# Patient Record
Sex: FEMALE | Race: WHITE | Employment: OTHER | URBAN - METROPOLITAN AREA
[De-identification: names, ages, dates, MRNs, and addresses within clinical notes are randomized per-mention and may not be internally consistent; named-entity substitution may affect disease eponyms.]

---

## 2017-01-04 ENCOUNTER — APPOINTMENT (OUTPATIENT)
Dept: GENERAL RADIOLOGY | Age: 67
DRG: 617 | End: 2017-01-04
Attending: PODIATRIST
Payer: MEDICARE

## 2017-01-04 PROCEDURE — 73630 X-RAY EXAM OF FOOT: CPT

## 2017-05-10 ENCOUNTER — HOSPITAL ENCOUNTER (OUTPATIENT)
Dept: GENERAL RADIOLOGY | Age: 67
Discharge: HOME | End: 2017-05-10
Attending: PODIATRIST
Payer: MEDICARE

## 2017-05-10 DIAGNOSIS — M25.571 RIGHT ANKLE PAIN: ICD-10-CM

## 2017-05-10 PROCEDURE — 73610 X-RAY EXAM OF ANKLE: CPT

## 2017-06-12 ENCOUNTER — HOSPITAL ENCOUNTER (OUTPATIENT)
Dept: GENERAL RADIOLOGY | Age: 67
Discharge: HOME | End: 2017-06-12
Attending: INTERNAL MEDICINE
Payer: MEDICARE

## 2017-06-12 DIAGNOSIS — R11.2 NAUSEA WITH VOMITING: ICD-10-CM

## 2017-06-12 DIAGNOSIS — K21.9 GERD (GASTROESOPHAGEAL REFLUX DISEASE): ICD-10-CM

## 2017-06-12 PROCEDURE — 74011000255 HC RX REV CODE- 255: Performed by: INTERNAL MEDICINE

## 2017-06-12 PROCEDURE — 74240 X-RAY XM UPR GI TRC 1CNTRST: CPT

## 2017-06-12 RX ADMIN — BARIUM SULFATE 300 ML: 0.6 SUSPENSION ORAL at 08:56

## 2017-07-22 ENCOUNTER — APPOINTMENT (OUTPATIENT)
Dept: CT IMAGING | Age: 67
DRG: 871 | End: 2017-07-22
Attending: EMERGENCY MEDICINE
Payer: MEDICARE

## 2017-07-22 ENCOUNTER — HOSPITAL ENCOUNTER (INPATIENT)
Age: 67
LOS: 2 days | Discharge: HOME HEALTH | DRG: 871 | End: 2017-07-24
Attending: EMERGENCY MEDICINE | Admitting: FAMILY MEDICINE
Payer: MEDICARE

## 2017-07-22 ENCOUNTER — APPOINTMENT (OUTPATIENT)
Dept: GENERAL RADIOLOGY | Age: 67
DRG: 871 | End: 2017-07-22
Attending: EMERGENCY MEDICINE
Payer: MEDICARE

## 2017-07-22 DIAGNOSIS — J18.9 COMMUNITY ACQUIRED PNEUMONIA: Primary | ICD-10-CM

## 2017-07-22 DIAGNOSIS — G89.4 CHRONIC PAIN SYNDROME: ICD-10-CM

## 2017-07-22 PROBLEM — I73.9 PAD (PERIPHERAL ARTERY DISEASE) (HCC): Chronic | Status: ACTIVE | Noted: 2017-07-22

## 2017-07-22 PROBLEM — A41.9 SEPSIS (HCC): Status: ACTIVE | Noted: 2017-07-22

## 2017-07-22 PROBLEM — E87.1 HYPONATREMIA: Status: ACTIVE | Noted: 2017-07-22

## 2017-07-22 PROBLEM — F51.3 SLEEPWALKING DISORDER: Chronic | Status: ACTIVE | Noted: 2017-07-22

## 2017-07-22 LAB
ALBUMIN SERPL BCP-MCNC: 3.4 G/DL (ref 3.4–5)
ALBUMIN/GLOB SERPL: 0.8 {RATIO} (ref 0.8–1.7)
ALP SERPL-CCNC: 127 U/L (ref 45–117)
ALT SERPL-CCNC: 45 U/L (ref 13–56)
AMPHET UR QL SCN: NEGATIVE
ANION GAP BLD CALC-SCNC: 13 MMOL/L (ref 3–18)
APPEARANCE UR: CLEAR
AST SERPL W P-5'-P-CCNC: 28 U/L (ref 15–37)
ATRIAL RATE: 97 BPM
BACTERIA URNS QL MICRO: ABNORMAL /HPF
BARBITURATES UR QL SCN: NEGATIVE
BASOPHILS # BLD AUTO: 0 K/UL (ref 0–0.06)
BASOPHILS # BLD: 0 % (ref 0–2)
BENZODIAZ UR QL: NEGATIVE
BILIRUB SERPL-MCNC: 0.3 MG/DL (ref 0.2–1)
BILIRUB UR QL: NEGATIVE
BUN SERPL-MCNC: 16 MG/DL (ref 7–18)
BUN/CREAT SERPL: 19 (ref 12–20)
CALCIUM SERPL-MCNC: 8.8 MG/DL (ref 8.5–10.1)
CALCULATED P AXIS, ECG09: 43 DEGREES
CALCULATED R AXIS, ECG10: 47 DEGREES
CALCULATED T AXIS, ECG11: 65 DEGREES
CANNABINOIDS UR QL SCN: POSITIVE
CHLORIDE SERPL-SCNC: 98 MMOL/L (ref 100–108)
CK MB CFR SERPL CALC: NORMAL % (ref 0–4)
CK MB SERPL-MCNC: <1 NG/ML (ref 5–25)
CK SERPL-CCNC: 119 U/L (ref 26–192)
CO2 SERPL-SCNC: 23 MMOL/L (ref 21–32)
COCAINE UR QL SCN: NEGATIVE
COLOR UR: YELLOW
CREAT SERPL-MCNC: 0.84 MG/DL (ref 0.6–1.3)
D DIMER PPP FEU-MCNC: 0.75 UG/ML(FEU)
DIAGNOSIS, 93000: NORMAL
DIFFERENTIAL METHOD BLD: ABNORMAL
EOSINOPHIL # BLD: 0 K/UL (ref 0–0.4)
EOSINOPHIL NFR BLD: 0 % (ref 0–5)
EPITH CASTS URNS QL MICRO: ABNORMAL /LPF (ref 0–5)
ERYTHROCYTE [DISTWIDTH] IN BLOOD BY AUTOMATED COUNT: 18.6 % (ref 11.6–14.5)
EST. AVERAGE GLUCOSE BLD GHB EST-MCNC: 186 MG/DL
GLOBULIN SER CALC-MCNC: 4.1 G/DL (ref 2–4)
GLUCOSE SERPL-MCNC: 183 MG/DL (ref 74–99)
GLUCOSE UR STRIP.AUTO-MCNC: 100 MG/DL
HBA1C MFR BLD: 8.1 % (ref 4.5–5.6)
HCT VFR BLD AUTO: 36.1 % (ref 35–45)
HDSCOM,HDSCOM: ABNORMAL
HGB BLD-MCNC: 11.6 G/DL (ref 12–16)
HGB UR QL STRIP: ABNORMAL
INR PPP: 1.4 (ref 0.8–1.2)
KETONES UR QL STRIP.AUTO: NEGATIVE MG/DL
LACTATE SERPL-SCNC: 1.5 MMOL/L (ref 0.4–2)
LEUKOCYTE ESTERASE UR QL STRIP.AUTO: NEGATIVE
LIPASE SERPL-CCNC: 54 U/L (ref 73–393)
LYMPHOCYTES # BLD AUTO: 4 % (ref 21–52)
LYMPHOCYTES # BLD: 0.7 K/UL (ref 0.9–3.6)
MCH RBC QN AUTO: 27.4 PG (ref 24–34)
MCHC RBC AUTO-ENTMCNC: 32.1 G/DL (ref 31–37)
MCV RBC AUTO: 85.1 FL (ref 74–97)
METHADONE UR QL: NEGATIVE
MONOCYTES # BLD: 0.7 K/UL (ref 0.05–1.2)
MONOCYTES NFR BLD AUTO: 4 % (ref 3–10)
NEUTS SEG # BLD: 17.1 K/UL (ref 1.8–8)
NEUTS SEG NFR BLD AUTO: 92 % (ref 40–73)
NITRITE UR QL STRIP.AUTO: NEGATIVE
OPIATES UR QL: POSITIVE
P-R INTERVAL, ECG05: 144 MS
PCP UR QL: NEGATIVE
PH UR STRIP: 5.5 [PH] (ref 5–8)
PLATELET # BLD AUTO: 233 K/UL (ref 135–420)
PMV BLD AUTO: 9.3 FL (ref 9.2–11.8)
POTASSIUM SERPL-SCNC: 3.8 MMOL/L (ref 3.5–5.5)
PROT SERPL-MCNC: 7.5 G/DL (ref 6.4–8.2)
PROT UR STRIP-MCNC: 100 MG/DL
PROTHROMBIN TIME: 16.6 SEC (ref 11.5–15.2)
Q-T INTERVAL, ECG07: 332 MS
QRS DURATION, ECG06: 76 MS
QTC CALCULATION (BEZET), ECG08: 421 MS
RBC # BLD AUTO: 4.24 M/UL (ref 4.2–5.3)
RBC #/AREA URNS HPF: ABNORMAL /HPF (ref 0–5)
SODIUM SERPL-SCNC: 134 MMOL/L (ref 136–145)
SP GR UR REFRACTOMETRY: >1.03 (ref 1–1.03)
TROPONIN I SERPL-MCNC: <0.02 NG/ML (ref 0–0.06)
UROBILINOGEN UR QL STRIP.AUTO: 1 EU/DL (ref 0.2–1)
VENTRICULAR RATE, ECG03: 97 BPM
WBC # BLD AUTO: 18.5 K/UL (ref 4.6–13.2)
WBC URNS QL MICRO: ABNORMAL /HPF (ref 0–5)

## 2017-07-22 PROCEDURE — 93005 ELECTROCARDIOGRAM TRACING: CPT

## 2017-07-22 PROCEDURE — 74011250637 HC RX REV CODE- 250/637: Performed by: EMERGENCY MEDICINE

## 2017-07-22 PROCEDURE — 96365 THER/PROPH/DIAG IV INF INIT: CPT

## 2017-07-22 PROCEDURE — 99285 EMERGENCY DEPT VISIT HI MDM: CPT

## 2017-07-22 PROCEDURE — 74011000258 HC RX REV CODE- 258: Performed by: FAMILY MEDICINE

## 2017-07-22 PROCEDURE — 83036 HEMOGLOBIN GLYCOSYLATED A1C: CPT | Performed by: FAMILY MEDICINE

## 2017-07-22 PROCEDURE — 87086 URINE CULTURE/COLONY COUNT: CPT | Performed by: EMERGENCY MEDICINE

## 2017-07-22 PROCEDURE — 65270000029 HC RM PRIVATE

## 2017-07-22 PROCEDURE — 74011000250 HC RX REV CODE- 250: Performed by: FAMILY MEDICINE

## 2017-07-22 PROCEDURE — 74011000258 HC RX REV CODE- 258: Performed by: EMERGENCY MEDICINE

## 2017-07-22 PROCEDURE — 74011000250 HC RX REV CODE- 250: Performed by: EMERGENCY MEDICINE

## 2017-07-22 PROCEDURE — 74011636320 HC RX REV CODE- 636/320: Performed by: EMERGENCY MEDICINE

## 2017-07-22 PROCEDURE — 82550 ASSAY OF CK (CPK): CPT | Performed by: EMERGENCY MEDICINE

## 2017-07-22 PROCEDURE — 71010 XR CHEST INSP AND EXP: CPT

## 2017-07-22 PROCEDURE — 81001 URINALYSIS AUTO W/SCOPE: CPT | Performed by: EMERGENCY MEDICINE

## 2017-07-22 PROCEDURE — 87077 CULTURE AEROBIC IDENTIFY: CPT | Performed by: EMERGENCY MEDICINE

## 2017-07-22 PROCEDURE — 80053 COMPREHEN METABOLIC PANEL: CPT | Performed by: EMERGENCY MEDICINE

## 2017-07-22 PROCEDURE — 87186 SC STD MICRODIL/AGAR DIL: CPT | Performed by: EMERGENCY MEDICINE

## 2017-07-22 PROCEDURE — 87040 BLOOD CULTURE FOR BACTERIA: CPT | Performed by: EMERGENCY MEDICINE

## 2017-07-22 PROCEDURE — 96361 HYDRATE IV INFUSION ADD-ON: CPT

## 2017-07-22 PROCEDURE — 85025 COMPLETE CBC W/AUTO DIFF WBC: CPT | Performed by: EMERGENCY MEDICINE

## 2017-07-22 PROCEDURE — 96375 TX/PRO/DX INJ NEW DRUG ADDON: CPT

## 2017-07-22 PROCEDURE — 74011250636 HC RX REV CODE- 250/636: Performed by: EMERGENCY MEDICINE

## 2017-07-22 PROCEDURE — 80307 DRUG TEST PRSMV CHEM ANLYZR: CPT | Performed by: FAMILY MEDICINE

## 2017-07-22 PROCEDURE — 83690 ASSAY OF LIPASE: CPT | Performed by: EMERGENCY MEDICINE

## 2017-07-22 PROCEDURE — 83605 ASSAY OF LACTIC ACID: CPT | Performed by: EMERGENCY MEDICINE

## 2017-07-22 PROCEDURE — 85379 FIBRIN DEGRADATION QUANT: CPT | Performed by: EMERGENCY MEDICINE

## 2017-07-22 PROCEDURE — 71275 CT ANGIOGRAPHY CHEST: CPT

## 2017-07-22 PROCEDURE — 85610 PROTHROMBIN TIME: CPT | Performed by: EMERGENCY MEDICINE

## 2017-07-22 RX ORDER — KETOROLAC TROMETHAMINE 15 MG/ML
15 INJECTION, SOLUTION INTRAMUSCULAR; INTRAVENOUS
Status: COMPLETED | OUTPATIENT
Start: 2017-07-22 | End: 2017-07-22

## 2017-07-22 RX ORDER — ALBUTEROL SULFATE 90 UG/1
2 AEROSOL, METERED RESPIRATORY (INHALATION)
Status: DISCONTINUED | OUTPATIENT
Start: 2017-07-22 | End: 2017-07-24 | Stop reason: HOSPADM

## 2017-07-22 RX ORDER — SODIUM CHLORIDE 0.9 % (FLUSH) 0.9 %
5-10 SYRINGE (ML) INJECTION EVERY 8 HOURS
Status: DISCONTINUED | OUTPATIENT
Start: 2017-07-22 | End: 2017-07-24 | Stop reason: HOSPADM

## 2017-07-22 RX ORDER — WARFARIN 6 MG/1
6 TABLET ORAL
Status: DISCONTINUED | OUTPATIENT
Start: 2017-07-23 | End: 2017-07-22

## 2017-07-22 RX ORDER — SODIUM CHLORIDE 0.9 % (FLUSH) 0.9 %
5-10 SYRINGE (ML) INJECTION AS NEEDED
Status: DISCONTINUED | OUTPATIENT
Start: 2017-07-22 | End: 2017-07-22 | Stop reason: SDUPTHER

## 2017-07-22 RX ORDER — MAGNESIUM SULFATE 100 %
4 CRYSTALS MISCELLANEOUS AS NEEDED
Status: DISCONTINUED | OUTPATIENT
Start: 2017-07-22 | End: 2017-07-24 | Stop reason: HOSPADM

## 2017-07-22 RX ORDER — INSULIN LISPRO 100 [IU]/ML
INJECTION, SOLUTION INTRAVENOUS; SUBCUTANEOUS
Status: DISCONTINUED | OUTPATIENT
Start: 2017-07-22 | End: 2017-07-24 | Stop reason: HOSPADM

## 2017-07-22 RX ORDER — KETOROLAC TROMETHAMINE 30 MG/ML
15 INJECTION, SOLUTION INTRAMUSCULAR; INTRAVENOUS
Status: DISCONTINUED | OUTPATIENT
Start: 2017-07-22 | End: 2017-07-22 | Stop reason: CLARIF

## 2017-07-22 RX ORDER — MORPHINE SULFATE 2 MG/ML
4 INJECTION, SOLUTION INTRAMUSCULAR; INTRAVENOUS
Status: DISCONTINUED | OUTPATIENT
Start: 2017-07-22 | End: 2017-07-22 | Stop reason: CLARIF

## 2017-07-22 RX ORDER — ONDANSETRON 2 MG/ML
4 INJECTION INTRAMUSCULAR; INTRAVENOUS
Status: COMPLETED | OUTPATIENT
Start: 2017-07-22 | End: 2017-07-22

## 2017-07-22 RX ORDER — OXYCODONE HYDROCHLORIDE 5 MG/1
5 TABLET ORAL
Status: COMPLETED | OUTPATIENT
Start: 2017-07-22 | End: 2017-07-22

## 2017-07-22 RX ORDER — DEXTROSE 50 % IN WATER (D50W) INTRAVENOUS SYRINGE
25-50 AS NEEDED
Status: DISCONTINUED | OUTPATIENT
Start: 2017-07-22 | End: 2017-07-24 | Stop reason: HOSPADM

## 2017-07-22 RX ORDER — MORPHINE SULFATE 4 MG/ML
4 INJECTION, SOLUTION INTRAMUSCULAR; INTRAVENOUS
Status: COMPLETED | OUTPATIENT
Start: 2017-07-22 | End: 2017-07-22

## 2017-07-22 RX ORDER — SODIUM CHLORIDE 0.9 % (FLUSH) 0.9 %
5-10 SYRINGE (ML) INJECTION AS NEEDED
Status: DISCONTINUED | OUTPATIENT
Start: 2017-07-22 | End: 2017-07-24 | Stop reason: HOSPADM

## 2017-07-22 RX ADMIN — KETOROLAC TROMETHAMINE 15 MG: 15 INJECTION, SOLUTION INTRAMUSCULAR; INTRAVENOUS at 18:20

## 2017-07-22 RX ADMIN — ONDANSETRON 4 MG: 2 INJECTION INTRAMUSCULAR; INTRAVENOUS at 20:28

## 2017-07-22 RX ADMIN — SODIUM CHLORIDE 1000 ML: 900 INJECTION, SOLUTION INTRAVENOUS at 18:18

## 2017-07-22 RX ADMIN — IOPAMIDOL 100 ML: 755 INJECTION, SOLUTION INTRAVENOUS at 19:28

## 2017-07-22 RX ADMIN — OXYCODONE HYDROCHLORIDE 5 MG: 5 TABLET ORAL at 20:52

## 2017-07-22 RX ADMIN — AZTREONAM 2 G: 2 INJECTION, POWDER, LYOPHILIZED, FOR SOLUTION INTRAMUSCULAR; INTRAVENOUS at 23:28

## 2017-07-22 RX ADMIN — Medication 4 MG: at 18:21

## 2017-07-22 RX ADMIN — DOXYCYCLINE 100 MG: 100 INJECTION, POWDER, LYOPHILIZED, FOR SOLUTION INTRAVENOUS at 23:35

## 2017-07-22 RX ADMIN — AZTREONAM 2 G: 2 INJECTION, POWDER, LYOPHILIZED, FOR SOLUTION INTRAMUSCULAR; INTRAVENOUS at 19:53

## 2017-07-22 RX ADMIN — SODIUM CHLORIDE 1000 ML: 900 INJECTION, SOLUTION INTRAVENOUS at 20:28

## 2017-07-22 NOTE — ED TRIAGE NOTES
Pt brought into er via ems from [facility] for right rib/shoulder pain, shortness of breath, and fever. Sepsis Screening completed    (  x)Patient meets SIRS criteria. (  )Patient does not meet SIRS criteria.       SIRS Criteria is achieved when two or more of the following are present   Temperature < 96.8°F (36°C) or > 100.9°F (38.3°C)   Heart Rate > 90 beats per minute   Respiratory Rate > 20 breaths per minute   WBC count > 12,000 or <4,000 or > 10% bands

## 2017-07-22 NOTE — ED NOTES
Bedside and Verbal shift change report given to Ene Hector (oncoming nurse) by Christi Castro RN   (offgoing nurse). Report included the following information SBAR, Kardex, ED Summary, [facility] and Recent Results. Pt currently off the floor to CT.

## 2017-07-22 NOTE — ED PROVIDER NOTES
Date: 7/22/2017   Patient Name: Dl Echevarria   YOB: 1950  Medical Record Number: [id number]    HISTORY OF PRESENTING ILLNESS / COMPLAINT     Chief Complaint   Patient presents with    Shortness of Breath    Fever    Shoulder Pain        History Provided By:  patient    Barriers to Obtaining History:  NONE    Additional History: 5:17 PM   Dl Echevarria is a 73 y.o. female with Hx of collapsed lung, blood clot and DM who presents to the emergency department C/O CP in right rib/shoulder area onset 24 hours ago and gradually worsening. Associated sx's include SOB, vomiting (regular for pt) and fever. PSHx of stomach removal. Pt denies Hx of COPD, smoking cigarettes and difficulty urinating. Primary Care Provider: Sarah Gruber MD   Specialist:    PAST HISTORY     Past Medical History:   Past Medical History:   Diagnosis Date    Arthritis     hands and feet    Asthma     Autoimmune disease ([address]     lupus anticoagulant-causes clotting    Cancer ([address]     carcinoma in situ of cervix and uterus; inside bottom lip    Chronic pain     Left foot    Diabetes ([address] 2013    Dizzy spells     h/o    GERD (gastroesophageal reflux disease)     Liver disease     1/2 of my liver was removed for ulcers.     Lung collapse     2015    Nausea & vomiting 5/23/2012    Neuropathy (HCC)     right foot    Other ill-defined conditions     arterial clot in rt leg    Other ill-defined conditions     restless leg syndrome    Other ill-defined conditions     raynaud's syndrome    Other ill-defined conditions     chronic N/V    Other ill-defined conditions     (2 mutated genes) hyper coag. state     Positive cardiolipin antibodies     Psychiatric disorder     depression    PUD (peptic ulcer disease) 2009    H/O requiring partial gastrectomy and small intestine    Thromboembolus ([address]     artery        Past Surgical History:   Past Surgical History:   Procedure Laterality Date    ABDOMEN SURGERY PROC UNLISTED      duodenal ulcer rupture    BREAST SURGERY PROCEDURE UNLISTED Bilateral 02/2016    hemorrhaged after breast surgery    HX APPENDECTOMY      HX BREAST AUGMENTATION      developed infection and implant removed    HX CHOLECYSTECTOMY      HX GASTRECTOMY      due to ulcers    HX GASTRIC BYPASS  1996    gastric by-pass    HX GI      total gastrectomy, partial colectomy    HX GI      colostomy and reversal    HX HEENT      cancer of lip removed    HX HYSTERECTOMY      for cervical cancer    HX ORTHOPAEDIC      rt foot surgery x10    HX ORTHOPAEDIC Left     great toe gangrenous and amputated    HX OTHER SURGICAL      part of liver removal    HX OTHER SURGICAL      ventral hernia repair x10    HX OTHER SURGICAL      Exc pilonial cystectomy    VASCULAR SURGERY PROCEDURE UNLIST      iliac aorta stents x 6        Family History:   Family History   Problem Relation Age of Onset    Malignant Hyperthermia Neg Hx     Pseudocholinesterase Deficiency Neg Hx     Delayed Awakening Neg Hx     Post-op Nausea/Vomiting Neg Hx     Emergence Delirium Neg Hx     Post-op Cognitive Dysfunction Neg Hx     Other Neg Hx         Social History:   Social History   Substance Use Topics    Smoking status: Former Smoker     Years: 0.50     Quit date: 1/2/1996    Smokeless tobacco: Never Used    Alcohol use Yes      Comment: 1 or 2 glasses wine or mixed drinks monthly         Allergies: Allergies   Allergen Reactions    Rifampin Hives and Swelling     Swelling of mouth and tounge    Levaquin [Levofloxacin] Itching    Amoxicillin Hives    Aspirin Nausea and Vomiting    Azithromycin Angioedema    Bactrim [Sulfamethoprim Ds] Hives    Corn Nausea and Vomiting    Rocephin [Ceftriaxone] Hives    Tylenol [Acetaminophen] Hives    Vancomycin Hives        Review of Systems   Review of Systems   Constitutional: Positive for fever. Respiratory: Positive for shortness of breath.     Cardiovascular: Positive for chest pain. Gastrointestinal: Positive for vomiting. Genitourinary: Negative for difficulty urinating. All other systems reviewed and are negative. Physical Exam  Vitals:    07/22/17 1831 07/22/17 1900 07/22/17 1945 07/22/17 2053   BP:  132/73  142/75   Pulse:  89 (!) 109 91   Resp:  24 20 18   Temp:   (!) 101.1 °F (38.4 °C)    SpO2: 96%  97% 97%   Weight:       Height:           Physical Exam   Nursing note and vitals reviewed. -------------------------PHYSICAL EXAM-------------------------    Vital signs and nursing notes reviewed  Nursing note and VS were reviewed      CONSTITUTIONAL: Mental status: Awake and alert. No immediate acute distress. Non-toxic in appearance. Chronically ill appearing appears. Dyspneic    HEAD: Normocephalic, Atraumatic. EYES: Pupils are equal, round and reactive. Extra-ocular movements intact. Sclera are non icteric. Conjunctiva not injected. ENT: Mucous membranes are moist.   Oral mucosa: There is no erythema or swelling; No oral lesions or thrush. Tonsillar tissue: NO enlargement of the tonsillar tissue or the presence of exudates. Ears: R:  EAC - clear, no swelling with TM that is normal in appearance. L:  EAC - clear, no swelling with TM that is normal in appearance. Nasal mucosa pink with no discharge and the turbinates are of normal size. NECK: Normal ROM. Neck is supple. Anterior aspect: Anterior cervical adenopathy: nothing abnormal.  No obvious enlargement of the thyroid. Trachea is midline. Posterior aspect: Posterior cervical paraspinal muscles are non tender and  bony midline is non tender. Posterior adenopathy: none palpable. CARDIOVASCULAR:  HEART- The rhythm is regular and the rate sounds to be normal. No murmurs, rubs or gallops. VASCULAR - Distal pulses are 2+ and equal.    PULMONARY: Patient is speaking in full sentences. Respiratory effort is normal and without the use of accessory muscles. Air exchange is good. Breath sounds: heard throughout; dry crackles heard on left. No wheezing, rales or rhonchi. CHEST WALL: Normal shape;  non tender to right posterior hemothorax; no crepitus    ABDOMINAL: Visual: non -distended; Ausculation: Bowel sounds are present. Palpation: Soft; No obvious organomegaly; Palpable tenderness: RUQ and epigastrium. NO peritoneal signs - No rebound, guarding or rigidity. BACK: Midline - No bony tenderness; Paraspinal muscles are non tender. Full range of motion. No CVA tenderness. MUSCULOSKELETAL:   Lower Extremities: Peripheral edema- none noted; In general there is No obvious soft tissue tenderness or sites of bony tenderness or deformities; tissue is prink. Multiple toes were surgically removed. Joints: No evidence of acute inflammatory changes and have good range of motion in all extremities. Muscles: Good tone and No obvious muscle tenderness. Upper Extremities: Peripheral edema- none noted; In general there is No obvious soft tissue tenderness or sites of bony tenderness or deformities;   Joints: No evidence of acute inflammatory changes and have good range of motion in all extremities. Muscles: Good tone and No obvious muscle tenderness. SKIN:  Good skin turgor. Cap Refill is Normal. Skin is warm and dry and with NO diaphoresis. Rashes: NONE or nothing that appears infectious; NO petechiae. NO particular lesions. NEUROLOGICAL: Alert, awake and appropriately oriented. Normal speech. CN's are normal;  Motor - no focal weakness; no obvious sensory loss; Cerebellar function- intact; DTR's - 2+ equal    PSYCH: Appropriate affect; normal thought content; no expressed suicidal ideation. INITIAL CLINICAL IMPRESSION and PLANS :  The patient presents with the primary complaint(s) of RECURRENT history of : CP. The presentation, to include historical aspects and clinical findings appear to be consistent with the DX of PNA.  However, other possible DX's to consider as primary, associated with, or exacerbated by include:    1.  pluricy  2.  musculoskeletal pain  3.  subdiaphoretic abscess     My initial focus is to Determine the cause and extent of the problem, Initiate Treatment as Appropriate, Determine the presence of associated injuries and STABILIZE THE PATIENT'S CONDITION . Considering the above, my initial management plan to evaluate and therapeutic interventions include: Obtain Lab Studies,, Obtain Radiologic studies, and Initiate Medications and or IV Fluids,  As well as those noted in the orders:      Deneen Funes MD      [facility] [place]:  I have reviewed past medical records with pertinent portions incorporated below. I reviewed the vital signs, available nursing notes, past medical history, past surgical history, family history and social history. I have spoken to other providers as described below. Old Medical Records: Nursing notes. Pertinent Input from Medical Records:    Diagnostic Study Results:     EKG interpretation: (Preliminary)  Rhythm: NSR.  Rate (approx.): 97 bpm; possible left atrial enlargement   EKG read by Luz Yu MD at 17:06    Labs -      Recent Results (from the past 12 hour(s))   EKG, 12 LEAD, INITIAL    Collection Time: 07/22/17  5:06 PM   Result Value Ref Range    Ventricular Rate 97 BPM    Atrial Rate 97 BPM    P-R Interval 144 ms    QRS Duration 76 ms    Q-T Interval 332 ms    QTC Calculation (Bezet) 421 ms    Calculated P Axis 43 degrees    Calculated R Axis 47 degrees    Calculated T Axis 65 degrees    Diagnosis       Normal sinus rhythm  Possible Left atrial enlargement  Borderline ECG  When compared with ECG of 31-AUG-2016 14:57,  No significant change was found     CBC WITH AUTOMATED DIFF    Collection Time: 07/22/17  5:15 PM   Result Value Ref Range    WBC 18.5 (H) 4.6 - 13.2 K/uL    RBC 4.24 4.20 - 5.30 M/uL    HGB 11.6 (L) 12.0 - 16.0 g/dL    HCT 36.1 35.0 - 45.0 %    MCV 85.1 74.0 - 97.0 FL    MCH 27.4 24.0 - 34.0 PG    MCHC 32.1 31.0 - 37.0 g/dL    RDW 18.6 (H) 11.6 - 14.5 %    PLATELET 055 688 - 101 K/uL    MPV 9.3 9.2 - 11.8 FL    NEUTROPHILS 92 (H) 40 - 73 %    LYMPHOCYTES 4 (L) 21 - 52 %    MONOCYTES 4 3 - 10 %    EOSINOPHILS 0 0 - 5 %    BASOPHILS 0 0 - 2 %    ABS. NEUTROPHILS 17.1 (H) 1.8 - 8.0 K/UL    ABS. LYMPHOCYTES 0.7 (L) 0.9 - 3.6 K/UL    ABS. MONOCYTES 0.7 0.05 - 1.2 K/UL    ABS. EOSINOPHILS 0.0 0.0 - 0.4 K/UL    ABS. BASOPHILS 0.0 0.0 - 0.06 K/UL    DF AUTOMATED     LACTIC ACID    Collection Time: 07/22/17  5:15 PM   Result Value Ref Range    Lactic acid 1.5 0.4 - 2.0 MMOL/L   METABOLIC PANEL, COMPREHENSIVE    Collection Time: 07/22/17  5:15 PM   Result Value Ref Range    Sodium 134 (L) 136 - 145 mmol/L    Potassium 3.8 3.5 - 5.5 mmol/L    Chloride 98 (L) 100 - 108 mmol/L    CO2 23 21 - 32 mmol/L    Anion gap 13 3.0 - 18 mmol/L    Glucose 183 (H) 74 - 99 mg/dL    BUN 16 7.0 - 18 MG/DL    Creatinine 0.84 0.6 - 1.3 MG/DL    BUN/Creatinine ratio 19 12 - 20      GFR est AA >60 >60 ml/min/1.73m2    GFR est non-AA >60 >60 ml/min/1.73m2    Calcium 8.8 8.5 - 10.1 MG/DL    Bilirubin, total 0.3 0.2 - 1.0 MG/DL    ALT (SGPT) 45 13 - 56 U/L    AST (SGOT) 28 15 - 37 U/L    Alk.  phosphatase 127 (H) 45 - 117 U/L    Protein, total 7.5 6.4 - 8.2 g/dL    Albumin 3.4 3.4 - 5.0 g/dL    Globulin 4.1 (H) 2.0 - 4.0 g/dL    A-G Ratio 0.8 0.8 - 1.7     CARDIAC PANEL,(CK, CKMB & TROPONIN)    Collection Time: 07/22/17  5:15 PM   Result Value Ref Range     26 - 192 U/L    CK - MB <1.0 <3.6 ng/ml    CK-MB Index  0.0 - 4.0 %     CALCULATION NOT PERFORMED WHEN RESULT IS BELOW LINEAR LIMIT    Troponin-I, Qt. <0.02 0.00 - 0.06 NG/ML   D DIMER    Collection Time: 07/22/17  5:15 PM   Result Value Ref Range    D DIMER 0.75 (H) <0.46 ug/ml(FEU)   LIPASE    Collection Time: 07/22/17  5:15 PM   Result Value Ref Range    Lipase 54 (L) 73 - 393 U/L   PROTHROMBIN TIME + INR    Collection Time: 07/22/17  5:15 PM Result Value Ref Range    Prothrombin time 16.6 (H) 11.5 - 15.2 sec    INR 1.4 (H) 0.8 - 1.2     URINALYSIS W/ RFLX MICROSCOPIC    Collection Time: 07/22/17  8:00 PM   Result Value Ref Range    Color YELLOW      Appearance CLEAR      Specific gravity >1.030 (H) 1.005 - 1.030    pH (UA) 5.5 5.0 - 8.0      Protein 100 (A) NEG mg/dL    Glucose 100 (A) NEG mg/dL    Ketone NEGATIVE  NEG mg/dL    Bilirubin NEGATIVE  NEG      Blood SMALL (A) NEG      Urobilinogen 1.0 0.2 - 1.0 EU/dL    Nitrites NEGATIVE  NEG      Leukocyte Esterase NEGATIVE  NEG     URINE MICROSCOPIC ONLY    Collection Time: 07/22/17  8:00 PM   Result Value Ref Range    WBC 0 to 3 0 - 5 /hpf    RBC 2 to 6 0 - 5 /hpf    Epithelial cells FEW 0 - 5 /lpf    Bacteria FEW (A) NEG /hpf       Radiologic Studies -  The following have been ordered and reviewed:  XR CHEST INSP AND EXP   Final Result   Left lower lobe airspace opacity may represent pneumonia,  aspiration, or atelectasis. As read by the radiologist.   CTA CHEST ABD PELV W CONT    (Final Result)    IMPRESSION:     1. No evidence of pulmonary embolism. 2. No evidence of acute aortic pathology. 3. Patchy bilateral airspace opacities are concerning for pneumonia. 4. Large volume of retained stool in the colon, otherwise no evidence of acute  abnormality in the abdomen or pelvis. As read by the radiologist.        ED COURSE:    Vital Signs-Reviewed the patient's vital signs.    Patient Vitals for the past 12 hrs:   Temp Pulse Resp BP SpO2   07/22/17 2053 - 91 18 142/75 97 %   07/22/17 1945 (!) 101.1 °F (38.4 °C) (!) 109 20 - 97 %   07/22/17 1900 - 89 24 132/73 -   07/22/17 1831 - - - - 96 %   07/22/17 1830 - 95 25 135/87 93 %   07/22/17 1815 - 95 24 144/73 -   07/22/17 1800 - (!) 101 23 - -   07/22/17 1745 - 93 20 - -   07/22/17 1730 - 96 22 101/79 96 %   07/22/17 1715 - 100 22 147/78 98 %   07/22/17 1708 (!) 103.1 °F (39.5 °C) (!) 102 22 143/64 96 %       Pulse Oximetry Analysis - Normal 96% on RA Cardiac Monitor:   Rate: 97 bpm  Rhythm: Normal Sinus Rhythm     Provider Notes:    [id number]  Initial assessment performed. I examined the patient and provided information regarding the scope of my initial clinical impression and plans for diagnostic and therapeutic intervention(s). I have encouraged them to ask questions as they arise throughout their visit. Miriam Mendez MD    TRANSITION OF CARE:  Summary: pt presents with subacute onset of right posterior chest pains and then later develops fever to 103. Does not describe infectious like sx's other than vomiting more bile than usual. She notes having a h/o sleep walking and could have fallen. ED Course:        Pertinent Study Results: WBC to 18K; CXR - no pneumo or infiltrate          Interventions:  Analgesics and Toradol for fever - use of this NSAID discussed with patient given her h/o GI ulcers             Response thus far:  Appears to be more comfortable. Preliminary Clinical Impression: confusing presentation. May have a skeletal injury but this does not explain the fever. Concern for intra-abdominal  and/ or vascular process such as a PE or ischemic bowel causing referred pain to the chest wall. There is no obvious evidence of a source of fever. Pending Studies:  CTA chest and Abdominal      Pending Response:  Effect on fever      Pending Consults:  none        Plan: obtain CTA of chest and abdom; IVF's;         Anticipated Disposition:  Probable admission  Ayaz Patel MD    BEDSIDE TRANSFER OF CARE:  7:08 PM  Patient care will be transferred from Pat Juarez MD to [facility] .  Discussed available diagnostic results and care plan at length at beside. Patient and family made aware of provider change. All questions answered. Patient and family voiced understanding.   Written by Wilton Moody ED Scribe, as dictated by Pat Juarez MD.     PROGRESS NOTE:   8:47 PM  Pt was updated on results and plan for admission. Procedures:   Procedures    Medications Given in the ED:  Medications   sodium chloride (NS) flush 5-10 mL (not administered)   sodium chloride 0.9 % bolus infusion 1,000 mL (0 mL IntraVENous IV Completed 7/22/17 2001)   morphine injection 4 mg (4 mg IntraVENous Given 7/22/17 1821)   ketorolac (TORADOL) injection 15 mg (15 mg IntraVENous Given 7/22/17 1820)   iopamidol (ISOVUE-370) 76 % injection 100 mL (100 mL IntraVENous Given 7/22/17 1928)   aztreonam (AZACTAM) 2 g in 0.9% sodium chloride (MBP/ADV) 100 mL MBP (0 g IntraVENous IV Completed 7/22/17 2023)   sodium chloride 0.9 % bolus infusion 1,000 mL (1,000 mL IntraVENous New Bag 7/22/17 2028)   ondansetron (ZOFRAN) injection 4 mg (4 mg IntraVENous Given 7/22/17 2028)   oxyCODONE IR (ROXICODONE) tablet 5 mg (5 mg Oral Given 7/22/17 2052)         CONCLUDING NOTES:   I was the first provider for this patient. During the ED course I had re-evaluated the patient, answered their and /or their family's questions regarding my clinical impression, the patient's condition and plans for therapeutic interventions. The patient's ED course was uneventful and remained stable throughout. Response to Intervention(s):   IMPROVED      Unanticipated Developments: NONE       Critical Care Time:     CLINICAL IMPRESSION AND DISCUSSION:     Based on the clinical presentation, findings and results of diagnostic studies, as well as developments while in the ED,  I suspect the following:         For the presentation noted above    My clinical impression is: Community acquired pneumonia         DISCUSSION REGARDING CLINICAL IMPRESSION: The specifics regarding my clinical impression / diagnosis are as follows:        Specific Conversations:  NONE      DISPOSITION DECISION:   CONCERNS / CONSIDERATIONS / JUSTIFICATION: Alec Osman require parenteral abx's    DISPOSITION DECISION:     ADMIT:  Based the my repeated evaluations to assess the patient's clinical response, the existence of underlying and / or new clinical conditions,and / or specific logistic considerations, I feel that hospitalization is warranted to continue ongoing monitoring and patient care. I have reviewed this information and my rationale to the patient and/or their family. The patient expresses agreement and understanding for the need to be admitted and of the admission diagnosis. Consultation will be made now with the inpatient physician for hospitalization. Patient's condition upon admission from the ED: CONDITION STABILIZED    CONDITIONS ON ADMISSION:  Deep Vein Thrombosis is not present at the time of admission. Thrombosis is not present at the time of admission. Urinary Tract Infection is not present at the time of admission. Pneumonia is present at the time of admission. MRSA is not present at the time of admission. Wound infection is not present at the time of admission. Pressure Ulcer is not present at the time of admission. CLINICAL IMPRESSION  1. Community acquired pneumonia        PLAN:  1. ADMIT TO:  DO Pineda Coleman MD      ATTESTATIONS:  This note is prepared by Chandra Lopes, acting as Scribe for Pineda Hurt MD.    Pineda Hurt MD: The scribe's documentation has been prepared under my direction and personally reviewed by me in its entirety. I confirm that the note above accurately reflects all work, treatment, procedures, and medical decision making performed by me. ATTESTATIONS:  This note is prepared by Anmol Gaston, acting as Scribe for Bridger Arellano MD .    Bridger Arellano MD: The scribe's documentation has been prepared under my direction and personally reviewed by me in its entirety. I confirm that the note above accurately reflects all work, treatment, procedures, and medical decision making performed by me.

## 2017-07-22 NOTE — IP AVS SNAPSHOT
[address] [place] [id number] 
[phone] Patient: Sherran Siemens MRN: [id number] GUB:[id number] You are allergic to the following Allergen Reactions Rifampin Hives Swelling Swelling of mouth and tounge Levaquin (Levofloxacin) Itching Amoxicillin Hives Aspirin Nausea and Vomiting Azithromycin Angioedema Bactrim (Sulfamethoprim Ds) Hives Corn Nausea and Vomiting Rocephin (Ceftriaxone) Hives Tylenol (Acetaminophen) Hives Vancomycin Hives Recent Documentation Height Weight Breastfeeding? BMI OB Status Smoking Status 1.651 m 62.5 kg No 22.93 kg/m2 Hysterectomy Former Smoker Unresulted Labs Order Current Status CULTURE, BLOOD Preliminary result Emergency Contacts Name Discharge Info Relation Home Work Mobile Cory Pineda DISCHARGE CAREGIVER [3] Son [22] [phone] Héctor Adkins  Other Relative [6]   [phone] PinedaKyler malin  Child [2] [phone] About your hospitalization You were admitted on:  July 22, 2017 You last received care in the:  [address] [place] [address] You were discharged on:  July 24, 2017 Unit phone number:  [phone] Why you were hospitalized Your primary diagnosis was:  Sepsis (Hcc) Your diagnoses also included:  Lupus Anticoagulant Syndrome (Hcc), Diabetes (Hcc), Pad (Peripheral Artery Disease) (Hcc), Hyponatremia, Sleepwalking Disorder, Cap (Community Acquired Pneumonia) Providers Seen During Your Hospitalizations Provider Role Specialty Primary office phone Geno Gómez MD Attending Provider Emergency Medicine [phone] Alexx Rene DO Attending Provider Family Practice [phone] Diaz Llamas DO Attending Provider Internal Medicine [phone] Your Primary Care Physician (PCP) Primary Care Physician Office Phone Office Fax Kala Coreas [phone] Follow-up Information Follow up With Details Comments Contact Collin Schneider MD On 7/28/2017 Follow up appointment scheduled for July 28, 2017 at 10:00 a.m. with Nav Jang 16. R 
Jamal 40 
[phone] [facility]  Chosen to continue managing your healthcare needs [address] [place] [address] [phone] Current Discharge Medication List  
  
START taking these medications Dose & Instructions Dispensing Information Comments Morning Noon Evening Bedtime  
 celecoxib 200 mg capsule Commonly known as:  CELEBREX Start taking on:  7/25/2017 Your last dose was: Your next dose is:    
   
   
 Dose:  200 mg Take 1 Cap by mouth daily for 90 days. Quantity:  10 Cap Refills:  0  
     
   
   
   
  
 doxycycline 100 mg capsule Commonly known as:  Maryessenia Anna Your last dose was: Your next dose is:    
   
   
 Dose:  100 mg Take 1 Cap by mouth two (2) times a day for 7 days. Quantity:  14 Cap Refills:  0  
     
   
   
   
  
 lidocaine 5 % Commonly known as:  Osborne Risser Your last dose was: Your next dose is:    
   
   
 Apply patch to the affected area for 12 hours a day and remove for 12 hours a day. Quantity:  30 Each Refills:  0 CONTINUE these medications which have NOT CHANGED Dose & Instructions Dispensing Information Comments Morning Noon Evening Bedtime  
 albuterol 90 mcg/actuation inhaler Commonly known as:  PROVENTIL HFA, VENTOLIN HFA, PROAIR HFA Your last dose was: Your next dose is:    
   
   
 Dose:  2 Puff Take 2 Puffs by inhalation every four (4) hours as needed. Indications: ACUTE ASTHMA ATTACK Refills:  0  
     
   
   
   
  
 ATIVAN 2 mg tablet Generic drug:  LORazepam  
   
 Your last dose was: Your next dose is:    
   
   
 Dose:  2 mg Take 2 mg by mouth nightly. Refills:  0  
     
   
   
   
  
 BENADRYL 25 mg capsule Generic drug:  diphenhydrAMINE Your last dose was: Your next dose is:    
   
   
 Dose:  50 mg Take 50 mg by mouth every six (6) hours as needed. Indications: ALLERGIC RHINITIS Refills:  0  
     
   
   
   
  
 BIOTIN PO Your last dose was: Your next dose is:    
   
   
 Dose:  5000 mcg Take 5,000 mcg by mouth daily. Refills:  0  
     
   
   
   
  
 cloNIDine HCl 0.1 mg tablet Commonly known as:  CATAPRES Your last dose was: Your next dose is:    
   
   
 Dose:  0.2 mg Take 0.2 mg by mouth two (2) times a day. Indications: HYPERTENSION, hot flashes; Refills:  0 ESTROVEN MAXIMUM STRENGTH 400 mcg Tab Generic drug:  Chandrika Streeter,Min-FA-Herbal No.157 Your last dose was: Your next dose is:    
   
   
 Dose:  1 Tab Take 1 Tab by mouth two (2) times a day. Refills:  0  
     
   
   
   
  
 hydrOXYzine HCl 25 mg tablet Commonly known as:  ATARAX Your last dose was: Your next dose is:    
   
   
 Dose:  25 mg Take 25 mg by mouth daily. Indications: ALLERGIC RHINITIS Refills:  0  
     
   
   
   
  
 levocetirizine 5 mg tablet Commonly known as:  Leandro Ambrosia Your last dose was: Your next dose is:    
   
   
 Dose:  5 mg Take 5 mg by mouth daily. Indications: ALLERGIC RHINITIS Refills:  0 MARINOL 2.5 mg capsule Generic drug:  dronabinol Your last dose was: Your next dose is:    
   
   
 Dose:  2.5 mg Take 2.5 mg by mouth three (3) times daily as needed. Indications: nausea Refills:  0  
     
   
   
   
  
 metFORMIN 500 mg tablet Commonly known as:  GLUCOPHAGE Your last dose was:     
   
Your next dose is:    
   
   
 Dose:  500 mg  
 Take 500 mg by mouth two (2) times daily (with meals). Indications: TYPE 2 DIABETES MELLITUS Refills:  0  
     
   
   
   
  
 miconazole 2 % vaginal cream  
Commonly known as:  Sandy Cobian Your last dose was: Your next dose is:    
   
   
 Dose:  1 Applicator Insert 1 Applicator into vagina every evening. Quantity:  45 g Refills:  0  
     
   
   
   
  
 promethazine 25 mg tablet Commonly known as:  PHENERGAN Your last dose was: Your next dose is:    
   
   
 Dose:  25 mg Take 25 mg by mouth every six (6) hours as needed for Nausea. Refills:  0  
     
   
   
   
  
 REQUIP 4 mg Tab TAB Generic drug:  rOPINIRole Your last dose was: Your next dose is:    
   
   
 Dose:  4 mg Take 4 mg by mouth nightly. Indications: RESTLESS LEGS SYNDROME Refills:  0  
     
   
   
   
  
 traMADol 50 mg tablet Commonly known as:  ULTRAM  
   
Your last dose was: Your next dose is:    
   
   
 Dose:  50 mg Take 50 mg by mouth every eight (8) hours as needed. Indications: PAIN Refills:  0  
     
   
   
   
  
 warfarin 6 mg tablet Commonly known as:  COUMADIN Your last dose was: Your next dose is:    
   
   
 Dose:  7 mg Take 7 mg by mouth nightly. Takes for circulation Refills:  0 Where to Get Your Medications Information on where to get these meds will be given to you by the nurse or doctor. ! Ask your nurse or doctor about these medications  
  celecoxib 200 mg capsule  
 doxycycline 100 mg capsule  
 lidocaine 5 % Discharge Instructions Sepsis: Care Instructions Your Care Instructions Sepsis is an infection that has spread throughout your body. It is a life-threatening condition and often causes extremely low blood pressure. This can lead to problems with many different organs.  
The cause of sepsis is not always clear, but it can happen as part of a long-term or sudden illness. Sometimes even a mild illness can lead to sepsis. Follow-up care is a key part of your treatment and safety. Be sure to make and go to all appointments, and call your doctor if you are having problems. Its also a good idea to know your test results and keep a list of the medicines you take. How can you care for yourself at home? · If your doctor prescribed antibiotics, take them as directed. Do not stop taking them just because you feel better. You need to take the full course of antibiotics. · Drink plenty of fluids, enough so that your urine is light yellow or clear like water. Choose water or caffeine-free clear liquids until you feel better. If you have kidney, heart, or liver disease and have to limit fluids, talk with your doctor before you increase your fluid intake. You can try rehydration drinks, such as Gatorade or Powerade. · Do not drink alcohol. · Eat a healthy diet. Include fruits, vegetables, and whole grains in your diet every day. · Walking is an easy way to get exercise. Gradually increase the amount you walk every day. Make sure your doctor knows that you are starting an exercise program. 
· Do not smoke or use other tobacco products. If you need help quitting, talk to your doctor about stop-smoking programs and medicines. These can increase your chances of quitting for good. When should you call for help? Call 911 anytime you think you may need emergency care. For example, call if: 
· You passed out (lost consciousness). Call your doctor now or seek immediate medical care if: 
· You have a fever or chills. · You have cool, pale, or clammy skin. · You are dizzy or lightheaded, or you feel like you may faint. · You have any new symptoms, such as a cough, pain in one part of your body, or urinary problems. Watch closely for changes in your health, and be sure to contact your doctor if: 
· You do not get better as expected. Where can you learn more? Go to http://lisa-kenan.info/. Enter A625 in the search box to learn more about \"Sepsis: Care Instructions. \" Current as of: March 20, 2017 Content Version: 11.3 © 7245-1976 [employer]. Care instructions adapted under license by [employer] (which disclaims liability or warranty for this information). If you have questions about a medical condition or this instruction, always ask your healthcare professional. [place] [address] any warranty or liability for your use of this information. Pneumonia: Care Instructions Your Care Instructions Pneumonia is an infection of the lungs. Most cases are caused by infections from bacteria or viruses. Pneumonia may be mild or very severe. If it is caused by bacteria, you will be treated with antibiotics. It may take a few weeks to a few months to recover fully from pneumonia, depending on how sick you were and whether your overall health is good. Follow-up care is a key part of your treatment and safety. Be sure to make and go to all appointments, and call your doctor if you are having problems. Its also a good idea to know your test results and keep a list of the medicines you take. How can you care for yourself at home? · Take your antibiotics exactly as directed. Do not stop taking the medicine just because you are feeling better. You need to take the full course of antibiotics. · Take your medicines exactly as prescribed. Call your doctor if you think you are having a problem with your medicine. · Get plenty of rest and sleep. You may feel weak and tired for a while, but your energy level will improve with time. · To prevent dehydration, drink plenty of fluids, enough so that your urine is light yellow or clear like water. Choose water and other caffeine-free clear liquids until you feel better.  If you have kidney, heart, or liver disease and have to limit fluids, talk with your doctor before you increase the amount of fluids you drink. · Take care of your cough so you can rest. A cough that brings up mucus from your lungs is common with pneumonia. It is one way your body gets rid of the infection. But if coughing keeps you from resting or causes severe fatigue and chest-wall pain, talk to your doctor. He or she may suggest that you take a medicine to reduce the cough. · Use a vaporizer or humidifier to add moisture to your bedroom. Follow the directions for cleaning the machine. · Do not smoke or allow others to smoke around you. Smoke will make your cough last longer. If you need help quitting, talk to your doctor about stop-smoking programs and medicines. These can increase your chances of quitting for good. · Take an over-the-counter pain medicine, such as acetaminophen (Tylenol), ibuprofen (Advil, Motrin), or naproxen (Aleve). Read and follow all instructions on the label. · Do not take two or more pain medicines at the same time unless the doctor told you to. Many pain medicines have acetaminophen, which is Tylenol. Too much acetaminophen (Tylenol) can be harmful. · If you were given a spirometer to measure how well your lungs are working, use it as instructed. This can help your doctor tell how your recovery is going. · To prevent pneumonia in the future, talk to your doctor about getting a flu vaccine (once a year) and a pneumococcal vaccine (one time only for most people). When should you call for help? Call 911 anytime you think you may need emergency care. For example, call if: 
· You have severe trouble breathing. Call your doctor now or seek immediate medical care if: 
· You cough up dark brown or bloody mucus (sputum). · You have new or worse trouble breathing. · You are dizzy or lightheaded, or you feel like you may faint. Watch closely for changes in your health, and be sure to contact your doctor if: 
· You have a new or higher fever. · You are coughing more deeply or more often. · You are not getting better after 2 days (48 hours). · You do not get better as expected. Where can you learn more? Go to http://lisa-kenan.info/. Enter [id number] in the search box to learn more about \"Pneumonia: Care Instructions. \" Current as of: March 25, 2017 Content Version: 11.3 © 1517-9735 [employer]. Care instructions adapted under license by [employer] (which disclaims liability or warranty for this information). If you have questions about a medical condition or this instruction, always ask your healthcare professional. [place] [address] any warranty or liability for your use of this information. Lab Results Component Value Date/Time Hemoglobin A1c 8.1 07/22/2017 05:15 PM  
 
 
This lab test reflects that your blood sugar has been slightly elevated over the past 3 months and should be evaluated by your primary care provider. An A1C of 5.7-6.4% meets the criteria for pre-diabetes; an A1C of 6.5% or higher meets the criteria for diabetes. This lab test reflects that your blood sugar averaged 186 mg/dL over the past 3 months. It is important to follow up with your provider on a routine basis to continue to evaluate your blood sugar and discuss any necessary changes in treatment. Discharge Orders None [email or web address] Announcement We are excited to announce that we are making your provider's discharge notes available to you in [email or web address]. You will see these notes when they are completed and signed by the physician that discharged you from your recent hospital stay. If you have any questions or concerns about any information you see in [email or web address], please call the Health Information Department where you were seen or reach out to your Primary Care Provider for more information about your plan of care. Introducing \[facility]\"" & HEALTH SERVICES! [facility] introduces [employer] patient portal. Now you can access parts of your medical record, email your doctor's office, and request medication refills online. 1. In your internet browser, go to [email or web address] 2. Click on the First Time User? Click Here link in the Sign In box. You will see the New Member Sign Up page. 3. Enter your [employer] Access Code exactly as it appears below. You will not need to use this code after youve completed the sign-up process. If you do not sign up before the expiration date, you must request a new code. · [employer] Access Code: [email or web address] Expires: 8/8/2017  4:06 PM 
 
4. Enter the last four digits of your Social Security Number (xxxx) and Date of Birth (mm/dd/yyyy) as indicated and click Submit. You will be taken to the next sign-up page. 5. Create a [employer] ID. This will be your [employer] login ID and cannot be changed, so think of one that is secure and easy to remember. 6. Create a [employer] password. You can change your password at any time. 7. Enter your Password Reset Question and Answer. This can be used at a later time if you forget your password. 8. Enter your e-mail address. You will receive e-mail notification when new information is available in [address] 9. Click Sign Up. You can now view and download portions of your medical record. 10. Click the Download Summary menu link to download a portable copy of your medical information. If you have questions, please visit the Frequently Asked Questions section of the [employer] website. Remember, [employer] is NOT to be used for urgent needs. For medical emergencies, dial 911. Now available from your iPhone and Android! General Information Please provide this summary of care documentation to your next provider. Patient Signature:  ____________________________________________________________ Date:  ____________________________________________________________  
  
Caitlin Pillai Provider Signature:  ____________________________________________________________ Date:  ____________________________________________________________

## 2017-07-23 LAB
ALBUMIN SERPL BCP-MCNC: 2.7 G/DL (ref 3.4–5)
ALBUMIN/GLOB SERPL: 0.7 {RATIO} (ref 0.8–1.7)
ALP SERPL-CCNC: 114 U/L (ref 45–117)
ALT SERPL-CCNC: 41 U/L (ref 13–56)
ANION GAP BLD CALC-SCNC: 8 MMOL/L (ref 3–18)
AST SERPL W P-5'-P-CCNC: 36 U/L (ref 15–37)
BASOPHILS # BLD AUTO: 0 K/UL (ref 0–0.06)
BASOPHILS # BLD: 0 % (ref 0–2)
BILIRUB SERPL-MCNC: 0.3 MG/DL (ref 0.2–1)
BUN SERPL-MCNC: 11 MG/DL (ref 7–18)
BUN/CREAT SERPL: 17 (ref 12–20)
CALCIUM SERPL-MCNC: 8.3 MG/DL (ref 8.5–10.1)
CHLORIDE SERPL-SCNC: 102 MMOL/L (ref 100–108)
CO2 SERPL-SCNC: 25 MMOL/L (ref 21–32)
CREAT SERPL-MCNC: 0.65 MG/DL (ref 0.6–1.3)
DIFFERENTIAL METHOD BLD: ABNORMAL
EOSINOPHIL # BLD: 0 K/UL (ref 0–0.4)
EOSINOPHIL NFR BLD: 0 % (ref 0–5)
ERYTHROCYTE [DISTWIDTH] IN BLOOD BY AUTOMATED COUNT: 19.1 % (ref 11.6–14.5)
GLOBULIN SER CALC-MCNC: 3.8 G/DL (ref 2–4)
GLUCOSE BLD STRIP.AUTO-MCNC: 112 MG/DL (ref 70–110)
GLUCOSE BLD STRIP.AUTO-MCNC: 189 MG/DL (ref 70–110)
GLUCOSE BLD STRIP.AUTO-MCNC: 192 MG/DL (ref 70–110)
GLUCOSE BLD STRIP.AUTO-MCNC: 256 MG/DL (ref 70–110)
GLUCOSE SERPL-MCNC: 145 MG/DL (ref 74–99)
HCT VFR BLD AUTO: 33.8 % (ref 35–45)
HGB BLD-MCNC: 10.8 G/DL (ref 12–16)
INR PPP: 1.2 (ref 0.8–1.2)
LYMPHOCYTES # BLD AUTO: 6 % (ref 21–52)
LYMPHOCYTES # BLD: 0.7 K/UL (ref 0.9–3.6)
MCH RBC QN AUTO: 27.8 PG (ref 24–34)
MCHC RBC AUTO-ENTMCNC: 32 G/DL (ref 31–37)
MCV RBC AUTO: 87.1 FL (ref 74–97)
MONOCYTES # BLD: 0.8 K/UL (ref 0.05–1.2)
MONOCYTES NFR BLD AUTO: 6 % (ref 3–10)
NEUTS SEG # BLD: 11.7 K/UL (ref 1.8–8)
NEUTS SEG NFR BLD AUTO: 88 % (ref 40–73)
PLATELET # BLD AUTO: 213 K/UL (ref 135–420)
PMV BLD AUTO: 9.3 FL (ref 9.2–11.8)
POTASSIUM SERPL-SCNC: 4.4 MMOL/L (ref 3.5–5.5)
PROT SERPL-MCNC: 6.5 G/DL (ref 6.4–8.2)
PROTHROMBIN TIME: 14.5 SEC (ref 11.5–15.2)
RBC # BLD AUTO: 3.88 M/UL (ref 4.2–5.3)
SODIUM SERPL-SCNC: 135 MMOL/L (ref 136–145)
WBC # BLD AUTO: 13.2 K/UL (ref 4.6–13.2)

## 2017-07-23 PROCEDURE — 65270000029 HC RM PRIVATE

## 2017-07-23 PROCEDURE — 85025 COMPLETE CBC W/AUTO DIFF WBC: CPT | Performed by: FAMILY MEDICINE

## 2017-07-23 PROCEDURE — 80053 COMPREHEN METABOLIC PANEL: CPT | Performed by: FAMILY MEDICINE

## 2017-07-23 PROCEDURE — 36415 COLL VENOUS BLD VENIPUNCTURE: CPT | Performed by: FAMILY MEDICINE

## 2017-07-23 PROCEDURE — 85610 PROTHROMBIN TIME: CPT | Performed by: FAMILY MEDICINE

## 2017-07-23 PROCEDURE — 74011000258 HC RX REV CODE- 258: Performed by: FAMILY MEDICINE

## 2017-07-23 PROCEDURE — 74011000250 HC RX REV CODE- 250: Performed by: FAMILY MEDICINE

## 2017-07-23 PROCEDURE — 74011250637 HC RX REV CODE- 250/637: Performed by: INTERNAL MEDICINE

## 2017-07-23 PROCEDURE — 82962 GLUCOSE BLOOD TEST: CPT

## 2017-07-23 PROCEDURE — 97161 PT EVAL LOW COMPLEX 20 MIN: CPT

## 2017-07-23 PROCEDURE — 74011250637 HC RX REV CODE- 250/637: Performed by: FAMILY MEDICINE

## 2017-07-23 RX ORDER — POLYETHYLENE GLYCOL 3350 17 G/17G
17 POWDER, FOR SOLUTION ORAL DAILY
Status: DISCONTINUED | OUTPATIENT
Start: 2017-07-23 | End: 2017-07-24 | Stop reason: HOSPADM

## 2017-07-23 RX ORDER — DOCUSATE SODIUM 100 MG/1
200 CAPSULE, LIQUID FILLED ORAL 2 TIMES DAILY
Status: DISCONTINUED | OUTPATIENT
Start: 2017-07-23 | End: 2017-07-24 | Stop reason: HOSPADM

## 2017-07-23 RX ORDER — OXYCODONE HYDROCHLORIDE 5 MG/1
5 TABLET ORAL
Status: DISCONTINUED | OUTPATIENT
Start: 2017-07-23 | End: 2017-07-23

## 2017-07-23 RX ORDER — NYSTATIN 100000 U/G
CREAM TOPICAL DAILY
Status: DISCONTINUED | OUTPATIENT
Start: 2017-07-23 | End: 2017-07-24 | Stop reason: HOSPADM

## 2017-07-23 RX ORDER — WARFARIN 10 MG/1
10 TABLET ORAL ONCE
Status: COMPLETED | OUTPATIENT
Start: 2017-07-23 | End: 2017-07-23

## 2017-07-23 RX ORDER — CLONIDINE 0.2 MG/24H
1 PATCH, EXTENDED RELEASE TRANSDERMAL
Status: DISCONTINUED | OUTPATIENT
Start: 2017-07-24 | End: 2017-07-24

## 2017-07-23 RX ORDER — OXYCODONE HYDROCHLORIDE 5 MG/1
10 TABLET ORAL
Status: DISCONTINUED | OUTPATIENT
Start: 2017-07-23 | End: 2017-07-24 | Stop reason: HOSPADM

## 2017-07-23 RX ORDER — CYCLOBENZAPRINE HCL 10 MG
10 TABLET ORAL
Status: DISCONTINUED | OUTPATIENT
Start: 2017-07-23 | End: 2017-07-24 | Stop reason: HOSPADM

## 2017-07-23 RX ORDER — CELECOXIB 100 MG/1
200 CAPSULE ORAL ONCE
Status: COMPLETED | OUTPATIENT
Start: 2017-07-23 | End: 2017-07-23

## 2017-07-23 RX ORDER — WARFARIN 6 MG/1
6 TABLET ORAL ONCE
Status: DISCONTINUED | OUTPATIENT
Start: 2017-07-23 | End: 2017-07-23 | Stop reason: DRUGHIGH

## 2017-07-23 RX ORDER — DOXYCYCLINE 100 MG/1
100 CAPSULE ORAL EVERY 12 HOURS
Status: DISCONTINUED | OUTPATIENT
Start: 2017-07-23 | End: 2017-07-24 | Stop reason: HOSPADM

## 2017-07-23 RX ORDER — DOXYCYCLINE 100 MG/10ML
100 INJECTION, POWDER, LYOPHILIZED, FOR SOLUTION INTRAVENOUS 2 TIMES DAILY WITH MEALS
Status: DISCONTINUED | OUTPATIENT
Start: 2017-07-23 | End: 2017-07-23

## 2017-07-23 RX ORDER — LIDOCAINE 50 MG/G
1 PATCH TOPICAL EVERY 24 HOURS
Status: DISCONTINUED | OUTPATIENT
Start: 2017-07-23 | End: 2017-07-24 | Stop reason: HOSPADM

## 2017-07-23 RX ADMIN — POLYETHYLENE GLYCOL 3350 17 G: 17 POWDER, FOR SOLUTION ORAL at 12:24

## 2017-07-23 RX ADMIN — CELECOXIB 200 MG: 100 CAPSULE ORAL at 11:08

## 2017-07-23 RX ADMIN — DOCUSATE SODIUM 200 MG: 100 CAPSULE, LIQUID FILLED ORAL at 20:32

## 2017-07-23 RX ADMIN — Medication 10 ML: at 21:06

## 2017-07-23 RX ADMIN — CYCLOBENZAPRINE HYDROCHLORIDE 10 MG: 10 TABLET, FILM COATED ORAL at 22:52

## 2017-07-23 RX ADMIN — OXYCODONE HYDROCHLORIDE 5 MG: 5 TABLET ORAL at 08:48

## 2017-07-23 RX ADMIN — AZTREONAM 2 G: 2 INJECTION, POWDER, LYOPHILIZED, FOR SOLUTION INTRAMUSCULAR; INTRAVENOUS at 14:34

## 2017-07-23 RX ADMIN — DOXYCYCLINE 100 MG: 100 CAPSULE ORAL at 20:32

## 2017-07-23 RX ADMIN — CYCLOBENZAPRINE HYDROCHLORIDE 10 MG: 10 TABLET, FILM COATED ORAL at 15:15

## 2017-07-23 RX ADMIN — OXYCODONE HYDROCHLORIDE 10 MG: 5 TABLET ORAL at 21:04

## 2017-07-23 RX ADMIN — WARFARIN SODIUM 10 MG: 10 TABLET ORAL at 17:42

## 2017-07-23 RX ADMIN — AZTREONAM 2 G: 2 INJECTION, POWDER, LYOPHILIZED, FOR SOLUTION INTRAMUSCULAR; INTRAVENOUS at 04:55

## 2017-07-23 RX ADMIN — OXYCODONE HYDROCHLORIDE 5 MG: 5 TABLET ORAL at 01:22

## 2017-07-23 RX ADMIN — AZTREONAM 2 G: 2 INJECTION, POWDER, LYOPHILIZED, FOR SOLUTION INTRAMUSCULAR; INTRAVENOUS at 23:49

## 2017-07-23 RX ADMIN — OXYCODONE HYDROCHLORIDE 5 MG: 5 TABLET ORAL at 05:09

## 2017-07-23 RX ADMIN — Medication 10 ML: at 22:00

## 2017-07-23 RX ADMIN — NYSTATIN: 100000 CREAM TOPICAL at 22:48

## 2017-07-23 RX ADMIN — Medication 10 ML: at 14:35

## 2017-07-23 RX ADMIN — OXYCODONE HYDROCHLORIDE 10 MG: 5 TABLET ORAL at 15:15

## 2017-07-23 RX ADMIN — DOCUSATE SODIUM 200 MG: 100 CAPSULE, LIQUID FILLED ORAL at 12:23

## 2017-07-23 NOTE — PROGRESS NOTES
1100- Pt could not receive abx due to loss of IV access, nurse attempted x1.    1135- Nurse attempt to start new IV x2 with no success at this time. 1227-  Blood sugar 189. Pt refused insulin at this time, states 'I am very sensitive to insulin'. 1230- PCT attempt to start new IV x2 with no success. Will contact ED.    1517- Pt IV infiltrated. Supervisor states that she will attempt to start IV at this time.

## 2017-07-23 NOTE — PROGRESS NOTES
Problem: Mobility Impaired (Adult and Pediatric)  Goal: *Acute Goals and Plan of Care (Insert Text)  Pt declined any further therapy after evaluation thus goals were not addressed. PHYSICAL THERAPY EVALUATION & DISCHARGE     Patient: Blaise Roman (50 y.o. female)  Date: 7/23/2017  Primary Diagnosis: Sepsis [facility])  Precautions:  Fall      ASSESSMENT AND RECOMMENDATIONS:  Based on the objective data described below, pt presents to acute care PT with CLOF at or slightly below PLOF. Pt very hesitant to participate with PT at this time and required increased encouragement. She was able to complete bed mobility and functional transfers with Mod I with increased time required due to c/o 9/10 upper back pain. During gait training pt ambulated a total of 50 feet with wide TOÑA and decreased step clearance without the use of an AD, only use of a GB for safety. Pt declined any further therapy at this time thus will discharge her from the caseload. Left pt in bed with all needs met and call bell within reach. Informed nursing staff of pt c/o 9/10 pain. Skilled physical therapy is not indicated at this time. Discharge Recommendations: Home Health  Further Equipment Recommendations for Discharge: N/A        SUBJECTIVE:   Patient stated I really think I am fine, I don't need any of this.       OBJECTIVE DATA SUMMARY:       Past Medical History:   Diagnosis Date    Arthritis       hands and feet    Asthma      Autoimmune disease ([address]       lupus anticoagulant-causes clotting    Cancer ([address]       carcinoma in situ of cervix and uterus; inside bottom lip    Chronic pain       Left foot    Diabetes ([address] 2013    Dizzy spells       h/o    GERD (gastroesophageal reflux disease)      Liver disease       1/2 of my liver was removed for ulcers.     Lung collapse       2015    Nausea & vomiting 5/23/2012    Neuropathy (HCC)       right foot    Other ill-defined conditions       arterial clot in rt leg    Other ill-defined conditions       restless leg syndrome    Other ill-defined conditions       raynaud's syndrome    Other ill-defined conditions       chronic N/V    Other ill-defined conditions       (2 mutated genes) hyper coag. state     Positive cardiolipin antibodies      Psychiatric disorder       depression    PUD (peptic ulcer disease) 2009     H/O requiring partial gastrectomy and small intestine    Thromboembolus ([address]       artery     Past Surgical History:   Procedure Laterality Date    ABDOMEN SURGERY PROC UNLISTED         duodenal ulcer rupture    BREAST SURGERY PROCEDURE UNLISTED Bilateral 02/2016     hemorrhaged after breast surgery    HX APPENDECTOMY        HX BREAST AUGMENTATION         developed infection and implant removed    HX CHOLECYSTECTOMY        HX GASTRECTOMY         due to ulcers    HX GASTRIC BYPASS   1996     gastric by-pass    HX GI         total gastrectomy, partial colectomy    HX GI         colostomy and reversal    HX HEENT         cancer of lip removed    HX HYSTERECTOMY         for cervical cancer    HX ORTHOPAEDIC         rt foot surgery x10    HX ORTHOPAEDIC Left       great toe gangrenous and amputated    HX OTHER SURGICAL         part of liver removal    HX OTHER SURGICAL         ventral hernia repair x10    HX OTHER SURGICAL         Exc pilonial cystectomy    VASCULAR SURGERY PROCEDURE UNLIST         iliac aorta stents x 6     Barriers to Learning/Limitations: yes;  physical  Compensate with: visual, verbal, tactile, kinesthetic cues/model  Prior Level of Function/Home Situation: Pt stated she required some assistance with ADLs prior to hospitalization.   Home Situation  Home Environment: Private residence  # Steps to Enter: 0  One/Two Story Residence: Two story, live on 1st floor  # of Interior Steps:  (5)  Height of Each Step (in): 9 inches  Interior Rails: Both  Lift Chair Available: No  Living Alone: No  Support Systems: Child(zack)  Patient Expects to be Discharged to[de-identified] Private residence  Current DME Used/Available at Home: None  Critical Behavior:  Neurologic State: Alert  Orientation Level: Oriented X4  Cognition: Appropriate decision making; Follows commands  Safety/Judgement: Awareness of environment; Insight into deficits  Psychosocial  Patient Behaviors: Calm; Cooperative; Talkative  Purposeful Interaction: Yes  Pt Identified Daily Priority: Clinical issues (comment)  Caritas Process: Nurture loving kindness; Teaching/learning; Attend basic human needs  Caring Interventions: Reassure; Therapeutic modalities  Reassure: Therapeutic listening; Informing;Caring rounds  Therapeutic Modalities: Humor; Intentional therapeutic touch  Skin Condition/Temp: Dry   Skin Integrity: Intact  Skin Integumentary  Skin Color: Appropriate for ethnicity  Skin Condition/Temp: Dry  Skin Integrity: Intact   Strength:    Strength: Generally decreased, functional   Tone & Sensation:   Tone: Normal   Sensation: Intact   Range Of Motion:  AROM: Generally decreased, functional   PROM: Within functional limits   Functional Mobility:  Bed Mobility:  Rolling: Modified independent  Supine to Sit: Modified independent  Sit to Supine: Modified independent  Scooting: Modified independent  Transfers:  Sit to Stand: Modified independent  Stand to Sit: Modified independent  Balance:   Sitting: Intact  Standing: Intact  Ambulation/Gait Training:  Distance (ft): 50 Feet (ft)   Ambulation - Level of Assistance: Supervision   Gait Description (WDL): Exceptions to WDL  Gait Abnormalities: Decreased step clearance   Base of Support: Widened   Speed/Agatha: Pace decreased (<100 feet/min)  Step Length: Right shortened;Left shortened   Interventions: Safety awareness training;Verbal cues   Pain:  Pain Scale 1: Numeric (0 - 10)  Pain Intensity 1: 9  Pain Location 1: Back  Pain Orientation 1: Upper;Right  Pain Description 1: Stabbing  Pain Intervention(s) 1: Medication (see MAR)  Activity Tolerance: Fair+  Please refer to the flowsheet for vital signs taken during this treatment. After treatment:   [ ]         Patient left in no apparent distress sitting up in chair  [X]         Patient left in no apparent distress in bed  [X]         Call bell left within reach  [X]         Nursing notified  [ ]         Caregiver present  [ ]         Bed alarm activated      COMMUNICATION/EDUCATION:   [X]         Fall prevention education was provided and the patient/caregiver indicated understanding. [X]         Patient/family have participated as able in goal setting and plan of care. [X]         Patient/family agree to work toward stated goals and plan of care. [ ]         Patient understands intent and goals of therapy, but is neutral about his/her participation. [ ]         Patient is unable to participate in goal setting and plan of care.      Thank you for this referral.  Darylene Imus, RONNIE      Time Calculation: 19 mins

## 2017-07-23 NOTE — ROUTINE PROCESS
Bedside, Verbal and Written shift change report given to HUMBERTO Castle (oncoming nurse) by Devon Forde. Kita Whitmore (offgoing nurse). Report included the following information SBAR, Kardex, MAR and Recent Results.

## 2017-07-23 NOTE — PROGRESS NOTES
Pharmacy Dosing Services: Warfarin    Consult for Warfarin Dosing by Pharmacy by Dr. Andrew Patel provided for this 78 y.o.  female , for indication of Lupus Anticoagulation Syndrome  Home regimen: Warfarin 7 mg po nightly. Dose to achieve an INR goal of 2-3    INR = 1.2 (7/23/17)  Adjusted today's dose to: Warfarin 10 mg ordered to be given today at 18:00. Significant drug interactions: none  LABS    PT/INR Lab Results   Component Value Date/Time    INR 1.2 07/23/2017 05:05 AM      Platelets Lab Results   Component Value Date/Time    PLATELET 406 36/67/1801 05:05 AM      H/H     Lab Results   Component Value Date/Time    HGB 10.8 07/23/2017 05:05 AM        Warfarin Administrations (last 168 hours)       None          Pharmacy to follow daily and will provide subsequent Warfarin dosing based on clinical status.   ITZ Merino  Contact information [phone]

## 2017-07-23 NOTE — PROGRESS NOTES
Pharmacy Dosing Services: Warfarin    Consult for Warfarin Dosing by Pharmacy by Dr. Gloria Watkins provided for this 78 y.o.  female , for indication of Recurrent Systemic Embolism; Lupus Anticoagulation Syndrome     Dose to achieve an INR goal of 2-3    Order entered for Warfarin 6 mg to be given today at 18:00. Home Regimen:  Warfarin 6 mg PO Daily  LABS    PT/INR Lab Results   Component Value Date/Time    INR 1.4 07/22/2017 05:15 PM      Platelets Lab Results   Component Value Date/Time    PLATELET 982 02/66/7097 05:15 PM      H/H     Lab Results   Component Value Date/Time    HGB 11.6 07/22/2017 05:15 PM        Warfarin Administrations (last 168 hours)       None          Pharmacy to follow daily and will provide subsequent Warfarin dosing based on clinical status.   GRICELDA Okeefe [id number] [phone]

## 2017-07-23 NOTE — H&P
Medicine History and Physical    Patient: Roseann Fofana   Age:  73 y.o.     Chief Complaint: Chest/Back Pain    Assessment/Plan     Hospital Problems  Date Reviewed: 12/30/2016          Codes Class Noted POA    * (Principal)Sepsis ([facility] ICD-10-CM: A41.9  ICD-9-CM: 038.9, 995.91  7/22/2017 Yes        PAD (peripheral artery disease) (HCC) (Chronic) ICD-10-CM: I73.9  ICD-9-CM: 443.9  7/22/2017 Yes        Hyponatremia ICD-10-CM: E87.1  ICD-9-CM: 276.1  7/22/2017 Yes        Sleepwalking disorder (Chronic) ICD-10-CM: F51.3  ICD-9-CM: 307.46  7/22/2017 Yes        CAP (community acquired pneumonia) ICD-10-CM: J18.9  ICD-9-CM: 903  7/22/2017 Yes        Lupus anticoagulant syndrome ([facility] ICD-10-CM: S39.96  ICD-9-CM: 289.81  12/29/2016 Yes        Diabetes ([facility] ICD-10-CM: E11.9  ICD-9-CM: 250.00  1/1/2013 Yes              Sepsis ([facility] (7/22/2017)   - Working Source: CAP   - Culture Data    - BCx x 1 (07/22/17): pending    - UCx x 1 (07/22/17): pending   - Antibiotic Data    - Aztreonam 2g IV q6h (first dose 7/22/17)    - Doxycycline 100mg IV q12 (first dose 7/22/17   - Severity/Perfusion Markers    - Lactate: never elevated    - WBC: 18.5k    - Bandemia: none    - CRP: n/a    - Procalcitonin: n/a    - ESR: n/a    - UOP: pending (target 0.5ml/kg/hr)    - MAP: appropriate (target > 65 mmHg)   - Fluid Resuscitation    - Initial fluid challenge: given in ED    - current fluids: NS @ 100cc/hr   - Pressors    - Not necessary   - Sepsis box    - Checked      CAP (community acquired pneumonia) (7/22/2017)  - see above      Lupus anticoagulant syndrome ([facility] (12/29/2016)  - chronically anticoagulated due to multiple ischemic events and severe PAD  - restart coumadin w/ pharmacy dosing      Diabetes ([facility] (1/1/2013)  - hold PO meds  - cover w/ SSI      PAD (peripheral artery disease) ([facility] (7/22/2017)  - coumadin as above      Hyponatremia (7/22/2017)  - hypochloremic, suggesting dehydration  - s/p 2L NS  - fluids: NS @ 100cc/hr  - follow up w/ AM labs      Sleepwalking disorder (7/22/2017)  - bed alarm  - fall precautions    ЕЛЕНА: [email or web address]; coumadin; diabetic diet; no GI prophylaxis required    PT/OT    CM for discharge planning    Dispo: admit to medical floor under inpatient status; anticipated length of stay greater than 48 hours or 2 midnights; step down abx as able, restart home medications as indicated, ensure sepsis box remains adequately checked; final discharge disposition anticipated: home w/ HH      HPI:   Ayaka Medel is a 73 y.o. female who presents to the ED c/o pain in her back and her right rib/shoulder starting 1 day ago. She initially went to an urgent care where they had suspicion for pulmonary embolism and she was noted to be febrile. She was sent here for further evaluation. She reports that her only real complaint is the back pain and she did not even know that she had a fever before she got here. She has no other complaints. She was given initial fluid challenge in ED and one dose of aztreonam as she has multiple severe drug allergies. Past Medical History:  Past Medical History:   Diagnosis Date    Arthritis     hands and feet    Asthma     Autoimmune disease ([address]     lupus anticoagulant-causes clotting    Cancer ([address]     carcinoma in situ of cervix and uterus; inside bottom lip    Chronic pain     Left foot    Diabetes ([address] 2013    Dizzy spells     h/o    GERD (gastroesophageal reflux disease)     Liver disease     1/2 of my liver was removed for ulcers.     Lung collapse     2015    Nausea & vomiting 5/23/2012    Neuropathy (HCC)     right foot    Other ill-defined conditions     arterial clot in rt leg    Other ill-defined conditions     restless leg syndrome    Other ill-defined conditions     raynaud's syndrome    Other ill-defined conditions     chronic N/V    Other ill-defined conditions     (2 mutated genes) hyper coag. state     Positive cardiolipin antibodies     Psychiatric disorder     depression    PUD (peptic ulcer disease) 2009    H/O requiring partial gastrectomy and small intestine    Thromboembolus ([facility]     artery       Past Surgical History:  Past Surgical History:   Procedure Laterality Date    ABDOMEN SURGERY PROC UNLISTED      duodenal ulcer rupture    BREAST SURGERY PROCEDURE UNLISTED Bilateral 02/2016    hemorrhaged after breast surgery    HX APPENDECTOMY      HX BREAST AUGMENTATION      developed infection and implant removed    HX CHOLECYSTECTOMY      HX GASTRECTOMY      due to ulcers    HX GASTRIC BYPASS  1996    gastric by-pass    HX GI      total gastrectomy, partial colectomy    HX GI      colostomy and reversal    HX HEENT      cancer of lip removed    HX HYSTERECTOMY      for cervical cancer    HX ORTHOPAEDIC      rt foot surgery x10    HX ORTHOPAEDIC Left     great toe gangrenous and amputated    HX OTHER SURGICAL      part of liver removal    HX OTHER SURGICAL      ventral hernia repair x10    HX OTHER SURGICAL      Exc pilonial cystectomy    VASCULAR SURGERY PROCEDURE UNLIST      iliac aorta stents x 6       Family History:  Family History   Problem Relation Age of Onset    Malignant Hyperthermia Neg Hx     Pseudocholinesterase Deficiency Neg Hx     Delayed Awakening Neg Hx     Post-op Nausea/Vomiting Neg Hx     Emergence Delirium Neg Hx     Post-op Cognitive Dysfunction Neg Hx     Other Neg Hx        Social History:  Social History     Social History    Marital status:      Spouse name: N/A    Number of children: N/A    Years of education: N/A     Social History Main Topics    Smoking status: Former Smoker     Years: 0.50     Quit date: 1/2/1996    Smokeless tobacco: Never Used    Alcohol use Yes      Comment: 1 or 2 glasses wine or mixed drinks monthly     Drug use: Yes     Special: Marijuana      Comment: daily    Sexual activity: No     Other Topics Concern    None     Social History [facility]       Home Medications:  Prior to Admission medications    Medication Sig Start Date End Date Taking? Authorizing Provider   promethazine (PHENERGAN) 25 mg tablet Take 25 mg by mouth every six (6) hours as needed for Nausea. Yes Historical Provider   LORazepam (ATIVAN) 2 mg tablet Take 2 mg by mouth nightly. Yes Historical Provider   diphenhydrAMINE (BENADRYL) 25 mg capsule Take 50 mg by mouth every six (6) hours as needed. Indications: ALLERGIC RHINITIS   Yes Jay Lott MD   albuterol (PROVENTIL HFA, VENTOLIN HFA) 90 mcg/actuation inhaler Take 2 Puffs by inhalation every four (4) hours as needed. Indications: ACUTE ASTHMA ATTACK   Yes Historical Provider   cloNIDine (CATAPRES) 0.1 mg tablet Take 0.2 mg by mouth two (2) times a day. Indications: HYPERTENSION, hot flashes; Yes Historical Provider   hydrOXYzine (ATARAX) 25 mg tablet Take 25 mg by mouth daily. Indications: ALLERGIC RHINITIS   Yes Historical Provider   Levocetirizine (XYZAL) 5 mg tablet Take 5 mg by mouth daily. Indications: ALLERGIC RHINITIS   Yes Historical Provider   dronabinol (MARINOL) 2.5 mg capsule Take 2.5 mg by mouth three (3) times daily as needed. Indications: nausea   Yes Historical Provider   metFORMIN (GLUCOPHAGE) 500 mg tablet Take 500 mg by mouth two (2) times daily (with meals). Indications: TYPE 2 DIABETES MELLITUS   Yes Historical Provider   ropinirole (REQUIP) 4 mg Tab TAB Take 4 mg by mouth nightly. Indications: RESTLESS LEGS SYNDROME   Yes Historical Provider   traMADol (ULTRAM) 50 mg tablet Take 50 mg by mouth every eight (8) hours as needed. Indications: PAIN   Yes Historical Provider   warfarin (COUMADIN) 6 mg tablet Take 6 mg by mouth nightly. Takes for circulation   Yes Historical Provider   miconazole (MICOTIN) 2 % vaginal cream Insert 1 Applicator into vagina every evening. 1/9/17   Michelet Nava MD   BIOTIN PO Take 5,000 mcg by mouth daily.     Historical Provider   Chandrika Streeter,Min-FA-Herbal No.[id number] ([facility]) 400 mcg Tab Take 1 Tab by mouth two (2) times a day. Jay Lott MD       Allergies: Allergies   Allergen Reactions    Rifampin Hives and Swelling     Swelling of mouth and tounge    Levaquin [Levofloxacin] Itching    Amoxicillin Hives    Aspirin Nausea and Vomiting    Azithromycin Angioedema    Bactrim [Sulfamethoprim Ds] Hives    Corn Nausea and Vomiting    Rocephin [Ceftriaxone] Hives    Tylenol [Acetaminophen] Hives    Vancomycin Hives       Review of Systems  A comprehensive review of systems was negative except for that written in the History of Present Illness. Physical Exam:     Visit Vitals    BP (!) 149/92    Pulse 97    Temp (!) 102 °F (38.9 °C)    Resp 18    Ht 5' 5\" (1.651 m)    Wt 56.7 kg (125 lb)    SpO2 95%    BMI 20.8 kg/m2       Physical Exam:   General appearance: alert, cooperative, no distress, appears stated age  Head: Normocephalic, without obvious abnormality, atraumatic  Neck: supple, trachea midline  Lungs: coarse bilaterally L>R; decreased air movement throughout; faint wheezes in upper fields; some accessory muscle usage after speaking for some time but still speaking in full sentences  Heart: regular rate and rhythm, S1, S2 normal, no murmur, click, rub or gallop  Abdomen: protuberant, soft, non-tender. Bowel sounds normal. No masses,  no organomegaly  Extremities: right foot s/p trans-metatarsal amputation, otherwise extremities normal, atraumatic, no cyanosis or edema  Skin: Skin color, texture, turgor normal. No rashes or lesions  Neurologic: Grossly normal    Intake and Output:  Current Shift:  07/22 1901 - 07/23 0700  In: -   Out: 50 [Urine:50]  Last three shifts:       Lab/Data Reviewed: All lab results for the last 24 hours reviewed. Medications Reviewed.     Ignacio Camejo DO

## 2017-07-23 NOTE — ROUTINE PROCESS
2230: Received report from Juancarlos Goodman, [address] [facility]. Report included SBAR. KARDEX, MAR, V/S, HX and projected hospital course. 00:30: Received pt as a transfer from ED. Pt in room and talking with family members who were in attendance. Pt immediately requested PRN analgesics. Pt had already received MS04 and Roxycodone in ED. Pt and family informed that physician was still in the process of ordering/releasing medications and that all meds ordered would be provided ASAP. Started ABX therapy immediately, Azactam then Doxycycline IV. Lungs are coarse bilaterally with some use of accessory muscles noted. Abdomen presents with bowel sounds to quadrants X4; non-tender and soft. V/S at this time as follows B/P 141/86 P 100 R 18 T 102. 6. Physician on board at this time and made aware of V/S as well as pt request for analgesics. Physician discussed his concerns for        01:22 Pt requested and received Oxycodone 5 MG PO for C/O pain rated 10/10 at this time. She was noted to squirm and groan audibly while verbalizng her request. Pt was noted to visibly relax as soon as medication was given. 03:00: Pt has been dozing lightly since last entry was appended. V/S improving as follows T 100.4 B/P 155/82 P 92 R 18 SP02 98% on RA.    05:00: Lab called with critical results as follows: Culture gram pos cocci in clusters. Aerobic/anaerobic bottle. Attending physician made aware of results along with progression of ABX therapy and improved V/S. No new orders issued.

## 2017-07-23 NOTE — ROUTINE PROCESS
Bedside and Verbal shift change report given to RONNI Fitzpatrick RN (oncoming nurse) by HUMBERTO Childs RN (offgoing nurse). Report included the following information SBAR, Kardex, MAR and Recent Results.

## 2017-07-23 NOTE — PROGRESS NOTES
Daily Progress Note: 7/23/2017 10:32 AM   Admit Date: 7/22/2017    Patient seen in follow up for multiple medical problems as listed below:  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 Z90.3, Z98.0    Right foot ulcer ([facility] L97.519    Atherosclerotic PVD with ulceration ([facility] I73.9, L98.499    Syncope R55    Anemia associated with acute blood loss D62    Anemia D64.9    Cellulitis of right foot L03.115    Foot osteomyelitis, right (HCC) M86.9    Lupus anticoagulant syndrome ([facility]) D68.62    Sepsis ([facility] A41.9    Diabetes ([facility] E11.9    Chronic pain G89.29    PAD (peripheral artery disease) ([facility]) I73.9    Hyponatremia E87.1    Sleepwalking disorder F51.3    CAP (community acquired pneumonia) J18.9       Assesment     67F presented with pleuritic pain found to have     Sepsis    - Working Source: CAP, - BCx x 1, UCx (07/22/17): pending     - Aztreonam 2g IV q6h (first dose 7/22/17) - Doxycycline 100mg IV q12 (first dose 7/22/17   - WBC: 18.5, goal Fever 102.     - MAP: target > 65 mmHg                        -     CAP   - Abx above    - Back pain   this does not appear to be pleuritic but moreso musculoskeletal, have added 1x celebrex, lidoderm patch, and flexeril as this is limiting full resp function and pulmonary toliler       Lupus anticoagulant syndrome   - chronically anticoagulated due to multiple ischemic events and severe PAD, coumadin w/ pharmacy dosing       Diabetes  cover w/ SSI       PAD - coumadin as above       Hyponatremia -resolved   - hypochloremic, suggesting dehydration, s/p 2L NS      Sleepwalking disorder - bed alarm- fall precautions    DVT Protocol Active: yes  Code Status:  Full Code     Disposition:possible home tomorrow pending fevers and cultures    Subjective:     CC: Shortness of Breath; Fever; and Shoulder Pain    Interval History: Overall patient feels improved. C/o uncontrolled pain in R chest worse with R arm movement or deep breath. She has 2 [employer] drinks at bedside. ROS: 11 point ROS negative except for pain. Objective:     Visit Vitals    /76 (BP 1 Location: Left arm, BP Patient Position: At rest)    Pulse 80    Temp 99.3 °F (37.4 °C)    Resp 18    Ht 5' 5\" (1.651 m)    Wt 58.4 kg (128 lb 11.2 oz)    SpO2 99%    Breastfeeding No    BMI 21.42 kg/m2       Temp (24hrs), Av.4 °F (38.6 °C), Min:99.3 °F (37.4 °C), Max:103.1 °F (39.5 °C)        Intake/Output Summary (Last 24 hours) at 17 1032  Last data filed at 17 2132   Gross per 24 hour   Intake                0 ml   Output               50 ml   Net              -50 ml       Gen: AOx3, NAD  HEENT:  ROLANDA, EOMI. Neck: No Bruits/JVD   Lungs:   Distant air sounds. poor respiratory effort limited by pain  Heart:   RR S1 S2 without M/R/G  Abdomen: ND,NT, BSX4,   Extremities:   No LE edema. No cyanosis. Skin:  no jaundice/lesions  MS: Tender posteriorly at inferior R scapular with arm movement/deep breath.  No rib tenderpoints      Data Review:     Meds/Labs/Tests reviewed    Current Shift:     Last three shifts:   1901 -  0700  In: -   Out: 50 [Urine:50]  Recent Labs      17   0505  17   1715   WBC  13.2  18.5*   RBC  3.88*  4.24   HGB  10.8*  11.6*   HCT  33.8*  36.1   PLT  213  233   GRANS  88*  92*   LYMPH  6*  4*   EOS  0  0       Recent Labs      17   0505  17   1715   BUN  11  16   CREA  0.65  0.84   CA  8.3*  8.8   ALB  2.7*  3.4   K  4.4  3.8   NA  135*  134*   CL  102  98*   CO2  25  23   GLU  145*  183*        Lab Results   Component Value Date/Time    Glucose 145 2017 05:05 AM    Glucose 183 2017 05:15 PM    Glucose 304 2017 03:45 AM    Glucose 120 2017 06:00 AM    Glucose 115 2017 02:52 PM          Care coordination with Nursing/Consultants/staff: 15  Prior history, labs, and charting reviewed: 15    Procedures/Imaging:  CTA chest 7/22 - Patchy bilateral airspace opacities, large fecal load     Total time spent with chart review, patient examination/education, discussion with staff on case,documentation and medication management / adjustment  :  30 Minutes      Dr Christofer Arthur

## 2017-07-24 VITALS
TEMPERATURE: 97.8 F | BODY MASS INDEX: 22.96 KG/M2 | HEIGHT: 65 IN | WEIGHT: 137.8 LBS | OXYGEN SATURATION: 100 % | DIASTOLIC BLOOD PRESSURE: 94 MMHG | SYSTOLIC BLOOD PRESSURE: 163 MMHG | RESPIRATION RATE: 18 BRPM | HEART RATE: 84 BPM

## 2017-07-24 LAB
ALBUMIN SERPL BCP-MCNC: 2.9 G/DL (ref 3.4–5)
ALBUMIN/GLOB SERPL: 0.7 {RATIO} (ref 0.8–1.7)
ALP SERPL-CCNC: 125 U/L (ref 45–117)
ALT SERPL-CCNC: 37 U/L (ref 13–56)
ANION GAP BLD CALC-SCNC: 8 MMOL/L (ref 3–18)
AST SERPL W P-5'-P-CCNC: 27 U/L (ref 15–37)
BACTERIA SPEC CULT: NORMAL
BASOPHILS # BLD AUTO: 0 K/UL (ref 0–0.06)
BASOPHILS # BLD: 0 % (ref 0–2)
BILIRUB SERPL-MCNC: 0.3 MG/DL (ref 0.2–1)
BUN SERPL-MCNC: 12 MG/DL (ref 7–18)
BUN/CREAT SERPL: 17 (ref 12–20)
CALCIUM SERPL-MCNC: 8.5 MG/DL (ref 8.5–10.1)
CHLORIDE SERPL-SCNC: 99 MMOL/L (ref 100–108)
CO2 SERPL-SCNC: 26 MMOL/L (ref 21–32)
CREAT SERPL-MCNC: 0.69 MG/DL (ref 0.6–1.3)
DIFFERENTIAL METHOD BLD: ABNORMAL
EOSINOPHIL # BLD: 0 K/UL (ref 0–0.4)
EOSINOPHIL NFR BLD: 0 % (ref 0–5)
ERYTHROCYTE [DISTWIDTH] IN BLOOD BY AUTOMATED COUNT: 18.7 % (ref 11.6–14.5)
GLOBULIN SER CALC-MCNC: 4.2 G/DL (ref 2–4)
GLUCOSE BLD STRIP.AUTO-MCNC: 169 MG/DL (ref 70–110)
GLUCOSE BLD STRIP.AUTO-MCNC: 190 MG/DL (ref 70–110)
GLUCOSE BLD STRIP.AUTO-MCNC: 207 MG/DL (ref 70–110)
GLUCOSE SERPL-MCNC: 202 MG/DL (ref 74–99)
HCT VFR BLD AUTO: 35.4 % (ref 35–45)
HGB BLD-MCNC: 11.4 G/DL (ref 12–16)
INR PPP: 1.1 (ref 0.8–1.2)
LYMPHOCYTES # BLD AUTO: 10 % (ref 21–52)
LYMPHOCYTES # BLD: 1 K/UL (ref 0.9–3.6)
MCH RBC QN AUTO: 27.3 PG (ref 24–34)
MCHC RBC AUTO-ENTMCNC: 32.2 G/DL (ref 31–37)
MCV RBC AUTO: 84.7 FL (ref 74–97)
MONOCYTES # BLD: 0.9 K/UL (ref 0.05–1.2)
MONOCYTES NFR BLD AUTO: 10 % (ref 3–10)
NEUTS SEG # BLD: 7.2 K/UL (ref 1.8–8)
NEUTS SEG NFR BLD AUTO: 80 % (ref 40–73)
PLATELET # BLD AUTO: 192 K/UL (ref 135–420)
PMV BLD AUTO: 8.9 FL (ref 9.2–11.8)
POTASSIUM SERPL-SCNC: 4.4 MMOL/L (ref 3.5–5.5)
PROT SERPL-MCNC: 7.1 G/DL (ref 6.4–8.2)
PROTHROMBIN TIME: 13.8 SEC (ref 11.5–15.2)
RBC # BLD AUTO: 4.18 M/UL (ref 4.2–5.3)
SERVICE CMNT-IMP: NORMAL
SODIUM SERPL-SCNC: 133 MMOL/L (ref 136–145)
WBC # BLD AUTO: 9.1 K/UL (ref 4.6–13.2)

## 2017-07-24 PROCEDURE — 97165 OT EVAL LOW COMPLEX 30 MIN: CPT

## 2017-07-24 PROCEDURE — 74011250637 HC RX REV CODE- 250/637: Performed by: INTERNAL MEDICINE

## 2017-07-24 PROCEDURE — 80053 COMPREHEN METABOLIC PANEL: CPT | Performed by: FAMILY MEDICINE

## 2017-07-24 PROCEDURE — 74011000258 HC RX REV CODE- 258: Performed by: FAMILY MEDICINE

## 2017-07-24 PROCEDURE — 74011636637 HC RX REV CODE- 636/637: Performed by: INTERNAL MEDICINE

## 2017-07-24 PROCEDURE — 85610 PROTHROMBIN TIME: CPT | Performed by: FAMILY MEDICINE

## 2017-07-24 PROCEDURE — 82962 GLUCOSE BLOOD TEST: CPT

## 2017-07-24 PROCEDURE — 97535 SELF CARE MNGMENT TRAINING: CPT

## 2017-07-24 PROCEDURE — 85025 COMPLETE CBC W/AUTO DIFF WBC: CPT | Performed by: FAMILY MEDICINE

## 2017-07-24 PROCEDURE — 74011000250 HC RX REV CODE- 250: Performed by: FAMILY MEDICINE

## 2017-07-24 PROCEDURE — 36415 COLL VENOUS BLD VENIPUNCTURE: CPT | Performed by: FAMILY MEDICINE

## 2017-07-24 RX ORDER — INSULIN GLARGINE 100 [IU]/ML
8 INJECTION, SOLUTION SUBCUTANEOUS DAILY
Status: DISCONTINUED | OUTPATIENT
Start: 2017-07-24 | End: 2017-07-24 | Stop reason: HOSPADM

## 2017-07-24 RX ORDER — CELECOXIB 200 MG/1
200 CAPSULE ORAL DAILY
Qty: 10 CAP | Refills: 0 | Status: SHIPPED | OUTPATIENT
Start: 2017-07-25 | End: 2017-09-08

## 2017-07-24 RX ORDER — CELECOXIB 100 MG/1
200 CAPSULE ORAL DAILY
Status: DISCONTINUED | OUTPATIENT
Start: 2017-07-25 | End: 2017-07-24 | Stop reason: HOSPADM

## 2017-07-24 RX ORDER — CLONIDINE 0.2 MG/24H
1 PATCH, EXTENDED RELEASE TRANSDERMAL
Status: DISCONTINUED | OUTPATIENT
Start: 2017-07-24 | End: 2017-07-24 | Stop reason: HOSPADM

## 2017-07-24 RX ORDER — DOXYCYCLINE 100 MG/1
100 CAPSULE ORAL 2 TIMES DAILY
Qty: 14 CAP | Refills: 0 | Status: SHIPPED | OUTPATIENT
Start: 2017-07-24 | End: 2017-07-31

## 2017-07-24 RX ORDER — LIDOCAINE 50 MG/G
PATCH TOPICAL
Qty: 30 EACH | Refills: 0 | Status: SHIPPED | OUTPATIENT
Start: 2017-07-24 | End: 2017-09-08

## 2017-07-24 RX ORDER — ONDANSETRON 4 MG/1
4 TABLET, ORALLY DISINTEGRATING ORAL
Status: DISCONTINUED | OUTPATIENT
Start: 2017-07-24 | End: 2017-07-24 | Stop reason: HOSPADM

## 2017-07-24 RX ADMIN — Medication 10 ML: at 05:37

## 2017-07-24 RX ADMIN — OXYCODONE HYDROCHLORIDE 10 MG: 5 TABLET ORAL at 01:15

## 2017-07-24 RX ADMIN — Medication 10 ML: at 14:00

## 2017-07-24 RX ADMIN — POLYETHYLENE GLYCOL 3350 17 G: 17 POWDER, FOR SOLUTION ORAL at 10:43

## 2017-07-24 RX ADMIN — DOCUSATE SODIUM 200 MG: 100 CAPSULE, LIQUID FILLED ORAL at 10:43

## 2017-07-24 RX ADMIN — CYCLOBENZAPRINE HYDROCHLORIDE 10 MG: 10 TABLET, FILM COATED ORAL at 16:28

## 2017-07-24 RX ADMIN — OXYCODONE HYDROCHLORIDE 10 MG: 5 TABLET ORAL at 16:28

## 2017-07-24 RX ADMIN — AZTREONAM 2 G: 2 INJECTION, POWDER, LYOPHILIZED, FOR SOLUTION INTRAMUSCULAR; INTRAVENOUS at 10:38

## 2017-07-24 RX ADMIN — NYSTATIN: 100000 CREAM TOPICAL at 10:45

## 2017-07-24 RX ADMIN — INSULIN GLARGINE 8 UNITS: 100 INJECTION, SOLUTION SUBCUTANEOUS at 10:44

## 2017-07-24 RX ADMIN — AZTREONAM 2 G: 2 INJECTION, POWDER, LYOPHILIZED, FOR SOLUTION INTRAMUSCULAR; INTRAVENOUS at 16:09

## 2017-07-24 RX ADMIN — OXYCODONE HYDROCHLORIDE 10 MG: 5 TABLET ORAL at 05:36

## 2017-07-24 RX ADMIN — DOXYCYCLINE 100 MG: 100 CAPSULE ORAL at 10:42

## 2017-07-24 NOTE — PROGRESS NOTES
[facility] and Kardex. 0810-assessed pt sitting in bed no c/o IV SL.  1750-discharged to home prescriptions given instructions reviewed with pt aand understood.

## 2017-07-24 NOTE — DISCHARGE SUMMARY
[facility]  Hospitalist Division    Discharge Summary      Patient: Ian Alvares MRN: [id number]  CSN: [id number]    YOB: 1950  Age: 78 y.o.   Sex: female    DOA: 7/22/2017 LOS:  LOS: 2 days   Discharge Date: 07/24/17     PCP:  Fidel Foster MD    Chief Complaint:    Chief Complaint   Patient presents with    Shortness of Breath    Fever    Shoulder Pain     Sepsis [facility])    Admission Diagnosis:   Hospital Problems as of 7/24/2017  Date Reviewed: 12/30/2016          Codes Class Noted - Resolved POA    * (Principal)Sepsis ([facility] ICD-10-CM: A41.9  ICD-9-CM: 038.9, 995.91  7/22/2017 - Present Yes        PAD (peripheral artery disease) (HCC) (Chronic) ICD-10-CM: I73.9  ICD-9-CM: 443.9  7/22/2017 - Present Yes        Hyponatremia ICD-10-CM: E87.1  ICD-9-CM: 276.1  7/22/2017 - Present Yes        Sleepwalking disorder (Chronic) ICD-10-CM: F51.3  ICD-9-CM: 307.46  7/22/2017 - Present Yes        CAP (community acquired pneumonia) ICD-10-CM: J18.9  ICD-9-CM: 486  7/22/2017 - Present Yes        Lupus anticoagulant syndrome ([facility] ICD-10-CM: R27.12  ICD-9-CM: 289.81  12/29/2016 - Present Yes        Diabetes ([facility] ICD-10-CM: E11.9  ICD-9-CM: 250.00  1/1/2013 - Present Yes        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 ICD-10-CM: Z90.3, Z98.0  ICD-9-CM: V45.89  5/23/2012 - Present               Discharge Diagnoses:    Sepsis    - Working Source: CAP, - BCx x 1, UCx (07/22/17): pending     - Aztreonam 2g IV q6h (first dose 7/22/17) - Doxycycline 100mg IV q12 (first dose 7/22/17   - WBC: 18.5, goal Fever 102.    - MAP: target > 65 mmHg                        -     CAP   - Abx above     - Back pain   this does not appear to be pleuritic but moreso musculoskeletal, have added 1x celebrex, lidoderm patch, and flexeril as this is limiting full resp function and pulmonary toliler        Lupus anticoagulant syndrome   - chronically anticoagulated due to multiple ischemic events and severe PAD, coumadin w/ pharmacy dosing        Diabetes  cover w/ SSI        PAD - coumadin as above        Hyponatremia -resolved   - hypochloremic, suggesting dehydration, s/p 2L NS       Sleepwalking disorder - bed alarm- fall precautions    Hospital Course:   67F presented with pleuritic and back pain and was found to have CAP with sepsis criteria. CTA chest negative for PE. She was started on aztreonam and doxycycline as she has multiple drug allergies. She has not required oxygen and has felt well. She has continued to have intermittent fevers however these have improved with IV antibiotics. On 7/22 the patient looked and felt well, she required no 02. Her pain was better controlled with a lidoderm patch and prn celebrex. She will be discharged on a doxycycline course and will need a chest x-ray in 30 days to monitor for resolution, this was stressed to her and her son. Discharge Ly=184, Cr=0.69 A1c=8.1, WBC decreased from 18.5 to 9. Significant Diagnostic Studies:  CTA chest 7/22 - Patchy bilateral airspace opacities, large fecal load     Consults:  Treatment Team: Attending Provider: Izzy Rice DO; Consulting Provider: Antionette Hernández DO; Care Manager: Phu Avila RN    Operative Procedures:  N/A    Disposition:  Home    Diet:  Diabetic    Discharge Condition:   Good    Discharge Medications:    Current Discharge Medication List      START taking these medications    Details   celecoxib (CELEBREX) 200 mg capsule Take 1 Cap by mouth daily for 90 days. Qty: 10 Cap, Refills: 0      lidocaine (LIDODERM) 5 % Apply patch to the affected area for 12 hours a day and remove for 12 hours a day. Qty: 30 Each, Refills: 0      doxycycline (MONODOX) 100 mg capsule Take 1 Cap by mouth two (2) times a day for 7 days.   Qty: 14 Cap, Refills: 0         CONTINUE these medications which have NOT CHANGED    Details   promethazine (PHENERGAN) 25 mg tablet Take 25 mg by mouth every six (6) hours as needed for Nausea. LORazepam (ATIVAN) 2 mg tablet Take 2 mg by mouth nightly. diphenhydrAMINE (BENADRYL) 25 mg capsule Take 50 mg by mouth every six (6) hours as needed. Indications: ALLERGIC RHINITIS      albuterol (PROVENTIL HFA, VENTOLIN HFA) 90 mcg/actuation inhaler Take 2 Puffs by inhalation every four (4) hours as needed. Indications: ACUTE ASTHMA ATTACK      hydrOXYzine (ATARAX) 25 mg tablet Take 25 mg by mouth daily. Indications: ALLERGIC RHINITIS      dronabinol (MARINOL) 2.5 mg capsule Take 2.5 mg by mouth three (3) times daily as needed. Indications: nausea      metFORMIN (GLUCOPHAGE) 500 mg tablet Take 500 mg by mouth two (2) times daily (with meals). Indications: TYPE 2 DIABETES MELLITUS      ropinirole (REQUIP) 4 mg Tab TAB Take 4 mg by mouth nightly. Indications: RESTLESS LEGS SYNDROME      traMADol (ULTRAM) 50 mg tablet Take 50 mg by mouth every eight (8) hours as needed. Indications: PAIN      warfarin (COUMADIN) 6 mg tablet Take 7 mg by mouth nightly. Takes for circulation      miconazole (MICOTIN) 2 % vaginal cream Insert 1 Applicator into vagina every evening. Qty: 45 g, Refills: 0      BIOTIN PO Take 5,000 mcg by mouth daily. Mv,Ca,Min-FA-Herbal No.157 (ESTROVEN MAXIMUM STRENGTH) 400 mcg Tab Take 1 Tab by mouth two (2) times a day. cloNIDine (CATAPRES) 0.1 mg tablet Take 0.2 mg by mouth two (2) times a day. Indications: HYPERTENSION, hot flashes;      Levocetirizine (XYZAL) 5 mg tablet Take 5 mg by mouth daily. Indications: ALLERGIC RHINITIS               Follow-Up And Discharge Instructions:    Follow-up Information     Follow up With Details Comments [address] MD   [address] [place] [address]  [phone]              Wound Care:   N/A      [facility]  Hospitalist Division        Time Spent:  35min    Cc: Yamilex Gil MD

## 2017-07-24 NOTE — PROGRESS NOTES
conducted an initial consultation and Spiritual Assessment for Nehemiah Cruz, who is a 78 y.o.,female. Patients Primary Language is: [place]. According to the patients EMR Sabianist Affiliation is: Bridger Melton.     The reason the Patient came to the hospital is:   Patient Active Problem List    Diagnosis Date Noted    Sepsis ([address] 07/22/2017    PAD (peripheral artery disease) ([address] 07/22/2017    Hyponatremia 07/22/2017    Sleepwalking disorder 07/22/2017    CAP (community acquired pneumonia) 07/22/2017    Chronic pain     Lupus anticoagulant syndrome ([address] 12/29/2016    Cellulitis of right foot 12/27/2016    Foot osteomyelitis, right ([address] 12/27/2016    Anemia 03/01/2016    Syncope 02/29/2016    Anemia associated with acute blood loss 02/29/2016    Atherosclerotic PVD with ulceration ([address] 03/13/2015    Right foot ulcer ([address] 03/09/2015    Diabetes ([facility] 01/01/2013    Heme positive stool 05/23/2012    Iron deficiency anemia 05/23/2012    Nausea & vomiting 05/23/2012    Pancreatic cyst 05/23/2012    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 05/23/2012        The  provided the following Interventions:  Initiated a relationship of care and support. Explored issues of lin, belief, spirituality and Gnosticist/ritual needs while hospitalized. Listened empathically. Provided chaplaincy education. Provided information about Spiritual Care Services. Offered prayer and assurance of continued prayers on patients behalf. Chart reviewed. The following outcomes were achieved:  Patient shared limited information about both their medical narrative and spiritual journey/beliefs. Patient processed feeling about current hospitalization. Patient expressed gratitude for pastoral care visit. Assessment:  Patient does not have any Gnosticist/cultural needs that will affect patients preferences in health care.   There are no further spiritual or Gnosticist issues which require intervention at this time. Plan:  Chaplains will continue to follow and will provide pastoral care on an as needed/requested basis.  recommends bedside caregivers page  on duty if patient shows signs of acute spiritual or emotional distress.       Sister Elliot Hsu, [address]  [phone]

## 2017-07-24 NOTE — PROGRESS NOTES
Problem: Self Care Deficits Care Plan (Adult)  Goal: *Acute Goals and Plan of Care (Insert Text)  Initial Occupational Therapy Goals (7/24/2017) Within 3 day(s):    1. Patient will perform toilet transfer with modified independent in preparation for bowel and bladder management. 2. Patient will perform bowel and bladder management with modified independent for increased independence with ADLs. 3. Patient will perform UB dressing with modified independence including retrieval of ADL items for increased independence with ADLs. 4. Patient will perform LB dressing with modified independence including retrieval of ADL items for increased independence with ADLs. 5. Patient will adhere to proper body mechanics to decrease low back pain 100% of the time with 1-2 verbal cues for increased independence with ADLs. 6. Patient will utilize energy conservation techniques with 1 verbal for increased independence with ADLs. Outcome: Progressing Towards Goal  OCCUPATIONAL THERAPY EVALUATION     Patient: Tonia Kim (89 y.o. female)  Date: 7/24/2017  Primary Diagnosis: Sepsis [facility])        Precautions:   Fall      ASSESSMENT :  Based on the objective data described below, the patient presents with decreased functional strength, decreased functional balance, decreased overall activity tolerance limiting independence with ADLs. Pt noted with deficits in standing balance during all self care tasks. Discussed with patient about using AD to decrease risk for falls during self care. Pt admits that she has had hx of falls since RLE TMA in January 2017. Pt would benefit from continued OT services to maximize function. Education:balance recovery; fall prevention; proper body mechanics; energy conservation     Patient will benefit from skilled intervention to address the above impairments.   Patients rehabilitation potential is considered to be Good  Factors which may influence rehabilitation potential include:   [X] None noted  [ ]             Mental ability/status  [ ]             Medical condition  [ ]             Home/family situation and support systems  [ ]             Safety awareness  [ ]             Pain tolerance/management  [X]             Other: pt is home alone during the day       PLAN :  Recommendations and Planned Interventions:  [X]               Self Care Training                  [X]        Therapeutic Activities  [X]               Functional Mobility Training    [ ]        Cognitive Retraining  [X]               Therapeutic Exercises           [X]        Endurance Activities  [X]               Balance Training                   [X]        Neuromuscular Re-Education  [ ]               Visual/Perceptual Training     [X]   Home Safety Training  [X]               Patient Education                 [X]        Family Training/Education  [ ]               Other (comment):     Frequency/Duration: Patient will be followed by occupational therapy 2 times a week to address goals. Discharge Recommendations: Home Health  Further Equipment Recommendations for Discharge: shower chair, rolling walker and reacher. SUBJECTIVE:   Patient stated I know my balance is off. I have had trouble since my foot surgery in [address]:       Past Medical History:   Diagnosis Date    Arthritis       hands and feet    Asthma      Autoimmune disease ([facility]       lupus anticoagulant-causes clotting    Cancer ([facility]       carcinoma in situ of cervix and uterus; inside bottom lip    Chronic pain       Left foot    Diabetes ([facility] 2013    Dizzy spells       h/o    GERD (gastroesophageal reflux disease)      Liver disease       1/2 of my liver was removed for ulcers.     Lung collapse       2015    Nausea & vomiting 5/23/2012    Neuropathy (HCC)       right foot    Other ill-defined conditions       arterial clot in rt leg    Other ill-defined conditions       restless leg syndrome    Other ill-defined conditions       raynaud's syndrome    Other ill-defined conditions       chronic N/V    Other ill-defined conditions       (2 mutated genes) hyper coag. state     Positive cardiolipin antibodies      Psychiatric disorder       depression    PUD (peptic ulcer disease) 2009     H/O requiring partial gastrectomy and small intestine    Thromboembolus ([address]       artery     Past Surgical History:   Procedure Laterality Date    ABDOMEN SURGERY PROC UNLISTED         duodenal ulcer rupture    BREAST SURGERY PROCEDURE UNLISTED Bilateral 02/2016     hemorrhaged after breast surgery    HX APPENDECTOMY        HX BREAST AUGMENTATION         developed infection and implant removed    HX CHOLECYSTECTOMY        HX GASTRECTOMY         due to ulcers    HX GASTRIC BYPASS   1996     gastric by-pass    HX GI         total gastrectomy, partial colectomy    HX GI         colostomy and reversal    HX HEENT         cancer of lip removed    HX HYSTERECTOMY         for cervical cancer    HX ORTHOPAEDIC         rt foot surgery x10    HX ORTHOPAEDIC Left       great toe gangrenous and amputated    HX OTHER SURGICAL         part of liver removal    HX OTHER SURGICAL         ventral hernia repair x10    HX OTHER SURGICAL         Exc pilonial cystectomy    VASCULAR SURGERY PROCEDURE UNLIST         iliac aorta stents x 6     Barriers to Learning/Limitations: None  Compensate with: visual, verbal, tactile, kinesthetic cues/model     G-Codes (GP)  Self Care   Current  CI= 1-19%   Goal  CI= 1-19%  The severity rating is based on the professional judgement & direct observation of Level of Assistance required for Functional Mobility and ADLs. Eval Complexity: History: LOW Complexity : Brief history review ; Examination: LOW Complexity : 1-3 performance deficits relating to physical, cognitive , or psychosocial skils that result in activity limitations and / or participation restrictions ;    Decision Making:LOW Complexity : No comorbidities that affect functional and no verbal or physical assistance needed to complete eval tasks      Prior Level of Function/Home Situation: Pt is home alone during the day and has history of falls; Recommend to patient that she use AD during self care tasks to decrease falls.   Home Situation  Home Environment: Private residence  # Steps to Enter: 0  One/Two Story Residence: Two story (bedroom and master bath on second floor)  # of Interior Steps:  (5)  Height of Each Step (in): 9 inches  Interior Rails: Both  Lift Chair Available: No  Living Alone: No  Support Systems: Child(zack) (patient is home alone during the day)  Patient Expects to be Discharged to[de-identified] Private residence  Current DME Used/Available at Home: Jorge Zhang, darlyn, Ady Leonardo, angela (Pt owns walker, cane, and scooter but reports does not use )  Tub or Shower Type: Shower  [X]  Right hand dominant          [ ]  Left hand dominant  Cognitive/Behavioral Status:  Neurologic State: Alert  Orientation Level: Oriented X4  Cognition: Appropriate for age attention/concentration  Safety/Judgement: Awareness of environment  Skin: intact  Edema: none  Vision/Perceptual: no deficits    Coordination:  Coordination: Within functional limits    Balance: Fair standing balance; balance decreases with fatigue   Strength:  Strength:  (Pt declined MMT due to pain in back)   Tone & Sensation:  Decreased sensation in RLE foot that impedes balance   Range of Motion:  AROM:  (pt declined ROM test to RUE due to pain in back)   Functional Mobility and Transfers for ADLs:  Bed Mobility: WFLs  Transfers:  Sit to Stand: Modified independent   Bed to Chair: Supervision              Toilet Transfer : Supervision              ADL Assessment:         Lower Body Dressing: Supervision   Toileting :  Supervision     ADL Intervention:     Lower Body Dressing Assistance  Underpants: Supervision/set-up (cues for safety when pulling up in standing position)  Socks: Modified independent  Slip on Shoes with Back: Modified independent  Leg Crossed Method Used: Yes  Cues: Verbal cues provided     Toileting  Toileting Assistance: Supervision/set up  Clothing Management: Supervision/set-up     Cognitive Retraining  Safety/Judgement: Awareness of environment      Recommended patient use AD during standing self care tasks to decrease risk for falls; pt with decreased dynamic standing balance     Pain:  Pre-treatment: 3/10  Post-treatment: 3/10  Activity Tolerance:   Fair; requires rest breaks; pt declines a lot of activity due to fear of back pain  Please refer to the flowsheet for vital signs taken during this treatment. After treatment:   [ ] Patient left in no apparent distress sitting up in chair  [X] Patient left in no apparent distress in bed  [X] Call bell left within reach  [ ] Nursing notified  [ ] Caregiver present  [ ] Bed alarm activated      COMMUNICATION/EDUCATION:   [ ] Home safety education was provided and the patient/caregiver indicated understanding. [X] Patient/family have participated as able in goal setting and plan of care. [X] Patient/family agree to work toward stated goals and plan of care. [ ] Patient understands intent and goals of therapy, but is neutral about his/her participation. [ ] Patient is unable to participate in goal setting and plan of care.      Thank you for this referral.  Carli Vickers, OT  Time Calculation: 24 mins

## 2017-07-24 NOTE — PROGRESS NOTES
Pharmacy Dosing Services: Warfarin    Consult for Warfarin Dosing by Pharmacy by Dr. Lavinia Guerra provided for this 78 y.o.  female , for indication of Lupus Anticoagulation Syndrome. Dose to achieve an INR goal of 2-3    Order entered for  Warfarin  9 (mg) ordered to be given today at 18:00. Significant drug interactions:  Celebrex    LABS    PT/INR Lab Results   Component Value Date/Time    INR 1.1 07/24/2017 05:55 AM      Platelets Lab Results   Component Value Date/Time    PLATELET 929 72/21/8534 05:55 AM      H/H     Lab Results   Component Value Date/Time    HGB 11.4 07/24/2017 05:55 AM        Warfarin Administrations (last 168 hours)     Date/Time Action Medication Dose    07/23/17 1742 Given   [Pt takes 7mg]    warfarin (COUMADIN) tablet 10 mg 10 mg          Pharmacy to follow daily and will provide subsequent Warfarin dosing based on clinical status.   Flor Gagnon, [facility]  Contact information    [phone]

## 2017-07-24 NOTE — DIABETES MGMT
Diabetes Patient/Family Education Record    Factors That  May Influence Patients Ability  to Learn or  Comply With  Recommendations:    []   Language barrier    []   Cultural needs   []   Motivation    []   Cognitive limitation    []   Physical   []   Education    []   Physiological factors   []   Hearing/vision/speaking impairment   []   Pentecostalism beliefs    []   Financial factors   []  Other:   [x]  No factors identified at this time.      Person Instructed:   []   Patient   []   Family   []  Other     Preference for Learning:   [x]   Verbal   []   Written   []  Demonstration     Level of Comprehension & Competence:    [x]  Good                                      [] Fair                                     []  Poor                             []  Needs Reinforcement   [x]  Teachback completed    Education Component:   [x]  Medication management, including confirmation of home regimen; per pt glipizide X 1 month; medication education   [x]  Nutritional management; pt eats several meals a day   []  Exercise   []  Signs, symptoms, and treatment of hyperglycemia and hypoglycemia   [] Treatment of hyperglycemia and hypoglycemia   [x]  Importance of blood glucose monitoring; SBGM daily or every other day    []  Instruction on use of blood glucose meter   [x]  Discuss the importance of HbA1C monitoring and provide patient with  results   []  Sick day guidelines   []  Proper use and disposal of lancets, needles, syringes or insulin pens (if appropriate)   []  Potential long-term complications (retinopathy, kidney disease, neuropathy, heart disease, stroke, vascular disease, foot care)   [] Provide emergency contact number and contact number for more information    [x]  Goal:  Patient/family will demonstrate understanding of Diabetes Self Management Skills by: (date) 8/30  Plan for post-discharge education or self-management support:    [x] Outpatient class schedule provided            [] Patient Declined    [] Scheduled for outpatient classes (date) _______         Edita Dejesus RN, MS  Glycemic Control Team

## 2017-07-24 NOTE — PROGRESS NOTES
Night coverage note    Patient lost IV access yesterday PM and Aztreonam was held x 1 dose and doxycycline was changed to PO formulation. Patient became febrile around the time the next Azactam dose was due. IV re-established. Azactam restarted. Patient now afebrile.       Trixie Solis DO

## 2017-07-24 NOTE — PROGRESS NOTES
Daily Progress Note: 7/24/2017 10:32 AM   Admit Date: 7/22/2017    Patient seen in follow up for multiple medical problems as listed below:  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 Z90.3, Z98.0    Right foot ulcer ([facility] [address] L97.519    Atherosclerotic PVD with ulceration ([facility] [address] I73.9, L98.499    Syncope R55    Anemia associated with acute blood loss D62    Anemia D64.9    Cellulitis of right foot L03.115    Foot osteomyelitis, right (HCC) M86.9    Lupus anticoagulant syndrome ([facility]) D68.62    Sepsis ([facility] [address] A41.9    Diabetes ([facility] [address] E11.9    Chronic pain G89.29    PAD (peripheral artery disease) ([facility]) I73.9    Hyponatremia E87.1    Sleepwalking disorder F51.3    CAP (community acquired pneumonia) J18.9       Assesment     67F presented with pleuritic pain and was found to have CAP with sepsis criteria. She was started on aztreonam and doxycycline as she has multiple drug allergies. She has not required oxygen and has felt well. She has continued to have intermittent fevers however these have improved with IV antibiotics. Sepsis    - Working Source: CAP, - BCx x 1, UCx (07/22/17): pending     - Aztreonam 2g IV q6h (first dose 7/22/17) - Doxycycline 100mg IV q12 (first dose 7/22/17   - WBC: 18.5, goal Fever 102.     - MAP: target > 65 mmHg                        -     CAP   - Abx above    - Back pain   this does not appear to be pleuritic but moreso musculoskeletal, have added 1x celebrex, lidoderm patch, and flexeril as this is limiting full resp function and pulmonary toliler       Lupus anticoagulant syndrome   - chronically anticoagulated due to multiple ischemic events and severe PAD, coumadin w/ pharmacy dosing       Diabetes  cover w/ SSI       PAD - coumadin as above       Hyponatremia -resolved   - hypochloremic, suggesting dehydration, s/p 2L NS      Sleepwalking disorder - bed alarm- fall precautions    DVT Protocol Active: yes  Code Status:  Full Code     Disposition: home tonight    Subjective:     CC: Shortness of Breath; Fever; and Shoulder Pain    Interval History: Overall patient feels improved. Pain improved with celebrex. No hypoxia, minimal cough. discussed need to complete antibiotics and have a 30 day follow-up chest x-ray. ROS: 11 point ROS negative except for pain. Objective:     Visit Vitals    BP (!) 172/93    Pulse 93    Temp 97.8 °F (36.6 °C)    Resp 18    Ht 5' 5\" (1.651 m)    Wt 62.5 kg (137 lb 12.8 oz)    SpO2 100%    Breastfeeding No    BMI 22.93 kg/m2       Temp (24hrs), Av.5 °F (37.5 °C), Min:97.8 °F (36.6 °C), Max:102.1 °F (38.9 °C)        Intake/Output Summary (Last 24 hours) at 17 1139  Last data filed at 17 0944   Gross per 24 hour   Intake              643 ml   Output             1875 ml   Net            -1232 ml       Gen: AOx3, NAD  HEENT:  ROLANDA, EOMI. Neck: No Bruits/JVD   Lungs:   Distant air sounds. poor respiratory effort limited by pain  Heart:   RR S1 S2 without M/R/G  Abdomen: ND,NT, BSX4,   Extremities:   No LE edema. No cyanosis. Skin:  no jaundice/lesions  MS: Tender posteriorly at inferior R scapular with arm movement/deep breath. No rib tenderpoints      Data Review:     Meds/Labs/Tests reviewed    Current Shift:  701 - 1900  In: 240 [P.O.:240]  Out: 500 [Urine:500]  Last three shifts:  1901 -  07  In: 403 [P.O.:240;  I.V.:163]  Out: 2225 [Urine:2225]  Recent Labs      17   0555  17   0505  17   1715   WBC  9.1  13.2  18.5*   RBC  4.18*  3.88*  4.24   HGB  11.4*  10.8*  11.6*   HCT  35.4  33.8*  36.1   PLT  192  213  233   GRANS  80*  88*  92*   LYMPH  10*  6*  4*   EOS  0  0  0       Recent Labs      17   0555  17   0505  17   1715   BUN  12  11  16   CREA  0.69  0.65 0. 84   CA  8.5  8.3*  8.8   ALB  2.9*  2.7*  3.4   K  4.4  4.4  3.8   NA  133*  135*  134*   CL  99*  102  98*   CO2  26  25  23   GLU  202*  145*  183*        Lab Results   Component Value Date/Time    Glucose 202 07/24/2017 05:55 AM    Glucose 145 07/23/2017 05:05 AM    Glucose 183 07/22/2017 05:15 PM    Glucose 304 01/09/2017 03:45 AM    Glucose 120 01/08/2017 06:00 AM          Care coordination with Nursing/Consultants/staff: 15  Prior history, labs, and charting reviewed: 15    Procedures/Imaging:  CTA chest 7/22 - Patchy bilateral airspace opacities, large fecal load     Total time spent with chart review, patient examination/education, discussion with staff on case,documentation and medication management / adjustment  :  30 Minutes      Dr Charlotte Kirby

## 2017-07-24 NOTE — DIABETES MGMT
NUTRITION ASSESSMENT / GLYCEMIC CONTROL PLAN OF CARE     Nehemiah Cruz           78 y.o.              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 Z90.3, Z98.0    Right foot ulcer ([address] L97.519    Atherosclerotic PVD with ulceration ([address] I73.9, L98.499    Syncope R55    Anemia associated with acute blood loss D62    Anemia D64.9    Cellulitis of right foot L03.115    Foot osteomyelitis, right (HCC) M86.9    Lupus anticoagulant syndrome ([facility]) D68.62    Sepsis (HCC) A41.9    Diabetes (HCC) E11.9    Chronic pain G89.29    PAD (peripheral artery disease) ([facility]) I73.9    Hyponatremia E87.1    Sleepwalking disorder F51.3    CAP (community acquired pneumonia) J18.9   - DM2       INTERVENTIONS/PLAN:   - BG has been out of target range since admission, noted pt has been refusing corrective coverage insulin  - basal insulin started today, recommend continue if pt dose not d/c today  - DM education provided per HOSP SHREE LAUREN RN (see note)  - encouraged OP DM Self Management Class (schedule given)  - encouraged to follow-up with OP PCM re: DM in next 2 months     ASSESSMENT:   Nutritional Status:  Healthy weight (BMI 22.9%), self-care deficit r/t DM2 AEB A1C >8% for at least last 8 months, BG out of target r/t DM         Lab Results   Component Value Date/Time     (H) 07/24/2017 05:55 AM    GLUCPOC 190 (H) 07/24/2017 10:58 AM    GLUCPOC 207 (H) 07/24/2017 08:20 AM    GLUCPOC 256 (H) 07/23/2017 10:55 PM             Within target range (non-ICU: <140; ICU<180): [] Yes   [x]  No    Current Insulin regimen:   - Lantus 8 units every day  - Humalog Normal Insulin Sensitivity Corrective Coverage    Home medication/insulin regimen:   - Metformin 500 mg BID  - Glipizide of glucotrol 1x/day    HbA1c: equivalent  to ave BGlucose of 186 mg/dl for 2-3 months prior to admission    Lab Results   Component Value Date/Time    Hemoglobin A1c 8.1 07/22/2017 05:15 PM    Hemoglobin A1c 8.4 12/28/2016 02:21 AM    Hemoglobin A1c 6.8 08/31/2016 03:10 PM       Adequate glycemic control PTA:  [] Yes  [x] No       SUBJECTIVE/OBJECTIVE:   Information obtained from: pt, chart, GC team; pt was able to confirm home regimen, states Glipizide or Glucotrol (not sure which one, starts with a \"g\") was just started in last month, her A1C was >9% at last follow-up, reports eats ~ 19A/RVG r/t to her complicated GI history (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), states SMBG every other day, denies <70 mg.dl, states is careful to take \"g\" medicine with food, reports good appetite      Medications: [x]                Reviewed        Labs:   Lab Results   Component Value Date/Time    Sodium 133 07/24/2017 05:55 AM    Potassium 4.4 07/24/2017 05:55 AM    Chloride 99 07/24/2017 05:55 AM    CO2 26 07/24/2017 05:55 AM    Anion gap 8 07/24/2017 05:55 AM    Glucose 202 07/24/2017 05:55 AM    BUN 12 07/24/2017 05:55 AM    Creatinine 0.69 07/24/2017 05:55 AM    Calcium 8.5 07/24/2017 05:55 AM    Magnesium 1.8 03/01/2016 01:05 AM    Phosphorus 2.3 03/01/2016 01:05 AM    Albumin 2.9 07/24/2017 05:55 AM       Anthropometrics: IBW : 59 kg (130 lb), % IBW (Calculated): 106 %, BMI (calculated): 22.9  Wt Readings from Last 1 Encounters:   07/24/17 62.5 kg (137 lb 12.8 oz)    Ht Readings from Last 1 Encounters:   07/24/17 5' 5\" (1.651 m)       Estimated Nutrition Needs: 1563 Kcals/day (25 kcal/kg of Actual wt), Protein (g): 75 g (1.2 g/kg) Fluid (ml): 1563 ml (1 ml/kcal)  Based on:   [x]          Actual BW    []          IBW   []            Adjusted BW        Diet:   Active Orders   Diet    DIET DIABETIC CONSISTENT CARB Regular       Intake:   Patient Vitals for the past 100 hrs:   % Diet Eaten   07/24/17 0944 100 %   07/23/17 1432 100 %         Nutrition Diagnoses:   1. Nutrition Diagnosis 1: altered nutrition-related lab values Related to: Dm2 Nutrition Diagnosis 1 Evidenced By: A1C of 8.1% and known h/o DM2    2. Nutrition Diagnosis 2: Inconsistent carbohydrate intake Nutrition Diagnosis 2 Related to: DM Nutrition Diagnosis 2 Evidenced By: A1C of 8.1%      Nutrition Interventions:   Intervention :Food/Nutrient Delivery: Yes  Meals/Snacks: General/healthful diet (Consistent CHO)  Intervention: Coordination of Nutrition Care: Yes  Recommended Diet/Supplements: Continue current diet    Goal:   - BG will be within target range of  mg/dl by 7/27  - pt will follow-up with OP PCM by 9/30 for DM med regimen eval     Nutrition Monitoring and Evaluation      [x]     Monitor po intake on meal rounds  [x]     Continue inpatient monitoring and intervention  []     Other:      Nutrition Risk:  []   High     []  Moderate    [x]  Minimal/Uncompromised    JUD Cisse, MPH, RD, CDE

## 2017-07-24 NOTE — PROGRESS NOTES
Readmission Risk Assessment:     Moderate Risk and [facility]P/[facility] patients    RRAT Score:  13-20    Initial Assessment:chart reviewed and spoke with patient at bedside,pt admitted for sepsis,when discharged pt plans to return back home where she lives with her son,cm offered home health services pt accepted [facility] completed for [facility]. Emergency Contact:  See chart  Pertinent Medical Hx: see chart      PCP/Specialists:Mona       [facility] Services:       DME:          Moderate Risk Care Transition Plan:  1. Evaluate for [facility] or [facility], SNF, acute rehab, community care coordination of resources. 2. Involve patient/caregiver in assessment, planning, education and implement of intervention. 3. CM daily patient care huddles/interdisciplinary rounds. 4. PCP/Specialist appointment within 5  7 days made prior to discharge. 5. Facilitate transportation and logistics for follow-up appointments. 6. Medication reconciliation [address]  7. Formal handoff between hospital provider and post-acute provider to transition patient  Handoff to [id number] Nurse Navigator or PCP practice.

## 2017-07-24 NOTE — PROGRESS NOTES
2030-Assessment complete at this time. Pt A&O x4. Lung sounds clear bilaterally with diminished sounds on right lower lobe. Pt has +PP bilaterally. Pt reports tingling and numbness in LE's due to neuropathy. Pain reported a  9/10 on pain scale to right upper shoulder. Lidocaine patch present and intact over rt shoulder. No IV access at this time. Skin warm and dry. Bruising noted on left forearm. Abdomen soft and non-tender. Bowel sounds active x4 quadrants WDL's. Patient resting with bed in lowest position. Call light in reach. 2108-20G IV inserted into right forearm. Patent and flushed    2315-Pt BP elevated 190/96 and temp of 102.1.     2340-Spoke with Dr. Conor uLo about pt condition (elevated /96, and temp 102.1). 0.2mg Clonidine patch ordered to start now and restart Azactam antibiotic now. Will continue to monitor pt condition    0310-Shift reassessment complete at this time. No changes noted to previous assessment. See flow sheet for details. Pt request shampoo cap and christiana pads at this time. Pt sitting up with bed in lowest position. Call light in reach. End of shift summary. Pt had uneventful shift. Pain was treated with PRN medications which helped but did not get pt to pain goal of 6/10. Pt ambulates ab ivory to restroom. Pt resting with no needs or signs of distress at this time. Call light in reach.

## 2017-07-24 NOTE — ROUTINE PROCESS
Bedside and verbal shift change report given to PHONG Olson RN (oncoming nurse) by SUN Dill RN (offgoing nurse). Report included the following information SBAR, Kardex and MAR.

## 2017-07-24 NOTE — DISCHARGE INSTRUCTIONS
Sepsis: Care Instructions  Your Care Instructions  Sepsis is an infection that has spread throughout your body. It is a life-threatening condition and often causes extremely low blood pressure. This can lead to problems with many different organs. The cause of sepsis is not always clear, but it can happen as part of a long-term or sudden illness. Sometimes even a mild illness can lead to sepsis. Follow-up care is a key part of your treatment and safety. Be sure to make and go to all appointments, and call your doctor if you are having problems. Its also a good idea to know your test results and keep a list of the medicines you take. How can you care for yourself at home? · If your doctor prescribed antibiotics, take them as directed. Do not stop taking them just because you feel better. You need to take the full course of antibiotics. · Drink plenty of fluids, enough so that your urine is light yellow or clear like water. Choose water or caffeine-free clear liquids until you feel better. If you have kidney, heart, or liver disease and have to limit fluids, talk with your doctor before you increase your fluid intake. You can try rehydration drinks, such as Gatorade or Powerade. · Do not drink alcohol. · Eat a healthy diet. Include fruits, vegetables, and whole grains in your diet every day. · Walking is an easy way to get exercise. Gradually increase the amount you walk every day. Make sure your doctor knows that you are starting an exercise program.  · Do not smoke or use other tobacco products. If you need help quitting, talk to your doctor about stop-smoking programs and medicines. These can increase your chances of quitting for good. When should you call for help? Call 911 anytime you think you may need emergency care. For example, call if:  · You passed out (lost consciousness). Call your doctor now or seek immediate medical care if:  · You have a fever or chills.   · You have cool, pale, or clammy skin. · You are dizzy or lightheaded, or you feel like you may faint. · You have any new symptoms, such as a cough, pain in one part of your body, or urinary problems. Watch closely for changes in your health, and be sure to contact your doctor if:  · You do not get better as expected. Where can you learn more? Go to http://lisa-kenan.info/. Enter U195 in the search box to learn more about \"Sepsis: Care Instructions. \"  Current as of: March 20, 2017  Content Version: 11.3  © 8538-9176 [employer]. Care instructions adapted under license by [employer] (which disclaims liability or warranty for this information). If you have questions about a medical condition or this instruction, always ask your healthcare professional. [address] any warranty or liability for your use of this information. Pneumonia: Care Instructions  Your Care Instructions    Pneumonia is an infection of the lungs. Most cases are caused by infections from bacteria or viruses. Pneumonia may be mild or very severe. If it is caused by bacteria, you will be treated with antibiotics. It may take a few weeks to a few months to recover fully from pneumonia, depending on how sick you were and whether your overall health is good. Follow-up care is a key part of your treatment and safety. Be sure to make and go to all appointments, and call your doctor if you are having problems. Its also a good idea to know your test results and keep a list of the medicines you take. How can you care for yourself at home? · Take your antibiotics exactly as directed. Do not stop taking the medicine just because you are feeling better. You need to take the full course of antibiotics. · Take your medicines exactly as prescribed. Call your doctor if you think you are having a problem with your medicine. · Get plenty of rest and sleep.  You may feel weak and tired for a while, but your energy level will improve with time. · To prevent dehydration, drink plenty of fluids, enough so that your urine is light yellow or clear like water. Choose water and other caffeine-free clear liquids until you feel better. If you have kidney, heart, or liver disease and have to limit fluids, talk with your doctor before you increase the amount of fluids you drink. · Take care of your cough so you can rest. A cough that brings up mucus from your lungs is common with pneumonia. It is one way your body gets rid of the infection. But if coughing keeps you from resting or causes severe fatigue and chest-wall pain, talk to your doctor. He or she may suggest that you take a medicine to reduce the cough. · Use a vaporizer or humidifier to add moisture to your bedroom. Follow the directions for cleaning the machine. · Do not smoke or allow others to smoke around you. Smoke will make your cough last longer. If you need help quitting, talk to your doctor about stop-smoking programs and medicines. These can increase your chances of quitting for good. · Take an over-the-counter pain medicine, such as acetaminophen (Tylenol), ibuprofen (Advil, Motrin), or naproxen (Aleve). Read and follow all instructions on the label. · Do not take two or more pain medicines at the same time unless the doctor told you to. Many pain medicines have acetaminophen, which is Tylenol. Too much acetaminophen (Tylenol) can be harmful. · If you were given a spirometer to measure how well your lungs are working, use it as instructed. This can help your doctor tell how your recovery is going. · To prevent pneumonia in the future, talk to your doctor about getting a flu vaccine (once a year) and a pneumococcal vaccine (one time only for most people). When should you call for help? Call 911 anytime you think you may need emergency care. For example, call if:  · You have severe trouble breathing.   Call your doctor now or seek immediate medical care if:  · You cough up dark brown or bloody mucus (sputum). · You have new or worse trouble breathing. · You are dizzy or lightheaded, or you feel like you may faint. Watch closely for changes in your health, and be sure to contact your doctor if:  · You have a new or higher fever. · You are coughing more deeply or more often. · You are not getting better after 2 days (48 hours). · You do not get better as expected. Where can you learn more? Go to http://lisa-kenan.info/. Enter [id number] in the search box to learn more about \"Pneumonia: Care Instructions. \"  Current as of: March 25, 2017  Content Version: 11.3  © 9703-0028 [employer]. Care instructions adapted under license by [employer] (which disclaims liability or warranty for this information). If you have questions about a medical condition or this instruction, always ask your healthcare professional. [place] [address] any warranty or liability for your use of this information. Lab Results   Component Value Date/Time    Hemoglobin A1c 8.1 07/22/2017 05:15 PM       This lab test reflects that your blood sugar has been slightly elevated over the past 3 months and should be evaluated by your primary care provider. An A1C of 5.7-6.4% meets the criteria for pre-diabetes; an A1C of 6.5% or higher meets the criteria for diabetes. This lab test reflects that your blood sugar averaged 186 mg/dL over the past 3 months. It is important to follow up with your provider on a routine basis to continue to evaluate your blood sugar and discuss any necessary changes in treatment.

## 2017-07-25 LAB
BACTERIA SPEC CULT: ABNORMAL
GRAM STN SPEC: ABNORMAL
GRAM STN SPEC: ABNORMAL
SERVICE CMNT-IMP: ABNORMAL

## 2017-08-18 ENCOUNTER — HOSPITAL ENCOUNTER (EMERGENCY)
Age: 67
Discharge: HOME | End: 2017-08-18
Attending: EMERGENCY MEDICINE
Payer: MEDICARE

## 2017-08-18 ENCOUNTER — APPOINTMENT (OUTPATIENT)
Dept: CT IMAGING | Age: 67
End: 2017-08-18
Attending: EMERGENCY MEDICINE
Payer: MEDICARE

## 2017-08-18 VITALS
SYSTOLIC BLOOD PRESSURE: 142 MMHG | HEART RATE: 75 BPM | TEMPERATURE: 98.7 F | DIASTOLIC BLOOD PRESSURE: 84 MMHG | BODY MASS INDEX: 20.83 KG/M2 | OXYGEN SATURATION: 93 % | WEIGHT: 125 LBS | RESPIRATION RATE: 18 BRPM | HEIGHT: 65 IN

## 2017-08-18 DIAGNOSIS — M54.50 ACUTE MIDLINE LOW BACK PAIN WITHOUT SCIATICA: Primary | ICD-10-CM

## 2017-08-18 DIAGNOSIS — K59.00 CONSTIPATION, UNSPECIFIED CONSTIPATION TYPE: ICD-10-CM

## 2017-08-18 LAB
ALBUMIN SERPL-MCNC: 2.5 G/DL (ref 3.4–5)
ALBUMIN/GLOB SERPL: 0.5 {RATIO} (ref 0.8–1.7)
ALP SERPL-CCNC: 126 U/L (ref 45–117)
ALT SERPL-CCNC: 21 U/L (ref 13–56)
ANION GAP SERPL CALC-SCNC: 7 MMOL/L (ref 3–18)
APTT PPP: 35.4 SEC (ref 23–36.4)
AST SERPL-CCNC: 17 U/L (ref 15–37)
BASOPHILS # BLD: 0 K/UL (ref 0–0.06)
BASOPHILS NFR BLD: 0 % (ref 0–2)
BILIRUB DIRECT SERPL-MCNC: 0.1 MG/DL (ref 0–0.2)
BILIRUB SERPL-MCNC: 0.2 MG/DL (ref 0.2–1)
BUN SERPL-MCNC: 12 MG/DL (ref 7–18)
BUN/CREAT SERPL: 18 (ref 12–20)
CALCIUM SERPL-MCNC: 8.5 MG/DL (ref 8.5–10.1)
CHLORIDE SERPL-SCNC: 101 MMOL/L (ref 100–108)
CK MB CFR SERPL CALC: NORMAL % (ref 0–4)
CK MB SERPL-MCNC: <1 NG/ML (ref 5–25)
CK SERPL-CCNC: 142 U/L (ref 26–192)
CO2 SERPL-SCNC: 29 MMOL/L (ref 21–32)
CREAT SERPL-MCNC: 0.66 MG/DL (ref 0.6–1.3)
DIFFERENTIAL METHOD BLD: ABNORMAL
EOSINOPHIL # BLD: 0.1 K/UL (ref 0–0.4)
EOSINOPHIL NFR BLD: 0 % (ref 0–5)
ERYTHROCYTE [DISTWIDTH] IN BLOOD BY AUTOMATED COUNT: 19.2 % (ref 11.6–14.5)
GLOBULIN SER CALC-MCNC: 4.7 G/DL (ref 2–4)
GLUCOSE SERPL-MCNC: 154 MG/DL (ref 74–99)
HCT VFR BLD AUTO: 35.6 % (ref 35–45)
HGB BLD-MCNC: 11.8 G/DL (ref 12–16)
INR PPP: 1.3 (ref 0.8–1.2)
LIPASE SERPL-CCNC: 65 U/L (ref 73–393)
LYMPHOCYTES # BLD: 1.3 K/UL (ref 0.9–3.6)
LYMPHOCYTES NFR BLD: 12 % (ref 21–52)
MCH RBC QN AUTO: 28.4 PG (ref 24–34)
MCHC RBC AUTO-ENTMCNC: 33.1 G/DL (ref 31–37)
MCV RBC AUTO: 85.6 FL (ref 74–97)
MONOCYTES # BLD: 0.7 K/UL (ref 0.05–1.2)
MONOCYTES NFR BLD: 7 % (ref 3–10)
NEUTS SEG # BLD: 9.1 K/UL (ref 1.8–8)
NEUTS SEG NFR BLD: 81 % (ref 40–73)
PLATELET # BLD AUTO: 395 K/UL (ref 135–420)
PMV BLD AUTO: 8.3 FL (ref 9.2–11.8)
POTASSIUM SERPL-SCNC: 4.6 MMOL/L (ref 3.5–5.5)
PROT SERPL-MCNC: 7.2 G/DL (ref 6.4–8.2)
PROTHROMBIN TIME: 15.5 SEC (ref 11.5–15.2)
RBC # BLD AUTO: 4.16 M/UL (ref 4.2–5.3)
SODIUM SERPL-SCNC: 137 MMOL/L (ref 136–145)
TROPONIN I SERPL-MCNC: <0.02 NG/ML (ref 0–0.06)
WBC # BLD AUTO: 11.2 K/UL (ref 4.6–13.2)

## 2017-08-18 PROCEDURE — 85610 PROTHROMBIN TIME: CPT | Performed by: EMERGENCY MEDICINE

## 2017-08-18 PROCEDURE — 80076 HEPATIC FUNCTION PANEL: CPT | Performed by: EMERGENCY MEDICINE

## 2017-08-18 PROCEDURE — 74011250636 HC RX REV CODE- 250/636: Performed by: EMERGENCY MEDICINE

## 2017-08-18 PROCEDURE — 85025 COMPLETE CBC W/AUTO DIFF WBC: CPT | Performed by: EMERGENCY MEDICINE

## 2017-08-18 PROCEDURE — 85730 THROMBOPLASTIN TIME PARTIAL: CPT | Performed by: EMERGENCY MEDICINE

## 2017-08-18 PROCEDURE — 74011636320 HC RX REV CODE- 636/320: Performed by: EMERGENCY MEDICINE

## 2017-08-18 PROCEDURE — 96374 THER/PROPH/DIAG INJ IV PUSH: CPT

## 2017-08-18 PROCEDURE — 80048 BASIC METABOLIC PNL TOTAL CA: CPT | Performed by: EMERGENCY MEDICINE

## 2017-08-18 PROCEDURE — 83690 ASSAY OF LIPASE: CPT | Performed by: EMERGENCY MEDICINE

## 2017-08-18 PROCEDURE — 74177 CT ABD & PELVIS W/CONTRAST: CPT

## 2017-08-18 PROCEDURE — 82550 ASSAY OF CK (CPK): CPT | Performed by: EMERGENCY MEDICINE

## 2017-08-18 PROCEDURE — 99284 EMERGENCY DEPT VISIT MOD MDM: CPT

## 2017-08-18 RX ORDER — DICYCLOMINE HYDROCHLORIDE 20 MG/1
20 TABLET ORAL EVERY 6 HOURS
Qty: 20 TAB | Refills: 0 | Status: SHIPPED | OUTPATIENT
Start: 2017-08-18 | End: 2017-08-23

## 2017-08-18 RX ORDER — KETOROLAC TROMETHAMINE 15 MG/ML
15 INJECTION, SOLUTION INTRAMUSCULAR; INTRAVENOUS
Status: COMPLETED | OUTPATIENT
Start: 2017-08-18 | End: 2017-08-18

## 2017-08-18 RX ORDER — MAGNESIUM CITRATE
SOLUTION, ORAL ORAL
Qty: 1 BOTTLE | Refills: 0 | Status: SHIPPED | OUTPATIENT
Start: 2017-08-18 | End: 2017-09-08

## 2017-08-18 RX ADMIN — IOPAMIDOL 100 ML: 612 INJECTION, SOLUTION INTRAVENOUS at 07:27

## 2017-08-18 RX ADMIN — KETOROLAC TROMETHAMINE 15 MG: 15 INJECTION, SOLUTION INTRAMUSCULAR; INTRAVENOUS at 06:39

## 2017-08-18 NOTE — ED PROVIDER NOTES
[address]  EMERGENCY DEPARTMENT HISTORY AND PHYSICAL EXAM       Date: 8/18/2017   Patient Name: Blaise Roman   YOB: 1950  Medical Record Number: [id number]    History of Presenting Illness     Chief Complaint   Patient presents with    Back Pain        History Provided By:  patient    Additional History: 6:13 AM  Blaise Roman is a 78 y.o. female with pmhx of chronic N/V, chronic pain, carcinoma, DM, PUD, and lupus, who presents to the emergency department C/O 10/10 lower back pain onset 2 weeks ago. Reports she is currently taking Prednisone (finished yesterday) and Tramadol for pain without relief. Pt was seen by [facility] on 7/22/17, was dx with PNA, admitted, and discharged 11 days ago. Current medications include Warfarin BID and Metformin every day. Son notes some constipation after discharge, now resolved. Son states pt has been walking in her sleep, unsure if she sustained injury/fall while sleep walking. Pt denies urinary sxs, change in BM, vaginal bleeding, fhx DM, fhx HTN, known fall/injury, new N/V, and any other Sx or complaints. Primary Care Provider: Sakina Sullivan MD   Specialist:    Past History     Past Medical History:   Past Medical History:   Diagnosis Date    Arthritis     hands and feet    Asthma     Autoimmune disease ([address]     lupus anticoagulant-causes clotting    Cancer ([address]     carcinoma in situ of cervix and uterus; inside bottom lip    Chronic pain     Left foot    Diabetes ([address] 2013    Dizzy spells     h/o    GERD (gastroesophageal reflux disease)     Liver disease     1/2 of my liver was removed for ulcers.     Lung collapse     2015    Nausea & vomiting 5/23/2012    Neuropathy (HCC)     right foot    Other ill-defined conditions     arterial clot in rt leg    Other ill-defined conditions     restless leg syndrome    Other ill-defined conditions     raynaud's syndrome    Other ill-defined conditions     chronic N/V  Other ill-defined conditions     (2 mutated genes) hyper coag. state     Positive cardiolipin antibodies     Psychiatric disorder     depression    PUD (peptic ulcer disease) 2009    H/O requiring partial gastrectomy and small intestine    Thromboembolus ([address]     artery        Past Surgical History:   Past Surgical History:   Procedure Laterality Date    ABDOMEN SURGERY PROC UNLISTED      duodenal ulcer rupture    BREAST SURGERY PROCEDURE UNLISTED Bilateral 02/2016    hemorrhaged after breast surgery    HX APPENDECTOMY      HX BREAST AUGMENTATION      developed infection and implant removed    HX CHOLECYSTECTOMY      HX GASTRECTOMY      due to ulcers    HX GASTRIC BYPASS  1996    gastric by-pass    HX GI      total gastrectomy, partial colectomy    HX GI      colostomy and reversal    HX HEENT      cancer of lip removed    HX HYSTERECTOMY      for cervical cancer    HX ORTHOPAEDIC      rt foot surgery x10    HX ORTHOPAEDIC Left     great toe gangrenous and amputated    HX OTHER SURGICAL      part of liver removal    HX OTHER SURGICAL      ventral hernia repair x10    HX OTHER SURGICAL      Exc pilonial cystectomy    VASCULAR SURGERY PROCEDURE UNLIST      iliac aorta stents x 6        Family History:   Family History   Problem Relation Age of Onset    Malignant Hyperthermia Neg Hx     Pseudocholinesterase Deficiency Neg Hx     Delayed Awakening Neg Hx     Post-op Nausea/Vomiting Neg Hx     Emergence Delirium Neg Hx     Post-op Cognitive Dysfunction Neg Hx     Other Neg Hx         Social History:   Social History   Substance Use Topics    Smoking status: Former Smoker     Years: 0.50     Quit date: 1/2/1996    Smokeless tobacco: Never Used    Alcohol use Yes      Comment: 1 or 2 glasses wine or mixed drinks monthly         Allergies:    Allergies   Allergen Reactions    Rifampin Hives and Swelling     Swelling of mouth and tounge    Levaquin [Levofloxacin] Itching    Amoxicillin Hives    Aspirin Nausea and Vomiting    Azithromycin Angioedema    Bactrim [Sulfamethoprim Ds] Hives    Corn Nausea and Vomiting    Rocephin [Ceftriaxone] Hives    Tylenol [Acetaminophen] Hives    Vancomycin Hives        Review of Systems   Review of Systems   Constitutional: Negative for chills, diaphoresis, fever and unexpected weight change. HENT: Positive for congestion. Negative for drooling, ear pain, rhinorrhea, sore throat, tinnitus and trouble swallowing. Eyes: Negative for photophobia, pain, redness and visual disturbance. Respiratory: Negative for cough, choking, chest tightness, shortness of breath, wheezing and stridor. Cardiovascular: Negative for chest pain, palpitations and leg swelling. Gastrointestinal: Positive for constipation (resolved). Negative for abdominal distention, abdominal pain, anal bleeding, blood in stool, diarrhea, nausea and vomiting. Endocrine: Negative for cold intolerance, heat intolerance, polydipsia and polyuria. Genitourinary: Negative for difficulty urinating, dysuria, flank pain, frequency, hematuria and urgency. Musculoskeletal: Positive for back pain. Negative for arthralgias and neck pain. Skin: Negative for color change, rash and wound. Allergic/Immunologic: Negative for immunocompromised state. Neurological: Negative for dizziness, seizures, syncope, speech difficulty, light-headedness and headaches. Hematological: Does not bruise/bleed easily. Psychiatric/Behavioral: Negative for agitation, behavioral problems, hallucinations, self-injury and suicidal ideas. The patient is not hyperactive. All other systems reviewed and are negative.       Physical Exam  Vitals:    08/18/17 0607 08/18/17 0817   BP: (!) 138/102 142/84   Pulse: 99 75   Resp: 20 18   Temp: 99.6 °F (37.6 °C) 98.7 °F (37.1 °C)   SpO2: 97% 93%   Weight: 56.7 kg (125 lb)    Height: 5' 5\" (1.651 m)        Physical Exam   Constitutional: She is oriented to person, place, and time. She appears well-developed and well-nourished. Non-toxic appearance. No distress. Uncomfortable appearing. HENT:   Head: Normocephalic and atraumatic. Right Ear: External ear normal.   Left Ear: External ear normal.   Mouth/Throat: Oropharynx is clear and moist. Mucous membranes are dry. No oropharyngeal exudate. Eyes: Conjunctivae and EOM are normal. Pupils are equal, round, and reactive to light. No scleral icterus. No pallor   Neck: Normal range of motion. Neck supple. No JVD present. No tracheal deviation present. No thyromegaly present. Cardiovascular: Normal rate, regular rhythm and normal heart sounds. Pulmonary/Chest: Effort normal and breath sounds normal. No stridor. No respiratory distress. Abdominal: Soft. Bowel sounds are normal. She exhibits no distension. There is no tenderness. There is no rebound and no guarding. Multiple well healed surgical scars. Musculoskeletal: Normal range of motion. She exhibits no edema or tenderness. No soft tissue injuries. No focal area of tenderness to her lower back. The region of complaint is lumbar L4-L5 and right SI joint region. Lymphadenopathy:     She has no cervical adenopathy. Neurological: She is alert and oriented to person, place, and time. She has normal reflexes. No cranial nerve deficit. Coordination normal.   Skin: Skin is warm and dry. No rash noted. She is not diaphoretic. No erythema. Psychiatric: She has a normal mood and affect. Her behavior is normal. Judgment and thought content normal.   Nursing note and vitals reviewed. Diagnostic Study Results     Labs -      No results found for this or any previous visit (from the past 12 hour(s)). Radiologic Studies -  The following have been ordered and reviewed:   CT ABD PELV W CONT   Final Result              Medical Decision Making   I am the first provider for this patient.      I reviewed the vital signs, available nursing notes, past medical history, past surgical history, family history and social history. Vital Signs-Reviewed the patient's vital signs. No data found. Pulse Oximetry Analysis - Normal 97% on room air     Old Medical Records: Nursing notes. Provider Notes:   DDx: worsening back pain over 2 weeks. Probable exacerbation of her chronic pain. Though she states not the case. Would consider internal bleeding or vascular lesion given anticoagulation, lupus, and her extensive medical hx. Kidney problems including bleeding, infection, or simple ASPEN. I anticipate this is musculoskeletal pain without cauda equina. Procedures:   Procedures    ED Course:  6:13 AM  Initial assessment performed. The patients presenting problems have been discussed, and they are in agreement with the care plan formulated and outlined with them. I have encouraged them to ask questions as they arise throughout their visit. Medications Given in the ED:  Medications   ketorolac (TORADOL) injection 15 mg (15 mg IntraVENous Given 8/18/17 0639)       Discharge Note:  .noq  7:58 AM  Pt has been reexamined. Patient has no new complaints, changes, or physical findings. Care plan outlined and precautions discussed. Results were reviewed with the patient. All medications were reviewed with the patient; will d/c home with pain meds, cathartics. All of pt's questions and concerns were addressed. Patient was instructed and agrees to follow up with GYN/PCP, as well as to return to the ED upon further deterioration. Patient is ready to go home. Diagnosis   Clinical Impression:   1. Acute midline low back pain without sciatica    2.  Constipation, unspecified constipation type           Follow-up Information     Follow up With Details Comments Anand Oro MD Schedule an appointment as soon as possible for a visit For Your [facility]      [facility] EMERGENCY DEPT Go to As needed, If symptoms worsen [address] Justina Koch  [phone]    Mickie Brand MD In 1 week  [address]  [phone]            Discharge Medication List as of 8/18/2017  9:25 AM      START taking these medications    Details   dicyclomine (BENTYL) 20 mg tablet Take 1 Tab by mouth every six (6) hours for 20 doses. , Normal, Disp-20 Tab, R-0      magnesium citrate solution Take 1/3 of bottle every 8 hours until finished or until you have a bowel movement., Normal, Disp-1 Bottle, R-0         CONTINUE these medications which have NOT CHANGED    Details   celecoxib (CELEBREX) 200 mg capsule Take 1 Cap by mouth daily for 90 days. , Print, Disp-10 Cap, R-0      lidocaine (LIDODERM) 5 % Apply patch to the affected area for 12 hours a day and remove for 12 hours a day., Print, Disp-30 Each, R-0      miconazole (MICOTIN) 2 % vaginal cream Insert 1 Applicator into vagina every evening., Disp-45 g, R-0, Print      promethazine (PHENERGAN) 25 mg tablet Take 25 mg by mouth every six (6) hours as needed for Nausea., Historical Med      LORazepam (ATIVAN) 2 mg tablet Take 2 mg by mouth nightly., Historical Med      BIOTIN PO Take 5,000 mcg by mouth daily. , Historical Med      diphenhydrAMINE (BENADRYL) 25 mg capsule Take 50 mg by mouth every six (6) hours as needed. Indications: ALLERGIC RHINITIS, Historical Med      Mv,Ca,Min-FA-Herbal No.157 (ESTROVEN MAXIMUM STRENGTH) 400 mcg Tab Take 1 Tab by mouth two (2) times a day.  , Historical Med      albuterol (PROVENTIL HFA, VENTOLIN HFA) 90 mcg/actuation inhaler Take 2 Puffs by inhalation every four (4) hours as needed. Indications: ACUTE ASTHMA ATTACK, Historical Med      cloNIDine (CATAPRES) 0.1 mg tablet Take 0.2 mg by mouth two (2) times a day. Indications: HYPERTENSION, hot flashes;, Historical Med      hydrOXYzine (ATARAX) 25 mg tablet Take 25 mg by mouth daily.  Indications: ALLERGIC RHINITIS, Historical Med      Levocetirizine (XYZAL) 5 mg tablet Take 5 mg by mouth daily. Indications: ALLERGIC RHINITIS, Historical Med      dronabinol (MARINOL) 2.5 mg capsule Take 2.5 mg by mouth three (3) times daily as needed. Indications: nausea, Historical Med      metFORMIN (GLUCOPHAGE) 500 mg tablet Take 500 mg by mouth two (2) times daily (with meals). Indications: TYPE 2 DIABETES MELLITUS, Historical Med      ropinirole (REQUIP) 4 mg Tab TAB Take 4 mg by mouth nightly. Indications: RESTLESS LEGS SYNDROME, Historical Med      traMADol (ULTRAM) 50 mg tablet Take 50 mg by mouth every eight (8) hours as needed. Indications: PAIN, Historical Med      warfarin (COUMADIN) 6 mg tablet Take 7 mg by mouth nightly. Takes for circulation, Historical Med             _______________________________   Attestations: This note is prepared by Sharon Huitron, acting as a Scribe for Eddie Walton MD  on 6:13 AM on 8/18/2017 . Tresa Navarrete. Juanita Claudio MD: The scribe's documentation has been prepared under my direction and personally reviewed by me in its entirety.   _______________________________

## 2017-08-18 NOTE — ED NOTES
Verbal shift change report given to April, RN (oncoming nurse) by Olga Martin RN (offgoing nurse). Report included the following information SBAR, ED Summary and MAR.

## 2017-08-18 NOTE — ED TRIAGE NOTES
Lower back pain x 2 weeks. Pt states that pain has been \"moving around\" but has not contacted PCP. Sepsis Screening completed    (  )Patient meets SIRS criteria. ( Meliza Miles does not meet SIRS criteria.       SIRS Criteria is achieved when two or more of the following are present   Temperature < 96.8°F (36°C) or > 100.9°F (38.3°C)   Heart Rate > 90 beats per minute   Respiratory Rate > 20 breaths per minute   WBC count > 12,000 or <4,000 or > 10% bands

## 2017-08-18 NOTE — DISCHARGE INSTRUCTIONS
Learning About How to Have a Healthy Back  What causes back pain? Back pain is often caused by overuse, strain, or injury. For example, people often hurt their backs playing sports or working in the yard, being jolted in a car accident, or lifting something too heavy. Aging plays a part too. Your bones and muscles tend to lose strength as you age, which makes injury more likely. The spongy discs between the bones of the spine (vertebrae) may suffer from wear and tear and no longer provide enough cushion between the bones. A disc that bulges or breaks open (herniated disc) can press on nerves, causing back pain. In some people, back pain is the result of arthritis, broken vertebrae caused by bone loss (osteoporosis), illness, or a spine problem. Although most people have back pain at one time or another, there are steps you can take to make it less likely. How can you have a healthy back? Reduce stress on your back through good posture  Slumping or slouching alone may not cause low back pain. But after the back has been strained or injured, bad posture can make pain worse. · Sleep in a position that maintains your back's normal curves and on a mattress that feels comfortable. Sleep on your side with a pillow between your knees, or sleep on your back with a pillow under your knees. These positions can reduce strain on your back. · Stand and sit up straight. \"Good posture\" generally means your ears, shoulders, and hips are in a straight line. · If you must stand for a long time, put one foot on a stool, ledge, or box. Switch feet every now and then. · Sit in a chair that is low enough to let you place both feet flat on the floor with both knees nearly level with your hips. If your chair or desk is too high, use a footrest to raise your knees. Place a small pillow, a rolled-up towel, or a lumbar roll in the curve of your back if you need extra support.   · Try a kneeling chair, which helps tilt your hips forward. This takes pressure off your lower back. · Try sitting on an exercise ball. It can rock from side to side, which helps keep your back loose. · When driving, keep your knees nearly level with your hips. Sit straight, and drive with both hands on the steering wheel. Your arms should be in a slightly bent position. Reduce stress on your back through careful lifting  · Squat down, bending at the hips and knees only. If you need to, put one knee to the floor and extend your other knee in front of you, bent at a right angle (half kneeling). · Press your chest straight forward. This helps keep your upper back straight while keeping a slight arch in your low back. · Hold the load as close to your body as possible, at the level of your belly button (navel). · Use your feet to change direction, taking small steps. · Lead with your hips as you change direction. Keep your shoulders in line with your hips as you move. · Set down your load carefully, squatting with your knees and hips only. Exercise and stretch your back  · Do some exercise on most days of the week, if your doctor says it is okay. You can walk, run, swim, or cycle. · Stretch your back muscles. Here are a few exercises to try:  Ann Marie Sermons on your back, and gently pull one bent knee to your chest. Put that foot back on the floor, and then pull the other knee to your chest.  ¨ Do pelvic tilts. Lie on your back with your knees bent. Tighten your stomach muscles. Pull your belly button (navel) in and up toward your ribs. You should feel like your back is pressing to the floor and your hips and pelvis are slightly lifting off the floor. Hold for 6 seconds while breathing smoothly. ¨ Sit with your back flat against a wall. · Keep your core muscles strong. The muscles of your back, belly (abdomen), and buttocks support your spine. ¨ Pull in your belly and imagine pulling your navel toward your spine. Hold this for 6 seconds, then relax.  Remember to keep breathing normally as you tense your muscles. ¨ Do curl-ups. Always do them with your knees bent. Keep your low back on the floor, and curl your shoulders toward your knees using a smooth, slow motion. Keep your arms folded across your chest. If this bothers your neck, try putting your hands behind your neck (not your head), with your elbows spread apart. ¨ Lie on your back with your knees bent and your feet flat on the floor. Tighten your belly muscles, and then push with your feet and raise your buttocks up a few inches. Hold this position 6 seconds as you continue to breathe normally, then lower yourself slowly to the floor. Repeat 8 to 12 times. ¨ If you like group exercise, try Pilates or yoga. These classes have poses that strengthen the core muscles. Lead a healthy lifestyle  · Stay at a healthy weight to avoid strain on your back. · Do not smoke. Smoking increases the risk of osteoporosis, which weakens the spine. If you need help quitting, talk to your doctor about stop-smoking programs and medicines. These can increase your chances of quitting for good. Where can you learn more? Go to http://lisaWiztangokenan.info/. Enter L315 in the search box to learn more about \"Learning About How to Have a Healthy Back. \"  Current as of: March 21, 2017  Content Version: 11.3  © 8899-0083 [employer]. Care instructions adapted under license by [employer] (which disclaims liability or warranty for this information). If you have questions about a medical condition or this instruction, always ask your healthcare professional. [place] [address] any warranty or liability for your use of this information. Constipation: Care Instructions  Your Care Instructions  Constipation means that you have a hard time passing stools (bowel movements). People pass stools from 3 times a day to once every 3 days. What is normal for you may be different.  Constipation may occur with pain in the rectum and cramping. The pain may get worse when you try to pass stools. Sometimes there are small amounts of bright red blood on toilet paper or the surface of stools. This is because of enlarged veins near the rectum (hemorrhoids). A few changes in your diet and lifestyle may help you avoid ongoing constipation. Your doctor may also prescribe medicine to help loosen your stool. Some medicines can cause constipation. These include pain medicines and antidepressants. Tell your doctor about all the medicines you take. Your doctor may want to make a medicine change to ease your symptoms. Follow-up care is a key part of your treatment and safety. Be sure to make and go to all appointments, and call your doctor if you are having problems. It's also a good idea to know your test results and keep a list of the medicines you take. How can you care for yourself at home? · Drink plenty of fluids, enough so that your urine is light yellow or clear like water. If you have kidney, heart, or liver disease and have to limit fluids, talk with your doctor before you increase the amount of fluids you drink. · Include high-fiber foods in your diet each day. These include fruits, vegetables, beans, and whole grains. · Get at least 30 minutes of exercise on most days of the week. Walking is a good choice. You also may want to do other activities, such as running, swimming, cycling, or playing tennis or team sports. · Take a fiber supplement, such as Citrucel or Metamucil, every day. Read and follow all instructions on the label. · Schedule time each day for a bowel movement. A daily routine may help. Take your time having your bowel movement. · Support your feet with a small step stool when you sit on the toilet. This helps flex your hips and places your pelvis in a squatting position. · Your doctor may recommend an over-the-counter laxative to relieve your constipation.  Examples are Milk of Magnesia and MiraLax. Read and follow all instructions on the label. Do not use laxatives on a long-term basis. When should you call for help? Call your doctor now or seek immediate medical care if:  · You have new or worse belly pain. · You have new or worse nausea or vomiting. · You have blood in your stools. Watch closely for changes in your health, and be sure to contact your doctor if:  · Your constipation is getting worse. · You do not get better as expected. Where can you learn more? Go to http://lisa-kenan.info/. Enter 21 491.390.2238 in the search box to learn more about \"Constipation: Care Instructions. \"  Current as of: March 20, 2017  Content Version: 11.3  © 6961-5368 [employer]. Care instructions adapted under license by [employer] (which disclaims liability or warranty for this information). If you have questions about a medical condition or this instruction, always ask your healthcare professional. [place] [address] any warranty or liability for your use of this information.

## 2017-08-28 ENCOUNTER — HOSPITAL ENCOUNTER (OUTPATIENT)
Dept: GENERAL RADIOLOGY | Age: 67
Discharge: HOME | End: 2017-08-28
Attending: PODIATRIST
Payer: MEDICARE

## 2017-08-28 DIAGNOSIS — L97.512 RIGHT FOOT ULCER, WITH FAT LAYER EXPOSED (HCC): ICD-10-CM

## 2017-08-28 DIAGNOSIS — M86.071 ACUTE HEMATOGENOUS OSTEOMYELITIS, RIGHT ANKLE AND FOOT (HCC): ICD-10-CM

## 2017-08-28 PROCEDURE — 73630 X-RAY EXAM OF FOOT: CPT

## 2017-09-01 ENCOUNTER — HOSPITAL ENCOUNTER (OUTPATIENT)
Dept: WOUND CARE | Age: 67
Discharge: HOME | End: 2017-09-01
Payer: MEDICARE

## 2017-09-01 PROCEDURE — 87185 SC STD ENZYME DETCJ PER NZM: CPT | Performed by: PODIATRIST

## 2017-09-01 PROCEDURE — 87077 CULTURE AEROBIC IDENTIFY: CPT | Performed by: PODIATRIST

## 2017-09-01 PROCEDURE — 87076 CULTURE ANAEROBE IDENT EACH: CPT | Performed by: PODIATRIST

## 2017-09-01 PROCEDURE — 87186 SC STD MICRODIL/AGAR DIL: CPT | Performed by: PODIATRIST

## 2017-09-01 PROCEDURE — 87070 CULTURE OTHR SPECIMN AEROBIC: CPT | Performed by: PODIATRIST

## 2017-09-01 PROCEDURE — 11042 DBRDMT SUBQ TIS 1ST 20SQCM/<: CPT

## 2017-09-01 PROCEDURE — 87075 CULTR BACTERIA EXCEPT BLOOD: CPT | Performed by: PODIATRIST

## 2017-09-05 LAB
BACTERIA SPEC CULT: ABNORMAL
GRAM STN SPEC: ABNORMAL
SERVICE CMNT-IMP: ABNORMAL

## 2017-09-06 LAB
BACTERIA SPEC CULT: ABNORMAL
SERVICE CMNT-IMP: ABNORMAL

## 2017-09-06 PROCEDURE — 97602 WOUND(S) CARE NON-SELECTIVE: CPT

## 2017-09-08 PROCEDURE — 97602 WOUND(S) CARE NON-SELECTIVE: CPT

## 2017-09-08 PROCEDURE — 99213 OFFICE O/P EST LOW 20 MIN: CPT

## 2017-09-13 PROCEDURE — 97602 WOUND(S) CARE NON-SELECTIVE: CPT

## 2017-09-14 ENCOUNTER — ANESTHESIA EVENT (OUTPATIENT)
Dept: SURGERY | Age: 67
End: 2017-09-14
Payer: MEDICARE

## 2017-09-15 ENCOUNTER — HOSPITAL ENCOUNTER (OUTPATIENT)
Age: 67
Setting detail: OUTPATIENT SURGERY
Discharge: HOME | End: 2017-09-15
Attending: PODIATRIST | Admitting: PODIATRIST
Payer: MEDICARE

## 2017-09-15 ENCOUNTER — APPOINTMENT (OUTPATIENT)
Dept: GENERAL RADIOLOGY | Age: 67
End: 2017-09-15
Attending: PODIATRIST
Payer: MEDICARE

## 2017-09-15 ENCOUNTER — ANESTHESIA (OUTPATIENT)
Dept: SURGERY | Age: 67
End: 2017-09-15
Payer: MEDICARE

## 2017-09-15 VITALS
HEART RATE: 72 BPM | HEIGHT: 65 IN | OXYGEN SATURATION: 99 % | TEMPERATURE: 98 F | DIASTOLIC BLOOD PRESSURE: 65 MMHG | WEIGHT: 122 LBS | SYSTOLIC BLOOD PRESSURE: 144 MMHG | RESPIRATION RATE: 16 BRPM | BODY MASS INDEX: 20.33 KG/M2

## 2017-09-15 DIAGNOSIS — M86.371 CHRONIC MULTIFOCAL OSTEOMYELITIS, RIGHT ANKLE AND FOOT (HCC): Primary | ICD-10-CM

## 2017-09-15 LAB
APTT PPP: 30.7 SEC (ref 23–36.4)
GLUCOSE BLD STRIP.AUTO-MCNC: 159 MG/DL (ref 70–110)
GLUCOSE BLD STRIP.AUTO-MCNC: 86 MG/DL (ref 70–110)
INR PPP: 0.9 (ref 0.8–1.2)
PROTHROMBIN TIME: 11.4 SEC (ref 11.5–15.2)

## 2017-09-15 PROCEDURE — 87186 SC STD MICRODIL/AGAR DIL: CPT | Performed by: PODIATRIST

## 2017-09-15 PROCEDURE — 85610 PROTHROMBIN TIME: CPT | Performed by: ANESTHESIOLOGY

## 2017-09-15 PROCEDURE — 74011250636 HC RX REV CODE- 250/636: Performed by: PODIATRIST

## 2017-09-15 PROCEDURE — 73630 X-RAY EXAM OF FOOT: CPT

## 2017-09-15 PROCEDURE — 77030020782 HC GWN BAIR PAWS FLX 3M -B: Performed by: PODIATRIST

## 2017-09-15 PROCEDURE — 74011250637 HC RX REV CODE- 250/637: Performed by: SPECIALIST

## 2017-09-15 PROCEDURE — 74011000250 HC RX REV CODE- 250: Performed by: PODIATRIST

## 2017-09-15 PROCEDURE — 76060000039 HC ANESTHESIA 4 TO 4.5 HR: Performed by: PODIATRIST

## 2017-09-15 PROCEDURE — 76010000135 HC OR TIME 4 TO 4.5 HR: Performed by: PODIATRIST

## 2017-09-15 PROCEDURE — 87077 CULTURE AEROBIC IDENTIFY: CPT | Performed by: PODIATRIST

## 2017-09-15 PROCEDURE — 74011250636 HC RX REV CODE- 250/636

## 2017-09-15 PROCEDURE — 82962 GLUCOSE BLOOD TEST: CPT

## 2017-09-15 PROCEDURE — 76210000026 HC REC RM PH II 1 TO 1.5 HR: Performed by: PODIATRIST

## 2017-09-15 PROCEDURE — 88311 DECALCIFY TISSUE: CPT | Performed by: PODIATRIST

## 2017-09-15 PROCEDURE — 77030031139 HC SUT VCRL2 J&J -A: Performed by: PODIATRIST

## 2017-09-15 PROCEDURE — 85730 THROMBOPLASTIN TIME PARTIAL: CPT | Performed by: ANESTHESIOLOGY

## 2017-09-15 PROCEDURE — 87075 CULTR BACTERIA EXCEPT BLOOD: CPT | Performed by: PODIATRIST

## 2017-09-15 PROCEDURE — 36415 COLL VENOUS BLD VENIPUNCTURE: CPT | Performed by: ANESTHESIOLOGY

## 2017-09-15 PROCEDURE — 87070 CULTURE OTHR SPECIMN AEROBIC: CPT | Performed by: PODIATRIST

## 2017-09-15 PROCEDURE — 74011250636 HC RX REV CODE- 250/636: Performed by: SPECIALIST

## 2017-09-15 PROCEDURE — 88304 TISSUE EXAM BY PATHOLOGIST: CPT | Performed by: PODIATRIST

## 2017-09-15 PROCEDURE — 74011000250 HC RX REV CODE- 250

## 2017-09-15 PROCEDURE — 77030013708 HC HNDPC SUC IRR PULS STRY –B: Performed by: PODIATRIST

## 2017-09-15 PROCEDURE — 77030002916 HC SUT ETHLN J&J -A: Performed by: PODIATRIST

## 2017-09-15 PROCEDURE — 74011000272 HC RX REV CODE- 272: Performed by: PODIATRIST

## 2017-09-15 RX ORDER — PROPOFOL 10 MG/ML
INJECTION, EMULSION INTRAVENOUS
Status: DISCONTINUED | OUTPATIENT
Start: 2017-09-15 | End: 2017-09-15 | Stop reason: HOSPADM

## 2017-09-15 RX ORDER — LIDOCAINE HYDROCHLORIDE 20 MG/ML
INJECTION, SOLUTION EPIDURAL; INFILTRATION; INTRACAUDAL; PERINEURAL AS NEEDED
Status: DISCONTINUED | OUTPATIENT
Start: 2017-09-15 | End: 2017-09-15 | Stop reason: HOSPADM

## 2017-09-15 RX ORDER — SODIUM CHLORIDE, SODIUM LACTATE, POTASSIUM CHLORIDE, CALCIUM CHLORIDE 600; 310; 30; 20 MG/100ML; MG/100ML; MG/100ML; MG/100ML
125 INJECTION, SOLUTION INTRAVENOUS CONTINUOUS
Status: DISCONTINUED | OUTPATIENT
Start: 2017-09-15 | End: 2017-09-15 | Stop reason: HOSPADM

## 2017-09-15 RX ORDER — PROPOFOL 10 MG/ML
INJECTION, EMULSION INTRAVENOUS AS NEEDED
Status: DISCONTINUED | OUTPATIENT
Start: 2017-09-15 | End: 2017-09-15 | Stop reason: HOSPADM

## 2017-09-15 RX ORDER — DIPHENHYDRAMINE HYDROCHLORIDE 50 MG/ML
25 INJECTION, SOLUTION INTRAMUSCULAR; INTRAVENOUS ONCE
Status: COMPLETED | OUTPATIENT
Start: 2017-09-15 | End: 2017-09-15

## 2017-09-15 RX ORDER — MIDAZOLAM HYDROCHLORIDE 1 MG/ML
INJECTION, SOLUTION INTRAMUSCULAR; INTRAVENOUS AS NEEDED
Status: DISCONTINUED | OUTPATIENT
Start: 2017-09-15 | End: 2017-09-15 | Stop reason: HOSPADM

## 2017-09-15 RX ORDER — SCOLOPAMINE TRANSDERMAL SYSTEM 1 MG/1
1.5 PATCH, EXTENDED RELEASE TRANSDERMAL ONCE
Status: DISCONTINUED | OUTPATIENT
Start: 2017-09-15 | End: 2017-09-15 | Stop reason: HOSPADM

## 2017-09-15 RX ORDER — FENTANYL CITRATE 50 UG/ML
INJECTION, SOLUTION INTRAMUSCULAR; INTRAVENOUS AS NEEDED
Status: DISCONTINUED | OUTPATIENT
Start: 2017-09-15 | End: 2017-09-15 | Stop reason: HOSPADM

## 2017-09-15 RX ORDER — GLYCOPYRROLATE 0.2 MG/ML
INJECTION INTRAMUSCULAR; INTRAVENOUS AS NEEDED
Status: DISCONTINUED | OUTPATIENT
Start: 2017-09-15 | End: 2017-09-15 | Stop reason: HOSPADM

## 2017-09-15 RX ORDER — METOCLOPRAMIDE HYDROCHLORIDE 5 MG/ML
INJECTION INTRAMUSCULAR; INTRAVENOUS AS NEEDED
Status: DISCONTINUED | OUTPATIENT
Start: 2017-09-15 | End: 2017-09-15 | Stop reason: HOSPADM

## 2017-09-15 RX ORDER — ONDANSETRON 2 MG/ML
INJECTION INTRAMUSCULAR; INTRAVENOUS AS NEEDED
Status: DISCONTINUED | OUTPATIENT
Start: 2017-09-15 | End: 2017-09-15 | Stop reason: HOSPADM

## 2017-09-15 RX ORDER — MINOXIDIL 50 MG/G
AEROSOL, FOAM TOPICAL
Status: ON HOLD | COMMUNITY
End: 2018-12-05

## 2017-09-15 RX ADMIN — ONDANSETRON 4 MG: 2 INJECTION INTRAMUSCULAR; INTRAVENOUS at 13:55

## 2017-09-15 RX ADMIN — FENTANYL CITRATE 25 MCG: 50 INJECTION, SOLUTION INTRAMUSCULAR; INTRAVENOUS at 14:15

## 2017-09-15 RX ADMIN — PROPOFOL 30 MG: 10 INJECTION, EMULSION INTRAVENOUS at 13:32

## 2017-09-15 RX ADMIN — SODIUM CHLORIDE, SODIUM LACTATE, POTASSIUM CHLORIDE, AND CALCIUM CHLORIDE: 600; 310; 30; 20 INJECTION, SOLUTION INTRAVENOUS at 14:46

## 2017-09-15 RX ADMIN — FENTANYL CITRATE 25 MCG: 50 INJECTION, SOLUTION INTRAMUSCULAR; INTRAVENOUS at 14:28

## 2017-09-15 RX ADMIN — FENTANYL CITRATE 50 MCG: 50 INJECTION, SOLUTION INTRAMUSCULAR; INTRAVENOUS at 13:33

## 2017-09-15 RX ADMIN — METOCLOPRAMIDE HYDROCHLORIDE 5 MG: 5 INJECTION INTRAMUSCULAR; INTRAVENOUS at 13:51

## 2017-09-15 RX ADMIN — MIDAZOLAM HYDROCHLORIDE 2 MG: 1 INJECTION, SOLUTION INTRAMUSCULAR; INTRAVENOUS at 13:30

## 2017-09-15 RX ADMIN — PROPOFOL 20 MG: 10 INJECTION, EMULSION INTRAVENOUS at 14:14

## 2017-09-15 RX ADMIN — DIPHENHYDRAMINE HYDROCHLORIDE 25 MG: 50 INJECTION, SOLUTION INTRAMUSCULAR; INTRAVENOUS at 10:55

## 2017-09-15 RX ADMIN — PROPOFOL 40 MG: 10 INJECTION, EMULSION INTRAVENOUS at 15:47

## 2017-09-15 RX ADMIN — FENTANYL CITRATE 25 MCG: 50 INJECTION, SOLUTION INTRAMUSCULAR; INTRAVENOUS at 16:28

## 2017-09-15 RX ADMIN — GLYCOPYRROLATE 0.2 MG: 0.2 INJECTION INTRAMUSCULAR; INTRAVENOUS at 13:31

## 2017-09-15 RX ADMIN — FENTANYL CITRATE 25 MCG: 50 INJECTION, SOLUTION INTRAMUSCULAR; INTRAVENOUS at 14:43

## 2017-09-15 RX ADMIN — FENTANYL CITRATE 25 MCG: 50 INJECTION, SOLUTION INTRAMUSCULAR; INTRAVENOUS at 14:52

## 2017-09-15 RX ADMIN — SODIUM CHLORIDE, SODIUM LACTATE, POTASSIUM CHLORIDE, AND CALCIUM CHLORIDE: 600; 310; 30; 20 INJECTION, SOLUTION INTRAVENOUS at 16:49

## 2017-09-15 RX ADMIN — SODIUM CHLORIDE, SODIUM LACTATE, POTASSIUM CHLORIDE, AND CALCIUM CHLORIDE 125 ML/HR: 600; 310; 30; 20 INJECTION, SOLUTION INTRAVENOUS at 10:39

## 2017-09-15 RX ADMIN — FENTANYL CITRATE 50 MCG: 50 INJECTION, SOLUTION INTRAMUSCULAR; INTRAVENOUS at 13:30

## 2017-09-15 RX ADMIN — LIDOCAINE HYDROCHLORIDE 20 MG: 20 INJECTION, SOLUTION EPIDURAL; INFILTRATION; INTRACAUDAL; PERINEURAL at 13:32

## 2017-09-15 RX ADMIN — PROPOFOL 50 MCG/KG/MIN: 10 INJECTION, EMULSION INTRAVENOUS at 13:40

## 2017-09-15 RX ADMIN — PROPOFOL 30 MG: 10 INJECTION, EMULSION INTRAVENOUS at 14:42

## 2017-09-15 RX ADMIN — PROPOFOL 40 MG: 10 INJECTION, EMULSION INTRAVENOUS at 16:22

## 2017-09-15 NOTE — ANESTHESIA POSTPROCEDURE EVALUATION
Post-Anesthesia Evaluation and Assessment    Cardiovascular Function/Vital Signs  Visit Vitals    /56    Pulse 69    Temp 36.5 °C (97.7 °F)    Resp 14    Ht 5' 4.75\" (1.645 m)    Wt 55.3 kg (122 lb)    SpO2 95%    BMI 20.46 kg/m2       Patient is status post Procedure(s):  INCISION & DRAINAGE ABSCESS,EXCISION INFECTED BONE W/BIOPSY RIGHT FOOT. Nausea/Vomiting: Controlled. Postoperative hydration reviewed and adequate. Pain:  Pain Scale 1: Numeric (0 - 10) (09/15/17 1745)  Pain Intensity 1: 0 (09/15/17 1745)   Managed. Neurological Status:   Neuro (WDL): Within Defined Limits (09/15/17 1745)   At baseline. Mental Status and Level of Consciousness: Arousable. Pulmonary Status:   O2 Device: Room air (09/15/17 1749)   Adequate oxygenation and airway patent. Complications related to anesthesia: None    Post-anesthesia assessment completed. No concerns. Patient has met all discharge requirements.     Signed By: Luna Lala MD    September 15, 2017

## 2017-09-15 NOTE — BRIEF OP NOTE
BRIEF OPERATIVE NOTE    Date of Procedure: 9/15/2017   Preoperative Diagnosis: ABSCESS/ULCER AND OSTEOMYELITIS RIGHT FOOT  Postoperative Diagnosis: ABSCESS/ULCER AND OSTEOMYELITIS RIGHT FOOT    Procedure(s):  INCISION & DRAINAGE ABSCESS,EXCISION INFECTED BONE W/BIOPSY RIGHT FOOT  Surgeon(s) and Role:     * Arash Lams [facility] - Primary         Assistant Staff:       Surgical Staff:  Circ-1: Shayna Linton  Circ-Relief: Maya Dubin, RN  Radiology Technician: Valentina Giles; Bard Grullon  Scrub Tech-1: Bob Gonzales  Scrub Tech-Relief: Alvie Seed  Surg Asst-1: Duana Gal  Surg Asst-Relief: Jessy Brew  Event Time In   Incision Start 1358   Incision Close 1726     Anesthesia: MAC   Estimated Blood Loss: min<20ml  Specimens:   ID Type Source Tests Collected by Time Destination   1 : Right Foot Fourth Metatarsal Preservative Foot, Right  Major Batters, DPM 9/15/2017 1605 Pathology   2 : Right foot lateral cunieform Preservative   Major Batters, DPM 9/15/2017 1646 Pathology   3 : Right Foot Third Metatarsal Preservative Foot, Right  Major Batters, DPM 9/15/2017 1655 Pathology   4 : Right foot second metatarsal Preservative Foot, Right  Major Batters, DPM 9/15/2017 1656 Pathology   1 : Right foot wound Wound Foot, Right CULTURE, ANAEROBIC, CULTURE, WOUND W GRAM STAIN [place], [place] 9/15/2017 1408 Microbiology      Findings: osteomyelitis right foot   Complications: none  Implants: * No implants in log *

## 2017-09-15 NOTE — DISCHARGE INSTRUCTIONS
Dr. Chance Boothe Post-op Podiatry Instructions    1. A SURGICAL PROCEDURE HAS JUST BEEN PERFORMED ON YOUR FOOT IN ORDER TO ELIMINATE YOUR PROBLEM. THE TECHNIQUES AND PROCEDURES USED ARE THE MOST MODERN AND EFFECTIVE IN THE FIELD OF FOOT SURGERY. THE AMOUNT OF DISCOMFORT AND SWELLING WILL VARY FROM ONE PATIENT TO ANOTHER. 2. THE EFFECTS OF ANESTHESIA ARE STILL PRESENT AND DROWSINESS MAY RESULT. DO NOT DRIVE OR OPERATE ANY EQUIPMENT TODAY. 3. REST TODAY, KEEPING YOUR FOOT AND LEG ELEVATED APPROXIMATELY SIX (6\") INCHES ABOVE THE LEVEL OF YOUR HIP. 4. PLACE AN ICE PACK BEHIND THE KNEE OF THE OPERATED LEG FOR FIFTEEN (20) MINUTES OUT OF EACH HALF HOUR YOU ARE AWAKE. USE ICE FOR THE NEXT SEVENTY-TWO (72) HOURS AFTER SURGERY. 5. PLEASE DO NOT REMOVE THE BANDAGE UNLESS INSTRUCTED BY THE DOCTOR TO DO SO.  IF THE BANDAGE FEELS TIGHT, YOU MAY LOOSEN THE OUTERMOST DRESSING ONLY. IF THIS DOES NOT GIVE RELIEF, CALL THE DOCTOR. 6. DO NOT GET THE BANDAGE WET!! 7. FOR YOUR DIET, YOU SHOULD START LIGHT (MOSTLY FLUIDS TODAY) AND PROGRESS TO YOUR REGULAR DIET AS TOLERATED. 8. YOU SHOULD ALREADY HAVE YOUR PRESCRIPTIONS, TAKE AS DIRECTED. NO ALCOHOLIC BEVERAGES SHOULD BE CONSUMED WHILE ON PAIN MEDICATION. 9. YOU MUST WEAR THE SURGICAL SHOE WHILE WALKING AND/OR CRUTCHES/WALKER IF THEY HAVE BEEN PROVIDED TO YOU.    10. NOTIFY DR. Roberts IF THE FOLLOWING OCCURS:  · LARGE AMOUNT OF BLEEDING  · FEVER OVER 101 DEGREES  · WOUND BECOMES EXTREMELY SWOLLEN, SHOWS RED STREAKS, OR BECOMES MORE SORE EACH DAY  · THE OPERATIVE EXTREMITY BECOMES VERY SWOLLEN, COLD TO TOUCH OR CHANGES COLOR  · BANDAGE GETS WET  · ANY REACTION TO MEDICATION  · IF UNABLE TO CONTACT DR. Roberts, PLEASE CALL OR GO TO THE NEAREST EMERGENCY ROOM. FOLLOWING THESE INSTRUCTIONS WILL HELP INSURE A SUCCESSFUL SURGICAL RESULT.   IGNORING THESE INSTRUCTIONS WILL MOST LIKELY RESULT IN A DELAY IN HEALING, INCREASED SWELLING, POSSIBLE INFECTION, OR A MORE SERIOUS COMPLICATION. WE ENCOURAGE YOU TO CALL THE OFFICE WITH ANY QUESTIONS OR CONCERNS. IF AFTER NORMAL OFFICE HOURS, DR. Roberts CAN BE REACHED at [phone] or [phone]. DISCHARGE SUMMARY from Nurse    The following personal items are in your possession at time of discharge:    Dental Appliances: None  Visual Aid: Glasses, Sent home     Home Medications: None  Jewelry: None  Clothing: Pants, Shirt, Undergarments, Footwear, Socks (locker 5)  Other Valuables: [employer], [employer], [address], Eyeglasses (with [facility] [facility])             PATIENT INSTRUCTIONS:    After general anesthesia or intravenous sedation, for 24 hours or while taking prescription Narcotics:  · Limit your activities  · Do not drive and operate hazardous machinery  · Do not make important personal or business decisions  · Do  not drink alcoholic beverages  · If you have not urinated within 8 hours after discharge, please contact your surgeon on call. Report the following to your surgeon:  · Excessive pain, swelling, redness or odor of or around the surgical area  · Temperature over 100.5  · Nausea and vomiting lasting longer than 4 hours or if unable to take medications  · Any signs of decreased circulation or nerve impairment to extremity: change in color, persistent  numbness, tingling, coldness or increase pain  · Any questions        What to do at Home:  Recommended activity: Bedrest ambulate on heal only to get up to bathroom elevate the foot on 2 pillows at all times and No lifting, Driving, or Strenuous exercise until cleared by Dr Shania Dia    If you experience any of the following symptoms bleeding, drainage, pain not relieved by medication nausea or vomiting, please follow up with Dr. Shania Dia. *  Please give a list of your current medications to your Primary Care Provider. *  Please update this list whenever your medications are discontinued, doses are      changed, or new medications (including over-the-counter products) are added.     * Please carry medication information at all times in case of emergency situations. These are general instructions for a healthy lifestyle:    No smoking/ No tobacco products/ Avoid exposure to second hand smoke    Surgeon General's Warning:  Quitting smoking now greatly reduces serious risk to your health. Obesity, smoking, and sedentary lifestyle greatly increases your risk for illness    A healthy diet, regular physical exercise & weight monitoring are important for maintaining a healthy lifestyle    You may be retaining fluid if you have a history of heart failure or if you experience any of the following symptoms:  Weight gain of 3 pounds or more overnight or 5 pounds in a week, increased swelling in our hands or feet or shortness of breath while lying flat in bed. Please call your doctor as soon as you notice any of these symptoms; do not wait until your next office visit. Recognize signs and symptoms of STROKE:    F-face looks uneven    A-arms unable to move or move unevenly    S-speech slurred or non-existent    T-time-call 911 as soon as signs and symptoms begin-DO NOT go       Back to bed or wait to see if you get better-TIME IS BRAIN. Warning Signs of HEART ATTACK     Call 911 if you have these symptoms:   Chest discomfort. Most heart attacks involve discomfort in the center of the chest that lasts more than a few minutes, or that goes away and comes back. It can feel like uncomfortable pressure, squeezing, fullness, or pain.  Discomfort in other areas of the upper body. Symptoms can include pain or discomfort in one or both arms, the back, neck, jaw, or stomach.  Shortness of breath with or without chest discomfort.  Other signs may include breaking out in a cold sweat, nausea, or lightheadedness. Don't wait more than five minutes to call 911 - MINUTES MATTER! Fast action can save your life. Calling 911 is almost always the fastest way to get lifesaving treatment.  Emergency Medical Services staff can begin treatment when they arrive -- up to an hour sooner than if someone gets to the hospital by car. The discharge information has been reviewed with the patient and caregiver. The patient and caregiver verbalized understanding. Discharge medications reviewed with the patient and caregiver and appropriate educational materials and side effects teaching were provided.       Patient armband removed and shredded

## 2017-09-15 NOTE — ANESTHESIA PREPROCEDURE EVALUATION
Anesthetic History     PONV          Review of Systems / Medical History  Patient summary reviewed, nursing notes reviewed and pertinent labs reviewed    Pulmonary            Asthma : well controlled  Pertinent negatives: No COPD and smoker (smokes MJ)     Neuro/Psych         Psychiatric history     Cardiovascular  Within defined limits              Pertinent negatives: No hypertension, past MI and CAD       GI/Hepatic/Renal     GERD: well controlled      PUD and liver disease  Pertinent negatives: No renal disease   Endo/Other    Diabetes (8.1): poorly controlled    Arthritis     Other Findings   Comments: etoh < 1/week           Physical Exam    Airway  Mallampati: I  TM Distance: > 6 cm  Neck ROM: normal range of motion   Mouth opening: Normal     Cardiovascular               Dental      Comments: Multiple missing teeth   Pulmonary                 Abdominal         Other Findings            Anesthetic Plan    ASA: 2  Anesthesia type: MAC          Induction: Intravenous  Anesthetic plan and risks discussed with: Patient

## 2017-09-15 NOTE — PERIOP NOTES
Patient has a fasting blood sugar of 159. Dr. Annita Maloney notified, however patient refuses to have insulin.  Stated\" last time I had insulin here it dropped me to 32, I'd rather not have it\"

## 2017-09-15 NOTE — IP AVS SNAPSHOT
[place] 
 
 
 [address] [address] Patient: Sherran Siemens MRN: [id number] You are allergic to the following Allergen Reactions Rifampin Hives Swelling Swelling of mouth and tounge Levaquin (Levofloxacin) Itching Amoxicillin Hives Aspirin Nausea and Vomiting Azithromycin Angioedema Bactrim (Sulfamethoprim Ds) Hives Cefatrizine Hives Corn Nausea and Vomiting Erythromycin Hives Rocephin (Ceftriaxone) Hives Tylenol (Acetaminophen) Hives Vancomycin Hives Recent Documentation Height Weight BMI OB Status Smoking Status 1.645 m 55.3 kg 20.46 kg/m2 Hysterectomy Former Smoker Unresulted Labs Order Current Status CULTURE, ANAEROBIC In process CULTURE, WOUND W GRAM STAIN In process Emergency Contacts Name Discharge Info Relation Home Work Mobile Cory Pineda DISCHARGE CAREGIVER [3] Son [22]   [phone] Héctor Adkins  Other Relative [6]   [phone] PinedaKyler malin  Child [2] [phone] About your hospitalization You were admitted on:  September 15, 2017 [facility] You were discharged on:  September 15, 2017 Unit phone number:    
  
Why you were hospitalized Your primary diagnosis was:  Chronic Multifocal Osteomyelitis, Right Ankle And Foot (Hcc) Providers Seen During Your Hospitalizations Provider Role Specialty Primary office phone Ciara Gutierrez DPM Attending Provider Podiatry [phone] Your Primary Care Physician (PCP) Primary Care Physician Office Phone Office Fax Neville Madison [phone] Follow-up Information Follow up With Details Comments Contact Info MD Presley Chakraborty  
Jamal  
[phone] [place] [place], [address] [facility] [address] [facility] 
[phone] Current Discharge Medication List  
  
CONTINUE these medications which have NOT CHANGED Dose & Instructions Dispensing Information Comments Morning Noon Evening Bedtime  
 albuterol 90 mcg/actuation inhaler Commonly known as:  PROVENTIL HFA, VENTOLIN HFA, PROAIR HFA Your last dose was: Your next dose is:    
   
   
 Dose:  2 Puff Take 2 Puffs by inhalation every four (4) hours as needed. Indications: ACUTE ASTHMA ATTACK Refills:  0  
     
   
   
   
  
 BENADRYL 25 mg capsule Generic drug:  diphenhydrAMINE Your last dose was: Your next dose is:    
   
   
 Dose:  50 mg Take 50 mg by mouth every six (6) hours as needed. Indications: ALLERGIC RHINITIS Refills:  0  
     
   
   
   
  
 BIOTIN PO Your last dose was: Your next dose is:    
   
   
 Dose:  5000 mcg Take 5,000 mcg by mouth daily. Refills:  0  
     
   
   
   
  
 cloNIDine HCl 0.1 mg tablet Commonly known as:  CATAPRES Your last dose was: Your next dose is:    
   
   
 Dose:  0.1 mg Take 0.1 mg by mouth two (2) times a day. Indications: hot flashes; Refills:  0  
     
   
   
   
  
 cyanocobalamin 1,000 mcg tablet Your last dose was: Your next dose is:    
   
   
 Dose:  1000 mcg Take 1,000 mcg by mouth daily. Refills:  0  
     
   
   
   
  
 cyclobenzaprine 10 mg tablet Commonly known as:  FLEXERIL Your last dose was: Your next dose is: Take  by mouth three (3) times daily as needed for Muscle Spasm(s). Refills:  0  
     
   
   
   
  
 glimepiride 2 mg tablet Commonly known as:  AMARYL Your last dose was: Your next dose is:    
   
   
 Dose:  2 mg Take 2 mg by mouth every morning. Refills:  0 LOVENOX 100 mg/mL Generic drug:  enoxaparin Your last dose was: Your next dose is:    
   
   
 by SubCUTAneous route. Refills:  0 MARINOL 2.5 mg capsule Generic drug:  dronabinol Your last dose was: Your next dose is:    
   
   
 Dose:  2.5 mg Take 2.5 mg by mouth three (3) times daily as needed. Indications: nausea Refills:  0 ZyrTEC 10 mg Cap Generic drug:  Cetirizine Your last dose was: Your next dose is: Take  by mouth. Refills:  0 ASK your doctor about these medications Dose & Instructions Dispensing Information Comments Morning Noon Evening Bedtime ATIVAN 2 mg tablet Generic drug:  LORazepam  
   
Your last dose was: Your next dose is:    
   
   
 Dose:  1 mg Take 1 mg by mouth every six (6) hours as needed. Refills:  0  
     
   
   
   
  
 DILAUDID 4 mg tablet Generic drug:  HYDROmorphone Your last dose was: Your next dose is:    
   
   
 Dose:  4 mg Take 4 mg by mouth every six (6) hours as needed for Pain. Refills:  0 ESTROVEN MAXIMUM STRENGTH 400 mcg Tab Generic drug:  Chandrika Streeter,Min-FA-Herbal No.157 Your last dose was: Your next dose is:    
   
   
 Dose:  1 Tab Take 1 Tab by mouth two (2) times a day. Refills:  0  
     
   
   
   
  
 levocetirizine 5 mg tablet Commonly known as:  Marval Kitten Your last dose was: Your next dose is:    
   
   
 Dose:  5 mg Take 5 mg by mouth daily as needed. Indications: ALLERGIC RHINITIS Refills:  0  
     
   
   
   
  
 melatonin 3 mg tablet Your last dose was: Your next dose is:    
   
   
 Dose:  6 mg Take 6 mg by mouth nightly. Refills:  0  
     
   
   
   
  
 metFORMIN 500 mg tablet Commonly known as:  GLUCOPHAGE Your last dose was:     
   
Your next dose is:    
   
   
 Dose:  500 mg  
 Take 500 mg by mouth two (2) times daily (with meals). Indications: TYPE 2 DIABETES MELLITUS Refills:  0  
     
   
   
   
  
 miconazole 2 % vaginal cream  
Commonly known as:  Mala Handy Your last dose was: Your next dose is:    
   
   
 Dose:  1 Applicator Insert 1 Applicator into vagina every evening. Quantity:  45 g Refills:  0 Minoxidil 5 % Foam  
   
Your last dose was: Your next dose is:    
   
   
 by Apply Externally route. Refills:  0  
     
   
   
   
  
 promethazine 25 mg tablet Commonly known as:  PHENERGAN Your last dose was: Your next dose is:    
   
   
 Dose:  25 mg Take 25 mg by mouth every six (6) hours as needed for Nausea. Refills:  0  
     
   
   
   
  
 REQUIP 4 mg Tab TAB Generic drug:  rOPINIRole Your last dose was: Your next dose is:    
   
   
 Dose:  4 mg Take 4 mg by mouth nightly. Indications: RESTLESS LEGS SYNDROME Refills:  0  
     
   
   
   
  
 traMADol 50 mg tablet Commonly known as:  ULTRAM  
   
Your last dose was: Your next dose is:    
   
   
 Dose:  50 mg Take 50 mg by mouth every eight (8) hours as needed. Indications: PAIN Refills:  0  
     
   
   
   
  
 warfarin 7.5 mg tablet Commonly known as:  COUMADIN Your last dose was: Your next dose is:    
   
   
 Dose:  7.5 mg Take 7.5 mg by mouth daily. Refills:  0 ZOFRAN (AS HYDROCHLORIDE) 4 mg tablet Generic drug:  ondansetron hcl Your last dose was: Your next dose is:    
   
   
 Dose:  4 mg Take 4 mg by mouth every eight (8) hours as needed for Nausea. Refills:  0 Discharge Instructions Dr. Lora Goldberg Podiatry Instructions 1. A SURGICAL PROCEDURE HAS JUST BEEN PERFORMED ON YOUR FOOT IN ORDER TO ELIMINATE YOUR PROBLEM.   THE TECHNIQUES AND PROCEDURES USED ARE THE MOST MODERN AND EFFECTIVE IN THE FIELD OF FOOT SURGERY. THE AMOUNT OF DISCOMFORT AND SWELLING WILL VARY FROM ONE PATIENT TO ANOTHER. 2. THE EFFECTS OF ANESTHESIA ARE STILL PRESENT AND DROWSINESS MAY RESULT. DO NOT DRIVE OR OPERATE ANY EQUIPMENT TODAY. 3. REST TODAY, KEEPING YOUR FOOT AND LEG ELEVATED APPROXIMATELY SIX (6\") INCHES ABOVE THE LEVEL OF YOUR HIP. 4. PLACE AN ICE PACK BEHIND THE KNEE OF THE OPERATED LEG FOR FIFTEEN (20) MINUTES OUT OF EACH HALF HOUR YOU ARE AWAKE. USE ICE FOR THE NEXT SEVENTY-TWO (72) HOURS AFTER SURGERY. 5. PLEASE DO NOT REMOVE THE BANDAGE UNLESS INSTRUCTED BY THE DOCTOR TO DO SO.  IF THE BANDAGE FEELS TIGHT, YOU MAY LOOSEN THE OUTERMOST DRESSING ONLY. IF THIS DOES NOT GIVE RELIEF, CALL THE DOCTOR. 6. DO NOT GET THE BANDAGE WET!! 7. FOR YOUR DIET, YOU SHOULD START LIGHT (MOSTLY FLUIDS TODAY) AND PROGRESS TO YOUR REGULAR DIET AS TOLERATED. 8. YOU SHOULD ALREADY HAVE YOUR PRESCRIPTIONS, TAKE AS DIRECTED. NO ALCOHOLIC BEVERAGES SHOULD BE CONSUMED WHILE ON PAIN MEDICATION. 9. YOU MUST WEAR THE SURGICAL SHOE WHILE WALKING AND/OR CRUTCHES/WALKER IF THEY HAVE BEEN PROVIDED TO YOU. 
 
10. NOTIFY DR. Roberts IF THE FOLLOWING OCCURS: 
· LARGE AMOUNT OF BLEEDING 
· FEVER OVER 101 DEGREES 
· WOUND BECOMES EXTREMELY SWOLLEN, SHOWS RED STREAKS, OR BECOMES MORE SORE EACH DAY · THE OPERATIVE EXTREMITY BECOMES VERY SWOLLEN, COLD TO TOUCH OR CHANGES COLOR 
· BANDAGE GETS WET · ANY REACTION TO MEDICATION 
· IF UNABLE TO CONTACT DR. Roberts, PLEASE CALL OR GO TO THE NEAREST EMERGENCY ROOM. FOLLOWING THESE INSTRUCTIONS WILL HELP INSURE A SUCCESSFUL SURGICAL RESULT. IGNORING THESE INSTRUCTIONS WILL MOST LIKELY RESULT IN A DELAY IN HEALING, INCREASED SWELLING, POSSIBLE INFECTION, OR A MORE SERIOUS COMPLICATION. WE ENCOURAGE YOU TO CALL THE OFFICE WITH ANY QUESTIONS OR CONCERNS. IF AFTER NORMAL OFFICE HOURS, DR. Roberts CAN BE REACHED at [phone] or [phone]. DISCHARGE SUMMARY from Nurse The following personal items are in your possession at time of discharge: 
 
Dental Appliances: None Visual Aid: Glasses, Sent home Home Medications: None Jewelry: None Clothing: Pants, Shirt, Undergarments, Footwear, Socks (locker 5) Other Valuables: [place], [address] [facility], [address] [place] [address] (with [employer]) PATIENT INSTRUCTIONS: 
 
After general anesthesia or intravenous sedation, for 24 hours or while taking prescription Narcotics: · Limit your activities · Do not drive and operate hazardous machinery · Do not make important personal or business decisions · Do  not drink alcoholic beverages · If you have not urinated within 8 hours after discharge, please contact your surgeon on call. Report the following to your surgeon: 
· Excessive pain, swelling, redness or odor of or around the surgical area · Temperature over 100.5 · Nausea and vomiting lasting longer than 4 hours or if unable to take medications · Any signs of decreased circulation or nerve impairment to extremity: change in color, persistent  numbness, tingling, coldness or increase pain · Any questions What to do at Home: 
Recommended activity: Bedrest ambulate on heal only to get up to bathroom elevate the foot on 2 pillows at all times and No lifting, Driving, or Strenuous exercise until cleared by Dr Claude Rice If you experience any of the following symptoms bleeding, drainage, pain not relieved by medication nausea or vomiting, please follow up with Dr. Claude Rice. *  Please give a list of your current medications to your Primary Care Provider. *  Please update this list whenever your medications are discontinued, doses are 
    changed, or new medications (including over-the-counter products) are added. *  Please carry medication information at all times in case of emergency situations. These are general instructions for a healthy lifestyle: No smoking/ No tobacco products/ Avoid exposure to second hand smoke Surgeon General's Warning:  Quitting smoking now greatly reduces serious risk to your health. Obesity, smoking, and sedentary lifestyle greatly increases your risk for illness A healthy diet, regular physical exercise & weight monitoring are important for maintaining a healthy lifestyle You may be retaining fluid if you have a history of heart failure or if you experience any of the following symptoms:  Weight gain of 3 pounds or more overnight or 5 pounds in a week, increased swelling in our hands or feet or shortness of breath while lying flat in bed. Please call your doctor as soon as you notice any of these symptoms; do not wait until your next office visit. Recognize signs and symptoms of STROKE: 
 
F-face looks uneven A-arms unable to move or move unevenly S-speech slurred or non-existent T-time-call 911 as soon as signs and symptoms begin-DO NOT go Back to bed or wait to see if you get better-TIME IS BRAIN. Warning Signs of HEART ATTACK Call 911 if you have these symptoms: 
? Chest discomfort. Most heart attacks involve discomfort in the center of the chest that lasts more than a few minutes, or that goes away and comes back. It can feel like uncomfortable pressure, squeezing, fullness, or pain. ? Discomfort in other areas of the upper body. Symptoms can include pain or discomfort in one or both arms, the back, neck, jaw, or stomach. ? Shortness of breath with or without chest discomfort. ? Other signs may include breaking out in a cold sweat, nausea, or lightheadedness. Don't wait more than five minutes to call [address]! Fast action can save your life. Calling 911 is almost always the fastest way to get lifesaving treatment. Emergency Medical Services staff can begin treatment when they arrive  up to an hour sooner than if someone gets to the hospital by car. The discharge information has been reviewed with the patient and caregiver. The patient and caregiver verbalized understanding. Discharge medications reviewed with the patient and caregiver and appropriate educational materials and side effects teaching were provided. Patient armband removed and shredded Discharge Orders Procedure Order Date Status Priority Quantity Spec Type Associated Dx SURGICAL BOOT 09/15/17 1748 Normal Routine 1  Chronic multifocal osteomyelitis, right ankle and foot ([facility] [address] [[id number]] Comments: To right foot. Fit with surgical shoe WEIGHT BEARING: SPECIFY 09/15/17 1748 Normal Routine 1  Chronic multifocal osteomyelitis, right ankle and foot ([facility] [address] [[id number]] Comments:  Partial weight bearing: right Introducing [facility]! Osiel Bowser introduces [employer] patient portal. Now you can access parts of your medical record, email your doctor's office, and request medication refills online. 1. In your internet browser, go to [email or web address] 2. Click on the First Time User? Click Here link in the Sign In box. You will see the New Member Sign Up page. 3. Enter your [employer] Access Code exactly as it appears below. You will not need to use this code after youve completed the sign-up process. If you do not sign up before the expiration date, you must request a new code. · [employer] Access Code: [email or web address] Expires: 11/16/2017  9:25 AM 
 
4. Enter the last four digits of your Social Security Number (xxxx) and Date of Birth (mm/dd/yyyy) as indicated and click Submit. You will be taken to the next sign-up page. 5. Create a [email or web address] ID. This will be your F[employer] login ID and cannot be changed, so think of one that is secure and easy to remember. 6. Create a F[employer] password. You can change your password at any time. 7. Enter your Password Reset Question and Answer. This can be used at a later time if you forget your password. 8. Enter your e-mail address. You will receive e-mail notification when new information is available in [address] 9. Click Sign Up. You can now view and download portions of your medical record. 10. Click the Download Summary menu link to download a portable copy of your medical information. If you have questions, please visit the Frequently Asked Questions section of the [employer] website. Remember, [employer] is NOT to be used for urgent needs. For medical emergencies, dial 911. Now available from your iPhone and Android! General Information Please provide this summary of care documentation to your next provider. Patient Signature:  ____________________________________________________________ Date:  ____________________________________________________________  
  
Jose Snipe Provider Signature:  ____________________________________________________________ Date:  ____________________________________________________________

## 2017-09-15 NOTE — H&P
Date of Surgery Update:  Blaise Roman was seen and examined. History and physical has been reviewed. The patient has been examined.  There have been no significant clinical changes since the completion of the originally dated History and Physical.    Signed By: Robinson Bartholomew DPM     September 15, 2017 1:17 PM

## 2017-09-15 NOTE — PERIOP NOTES
Called into OR 1 to inform Dr. Zena Ballard that patient took Lovenox injection yesterday morning. He said that was ok.

## 2017-09-20 LAB
BACTERIA SPEC CULT: ABNORMAL
BACTERIA SPEC CULT: ABNORMAL
BACTERIA SPEC CULT: NORMAL
GRAM STN SPEC: ABNORMAL
GRAM STN SPEC: ABNORMAL
SERVICE CMNT-IMP: ABNORMAL
SERVICE CMNT-IMP: NORMAL

## 2017-09-25 NOTE — OP NOTES
[address] [place] [address]  OPERATIVE REPORT    Name:  Noe Kwon  MR#:  [id number]  :  1950  Account #:  [de-identified]  Date of Adm:  09/15/2017  Date of Surgery:  09/15/2017      PREOPERATIVE DIAGNOSES  1. Painful plantar right foot laceration/wound with no foreign body. 2. Osteomyelitis of the right 2nd, 3rd, and 4th metatarsals. POSTOPERATIVE DIAGNOSES  1. Painful plantar right foot laceration/wound with no foreign body. 2. Osteomyelitis of the right 2nd, 3rd, and 4th metatarsals. PROCEDURES PERFORMED  1. Adjacent tissue transfer with Z-plasty procedure, right foot wound. 2. Excision of the right 2nd, 3rd, and 4th metatarsals secondary to  osteomyelitis. ESTIMATED BLOOD LOSS: Minimal, less than 50 mL. SPECIMENS REMOVED: right 2nd, 3rd, 4th metatarsals and biopsy right lateral cueniform    ANESTHESIA: MAC with local consisting of 10 mL of 0.5% Marcaine  plain as an ankle block to the right ankle. SURGEON: Jorge White DPM    ASSISTANT: Adam Beth    HEMOSTASIS: None. There was no tourniquet used in this procedure. MATERIALS: Aerobic and anaerobic cultures. 3-0 Vicryl and 5-0 nylon. INJECTABLES: None. COMPLICATIONS: None. INDICATIONS FOR PROCEDURE: Patient is a 69-year-old diabetic  female who presents to [facility] for outpatient  surgical correction of painful right foot laceration/wound on the bottom  of her foot, and also osteomyelitis or infected bone of the right 2nd,  3rd, and 4th metatarsals. All conservative treatments have failed to  offer the patient adequate relief, and the patient desires surgical  correction. The planned procedures were explained to the patient in  detail, including all risks, benefits, and possible complications, and the  patient still desires surgical correction. Medical clearance was obtained  prior to surgery. Consent was signed and on chart. All patient's  questions were answered, and no guarantees were given. Lastly, the  patient was asked if she had any history of blood clots or bleeding  disorders, and she stated she has a history of blood clots, DVTs   Also arterial thrombosis, and she has anticardiolipin antibody syndrome  And takes warfarin for that. She stated that her primary care doctor  Dr. Pedro Luis Goodson and her hematologist were managing her   perioperative anticoagulation therapy. DESCRIPTION OF PROCEDURE: Patient was brought to the  operating room and placed on the operating room table in the supine  position. The patient's right foot was then anesthetized using 10 mL of  0.5% Marcaine as an ankle block to the right ankle. Next, the patient's  right foot was prepped and draped in the usual sterile manner. Attention was then drawn to the plantar right foot laceration/wound that  measured approximately 2.7 cm round, was full-thickness deep to the  muscle and bone layer. A #15 blade was then used to make 4 skin  incisions approximately 6 cm long each in a Z-plasty technique around  the laceration. A second #15 blade was then used to deepen these  incisions, being careful to avoid any neurovascular structures. Next,  the adjacent flaps of the Z-plasty were then transposed to close down  the wound in total. Vicryl 3-0 was used to close the deeper tissues. Then, 3-0 nylon was then used to suture the skin to complete the Z-  plasty tissue transfer of the plantar right foot wound. Attention was then drawn to the dorsal aspect of the right foot, where 2  longitudinal skin incisions were made approximately 7 cm each. One  incision was made over the 2nd and 3rd metatarsals and the other  incision made over the 3rd and 4th metatarsals longitudinally. A  second #15 blade was then used to deepen each incision, being  careful to avoid any neurovascular structures. At this point, deep  cultures were taken, aerobic and anaerobic, and sent to the micro lab.   Next, blunt dissection was then carried down to expose the entire 2nd  metatarsal, which appeared gray in color and was soft secondary to  osteomyelitis; therefore, the entire 2nd metatarsal was removed back  to the cuneiform joint. Next, the 3rd metatarsal was examined and  found to be the same; therefore, the same exact procedure was then  performed to remove the entire 3rd, then 4th metatarsals secondary to  osteomyelitis. After all 3 metatarsals were removed, the surgical site  was then pulse lavaged. A sagittal saw was then used to take a biopsy  of the distal aspect of the right lateral cuneiform and sent to Pathology  to rule out osteomyelitis. Next, the deep tissues were then closed using  3-0 Vicryl in a simple interrupted technique. The skin was then closed  using 3-0 nylon in a simple interrupted technique. All incisions were  then supported using Steri-Strips, Adaptic, 4 x 4's, Kerlix, and then an  Ace wrap. The patient was then transported to the recovery room with  vital signs stable and neurovascular status returned to baseline for her  right foot. POSTOPERATIVE CHECK: The patient was later seen in the recovery  room with her dressings clean, dry, and intact, with no bleeding noted  for her right foot. She stated that she had no pain. The patient was  later discharged to home. She was given a surgical shoe. She was  also given discharge instructions and a postoperative prescription for  pain. Her capillary refill time for her distal right foot was found to be  less than 3 seconds. The patient was given a followup appointment  with Dr. Zena Ballard in the office in 3 days. Lastly, she was given Dr. Omid Vincent  cell phone number and instructed to call anytime with any problems.         KUN Melchor MD  D:  09/24/2017   17:34  T:  09/24/2017   23:37  Job #:  [id number]

## 2017-10-09 ENCOUNTER — HOSPITAL ENCOUNTER (OUTPATIENT)
Dept: WOUND CARE | Age: 67
Discharge: HOME | End: 2017-10-09
Payer: MEDICARE

## 2017-10-09 PROCEDURE — 97602 WOUND(S) CARE NON-SELECTIVE: CPT

## 2017-10-12 ENCOUNTER — HOSPITAL ENCOUNTER (INPATIENT)
Age: 67
LOS: 8 days | Discharge: HOME | DRG: 617 | End: 2017-10-20
Attending: EMERGENCY MEDICINE | Admitting: INTERNAL MEDICINE
Payer: MEDICARE

## 2017-10-12 DIAGNOSIS — M86.9 OSTEOMYELITIS OF RIGHT FOOT, UNSPECIFIED TYPE (HCC): Primary | ICD-10-CM

## 2017-10-12 LAB
ALBUMIN SERPL-MCNC: 3.1 G/DL (ref 3.4–5)
ALBUMIN/GLOB SERPL: 0.8 {RATIO} (ref 0.8–1.7)
ALP SERPL-CCNC: 250 U/L (ref 45–117)
ALT SERPL-CCNC: 53 U/L (ref 13–56)
ANION GAP SERPL CALC-SCNC: 13 MMOL/L (ref 3–18)
AST SERPL-CCNC: 32 U/L (ref 15–37)
BASOPHILS # BLD: 0 K/UL (ref 0–0.06)
BASOPHILS NFR BLD: 0 % (ref 0–2)
BILIRUB SERPL-MCNC: 0.2 MG/DL (ref 0.2–1)
BUN SERPL-MCNC: 33 MG/DL (ref 7–18)
BUN/CREAT SERPL: 37 (ref 12–20)
CALCIUM SERPL-MCNC: 9.1 MG/DL (ref 8.5–10.1)
CHLORIDE SERPL-SCNC: 104 MMOL/L (ref 100–108)
CO2 SERPL-SCNC: 24 MMOL/L (ref 21–32)
CREAT SERPL-MCNC: 0.89 MG/DL (ref 0.6–1.3)
DIFFERENTIAL METHOD BLD: ABNORMAL
EOSINOPHIL # BLD: 0.1 K/UL (ref 0–0.4)
EOSINOPHIL NFR BLD: 1 % (ref 0–5)
ERYTHROCYTE [DISTWIDTH] IN BLOOD BY AUTOMATED COUNT: 17.4 % (ref 11.6–14.5)
GLOBULIN SER CALC-MCNC: 4.1 G/DL (ref 2–4)
GLUCOSE BLD STRIP.AUTO-MCNC: 237 MG/DL (ref 70–110)
GLUCOSE SERPL-MCNC: 138 MG/DL (ref 74–99)
HCT VFR BLD AUTO: 39.4 % (ref 35–45)
HGB BLD-MCNC: 13.1 G/DL (ref 12–16)
INR PPP: 1.6 (ref 0.8–1.2)
LYMPHOCYTES # BLD: 2.2 K/UL (ref 0.9–3.6)
LYMPHOCYTES NFR BLD: 24 % (ref 21–52)
MCH RBC QN AUTO: 29.8 PG (ref 24–34)
MCHC RBC AUTO-ENTMCNC: 33.2 G/DL (ref 31–37)
MCV RBC AUTO: 89.7 FL (ref 74–97)
MONOCYTES # BLD: 0.5 K/UL (ref 0.05–1.2)
MONOCYTES NFR BLD: 6 % (ref 3–10)
NEUTS SEG # BLD: 6.4 K/UL (ref 1.8–8)
NEUTS SEG NFR BLD: 69 % (ref 40–73)
PLATELET # BLD AUTO: 309 K/UL (ref 135–420)
PMV BLD AUTO: 9 FL (ref 9.2–11.8)
POTASSIUM SERPL-SCNC: 4.5 MMOL/L (ref 3.5–5.5)
PROT SERPL-MCNC: 7.2 G/DL (ref 6.4–8.2)
PROTHROMBIN TIME: 18.8 SEC (ref 11.5–15.2)
RBC # BLD AUTO: 4.39 M/UL (ref 4.2–5.3)
SODIUM SERPL-SCNC: 141 MMOL/L (ref 136–145)
WBC # BLD AUTO: 9.2 K/UL (ref 4.6–13.2)

## 2017-10-12 PROCEDURE — 85025 COMPLETE CBC W/AUTO DIFF WBC: CPT | Performed by: EMERGENCY MEDICINE

## 2017-10-12 PROCEDURE — 74011250636 HC RX REV CODE- 250/636: Performed by: INTERNAL MEDICINE

## 2017-10-12 PROCEDURE — 82962 GLUCOSE BLOOD TEST: CPT

## 2017-10-12 PROCEDURE — 74011250637 HC RX REV CODE- 250/637: Performed by: INTERNAL MEDICINE

## 2017-10-12 PROCEDURE — 85610 PROTHROMBIN TIME: CPT | Performed by: EMERGENCY MEDICINE

## 2017-10-12 PROCEDURE — 99283 EMERGENCY DEPT VISIT LOW MDM: CPT

## 2017-10-12 PROCEDURE — 87040 BLOOD CULTURE FOR BACTERIA: CPT | Performed by: EMERGENCY MEDICINE

## 2017-10-12 PROCEDURE — 80053 COMPREHEN METABOLIC PANEL: CPT | Performed by: EMERGENCY MEDICINE

## 2017-10-12 PROCEDURE — 74011000258 HC RX REV CODE- 258: Performed by: INTERNAL MEDICINE

## 2017-10-12 PROCEDURE — 65270000029 HC RM PRIVATE

## 2017-10-12 RX ORDER — CHOLECALCIFEROL (VITAMIN D3) 125 MCG
5 CAPSULE ORAL
Status: DISCONTINUED | OUTPATIENT
Start: 2017-10-12 | End: 2017-10-20 | Stop reason: HOSPADM

## 2017-10-12 RX ORDER — MICONAZOLE NITRATE 2 %
1 CREAM WITH APPLICATOR VAGINAL EVERY EVENING
Status: DISCONTINUED | OUTPATIENT
Start: 2017-10-12 | End: 2017-10-18 | Stop reason: ALTCHOICE

## 2017-10-12 RX ORDER — TRAMADOL HYDROCHLORIDE 50 MG/1
50 TABLET ORAL
Status: DISCONTINUED | OUTPATIENT
Start: 2017-10-12 | End: 2017-10-20 | Stop reason: HOSPADM

## 2017-10-12 RX ORDER — ALBUTEROL SULFATE 90 UG/1
2 AEROSOL, METERED RESPIRATORY (INHALATION)
Status: DISCONTINUED | OUTPATIENT
Start: 2017-10-12 | End: 2017-10-20 | Stop reason: HOSPADM

## 2017-10-12 RX ORDER — WARFARIN 7.5 MG/1
7.5 TABLET ORAL DAILY
Status: CANCELLED | OUTPATIENT
Start: 2017-10-13

## 2017-10-12 RX ORDER — HYDROMORPHONE HYDROCHLORIDE 4 MG/1
4 TABLET ORAL
Status: DISCONTINUED | OUTPATIENT
Start: 2017-10-12 | End: 2017-10-20 | Stop reason: HOSPADM

## 2017-10-12 RX ORDER — LORAZEPAM 1 MG/1
1 TABLET ORAL
Status: DISCONTINUED | OUTPATIENT
Start: 2017-10-12 | End: 2017-10-20 | Stop reason: HOSPADM

## 2017-10-12 RX ORDER — BIOTIN 5 MG
5 TABLET ORAL DAILY
Status: DISCONTINUED | OUTPATIENT
Start: 2017-10-13 | End: 2017-10-20 | Stop reason: HOSPADM

## 2017-10-12 RX ORDER — CYCLOBENZAPRINE HCL 10 MG
10 TABLET ORAL
Status: DISCONTINUED | OUTPATIENT
Start: 2017-10-12 | End: 2017-10-20 | Stop reason: HOSPADM

## 2017-10-12 RX ORDER — CLONIDINE HYDROCHLORIDE 0.1 MG/1
0.1 TABLET ORAL 2 TIMES DAILY
Status: DISCONTINUED | OUTPATIENT
Start: 2017-10-12 | End: 2017-10-20 | Stop reason: HOSPADM

## 2017-10-12 RX ORDER — SODIUM CHLORIDE 9 MG/ML
75 INJECTION, SOLUTION INTRAVENOUS CONTINUOUS
Status: DISCONTINUED | OUTPATIENT
Start: 2017-10-12 | End: 2017-10-15

## 2017-10-12 RX ORDER — ONDANSETRON 4 MG/1
4 TABLET, FILM COATED ORAL
Status: DISCONTINUED | OUTPATIENT
Start: 2017-10-12 | End: 2017-10-20 | Stop reason: HOSPADM

## 2017-10-12 RX ORDER — METFORMIN HYDROCHLORIDE 500 MG/1
500 TABLET ORAL 2 TIMES DAILY WITH MEALS
Status: DISCONTINUED | OUTPATIENT
Start: 2017-10-12 | End: 2017-10-13

## 2017-10-12 RX ORDER — DRONABINOL 2.5 MG/1
2.5 CAPSULE ORAL
Status: DISCONTINUED | OUTPATIENT
Start: 2017-10-12 | End: 2017-10-20 | Stop reason: HOSPADM

## 2017-10-12 RX ORDER — HEPARIN SODIUM 5000 [USP'U]/ML
5000 INJECTION, SOLUTION INTRAVENOUS; SUBCUTANEOUS EVERY 8 HOURS
Status: DISCONTINUED | OUTPATIENT
Start: 2017-10-12 | End: 2017-10-20 | Stop reason: HOSPADM

## 2017-10-12 RX ORDER — LANOLIN ALCOHOL/MO/W.PET/CERES
1000 CREAM (GRAM) TOPICAL DAILY
Status: DISCONTINUED | OUTPATIENT
Start: 2017-10-13 | End: 2017-10-20 | Stop reason: HOSPADM

## 2017-10-12 RX ORDER — ROPINIROLE 1 MG/1
4 TABLET, FILM COATED ORAL
Status: DISCONTINUED | OUTPATIENT
Start: 2017-10-12 | End: 2017-10-20 | Stop reason: HOSPADM

## 2017-10-12 RX ADMIN — Medication 5 MG: at 21:18

## 2017-10-12 RX ADMIN — MICONAZOLE NITRATE 1 APPLICATOR: 20 CREAM VAGINAL at 20:12

## 2017-10-12 RX ADMIN — SODIUM CHLORIDE 75 ML/HR: 900 INJECTION, SOLUTION INTRAVENOUS at 18:29

## 2017-10-12 RX ADMIN — DAPTOMYCIN 326 MG: 500 INJECTION, POWDER, LYOPHILIZED, FOR SOLUTION INTRAVENOUS at 18:26

## 2017-10-12 RX ADMIN — HYDROMORPHONE HYDROCHLORIDE 4 MG: 4 TABLET ORAL at 20:12

## 2017-10-12 RX ADMIN — HEPARIN SODIUM 5000 UNITS: 5000 INJECTION, SOLUTION INTRAVENOUS; SUBCUTANEOUS at 20:12

## 2017-10-12 RX ADMIN — CLONIDINE HYDROCHLORIDE 0.1 MG: 0.1 TABLET ORAL at 20:12

## 2017-10-12 RX ADMIN — ROPINIROLE HYDROCHLORIDE 4 MG: 1 TABLET, FILM COATED ORAL at 21:18

## 2017-10-12 RX ADMIN — METFORMIN HYDROCHLORIDE 500 MG: 500 TABLET, FILM COATED ORAL at 20:12

## 2017-10-12 NOTE — ED NOTES
TRANSFER - OUT REPORT:    Verbal report given to [facility] RN(name) on Renay Shetty  being transferred to medical(unit) for routine progression of care       Report consisted of patients Situation, Background, Assessment and   Recommendations(SBAR). Information from the following report(s) SBAR, Kardex and ED Summary was reviewed with the receiving nurse. Lines:   Peripheral IV 10/12/17 Right;Posterior Forearm (Active)   Dressing Status Clean, dry, & intact 10/12/2017  5:35 PM   Dressing Type Transparent 10/12/2017  5:35 PM   Hub Color/Line Status Pink;Flushed 10/12/2017  5:35 PM   Action Taken Blood drawn 10/12/2017  5:35 PM        Opportunity for questions and clarification was provided.       Patient transported with:   [employer]

## 2017-10-12 NOTE — Clinical Note
Status[de-identified] Inpatient [101] Type of Bed: Medical [8] Inpatient Hospitalization Certified Necessary for the Following Reasons: 3. Patient receiving treatment that can only be provided in an inpatient setting (further clarification in H&P documentation) Admitting Diagnosis: [facility] [[id number]] Admitting Physician: Lupe Mercer [[id number]] Attending Physician: Lupe Mercer [[id number]] Estimated Length of Stay: 2 Midnights Discharge Plan[de-identified] Home with Office Follow-up

## 2017-10-12 NOTE — ED PROVIDER NOTES
[address]  EMERGENCY DEPARTMENT HISTORY AND PHYSICAL EXAM       Date: 10/12/2017   Patient Name: Dora Melendez   YOB: 1950  Medical Record Number: [id number]    History of Presenting Illness     Chief Complaint   Patient presents with    Post OP Complication        History Provided By:  patient    Additional History: 4:07 PM  Dora Melendez is a 78 y.o. female who presents to the emergency department C/O known painful red draining infection of right foot s/p right toes amputation 3 weeks ago by Dr. Aysha Hill (podiatry). Dr. Rohith To had tried outpatient treatment of oral abx for this infection, which was unsuccessful. Worsening with tetracyclin. No fever. Foul odor. Mild pain. Pain increase with moving and palpation. Pain decrease at rest.  Pt was seen by Dr. Rohith To today and referred to ED for admission and IV abx. PMHx of PAD, DM, and DVT. Current medications include Warfarin. Denies fever, chills, and any other sxs or complaints. Primary Care Provider: Destinee Claudio MD   Specialist:    Past History     Past Medical History:   Past Medical History:   Diagnosis Date    Arthritis     hands and feet    Asthma     Autoimmune disease ([address]     lupus anticoagulant-causes clotting    Cancer ([address]     carcinoma in situ of cervix and uterus; inside bottom lip    Chronic pain     Left foot    Diabetes ([address] 2013    Dizzy spells     h/o    GERD (gastroesophageal reflux disease)     Liver disease     1/2 of my liver was removed for ulcers.     Lung collapse     2015    Nausea & vomiting 5/23/2012    Neuropathy     right foot    Other ill-defined conditions(799.89)     arterial clot in rt leg    Other ill-defined conditions(799.89)     restless leg syndrome    Other ill-defined conditions(799.89)     raynaud's syndrome    Other ill-defined conditions(799.89)     chronic N/V    Other ill-defined conditions(799.89)     (2 mutated genes) hyper coag. state  Pneumonia 07/22/2017    Positive cardiolipin antibodies     Psychiatric disorder     depression    PUD (peptic ulcer disease) 2009    H/O requiring partial gastrectomy and small intestine    Thromboembolus (HCC)     artery right leg        Past Surgical History:   Past Surgical History:   Procedure Laterality Date    ABDOMEN SURGERY PROC UNLISTED      duodenal ulcer rupture    BREAST SURGERY PROCEDURE UNLISTED Bilateral 02/2016    hemorrhaged after breast surgery    HX APPENDECTOMY      HX BREAST AUGMENTATION      developed infection and implant removed    HX CHOLECYSTECTOMY      HX GASTRECTOMY      due to ulcers    HX GASTRIC BYPASS  1996    gastric by-pass    HX GI      total gastrectomy, partial colectomy    HX GI      colostomy and reversal    HX HEENT      cancer of lip removed    HX HYSTERECTOMY      for cervical cancer    HX ORTHOPAEDIC      rt foot surgery x10    HX ORTHOPAEDIC Left     great toe gangrenous and amputated    HX OTHER SURGICAL      part of liver removal    HX OTHER SURGICAL      ventral hernia repair x10    HX OTHER SURGICAL      Exc pilonial cystectomy    VASCULAR SURGERY PROCEDURE UNLIST      iliac aorta stents x 6        Family History:   Family History   Problem Relation Age of Onset    Malignant Hyperthermia Neg Hx     Pseudocholinesterase Deficiency Neg Hx     Delayed Awakening Neg Hx     Post-op Nausea/Vomiting Neg Hx     Emergence Delirium Neg Hx     Post-op Cognitive Dysfunction Neg Hx     Other Neg Hx         Social History:   Social History   Substance Use Topics    Smoking status: Former Smoker     Years: 0.50     Quit date: 1/2/1996    Smokeless tobacco: Never Used    Alcohol use Yes      Comment: 1 or 2 glasses wine or mixed drinks monthly         Allergies:    Allergies   Allergen Reactions    Rifampin Hives and Swelling     Swelling of mouth and tounge    Levaquin [Levofloxacin] Itching    Amoxicillin Hives    Aspirin Nausea and Vomiting 89.7 74.0 - 97.0 FL    MCH 29.8 24.0 - 34.0 PG    MCHC 33.2 31.0 - 37.0 g/dL    RDW 17.4 (H) 11.6 - 14.5 %    PLATELET 282 801 - 069 K/uL    MPV 9.0 (L) 9.2 - 11.8 FL    NEUTROPHILS 69 40 - 73 %    LYMPHOCYTES 24 21 - 52 %    MONOCYTES 6 3 - 10 %    EOSINOPHILS 1 0 - 5 %    BASOPHILS 0 0 - 2 %    ABS. NEUTROPHILS 6.4 1.8 - 8.0 K/UL    ABS. LYMPHOCYTES 2.2 0.9 - 3.6 K/UL    ABS. MONOCYTES 0.5 0.05 - 1.2 K/UL    ABS. EOSINOPHILS 0.1 0.0 - 0.4 K/UL    ABS. BASOPHILS 0.0 0.0 - 0.06 K/UL    DF AUTOMATED     METABOLIC PANEL, COMPREHENSIVE    Collection Time: 10/12/17  5:35 PM   Result Value Ref Range    Sodium 141 136 - 145 mmol/L    Potassium 4.5 3.5 - 5.5 mmol/L    Chloride 104 100 - 108 mmol/L    CO2 24 21 - 32 mmol/L    Anion gap 13 3.0 - 18 mmol/L    Glucose 138 (H) 74 - 99 mg/dL    BUN 33 (H) 7.0 - 18 MG/DL    Creatinine 0.89 0.6 - 1.3 MG/DL    BUN/Creatinine ratio 37 (H) 12 - 20      GFR est AA >60 >60 ml/min/1.73m2    GFR est non-AA >60 >60 ml/min/1.73m2    Calcium 9.1 8.5 - 10.1 MG/DL    Bilirubin, total PENDING MG/DL    ALT (SGPT) PENDING U/L    AST (SGOT) PENDING U/L    Alk. phosphatase PENDING U/L    Protein, total PENDING g/dL    Albumin PENDING g/dL    Globulin PENDING g/dL    A-G Ratio PENDING         Radiologic Studies -  The following have been ordered and reviewed:  No orders to display           Medical Decision Making   I am the first provider for this patient. I reviewed the vital signs, available nursing notes, past medical history, past surgical history, family history and social history. Vital Signs-Reviewed the patient's vital signs. Patient Vitals for the past 12 hrs:   Temp Pulse Resp BP SpO2   10/12/17 1555 97.9 °F (36.6 °C) 88 16 127/75 100 %       Pulse Oximetry Analysis - Normal 100% on room air     Old Medical Records: Nursing notes. Procedures:   Procedures    ED Course:  4:07 PM  Initial assessment performed.  The patients presenting problems have been discussed, and they are in agreement with the care plan formulated and outlined with them. I have encouraged them to ask questions as they arise throughout their visit. 4:43 PM Discussed patient's history, exam, and available diagnostics results with Dr. Carolina Coleman (podiatry) who recommended infectious disease consult for abx choice. 4:54 PM Discussed patient's history, exam, and available diagnostics results with Dr. Anna Tee MD (infectious diseases) who recommended Daptomycin 6 mg/kg every 24 hours. 4:57 PM Discussed patient's history, exam, and available diagnostics results with Dr. Tracy Davidson (hospitalist) who will admit pt to Medical.    Medications Given in the ED:  Medications   DAPTOmycin (CUBICIN) 326 mg in 0.9% sodium chloride 50 mL IVPB RF formulation (not administered)   heparin (porcine) injection 5,000 Units (not administered)       Admission Note:  4:57 PM Patient is being admitted to the hospital by Tracy Davidson MD. The results of their tests and reasons for their admission have been discussed with them and/or available family. They convey agreement and understanding for the need to be admitted and for their admission diagnosis. CONDITIONS ON ADMISSION:  Deep Vein Thrombosis is not present at the time of admission. Thrombosis is not present at the time of admission. Urinary Tract Infection is not present at the time of admission. Pneumonia is not present at the time of admission. MRSA is not present at the time of admission. Wound infection is present at the time of admission. Pressure Ulcer is not present at the time of admission. Diagnosis   Clinical Impression:   1. Osteomyelitis of right foot, unspecified type [facility])         Discussion:  78 y.o. female with osteo. Abx started per ID rec. No fever. No signs of sepsis. Failed outpt abx. Will admit    _______________________________   Attestations:      This note is prepared by Neil Vallejo and Cher Jay, acting as a Scribe for Tim Mcgrath MD on 4:04 PM on 10/12/2017 . Tim Mcgrath MD: The scribe's documentation has been prepared under my direction and personally reviewed by me in its entirety.   _______________________________

## 2017-10-12 NOTE — H&P
History & Physical    Patient: Maxi Stewart MRN: [id number]  CSN: [id number]    YOB: 1950  Age: 78 y.o. Sex: female      DOA: 10/12/2017  Primary Care Provider:  Loraine Rubinstein, MD      Assessment/Plan     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 Z90.3, Z98.0    Right foot ulcer ([address] L97.519    Atherosclerotic PVD with ulceration ([address] I70.209, L98.499    Syncope R55    Anemia associated with acute blood loss D62    Anemia D64.9    Cellulitis of right foot L03.115    Foot osteomyelitis, right ([address] M86.9    Lupus anticoagulant syndrome ([address] D68.62    Sepsis ([address] A41.9    Diabetes ([address] E11.9    Chronic pain G89.29    PAD (peripheral artery disease) ([address] I73.9    Hyponatremia E87.1    Sleepwalking disorder F51.3    CAP (community acquired pneumonia) J18.9    Chronic multifocal osteomyelitis, right ankle and foot (HCC) M86.371    Osteomyelitis (HCC) M86.9       1. Worsening of right foot cellulitis, with abscess  2. PAD  3. Uncontrolled DM  4. Asthma  5. Hx of Pancreatitis s/p Sameer en Y  6. Depression   7. PVD s/p multiple iliac stents   FULL CODe     -admit for further care, failed outpatient therapy at this time   -had recent excision of right 2nd,3rd and 4th metatarsal removal. Cultures grew Staph epi and strep viridans  -will need IV Daptomycin here, allergic to lots of abx at this time. -ID and Podiatry consulted   -cont other home meds   -IVF, pain control   -supportive care   -accuchecks, ISS  -bed rest, fall precaution   -wound care    Estimated length of stay : 2-3 days     CC: Right foot infection        HPI:     Maxi Stewart is a 78 y.o. female who was sent to the ed by Dr Kait Grajeda for evaluation of right foot infection and IV Abx.  Patient has been dealing with right foot osteo and recurrent infections for several months now. She has undergone multiple surgeries, wound debridement etc. About a month ago, she underwent removal of her right 2nd, 3rd and 4th metatarsal. Cultures grew Staph epi and strep viridans, according to her she was put on tetracycline but have failed outpatient therapy. Despite of doing her best ot take care of it, she states it has been getting worst. It continues to have foul smell to it, along with pus like drainage. She has also been going to wound care clinic where she had fever last time. Today she was seen by Dr Adis Delacruz who thought she continous to get worst therefore sent here. In the ED the case was discussed with Dr Divya Moses by Dr David Patel who suggested Daptomycin IV for now. No other complaints to me. Past Medical History:   Diagnosis Date    Arthritis     hands and feet    Asthma     Autoimmune disease ([address]     lupus anticoagulant-causes clotting    Cancer ([address]     carcinoma in situ of cervix and uterus; inside bottom lip    Chronic pain     Left foot    Diabetes ([address] 2013    Dizzy spells     h/o    GERD (gastroesophageal reflux disease)     Liver disease     1/2 of my liver was removed for ulcers.     Lung collapse     2015    Nausea & vomiting 5/23/2012    Neuropathy     right foot    Other ill-defined conditions(799.89)     arterial clot in rt leg    Other ill-defined conditions(799.89)     restless leg syndrome    Other ill-defined conditions(799.89)     raynaud's syndrome    Other ill-defined conditions(799.89)     chronic N/V    Other ill-defined conditions(799.89)     (2 mutated genes) hyper coag. state     Pneumonia 07/22/2017    Positive cardiolipin antibodies     Psychiatric disorder     depression    PUD (peptic ulcer disease) 2009    H/O requiring partial gastrectomy and small intestine    Thromboembolus ([address]     artery right leg       Past Surgical History:   Procedure Laterality Date    ABDOMEN SURGERY PROC UNLISTED      duodenal ulcer rupture    BREAST SURGERY PROCEDURE UNLISTED Bilateral 02/2016    hemorrhaged after breast surgery    HX APPENDECTOMY      HX BREAST AUGMENTATION      developed infection and implant removed    HX CHOLECYSTECTOMY      HX GASTRECTOMY      due to ulcers    HX GASTRIC BYPASS  1996    gastric by-pass    HX GI      total gastrectomy, partial colectomy    HX GI      colostomy and reversal    HX HEENT      cancer of lip removed    HX HYSTERECTOMY      for cervical cancer    HX ORTHOPAEDIC      rt foot surgery x10    HX ORTHOPAEDIC Left     great toe gangrenous and amputated    HX OTHER SURGICAL      part of liver removal    HX OTHER SURGICAL      ventral hernia repair x10    HX OTHER SURGICAL      Exc pilonial cystectomy    VASCULAR SURGERY PROCEDURE UNLIST      iliac aorta stents x 6       Family History   Problem Relation Age of Onset    Malignant Hyperthermia Neg Hx     Pseudocholinesterase Deficiency Neg Hx     Delayed Awakening Neg Hx     Post-op Nausea/Vomiting Neg Hx     Emergence Delirium Neg Hx     Post-op Cognitive Dysfunction Neg Hx     Other Neg Hx        Social History     Social History    Marital status:      Spouse name: N/A    Number of children: N/A    Years of education: N/A     Social History Main Topics    Smoking status: Former Smoker     Years: 0.50     Quit date: 1/2/1996    Smokeless tobacco: Never Used    Alcohol use Yes      Comment: 1 or 2 glasses wine or mixed drinks monthly     Drug use: Yes     Special: Marijuana      Comment: daily    Sexual activity: No     Other Topics Concern    None     Social History Narrative       Prior to Admission medications    Medication Sig Start Date End Date Taking? Authorizing Provider   Cetirizine (ZYRTEC) 10 mg cap Take  by mouth. Historical Provider   Minoxidil 5 % foam by Apply Externally route. Historical Provider   cyanocobalamin 1,000 mcg tablet Take 1,000 mcg by mouth daily. Historical Provider   glimepiride (AMARYL) 2 mg tablet Take 2 mg by mouth every morning. Historical Provider   melatonin 3 mg tablet Take 6 mg by mouth nightly. Historical Provider   warfarin (COUMADIN) 7.5 mg tablet Take 7.5 mg by mouth daily. Historical Provider   ondansetron hcl (ZOFRAN, AS HYDROCHLORIDE,) 4 mg tablet Take 4 mg by mouth every eight (8) hours as needed for Nausea. Historical Provider   HYDROmorphone (DILAUDID) 4 mg tablet Take 4 mg by mouth every six (6) hours as needed for Pain. Historical Provider   cyclobenzaprine (FLEXERIL) 10 mg tablet Take  by mouth three (3) times daily as needed for Muscle Spasm(s). Historical Provider   enoxaparin (LOVENOX) 100 mg/mL by SubCUTAneous route. Historical Provider   miconazole (MICOTIN) 2 % vaginal cream Insert 1 Applicator into vagina every evening. 1/9/17   Hortencia Nava MD   promethazine (PHENERGAN) 25 mg tablet Take 25 mg by mouth every six (6) hours as needed for Nausea. Historical Provider   LORazepam (ATIVAN) 2 mg tablet Take 1 mg by mouth every six (6) hours as needed. Historical Provider   BIOTIN PO Take 5,000 mcg by mouth daily. Historical Provider   diphenhydrAMINE (BENADRYL) 25 mg capsule Take 50 mg by mouth every six (6) hours as needed. Indications: ALLERGIC RHINITIS    MD Ferdinand Solitario,Ca,Min-FA-Herbal No.157 (ESTROVEN MAXIMUM STRENGTH) 400 mcg Tab Take 1 Tab by mouth two (2) times a day. Jay Lott MD   albuterol (PROVENTIL HFA, VENTOLIN HFA) 90 mcg/actuation inhaler Take 2 Puffs by inhalation every four (4) hours as needed. Indications: ACUTE ASTHMA ATTACK    Historical Provider   cloNIDine (CATAPRES) 0.1 mg tablet Take 0.1 mg by mouth two (2) times a day. Indications: hot flashes; Historical Provider   dronabinol (MARINOL) 2.5 mg capsule Take 2.5 mg by mouth three (3) times daily as needed.  Indications: nausea    Historical Provider   metFORMIN (GLUCOPHAGE) 500 mg tablet Take 500 mg by mouth two (2) times daily (with meals). Indications: TYPE 2 DIABETES MELLITUS    Historical Provider   ropinirole (REQUIP) 4 mg Tab TAB Take 4 mg by mouth nightly. Indications: RESTLESS LEGS SYNDROME    Historical Provider   traMADol (ULTRAM) 50 mg tablet Take 50 mg by mouth every eight (8) hours as needed. Indications: PAIN    Historical Provider       Allergies   Allergen Reactions    Rifampin Hives and Swelling     Swelling of mouth and tounge    Levaquin [Levofloxacin] Itching    Amoxicillin Hives    Aspirin Nausea and Vomiting    Azithromycin Angioedema    Bactrim [Sulfamethoprim Ds] Hives    Cefatrizine Hives    Corn Nausea and Vomiting    Erythromycin Hives    Rocephin [Ceftriaxone] Hives    Tylenol [Acetaminophen] Hives    Vancomycin Hives       Review of Systems  Gen: No malaise, weight loss/gain. Heent: No headache, rhinorrhea, epistaxis, ear pain, hearing loss, sinus pain, neck pain/stiffness, sore throat. Heart: No chest pain, palpitations, SHUKLA, pnd, or orthopnea. Resp: No cough, hemoptysis, wheezing and shortness of breath. GI: No nausea, vomiting, diarrhea, constipation, melena or hematochezia. : No urinary obstruction, dysuria or hematuria. Derm: No rash, new skin lesion or pruritis. Musc/skeletal: per hpi   Vasc: No edema, cyanosis or claudication. Endo: No heat/cold intolerance, no polyuria,polydipsia or polyphagia. Neuro: No unilateral weakness, numbness, tingling. No seizures. Heme: No easy bruising or bleeding. Physical Exam:     Physical Exam:  Visit Vitals    /75 (BP 1 Location: Left arm, BP Patient Position: Sitting)    Pulse 88    Temp 97.9 °F (36.6 °C)    Resp 16    Ht 5' 5\" (1.651 m)    Wt 54.4 kg (120 lb)    SpO2 100%    BMI 19.97 kg/m2      O2 Device: Room air    Temp (24hrs), Av.9 °F (36.6 °C), Min:97.9 °F (36.6 °C), Max:97.9 °F (36.6 °C)             General:  Awake, cooperative, no distress.    Head:  Normocephalic, without obvious abnormality, atraumatic. Eyes:  Conjunctivae/corneas clear, sclera anicteric, PERRL, EOMs intact. Nose: Nares normal. No drainage or sinus tenderness. Throat: Lips, mucosa, and tongue normal.    Neck: Supple, symmetrical, trachea midline, no adenopathy. Lungs:   Clear to auscultation bilaterally. Heart:  Regular rate and rhythm, S1, S2 normal, no murmur, click, rub or gallop. Abdomen: Soft, non-tender. Bowel sounds normal. No masses,  No organomegaly. Extremities: Amputation of toes and metatarsal of right foot, + pus like drainage, +dressing in place. no cyanosis or edema. Capillary refill normal.   Pulses: 2+ and symmetric all extremities. Skin: Skin color pink/pale/mottled/flushed, turgor normal. No rashes or lesions   Neurologic: CNII-XII intact. No focal motor or sensory deficit. Labs Reviewed:      No results found for this or any previous visit (from the past 24 hour(s)). Procedures/imaging: see electronic medical records for all procedures/Xrays and details which were not copied into this note but were reviewed prior to creation of Plan    50 minutes of care time spent in the direct evaluation and treatment of this high risk patient.    CC: Laureano Torres MD

## 2017-10-12 NOTE — IP AVS SNAPSHOT
[facility] 
 
 
 [address] [facility] [address] 
[phone] Patient: Guillermina Last MRN: [id number] You are allergic to the following Allergen Reactions Rifampin Hives Swelling Swelling of mouth and tounge Levaquin (Levofloxacin) Itching Amoxicillin Hives Aspirin Nausea and Vomiting Azithromycin Angioedema Bactrim (Sulfamethoprim Ds) Hives Cefatrizine Hives Erythromycin Hives Rocephin (Ceftriaxone) Hives Tylenol (Acetaminophen) Hives Vancomycin Hives Immunizations Administered for This Admission Name Date Influenza Vaccine (Quad) PF 10/20/2017 Recent Documentation Height Weight BMI OB Status Smoking Status 1.651 m 59.7 kg 21.9 kg/m2 Hysterectomy Former Smoker Emergency Contacts Name Discharge Info Relation Home Work Mobile Cory Pineda DISCHARGE CAREGIVER [3] Son [22]   [phone] Amaris Howell DISCHARGE CAREGIVER [3] Other Relative [6]   [phone] Kyler Pineda DISCHARGE CAREGIVER [3] Child [2] [phone] About your hospitalization You were admitted on:  October 12, 2017 You last received care in the:  [address] [place] [address] You were discharged on:  October 20, 2017 Unit phone number:  [phone] Why you were hospitalized Your primary diagnosis was:  Osteomyelitis Of Right Foot (Hcc) Your diagnoses also included:  Osteomyelitis (Hcc), Iron Deficiency Anemia, S/P Total Gastrectomy And Sameer-En-Y Esophagojejunal Anastomosis, Right Foot Ulcer (Hcc), Atherosclerotic Pvd With Ulceration (Hcc), Foot Osteomyelitis, Right (Hcc), Diabetes (Hcc), Pad (Peripheral Artery Disease) (Hcc), Constipation, Anxiety, Cellulitis Of Right Foot, Hypokalemia Providers Seen During Your Hospitalizations Provider Role Specialty Primary office phone Starlin Gaucher, MD Attending Provider Emergency Medicine [phone] Reta Garza MD Attending Provider Internal Medicine [phone] Your Primary Care Physician (PCP) Primary Care Physician Office Phone Office Fax Kim Gallegos [phone] Follow-up Information Follow up With Details Comments Contact Info Amari Mendiola MD On 10/23/2017 Follow up appointment scheduled for Monday, October 23, 2017 at 10:45 a.m. with Comp[facility]en, [address] 
Jamal 40 
[phone] Eliana Villalba DPM On 10/26/2017 Follow up appointment scheduled for October 26, 2017 at 11:30 a.m. Alex 36 A to 1300 [facility] [address] [place] [address] 
[phone] [address] [place] [address] On 10/21/2017 Chosen to continue managing your healthcare needs. Follow up appointment scheduled for October 21, 2017 at 9:00 a.m. at [facility]. [facility] [phone] [facility], [address] [facility], [address] 
[phone] [facility] On 10/23/2017 Follow up appointment scheduled for October 23, 2017 at 10:00 a.m. [address] [facility] 
[phone] Your Appointments Saturday October 21, 2017  9:00 AM EDT INFUSION 60 with THE [facility] FAST TRACK NURSE [facility] OP INFUSION [facility] ([address] [facility]) [address] [address]  
[phone] Sunday October 22, 2017  8:30 AM EDT INFUSION 30 with THE [facility] FAST TRACK NURSE SO [facility] OP INFUSION [facility] ([address] [facility]) [address] [address]  
[phone] Monday October 23, 2017  9:00 AM EDT INFUSION 30 with THE [facility] FAST TRACK NURSE ANITA [facility] OP INFUSION THE [facility] OF [facility] ([address] [facility]) [address] [address]  
[phone] Tuesday October 24, 2017  9:30 AM EDT INFUSION 30 with THE [facility] FAST TRACK NURSE ANITA [facility] OP INFUSION THE [facility] ([address] [facility])  [address] [place] [address] 
 [address] [place], [place] [id number] [phone] Wednesday October 25, 2017  9:00 AM EDT INFUSION 30 with THE [facility] OF [facility] FAST TRACK NURSE SO [facility] OP INFUSION THE [facility] OF [facility] ([place] [address] [facility]) [address] [address]  
[phone] Thursday October 26, 2017  9:00 AM EDT INFUSION 30 with THE [facility] FAST TRACK NURSE SO [facility] OP INFUSION THE [facility] ([place] [address] [facility]) [address] [address]  
[phone] Friday October 27, 2017  9:00 AM EDT INFUSION 30 with THE [facility] FAST TRACK NURSE SO [facility] OP INFUSION THE [facility] ([place] [address] [facility]) [address] [address]  
[phone] Saturday October 28, 2017  8:30 AM EDT INFUSION 30 with THE [facility] FAST TRACK NURSE SO [facility] OP INFUSION THE [facility] ([place] [address] [facility]) [address] [address]  
[phone] Sunday October 29, 2017  8:30 AM EDT INFUSION 30 with THE [facility] OF [facility] FAST TRACK NURSE SO [facility] OP INFUSION THE [facility] ([place] [address] [facility]) [address] [address]  
[phone] Current Discharge Medication List  
  
START taking these medications Dose & Instructions Dispensing Information Comments Morning Noon Evening Bedtime DAPTOmycin (CUBICIN RF) IVPB Your last dose was: Your next dose is:    
   
   
 Dose:  6 mg/kg 326 mg by IntraVENous route every twenty-four (24) hours. Quantity:  9 Dose Refills:  0 CONTINUE these medications which have NOT CHANGED Dose & Instructions Dispensing Information Comments Morning Noon Evening Bedtime  
 albuterol 90 mcg/actuation inhaler Commonly known as:  PROVENTIL HFA, VENTOLIN HFA, PROAIR HFA Your last dose was: Your next dose is:    
   
   
 Dose:  2 Puff Take 2 Puffs by inhalation every four (4) hours as needed. Indications: ACUTE ASTHMA ATTACK Refills:  0  
     
   
   
   
  
 ATIVAN 2 mg tablet Generic drug:  LORazepam  
   
Your last dose was: Your next dose is:    
   
   
 Dose:  1 mg Take 1 mg by mouth every six (6) hours as needed. Refills:  0  
     
   
   
   
  
 BENADRYL 25 mg capsule Generic drug:  diphenhydrAMINE Your last dose was: Your next dose is:    
   
   
 Dose:  50 mg Take 50 mg by mouth every six (6) hours as needed. Indications: ALLERGIC RHINITIS Refills:  0  
     
   
   
   
  
 BIOTIN PO Your last dose was: Your next dose is:    
   
   
 Dose:  5000 mcg Take 5,000 mcg by mouth daily. Refills:  0  
     
   
   
   
  
 cloNIDine HCl 0.1 mg tablet Commonly known as:  CATAPRES Your last dose was: Your next dose is:    
   
   
 Dose:  0.1 mg Take 0.1 mg by mouth two (2) times a day. Indications: hot flashes; Refills:  0  
     
   
   
   
  
 cyanocobalamin 1,000 mcg tablet Your last dose was: Your next dose is:    
   
   
 Dose:  1000 mcg Take 1,000 mcg by mouth daily. Refills:  0  
     
   
   
   
  
 cyclobenzaprine 10 mg tablet Commonly known as:  FLEXERIL Your last dose was: Your next dose is: Take  by mouth three (3) times daily as needed for Muscle Spasm(s). Refills:  0  
     
   
   
   
  
 DILAUDID 4 mg tablet Generic drug:  HYDROmorphone Your last dose was: Your next dose is:    
   
   
 Dose:  4 mg Take 4 mg by mouth every six (6) hours as needed for Pain. Refills:  0 ESTROVEN MAXIMUM STRENGTH 400 mcg Tab Generic drug:  Chandrika Streeter,Min-FA-Herbal No.157 Your last dose was: Your next dose is:    
   
   
 Dose:  1 Tab Take 1 Tab by mouth two (2) times a day. Refills:  0  
     
   
   
   
  
 glimepiride 2 mg tablet Commonly known as:  AMARYL Your last dose was: Your next dose is:    
   
   
 Dose:  2 mg Take 2 mg by mouth every morning. Refills:  0 LOVENOX 100 mg/mL Generic drug:  enoxaparin Your last dose was: Your next dose is:    
   
   
 by SubCUTAneous route. Refills:  0 MARINOL 2.5 mg capsule Generic drug:  dronabinol Your last dose was: Your next dose is:    
   
   
 Dose:  2.5 mg Take 2.5 mg by mouth three (3) times daily as needed. Indications: nausea Refills:  0  
     
   
   
   
  
 melatonin 3 mg tablet Your last dose was: Your next dose is:    
   
   
 Dose:  6 mg Take 6 mg by mouth nightly. Refills:  0  
     
   
   
   
  
 metFORMIN 500 mg tablet Commonly known as:  GLUCOPHAGE Your last dose was: Your next dose is:    
   
   
 Dose:  500 mg Take 500 mg by mouth two (2) times daily (with meals). Indications: TYPE 2 DIABETES MELLITUS Refills:  0  
     
   
   
   
  
 miconazole 2 % vaginal cream  
Commonly known as:  Flavio Rathke Your last dose was: Your next dose is:    
   
   
 Dose:  1 Applicator Insert 1 Applicator into vagina every evening. Quantity:  45 g Refills:  0 Minoxidil 5 % Foam  
   
Your last dose was: Your next dose is:    
   
   
 by Apply Externally route. Refills:  0  
     
   
   
   
  
 promethazine 25 mg tablet Commonly known as:  PHENERGAN Your last dose was: Your next dose is:    
   
   
 Dose:  25 mg Take 25 mg by mouth every six (6) hours as needed for Nausea. Refills:  0  
     
   
   
   
  
 REQUIP 4 mg Tab TAB Generic drug:  rOPINIRole Your last dose was: Your next dose is:    
   
   
 Dose:  4 mg Take 4 mg by mouth nightly. Indications: RESTLESS LEGS SYNDROME Refills:  0  
     
   
   
   
  
 traMADol 50 mg tablet Commonly known as:  ULTRAM  
   
Your last dose was: Your next dose is:    
   
   
 Dose:  50 mg Take 50 mg by mouth every eight (8) hours as needed. Indications: PAIN Refills:  0 warfarin 7.5 mg tablet Commonly known as:  COUMADIN Your last dose was: Your next dose is:    
   
   
 Dose:  7.5 mg Take 7.5 mg by mouth daily. Refills:  0 ZOFRAN (AS HYDROCHLORIDE) 4 mg tablet Generic drug:  ondansetron hcl Your last dose was: Your next dose is:    
   
   
 Dose:  4 mg Take 4 mg by mouth every eight (8) hours as needed for Nausea. Refills:  0 ZyrTEC 10 mg Cap Generic drug:  Cetirizine Your last dose was: Your next dose is: Take  by mouth. Refills:  0 Where to Get Your Medications Information on where to get these meds will be given to you by the nurse or doctor. ! Ask your nurse or doctor about these medications DAPTOmycin (CUBICIN RF) IVPB Discharge Instructions Osteomyelitis: Care Instructions Your Care Instructions Osteomyelitis (say \"dh-scvu-pk-rb-tq-PD-tus\") is a bone infection. It is caused by bacteria. The bacteria can infect the bone where it has been injured, or they can be carried through the blood from another area in the body. Osteomyelitis can be a short- or long-term problem. It is treated with antibiotics. You may get the antibiotics as pills or through a needle in a vein (IV). You will probably get treatment in the hospital at first. The type of treatment depends on the type of bacteria causing the infection, the bones affected, and how bad the infection is. Sometimes people need surgery to drain pus from bone or to fix damaged bone. Short-term osteomyelitis that is treated right away usually can be cured. But the long-term form sometimes comes back after treatment. You can help your chances of stopping the infection by taking your medicines as directed. Follow-up care is a key part of your treatment and safety.  Be sure to make and go to all appointments, and call your doctor if you are having problems. It's also a good idea to know your test results and keep a list of the medicines you take. How can you care for yourself at home? · Take your antibiotics as directed. Do not stop taking them just because you feel better. You need to take the full course of antibiotics. · Take pain medicines exactly as directed. ¨ If the doctor gave you a prescription medicine for pain, take it as prescribed. ¨ If you are not taking a prescription pain medicine, ask your doctor if you can take an over-the-counter medicine. · Do mild exercise and stretching if your doctor says it is okay. This can help keep your bones and muscles healthy. Avoid strenuous work or exercise until your doctor says you can do it. · Consider physical therapy if your doctor suggests it. Physical therapy may help you have a normal range of movement. · Do not smoke. Smoking can slow healing of the infection. If you need help quitting, talk to your doctor about stop-smoking programs and medicines. These can increase your chances of quitting for good. When should you call for help? Call 911 anytime you think you may need emergency care. For example, call if: 
· You have severe bone pain. Call your doctor now or seek immediate medical care if: 
· You continue to have bone pain. · You have signs of infection, such as: 
¨ Increased pain, swelling, warmth, or redness. ¨ Red streaks leading from a wound. ¨ Pus draining from a wound. ¨ A fever. Watch closely for changes in your health, and be sure to contact your doctor if: 
· You do not get better as expected. Where can you learn more? Go to http://lisa-kenan.info/. Enter D754 in the search box to learn more about \"Osteomyelitis: Care Instructions. \" Current as of: March 20, 2017 Content Version: 11.3 © 8178-6923 [employer].  Care instructions adapted under license by [address] (which disclaims liability or warranty for this information). If you have questions about a medical condition or this instruction, always ask your healthcare professional. [address] any warranty or liability for your use of this information. Diabetes Foot Health: Care Instructions Your Care Instructions When you have diabetes, your feet need extra care and attention. Diabetes can damage the nerve endings and blood vessels in your feet, making you less likely to notice when your feet are injured. Diabetes also limits your body's ability to fight infection and get blood to areas that need it. If you get a minor foot injury, it could become an ulcer or a serious infection. With good foot care, you can prevent most of these problems. Caring for your feet can be quick and easy. Most of the care can be done when you are bathing or getting ready for bed. Follow-up care is a key part of your treatment and safety. Be sure to make and go to all appointments, and call your doctor if you are having problems. Its also a good idea to know your test results and keep a list of the medicines you take. How can you care for yourself at home? · Keep your blood sugar close to normal by watching what and how much you eat, monitoring blood sugar, taking medicines if prescribed, and getting regular exercise. · Do not smoke. Smoking affects blood flow and can make foot problems worse. If you need help quitting, talk to your doctor about stop-smoking programs and medicines. These can increase your chances of quitting for good. · Eat a diet that is low in fats. High fat intake can cause fat to build up in your blood vessels and decrease blood flow. · Inspect your feet daily for blisters, cuts, cracks, or sores. If you cannot see well, use a mirror or have someone help you. · Take care of your feet: 
[facility] [place] [place] AUTHORITY your feet every day. Use warm (not hot) water.  Check the water temperature with your wrists or other part of your body, not your feet. ¨ Dry your feet well. Pat them dry. Do not rub the skin on your feet too hard. Dry well between your toes. If the skin on your feet stays moist, bacteria or a fungus can grow, which can lead to infection. ¨ Keep your skin soft. Use moisturizing skin cream to keep the skin on your feet soft and prevent calluses and cracks. But do not put the cream between your toes, and stop using any cream that causes a rash. ¨ Clean underneath your toenails carefully. Do not use a sharp object to clean underneath your toenails. Use the blunt end of a nail file or other rounded tool. ¨ Trim and file your toenails straight across to prevent ingrown toenails. Use a nail clipper, not scissors. Use an emery board to smooth the edges. · Change socks daily. Socks without seams are best, because seams often rub the feet. You can find socks for people with diabetes from specialty catalogs. · Look inside your shoes every day for things like gravel or torn linings, which could cause blisters or sores. · Buy shoes that fit well: 
¨ Look for shoes that have plenty of space around the toes. This helps prevent bunions and blisters. ¨ Try on shoes while wearing the kind of socks you will usually wear with the shoes. ¨ Avoid plastic shoes. They may rub your feet and cause blisters. Good shoes should be made of materials that are flexible and breathable, such as leather or cloth. ¨ Break in new shoes slowly by wearing them for no more than an hour a day for several days. Take extra time to check your feet for red areas, blisters, or other problems after you wear new shoes. · Do not go barefoot. Do not wear sandals, and do not wear shoes with very thin soles. Thin soles are easy to puncture. They also do not protect your feet from hot pavement or cold weather. · Have your doctor check your feet during each visit.  If you have a foot problem, see your doctor. Do not try to treat an early foot problem at home. Home remedies or treatments that you can buy without a prescription (such as corn removers) can be harmful. · Always get early treatment for foot problems. A minor irritation can lead to a major problem if not properly cared for early. When should you call for help? Call your doctor now or seek immediate medical care if: 
· You have a foot sore, an ulcer or break in the skin that is not healing after 4 days, bleeding corns or calluses, or an ingrown toenail. · You have blue or black areas, which can mean bruising or blood flow problems. · You have peeling skin or tiny blisters between your toes or cracking or oozing of the skin. · You have a fever for more than 24 hours and a foot sore. · You have new numbness or tingling in your feet that does not go away after you move your feet or change positions. · You have unexplained or unusual swelling of the foot or ankle. Watch closely for changes in your health, and be sure to contact your doctor if: 
· You cannot do proper foot care. Where can you learn more? Go to http://lisa-kenan.info/. Enter A739 in the search box to learn more about \"Diabetes Foot Health: Care Instructions. \" Current as of: March 13, 2017 Content Version: 11.3 © 6183-6400 [employer]. Care instructions adapted under license by [employer] (which disclaims liability or warranty for this information). If you have questions about a medical condition or this instruction, always ask your healthcare professional. [place] [address] any warranty or liability for your use of this information. Diabetic Neuropathy: Care Instructions Your Care Instructions When you have diabetes, your blood sugar level may get too high. Over time, high blood sugar levels can damage nerves. This is called diabetic neuropathy. Nerve damage can cause pain, burning, tingling, and numbness and may leave you feeling weak. The feet are often affected. When you have nerve damage in your feet, you cannot feel your feet and toes as well as normal and may not notice cuts or sores. Even a small injury can lead to a serious infection. It is very important that you follow your doctor's advice on foot care. Sometimes diabetes damages nerves that help the body function. If this happens, your blood pressure, sweating, digestion, and urination might be affected. Your doctor may give you a target blood sugar level that is higher or lower than you are used to. Try to keep your blood sugar very close to this target level to prevent more damage. Follow-up care is a key part of your treatment and safety. Be sure to make and go to all appointments, and call your doctor if you are having problems. It's also a good idea to know your test results and keep a list of the medicines you take. How can you care for yourself at home? · Take your medicines exactly as prescribed. Call your doctor if you think you are having a problem with your medicine. It is very important that you take your insulin or diabetes pills as your doctor tells you. · Try to keep blood sugar at your target level. ¨ Eat a variety of healthy foods, with carbohydrate spread out in your meals. A dietitian can help you plan meals. ¨ Try to get at least 30 minutes of exercise on most days. ¨ Check your blood sugar as many times each day as your doctor recommends. · Take and record your blood pressure at home if your doctor tells you to. Learn the importance of the two measures of blood pressure (such as 130 over 80, or 130/80). To take your blood pressure at home: ¨ Ask your doctor to check your blood pressure monitor to be sure it is accurate and the cuff fits you. Also ask your doctor to watch you to make sure that you are using it right. ¨ Do not use medicine known to raise blood pressure (such as some nasal decongestant sprays) before taking your blood pressure. ¨ Avoid taking your blood pressure if you have just exercised or are nervous or upset. Rest at least 15 minutes before you take a reading. · Take pain medicines exactly as directed. ¨ If the doctor gave you a prescription medicine for pain, take it as prescribed. ¨ If you are not taking a prescription pain medicine, ask your doctor if you can take an over-the-counter medicine. · Do not smoke. Smoking can increase your chance for a heart attack or stroke. If you need help quitting, talk to your doctor about stop-smoking programs and medicines. These can increase your chances of quitting for good. · Limit alcohol to 2 drinks a day for men and 1 drink a day for women. Too much alcohol can cause health problems. · Eat small meals often, rather than 2 or 3 large meals a day. To care for your feet · Prevent injury by wearing shoes at all times, even when you are indoors. · Do foot care as part of your daily routine. Wash your feet and then rub lotion on your feet, but not between your toes. Use a handheld mirror or magnifying mirror to inspect your feet for blisters, cuts, cracks, or sores. · Have your toenails trimmed and filed straight across. · Wear shoes and socks that fit well. Soft shoes that have good support and that fit well (such as tennis shoes) are best for your feet. · Check your shoes for any loose objects or rough edges before you put them on. · Ask your doctor to check your feet during each visit. Your doctor may notice a foot problem you have missed. · Get early treatment for any foot problem, even a minor one. When should you call for help? Call your doctor now or seek immediate medical care if: 
· You have symptoms of infection, such as: 
¨ Increased pain, swelling, warmth, or redness. ¨ Red streaks leading from the area. ¨ Pus draining from the area. ¨ A fever. · You have new or worse numbness, pain, or tingling in any part of your body. Watch closely for changes in your health, and be sure to contact your doctor if: 
· You have a new problem with your feet, such as: ¨ A new sore or ulcer. ¨ A break in the skin that is not healing after several days. ¨ Bleeding corns or calluses. ¨ An ingrown toenail. · You do not get better as expected. Where can you learn more? Go to http://lisa-kenan.info/. Enter W697 in the search box to learn more about \"Diabetic Neuropathy: Care Instructions. \" Current as of: March 13, 2017 Content Version: 11.3 © 9124-7707 [employer]. Care instructions adapted under license by [employer] (which disclaims liability or warranty for this information). If you have questions about a medical condition or this instruction, always ask your healthcare professional. [place] [address] any warranty or liability for your use of this information. Toe Amputation: What to Expect at [facility] Your Recovery You had amputation surgery to remove one or more of your toes. For most people, pain improves within a week after surgery. You may have stitches or sutures. The doctor will probably take these out about 10 days after the surgery. You may need to wear a cast or a special type of shoe for about 2 to 4 weeks. You may think you have feeling or pain where your toe had been. This is called phantom pain. It is common, and it may come and go for a year or longer. If you have this kind of pain, your doctor may prescribe medicine to treat it. This care sheet gives you a general idea about how long it will take for you to recover. But each person recovers at a different pace. Follow the steps below to get better as quickly as possible. How can you care for yourself at home? Activity · Rest when you feel tired. Getting enough sleep will help you recover. · You may notice some changes in your balance when you walk. Your balance will improve over time. · Try to walk each day if you are able. Start by walking a little more than you did the day before. Bit by bit, increase the amount you walk. Walking boosts blood flow and helps prevent blood clots. · Prop up your foot and leg on a pillow when you ice it or anytime you sit or lie down during the next 3 days. Try to keep it above the level of your heart. This will help reduce swelling. · Ask your doctor when you can drive again. · You may shower, unless your doctor tells you not to. Keep the bandage dry. If the bandage has been removed, you can wash the area with warm water and soap. Pat the area dry. · You will probably need to take about 4 weeks off from work or your normal routine. How much time you need to take off depends on the type of work you do and your overall health. Diet · You can eat your normal diet. If your stomach is upset, try bland, low-fat foods like plain rice, broiled chicken, toast, and yogurt. · You may notice that your bowel movements are not regular right after your surgery. This is common. Try to avoid constipation and straining with bowel movements. You may want to take a fiber supplement every day. If you have not had a bowel movement after a couple of days, ask your doctor about taking a mild laxative. Medicines · Your doctor will tell you if and when you can restart your medicines. He or she will also give you instructions about taking any new medicines. · If you take blood thinners, such as warfarin (Coumadin), clopidogrel (Plavix), or aspirin, be sure to talk to your doctor. He or she will tell you if and when to start taking those medicines again. Make sure that you understand exactly what your doctor wants you to do. · Take pain medicines exactly as directed. ¨ If the doctor gave you a prescription medicine for pain, take it as prescribed. ¨ If you are not taking a prescription pain medicine, ask your doctor if you can take an over-the-counter medicine. · If your doctor prescribed antibiotics, take them as directed. Do not stop taking them just because you feel better. You need to take the full course of antibiotics. · If you think your pain medicine is making you sick to your stomach: 
¨ Take your medicine after meals (unless your doctor has told you not to). ¨ Ask your doctor for a different pain medicine. Incision care · Your doctor will probably remove the bandages after several days. Or your doctor may have you remove your bandages at home. Do not touch the surgery area. Keep it dry. · Do not soak your foot until your doctor says it is okay. Follow-up care is a key part of your treatment and safety. Be sure to make and go to all appointments, and call your doctor if you are having problems. It's also a good idea to know your test results and keep a list of the medicines you take. When should you call for help? Call 911 anytime you think you may need emergency care. For example, call if: 
· You passed out (lost consciousness). · You have sudden chest pain and shortness of breath, or you cough up blood. · You have severe trouble breathing. Call your doctor now or seek immediate medical care if: 
· Your foot is cool or pale or changes color. · You have numbness, tingling, or less feeling in your foot or your toes. · You have pain that does not get better after you take pain medicine. · You have loose stitches, or your incision comes open. · Bright red blood has soaked through the bandage over your incision. · You have signs of infection, such as: 
¨ Increased pain, swelling, warmth, or redness. ¨ Red streaks leading from the incision. ¨ Pus draining from the incision. ¨ A fever. Watch closely for any changes in your health, and be sure to contact your doctor if: 
· You do not have a bowel movement after taking a laxative. Where can you learn more? Go to http://lisa-kenan.info/. Enter E334 in the search box to learn more about \"Toe Amputation: What to Expect at Home. \" Current as of: March 21, 2017 Content Version: 11.3 © 4839-2138 [employer]. Care instructions adapted under license by [employer] (which disclaims liability or warranty for this information). If you have questions about a medical condition or this instruction, always ask your healthcare professional. [address] any warranty or liability for your use of this information. Discharge Orders None Within3 Announcement We are excited to announce that we are making your provider's discharge notes available to you in Within3. You will see these notes when they are completed and signed by the physician that discharged you from your recent hospital stay. If you have any questions or concerns about any information you see in Within3, please call the Health Information Department where you were seen or reach out to your Primary Care Provider for more information about your plan of care. Introducing [facility]! [employer] introduces Within3 patient portal. Now you can access parts of your medical record, email your doctor's office, and request medication refills online. 1. In your internet browser, go to [email or web address] 2. Click on the First Time User? Click Here link in the Sign In box. You will see the New Member Sign Up page. 3. Enter your Within3 Access Code exactly as it appears below. You will not need to use this code after youve completed the sign-up process. If you do not sign up before the expiration date, you must request a new code. · Within3 Access Code: [email or web address] Expires: 11/16/2017  9:25 AM 
 
4.  Enter the last four digits of your Social Security Number (xxxx) and Date of Birth (mm/dd/yyyy) as indicated and click Submit. You will be taken to the next sign-up page. 5. Create a GeoQuip ID. This will be your GeoQuip login ID and cannot be changed, so think of one that is secure and easy to remember. 6. Create a GeoQuip password. You can change your password at any time. 7. Enter your Password Reset Question and Answer. This can be used at a later time if you forget your password. 8. Enter your e-mail address. You will receive e-mail notification when new information is available in [address] 9. Click Sign Up. You can now view and download portions of your medical record. 10. Click the Download Summary menu link to download a portable copy of your medical information. If you have questions, please visit the Frequently Asked Questions section of the GeoQuip website. Remember, GeoQuip is NOT to be used for urgent needs. For medical emergencies, dial 911. Now available from your iPhone and Android! General Information Please provide this summary of care documentation to your next provider. Patient Signature:  ____________________________________________________________ Date:  ____________________________________________________________  
  
Donny Hadley Provider Signature:  ____________________________________________________________ Date:  ____________________________________________________________

## 2017-10-12 NOTE — PROGRESS NOTES
Bedside and Verbal shift change report given to JUNIOR Maya RN (oncoming nurse) by Juan Jose Portillo RN (offgoing nurse). Report included the following information SBAR and Kardex.      Patient Vitals for the past 12 hrs:   Temp Pulse Resp BP SpO2   10/12/17 1555 97.9 °F (36.6 °C) 88 16 127/75 100 %

## 2017-10-12 NOTE — IP AVS SNAPSHOT
[facility] 
 
 
 [address] [facility] [address] 
[phone] Patient: Guillermina Last MRN: [id number] Current Discharge Medication List  
  
START taking these medications Dose & Instructions Dispensing Information Comments Morning Noon Evening Bedtime DAPTOmycin (CUBICIN RF) IVPB Your last dose was: Your next dose is:    
   
   
 Dose:  6 mg/kg 326 mg by IntraVENous route every twenty-four (24) hours. Quantity:  9 Dose Refills:  0 CONTINUE these medications which have NOT CHANGED Dose & Instructions Dispensing Information Comments Morning Noon Evening Bedtime  
 albuterol 90 mcg/actuation inhaler Commonly known as:  PROVENTIL HFA, VENTOLIN HFA, PROAIR HFA Your last dose was: Your next dose is:    
   
   
 Dose:  2 Puff Take 2 Puffs by inhalation every four (4) hours as needed. Indications: ACUTE ASTHMA ATTACK Refills:  0  
     
   
   
   
  
 ATIVAN 2 mg tablet Generic drug:  LORazepam  
   
Your last dose was: Your next dose is:    
   
   
 Dose:  1 mg Take 1 mg by mouth every six (6) hours as needed. Refills:  0  
     
   
   
   
  
 BENADRYL 25 mg capsule Generic drug:  diphenhydrAMINE Your last dose was: Your next dose is:    
   
   
 Dose:  50 mg Take 50 mg by mouth every six (6) hours as needed. Indications: ALLERGIC RHINITIS Refills:  0  
     
   
   
   
  
 BIOTIN PO Your last dose was: Your next dose is:    
   
   
 Dose:  5000 mcg Take 5,000 mcg by mouth daily. Refills:  0  
     
   
   
   
  
 cloNIDine HCl 0.1 mg tablet Commonly known as:  CATAPRES Your last dose was: Your next dose is:    
   
   
 Dose:  0.1 mg Take 0.1 mg by mouth two (2) times a day. Indications: hot flashes; Refills:  0  
     
   
   
   
  
 cyanocobalamin 1,000 mcg tablet Your last dose was: Your next dose is:    
   
   
 Dose:  1000 mcg Take 1,000 mcg by mouth daily. Refills:  0  
     
   
   
   
  
 cyclobenzaprine 10 mg tablet Commonly known as:  FLEXERIL Your last dose was: Your next dose is: Take  by mouth three (3) times daily as needed for Muscle Spasm(s). Refills:  0  
     
   
   
   
  
 DILAUDID 4 mg tablet Generic drug:  HYDROmorphone Your last dose was: Your next dose is:    
   
   
 Dose:  4 mg Take 4 mg by mouth every six (6) hours as needed for Pain. Refills:  0 ESTROVEN MAXIMUM STRENGTH 400 mcg Tab Generic drug:  Chandrika Streeter,Min-FA-Herbal No.157 Your last dose was: Your next dose is:    
   
   
 Dose:  1 Tab Take 1 Tab by mouth two (2) times a day. Refills:  0  
     
   
   
   
  
 glimepiride 2 mg tablet Commonly known as:  AMARYL Your last dose was: Your next dose is:    
   
   
 Dose:  2 mg Take 2 mg by mouth every morning. Refills:  0 LOVENOX 100 mg/mL Generic drug:  enoxaparin Your last dose was: Your next dose is:    
   
   
 by SubCUTAneous route. Refills:  0 MARINOL 2.5 mg capsule Generic drug:  dronabinol Your last dose was: Your next dose is:    
   
   
 Dose:  2.5 mg Take 2.5 mg by mouth three (3) times daily as needed. Indications: nausea Refills:  0  
     
   
   
   
  
 melatonin 3 mg tablet Your last dose was: Your next dose is:    
   
   
 Dose:  6 mg Take 6 mg by mouth nightly. Refills:  0  
     
   
   
   
  
 metFORMIN 500 mg tablet Commonly known as:  GLUCOPHAGE Your last dose was: Your next dose is:    
   
   
 Dose:  500 mg Take 500 mg by mouth two (2) times daily (with meals). Indications: TYPE 2 DIABETES MELLITUS Refills:  0 miconazole 2 % vaginal cream  
Commonly known as:  Lestine Hang Your last dose was: Your next dose is:    
   
   
 Dose:  1 Applicator Insert 1 Applicator into vagina every evening. Quantity:  45 g Refills:  0 Minoxidil 5 % Foam  
   
Your last dose was: Your next dose is:    
   
   
 by Apply Externally route. Refills:  0  
     
   
   
   
  
 promethazine 25 mg tablet Commonly known as:  PHENERGAN Your last dose was: Your next dose is:    
   
   
 Dose:  25 mg Take 25 mg by mouth every six (6) hours as needed for Nausea. Refills:  0  
     
   
   
   
  
 REQUIP 4 mg Tab TAB Generic drug:  rOPINIRole Your last dose was: Your next dose is:    
   
   
 Dose:  4 mg Take 4 mg by mouth nightly. Indications: RESTLESS LEGS SYNDROME Refills:  0  
     
   
   
   
  
 traMADol 50 mg tablet Commonly known as:  ULTRAM  
   
Your last dose was: Your next dose is:    
   
   
 Dose:  50 mg Take 50 mg by mouth every eight (8) hours as needed. Indications: PAIN Refills:  0  
     
   
   
   
  
 warfarin 7.5 mg tablet Commonly known as:  COUMADIN Your last dose was: Your next dose is:    
   
   
 Dose:  7.5 mg Take 7.5 mg by mouth daily. Refills:  0 ZOFRAN (AS HYDROCHLORIDE) 4 mg tablet Generic drug:  ondansetron hcl Your last dose was: Your next dose is:    
   
   
 Dose:  4 mg Take 4 mg by mouth every eight (8) hours as needed for Nausea. Refills:  0 ZyrTEC 10 mg Cap Generic drug:  Cetirizine Your last dose was: Your next dose is: Take  by mouth. Refills:  0 Where to Get Your Medications Information on where to get these meds will be given to you by the nurse or doctor. ! Ask your nurse or doctor about these medications DAPTOmycin (CUBICIN RF) IVPB

## 2017-10-12 NOTE — ED TRIAGE NOTES
Pt states she had foot surgery 3 weeks ago, pt states she has developed and infection, pt has been treated with oral antibiotics with minimal results, told by Dr. Orin Lopez to come to ED to be admitted to hospital for IV antibiotics. Sepsis Screening completed    (  )Patient meets SIRS criteria. (  x)Patient does not meet SIRS criteria.       SIRS Criteria is achieved when two or more of the following are present   Temperature < 96.8°F (36°C) or > 100.9°F (38.3°C)   Heart Rate > 90 beats per minute   Respiratory Rate > 20 breaths per minute   WBC count > 12,000 or <4,000 or > 10% bands

## 2017-10-13 ENCOUNTER — ANESTHESIA EVENT (OUTPATIENT)
Dept: SURGERY | Age: 67
DRG: 617 | End: 2017-10-13
Payer: MEDICARE

## 2017-10-13 LAB
ALBUMIN SERPL-MCNC: 2.9 G/DL (ref 3.4–5)
ALBUMIN/GLOB SERPL: 0.7 {RATIO} (ref 0.8–1.7)
ALP SERPL-CCNC: 241 U/L (ref 45–117)
ALT SERPL-CCNC: 50 U/L (ref 13–56)
ANION GAP SERPL CALC-SCNC: 9 MMOL/L (ref 3–18)
APTT PPP: 33.3 SEC (ref 23–36.4)
AST SERPL-CCNC: 42 U/L (ref 15–37)
BASOPHILS # BLD: 0 K/UL (ref 0–0.06)
BASOPHILS NFR BLD: 1 % (ref 0–2)
BILIRUB SERPL-MCNC: 0.2 MG/DL (ref 0.2–1)
BUN SERPL-MCNC: 30 MG/DL (ref 7–18)
BUN/CREAT SERPL: 38 (ref 12–20)
CALCIUM SERPL-MCNC: 9.2 MG/DL (ref 8.5–10.1)
CHLORIDE SERPL-SCNC: 102 MMOL/L (ref 100–108)
CO2 SERPL-SCNC: 27 MMOL/L (ref 21–32)
CREAT SERPL-MCNC: 0.78 MG/DL (ref 0.6–1.3)
DIFFERENTIAL METHOD BLD: ABNORMAL
EOSINOPHIL # BLD: 0.1 K/UL (ref 0–0.4)
EOSINOPHIL NFR BLD: 1 % (ref 0–5)
ERYTHROCYTE [DISTWIDTH] IN BLOOD BY AUTOMATED COUNT: 17.4 % (ref 11.6–14.5)
EST. AVERAGE GLUCOSE BLD GHB EST-MCNC: 131 MG/DL
GLOBULIN SER CALC-MCNC: 3.9 G/DL (ref 2–4)
GLUCOSE BLD STRIP.AUTO-MCNC: 121 MG/DL (ref 70–110)
GLUCOSE BLD STRIP.AUTO-MCNC: 171 MG/DL (ref 70–110)
GLUCOSE SERPL-MCNC: 117 MG/DL (ref 74–99)
HBA1C MFR BLD: 6.2 % (ref 4.5–5.6)
HCT VFR BLD AUTO: 38.2 % (ref 35–45)
HGB BLD-MCNC: 12.5 G/DL (ref 12–16)
INR PPP: 1.6 (ref 0.8–1.2)
LYMPHOCYTES # BLD: 2 K/UL (ref 0.9–3.6)
LYMPHOCYTES NFR BLD: 26 % (ref 21–52)
MCH RBC QN AUTO: 29.5 PG (ref 24–34)
MCHC RBC AUTO-ENTMCNC: 32.7 G/DL (ref 31–37)
MCV RBC AUTO: 90.1 FL (ref 74–97)
MONOCYTES # BLD: 0.6 K/UL (ref 0.05–1.2)
MONOCYTES NFR BLD: 7 % (ref 3–10)
NEUTS SEG # BLD: 5 K/UL (ref 1.8–8)
NEUTS SEG NFR BLD: 65 % (ref 40–73)
PLATELET # BLD AUTO: 312 K/UL (ref 135–420)
PMV BLD AUTO: 9.1 FL (ref 9.2–11.8)
POTASSIUM SERPL-SCNC: 4.4 MMOL/L (ref 3.5–5.5)
PROT SERPL-MCNC: 6.8 G/DL (ref 6.4–8.2)
PROTHROMBIN TIME: 18 SEC (ref 11.5–15.2)
RBC # BLD AUTO: 4.24 M/UL (ref 4.2–5.3)
SODIUM SERPL-SCNC: 138 MMOL/L (ref 136–145)
WBC # BLD AUTO: 7.7 K/UL (ref 4.6–13.2)

## 2017-10-13 PROCEDURE — 87077 CULTURE AEROBIC IDENTIFY: CPT | Performed by: INTERNAL MEDICINE

## 2017-10-13 PROCEDURE — 36415 COLL VENOUS BLD VENIPUNCTURE: CPT | Performed by: INTERNAL MEDICINE

## 2017-10-13 PROCEDURE — 87075 CULTR BACTERIA EXCEPT BLOOD: CPT | Performed by: PODIATRIST

## 2017-10-13 PROCEDURE — 82962 GLUCOSE BLOOD TEST: CPT

## 2017-10-13 PROCEDURE — 65270000029 HC RM PRIVATE

## 2017-10-13 PROCEDURE — 74011000258 HC RX REV CODE- 258: Performed by: INTERNAL MEDICINE

## 2017-10-13 PROCEDURE — 74011250636 HC RX REV CODE- 250/636: Performed by: INTERNAL MEDICINE

## 2017-10-13 PROCEDURE — 87186 SC STD MICRODIL/AGAR DIL: CPT | Performed by: INTERNAL MEDICINE

## 2017-10-13 PROCEDURE — 74011000250 HC RX REV CODE- 250: Performed by: INTERNAL MEDICINE

## 2017-10-13 PROCEDURE — 80053 COMPREHEN METABOLIC PANEL: CPT | Performed by: INTERNAL MEDICINE

## 2017-10-13 PROCEDURE — 74011250637 HC RX REV CODE- 250/637: Performed by: INTERNAL MEDICINE

## 2017-10-13 PROCEDURE — 83036 HEMOGLOBIN GLYCOSYLATED A1C: CPT | Performed by: HOSPITALIST

## 2017-10-13 PROCEDURE — 85610 PROTHROMBIN TIME: CPT | Performed by: INTERNAL MEDICINE

## 2017-10-13 PROCEDURE — 87070 CULTURE OTHR SPECIMN AEROBIC: CPT | Performed by: INTERNAL MEDICINE

## 2017-10-13 PROCEDURE — 85730 THROMBOPLASTIN TIME PARTIAL: CPT | Performed by: INTERNAL MEDICINE

## 2017-10-13 PROCEDURE — 85025 COMPLETE CBC W/AUTO DIFF WBC: CPT | Performed by: INTERNAL MEDICINE

## 2017-10-13 RX ORDER — SODIUM CHLORIDE 0.9 % (FLUSH) 0.9 %
5-10 SYRINGE (ML) INJECTION AS NEEDED
Status: CANCELLED | OUTPATIENT
Start: 2017-10-13

## 2017-10-13 RX ORDER — CLINDAMYCIN PHOSPHATE 900 MG/50ML
900 INJECTION, SOLUTION INTRAVENOUS EVERY 8 HOURS
Status: DISCONTINUED | OUTPATIENT
Start: 2017-10-13 | End: 2017-10-19

## 2017-10-13 RX ORDER — INSULIN LISPRO 100 [IU]/ML
INJECTION, SOLUTION INTRAVENOUS; SUBCUTANEOUS
Status: DISCONTINUED | OUTPATIENT
Start: 2017-10-13 | End: 2017-10-20 | Stop reason: HOSPADM

## 2017-10-13 RX ORDER — MAGNESIUM SULFATE 100 %
4 CRYSTALS MISCELLANEOUS AS NEEDED
Status: DISCONTINUED | OUTPATIENT
Start: 2017-10-13 | End: 2017-10-20 | Stop reason: HOSPADM

## 2017-10-13 RX ORDER — SODIUM CHLORIDE, SODIUM LACTATE, POTASSIUM CHLORIDE, CALCIUM CHLORIDE 600; 310; 30; 20 MG/100ML; MG/100ML; MG/100ML; MG/100ML
125 INJECTION, SOLUTION INTRAVENOUS CONTINUOUS
Status: CANCELLED | OUTPATIENT
Start: 2017-10-13

## 2017-10-13 RX ORDER — INSULIN LISPRO 100 [IU]/ML
INJECTION, SOLUTION INTRAVENOUS; SUBCUTANEOUS ONCE
Status: CANCELLED | OUTPATIENT
Start: 2017-10-13 | End: 2017-10-14

## 2017-10-13 RX ORDER — FENTANYL CITRATE 50 UG/ML
50 INJECTION, SOLUTION INTRAMUSCULAR; INTRAVENOUS
Status: CANCELLED | OUTPATIENT
Start: 2017-10-13

## 2017-10-13 RX ORDER — DEXTROSE 50 % IN WATER (D50W) INTRAVENOUS SYRINGE
25-50 AS NEEDED
Status: CANCELLED | OUTPATIENT
Start: 2017-10-13

## 2017-10-13 RX ORDER — DEXTROSE 50 % IN WATER (D50W) INTRAVENOUS SYRINGE
25-50 AS NEEDED
Status: DISCONTINUED | OUTPATIENT
Start: 2017-10-13 | End: 2017-10-20 | Stop reason: HOSPADM

## 2017-10-13 RX ORDER — INSULIN GLARGINE 100 [IU]/ML
5 INJECTION, SOLUTION SUBCUTANEOUS
Status: DISCONTINUED | OUTPATIENT
Start: 2017-10-13 | End: 2017-10-20 | Stop reason: HOSPADM

## 2017-10-13 RX ADMIN — CYANOCOBALAMIN TAB 500 MCG 1000 MCG: 500 TAB at 08:57

## 2017-10-13 RX ADMIN — CLINDAMYCIN PHOSPHATE 900 MG: 900 INJECTION, SOLUTION INTRAVENOUS at 19:51

## 2017-10-13 RX ADMIN — METFORMIN HYDROCHLORIDE 500 MG: 500 TABLET, FILM COATED ORAL at 08:57

## 2017-10-13 RX ADMIN — DAPTOMYCIN 326 MG: 500 INJECTION, POWDER, LYOPHILIZED, FOR SOLUTION INTRAVENOUS at 17:57

## 2017-10-13 RX ADMIN — HEPARIN SODIUM 5000 UNITS: 5000 INJECTION, SOLUTION INTRAVENOUS; SUBCUTANEOUS at 03:58

## 2017-10-13 RX ADMIN — AZTREONAM 1 G: 1 INJECTION, POWDER, LYOPHILIZED, FOR SOLUTION INTRAMUSCULAR; INTRAVENOUS at 11:08

## 2017-10-13 RX ADMIN — Medication 5 MG: at 09:01

## 2017-10-13 RX ADMIN — SODIUM CHLORIDE 75 ML/HR: 900 INJECTION, SOLUTION INTRAVENOUS at 22:18

## 2017-10-13 RX ADMIN — TRAMADOL HYDROCHLORIDE 50 MG: 50 TABLET, FILM COATED ORAL at 17:57

## 2017-10-13 RX ADMIN — HYDROMORPHONE HYDROCHLORIDE 4 MG: 4 TABLET ORAL at 04:49

## 2017-10-13 RX ADMIN — CLONIDINE HYDROCHLORIDE 0.1 MG: 0.1 TABLET ORAL at 08:57

## 2017-10-13 RX ADMIN — ONDANSETRON HYDROCHLORIDE 4 MG: 4 TABLET, FILM COATED ORAL at 22:18

## 2017-10-13 RX ADMIN — HEPARIN SODIUM 5000 UNITS: 5000 INJECTION, SOLUTION INTRAVENOUS; SUBCUTANEOUS at 12:14

## 2017-10-13 RX ADMIN — Medication 5 MG: at 23:34

## 2017-10-13 RX ADMIN — AZTREONAM 1 G: 1 INJECTION, POWDER, LYOPHILIZED, FOR SOLUTION INTRAMUSCULAR; INTRAVENOUS at 18:50

## 2017-10-13 RX ADMIN — HYDROMORPHONE HYDROCHLORIDE 4 MG: 4 TABLET ORAL at 12:31

## 2017-10-13 RX ADMIN — CLINDAMYCIN PHOSPHATE 900 MG: 900 INJECTION, SOLUTION INTRAVENOUS at 12:31

## 2017-10-13 RX ADMIN — MICONAZOLE NITRATE 1 APPLICATOR: 20 CREAM VAGINAL at 19:52

## 2017-10-13 RX ADMIN — HYDROMORPHONE HYDROCHLORIDE 4 MG: 4 TABLET ORAL at 23:22

## 2017-10-13 RX ADMIN — ROPINIROLE HYDROCHLORIDE 4 MG: 1 TABLET, FILM COATED ORAL at 21:21

## 2017-10-13 RX ADMIN — CLONIDINE HYDROCHLORIDE 0.1 MG: 0.1 TABLET ORAL at 21:21

## 2017-10-13 NOTE — CONSULTS
Podiatry Consult    Subjective:         Date of Consultation: October 13, 2017     Referring Physician: Dr. Carolina Gore    Patient is a 78 y.o.  diabetic female who is being seen for painful cellulitis/abscess right foot. She is status post 9- surgery for excision right metatarsals 2,3,4 secondary to osteomyelitis. She was discharged to home and followed up in my office for post op care. Her right foot started to show increased signs of infection on 9- and was started on Tetracycline, after reviewing her latest cultures taken in the OR that grew out Staph Epidermidis and Strep Viridans. She continued her PO antibiotic for about two weeks with dressing changes and showed only minimal improvement. Since she has multiple antibiotic allergies and is diabetic, she was admitted for ID consult/care with IV antibiotic treatment.     Patient Active Problem List    Diagnosis Date Noted    Osteomyelitis ([address] 10/12/2017    Chronic multifocal osteomyelitis, right ankle and foot ([address] 09/15/2017    Sepsis ([address] 07/22/2017    PAD (peripheral artery disease) ([address] 07/22/2017    Hyponatremia 07/22/2017    Sleepwalking disorder 07/22/2017    CAP (community acquired pneumonia) 07/22/2017    Chronic pain     Lupus anticoagulant syndrome ([address] 12/29/2016    Cellulitis of right foot 12/27/2016    Foot osteomyelitis, right ([address] 12/27/2016    Anemia 03/01/2016    Syncope 02/29/2016    Anemia associated with acute blood loss 02/29/2016    Atherosclerotic PVD with ulceration ([address] 03/13/2015    Right foot ulcer ([address] 03/09/2015    Diabetes ([address] 01/01/2013    Heme positive stool 05/23/2012    Iron deficiency anemia 05/23/2012    Nausea & vomiting 05/23/2012    Pancreatic cyst 05/23/2012    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 05/23/2012     Past Medical History:   Diagnosis Date    Arthritis     hands and feet    Asthma     Autoimmune disease ([address]     lupus anticoagulant-causes clotting    Cancer ([address]     carcinoma in situ of cervix and uterus; inside bottom lip    Chronic pain     Left foot    Diabetes ([address] 2013    Dizzy spells     h/o    GERD (gastroesophageal reflux disease)     Liver disease     1/2 of my liver was removed for ulcers.     Lung collapse     2015    Nausea & vomiting 5/23/2012    Neuropathy     right foot    Other ill-defined conditions(799.89)     arterial clot in rt leg    Other ill-defined conditions(799.89)     restless leg syndrome    Other ill-defined conditions(799.89)     raynaud's syndrome    Other ill-defined conditions(799.89)     chronic N/V    Other ill-defined conditions(799.89)     (2 mutated genes) hyper coag. state     Pneumonia 07/22/2017    Positive cardiolipin antibodies     Psychiatric disorder     depression    PUD (peptic ulcer disease) 2009    H/O requiring partial gastrectomy and small intestine    Thromboembolus ([address]     artery right leg      Past Surgical History:   Procedure Laterality Date    ABDOMEN SURGERY PROC UNLISTED      duodenal ulcer rupture    BREAST SURGERY PROCEDURE UNLISTED Bilateral 02/2016    hemorrhaged after breast surgery    HX APPENDECTOMY      HX BREAST AUGMENTATION      developed infection and implant removed    HX CHOLECYSTECTOMY      HX GASTRECTOMY      due to ulcers    HX GASTRIC BYPASS  1996    gastric by-pass    HX GI      total gastrectomy, partial colectomy    HX GI      colostomy and reversal    HX HEENT      cancer of lip removed    HX HYSTERECTOMY      for cervical cancer    HX ORTHOPAEDIC      rt foot surgery x10    HX ORTHOPAEDIC Left     great toe gangrenous and amputated    HX OTHER SURGICAL      part of liver removal    HX OTHER SURGICAL      ventral hernia repair x10    HX OTHER SURGICAL      Exc pilonial cystectomy    VASCULAR SURGERY PROCEDURE UNLIST      iliac aorta stents x 6      Family History   Problem Relation Age of Onset    Malignant Hyperthermia Neg Hx     Pseudocholinesterase Deficiency Neg Hx     Delayed Awakening Neg Hx     Post-op Nausea/Vomiting Neg Hx     Emergence Delirium Neg Hx     Post-op Cognitive Dysfunction Neg Hx     Other Neg Hx       Social History   Substance Use Topics    Smoking status: Former Smoker     Years: 0.50     Quit date: 1/2/1996    Smokeless tobacco: Never Used    Alcohol use Yes      Comment: 1 or 2 glasses wine or mixed drinks monthly      Current Facility-Administered Medications   Medication Dose Route Frequency Provider Last Rate Last Dose    clindamycin (CLEOCIN) 900mg NS 50mL IVPB (premix)  900 mg IntraVENous Q8H Amee Dempsey MD   900 mg at 10/13/17 1231    aztreonam (AZACTAM) 1 g in 0.9% sodium chloride (MBP/ADV) 100 mL MBP  1 g IntraVENous Q8H Amee Dempsey  mL/hr at 10/13/17 1108 1 g at 10/13/17 1108    insulin lispro (HUMALOG) injection   SubCUTAneous AC&HS Anderson Dodge MD        glucose chewable tablet 16 g  4 Tab Oral PRN Anderson Dodge MD        glucagon [facility]) injection 1 mg  1 mg IntraMUSCular PRN Anderson Dodge MD        dextrose (D50W) injection syrg 12.5-25 g  25-50 mL IntraVENous PRN Anderson Dodge MD        insulin glargine (LANTUS) injection 5 Units  5 Units SubCUTAneous QHS Anderson Dodge MD        albuterol (PROVENTIL HFA, VENTOLIN HFA, PROAIR HFA) inhaler 2 Puff  2 Puff Inhalation Q4H PRN Derrick Guthrie MD        cloNIDine HCl (CATAPRES) tablet 0.1 mg  0.1 mg Oral BID Alex Sun MD   0.1 mg at 10/13/17 0857    dronabinol (MARINOL) capsule 2.5 mg  2.5 mg Oral TID PRN Derrick Guthrie MD        rOPINIRole (REQUIP) tablet 4 mg  4 mg Oral QHS Alex Culver MD   4 mg at 10/12/17 2118    traMADol (ULTRAM) tablet 50 mg  50 mg Oral Q8H PRN Derrick Guthrie MD        LORazepam (ATIVAN) tablet 1 mg  1 mg Oral Q6H PRN Derrick Guthrie MD        Biotin (VITAMIN B7) tablet 5 mg  5 mg Oral DAILY Alex Culver MD   5 mg at 10/13/17 0901    miconazole (MICOTIN) 2 % vaginal cream 1 Applicator  1 Applicator Vaginal QPM Pito Diez MD   1 Applicator at  5650    cyanocobalamin (VITAMIN B12) tablet 1,000 mcg  1,000 mcg Oral DAILY Pito Diez MD   1,000 mcg at 10/13/17 0857    melatonin tablet 5 mg  5 mg Oral QHS Alex Velazquez MD   5 mg at 10/12/17 2118    ondansetron hcl (ZOFRAN) tablet 4 mg  4 mg Oral Q8H PRN Pito Diez MD        HYDROmorphone (DILAUDID) tablet 4 mg  4 mg Oral Q6H PRN Pito Diez MD   4 mg at 10/13/17 1231    cyclobenzaprine (FLEXERIL) tablet 10 mg  10 mg Oral TID PRN Ptio Diez MD        0.9% sodium chloride infusion  75 mL/hr IntraVENous CONTINUOUS Pito Diez MD 75 mL/hr at 10/12/17 1829 75 mL/hr at 10/12/17 1829    DAPTOmycin (CUBICIN) 326 mg in 0.9% sodium chloride 50 mL IVPB RF formulation  6 mg/kg IntraVENous Q24H Alex Velazquez MD        heparin (porcine) injection 5,000 Units  5,000 Units SubCUTAneous Q8H Alex Velazquez MD   5,000 Units at 10/13/17 1214    influenza vaccine 2017- (3 yrs+)(PF) (FLUZONE QUAD/FLUARIX QUAD) injection 0.5 mL  0.5 mL IntraMUSCular PRIOR TO DISCHARGE Italia Boothe DPM          Allergies   Allergen Reactions    Rifampin Hives and Swelling     Swelling of mouth and tounge    Levaquin [Levofloxacin] Itching    Amoxicillin Hives    Aspirin Nausea and Vomiting    Azithromycin Angioedema    Bactrim [Sulfamethoprim Ds] Hives    Cefatrizine Hives    Corn Nausea and Vomiting    Erythromycin Hives    Rocephin [Ceftriaxone] Hives    Tylenol [Acetaminophen] Hives    Vancomycin Hives        Review of Systems:  A comprehensive review of systems was negative except for that written in the History of Present Illness.     Objective:     Patient Vitals for the past 8 hrs:   BP Temp Pulse Resp SpO2   10/13/17 1610 164/89 99.5 °F (37.5 °C) 77 20 100 %   10/13/17 1157 98/71 99.4 °F (37.4 °C) 74 20 100 %     Temp (24hrs), Av.9 °F (37.2 °C), Min:97.9 °F (36.6 °C), Max:99.5 °F (37.5 °C)      Physical Exam:    General:  alert, fatigued, cooperative, no distress, appears stated age    VASCULAR:  Normal DP and PT pulses 2/4 bilateral, positive moderate edema right foot/ankle  DERMATOLOGY: Open/dehisced surgical wound right lateral midfoot 1.5 cm round, probes deep to inner midfoot secondary to surgical excision of remaining infected metatarsals 2,3,4 on 9-, positive erythema, positive mal odor, positive purulent drainage. ORTHOPEDIC: Positive tenderness right forefoot with pressure. Status post amputation right toes and metatarsals 1-5 and left hallux. NEUROLOGY: Reduced sensation at right foot with sharp/dull, vibration, soft touch. Lab Review:   Recent Results (from the past 24 hour(s))   CBC WITH AUTOMATED DIFF    Collection Time: 10/12/17  5:35 PM   Result Value Ref Range    WBC 9.2 4.6 - 13.2 K/uL    RBC 4.39 4.20 - 5.30 M/uL    HGB 13.1 12.0 - 16.0 g/dL    HCT 39.4 35.0 - 45.0 %    MCV 89.7 74.0 - 97.0 FL    MCH 29.8 24.0 - 34.0 PG    MCHC 33.2 31.0 - 37.0 g/dL    RDW 17.4 (H) 11.6 - 14.5 %    PLATELET 488 396 - 917 K/uL    MPV 9.0 (L) 9.2 - 11.8 FL    NEUTROPHILS 69 40 - 73 %    LYMPHOCYTES 24 21 - 52 %    MONOCYTES 6 3 - 10 %    EOSINOPHILS 1 0 - 5 %    BASOPHILS 0 0 - 2 %    ABS. NEUTROPHILS 6.4 1.8 - 8.0 K/UL    ABS. LYMPHOCYTES 2.2 0.9 - 3.6 K/UL    ABS. MONOCYTES 0.5 0.05 - 1.2 K/UL    ABS. EOSINOPHILS 0.1 0.0 - 0.4 K/UL    ABS.  BASOPHILS 0.0 0.0 - 0.06 K/UL    DF AUTOMATED     PROTHROMBIN TIME + INR    Collection Time: 10/12/17  5:35 PM   Result Value Ref Range    Prothrombin time 18.8 (H) 11.5 - 15.2 sec    INR 1.6 (H) 0.8 - 1.2     METABOLIC PANEL, COMPREHENSIVE    Collection Time: 10/12/17  5:35 PM   Result Value Ref Range    Sodium 141 136 - 145 mmol/L    Potassium 4.5 3.5 - 5.5 mmol/L    Chloride 104 100 - 108 mmol/L    CO2 24 21 - 32 mmol/L    Anion gap 13 3.0 - 18 mmol/L    Glucose 138 (H) 74 - 99 mg/dL    BUN 33 (H) 7.0 - 18 MG/DL    Creatinine 0.89 0.6 - 1.3 MG/DL    BUN/Creatinine ratio 37 (H) 12 - 20      GFR est AA >60 >60 ml/min/1.73m2    GFR est non-AA >60 >60 ml/min/1.73m2    Calcium 9.1 8.5 - 10.1 MG/DL    Bilirubin, total 0.2 0.2 - 1.0 MG/DL    ALT (SGPT) 53 13 - 56 U/L    AST (SGOT) 32 15 - 37 U/L    Alk. phosphatase 250 (H) 45 - 117 U/L    Protein, total 7.2 6.4 - 8.2 g/dL    Albumin 3.1 (L) 3.4 - 5.0 g/dL    Globulin 4.1 (H) 2.0 - 4.0 g/dL    A-G Ratio 0.8 0.8 - 1.7     CULTURE, BLOOD    Collection Time: 10/12/17  5:37 PM   Result Value Ref Range    Special Requests: NO SPECIAL REQUESTS      Culture result: NO GROWTH AFTER 11 HOURS     GLUCOSE, POC    Collection Time: 10/12/17  9:39 PM   Result Value Ref Range    Glucose (POC) 237 (H) 70 - 841 mg/dL   METABOLIC PANEL, COMPREHENSIVE    Collection Time: 10/13/17  3:49 AM   Result Value Ref Range    Sodium 138 136 - 145 mmol/L    Potassium 4.4 3.5 - 5.5 mmol/L    Chloride 102 100 - 108 mmol/L    CO2 27 21 - 32 mmol/L    Anion gap 9 3.0 - 18 mmol/L    Glucose 117 (H) 74 - 99 mg/dL    BUN 30 (H) 7.0 - 18 MG/DL    Creatinine 0.78 0.6 - 1.3 MG/DL    BUN/Creatinine ratio 38 (H) 12 - 20      GFR est AA >60 >60 ml/min/1.73m2    GFR est non-AA >60 >60 ml/min/1.73m2    Calcium 9.2 8.5 - 10.1 MG/DL    Bilirubin, total 0.2 0.2 - 1.0 MG/DL    ALT (SGPT) 50 13 - 56 U/L    AST (SGOT) 42 (H) 15 - 37 U/L    Alk. phosphatase 241 (H) 45 - 117 U/L    Protein, total 6.8 6.4 - 8.2 g/dL    Albumin 2.9 (L) 3.4 - 5.0 g/dL    Globulin 3.9 2.0 - 4.0 g/dL    A-G Ratio 0.7 (L) 0.8 - 1.7     CBC WITH AUTOMATED DIFF    Collection Time: 10/13/17  3:49 AM   Result Value Ref Range    WBC 7.7 4.6 - 13.2 K/uL    RBC 4.24 4.20 - 5.30 M/uL    HGB 12.5 12.0 - 16.0 g/dL    HCT 38.2 35.0 - 45.0 %    MCV 90.1 74.0 - 97.0 FL    MCH 29.5 24.0 - 34.0 PG    MCHC 32.7 31.0 - 37.0 g/dL    RDW 17.4 (H) 11.6 - 14.5 %    PLATELET 355 139 - 271 K/uL    MPV 9.1 (L) 9.2 - 11.8 FL    NEUTROPHILS 65 40 - 73 %    LYMPHOCYTES 26 21 - 52 %    MONOCYTES 7 3 - 10 %    EOSINOPHILS 1 0 - 5 %    BASOPHILS 1 0 - 2 %    ABS. NEUTROPHILS 5.0 1.8 - 8.0 K/UL    ABS. LYMPHOCYTES 2.0 0.9 - 3.6 K/UL    ABS. MONOCYTES 0.6 0.05 - 1.2 K/UL    ABS. EOSINOPHILS 0.1 0.0 - 0.4 K/UL    ABS. BASOPHILS 0.0 0.0 - 0.06 K/UL    DF AUTOMATED     PROTHROMBIN TIME + INR    Collection Time: 10/13/17  3:49 AM   Result Value Ref Range    Prothrombin time 18.0 (H) 11.5 - 15.2 sec    INR 1.6 (H) 0.8 - 1.2     PTT    Collection Time: 10/13/17  3:49 AM   Result Value Ref Range    aPTT 33.3 23.0 - 36.4 SEC   HEMOGLOBIN A1C WITH EAG    Collection Time: 10/13/17  3:49 AM   Result Value Ref Range    Hemoglobin A1c 6.2 (H) 4.5 - 5.6 %    Est. average glucose 131 mg/dL       Impression:       1)Status post surgical excision right metatarsals (2,3,4) (secondary to osteomyelitis 9-15) and subsequent cellulitis/abscess right foot (intra-op cultures :Staph Epidermidis and Strep Viridans)    2)Post-Op X-rays show a few tiny bone fragments left behind in right forefoot, which may have contributed to her post op infection    3)Diabetes with PVD and peripheral neuropathy    Recommendation:       1) Continue with IV antibiotics as per ID Dr. Elliot Boucher- new cultures taken today at bedside/dressing change  2) Patient scheduled to OR tomorrow 8 AM for I&D abscess and removal of tiny bone fragments (from metatarsals) right foot  3) Will follow as needed.   Thank you for the consult>      Signed By: Del Glover DPM     October 13, 2017

## 2017-10-13 NOTE — PROGRESS NOTES
Shift Summary- Pt had an uneventful night. Pt medicated for pain to right foot. Right foot covered with ABD pad, wrapped with kerlix and ace bandage. Knee scooter at bedside. Pt upset that shes on a diabetic diet and wants to be put on a regular diet asap.       Patient Vitals for the past 12 hrs:   Temp Pulse Resp BP SpO2   10/13/17 0443 97.9 °F (36.6 °C) 73 18 140/77 100 %   10/13/17 0051 98.5 °F (36.9 °C) 82 18 137/87 100 %   10/12/17 2023 99 °F (37.2 °C) 100 18 139/88 99 %

## 2017-10-13 NOTE — CONSULTS
[address] [place] [address] Rd    Name:  Philipp Sommer  MR#:  [id number]  :  1950  Account #:  [de-identified]  Date of Adm:  10/12/2017  Date of Consultation:  10/13/2017      INFECTIOUS DISEASE CONSULTATION    REFERRING PHYSICIAN: Dr. Vilma Fragoso and Dr. Kait Grajeda. REASON FOR CONSULTATION: Diabetic foot infection. IMPRESSION  1. Right diabetic foot infection in a 62-year-old female who is status  post right great toe amputation on 2016, excision of the right first  metatarsal on 2016, and excision of the second, third, and fourth  metatarsals on 09/15/2017. The patient has previously undergone  amputation of the right fifth toe in early . She now presents with a  nonhealing ulcer as well as low-grade fever and is felt by Dr. Kait Grajeda to  require intravenous antibiotics. Recent operative cultures grew  methicillin-sensitive Staphylococcus epidermidis as well as  Streptococcus viridans. The patient does have MULTIPLE  ANTIBIOTIC ALLERGIES, complicating her therapy. 2. Status post left great toe amputation 2015. 3. Multiple medical problems including gastrectomy, diabetes mellitus,  peripheral vascular disease, and anemia. 4. MULTIPLE ANTIBIOTIC ALLERGIES INCLUDING  a. FLUOROQUINOLONES. b. Madalynn [place]. c. BACTRIM. d. VANCOMYCIN.  e. ERYTHROMYCIN. f. AZITHROMYCIN. The reactions consisted of urticaria or angioedema. SUGGESTIONS  1. Continue daptomycin for gram-positive coccal coverage. 2. Add aztreonam for gram-negative shadi coverage. 3. Add clindamycin for anaerobe coverage. 4. Repeat wound cultures with the next dressing change. HISTORY OF PRESENT ILLNESS: The patient is a 62-year-old  female who has a long history of peripheral vascular disease as well as  diabetes mellitus. She has multiple surgical procedures in the past on  her right foot.  On 2016, she underwent right great toe  amputation as well as removal of screws from the right first metatarsal.  She subsequently had excision of the entire right first metatarsal on  12/13/2016. At that time, she did receive a course of daptomycin and  aztreonam and clindamycin postoperatively for approximately 10 days. She had a persistent right plantar ulcer and on 09/15/2017, she  underwent excision of the second, third, and fourth metatarsals. She  had previously undergone excision of the fifth metatarsal over 10 years  ago. Operative cultures on 09/15/2017 grew Staphylococcus  epidermidis as well as Streptococcus viridans. The patient was treated  as an outpatient with tetracycline and local wound care, but has had  failure to improve as far as her ulcers and was felt by Dr. Saeid Watson to  have worsening cellulitis. She does report some low-grade fever  around 100. She denies any chills. She has been admitted and started  on daptomycin. PAST MEDICAL HISTORY: Includes total gastrectomy with Sameer-en-Y  esophagojejunal anastomosis, diabetes mellitus, peripheral vascular  disease, history of MULTIPLE ANTIBIOTIC ALLERGIES as detailed  above. She is also status post breast augmentation in the past with  subsequent infection and removal of the implant. She has a history of  hysterectomy. FAMILY MEDICAL HISTORY: Noncontributory. SOCIAL HISTORY: Negative for smoking or alcohol to excess. REVIEW OF SYSTEMS  Significant for low-grade fever. She denies any chills. She has had no  visual or auditory complaints. She denies any sore throat or difficulty  swallowing. She has no chronic cough, shortness of breath or chest  pain. She has chronic diarrhea, but denies any dysuria. She has had  no rash. PHYSICAL EXAMINATION  VITAL SIGNS: The maximal temperature is 99.1, pulse is 90,  respiratory rate is 20, blood pressure is 158/96. GENERAL: She is a well-developed female who does not appear toxic. HEAD AND NECK: Exam reveal the sclerae to be anicteric. The  conjunctivae are clear.  The mouth shows no thrush. NECK: Supple. LUNGS: Fields are clear anteriorly, laterally and posteriorly to  auscultation. CARDIOVASCULAR: Exam reveals a regular rhythm. There are no  murmurs, gallops, or rubs. ABDOMEN: Abdominal exam reveals no masses. There is no  hepatosplenomegaly. EXTREMITIES: Examination of the right foot reveals a silver-  impregnated dressing to be in place over the ulcer. There is some  surrounding edema. I note no erythema. LABORATORY DATA: White blood cell count is 7.7, hemoglobin is  12.5, hematocrit 38.2, platelet count 696,371. BUN is 30 with a  creatinine of 0.78. Blood culture on 10/12/2017 is no growth so far. Wound culture on 09/15/2017 grew Staph epi and Streptococcus  viridans. A surface culture on 09/01/2017 grew methicillin-sensitive  Staphylococcus aureus, Escherichia coli, Klebsiella and Enterococcus. COMMENT: It is difficult to know the significance of the Streptococcus  viridans and Staphylococcus epidermidis. These could represent  colonization, but are potential pathogens. I am going to broaden her  gram-negative shadi and anaerobe coverage by adding aztreonam and  clindamycin. We will continue local wound care per Dr. Adalid Gil. A  followup culture at the time of her silver dressing change would be  potentially useful.         Fabricio Christianson MD    [facility] / [facility]  D:  10/13/2017   10:17  T:  10/13/2017   10:47  Job #:  [id number]

## 2017-10-13 NOTE — PROGRESS NOTES
ID consult dictated. #[id number]. ASSESSMENT/RECOMMENDATIONS:    1. Rt diabetic foot infection/plantar ulcer in patient s/p multiple surgical procedures for osteomyelitis. Unresponsive to outpatient wound care, oral tetracycline. 9/9/16: Rt great toe amputation. 12/13/16: Excision Rt first metatarsal.      9/15/17: Excision of 2nd,3rd,4th metatarsals (Rt 5th toe amputated >10 years ago) - cultures grew S epi, S viridans.     2. Multiple antibiotic allergies: Beta lactams, FQ, sulfa, azithromycin, vancomycin, rifampin, erythromycin - all reactions urticaria or angioedema per patient report.    ==>continue daptomycin, add clindamycin/aztreonam - has tolerated this in past. Doubt we will need prolonged course as osteomyelitis has been surgically excised.  ==> wound culture with next dressing change (has silver impregnated dressing in place)

## 2017-10-13 NOTE — ROUTINE PROCESS
Bedside shift change report given to Alondra Diaz (oncoming nurse) by Michael Chen RN (offgoing nurse). Report included the following information SBAR, Kardex, MAR, Recent Results and Alarm Parameters .

## 2017-10-13 NOTE — PROGRESS NOTES
Problem: Falls - Risk of  Goal: *Absence of Falls  Document Cristine Fall Risk and appropriate interventions in the flowsheet.    Outcome: Progressing Towards Goal  Fall Risk Interventions:  Mobility Interventions: PT Consult for mobility concerns, PT Consult for assist device competence           Medication Interventions: Teach patient to arise slowly

## 2017-10-13 NOTE — WOUND CARE
Patient seen by wound care per consult for right foot drainage with Dr. Adalid Gil. Cultures complete. Wound care available if needed further.

## 2017-10-13 NOTE — PROGRESS NOTES
Hospitalist Progress Note-critical care note     Patient: Guillermina Last MRN: [id number]  CSN: [id number]    YOB: 1950  Age: 78 y.o. Sex: female    DOA: 10/12/2017 LOS:  LOS: 1 day            Chief complaint: OM ,cellulitis . Dm      Assessment/Plan         Hospital Problems  Date Reviewed: 10/12/2017          Codes Class Noted POA    Osteomyelitis ([facility] ICD-10-CM: M86.9  ICD-9-CM: 730.20  10/12/2017 Unknown        PAD (peripheral artery disease) (HCC) (Chronic) ICD-10-CM: I73.9  ICD-9-CM: 443.9  7/22/2017 Yes        * (Principal)Foot osteomyelitis, right ([facility] ICD-10-CM: M86.9  ICD-9-CM: 730.27  12/27/2016 Yes        Atherosclerotic PVD with ulceration (HCC) (Chronic) ICD-10-CM: I70.209, L98.499  ICD-9-CM: 440.23, 707.9  3/13/2015 Yes        Right foot ulcer ([facility] ICD-10-CM: L97.519  ICD-9-CM: 707.15  3/9/2015 Yes        Diabetes ([facility] ICD-10-CM: E11.9  ICD-9-CM: 250.00  1/1/2013 Yes        Iron deficiency anemia ICD-10-CM: D50.9  ICD-9-CM: 280.9  5/23/2012 Yes        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 ICD-10-CM: Z90.3, Z98.0  ICD-9-CM: V45.89  5/23/2012 Yes              1. Worsening of right foot cellulitis, with abscess, OM  ID on board and will continue abx   Case discussed with Dr. Maycol Montana and he will see her today   Wound care     2 HTN  Continue clonidine     3. Uncontrolled DM  a1c 8.1 ,refused to have diabetic diet   Will hold metformin   ssi and lantus 5 units     4. Asthma  Stable , breathing tx prn     5. Hx of Pancreatitis s/p Sameer en Y    6. Depression   controlled     7. PVD s/p multiple iliac stents     Subjective: no one to look at my wound and go culture. I know what I need to eat,   Nurse: refused to have DM diet and wants bread and refused nutritionist recommendation     Review of systems:    General: No fevers or chills. Cardiovascular: No chest pain or pressure. No palpitations. Pulmonary: No shortness of breath.    Gastrointestinal: No nausea, vomiting. Vital signs/Intake and Output:  Visit Vitals    BP 98/71 (BP 1 Location: Left arm, BP Patient Position: At rest)    Pulse 74    Temp 99.4 °F (37.4 °C)    Resp 20    Ht 5' 5\" (1.651 m)    Wt 55.1 kg (121 lb 6.4 oz)    SpO2 100%    BMI 20.2 kg/m2     Current Shift:  10/13 0701 - 10/13 1900  In: -   Out: 600 [Urine:600]  Last three shifts:  10/11 1901 - 10/13 0700  In: 1138.8 [P.O.:240; I.V.:898.8]  Out: 700 [Urine:700]    Physical Exam:  General: WD, WN. Alert, cooperative, no acute distress    HEENT: NC, Atraumatic. PERRLA, anicteric sclerae. Lungs: CTA Bilaterally. No Wheezing/Rhonchi/Rales. Heart:  Regular  rhythm,  No murmur, No Rubs, No Gallops  Abdomen: Soft, Non distended, Non tender.  +Bowel sounds,   Extremities: No c/c, rt foot swelling./erythema . Amputated the toe , surgical wound covered with gauze    Psych:   Not anxious or agitated. Neurologic:  No acute neurological deficit. Labs: Results:       Chemistry Recent Labs      10/13/17   0349  10/12/17   1735   GLU  117*  138*   NA  138  141   K  4.4  4.5   CL  102  104   CO2  27  24   BUN  30*  33*   CREA  0.78  0.89   CA  9.2  9.1   AGAP  9  13   BUCR  38*  37*   AP  241*  250*   TP  6.8  7.2   ALB  2.9*  3.1*   GLOB  3.9  4.1*   AGRAT  0.7*  0.8      CBC w/Diff Recent Labs      10/13/17   0349  10/12/17   1735   WBC  7.7  9.2   RBC  4.24  4.39   HGB  12.5  13.1   HCT  38.2  39.4   PLT  312  309   GRANS  65  69   LYMPH  26  24   EOS  1  1      Cardiac Enzymes No results for input(s): CPK, CKND1, RAYNA in the last 72 hours. No lab exists for component: CKRMB, TROIP   Coagulation Recent Labs      10/13/17   0349  10/12/17   1735   PTP  18.0*  18.8*   INR  1.6*  1.6*   APTT  33.3   --        Lipid Panel No results found for: CHOL, CHOLPOCT, CHOLX, CHLST, CHOLV, 853639, HDL, LDL, LDLC, DLDLP, 633875, VLDLC, VLDL, TGLX, TRIGL, TRIGP, TGLPOCT, CHHD, CHHDX   BNP No results for input(s): BNPP in the last 72 hours.    Liver Enzymes Recent Labs      10/13/17   0349   TP  6.8   ALB  2.9*   AP  241*   SGOT  42*      Thyroid Studies No results found for: T4, T3U, TSH, TSHEXT     Procedures/imaging: see electronic medical records for all procedures/Xrays and details which were not copied into this note but were reviewed prior to creation of Isak Flores MD

## 2017-10-13 NOTE — PROGRESS NOTES
Readmission Risk Assessment:     Moderate Risk and MSSP/Good Help ACO patients    RRAT Score:  13-20    Initial Assessment:chart reviewed pt admitted for diagnosed with Right  foot osteomyelitis, pt recently had surgery approx 3 weeks ago to right foot pt stated procedure done by ,pt states she lives at home with her adult son ,prior to admission pt was active with [facility] wound clinic twice a week,when discharge pt considering home health instead of wound clinic due to difficulty ambulating,cm did provide home health list for pt to review, d/c not anticipated over weekend if d/c does occur weekend cm can be contacted to finalized plan,if rehab is needed cm would have to follow up on Monday. Emergency Contact:see chart      Pertinent Medical Hx:    See chart     PCP/Specialists:Mona      [facility]:       DME:          Moderate Risk Care Transition Plan:  1. Evaluate for [facility] or [facility], SNF, acute rehab, community care coordination of resources. 2. Involve patient/caregiver in assessment, planning, education and implement of intervention. 3. CM daily patient care huddles/interdisciplinary rounds. 4. PCP/Specialist appointment within 5  7 days made prior to discharge. 5. Facilitate transportation and logistics for follow-up appointments. 6. Medication reconciliation [id number]  7. Formal handoff between hospital provider and post-acute provider to transition patient  Handoff to [address] Nurse Navigator or PCP practice.

## 2017-10-13 NOTE — PROGRESS NOTES
[facility]. Patient in high sheriff position talking on the phone. Denies pain. No acute distress noted. Dressing to right foot  clean,dry and intact. No c/o numbness or tingling sensation. Positive PPP present to BLE. Knee scooter at bedside. 1112  No acute distress noted. 1500  Denies pain. Call light within reach. Bedside and Verbal shift change report given to [address] [place], [place] [address]y 6 (oncoming nurse) by Emily Byrd (offgoing nurse). Report included the following information SBAR and Kardex.

## 2017-10-13 NOTE — PROGRESS NOTES
19:45 Assessment completed. Lungs are clear bilat but slightly decreased in the bases. Ace wrap on R ft remains C/D/I with + CMS. Denies pain or discomfort in calves. Resting quietly in bed x for voiding per BR w/o difficulty. 22:55 Shift assessment completed. See [facility] flow sheet for details. 02:55 Reassessed with 0 changes noted. Ace wrap on R ft remains C/D/I with + CMS. Resting quietly in bed x for voiding per BR w/o difficulty. 07:15 TRANSFER - OUT REPORT:    Verbal report given to Rivka Carey RN(name) on Priyanka Chatham  being transferred to PRE-OP(unit) for ordered procedure       Report consisted of patients Situation, Background, Assessment and   Recommendations(SBAR). Information from the following report(s) SBAR was reviewed with the receiving nurse. Lines:   Peripheral IV 10/12/17 Right;Posterior Forearm (Active)   Site Assessment Clean, dry, & intact 10/12/2017  9:11 PM   Phlebitis Assessment 0 10/12/2017  9:11 PM   Infiltration Assessment 0 10/12/2017  9:11 PM   Dressing Status Clean, dry, & intact 10/12/2017  9:11 PM   Dressing Type Tape;Transparent 10/12/2017  9:11 PM   Hub Color/Line Status Pink 10/12/2017  9:11 PM   Action Taken Blood drawn 10/12/2017  5:35 PM        Opportunity for questions and clarification was provided. Patient transported with:   Tech    07:30 Bedside and Verbal shift change report given to RAJI Tyson RN (oncoming nurse) by Cam Ballard RN (offgoing nurse). Report included the following information SBAR.

## 2017-10-14 ENCOUNTER — APPOINTMENT (OUTPATIENT)
Dept: GENERAL RADIOLOGY | Age: 67
DRG: 617 | End: 2017-10-14
Attending: PODIATRIST
Payer: MEDICARE

## 2017-10-14 ENCOUNTER — ANESTHESIA (OUTPATIENT)
Dept: SURGERY | Age: 67
DRG: 617 | End: 2017-10-14
Payer: MEDICARE

## 2017-10-14 PROBLEM — M86.371 CHRONIC MULTIFOCAL OSTEOMYELITIS, RIGHT ANKLE AND FOOT (HCC): Status: RESOLVED | Noted: 2017-09-15 | Resolved: 2017-10-14

## 2017-10-14 PROBLEM — M86.9 OSTEOMYELITIS (HCC): Status: RESOLVED | Noted: 2017-10-12 | Resolved: 2017-10-14

## 2017-10-14 LAB
ANION GAP SERPL CALC-SCNC: 6 MMOL/L (ref 3–18)
BASOPHILS # BLD: 0 K/UL (ref 0–0.06)
BASOPHILS NFR BLD: 1 % (ref 0–2)
BUN SERPL-MCNC: 18 MG/DL (ref 7–18)
BUN/CREAT SERPL: 31 (ref 12–20)
CALCIUM SERPL-MCNC: 8.8 MG/DL (ref 8.5–10.1)
CHLORIDE SERPL-SCNC: 103 MMOL/L (ref 100–108)
CO2 SERPL-SCNC: 28 MMOL/L (ref 21–32)
CREAT SERPL-MCNC: 0.59 MG/DL (ref 0.6–1.3)
DIFFERENTIAL METHOD BLD: ABNORMAL
EOSINOPHIL # BLD: 0.1 K/UL (ref 0–0.4)
EOSINOPHIL NFR BLD: 1 % (ref 0–5)
ERYTHROCYTE [DISTWIDTH] IN BLOOD BY AUTOMATED COUNT: 16.9 % (ref 11.6–14.5)
GLUCOSE BLD STRIP.AUTO-MCNC: 102 MG/DL (ref 70–110)
GLUCOSE BLD STRIP.AUTO-MCNC: 105 MG/DL (ref 70–110)
GLUCOSE BLD STRIP.AUTO-MCNC: 149 MG/DL (ref 70–110)
GLUCOSE BLD STRIP.AUTO-MCNC: 230 MG/DL (ref 70–110)
GLUCOSE BLD STRIP.AUTO-MCNC: 89 MG/DL (ref 70–110)
GLUCOSE SERPL-MCNC: 119 MG/DL (ref 74–99)
HCT VFR BLD AUTO: 35.8 % (ref 35–45)
HGB BLD-MCNC: 12 G/DL (ref 12–16)
LYMPHOCYTES # BLD: 2.1 K/UL (ref 0.9–3.6)
LYMPHOCYTES NFR BLD: 31 % (ref 21–52)
MAGNESIUM SERPL-MCNC: 1.7 MG/DL (ref 1.6–2.6)
MCH RBC QN AUTO: 30.5 PG (ref 24–34)
MCHC RBC AUTO-ENTMCNC: 33.5 G/DL (ref 31–37)
MCV RBC AUTO: 90.9 FL (ref 74–97)
MONOCYTES # BLD: 0.5 K/UL (ref 0.05–1.2)
MONOCYTES NFR BLD: 8 % (ref 3–10)
NEUTS SEG # BLD: 3.9 K/UL (ref 1.8–8)
NEUTS SEG NFR BLD: 59 % (ref 40–73)
PLATELET # BLD AUTO: 277 K/UL (ref 135–420)
PMV BLD AUTO: 8.8 FL (ref 9.2–11.8)
POTASSIUM SERPL-SCNC: 4.9 MMOL/L (ref 3.5–5.5)
RBC # BLD AUTO: 3.94 M/UL (ref 4.2–5.3)
SODIUM SERPL-SCNC: 137 MMOL/L (ref 136–145)
WBC # BLD AUTO: 6.6 K/UL (ref 4.6–13.2)

## 2017-10-14 PROCEDURE — 74011000250 HC RX REV CODE- 250

## 2017-10-14 PROCEDURE — 88304 TISSUE EXAM BY PATHOLOGIST: CPT | Performed by: PODIATRIST

## 2017-10-14 PROCEDURE — 77030012406 HC DRN WND PENRS BARD -A: Performed by: PODIATRIST

## 2017-10-14 PROCEDURE — 36415 COLL VENOUS BLD VENIPUNCTURE: CPT | Performed by: HOSPITALIST

## 2017-10-14 PROCEDURE — 87186 SC STD MICRODIL/AGAR DIL: CPT | Performed by: PODIATRIST

## 2017-10-14 PROCEDURE — 76060000036 HC ANESTHESIA 2.5 TO 3 HR: Performed by: PODIATRIST

## 2017-10-14 PROCEDURE — 87077 CULTURE AEROBIC IDENTIFY: CPT | Performed by: PODIATRIST

## 2017-10-14 PROCEDURE — 74011000250 HC RX REV CODE- 250: Performed by: INTERNAL MEDICINE

## 2017-10-14 PROCEDURE — 87075 CULTR BACTERIA EXCEPT BLOOD: CPT | Performed by: PODIATRIST

## 2017-10-14 PROCEDURE — 76010000132 HC OR TIME 2.5 TO 3 HR: Performed by: PODIATRIST

## 2017-10-14 PROCEDURE — 83735 ASSAY OF MAGNESIUM: CPT | Performed by: HOSPITALIST

## 2017-10-14 PROCEDURE — 77030019895 HC PCKNG STRP IODO -A: Performed by: PODIATRIST

## 2017-10-14 PROCEDURE — 74011250636 HC RX REV CODE- 250/636: Performed by: PODIATRIST

## 2017-10-14 PROCEDURE — 0Y6M0Z7 DETACHMENT AT RIGHT FOOT, COMPLETE 4TH RAY, OPEN APPROACH: ICD-10-PCS | Performed by: PODIATRIST

## 2017-10-14 PROCEDURE — 74011250636 HC RX REV CODE- 250/636

## 2017-10-14 PROCEDURE — 73620 X-RAY EXAM OF FOOT: CPT

## 2017-10-14 PROCEDURE — 74011000258 HC RX REV CODE- 258: Performed by: INTERNAL MEDICINE

## 2017-10-14 PROCEDURE — 0Y6M0Z5 DETACHMENT AT RIGHT FOOT, COMPLETE 2ND RAY, OPEN APPROACH: ICD-10-PCS | Performed by: PODIATRIST

## 2017-10-14 PROCEDURE — 85025 COMPLETE CBC W/AUTO DIFF WBC: CPT | Performed by: HOSPITALIST

## 2017-10-14 PROCEDURE — 74011250636 HC RX REV CODE- 250/636: Performed by: INTERNAL MEDICINE

## 2017-10-14 PROCEDURE — 77030013567 HC DRN WND RESERV BARD -A: Performed by: PODIATRIST

## 2017-10-14 PROCEDURE — 87070 CULTURE OTHR SPECIMN AEROBIC: CPT | Performed by: PODIATRIST

## 2017-10-14 PROCEDURE — 73630 X-RAY EXAM OF FOOT: CPT

## 2017-10-14 PROCEDURE — 74011636637 HC RX REV CODE- 636/637: Performed by: HOSPITALIST

## 2017-10-14 PROCEDURE — 80048 BASIC METABOLIC PNL TOTAL CA: CPT | Performed by: HOSPITALIST

## 2017-10-14 PROCEDURE — 82962 GLUCOSE BLOOD TEST: CPT

## 2017-10-14 PROCEDURE — 0Y9M0ZZ DRAINAGE OF RIGHT FOOT, OPEN APPROACH: ICD-10-PCS | Performed by: PODIATRIST

## 2017-10-14 PROCEDURE — 74011250637 HC RX REV CODE- 250/637: Performed by: INTERNAL MEDICINE

## 2017-10-14 PROCEDURE — 77030013708 HC HNDPC SUC IRR PULS STRY –B: Performed by: PODIATRIST

## 2017-10-14 PROCEDURE — 88311 DECALCIFY TISSUE: CPT | Performed by: PODIATRIST

## 2017-10-14 PROCEDURE — 65270000029 HC RM PRIVATE

## 2017-10-14 PROCEDURE — 77030002916 HC SUT ETHLN J&J -A: Performed by: PODIATRIST

## 2017-10-14 PROCEDURE — 74011000250 HC RX REV CODE- 250: Performed by: PODIATRIST

## 2017-10-14 RX ORDER — PROPOFOL 10 MG/ML
INJECTION, EMULSION INTRAVENOUS
Status: DISCONTINUED | OUTPATIENT
Start: 2017-10-14 | End: 2017-10-14 | Stop reason: HOSPADM

## 2017-10-14 RX ORDER — SODIUM CHLORIDE, SODIUM LACTATE, POTASSIUM CHLORIDE, CALCIUM CHLORIDE 600; 310; 30; 20 MG/100ML; MG/100ML; MG/100ML; MG/100ML
INJECTION, SOLUTION INTRAVENOUS
Status: DISCONTINUED | OUTPATIENT
Start: 2017-10-14 | End: 2017-10-14 | Stop reason: HOSPADM

## 2017-10-14 RX ORDER — LIDOCAINE HYDROCHLORIDE 20 MG/ML
INJECTION, SOLUTION EPIDURAL; INFILTRATION; INTRACAUDAL; PERINEURAL AS NEEDED
Status: DISCONTINUED | OUTPATIENT
Start: 2017-10-14 | End: 2017-10-14 | Stop reason: HOSPADM

## 2017-10-14 RX ORDER — BUPIVACAINE HYDROCHLORIDE AND EPINEPHRINE 2.5; 5 MG/ML; UG/ML
INJECTION, SOLUTION EPIDURAL; INFILTRATION; INTRACAUDAL; PERINEURAL AS NEEDED
Status: DISCONTINUED | OUTPATIENT
Start: 2017-10-14 | End: 2017-10-14 | Stop reason: HOSPADM

## 2017-10-14 RX ORDER — MIDAZOLAM HYDROCHLORIDE 1 MG/ML
INJECTION, SOLUTION INTRAMUSCULAR; INTRAVENOUS AS NEEDED
Status: DISCONTINUED | OUTPATIENT
Start: 2017-10-14 | End: 2017-10-14 | Stop reason: HOSPADM

## 2017-10-14 RX ORDER — KETAMINE HYDROCHLORIDE 10 MG/ML
INJECTION, SOLUTION INTRAMUSCULAR; INTRAVENOUS AS NEEDED
Status: DISCONTINUED | OUTPATIENT
Start: 2017-10-14 | End: 2017-10-14 | Stop reason: HOSPADM

## 2017-10-14 RX ADMIN — PROPOFOL 75 MCG/KG/MIN: 10 INJECTION, EMULSION INTRAVENOUS at 08:21

## 2017-10-14 RX ADMIN — TRAMADOL HYDROCHLORIDE 50 MG: 50 TABLET, FILM COATED ORAL at 22:45

## 2017-10-14 RX ADMIN — KETAMINE HYDROCHLORIDE 20 MG: 10 INJECTION, SOLUTION INTRAMUSCULAR; INTRAVENOUS at 09:12

## 2017-10-14 RX ADMIN — MIDAZOLAM HYDROCHLORIDE 3 MG: 1 INJECTION, SOLUTION INTRAMUSCULAR; INTRAVENOUS at 08:10

## 2017-10-14 RX ADMIN — AZTREONAM 1 G: 1 INJECTION, POWDER, LYOPHILIZED, FOR SOLUTION INTRAMUSCULAR; INTRAVENOUS at 02:34

## 2017-10-14 RX ADMIN — DAPTOMYCIN 326 MG: 500 INJECTION, POWDER, LYOPHILIZED, FOR SOLUTION INTRAVENOUS at 16:14

## 2017-10-14 RX ADMIN — SODIUM CHLORIDE, SODIUM LACTATE, POTASSIUM CHLORIDE, CALCIUM CHLORIDE: 600; 310; 30; 20 INJECTION, SOLUTION INTRAVENOUS at 09:03

## 2017-10-14 RX ADMIN — AZTREONAM 1 G: 1 INJECTION, POWDER, LYOPHILIZED, FOR SOLUTION INTRAMUSCULAR; INTRAVENOUS at 11:27

## 2017-10-14 RX ADMIN — ROPINIROLE HYDROCHLORIDE 4 MG: 1 TABLET, FILM COATED ORAL at 22:45

## 2017-10-14 RX ADMIN — HYDROMORPHONE HYDROCHLORIDE 4 MG: 4 TABLET ORAL at 11:26

## 2017-10-14 RX ADMIN — MIDAZOLAM HYDROCHLORIDE 2 MG: 1 INJECTION, SOLUTION INTRAMUSCULAR; INTRAVENOUS at 08:21

## 2017-10-14 RX ADMIN — INSULIN GLARGINE 5 UNITS: 100 INJECTION, SOLUTION SUBCUTANEOUS at 22:45

## 2017-10-14 RX ADMIN — HYDROMORPHONE HYDROCHLORIDE 4 MG: 4 TABLET ORAL at 19:07

## 2017-10-14 RX ADMIN — AZTREONAM 1 G: 1 INJECTION, POWDER, LYOPHILIZED, FOR SOLUTION INTRAMUSCULAR; INTRAVENOUS at 19:00

## 2017-10-14 RX ADMIN — CLINDAMYCIN PHOSPHATE 900 MG: 900 INJECTION, SOLUTION INTRAVENOUS at 20:16

## 2017-10-14 RX ADMIN — MICONAZOLE NITRATE 1 APPLICATOR: 20 CREAM VAGINAL at 20:16

## 2017-10-14 RX ADMIN — HEPARIN SODIUM 5000 UNITS: 5000 INJECTION, SOLUTION INTRAVENOUS; SUBCUTANEOUS at 20:16

## 2017-10-14 RX ADMIN — CLONIDINE HYDROCHLORIDE 0.1 MG: 0.1 TABLET ORAL at 20:48

## 2017-10-14 RX ADMIN — LIDOCAINE HYDROCHLORIDE 40 MG: 20 INJECTION, SOLUTION EPIDURAL; INFILTRATION; INTRACAUDAL; PERINEURAL at 08:20

## 2017-10-14 RX ADMIN — Medication 5 MG: at 22:45

## 2017-10-14 RX ADMIN — HEPARIN SODIUM 5000 UNITS: 5000 INJECTION, SOLUTION INTRAVENOUS; SUBCUTANEOUS at 11:27

## 2017-10-14 RX ADMIN — CLINDAMYCIN PHOSPHATE 900 MG: 900 INJECTION, SOLUTION INTRAVENOUS at 12:35

## 2017-10-14 RX ADMIN — CLINDAMYCIN PHOSPHATE 900 MG: 900 INJECTION, SOLUTION INTRAVENOUS at 03:21

## 2017-10-14 NOTE — ANESTHESIA POSTPROCEDURE EVALUATION
Post-Anesthesia Evaluation and Assessment    Cardiovascular Function/Vital Signs  Visit Vitals    /73 (BP 1 Location: Left arm, BP Patient Position: At rest)    Pulse 64    Temp 36.1 °C (97 °F)    Resp 18    Ht 5' 5\" (1.651 m)    Wt 55.1 kg (121 lb 6.4 oz)    SpO2 98%    BMI 20.2 kg/m2       Patient is status post Procedure(s):  INCISION & DRAINAGE AND LAVAGE ABSCESS RIGHT FOOT, REMOVAL OF BONE FRAGMENTS OF RIGHT FOOT W/C-ARM, PATIENT IS IN ROOM 326. Nausea/Vomiting: Controlled. Postoperative hydration reviewed and adequate. Pain:     Managed. Neurological Status: At baseline. Mental Status and Level of Consciousness: Arousable. Pulmonary Status:   O2 Device: Room air (10/14/17 1051)   Adequate oxygenation and airway patent. Complications related to anesthesia: None    Post-anesthesia assessment completed. No concerns. Patient has met all discharge requirements.     Signed By: Denia Bender CRNA    October 14, 2017

## 2017-10-14 NOTE — H&P
Date of Surgery Update:  Amirah Carl was seen and examined. History and physical has been reviewed. The patient has been examined.  There have been no significant clinical changes since the completion of the originally dated History and Physical.    Signed By: Wilton Donahue DPM     October 14, 2017 7:57 AM

## 2017-10-14 NOTE — PROGRESS NOTES
[phone] Pt received from offgoing nurse without any signs or symptoms of distress. Pt vitals are stable and within normal limits. Pt bed in low position with wheels locked and call bell within reach. 1727 Assessment completed and documented in flow sheet. Pt denies any further needs at this time. Pt in NAD with bed in low position, wheels locked and call bell within reach. 1950 Bedside and Verbal shift change report given to Bee Krutz RN (oncoming nurse) by Kin Matson RN (offgoing nurse). Report included the following information SBAR, Intake/Output, MAR and Recent Results.

## 2017-10-14 NOTE — PROGRESS NOTES
Patient is status post right foot I&D with excision of remaining metatarsal bone fragments secondary to osteomyelitis. Deep cultures were taken intra-op for more accurate sample for ID.  ANDER drain was used to try and help with post op fluid management, but seems to loose suction after about 5 min. Will continue IV antibiotics as per ID and I will follow as needed.   Ritika Rosas DPM

## 2017-10-14 NOTE — PROGRESS NOTES
ID Progress Note    Patient was down in the OR when I came to evaluate her. She is pending repeat I&D with removal of bone fragments.     No new recs from ID standpoint, continue current antibiotics (dapto, clinda, aztreonam)  F/u culture results

## 2017-10-14 NOTE — ANESTHESIA PREPROCEDURE EVALUATION
Anesthetic History     PONV          Review of Systems / Medical History  Patient summary reviewed, nursing notes reviewed and pertinent labs reviewed    Pulmonary            Asthma        Neuro/Psych         Psychiatric history     Cardiovascular                  Exercise tolerance: >4 METS     GI/Hepatic/Renal     GERD: well controlled      PUD and liver disease     Endo/Other    Diabetes    Arthritis     Other Findings              Physical Exam    Airway  Mallampati: II  TM Distance: 4 - 6 cm  Neck ROM: normal range of motion   Mouth opening: Normal     Cardiovascular  Regular rate and rhythm,  S1 and S2 normal,  no murmur, click, rub, or gallop  Rhythm: regular  Rate: normal         Dental    Dentition: Caps/crowns     Pulmonary  Breath sounds clear to auscultation               Abdominal  GI exam deferred       Other Findings            Anesthetic Plan    ASA: 3  Anesthesia type: MAC            Anesthetic plan and risks discussed with: Patient

## 2017-10-14 NOTE — PROGRESS NOTES
0740-Patient transported down to OR. Overnight nurse gave handoff. 1100 - Patient in bed at this time. A/O x 4. IV to right forearm  intact and patent. Patient is getting heparin injections for DVT prophylaxis. .  ACE dressing to right foot CDI. Patient has ANDER drain to right foot. Patient denies numbness/tingling. Pedal pulses palpable on left foot. Lungs clear. Bowel sounds active to all quadrants. Patient able to get to 1500 on the incentive spirometer. Pain 8/10.   1125 - Pain 9/10. PRN 4 mg PO Dilaudid pain medication administered at this time. Patient has been educated on side effects. Shift Summary: Patient's pain well controlled this shift. IV remains intact. Dressing to right foot shows small amount of breakthrough bleeding.

## 2017-10-14 NOTE — PROGRESS NOTES
Hospitalist Progress Note-critical care note     Patient: Navdeep Dougherty MRN: [id number]  CSN: [id number]    YOB: 1950  Age: 78 y.o. Sex: female    DOA: 10/12/2017 LOS:  LOS: 2 days            Chief complaint: OM ,cellulitis . Dm      Assessment/Plan         Hospital Problems  Date Reviewed: 10/12/2017          Codes Class Noted POA    PAD (peripheral artery disease) (HCC) (Chronic) ICD-10-CM: I73.9  ICD-9-CM: 443.9  7/22/2017 Yes        Atherosclerotic PVD with ulceration ([facility] (Chronic) ICD-10-CM: I70.209, L98.499  ICD-9-CM: 440.23, 707.9  3/13/2015 Yes        Diabetes ([facility] ICD-10-CM: E11.9  ICD-9-CM: 250.00  1/1/2013 Yes        Iron deficiency anemia ICD-10-CM: D50.9  ICD-9-CM: 280.9  5/23/2012 Yes        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 ICD-10-CM: Z90.3, Z98.0  ICD-9-CM: V45.89  5/23/2012 Yes              1. Worsening of right foot cellulitis, with abscess, OM  ID on board and will continue abx   Dr. Jarrell Mcdonald did I & D today. Case discussed with Dr. Luis Armando villa   Pt/ot   2 HTN  Continue clonidine     3. Uncontrolled DM  a1c 8.1 ,refused to have diabetic diet , regular diet except corn on  Cobs and creamy corn and corn bread   Will hold metformin   ssi and lantus 5 units     4. Asthma  Stable , breathing tx prn     5. Hx of Pancreatitis s/p Sameer en Y    6. Depression   controlled     7. PVD s/p multiple iliac stents     Subjective: I need to eat, I am not allergic to corn, just nausea after eating corn on  Cobs and creamy corn and corn bread Nurse: kitchen refused to give her food     Review of systems:    General: No fevers or chills. Cardiovascular: No chest pain or pressure. No palpitations. Pulmonary: No shortness of breath. Gastrointestinal: No nausea, vomiting.      Vital signs/Intake and Output:  Visit Vitals    /73 (BP 1 Location: Left arm, BP Patient Position: At rest)    Pulse 64    Temp 97 °F (36.1 °C)    Resp 18    Ht 5' 5\" (1.651 m)  Wt 55.1 kg (121 lb 6.4 oz)    SpO2 98%    BMI 20.2 kg/m2     Current Shift:  10/14 0701 - 10/14 1900  In: 1000 [I.V.:1000]  Out: 20   Last three shifts:  10/12 1901 - 10/14 0700  In: 1258.8 [P.O.:360; I.V.:898.8]  Out: 1700 [Urine:1700]    Physical Exam:  General: WD, WN. Alert, cooperative, no acute distress    HEENT: NC, Atraumatic. PERRLA, anicteric sclerae. Lungs: CTA Bilaterally. No Wheezing/Rhonchi/Rales. Heart:  Regular  rhythm,  No murmur, No Rubs, No Gallops  Abdomen: Soft, Non distended, Non tender.  +Bowel sounds,   Extremities: No c/c, rt foot wrapped with gauze   Psych:   Not anxious or agitated. Neurologic:  No acute neurological deficit. Labs: Results:       Chemistry Recent Labs      10/14/17   0415  10/13/17   0349  10/12/17   1735   GLU  119*  117*  138*   NA  137  138  141   K  4.9  4.4  4.5   CL  103  102  104   CO2  28  27  24   BUN  18  30*  33*   CREA  0.59*  0.78  0.89   CA  8.8  9.2  9.1   AGAP  6  9  13   BUCR  31*  38*  37*   AP   --   241*  250*   TP   --   6.8  7.2   ALB   --   2.9*  3.1*   GLOB   --   3.9  4.1*   AGRAT   --   0.7*  0.8      CBC w/Diff Recent Labs      10/14/17   0415  10/13/17   0349  10/12/17   1735   WBC  6.6  7.7  9.2   RBC  3.94*  4.24  4.39   HGB  12.0  12.5  13.1   HCT  35.8  38.2  39.4   PLT  277  312  309   GRANS  59  65  69   LYMPH  31  26  24   EOS  1  1  1      Cardiac Enzymes No results for input(s): CPK, CKND1, RAYNA in the last 72 hours. No lab exists for component: CKRMB, TROIP   Coagulation Recent Labs      10/13/17   0349  10/12/17   1735   PTP  18.0*  18.8*   INR  1.6*  1.6*   APTT  33.3   --        Lipid Panel No results found for: CHOL, CHOLPOCT, CHOLX, CHLST, CHOLV, 948215, HDL, LDL, LDLC, DLDLP, 748786, VLDLC, VLDL, TGLX, TRIGL, TRIGP, TGLPOCT, CHHD, CHHDX   BNP No results for input(s): BNPP in the last 72 hours.    Liver Enzymes Recent Labs      10/13/17   0349   TP  6.8   ALB  2.9*   AP  241*   SGOT  42*      Thyroid Studies No results found for: T4, T3U, TSH, TSHEXT, TSHEXT     Procedures/imaging: see electronic medical records for all procedures/Xrays and details which were not copied into this note but were reviewed prior to creation of Melquiades Sky MD

## 2017-10-14 NOTE — BRIEF OP NOTE
BRIEF OPERATIVE NOTE    Date of Procedure: 10/14/2017   Preoperative Diagnosis: INFECTION/ABSCESS RIGHT FOOT  Postoperative Diagnosis: INFECTION/ABSCESS RIGHT FOOT    Procedure(s):  INCISION & DRAINAGE AND LAVAGE ABSCESS RIGHT FOOT, REMOVAL OF BONE FRAGMENTS OF RIGHT FOOT W/C-ARM, PATIENT IS IN ROOM 326  Surgeon(s) and Role:     * Kim Lira DPM - Primary         Assistant Staff:       Surgical Staff:  Circ-1: Annabelle Ngo RN  Radiology Technician: Nasima Villafana  Scrub Tech-1: Michelet Mendez  Surg Asst-1: Kelsey Martinez  Event Time In   Incision Start 4299   Incision Close 1040     Anesthesia: MAC   Estimated Blood Loss: min>50ml  Specimens:   ID Type Source Tests Collected by Time Destination   1 : bone right forefoot Preservative Foot, Right  Kim Lira DPM 10/14/2017 0957 Pathology   1 : swab right foot Wound Foot, Right CULTURE, ANAEROBIC, CULTURE, W[facility]/2017 0848 Microbiology      Findings: abscess with infected bone fragments right foot   Complications: nnone  Implants: * No implants in log *

## 2017-10-14 NOTE — PROGRESS NOTES
Problem: Falls - Risk of  Goal: *Absence of Falls  Document Cristine Fall Risk and appropriate interventions in the flowsheet.    Outcome: Progressing Towards Goal  Fall Risk Interventions:  Mobility Interventions: Utilize walker, cane, or other assitive device           Medication Interventions: Patient to call before getting OOB, Teach patient to arise slowly

## 2017-10-14 NOTE — ROUTINE PROCESS
0730- Bedside report received from David Quiles. Patient sitting up in bed at this time. Patient is leaving to OR for right foot I&D procedure. Overnight nurse did CHG wipes and nose swabs. Plan of care for the day addressed with the patient. Patient A&Ox4, lung sounds clear, heart rate regular, bowel sounds active to all quadrants.

## 2017-10-14 NOTE — OP NOTES
[address] [place] [address]  OPERATIVE REPORT    Name:  Gauri Clark  MR#:  [id number]  :  1950  Account #:  [de-identified]  Date of Adm:  10/12/2017  Date of Surgery:  10/14/2017      SURGEON: Ever Herrera DPM    ASSISTANT: [facility]. PREOPERATIVE DIAGNOSES  1. Chronic osteomyelitis of remaining right second and fourth  metatarsals. 2. Painful postoperative infection/abscess complicated right foot. POSTOPERATIVE DIAGNOSES  1. Chronic osteomyelitis of remaining right second and fourth  metatarsals painful. 2. Painful postoperative infection/abscess complicated right foot. PROCEDURES PERFORMED  1. Partial excision of remaining right second metatarsal secondary to  osteomyelitis. 2. Partial excision of remaining right fourth metatarsal secondary to  osteomyelitis. 3. Incision and drainage, complicated right foot, postoperative  infection/abscess. ANESTHESIA: MAC with local consisting of 10 mL of 0.25% Marcaine  with epinephrine. HEMOSTASIS: There was no tourniquet used in this procedure. ESTIMATED BLOOD LOSS: Minimal, less than 50 mL. SPECIMENS REMOVED: bone right 2nd and 4th metatarsal fragments    MATERIALS: 3-0 nylon, aerobic and anaerobic cultures, and also a 7-  Persian Ramón-Sheppard drain. INJECTABLES: 10 mL of 0.25% Marcaine with epinephrine. COMPLICATIONS: None. INDICATIONS FOR PROCEDURE: The patient is a 36-year-old  female diabetic admitted to [facility] on 10/12/2017  for treatment of painful right foot abscess with cellulitis and chronic  osteomyelitis of the right second and fourth metatarsal remnants. She  is status post 1 month right foot surgery for her right foot and has failed  oral antibiotic treatment for infection and now requires surgical  correction. All conservative treatments have failed to offer the patient  adequate relief and the patient desires surgical correction.  The  planned procedures were explained to the patient in detail including all  risks, benefits, and possible complications, and the patient still desires  surgical correction. Medical clearance was obtained prior to surgery. Consent was signed and on chart. All the patient's questions were  answered and no guarantees were given. Lastly, the patient was asked  if she had any history of blood clots or bleeding disorders, and she  stated that she had any history of blood clots, DVTs, also arterial  thrombosis and also that she had anticardiolipin antibody syndrome,  therefore she was left on her warfarin and heparin treatments through  the surgery. DESCRIPTION OF PROCEDURE: The patient was brought to the  operating room and placed on the operating room table in the supine  position. The patient's right foot was then anesthetized using 10 mL of  0.25% Marcaine with epinephrine as a local block to the right foot. Next, the patient's right foot was then prepped and draped in the usual  sterile manner. Attention was then drawn to the dorsal right midfoot, where the  infection/abscess was noted. The original incision from 1 month ago appeared  to be dehisced. The sutures remained intact with purulent drainage and  foul odor noted. Therefore, all the sutures were removed from the right  foot. Next, an incision and drainage of the infected postoperative  abscess/complicated was then performed using a #15 blade to make  an approximately 7 cm linear skin incision from medial to lateral over  the dorsal midfoot. A second #15 blade was then used to deepen this  incision down to the deeper tissues and down to bone. This was done  to expose the medial intermediate and lateral cuneiforms and also  cuboid bone. They appeared to be healthy, cream colored and hard. Blunt dissection was then carried down into the midfoot and forefoot to  open up any infected pockets and abscess to drain all the pus and  purulent tissue.  Deep cultures were then taken, both aerobic and  anaerobic, and sent to the lab for analysis. The wound was then  flushed with copious amounts of normal sterile saline mixed with  bacitracin. This was done using a pulse lavage deep to bone to  complete the complicated incision and drainage of a postoperative  infection, right foot. Blunt dissection was then used to expose the second metatarsal bone  fragments and the first MPJ sesamoid bone. The patient is status post  a partial amputation of the right forefoot secondary to osteomyelitis on  09/15/2017. Unfortunately, the osteomyelitis and infection returned. Therefore, a second #15 blade was then used to deepen this incision  down to bone to expose the second metatarsal bone fragment and  sesamoids which appeared to be gray soft and therefore were totally  excised. This bone fragment and sesamoid were then sent to  pathology for analysis. Next, blunt dissection was then carried down to  expose the right fourth metatarsal bone fragment, which also appeared  to be gray soft and therefore, the fourth metatarsal bone fragment was  then removed in total and sent to pathology. Next, sharp dissection and debridement was then done to remove all  necrotic soft tissue in the midfoot and distal forefoot in and around the  original surgical site. The debridement was done down to healthy  bleeding tissue. After the deep debridement was completed and all  necrotic tissue was removed, the surgical site was again irrigated and  pulse lavaged with a solution mixed with bacitracin. The wound was  then observed with no remaining portions of necrotic tissue or infected  bone were found. Therefore, the wound was then partially closed using  3-0 nylon in a running stitch to close approximately 90% of the dorsal  incision. The lateral portion of the wound was left open and packed  with half-inch iodoform gauze and also a Ramón-Sheppard flat drain was  inserted an postoperative fluid management.  The surgical site was  then dressed using 4 x 4's, gauze and Kerlix. Also, an Ace wrap were  used. The patient was then transported to the recovery room with vital  signs stable and neurovascular status returned to baseline for the  patient's right foot. The patient tolerated the procedures and  anesthesia well with no complications. POSTOPERATIVE CHECK: The patient was later seen in the recovery  room with the dressings clean, dry, and intact with no bleeding noted to  her right foot. The patient stated that she had no pain. The patient was  then returned to her room on the medical floor. The patient will  continue her IV antibiotics as per Dr. Pedro Marcano, infectious disease. Podiatry will follow as needed.         KUN Bear / MARK  D:  10/14/2017   14:35  T:  10/14/2017   15:12  Job #:  [id number]

## 2017-10-14 NOTE — PROGRESS NOTES
Patient has 4 inch long red scratch on left scapula. Patient has redness that is blanching in sacral area, no pressure ulcer noted on sacrum. Skin on heels is intact and dry. Patient has many scratches and marks from itching skin. Her daughter reported she was treated for possible scabies in September, before the 9th. She took permethrin for treatment.

## 2017-10-14 NOTE — PERIOP NOTES
TO2 @  liters  Registered NurseRANSFER - OUT REPORT:    Verbal report given amena(name) on Erik Spectmj  being transferred t326(unit) for routine progression of care       Report consisted of patients Situation, Background, Assessment and   Recommendations(SBAR). Information from the following report(s) SBAR was reviewed with the receiving nurse. Opportunity for questions and clarification was provided.       2ient transported with:

## 2017-10-15 PROBLEM — F41.9 ANXIETY: Status: ACTIVE | Noted: 2017-10-15

## 2017-10-15 PROBLEM — K59.00 CONSTIPATION: Status: ACTIVE | Noted: 2017-10-15

## 2017-10-15 LAB
ANION GAP SERPL CALC-SCNC: 9 MMOL/L (ref 3–18)
BASOPHILS # BLD: 0 K/UL (ref 0–0.06)
BASOPHILS NFR BLD: 1 % (ref 0–2)
BUN SERPL-MCNC: 8 MG/DL (ref 7–18)
BUN/CREAT SERPL: 17 (ref 12–20)
CALCIUM SERPL-MCNC: 8.6 MG/DL (ref 8.5–10.1)
CHLORIDE SERPL-SCNC: 102 MMOL/L (ref 100–108)
CO2 SERPL-SCNC: 27 MMOL/L (ref 21–32)
CREAT SERPL-MCNC: 0.46 MG/DL (ref 0.6–1.3)
DIFFERENTIAL METHOD BLD: ABNORMAL
EOSINOPHIL # BLD: 0 K/UL (ref 0–0.4)
EOSINOPHIL NFR BLD: 0 % (ref 0–5)
ERYTHROCYTE [DISTWIDTH] IN BLOOD BY AUTOMATED COUNT: 16.3 % (ref 11.6–14.5)
GLUCOSE BLD STRIP.AUTO-MCNC: 137 MG/DL (ref 70–110)
GLUCOSE BLD STRIP.AUTO-MCNC: 183 MG/DL (ref 70–110)
GLUCOSE BLD STRIP.AUTO-MCNC: 207 MG/DL (ref 70–110)
GLUCOSE BLD STRIP.AUTO-MCNC: 231 MG/DL (ref 70–110)
GLUCOSE SERPL-MCNC: 92 MG/DL (ref 74–99)
HCT VFR BLD AUTO: 33 % (ref 35–45)
HGB BLD-MCNC: 11.2 G/DL (ref 12–16)
LYMPHOCYTES # BLD: 1.7 K/UL (ref 0.9–3.6)
LYMPHOCYTES NFR BLD: 22 % (ref 21–52)
MAGNESIUM SERPL-MCNC: 1.3 MG/DL (ref 1.6–2.6)
MCH RBC QN AUTO: 30 PG (ref 24–34)
MCHC RBC AUTO-ENTMCNC: 33.9 G/DL (ref 31–37)
MCV RBC AUTO: 88.5 FL (ref 74–97)
MONOCYTES # BLD: 0.7 K/UL (ref 0.05–1.2)
MONOCYTES NFR BLD: 9 % (ref 3–10)
NEUTS SEG # BLD: 5.4 K/UL (ref 1.8–8)
NEUTS SEG NFR BLD: 68 % (ref 40–73)
PLATELET # BLD AUTO: 260 K/UL (ref 135–420)
PMV BLD AUTO: 8.8 FL (ref 9.2–11.8)
POTASSIUM SERPL-SCNC: 3.5 MMOL/L (ref 3.5–5.5)
RBC # BLD AUTO: 3.73 M/UL (ref 4.2–5.3)
SODIUM SERPL-SCNC: 138 MMOL/L (ref 136–145)
WBC # BLD AUTO: 7.9 K/UL (ref 4.6–13.2)

## 2017-10-15 PROCEDURE — 74011250636 HC RX REV CODE- 250/636: Performed by: INTERNAL MEDICINE

## 2017-10-15 PROCEDURE — 74011636637 HC RX REV CODE- 636/637: Performed by: HOSPITALIST

## 2017-10-15 PROCEDURE — 80048 BASIC METABOLIC PNL TOTAL CA: CPT | Performed by: HOSPITALIST

## 2017-10-15 PROCEDURE — 65270000029 HC RM PRIVATE

## 2017-10-15 PROCEDURE — 74011250637 HC RX REV CODE- 250/637: Performed by: INTERNAL MEDICINE

## 2017-10-15 PROCEDURE — 74011000250 HC RX REV CODE- 250: Performed by: INTERNAL MEDICINE

## 2017-10-15 PROCEDURE — 74011250637 HC RX REV CODE- 250/637: Performed by: HOSPITALIST

## 2017-10-15 PROCEDURE — 74011000258 HC RX REV CODE- 258: Performed by: INTERNAL MEDICINE

## 2017-10-15 PROCEDURE — 36415 COLL VENOUS BLD VENIPUNCTURE: CPT | Performed by: HOSPITALIST

## 2017-10-15 PROCEDURE — 85025 COMPLETE CBC W/AUTO DIFF WBC: CPT | Performed by: HOSPITALIST

## 2017-10-15 PROCEDURE — 83735 ASSAY OF MAGNESIUM: CPT | Performed by: HOSPITALIST

## 2017-10-15 PROCEDURE — 82962 GLUCOSE BLOOD TEST: CPT

## 2017-10-15 RX ORDER — MAGNESIUM CITRATE
296 SOLUTION, ORAL ORAL
Status: COMPLETED | OUTPATIENT
Start: 2017-10-15 | End: 2017-10-15

## 2017-10-15 RX ORDER — ADHESIVE BANDAGE
30 BANDAGE TOPICAL DAILY PRN
Status: DISCONTINUED | OUTPATIENT
Start: 2017-10-15 | End: 2017-10-20 | Stop reason: HOSPADM

## 2017-10-15 RX ORDER — DOCUSATE SODIUM 100 MG/1
100 CAPSULE, LIQUID FILLED ORAL 2 TIMES DAILY
Status: DISCONTINUED | OUTPATIENT
Start: 2017-10-15 | End: 2017-10-17

## 2017-10-15 RX ADMIN — CITROMA MAGNESIUM CITRATE 296 ML: 1.75 LIQUID ORAL at 13:05

## 2017-10-15 RX ADMIN — HEPARIN SODIUM 5000 UNITS: 5000 INJECTION, SOLUTION INTRAVENOUS; SUBCUTANEOUS at 04:29

## 2017-10-15 RX ADMIN — MICONAZOLE NITRATE 1 APPLICATOR: 20 CREAM VAGINAL at 17:19

## 2017-10-15 RX ADMIN — HEPARIN SODIUM 5000 UNITS: 5000 INJECTION, SOLUTION INTRAVENOUS; SUBCUTANEOUS at 11:36

## 2017-10-15 RX ADMIN — CLONIDINE HYDROCHLORIDE 0.1 MG: 0.1 TABLET ORAL at 09:39

## 2017-10-15 RX ADMIN — INSULIN GLARGINE 5 UNITS: 100 INJECTION, SOLUTION SUBCUTANEOUS at 21:33

## 2017-10-15 RX ADMIN — AZTREONAM 1 G: 1 INJECTION, POWDER, LYOPHILIZED, FOR SOLUTION INTRAMUSCULAR; INTRAVENOUS at 03:28

## 2017-10-15 RX ADMIN — DAPTOMYCIN 326 MG: 500 INJECTION, POWDER, LYOPHILIZED, FOR SOLUTION INTRAVENOUS at 17:14

## 2017-10-15 RX ADMIN — ONDANSETRON HYDROCHLORIDE 4 MG: 4 TABLET, FILM COATED ORAL at 00:50

## 2017-10-15 RX ADMIN — ROPINIROLE HYDROCHLORIDE 4 MG: 1 TABLET, FILM COATED ORAL at 21:36

## 2017-10-15 RX ADMIN — CLONIDINE HYDROCHLORIDE 0.1 MG: 0.1 TABLET ORAL at 20:33

## 2017-10-15 RX ADMIN — HYDROMORPHONE HYDROCHLORIDE 4 MG: 4 TABLET ORAL at 09:39

## 2017-10-15 RX ADMIN — AZTREONAM 1 G: 1 INJECTION, POWDER, LYOPHILIZED, FOR SOLUTION INTRAMUSCULAR; INTRAVENOUS at 13:03

## 2017-10-15 RX ADMIN — DOCUSATE SODIUM 100 MG: 100 CAPSULE, LIQUID FILLED ORAL at 20:33

## 2017-10-15 RX ADMIN — HEPARIN SODIUM 5000 UNITS: 5000 INJECTION, SOLUTION INTRAVENOUS; SUBCUTANEOUS at 20:34

## 2017-10-15 RX ADMIN — HYDROMORPHONE HYDROCHLORIDE 4 MG: 4 TABLET ORAL at 23:45

## 2017-10-15 RX ADMIN — AZTREONAM 1 G: 1 INJECTION, POWDER, LYOPHILIZED, FOR SOLUTION INTRAMUSCULAR; INTRAVENOUS at 20:32

## 2017-10-15 RX ADMIN — SODIUM CHLORIDE 75 ML/HR: 900 INJECTION, SOLUTION INTRAVENOUS at 06:57

## 2017-10-15 RX ADMIN — TRAMADOL HYDROCHLORIDE 50 MG: 50 TABLET, FILM COATED ORAL at 13:05

## 2017-10-15 RX ADMIN — CLINDAMYCIN PHOSPHATE 900 MG: 900 INJECTION, SOLUTION INTRAVENOUS at 21:32

## 2017-10-15 RX ADMIN — LORAZEPAM 1 MG: 1 TABLET ORAL at 00:50

## 2017-10-15 RX ADMIN — Medication 5 MG: at 11:35

## 2017-10-15 RX ADMIN — Medication 5 MG: at 21:36

## 2017-10-15 RX ADMIN — INSULIN LISPRO 4 UNITS: 100 INJECTION, SOLUTION INTRAVENOUS; SUBCUTANEOUS at 21:33

## 2017-10-15 RX ADMIN — CLINDAMYCIN PHOSPHATE 900 MG: 900 INJECTION, SOLUTION INTRAVENOUS at 04:53

## 2017-10-15 RX ADMIN — CLINDAMYCIN PHOSPHATE 900 MG: 900 INJECTION, SOLUTION INTRAVENOUS at 11:35

## 2017-10-15 RX ADMIN — CYANOCOBALAMIN TAB 500 MCG 1000 MCG: 500 TAB at 09:39

## 2017-10-15 RX ADMIN — DOCUSATE SODIUM 100 MG: 100 CAPSULE, LIQUID FILLED ORAL at 13:05

## 2017-10-15 RX ADMIN — HYDROMORPHONE HYDROCHLORIDE 4 MG: 4 TABLET ORAL at 03:39

## 2017-10-15 NOTE — PROGRESS NOTES
[address]  care of patient at this time. Patient denies chest pain, shortness of breath, or numbness or tingling in the upper or lower extremities. Dressing on right foot is clean dry and intact with a scant amount of drainage on the bottom of the foot dressing.   20g IV in right forearm is patent and infusing LR @ 100mL/hr. Patient currently experiencing 7/10 pain. Bed is in lowest position with the wheels locked. Siderails x 3 are up. Call bell within reach. Patient is currently resting in bed in no apparent distress. 2045 - Head to toe assessment performed at this time. Patient has no complaints of chest pain or shortness of breath. Denies any numbness or tingling in extremities. Lungs are clear to auscultation. Bowel sounds are present in all 4 quadrants. Patient educated how to actively manage their pain. Patient encouraged to use the incentive spirometer. Patient educated on the side effects of medications. Explained the importance of ambulation. Patient left in bed with call light within reach, bed in lowest position with wheels locked, and siderails x 3 up. Will continue to monitor. 46 - Spoke with Dr. Michaela Cook concerning the patient's request for [address] for symptoms of constipation. Took a verbal order with read back for milk of magnesia, 30 mL PO daily starting now. 0000 - Patient having nausea and vomiting. Will wait for this vomiting episode to pass and then administer Zofran PO per the [facility].    0050 - Patient ambulated to the restroom with the use of her scooter assistive device and voided 500 cc of clear, yellow urine. Will continue to monitor. Reinforced to the patient to call for assistance prior to going to the restroom. 0500 - Patient ambulated to the restroom with the use of her scooter and voided 500 cc of clear, yellow urine.

## 2017-10-15 NOTE — ROUTINE PROCESS
Bedside and Verbal shift change report given to Katharina Carlton RN (oncoming nurse) by PHONG Parr RN (offgoing nurse). Report included the following information SBAR, Procedure Summary, Intake/Output and MAR.

## 2017-10-15 NOTE — PROGRESS NOTES
Hospitalist Progress Note-critical care note     Patient: Nika Lo MRN: [id number]  CSN: [id number]    YOB: 1950  Age: 78 y.o. Sex: female    DOA: 10/12/2017 LOS:  LOS: 3 days            Chief complaint: OM ,cellulitis . Dm      Assessment/Plan         Hospital Problems  Date Reviewed: 10/12/2017          Codes Class Noted POA    Constipation ICD-10-CM: K59.00  ICD-9-CM: 564.00  10/15/2017 Unknown        Anxiety ICD-10-CM: F41.9  ICD-9-CM: 300.00  10/15/2017 Unknown        PAD (peripheral artery disease) (HCC) (Chronic) ICD-10-CM: I73.9  ICD-9-CM: 443.9  7/22/2017 Yes        Atherosclerotic PVD with ulceration ([facility] (Chronic) ICD-10-CM: I70.209, L98.499  ICD-9-CM: 440.23, 707.9  3/13/2015 Yes        Diabetes ([facility] ICD-10-CM: E11.9  ICD-9-CM: 250.00  1/1/2013 Yes        Iron deficiency anemia ICD-10-CM: D50.9  ICD-9-CM: 280.9  5/23/2012 Yes        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 ICD-10-CM: Z90.3, Z98.0  ICD-9-CM: V45.89  5/23/2012 Yes              1. Worsening of right foot cellulitis, with abscess, OM    Dr. Christie Hernandez did I & D 10/14, will f/u with the cx  ID on board : continue on azactam and daptomycin -no muscle pain reported a  Wound care   Pt/ot     2 HTN  Continue clonidine     3. Uncontrolled DM  a1c 8.1 ,refused to have diabetic diet ,   -regular diet except corn on  Cobs and creamy corn and corn bread   Will hold metformin   ssi and lantus 5 units     4. Asthma  Stable , breathing tx prn     5. Hx of Pancreatitis s/p Sameer en Y    6. Depression   controlled     7. PVD s/p multiple iliac stents     8 constipation   will  Put colace and mg citrate   9 anxiety   Will continue ativan     Subjective: I need more ativan , constipated     Nurse: no acute issue     Review of systems:    General: No fevers or chills. Cardiovascular: No chest pain or pressure. No palpitations. Pulmonary: No shortness of breath. Gastrointestinal: No nausea, vomiting.      Vital signs/Intake and Output:  Visit Vitals    /84 (BP 1 Location: Left arm, BP Patient Position: At rest;Supine)    Pulse 73    Temp 98.2 °F (36.8 °C)    Resp 20    Ht 5' 5\" (1.651 m)    Wt 59.8 kg (131 lb 14.4 oz)    SpO2 98%    BMI 21.95 kg/m2     Current Shift:     Last three shifts:  10/13 1901 - 10/15 0700  In: 0228 [P.O.:1240; I.V.:3144]  Out: 1545 [Urine:2450; Drains:35]    Physical Exam:  General: WD, WN. Alert, cooperative, no acute distress    HEENT: NC, Atraumatic. PERRLA, anicteric sclerae. Lungs: CTA Bilaterally. No Wheezing/Rhonchi/Rales. Heart:  Regular  rhythm,  No murmur, No Rubs, No Gallops  Abdomen: Soft, Non distended, Non tender.  +Bowel sounds,   Extremities: No c/c, rt foot wrapped with gauze   Psych:   Not anxious or agitated. Neurologic:  No acute neurological deficit. Labs: Results:       Chemistry Recent Labs      10/15/17   0603  10/14/17   0415  10/13/17   0349  10/12/17   1735   GLU  92  119*  117*  138*   NA  138  137  138  141   K  3.5  4.9  4.4  4.5   CL  102  103  102  104   CO2  27  28  27  24   BUN  8  18  30*  33*   CREA  0.46*  0.59*  0.78  0.89   CA  8.6  8.8  9.2  9.1   AGAP  9  6  9  13   BUCR  17  31*  38*  37*   AP   --    --   241*  250*   TP   --    --   6.8  7.2   ALB   --    --   2.9*  3.1*   GLOB   --    --   3.9  4.1*   AGRAT   --    --   0.7*  0.8      CBC w/Diff Recent Labs      10/15/17   0603  10/14/17   0415  10/13/17   0349   WBC  7.9  6.6  7.7   RBC  3.73*  3.94*  4.24   HGB  11.2*  12.0  12.5   HCT  33.0*  35.8  38.2   PLT  260  277  312   GRANS  68  59  65   LYMPH  22  31  26   EOS  0  1  1      Cardiac Enzymes No results for input(s): CPK, CKND1, RAYNA in the last 72 hours.     No lab exists for component: CKRMB, TROIP   Coagulation Recent Labs      10/13/17   0349  10/12/17   1735   PTP  18.0*  18.8*   INR  1.6*  1.6*   APTT  33.3   --        Lipid Panel No results found for: CHOL, CHOLPOCT, CHOLX, CHLST, CHOLV, 090796, HDL, LDL, LDLC, DLDLP, 781280, VLDLC, VLDL, TGLX, TRIGL, TRIGP, TGLPOCT, CHHD, CHHDX   BNP No results for input(s): BNPP in the last 72 hours.    Liver Enzymes Recent Labs      10/13/17   0349   TP  6.8   ALB  2.9*   AP  241*   SGOT  42*      Thyroid Studies No results found for: T4, T3U, TSH, TSHEXT, TSHEXT     Procedures/imaging: see electronic medical records for all procedures/Xrays and details which were not copied into this note but were reviewed prior to creation of Oren Taylor MD

## 2017-10-16 PROBLEM — M86.9 OSTEOMYELITIS OF RIGHT FOOT (HCC): Status: ACTIVE | Noted: 2017-10-16

## 2017-10-16 PROBLEM — E87.6 HYPOKALEMIA: Status: ACTIVE | Noted: 2017-10-16

## 2017-10-16 LAB
ANION GAP SERPL CALC-SCNC: 11 MMOL/L (ref 3–18)
BASOPHILS # BLD: 0 K/UL (ref 0–0.06)
BASOPHILS NFR BLD: 0 % (ref 0–2)
BUN SERPL-MCNC: 11 MG/DL (ref 7–18)
BUN/CREAT SERPL: 17 (ref 12–20)
CALCIUM SERPL-MCNC: 8.7 MG/DL (ref 8.5–10.1)
CHLORIDE SERPL-SCNC: 98 MMOL/L (ref 100–108)
CO2 SERPL-SCNC: 28 MMOL/L (ref 21–32)
CREAT SERPL-MCNC: 0.66 MG/DL (ref 0.6–1.3)
DIFFERENTIAL METHOD BLD: ABNORMAL
EOSINOPHIL # BLD: 0.1 K/UL (ref 0–0.4)
EOSINOPHIL NFR BLD: 1 % (ref 0–5)
ERYTHROCYTE [DISTWIDTH] IN BLOOD BY AUTOMATED COUNT: 16.5 % (ref 11.6–14.5)
GLUCOSE BLD STRIP.AUTO-MCNC: 139 MG/DL (ref 70–110)
GLUCOSE BLD STRIP.AUTO-MCNC: 167 MG/DL (ref 70–110)
GLUCOSE BLD STRIP.AUTO-MCNC: 184 MG/DL (ref 70–110)
GLUCOSE SERPL-MCNC: 139 MG/DL (ref 74–99)
HCT VFR BLD AUTO: 37 % (ref 35–45)
HGB BLD-MCNC: 12.4 G/DL (ref 12–16)
LYMPHOCYTES # BLD: 1.4 K/UL (ref 0.9–3.6)
LYMPHOCYTES NFR BLD: 20 % (ref 21–52)
MAGNESIUM SERPL-MCNC: 2 MG/DL (ref 1.6–2.6)
MCH RBC QN AUTO: 30 PG (ref 24–34)
MCHC RBC AUTO-ENTMCNC: 33.5 G/DL (ref 31–37)
MCV RBC AUTO: 89.6 FL (ref 74–97)
MONOCYTES # BLD: 0.8 K/UL (ref 0.05–1.2)
MONOCYTES NFR BLD: 11 % (ref 3–10)
NEUTS SEG # BLD: 4.7 K/UL (ref 1.8–8)
NEUTS SEG NFR BLD: 68 % (ref 40–73)
PLATELET # BLD AUTO: 305 K/UL (ref 135–420)
PMV BLD AUTO: 8.9 FL (ref 9.2–11.8)
POTASSIUM SERPL-SCNC: 3.3 MMOL/L (ref 3.5–5.5)
RBC # BLD AUTO: 4.13 M/UL (ref 4.2–5.3)
SODIUM SERPL-SCNC: 137 MMOL/L (ref 136–145)
WBC # BLD AUTO: 6.9 K/UL (ref 4.6–13.2)

## 2017-10-16 PROCEDURE — 74011250636 HC RX REV CODE- 250/636: Performed by: INTERNAL MEDICINE

## 2017-10-16 PROCEDURE — 74011000250 HC RX REV CODE- 250: Performed by: INTERNAL MEDICINE

## 2017-10-16 PROCEDURE — 65270000029 HC RM PRIVATE

## 2017-10-16 PROCEDURE — 74011000258 HC RX REV CODE- 258: Performed by: INTERNAL MEDICINE

## 2017-10-16 PROCEDURE — 83735 ASSAY OF MAGNESIUM: CPT | Performed by: HOSPITALIST

## 2017-10-16 PROCEDURE — 85025 COMPLETE CBC W/AUTO DIFF WBC: CPT | Performed by: HOSPITALIST

## 2017-10-16 PROCEDURE — 36415 COLL VENOUS BLD VENIPUNCTURE: CPT | Performed by: HOSPITALIST

## 2017-10-16 PROCEDURE — 82962 GLUCOSE BLOOD TEST: CPT

## 2017-10-16 PROCEDURE — 80048 BASIC METABOLIC PNL TOTAL CA: CPT | Performed by: HOSPITALIST

## 2017-10-16 PROCEDURE — 74011250637 HC RX REV CODE- 250/637: Performed by: INTERNAL MEDICINE

## 2017-10-16 PROCEDURE — 74011250637 HC RX REV CODE- 250/637: Performed by: HOSPITALIST

## 2017-10-16 RX ORDER — POTASSIUM CHLORIDE 20 MEQ/1
40 TABLET, EXTENDED RELEASE ORAL
Status: COMPLETED | OUTPATIENT
Start: 2017-10-16 | End: 2017-10-16

## 2017-10-16 RX ADMIN — HEPARIN SODIUM 5000 UNITS: 5000 INJECTION, SOLUTION INTRAVENOUS; SUBCUTANEOUS at 04:58

## 2017-10-16 RX ADMIN — CLONIDINE HYDROCHLORIDE 0.1 MG: 0.1 TABLET ORAL at 10:20

## 2017-10-16 RX ADMIN — Medication 5 MG: at 10:27

## 2017-10-16 RX ADMIN — CYANOCOBALAMIN TAB 500 MCG 1000 MCG: 500 TAB at 10:21

## 2017-10-16 RX ADMIN — HYDROMORPHONE HYDROCHLORIDE 4 MG: 4 TABLET ORAL at 15:19

## 2017-10-16 RX ADMIN — AZTREONAM 1 G: 1 INJECTION, POWDER, LYOPHILIZED, FOR SOLUTION INTRAMUSCULAR; INTRAVENOUS at 04:57

## 2017-10-16 RX ADMIN — AZTREONAM 1 G: 1 INJECTION, POWDER, LYOPHILIZED, FOR SOLUTION INTRAMUSCULAR; INTRAVENOUS at 15:04

## 2017-10-16 RX ADMIN — CLINDAMYCIN PHOSPHATE 900 MG: 900 INJECTION, SOLUTION INTRAVENOUS at 15:04

## 2017-10-16 RX ADMIN — CLINDAMYCIN PHOSPHATE 900 MG: 900 INJECTION, SOLUTION INTRAVENOUS at 05:00

## 2017-10-16 RX ADMIN — DOCUSATE SODIUM 100 MG: 100 CAPSULE, LIQUID FILLED ORAL at 10:21

## 2017-10-16 RX ADMIN — HEPARIN SODIUM 5000 UNITS: 5000 INJECTION, SOLUTION INTRAVENOUS; SUBCUTANEOUS at 15:04

## 2017-10-16 RX ADMIN — TRAMADOL HYDROCHLORIDE 50 MG: 50 TABLET, FILM COATED ORAL at 10:27

## 2017-10-16 RX ADMIN — DAPTOMYCIN 326 MG: 500 INJECTION, POWDER, LYOPHILIZED, FOR SOLUTION INTRAVENOUS at 15:00

## 2017-10-16 RX ADMIN — POTASSIUM CHLORIDE 40 MEQ: 20 TABLET, EXTENDED RELEASE ORAL at 10:27

## 2017-10-16 RX ADMIN — HYDROMORPHONE HYDROCHLORIDE 4 MG: 4 TABLET ORAL at 06:29

## 2017-10-16 NOTE — PROGRESS NOTES
SHIFT SUMMARY: No events this short shift. Dilaudid 4 mg given for pain. Less than 5ml drained from ANDER drain. Pt is mobile with use of her scooter. Friendly and cooperative.

## 2017-10-16 NOTE — ROUTINE PROCESS
Bedside and Verbal shift change report given to Morgan Houston RN (oncoming nurse) by Cecelia Perez RN  (offgoing nurse). Report included the following information SBAR, Kardex, Intake/Output and MAR.

## 2017-10-16 NOTE — PROGRESS NOTES
Shift Summary:  Patient required Dilaudid 4 mg po this a.m for pain to right foot rating at 9/10 which provide moderate relief that patient states is comfortable with. Dressing to right foot CDI with ANDER drain intact, less than 1 ml serosanguineous drainage. Vital signs WNL as documented on flowsheet.

## 2017-10-16 NOTE — PROGRESS NOTES
ID Progress Note    Antibiotics:  daptomycin  Aztreonam  Clindamycin         abx day 3 from OR    CC: R foot infection    S: Ms. Dyllan Neely is a 66y/o female seen in f/u for R diabetic foot infection with abscess. Patient has had multiple surgeries on the R foot including a R great toe amputation on 9/9/16, excision of the R 1st MT on 12/13/16, excision of the snd, 3rd, and 4th metatarsals on 9/15/17 and now partial excision of the remaining right 2nd and 4th metatarsal on 10/14/17. Pt has no new c/o  Reports that she eats very little. She feels constipated. No n/v  No rash  No pain in foot, but concerned about drainage. Physical Exam:  Temp:  [97 °F (36.1 °C)-99.3 °F (37.4 °C)]   Pulse (Heart Rate):  []   BP: (107-168)/()   Resp Rate:  [16-20]   O2 Sat (%):  [90 %-100 %]   Weight:  [59.8 kg (131 lb 14.4 oz)-60.4 kg (133 lb 1.6 oz)]   Gen: WDWN WF, AAOX3, NAD  HEENT: no icterus, no thrush  Neck: supple  CV: RRR  Pulm: CTA B/L  Abd: soft, NTND  Ext: R foot in surgical dressing with ANDER drain in place - sanguinous discharge noted in ANDER drain. Some light sanguinous drainage noted on dressing    Labs/Rad reviewed  Lab Results   Component Value Date/Time    WBC 6.9 10/16/2017 05:30 AM    HGB 12.4 10/16/2017 05:30 AM    HCT 37.0 10/16/2017 05:30 AM    PLATELET 901 17/59/5646 05:30 AM    MCV 89.6 10/16/2017 05:30 AM     Lab Results   Component Value Date/Time    Sodium 137 10/16/2017 05:30 AM    Potassium 3.3 10/16/2017 05:30 AM    Chloride 98 10/16/2017 05:30 AM    CO2 28 10/16/2017 05:30 AM    Anion gap 11 10/16/2017 05:30 AM    Glucose 139 10/16/2017 05:30 AM    BUN 11 10/16/2017 05:30 AM    Creatinine 0.66 10/16/2017 05:30 AM    BUN/Creatinine ratio 17 10/16/2017 05:30 AM    GFR est AA >60 10/16/2017 05:30 AM    GFR est non-AA >60 10/16/2017 05:30 AM    Calcium 8.7 10/16/2017 05:30 AM     9/1/17: surface culture: MSSA, E. Coli, KP, Enterococcus  9/15/17: wound: s.  Epi and strep viridans  10/12/17; blood: NG  10/13/17: wound: moderate GNR (although few GPC on gram stain)  10/14: wound: rare GPC in pairs (surgical culture)             Few proteus mirabilis, non hemolytic strept, alpha hemolytic strept, staph aureus    Assessment/Recommendations  1. R diabetic foot infection with prior OM in a 66y/o female with multiple R foot surgeries  -patient is s/p R great toe amputation on 9/9/16  -s/p excision of R 1st metatarsal on 12/13/16  -s/p excision of 2nd-4th metatarsals on 9/15/17  -now s/p excision of remaining 2nd and4th metatarsal on 10/14/17  -superficial culture with GNR, deep culture with GPC in pairs  -prior cultures with MSSA, E. Coli, KP, enterococcus, s. Epi, and s. Viridans              Current growth with Proteus, Strept species, and Staph aureus  -complicated by multiple antibiotic allergies  -currently on dapto, aztreonam, and clinda    ==> continue current antibiotics  ==> f/u on culture results and pathology  ==> suspect she will require a short course of antibiotics given likely surgical cure of OM                   Need to evaluate wound for appropriate healing before make final recommendation  ==> monitor CK level - I dont see a level so will order for am    2. Multiple medical problems  -include gastrectomy, DM, PVD, anemia    3.  Multiple antibiotic allergies  -include fluoroquinolones, b-lactams, vancomycin, erythromycin, and azithromycin -->all consisted of urticaria or angioedema

## 2017-10-16 NOTE — ROUTINE PROCESS
Bedside and Verbal shift change report given to SHERON Martin (oncoming nurse) by Oscar Eason (offgoing nurse). Report included the following information SBAR, Kardex, Intake/Output and MAR.

## 2017-10-16 NOTE — PROGRESS NOTES
Hospitalist Progress Note-critical care note     Patient: Demetrius Mayers MRN: [id number]  CSN: [id number]    YOB: 1950  Age: 78 y.o. Sex: female    DOA: 10/12/2017 LOS:  LOS: 4 days            Chief complaint: OM ,cellulitis . Dm  . Hypokalemia     Assessment/Plan         Hospital Problems  Date Reviewed: 10/12/2017          Codes Class Noted POA    Constipation ICD-10-CM: K59.00  ICD-9-CM: 564.00  10/15/2017 Unknown        Anxiety ICD-10-CM: F41.9  ICD-9-CM: 300.00  10/15/2017 Unknown        PAD (peripheral artery disease) (HCC) (Chronic) ICD-10-CM: I73.9  ICD-9-CM: 443.9  7/22/2017 Yes        Atherosclerotic PVD with ulceration ([facility] (Chronic) ICD-10-CM: I70.209, L98.499  ICD-9-CM: 440.23, 707.9  3/13/2015 Yes        Diabetes ([facility] ICD-10-CM: E11.9  ICD-9-CM: 250.00  1/1/2013 Yes        Iron deficiency anemia ICD-10-CM: D50.9  ICD-9-CM: 280.9  5/23/2012 Yes        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 ICD-10-CM: Z90.3, Z98.0  ICD-9-CM: V45.89  5/23/2012 Yes              1. right foot cellulitis, with abscess, OM   continue doing well   Cx: strep and staph   Dr. Mynor Hunt did I & D 10/14,   ID on board : continue on azactam and daptomycin -no muscle pain reported  Wound care   Pt/ot     2 HTN  Continue clonidine     3. Uncontrolled DM  a1c 8.1 ,refused to have diabetic diet ,   -regular diet except corn on  Cobs and creamy corn and corn bread     ssi and lantus 5 units     4. Asthma  Stable , breathing tx prn     5. Hx of Pancreatitis s/p Sameer en Y    6. Depression   controlled     7. PVD s/p multiple iliac stents     8 constipation     9 anxiety   Will continue ativan     10 hypokalemia   K replacement     Subjective: I feel fine ,     Nurse: no acute issue     D/c planning, in 24-48 hr after finalize abx and clear per podiatry   Review of systems:    General: No fevers or chills. Cardiovascular: No chest pain or pressure. No palpitations.    Pulmonary: No shortness of breath. Gastrointestinal: No nausea, vomiting. Vital signs/Intake and Output:  Visit Vitals    /71 (BP 1 Location: Right arm, BP Patient Position: At rest)    Pulse 78    Temp 98 °F (36.7 °C)    Resp 20    Ht 5' 5\" (1.651 m)    Wt 60.4 kg (133 lb 1.6 oz)    SpO2 100%    BMI 22.15 kg/m2     Current Shift:  10/16 0701 - 10/16 1900  In: -   Out: 550 [Urine:550]  Last three shifts:  10/14 1901 - 10/16 0700  In: 9141 [P.O.:720; I.V.:3194]  Out: 1940 [Urine:1900; Drains:40]    Physical Exam:  General: WD, WN. Alert, cooperative, no acute distress    HEENT: NC, Atraumatic. PERRLA, anicteric sclerae. Lungs: CTA Bilaterally. No Wheezing/Rhonchi/Rales. Heart:  Regular  rhythm,  No murmur, No Rubs, No Gallops  Abdomen: Soft, Non distended, Non tender.  +Bowel sounds,   Extremities: No c/c, rt foot wrapped with gauze , katy tube with small amount bloody discharge noted  Psych:   Not anxious or agitated. Neurologic:  No acute neurological deficit. Labs: Results:       Chemistry Recent Labs      10/16/17   0530  10/15/17   0603  10/14/17   0415   GLU  139*  92  119*   NA  137  138  137   K  3.3*  3.5  4.9   CL  98*  102  103   CO2  28  27  28   BUN  11  8  18   CREA  0.66  0.46*  0.59*   CA  8.7  8.6  8.8   AGAP  11  9  6   BUCR  17  17  31*      CBC w/Diff Recent Labs      10/16/17   0530  10/15/17   0603  10/14/17   0415   WBC  6.9  7.9  6.6   RBC  4.13*  3.73*  3.94*   HGB  12.4  11.2*  12.0   HCT  37.0  33.0*  35.8   PLT  305  260  277   GRANS  68  68  59   LYMPH  20*  22  31   EOS  1  0  1      Cardiac Enzymes No results for input(s): CPK, CKND1, RAYNA in the last 72 hours. No lab exists for component: CKRMB, TROIP   Coagulation No results for input(s): PTP, INR, APTT in the last 72 hours.     No lab exists for component: INREXT, INREXT    Lipid Panel No results found for: CHOL, CHOLPOCT, CHOLX, CHLST, CHOLV, 928211, HDL, LDL, LDLC, DLDLP, 784455, VLDLC, VLDL, TGLX, TRIGL, TRIGP, TGLPOCT, CHHD, CHHDX   BNP No results for input(s): BNPP in the last 72 hours. Liver Enzymes No results for input(s): TP, ALB, TBIL, AP, SGOT, GPT in the last 72 hours.     No lab exists for component: DBIL   Thyroid Studies No results found for: T4, T3U, TSH, TSHEXT, TSHEXT     Procedures/imaging: see electronic medical records for all procedures/Xrays and details which were not copied into this note but were reviewed prior to creation of Ryann Moseley MD

## 2017-10-16 NOTE — PROGRESS NOTES
conducted an initial consultation and Spiritual Assessment for Dora Melendez, who is a 78 y.o.,female. Patients Primary Language is: [place]. According to the patients EMR Mormon Affiliation is: Fatou Farmer.     The reason the Patient came to the hospital is:   Patient Active Problem List    Diagnosis Date Noted    Osteomyelitis of right foot ([facility] 10/16/2017    Hypokalemia 10/16/2017    Constipation 10/15/2017    Anxiety 10/15/2017    Sepsis ([address] 07/22/2017    PAD (peripheral artery disease) ([facility] 07/22/2017    Hyponatremia 07/22/2017    Sleepwalking disorder 07/22/2017    CAP (community acquired pneumonia) 07/22/2017    Chronic pain     Lupus anticoagulant syndrome ([facility] 12/29/2016    Cellulitis of right foot 12/27/2016    Abscess of right foot 09/09/2016    Anemia 03/01/2016    Syncope 02/29/2016    Anemia associated with acute blood loss 02/29/2016    Atherosclerotic PVD with ulceration ([facility] 03/13/2015    Diabetes ([facility] 01/01/2013    Heme positive stool 05/23/2012    Iron deficiency anemia 05/23/2012    Nausea & vomiting 05/23/2012    Pancreatic cyst 05/23/2012    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 05/23/2012        The  provided the following Interventions:  Initiated a relationship of care and support. Explored issues of lin, belief, spirituality and Denominational/ritual needs while hospitalized. Listened empathically. Provided chaplaincy education. Provided information about Spiritual Care Services. Offered prayer and assurance of continued prayers on patients behalf. Chart reviewed. The following outcomes were achieved:  Patient shared limited information about both their medical narrative and spiritual journey/beliefs. Patient processed feeling about current hospitalization. Patient expressed gratitude for pastoral care visit.     Assessment:  Patient does not have any Denominational/cultural needs that will affect patients preferences in health care. There are no further spiritual or Hindu issues which require intervention at this time. Plan:  Chaplains will continue to follow and will provide pastoral care on an as needed/requested basis.  recommends bedside caregivers page  on duty if patient shows signs of acute spiritual or emotional distress.       Sister Marilee Jessie, [place], [address]  [phone]

## 2017-10-16 NOTE — PROGRESS NOTES
Met with pt to discuss plan of care. Pt has indicated she would like to use HH upon discharge. Offered FOC and a list and pt has indicated she has used [facility] in the past and would like to use this agency again. CMS has been notified to assist.  Awaiting ID determination for IVABX. CM continuing to follow. Care Management Interventions  PCP Verified by CM: Yes  Mode of Transport at Discharge:  Other (see comment) (Family)  Transition of Care Consult (CM Consult): [address]: No  Reason Outside [place]: Patient already serviced by other home care/hospice agency (previous experience with [facility])  Health Maintenance Reviewed: Yes  Physical Therapy Consult: Yes  Current Support Network: Lives Alone, Family Lives Nearby  Confirm Follow Up Transport: Family  Plan discussed with Pt/Family/Caregiver: Yes  Freedom of Choice Offered: Yes  Discharge Location  Discharge Placement: Home with home health

## 2017-10-16 NOTE — ROUTINE PROCESS
Bedside and Verbal shift change report given to Nan Estrada RN (oncoming nurse) by Kashmir Hernández   (offgoing nurse). Report included the following information SBAR, Kardex and MAR.

## 2017-10-17 LAB
ANION GAP SERPL CALC-SCNC: 5 MMOL/L (ref 3–18)
BACTERIA SPEC CULT: ABNORMAL
BASOPHILS # BLD: 0 K/UL (ref 0–0.06)
BASOPHILS NFR BLD: 1 % (ref 0–2)
BUN SERPL-MCNC: 15 MG/DL (ref 7–18)
BUN/CREAT SERPL: 23 (ref 12–20)
CALCIUM SERPL-MCNC: 8.9 MG/DL (ref 8.5–10.1)
CHLORIDE SERPL-SCNC: 105 MMOL/L (ref 100–108)
CK SERPL-CCNC: 186 U/L (ref 26–192)
CO2 SERPL-SCNC: 30 MMOL/L (ref 21–32)
CREAT SERPL-MCNC: 0.65 MG/DL (ref 0.6–1.3)
DIFFERENTIAL METHOD BLD: ABNORMAL
EOSINOPHIL # BLD: 0.1 K/UL (ref 0–0.4)
EOSINOPHIL NFR BLD: 1 % (ref 0–5)
ERYTHROCYTE [DISTWIDTH] IN BLOOD BY AUTOMATED COUNT: 16.5 % (ref 11.6–14.5)
GLUCOSE BLD STRIP.AUTO-MCNC: 105 MG/DL (ref 70–110)
GLUCOSE BLD STRIP.AUTO-MCNC: 134 MG/DL (ref 70–110)
GLUCOSE BLD STRIP.AUTO-MCNC: 167 MG/DL (ref 70–110)
GLUCOSE BLD STRIP.AUTO-MCNC: 173 MG/DL (ref 70–110)
GLUCOSE BLD STRIP.AUTO-MCNC: 95 MG/DL (ref 70–110)
GLUCOSE SERPL-MCNC: 137 MG/DL (ref 74–99)
GRAM STN SPEC: ABNORMAL
GRAM STN SPEC: ABNORMAL
HCT VFR BLD AUTO: 32 % (ref 35–45)
HGB BLD-MCNC: 10.8 G/DL (ref 12–16)
LYMPHOCYTES # BLD: 1.2 K/UL (ref 0.9–3.6)
LYMPHOCYTES NFR BLD: 21 % (ref 21–52)
MAGNESIUM SERPL-MCNC: 1.9 MG/DL (ref 1.6–2.6)
MCH RBC QN AUTO: 30.1 PG (ref 24–34)
MCHC RBC AUTO-ENTMCNC: 33.8 G/DL (ref 31–37)
MCV RBC AUTO: 89.1 FL (ref 74–97)
MONOCYTES # BLD: 0.7 K/UL (ref 0.05–1.2)
MONOCYTES NFR BLD: 12 % (ref 3–10)
NEUTS SEG # BLD: 3.6 K/UL (ref 1.8–8)
NEUTS SEG NFR BLD: 65 % (ref 40–73)
PLATELET # BLD AUTO: 256 K/UL (ref 135–420)
PMV BLD AUTO: 8.6 FL (ref 9.2–11.8)
POTASSIUM SERPL-SCNC: 4.8 MMOL/L (ref 3.5–5.5)
RBC # BLD AUTO: 3.59 M/UL (ref 4.2–5.3)
SERVICE CMNT-IMP: ABNORMAL
SODIUM SERPL-SCNC: 140 MMOL/L (ref 136–145)
WBC # BLD AUTO: 5.6 K/UL (ref 4.6–13.2)

## 2017-10-17 PROCEDURE — 74011000258 HC RX REV CODE- 258: Performed by: INTERNAL MEDICINE

## 2017-10-17 PROCEDURE — 74011250636 HC RX REV CODE- 250/636: Performed by: INTERNAL MEDICINE

## 2017-10-17 PROCEDURE — 83735 ASSAY OF MAGNESIUM: CPT | Performed by: HOSPITALIST

## 2017-10-17 PROCEDURE — 74011636637 HC RX REV CODE- 636/637: Performed by: HOSPITALIST

## 2017-10-17 PROCEDURE — 36415 COLL VENOUS BLD VENIPUNCTURE: CPT | Performed by: HOSPITALIST

## 2017-10-17 PROCEDURE — 74011250637 HC RX REV CODE- 250/637: Performed by: INTERNAL MEDICINE

## 2017-10-17 PROCEDURE — 65270000029 HC RM PRIVATE

## 2017-10-17 PROCEDURE — 85025 COMPLETE CBC W/AUTO DIFF WBC: CPT | Performed by: HOSPITALIST

## 2017-10-17 PROCEDURE — 74011250637 HC RX REV CODE- 250/637: Performed by: HOSPITALIST

## 2017-10-17 PROCEDURE — 74011000250 HC RX REV CODE- 250: Performed by: INTERNAL MEDICINE

## 2017-10-17 PROCEDURE — 82550 ASSAY OF CK (CPK): CPT | Performed by: HOSPITALIST

## 2017-10-17 PROCEDURE — 80048 BASIC METABOLIC PNL TOTAL CA: CPT | Performed by: HOSPITALIST

## 2017-10-17 PROCEDURE — 82962 GLUCOSE BLOOD TEST: CPT

## 2017-10-17 RX ORDER — DOCUSATE SODIUM 100 MG/1
100 CAPSULE, LIQUID FILLED ORAL DAILY
Status: DISCONTINUED | OUTPATIENT
Start: 2017-10-18 | End: 2017-10-20 | Stop reason: HOSPADM

## 2017-10-17 RX ADMIN — HEPARIN SODIUM 5000 UNITS: 5000 INJECTION, SOLUTION INTRAVENOUS; SUBCUTANEOUS at 08:54

## 2017-10-17 RX ADMIN — CLINDAMYCIN PHOSPHATE 900 MG: 900 INJECTION, SOLUTION INTRAVENOUS at 17:00

## 2017-10-17 RX ADMIN — HEPARIN SODIUM 5000 UNITS: 5000 INJECTION, SOLUTION INTRAVENOUS; SUBCUTANEOUS at 00:32

## 2017-10-17 RX ADMIN — Medication 5 MG: at 00:28

## 2017-10-17 RX ADMIN — HEPARIN SODIUM 5000 UNITS: 5000 INJECTION, SOLUTION INTRAVENOUS; SUBCUTANEOUS at 16:50

## 2017-10-17 RX ADMIN — MICONAZOLE NITRATE 1 APPLICATOR: 20 CREAM VAGINAL at 17:32

## 2017-10-17 RX ADMIN — LORAZEPAM 1 MG: 1 TABLET ORAL at 03:46

## 2017-10-17 RX ADMIN — DOCUSATE SODIUM 100 MG: 100 CAPSULE, LIQUID FILLED ORAL at 00:28

## 2017-10-17 RX ADMIN — CLINDAMYCIN PHOSPHATE 900 MG: 900 INJECTION, SOLUTION INTRAVENOUS at 10:00

## 2017-10-17 RX ADMIN — HYDROMORPHONE HYDROCHLORIDE 4 MG: 4 TABLET ORAL at 03:46

## 2017-10-17 RX ADMIN — AZTREONAM 1 G: 1 INJECTION, POWDER, LYOPHILIZED, FOR SOLUTION INTRAMUSCULAR; INTRAVENOUS at 09:27

## 2017-10-17 RX ADMIN — Medication 5 MG: at 22:20

## 2017-10-17 RX ADMIN — DAPTOMYCIN 326 MG: 500 INJECTION, POWDER, LYOPHILIZED, FOR SOLUTION INTRAVENOUS at 19:00

## 2017-10-17 RX ADMIN — CYCLOBENZAPRINE HYDROCHLORIDE 10 MG: 10 TABLET, FILM COATED ORAL at 00:39

## 2017-10-17 RX ADMIN — HYDROMORPHONE HYDROCHLORIDE 4 MG: 4 TABLET ORAL at 09:31

## 2017-10-17 RX ADMIN — ROPINIROLE HYDROCHLORIDE 4 MG: 1 TABLET, FILM COATED ORAL at 00:27

## 2017-10-17 RX ADMIN — TRAMADOL HYDROCHLORIDE 50 MG: 50 TABLET, FILM COATED ORAL at 00:28

## 2017-10-17 RX ADMIN — CYANOCOBALAMIN TAB 500 MCG 1000 MCG: 500 TAB at 08:51

## 2017-10-17 RX ADMIN — INSULIN GLARGINE 5 UNITS: 100 INJECTION, SOLUTION SUBCUTANEOUS at 22:20

## 2017-10-17 RX ADMIN — CLONIDINE HYDROCHLORIDE 0.1 MG: 0.1 TABLET ORAL at 00:28

## 2017-10-17 RX ADMIN — INSULIN GLARGINE 5 UNITS: 100 INJECTION, SOLUTION SUBCUTANEOUS at 00:29

## 2017-10-17 RX ADMIN — CLONIDINE HYDROCHLORIDE 0.1 MG: 0.1 TABLET ORAL at 08:54

## 2017-10-17 RX ADMIN — MAGNESIUM HYDROXIDE 30 ML: 400 SUSPENSION ORAL at 01:02

## 2017-10-17 RX ADMIN — AZTREONAM 1 G: 1 INJECTION, POWDER, LYOPHILIZED, FOR SOLUTION INTRAMUSCULAR; INTRAVENOUS at 17:30

## 2017-10-17 RX ADMIN — CLINDAMYCIN PHOSPHATE 900 MG: 900 INJECTION, SOLUTION INTRAVENOUS at 00:26

## 2017-10-17 RX ADMIN — ONDANSETRON HYDROCHLORIDE 4 MG: 4 TABLET, FILM COATED ORAL at 09:32

## 2017-10-17 RX ADMIN — MICONAZOLE NITRATE 1 APPLICATOR: 20 CREAM VAGINAL at 00:36

## 2017-10-17 RX ADMIN — HYDROMORPHONE HYDROCHLORIDE 4 MG: 4 TABLET ORAL at 17:42

## 2017-10-17 RX ADMIN — CLONIDINE HYDROCHLORIDE 0.1 MG: 0.1 TABLET ORAL at 22:20

## 2017-10-17 RX ADMIN — Medication 5 MG: at 09:31

## 2017-10-17 RX ADMIN — ROPINIROLE HYDROCHLORIDE 4 MG: 1 TABLET, FILM COATED ORAL at 22:20

## 2017-10-17 RX ADMIN — AZTREONAM 1 G: 1 INJECTION, POWDER, LYOPHILIZED, FOR SOLUTION INTRAMUSCULAR; INTRAVENOUS at 00:26

## 2017-10-17 NOTE — PROGRESS NOTES
Hospitalist Progress Note-critical care note     Patient: Jan Olivares MRN: [id number]  CSN: [id number]    YOB: 1950  Age: 78 y.o. Sex: female    DOA: 10/12/2017 LOS:  LOS: 5 days            Chief complaint: OM ,cellulitis . Dm  . Hypokalemia     Assessment/Plan         Hospital Problems  Date Reviewed: 10/12/2017          Codes Class Noted POA    Osteomyelitis of right foot ([facility] ICD-10-CM: M86.9  ICD-9-CM: 730.27  10/16/2017 Unknown        Hypokalemia ICD-10-CM: E87.6  ICD-9-CM: 276.8  10/16/2017 Unknown        Constipation ICD-10-CM: K59.00  ICD-9-CM: 564.00  10/15/2017 Unknown        Anxiety ICD-10-CM: F41.9  ICD-9-CM: 300.00  10/15/2017 Unknown        PAD (peripheral artery disease) (HCC) (Chronic) ICD-10-CM: I73.9  ICD-9-CM: 443.9  7/22/2017 Yes        Cellulitis of right foot ICD-10-CM: L03.115  ICD-9-CM: 682.7  12/27/2016 Yes        Atherosclerotic PVD with ulceration (HCC) (Chronic) ICD-10-CM: I70.209, L98.499  ICD-9-CM: 440.23, 707.9  3/13/2015 Yes        Diabetes ([facility] ICD-10-CM: E11.9  ICD-9-CM: 250.00  1/1/2013 Yes        Iron deficiency anemia ICD-10-CM: D50.9  ICD-9-CM: 280.9  5/23/2012 Yes        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 ICD-10-CM: Z90.3, Z98.0  ICD-9-CM: V45.89  5/23/2012 Yes              1. right foot cellulitis, with abscess, OM   continue doing well   Cx: strep and staph   Dr. Jann Valenzuela did I & D 10/14,   ID on board : continue on azactam and daptomycin -no muscle pain reported, ck wnl   Wound care   Pt/ot     2 HTN  Continue clonidine     3. Uncontrolled DM  a1c 8.1 ,refused to have diabetic diet ,   -regular diet except corn on  Cobs and creamy corn and corn bread     ssi and lantus 5 units -well controlled per current meds     4. Asthma  Stable , breathing tx prn     5. Hx of Pancreatitis s/p Sameer en Y    6. Depression   controlled     7.  PVD s/p multiple iliac stents     8 constipation   resolved , BM today , will change colace once a day   9 anxiety   Well controlled  continue ativan     10 hypokalemia   K replacement     Subjective: I feel fine, finally have a  BM     Nurse: no acute issue     D/c planning, in 24-48 hr after finalize abx and clear per podiatry     Review of systems:    General: No fevers or chills. Cardiovascular: No chest pain or pressure. No palpitations. Pulmonary: No shortness of breath. Gastrointestinal: No nausea, vomiting. Vital signs/Intake and Output:  Visit Vitals    /55 (BP 1 Location: Left arm, BP Patient Position: At rest)    Pulse 94    Temp 98.8 °F (37.1 °C)    Resp 18    Ht 5' 5\" (1.651 m)    Wt 60.4 kg (133 lb 1.6 oz)    SpO2 98%    BMI 22.15 kg/m2     Current Shift:     Last three shifts:  10/15 1901 - 10/17 0700  In: 570 [P.O.:120; I.V.:450]  Out: 1955 [VTARY:7198; Drains:5]    Physical Exam:  General: WD, WN. Alert, cooperative, no acute distress    HEENT: NC, Atraumatic. PERRLA, anicteric sclerae. Lungs: CTA Bilaterally. No Wheezing/Rhonchi/Rales. Heart:  Regular  rhythm,  No murmur, No Rubs, No Gallops  Abdomen: Soft, Non distended, Non tender.  +Bowel sounds,   Extremities: No c/c, rt foot wrapped with gauze , katy tube with small amount bloody discharge noted  Psych:   Not anxious or agitated. Neurologic:  No acute neurological deficit.              Labs: Results:       Chemistry Recent Labs      10/17/17   0311  10/16/17   0530  10/15/17   0603   GLU  137*  139*  92   NA  140  137  138   K  4.8  3.3*  3.5   CL  105  98*  102   CO2  30  28  27   BUN  15  11  8   CREA  0.65  0.66  0.46*   CA  8.9  8.7  8.6   AGAP  5  11  9   BUCR  23*  17  17      CBC w/Diff Recent Labs      10/17/17   0311  10/16/17   0530  10/15/17   0603   WBC  5.6  6.9  7.9   RBC  3.59*  4.13*  3.73*   HGB  10.8*  12.4  11.2*   HCT  32.0*  37.0  33.0*   PLT  256  305  260   GRANS  65  68  68   LYMPH  21  20*  22   EOS  1  1  0      Cardiac Enzymes Recent Labs      10/17/17   0311   CPK  186      Coagulation No results for input(s): PTP, INR, APTT in the last 72 hours. No lab exists for component: INREXT, INREXT    Lipid Panel No results found for: CHOL, CHOLPOCT, CHOLX, CHLST, CHOLV, 265156, HDL, LDL, LDLC, DLDLP, 098623, VLDLC, VLDL, TGLX, TRIGL, TRIGP, TGLPOCT, CHHD, CHHDX   BNP No results for input(s): BNPP in the last 72 hours. Liver Enzymes No results for input(s): TP, ALB, TBIL, AP, SGOT, GPT in the last 72 hours.     No lab exists for component: DBIL   Thyroid Studies No results found for: T4, T3U, TSH, TSHEXT, TSHEXT     Procedures/imaging: see electronic medical records for all procedures/Xrays and details which were not copied into this note but were reviewed prior to creation of Maricruz Cisneros MD

## 2017-10-17 NOTE — PROGRESS NOTES
0796  Received report. Patient resting in bed with no acute distress noted. Dressing to right foot clean,dry and intact. ANDER drain in place to right foot draining small amount of serosanguinous fluid. Denies pain. Call light within reach. 1133  Patient resting in. Denies pain. 1500  No acute distress noted. Call light within reach. 1600   Changed dressing to right foot per MD order. Bedside and Verbal shift change report given to [facility] (oncoming nurse) by Marycarmen Moss (offgoing nurse). Report included the following information SBAR and Kardex.

## 2017-10-17 NOTE — PROGRESS NOTES
ID Progress Note    Antibiotics:  daptomycin  Aztreonam  Clindamycin         abx day 4 from OR    CC: R foot infection    S: Ms. Amelia Shearer is a 68y/o female seen in f/u for R diabetic foot infection with abscess. Patient has had multiple surgeries on the R foot including a R great toe amputation on 9/9/16, excision of the R 1st MT on 12/13/16, excision of the snd, 3rd, and 4th metatarsals on 9/15/17 and now partial excision of the remaining right 2nd and 4th metatarsal on 10/14/17. Pt anxious to leave. Reports that dressing had to be removed for continued drainage. She denies any other issues. No fever or chills  nilda diet well - does not eat much  No rash   No issues with peripheral IV    Physical Exam:  Temp:  [97.7 °F (36.5 °C)-99.3 °F (37.4 °C)]   Pulse (Heart Rate):  []   BP: (121-168)/()   Resp Rate:  [16-20]   O2 Sat (%):  [90 %-100 %]   Weight:  [59.8 kg (131 lb 14.4 oz)-60.4 kg (133 lb 1.6 oz)]   Gen: WDWN WF, AAOX3, NAD  HEENT: no icterus, no thrush  Abd: soft, NTND  Ext:  R foot with open wound laterally with drainage of creamy fluid with slight odor. Erythema of wound with some edema. Pain only in one area at site of drain which has serosanguinous drainage.        Labs/Rad reviewed  Lab Results   Component Value Date/Time    WBC 5.6 10/17/2017 03:11 AM    HGB 10.8 10/17/2017 03:11 AM    HCT 32.0 10/17/2017 03:11 AM    PLATELET 634 08/62/3457 03:11 AM    MCV 89.1 10/17/2017 03:11 AM     Lab Results   Component Value Date/Time    Sodium 140 10/17/2017 03:11 AM    Potassium 4.8 10/17/2017 03:11 AM    Chloride 105 10/17/2017 03:11 AM    CO2 30 10/17/2017 03:11 AM    Anion gap 5 10/17/2017 03:11 AM    Glucose 137 10/17/2017 03:11 AM    BUN 15 10/17/2017 03:11 AM    Creatinine 0.65 10/17/2017 03:11 AM    BUN/Creatinine ratio 23 10/17/2017 03:11 AM    GFR est AA >60 10/17/2017 03:11 AM    GFR est non-AA >60 10/17/2017 03:11 AM    Calcium 8.9 10/17/2017 03:11 AM     10/17     9/1/17: surface culture: MSSA, E. Coli, KP, Enterococcus  9/15/17: wound: s. Epi and strep viridans  10/12/17; blood: NG  10/13/17: wound: moderate GNR (although few GPC on gram stain)                     Proteus mirabilis, S viridans, MSSA      10/14: wound: rare GPC in pairs (surgical culture)                      Few proteus mirabilis, E faecalis, alpha strept, MSSA    10/14 Pathology:  BONE, RIGHT FOREFOOT, BIOPSY:   BENIGN BONE WITH SCARRING AND REORGANIZATION. BENIGN SOFT TISSUE FRAGMENTS WITH SCARRING. NO ACUTE INFLAMMATION IS SEEN. SEE COMMENT. DIAGNOSIS COMMENT:   Morphologic features suggest healing/resolving acute osteomyelitis. Clinical correlation is required. Assessment/Recommendations  1. R diabetic foot infection with prior OM in a 68y/o female with multiple R foot surgeries  -patient is s/p R great toe amputation on 9/9/16  -s/p excision of R 1st metatarsal on 12/13/16  -s/p excision of 2nd-4th metatarsals on 9/15/17  -now s/p excision of remaining 2nd and4th metatarsal on 10/14/17  -superficial culture with GNR, deep culture with GPC in pairs  -prior cultures with MSSA, E. Coli, KP, enterococcus, s. Epi, and s. Viridans              Current growth with Proteus, Strept species, and Staph aureus  -complicated by multiple antibiotic allergies  -currently on dapto, aztreonam, and clinda    ==> continue current antibiotics  ==> f/u on culture results and pathology  ==> suspect she will require a short course of antibiotics given likely surgical cure of OM                   Need to evaluate wound for appropriate healing before make final recommendation                   Pathology reveals resolving osteomyelitis  ==>  Await further input from podiatry regarding possible further debridement  ==> monitor CK level q week while on daptomycin    2. Multiple medical problems  -include gastrectomy, DM, PVD, anemia    3.  Multiple antibiotic allergies  -include fluoroquinolones, b-lactams, vancomycin, erythromycin, and azithromycin -->all consisted of urticaria or angioedema

## 2017-10-17 NOTE — ROUTINE PROCESS
Bedside and Verbal shift change report given to Bere Crowley RN (oncoming nurse) by Tiki Parrish RN (offgoing nurse). Report included the following information SBAR, Kardex, ED Summary, Procedure Summary, Intake/Output, MAR, Recent Results and Med Rec Status.

## 2017-10-18 LAB
ANION GAP SERPL CALC-SCNC: 8 MMOL/L (ref 3–18)
BACTERIA SPEC CULT: ABNORMAL
BACTERIA SPEC CULT: NORMAL
BACTERIA SPEC CULT: NORMAL
BASOPHILS # BLD: 0 K/UL (ref 0–0.06)
BASOPHILS NFR BLD: 1 % (ref 0–2)
BUN SERPL-MCNC: 14 MG/DL (ref 7–18)
BUN/CREAT SERPL: 20 (ref 12–20)
CALCIUM SERPL-MCNC: 8.8 MG/DL (ref 8.5–10.1)
CHLORIDE SERPL-SCNC: 100 MMOL/L (ref 100–108)
CO2 SERPL-SCNC: 29 MMOL/L (ref 21–32)
CREAT SERPL-MCNC: 0.71 MG/DL (ref 0.6–1.3)
DIFFERENTIAL METHOD BLD: ABNORMAL
EOSINOPHIL # BLD: 0.1 K/UL (ref 0–0.4)
EOSINOPHIL NFR BLD: 2 % (ref 0–5)
ERYTHROCYTE [DISTWIDTH] IN BLOOD BY AUTOMATED COUNT: 16.8 % (ref 11.6–14.5)
GLUCOSE BLD STRIP.AUTO-MCNC: 118 MG/DL (ref 70–110)
GLUCOSE BLD STRIP.AUTO-MCNC: 143 MG/DL (ref 70–110)
GLUCOSE BLD STRIP.AUTO-MCNC: 169 MG/DL (ref 70–110)
GLUCOSE BLD STRIP.AUTO-MCNC: 172 MG/DL (ref 70–110)
GLUCOSE SERPL-MCNC: 123 MG/DL (ref 74–99)
GRAM STN SPEC: ABNORMAL
GRAM STN SPEC: ABNORMAL
HCT VFR BLD AUTO: 34.6 % (ref 35–45)
HGB BLD-MCNC: 11.5 G/DL (ref 12–16)
LYMPHOCYTES # BLD: 1.6 K/UL (ref 0.9–3.6)
LYMPHOCYTES NFR BLD: 36 % (ref 21–52)
MAGNESIUM SERPL-MCNC: 1.8 MG/DL (ref 1.6–2.6)
MCH RBC QN AUTO: 30.7 PG (ref 24–34)
MCHC RBC AUTO-ENTMCNC: 33.2 G/DL (ref 31–37)
MCV RBC AUTO: 92.3 FL (ref 74–97)
MONOCYTES # BLD: 0.5 K/UL (ref 0.05–1.2)
MONOCYTES NFR BLD: 10 % (ref 3–10)
NEUTS SEG # BLD: 2.3 K/UL (ref 1.8–8)
NEUTS SEG NFR BLD: 51 % (ref 40–73)
PLATELET # BLD AUTO: 304 K/UL (ref 135–420)
PMV BLD AUTO: 8.7 FL (ref 9.2–11.8)
POTASSIUM SERPL-SCNC: 3.9 MMOL/L (ref 3.5–5.5)
RBC # BLD AUTO: 3.75 M/UL (ref 4.2–5.3)
SERVICE CMNT-IMP: ABNORMAL
SERVICE CMNT-IMP: NORMAL
SERVICE CMNT-IMP: NORMAL
SODIUM SERPL-SCNC: 137 MMOL/L (ref 136–145)
WBC # BLD AUTO: 4.5 K/UL (ref 4.6–13.2)

## 2017-10-18 PROCEDURE — 74011250636 HC RX REV CODE- 250/636: Performed by: INTERNAL MEDICINE

## 2017-10-18 PROCEDURE — 74011250637 HC RX REV CODE- 250/637: Performed by: HOSPITALIST

## 2017-10-18 PROCEDURE — 74011000258 HC RX REV CODE- 258: Performed by: INTERNAL MEDICINE

## 2017-10-18 PROCEDURE — 74011000250 HC RX REV CODE- 250: Performed by: INTERNAL MEDICINE

## 2017-10-18 PROCEDURE — 74011250637 HC RX REV CODE- 250/637: Performed by: INTERNAL MEDICINE

## 2017-10-18 PROCEDURE — 85025 COMPLETE CBC W/AUTO DIFF WBC: CPT | Performed by: HOSPITALIST

## 2017-10-18 PROCEDURE — 82962 GLUCOSE BLOOD TEST: CPT

## 2017-10-18 PROCEDURE — 83735 ASSAY OF MAGNESIUM: CPT | Performed by: HOSPITALIST

## 2017-10-18 PROCEDURE — 74011636637 HC RX REV CODE- 636/637: Performed by: HOSPITALIST

## 2017-10-18 PROCEDURE — 36415 COLL VENOUS BLD VENIPUNCTURE: CPT | Performed by: HOSPITALIST

## 2017-10-18 PROCEDURE — 80048 BASIC METABOLIC PNL TOTAL CA: CPT | Performed by: HOSPITALIST

## 2017-10-18 PROCEDURE — 65270000029 HC RM PRIVATE

## 2017-10-18 RX ORDER — FLUCONAZOLE 100 MG/1
150 TABLET ORAL ONCE
Status: COMPLETED | OUTPATIENT
Start: 2017-10-18 | End: 2017-10-18

## 2017-10-18 RX ORDER — ASPIRIN 325 MG
1 TABLET, DELAYED RELEASE (ENTERIC COATED) ORAL
Status: DISCONTINUED | OUTPATIENT
Start: 2017-10-18 | End: 2017-10-18 | Stop reason: ALTCHOICE

## 2017-10-18 RX ADMIN — CLINDAMYCIN PHOSPHATE 900 MG: 900 INJECTION, SOLUTION INTRAVENOUS at 00:28

## 2017-10-18 RX ADMIN — DOCUSATE SODIUM 100 MG: 100 CAPSULE, LIQUID FILLED ORAL at 09:40

## 2017-10-18 RX ADMIN — CLONIDINE HYDROCHLORIDE 0.1 MG: 0.1 TABLET ORAL at 22:07

## 2017-10-18 RX ADMIN — CYCLOBENZAPRINE HYDROCHLORIDE 10 MG: 10 TABLET, FILM COATED ORAL at 22:12

## 2017-10-18 RX ADMIN — HYDROMORPHONE HYDROCHLORIDE 4 MG: 4 TABLET ORAL at 09:40

## 2017-10-18 RX ADMIN — CLINDAMYCIN PHOSPHATE 900 MG: 900 INJECTION, SOLUTION INTRAVENOUS at 17:01

## 2017-10-18 RX ADMIN — AZTREONAM 1 G: 1 INJECTION, POWDER, LYOPHILIZED, FOR SOLUTION INTRAMUSCULAR; INTRAVENOUS at 00:28

## 2017-10-18 RX ADMIN — FLUCONAZOLE 150 MG: 100 TABLET ORAL at 13:44

## 2017-10-18 RX ADMIN — Medication 5 MG: at 09:00

## 2017-10-18 RX ADMIN — HEPARIN SODIUM 5000 UNITS: 5000 INJECTION, SOLUTION INTRAVENOUS; SUBCUTANEOUS at 09:42

## 2017-10-18 RX ADMIN — HEPARIN SODIUM 5000 UNITS: 5000 INJECTION, SOLUTION INTRAVENOUS; SUBCUTANEOUS at 17:02

## 2017-10-18 RX ADMIN — TRAMADOL HYDROCHLORIDE 50 MG: 50 TABLET, FILM COATED ORAL at 22:12

## 2017-10-18 RX ADMIN — AZTREONAM 1 G: 1 INJECTION, POWDER, LYOPHILIZED, FOR SOLUTION INTRAMUSCULAR; INTRAVENOUS at 09:40

## 2017-10-18 RX ADMIN — CYANOCOBALAMIN TAB 500 MCG 1000 MCG: 500 TAB at 09:40

## 2017-10-18 RX ADMIN — AZTREONAM 1 G: 1 INJECTION, POWDER, LYOPHILIZED, FOR SOLUTION INTRAMUSCULAR; INTRAVENOUS at 18:00

## 2017-10-18 RX ADMIN — INSULIN LISPRO 2 UNITS: 100 INJECTION, SOLUTION INTRAVENOUS; SUBCUTANEOUS at 17:03

## 2017-10-18 RX ADMIN — ONDANSETRON HYDROCHLORIDE 4 MG: 4 TABLET, FILM COATED ORAL at 13:44

## 2017-10-18 RX ADMIN — Medication 5 MG: at 22:07

## 2017-10-18 RX ADMIN — DAPTOMYCIN 326 MG: 500 INJECTION, POWDER, LYOPHILIZED, FOR SOLUTION INTRAVENOUS at 16:20

## 2017-10-18 RX ADMIN — INSULIN GLARGINE 5 UNITS: 100 INJECTION, SOLUTION SUBCUTANEOUS at 22:09

## 2017-10-18 RX ADMIN — HYDROMORPHONE HYDROCHLORIDE 4 MG: 4 TABLET ORAL at 01:39

## 2017-10-18 RX ADMIN — CLINDAMYCIN PHOSPHATE 900 MG: 900 INJECTION, SOLUTION INTRAVENOUS at 09:41

## 2017-10-18 RX ADMIN — HEPARIN SODIUM 5000 UNITS: 5000 INJECTION, SOLUTION INTRAVENOUS; SUBCUTANEOUS at 00:27

## 2017-10-18 RX ADMIN — CLONIDINE HYDROCHLORIDE 0.1 MG: 0.1 TABLET ORAL at 09:40

## 2017-10-18 RX ADMIN — ROPINIROLE HYDROCHLORIDE 4 MG: 1 TABLET, FILM COATED ORAL at 22:07

## 2017-10-18 NOTE — PROGRESS NOTES
Shift Summary:  A usual night. Pain medication on request. Right foot dressing dry and intact with ANDER drain  draining small amount of pink drainage. Up at bedside ad ivory.

## 2017-10-18 NOTE — PROGRESS NOTES
Hospitalist Progress Note    Patient: Ciarra Hussein MRN: [id number]  CSN: [id number]    YOB: 1950  Age: 78 y.o. Sex: female    DOA: 10/12/2017 LOS:  LOS: 6 days          Chief Complaint:          Assessment/Plan       Disposition :  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 Z90.3, Z98.0    Atherosclerotic PVD with ulceration ([facility]) I70.209, L98.499    Syncope R55    Anemia associated with acute blood loss D62    Anemia D64.9    Abscess of right foot L02.611    Cellulitis of right foot L03.115    Lupus anticoagulant syndrome ([facility]) D68.62    Sepsis ([facility] A41.9    Diabetes ([facility] E11.9    Chronic pain G89.29    PAD (peripheral artery disease) ([facility]) I73.9    Hyponatremia E87.1    Sleepwalking disorder F51.3    CAP (community acquired pneumonia) J18.9    Constipation K59.00    Anxiety F41.9    Osteomyelitis of right foot ([facility]) M86.9    Hypokalemia E87.6       1. Continue antibiotics as directed by Dr. Hardin Folds. Appreciate consult. 2.  Continue bp meds, follow dm. 3.  Appreciate Podiatry consult. 4.  Abx planning per ID.  5.  Rx antifungal for vaginal yeast infection. Subjective:    Seen and examined with Dr. Navjot Livingston present. Remains on iv abx for infection. Case reviewed.        Review of systems:    Constitutional: denies fevers, chills, myalgias  Respiratory: denies SOB, cough  Cardiovascular: denies chest pain, palpitations  Gastrointestinal: denies nausea, vomiting, diarrhea      Vital signs/Intake and Output:  Visit Vitals    /73    Pulse 93    Temp 98 °F (36.7 °C)    Resp 18    Ht 5' 5\" (1.651 m)    Wt 60.4 kg (133 lb 1.6 oz)    SpO2 100%    BMI 22.15 kg/m2     Current Shift:  10/18 0701 - 10/18 1900  In: 480 [P.O.:480]  Out: 900 [Urine:900]  Last three shifts:  10/16 1901 - 10/18 0700  In: 740 [P.O.:240; I.V.:500]  Out: 2130 [Urine:2100; Drains:30]    Exam:    General: Well developed, alert, NAD, OX3  Head/Neck: NCAT, supple, No masses, No lymphadenopathy  CVS:Regular rate and rhythm, no M/R/G, S1/S2 heard, no thrill  Lungs:Clear to auscultation bilaterally, no wheezes, rhonchi, or rales  Abdomen: Soft, Nontender, No distention, Normal Bowel sounds, No hepatomegaly                  Labs: Results:       Chemistry Recent Labs      10/18/17   0530  10/17/17   0311  10/16/17   0530   GLU  123*  137*  139*   NA  137  140  137   K  3.9  4.8  3.3*   CL  100  105  98*   CO2  29  30  28   BUN  14  15  11   CREA  0.71  0.65  0.66   CA  8.8  8.9  8.7   AGAP  8  5  11   BUCR  20  23*  17      CBC w/Diff Recent Labs      10/18/17   0530  10/17/17   0311  10/16/17   0530   WBC  4.5*  5.6  6.9   RBC  3.75*  3.59*  4.13*   HGB  11.5*  10.8*  12.4   HCT  34.6*  32.0*  37.0   PLT  304  256  305   GRANS  51  65  68   LYMPH  36  21  20*   EOS  2  1  1      Cardiac Enzymes Recent Labs      10/17/17   0311   CPK  186      Coagulation No results for input(s): PTP, INR, APTT in the last 72 hours. No lab exists for component: INREXT    Lipid Panel No results found for: CHOL, CHOLPOCT, CHOLX, CHLST, CHOLV, 962161, HDL, LDL, LDLC, DLDLP, 887374, VLDLC, VLDL, TGLX, TRIGL, TRIGP, TGLPOCT, CHHD, CHHDX   BNP No results for input(s): BNPP in the last 72 hours. Liver Enzymes No results for input(s): TP, ALB, TBIL, AP, SGOT, GPT in the last 72 hours.     No lab exists for component: DBIL   Thyroid Studies No results found for: T4, T3U, TSH, TSHEXT     Procedures/imaging: see electronic medical records for all procedures/Xrays and details which were not copied into this note but were reviewed prior to creation of [place] MD

## 2017-10-18 NOTE — PROGRESS NOTES
Problem: Falls - Risk of  Goal: *Absence of Falls  Document Cristine Fall Risk and appropriate interventions in the flowsheet.    Outcome: Progressing Towards Goal  Fall Risk Interventions:  Mobility Interventions: Patient to call before getting OOB         Medication Interventions: Evaluate medications/consider consulting pharmacy, Teach patient to arise slowly, Patient to call before getting OOB    Elimination Interventions: Call light in reach, Patient to call for help with toileting needs

## 2017-10-18 NOTE — PROGRESS NOTES
SHIFT SUMMARY:  Pt has been stable evening shift. No major concerns noted or verbalized. No major concerns noted or verbalized. Bedside shift change report given to FATEMEH Ruff RN.  Report included the following information SBAR, Kardex, Intake/Output, MAR and Recent Results.

## 2017-10-18 NOTE — ROUTINE PROCESS
Bedside and Verbal shift change report given to SUN Power RN  (oncoming nurse) by  ADWOA Ruff RN  (offgoing nurse). Report included the following information SBAR, Kardex, Recent Results and Med Rec Status.

## 2017-10-18 NOTE — PROGRESS NOTES
Daptomycin/ aztreonam/ clinda #5  Rec: 1. Rt foot osteomyelitis    S/p distal amputation    \"I have a flipper\"      Polymicrobial isolates    Most significant mssa       Likely surgical cure om    Ongoing need for parenteral abx for complicated soft tissue infection    Multiple allergies - rash/ hives w/pcn/ cephalosporin/ fq/ glycopeptides     Not a contraindication to linezolid     Ongoing tramadol due to nsaid allergy     Prn flexeril \"not working\"     These agent are not to be coadministered w/linezolid d/t risk serotonin      ==> continue parenteral abx    ==> local care    ==> will ask case management to price linezolid    ==> if linezolid cannot be afforded  ==> picc placement         ==> daptomycin daily at infusion     Anticipate ~10 days abx on discharge   D/w patient at length    Subjective: Cc:\"my right foot is infected\"    No n/v/d    No cough/sputum    No abd pain     No rash    PE:   Visit Vitals    /73    Pulse 93    Temp 98 °F (36.7 °C)    Resp 18    Ht 5' 5\" (1.651 m)    Wt 60.4 kg (133 lb 1.6 oz)    SpO2 100%    BMI 22.15 kg/m2       Heent: O/c clear. No icterus. No thrush No ulcer. Neck: Supple   Chest: CTA Bilat No exp wheeze   CV: Nl s1s2 No murmurs   Abd: Soft NTND no reboun No masses   Ext:    Tr edema No rash. Rt foot dressing in place. Drain w/serous discharge    No proximal extension erythema    Lab:   Plain film rt foot indepedntly removed. Post surgical changes.  No focal destruction c/w chronic om  Recent Results (from the past 24 hour(s))   GLUCOSE, POC    Collection Time: 10/17/17  4:36 PM   Result Value Ref Range    Glucose (POC) 105 70 - 110 mg/dL   GLUCOSE, POC    Collection Time: 10/17/17  9:13 PM   Result Value Ref Range    Glucose (POC) 95 70 - 110 mg/dL   MAGNESIUM    Collection Time: 10/18/17  5:30 AM   Result Value Ref Range    Magnesium 1.8 1.6 - 2.6 mg/dL   METABOLIC PANEL, BASIC    Collection Time: 10/18/17  5:30 AM   Result Value Ref Range    Sodium 137 136 - 145 mmol/L    Potassium 3.9 3.5 - 5.5 mmol/L    Chloride 100 100 - 108 mmol/L    CO2 29 21 - 32 mmol/L    Anion gap 8 3.0 - 18 mmol/L    Glucose 123 (H) 74 - 99 mg/dL    BUN 14 7.0 - 18 MG/DL    Creatinine 0.71 0.6 - 1.3 MG/DL    BUN/Creatinine ratio 20 12 - 20      GFR est AA >60 >60 ml/min/1.73m2    GFR est non-AA >60 >60 ml/min/1.73m2    Calcium 8.8 8.5 - 10.1 MG/DL   CBC WITH AUTOMATED DIFF    Collection Time: 10/18/17  5:30 AM   Result Value Ref Range    WBC 4.5 (L) 4.6 - 13.2 K/uL    RBC 3.75 (L) 4.20 - 5.30 M/uL    HGB 11.5 (L) 12.0 - 16.0 g/dL    HCT 34.6 (L) 35.0 - 45.0 %    MCV 92.3 74.0 - 97.0 FL    MCH 30.7 24.0 - 34.0 PG    MCHC 33.2 31.0 - 37.0 g/dL    RDW 16.8 (H) 11.6 - 14.5 %    PLATELET 603 328 - 248 K/uL    MPV 8.7 (L) 9.2 - 11.8 FL    NEUTROPHILS 51 40 - 73 %    LYMPHOCYTES 36 21 - 52 %    MONOCYTES 10 3 - 10 %    EOSINOPHILS 2 0 - 5 %    BASOPHILS 1 0 - 2 %    ABS. NEUTROPHILS 2.3 1.8 - 8.0 K/UL    ABS. LYMPHOCYTES 1.6 0.9 - 3.6 K/UL    ABS. MONOCYTES 0.5 0.05 - 1.2 K/UL    ABS. EOSINOPHILS 0.1 0.0 - 0.4 K/UL    ABS.  BASOPHILS 0.0 0.0 - 0.06 K/UL    DF AUTOMATED     GLUCOSE, POC    Collection Time: 10/18/17  8:46 AM   Result Value Ref Range    Glucose (POC) 118 (H) 70 - 110 mg/dL   GLUCOSE, POC    Collection Time: 10/18/17 11:37 AM   Result Value Ref Range    Glucose (POC) 143 (H) 70 - 110 mg/dL       Meds:     Current Facility-Administered Medications   Medication Dose Route Frequency Provider Last Rate Last Dose    aztreonam (AZACTAM) 1 g in 0.9% sodium chloride (MBP/ADV) 100 mL  1 g IntraVENous Q8H Kyra Hardy MD        docusate sodium (COLACE) capsule 100 mg  100 mg Oral DAILY Franki Arellano MD   100 mg at 10/18/17 0940    magnesium hydroxide (MILK OF MAGNESIA) 400 mg/5 mL oral suspension 30 mL  30 mL Oral DAILY PRN Leisa Holloway MD   30 mL at 10/17/17 0102    clindamycin (CLEOCIN) 900mg NS 50mL IVPB (premix)  900 mg IntraVENous Q8H Kyra Hardy MD   900 mg at 10/18/17 0941    insulin lispro (HUMALOG) injection   SubCUTAneous AC&HS Loli Hair MD   Stopped at 10/16/17 0730    glucose chewable tablet 16 g  4 Tab Oral PRN Loli Hair MD        glucagon [facility]) injection 1 mg  1 mg IntraMUSCular PRN Loli Hair MD        dextrose (D50W) injection syrg 12.5-25 g  25-50 mL IntraVENous PRN Loli Hair MD        insulin glargine (LANTUS) injection 5 Units  5 Units SubCUTAneous QHS Loli Hair MD   5 Units at 10/17/17 2220    albuterol (PROVENTIL HFA, VENTOLIN HFA, PROAIR HFA) inhaler 2 Puff  2 Puff Inhalation Q4H PRN Phillip Tan MD        cloNIDine HCl (CATAPRES) tablet 0.1 mg  0.1 mg Oral BID Phillip Tan MD   0.1 mg at 10/18/17 0940    dronabinol (MARINOL) capsule 2.5 mg  2.5 mg Oral TID PRN Phillip Tan MD        rOPINIRole (REQUIP) tablet 4 mg  4 mg Oral QHS Alex Salazar MD   4 mg at 10/17/17 2220    traMADol (ULTRAM) tablet 50 mg  50 mg Oral Q8H PRN Phillip Tan MD   50 mg at 10/17/17 0028    LORazepam (ATIVAN) tablet 1 mg  1 mg Oral Q6H PRN Phillip Tan MD   1 mg at 10/17/17 0346    Biotin (VITAMIN B7) tablet 5 mg  5 mg Oral DAILY Alex Salazar MD   5 mg at 10/18/17 0900    cyanocobalamin (VITAMIN B12) tablet 1,000 mcg  1,000 mcg Oral DAILY Phillip Tan MD   1,000 mcg at 10/18/17 0940    melatonin tablet 5 mg  5 mg Oral QHS Alex Salazar MD   5 mg at 10/17/17 2220    ondansetron hcl (ZOFRAN) tablet 4 mg  4 mg Oral Q8H PRN Phillip Tan MD   4 mg at 10/18/17 1344    HYDROmorphone (DILAUDID) tablet 4 mg  4 mg Oral Q6H PRN Phillip Tan MD   4 mg at 10/18/17 0940    cyclobenzaprine (FLEXERIL) tablet 10 mg  10 mg Oral TID PRN Phillip Tan MD   10 mg at 10/17/17 0039    DAPTOmycin (CUBICIN) 326 mg in 0.9% sodium chloride 50 mL IVPB RF formulation  6 mg/kg IntraVENous Q24H Alex Mike Salazar MD   326 mg at 10/17/17 1900    heparin (porcine) injection 5,000 Units  5,000 Units SubCUTAneous Lorie Pickering MD   5,000 Units at 10/18/17 0942    influenza vaccine 2017-18 (3 yrs+)(PF) (FLUZONE QUAD/FLUARIX QUAD) injection 0.5 mL  0.5 mL IntraMUSCular PRIOR TO DISCHARGE KUN Davis  [phone](IE)

## 2017-10-18 NOTE — PROGRESS NOTES
Forwarded information to [employer] company to attempt prior auth for Linezolid. Awaiting response. CM continuing to follow.

## 2017-10-19 LAB
ANION GAP SERPL CALC-SCNC: 9 MMOL/L (ref 3–18)
BACTERIA SPEC CULT: NORMAL
BASOPHILS # BLD: 0 K/UL (ref 0–0.06)
BASOPHILS NFR BLD: 1 % (ref 0–2)
BUN SERPL-MCNC: 14 MG/DL (ref 7–18)
BUN/CREAT SERPL: 19 (ref 12–20)
CALCIUM SERPL-MCNC: 9.2 MG/DL (ref 8.5–10.1)
CHLORIDE SERPL-SCNC: 100 MMOL/L (ref 100–108)
CO2 SERPL-SCNC: 29 MMOL/L (ref 21–32)
CREAT SERPL-MCNC: 0.72 MG/DL (ref 0.6–1.3)
DIFFERENTIAL METHOD BLD: ABNORMAL
EOSINOPHIL # BLD: 0.1 K/UL (ref 0–0.4)
EOSINOPHIL NFR BLD: 2 % (ref 0–5)
ERYTHROCYTE [DISTWIDTH] IN BLOOD BY AUTOMATED COUNT: 18.4 % (ref 11.6–14.5)
GLUCOSE BLD STRIP.AUTO-MCNC: 114 MG/DL (ref 70–110)
GLUCOSE BLD STRIP.AUTO-MCNC: 140 MG/DL (ref 70–110)
GLUCOSE BLD STRIP.AUTO-MCNC: 169 MG/DL (ref 70–110)
GLUCOSE BLD STRIP.AUTO-MCNC: 78 MG/DL (ref 70–110)
GLUCOSE SERPL-MCNC: 125 MG/DL (ref 74–99)
HCT VFR BLD AUTO: 36.1 % (ref 35–45)
HGB BLD-MCNC: 12.2 G/DL (ref 12–16)
LYMPHOCYTES # BLD: 1.9 K/UL (ref 0.9–3.6)
LYMPHOCYTES NFR BLD: 27 % (ref 21–52)
MAGNESIUM SERPL-MCNC: 1.8 MG/DL (ref 1.6–2.6)
MCH RBC QN AUTO: 31.8 PG (ref 24–34)
MCHC RBC AUTO-ENTMCNC: 33.8 G/DL (ref 31–37)
MCV RBC AUTO: 94 FL (ref 74–97)
MONOCYTES # BLD: 0.8 K/UL (ref 0.05–1.2)
MONOCYTES NFR BLD: 11 % (ref 3–10)
NEUTS SEG # BLD: 4.2 K/UL (ref 1.8–8)
NEUTS SEG NFR BLD: 59 % (ref 40–73)
PLATELET # BLD AUTO: 348 K/UL (ref 135–420)
PMV BLD AUTO: 8.9 FL (ref 9.2–11.8)
POTASSIUM SERPL-SCNC: 5.1 MMOL/L (ref 3.5–5.5)
RBC # BLD AUTO: 3.84 M/UL (ref 4.2–5.3)
SERVICE CMNT-IMP: NORMAL
SODIUM SERPL-SCNC: 138 MMOL/L (ref 136–145)
WBC # BLD AUTO: 7.1 K/UL (ref 4.6–13.2)

## 2017-10-19 PROCEDURE — 74011000258 HC RX REV CODE- 258: Performed by: INTERNAL MEDICINE

## 2017-10-19 PROCEDURE — 74011250637 HC RX REV CODE- 250/637: Performed by: INTERNAL MEDICINE

## 2017-10-19 PROCEDURE — 80048 BASIC METABOLIC PNL TOTAL CA: CPT | Performed by: HOSPITALIST

## 2017-10-19 PROCEDURE — 65270000029 HC RM PRIVATE

## 2017-10-19 PROCEDURE — 74011636637 HC RX REV CODE- 636/637: Performed by: HOSPITALIST

## 2017-10-19 PROCEDURE — 36415 COLL VENOUS BLD VENIPUNCTURE: CPT | Performed by: HOSPITALIST

## 2017-10-19 PROCEDURE — 82962 GLUCOSE BLOOD TEST: CPT

## 2017-10-19 PROCEDURE — 74011250636 HC RX REV CODE- 250/636: Performed by: INTERNAL MEDICINE

## 2017-10-19 PROCEDURE — 74011250637 HC RX REV CODE- 250/637: Performed by: HOSPITALIST

## 2017-10-19 PROCEDURE — 74011000250 HC RX REV CODE- 250: Performed by: INTERNAL MEDICINE

## 2017-10-19 PROCEDURE — 74011250636 HC RX REV CODE- 250/636: Performed by: PHYSICIAN ASSISTANT

## 2017-10-19 PROCEDURE — 85025 COMPLETE CBC W/AUTO DIFF WBC: CPT | Performed by: HOSPITALIST

## 2017-10-19 PROCEDURE — 83735 ASSAY OF MAGNESIUM: CPT | Performed by: HOSPITALIST

## 2017-10-19 RX ORDER — TERCONAZOLE 4 MG/G
1 CREAM VAGINAL
Status: DISCONTINUED | OUTPATIENT
Start: 2017-10-19 | End: 2017-10-20 | Stop reason: HOSPADM

## 2017-10-19 RX ORDER — ONDANSETRON 2 MG/ML
4 INJECTION INTRAMUSCULAR; INTRAVENOUS
Status: DISCONTINUED | OUTPATIENT
Start: 2017-10-19 | End: 2017-10-20 | Stop reason: HOSPADM

## 2017-10-19 RX ADMIN — LORAZEPAM 1 MG: 1 TABLET ORAL at 22:06

## 2017-10-19 RX ADMIN — DAPTOMYCIN 326 MG: 500 INJECTION, POWDER, LYOPHILIZED, FOR SOLUTION INTRAVENOUS at 14:42

## 2017-10-19 RX ADMIN — TERCONAZOLE 1 APPLICATOR: 4 CREAM VAGINAL at 02:34

## 2017-10-19 RX ADMIN — AZTREONAM 1 G: 1 INJECTION, POWDER, LYOPHILIZED, FOR SOLUTION INTRAMUSCULAR; INTRAVENOUS at 02:34

## 2017-10-19 RX ADMIN — CLONIDINE HYDROCHLORIDE 0.1 MG: 0.1 TABLET ORAL at 21:59

## 2017-10-19 RX ADMIN — INSULIN LISPRO 2 UNITS: 100 INJECTION, SOLUTION INTRAVENOUS; SUBCUTANEOUS at 21:58

## 2017-10-19 RX ADMIN — HYDROMORPHONE HYDROCHLORIDE 4 MG: 4 TABLET ORAL at 08:49

## 2017-10-19 RX ADMIN — CLINDAMYCIN PHOSPHATE 900 MG: 900 INJECTION, SOLUTION INTRAVENOUS at 08:51

## 2017-10-19 RX ADMIN — CLINDAMYCIN PHOSPHATE 900 MG: 900 INJECTION, SOLUTION INTRAVENOUS at 00:54

## 2017-10-19 RX ADMIN — HEPARIN SODIUM 5000 UNITS: 5000 INJECTION, SOLUTION INTRAVENOUS; SUBCUTANEOUS at 08:49

## 2017-10-19 RX ADMIN — HYDROMORPHONE HYDROCHLORIDE 4 MG: 4 TABLET ORAL at 02:11

## 2017-10-19 RX ADMIN — DRONABINOL 2.5 MG: 2.5 CAPSULE ORAL at 12:05

## 2017-10-19 RX ADMIN — ONDANSETRON HYDROCHLORIDE 4 MG: 2 INJECTION INTRAMUSCULAR; INTRAVENOUS at 11:29

## 2017-10-19 RX ADMIN — HEPARIN SODIUM 5000 UNITS: 5000 INJECTION, SOLUTION INTRAVENOUS; SUBCUTANEOUS at 00:54

## 2017-10-19 RX ADMIN — CLONIDINE HYDROCHLORIDE 0.1 MG: 0.1 TABLET ORAL at 08:49

## 2017-10-19 RX ADMIN — LORAZEPAM 1 MG: 1 TABLET ORAL at 14:42

## 2017-10-19 RX ADMIN — DOCUSATE SODIUM 100 MG: 100 CAPSULE, LIQUID FILLED ORAL at 08:48

## 2017-10-19 RX ADMIN — ROPINIROLE HYDROCHLORIDE 4 MG: 1 TABLET, FILM COATED ORAL at 21:59

## 2017-10-19 RX ADMIN — Medication 5 MG: at 21:59

## 2017-10-19 RX ADMIN — AZTREONAM 1 G: 1 INJECTION, POWDER, LYOPHILIZED, FOR SOLUTION INTRAMUSCULAR; INTRAVENOUS at 10:04

## 2017-10-19 RX ADMIN — INSULIN GLARGINE 5 UNITS: 100 INJECTION, SOLUTION SUBCUTANEOUS at 21:57

## 2017-10-19 RX ADMIN — CYANOCOBALAMIN TAB 500 MCG 1000 MCG: 500 TAB at 08:49

## 2017-10-19 RX ADMIN — Medication 5 MG: at 10:04

## 2017-10-19 NOTE — ROUTINE PROCESS
Bedside and Verbal shift change report given to RONNI Walter RN (oncoming nurse) by Chiquita Musa RN  (offgoing nurse). Report included the following information SBAR, Kardex, Intake/Output and MAR.

## 2017-10-19 NOTE — PROGRESS NOTES
D/C Plan: [facility] 10/19/17 vs 10/20/17    Multiple attempts have been made to obtain insurance auth. Pt's insurance company sites pt is not current or policy has termed. Explored home health option and the cost would be over $900.00. Notified pt of cost and she is not able to afford this cost.  Information has been sent to [facility] to assist with further medical management. Pt will have to have PICC placed to assist with transition of care. CMS has been made aware to assist with appointment for [facility]. CM has left new [facility] form on pt's chart for physician to complete. CM continuing to follow.

## 2017-10-19 NOTE — PROGRESS NOTES
0021 Pt states that current vaginal cream is not working. Order of Mycelex was written during the day but is non forumulary in pharmacy. Recommended to Dr. Fabien Arreguin, which is in stock in the pharmacy. Order received. SHIFT SUMMARY: Pt occasionally flexes foot to assist in drainage of foot. Serosanginous drainage. 20 mls this night shift. Talkative. Terconazole given to patient. Pt's boyfriend at bedside for most of shift; pt prefers for him to be out of room when discussing medical information.

## 2017-10-19 NOTE — PROGRESS NOTES
Daptomycin/ aztreonam/ clinda #6  Rec: 1. Rt foot osteomyelitis    S/p distal amputation    \"I have a flipper\"      Polymicrobial isolates enterococci/ proteus    Most significant mssa       Likely surgical cure om    Ongoing need for parenteral abx for complicated soft tissue infection    Multiple allergies - rash/ hives w/pcn/ cephalosporin/ fq/ glycopeptides     Not a contraindication to linezolid     Ongoing tramadol due to nsaid allergy     Prn flexeril \"not working\"     These agent are not to be coadministered w/linezolid d/t risk serotonin      Unfortunately, no coverage for po abx      ==> continue parenteral abx    ==> local care    ==> place picc line today    ==> daptomycin daily at infusion     Anticipate ~10 days abx on discharge     Prescription in chart   D/w patient at length    Subjective: Cc:\"my right foot is infected\"    No n/v/d    No cough/sputum    No abd pain     No rash    PE:   Visit Vitals    /87    Pulse (!) 112    Temp 98.9 °F (37.2 °C)    Resp 18    Ht 5' 5\" (1.651 m)    Wt 59.7 kg (131 lb 9.6 oz)    SpO2 100%    BMI 21.9 kg/m2       Heent: O/c clear. No icterus. No thrush No ulcer. Neck: Supple   Chest: CTA Bilat No exp wheeze   CV: Nl s1s2 No murmurs   Abd: Soft NTND no reboun No masses   Ext:    Tr edema No rash. Rt foot dressing in place. Drain w/serous discharge    No proximal extension erythema    Lab:   Plain film rt foot indepedntly removed. Post surgical changes.  No focal destruction c/w chronic om  Recent Results (from the past 24 hour(s))   GLUCOSE, POC    Collection Time: 10/18/17  4:25 PM   Result Value Ref Range    Glucose (POC) 169 (H) 70 - 110 mg/dL   GLUCOSE, POC    Collection Time: 10/18/17  9:20 PM   Result Value Ref Range    Glucose (POC) 172 (H) 70 - 110 mg/dL   MAGNESIUM    Collection Time: 10/19/17  4:06 AM   Result Value Ref Range    Magnesium 1.8 1.6 - 2.6 mg/dL   METABOLIC PANEL, BASIC    Collection Time: 10/19/17  4:06 AM   Result Value Ref Range    Sodium 138 136 - 145 mmol/L    Potassium 5.1 3.5 - 5.5 mmol/L    Chloride 100 100 - 108 mmol/L    CO2 29 21 - 32 mmol/L    Anion gap 9 3.0 - 18 mmol/L    Glucose 125 (H) 74 - 99 mg/dL    BUN 14 7.0 - 18 MG/DL    Creatinine 0.72 0.6 - 1.3 MG/DL    BUN/Creatinine ratio 19 12 - 20      GFR est AA >60 >60 ml/min/1.73m2    GFR est non-AA >60 >60 ml/min/1.73m2    Calcium 9.2 8.5 - 10.1 MG/DL   CBC WITH AUTOMATED DIFF    Collection Time: 10/19/17  4:06 AM   Result Value Ref Range    WBC 7.1 4.6 - 13.2 K/uL    RBC 3.84 (L) 4.20 - 5.30 M/uL    HGB 12.2 12.0 - 16.0 g/dL    HCT 36.1 35.0 - 45.0 %    MCV 94.0 74.0 - 97.0 FL    MCH 31.8 24.0 - 34.0 PG    MCHC 33.8 31.0 - 37.0 g/dL    RDW 18.4 (H) 11.6 - 14.5 %    PLATELET 470 969 - 792 K/uL    MPV 8.9 (L) 9.2 - 11.8 FL    NEUTROPHILS 59 40 - 73 %    LYMPHOCYTES 27 21 - 52 %    MONOCYTES 11 (H) 3 - 10 %    EOSINOPHILS 2 0 - 5 %    BASOPHILS 1 0 - 2 %    ABS. NEUTROPHILS 4.2 1.8 - 8.0 K/UL    ABS. LYMPHOCYTES 1.9 0.9 - 3.6 K/UL    ABS. MONOCYTES 0.8 0.05 - 1.2 K/UL    ABS. EOSINOPHILS 0.1 0.0 - 0.4 K/UL    ABS.  BASOPHILS 0.0 0.0 - 0.06 K/UL    DF AUTOMATED     GLUCOSE, POC    Collection Time: 10/19/17  7:49 AM   Result Value Ref Range    Glucose (POC) 114 (H) 70 - 110 mg/dL   GLUCOSE, POC    Collection Time: 10/19/17 12:26 PM   Result Value Ref Range    Glucose (POC) 140 (H) 70 - 110 mg/dL       Meds:     Current Facility-Administered Medications   Medication Dose Route Frequency Provider Last Rate Last Dose    terconazole (TERAZOL 7) 0.4 % vaginal cream 1 Applicator  1 Applicator Vaginal DAILY PRN Yamilex Aly MD   1 Applicator at 54/26/63 5875    ondansetron (ZOFRAN) injection 4 mg  4 mg IntraVENous Q8H PRN Eric Santillan PA-C   4 mg at 10/19/17 1129    aztreonam (AZACTAM) 1 g in 0.9% sodium chloride (MBP/ADV) 100 mL  1 g IntraVENous Q8H Heather Mccollum MD        docusate sodium (COLACE) capsule 100 mg  100 mg Oral DAILY Dawood Chauhan MD   100 mg at 10/19/17 0873    magnesium hydroxide (MILK OF MAGNESIA) 400 mg/5 mL oral suspension 30 mL  30 mL Oral DAILY PRN Nat Villagran MD   30 mL at 10/17/17 0102    insulin lispro (HUMALOG) injection   SubCUTAneous AC&HS Shena Gallagher MD   Stopped at 10/19/17 0730    glucose chewable tablet 16 g  4 Tab Oral PRN Shena Gallagher MD        glucagon [facility]) injection 1 mg  1 mg IntraMUSCular PRN Shena Gallagher MD        dextrose (D50W) injection syrg 12.5-25 g  25-50 mL IntraVENous PRN Shena Gallagher MD        insulin glargine (LANTUS) injection 5 Units  5 Units SubCUTAneous QHS Shena Gallagher MD   5 Units at 10/18/17 2209    albuterol (PROVENTIL HFA, VENTOLIN HFA, PROAIR HFA) inhaler 2 Puff  2 Puff Inhalation Q4H PRN Corrinne Linea, MD        cloNIDine HCl (CATAPRES) tablet 0.1 mg  0.1 mg Oral BID Alex Arita MD   0.1 mg at 10/19/17 0849    dronabinol (MARINOL) capsule 2.5 mg  2.5 mg Oral TID PRN Corrinne Linea, MD   2.5 mg at 10/19/17 1205    rOPINIRole (REQUIP) tablet 4 mg  4 mg Oral QHS Alex Arita MD   4 mg at 10/18/17 2207    traMADol (ULTRAM) tablet 50 mg  50 mg Oral Q8H PRN Corrinne Linea, MD   50 mg at 10/18/17 2212    LORazepam (ATIVAN) tablet 1 mg  1 mg Oral Q6H PRN Corrinne Linea, MD   1 mg at 10/19/17 1442    Biotin (VITAMIN B7) tablet 5 mg  5 mg Oral DAILY Alex Arita MD   5 mg at 10/19/17 1004    cyanocobalamin (VITAMIN B12) tablet 1,000 mcg  1,000 mcg Oral DAILY Corrinne Linea, MD   1,000 mcg at 10/19/17 0849    melatonin tablet 5 mg  5 mg Oral QHS Alex Arita MD   5 mg at 10/18/17 2207    ondansetron hcl (ZOFRAN) tablet 4 mg  4 mg Oral Q8H PRN Corrinne Linea, MD   4 mg at 10/18/17 1344    HYDROmorphone (DILAUDID) tablet 4 mg  4 mg Oral Q6H PRN Corrinne Linea, MD   4 mg at 10/19/17 0849    cyclobenzaprine (FLEXERIL) tablet 10 mg  10 mg Oral TID PRN Corrinne Linea, MD   10 mg at 10/18/17 2212    DAPTOmycin (CUBICIN) 326 mg in 0.9% sodium chloride 50 mL IVPB RF formulation  6 mg/kg IntraVENous Q24H Alex Arita MD   326 mg at 10/19/17 1442    heparin (porcine) injection 5,000 Units  5,000 Units SubCUTAneous Q8H Alex Cade MD   5,000 Units at 10/19/17 0849    influenza vaccine 2017-18 (3 yrs+)(PF) (FLUZONE QUAD/FLUARIX QUAD) injection 0.5 mL  0.5 mL IntraMUSCular PRIOR TO DISCHARGE Del Glover DPM               Sol Ham  [phone](BA)

## 2017-10-19 NOTE — ROUTINE PROCESS
Bedside and Verbal shift change report given to Cj Hines RN (oncoming nurse) by Eileen Bateman RN   (offgoing nurse). Report included the following information SBAR, Kardex, Intake/Output and MAR.

## 2017-10-19 NOTE — PROGRESS NOTES
Spoke with Shannon Johnson on [facility] to relay a message to Aron(sp?) RN. Relayed that pt was for a PICC line. Needed to know if pt was consentable, last administration of anti-coags, current IV access, and possible discharge. Told her it would likely be done tomorrow due to the time order was put in and being unable to speak with patients RN.

## 2017-10-19 NOTE — PROGRESS NOTES
Hospitalist Progress Note    Patient: Martha White MRN: [id number]  CSN: [id number]    YOB: 1950  Age: 78 y.o. Sex: female    DOA: 10/12/2017 LOS:  LOS: 7 days         Assessment/Plan     1. Right Foot Osteomyelitis  2. HTN  3. Nasuea/Vomiting  4. DM    1. Appreciate Podiatry and Infectious Disease continued care. Patient will have PICC line placed tomorrow AM per [facility] CHANDRA in radiology. Discussed this with the patient and she well be able to consent tomorrow prior to procedure. The PICC is going to be in place for ~10 days IV Daptomycin. 2. Continue BP meds. 3. Changed patient's oral Zofran to IV Zofran due to inability to tolerate anything PO. Will continue to monitor. 4. Continue SSI coverage while remaining inpatient. 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 Z90.3, Z98.0    Atherosclerotic PVD with ulceration ([facility]) I70.209, L98.499    Syncope R55    Anemia associated with acute blood loss D62    Anemia D64.9    Abscess of right foot L02.611    Cellulitis of right foot L03.115    Lupus anticoagulant syndrome (HCC) D68.62    Sepsis (HCC) A41.9    Diabetes (HCC) E11.9    Chronic pain G89.29    PAD (peripheral artery disease) ([facility]) I73.9    Hyponatremia E87.1    Sleepwalking disorder F51.3    CAP (community acquired pneumonia) J18.9    Constipation K59.00    Anxiety F41.9    Osteomyelitis of right foot ([facility]) M86.9    Hypokalemia E87.6       Subjective:     \"Tell Dr Raheel Kaba he is my jorge\"     Review of systems:    Constitutional: denies fevers, chills, myalgias  Respiratory: denies SOB, cough  Cardiovascular: denies chest pain, palpitations  Gastrointestinal: denies nausea, vomiting, diarrhea      Vital signs/Intake and Output:  Visit Vitals    /87    Pulse (!) 112    Temp 98.9 °F (37.2 °C)    Resp 18    Ht 5' 5\" (1.651 m)    Wt 59.7 kg (131 lb 9.6 oz)    SpO2 100%    BMI 21.9 kg/m2     Current Shift:  10/19 0701 - 10/19 1900  In: -   Out: 570 [Urine:500; Drains:70]  Last three shifts:  10/17 1901 - 10/19 0700  In: 1 [P.O.:480; I.V.:350]  Out: 3190 [Urine:3150; Drains:40]    Exam:    General: Well developed, alert, NAD, OX3, talkative  Head/Neck: NCAT, supple, No masses, No lymphadenopathy  CVS:Regular rate and rhythm, no M/R/G, S1/S2 heard, no thrill  Lungs:Clear to auscultation bilaterally, no wheezes, rhonchi, or rales  Abdomen: Soft, Nontender, No distention, Normal Bowel sounds, No hepatomegaly  Extremities: Right Foot with dressing in place and drain evident with some serosanguinous drainage. Left lower extremity grossly normal. Pulses 2+  Skin:normal texture and turgor, no rashes, no lesions  Neuro:grossly normal , follows commands  Psych:appropriate                Labs: Results:       Chemistry Recent Labs      10/19/17   0406  10/18/17   0530  10/17/17   0311   GLU  125*  123*  137*   NA  138  137  140   K  5.1  3.9  4.8   CL  100  100  105   CO2  29  29  30   BUN  14  14  15   CREA  0.72  0.71  0.65   CA  9.2  8.8  8.9   AGAP  9  8  5   BUCR  19  20  23*      CBC w/Diff Recent Labs      10/19/17   0406  10/18/17   0530  10/17/17   0311   WBC  7.1  4.5*  5.6   RBC  3.84*  3.75*  3.59*   HGB  12.2  11.5*  10.8*   HCT  36.1  34.6*  32.0*   PLT  348  304  256   GRANS  59  51  65   LYMPH  27  36  21   EOS  2  2  1      Cardiac Enzymes Recent Labs      10/17/17   0311   CPK  186      Coagulation No results for input(s): PTP, INR, APTT in the last 72 hours. No lab exists for component: INREXT    Lipid Panel No results found for: CHOL, CHOLPOCT, CHOLX, CHLST, CHOLV, 834378, HDL, LDL, LDLC, DLDLP, 994001, VLDLC, VLDL, TGLX, TRIGL, TRIGP, TGLPOCT, CHHD, CHHDX   BNP No results for input(s): BNPP in the last 72 hours.    Liver Enzymes No results for input(s): TP, ALB, TBIL, AP, SGOT, GPT in the last 72 hours.     No lab exists for component: DBIL   Thyroid Studies No results found for: T4, T3U, TSH, TSHEXT     Procedures/imaging: see electronic medical records for all procedures/Xrays and details which were not copied into this note but were reviewed prior to creation of [address], PAJordynC

## 2017-10-19 NOTE — PROGRESS NOTES
Attended interdisciplinary rounds on this patient. Took verbal order to add zofran 4mg IV as pt is vomiting and cant tolerate po at this time.     [address] [place] [address] Pharmacist  [phone]

## 2017-10-19 NOTE — PROGRESS NOTES
Podiatry:    Patient seen today at bedside AAOx3 with dressing intact right foot and complains of moderate pain. Right foot has improved with decreased erythema, mal odor, and pain. Open dorsal lateral wound, 2 cm round, remains from post-op dehiscence and shows healthy skin edges. ANDER drain with some bloody drainage but decreasing. Plan:  Dressing change today and removed iodoform packing (Nursing to continue daily dressing changes)  Cultures show Proteus Mirabilis, E. Faecalis, Strep. Intermedius/Viridans, Staph.  Aureus--  Continue IV antibiotics as per ID  Ok to consider discharge from podiatry standpoint-- when ID is ready and chooses antibiotics/term  Will follow as needed then will follow up in my office for continued care right foot    Xena Silva DPM

## 2017-10-20 ENCOUNTER — APPOINTMENT (OUTPATIENT)
Dept: INTERVENTIONAL RADIOLOGY/VASCULAR | Age: 67
DRG: 617 | End: 2017-10-20
Attending: INTERNAL MEDICINE
Payer: MEDICARE

## 2017-10-20 VITALS
RESPIRATION RATE: 20 BRPM | OXYGEN SATURATION: 100 % | TEMPERATURE: 98.2 F | WEIGHT: 131.6 LBS | HEIGHT: 65 IN | BODY MASS INDEX: 21.92 KG/M2 | HEART RATE: 88 BPM | DIASTOLIC BLOOD PRESSURE: 80 MMHG | SYSTOLIC BLOOD PRESSURE: 118 MMHG

## 2017-10-20 LAB
ANION GAP SERPL CALC-SCNC: 9 MMOL/L (ref 3–18)
BASOPHILS # BLD: 0 K/UL (ref 0–0.06)
BASOPHILS NFR BLD: 1 % (ref 0–2)
BUN SERPL-MCNC: 14 MG/DL (ref 7–18)
BUN/CREAT SERPL: 18 (ref 12–20)
CALCIUM SERPL-MCNC: 8.9 MG/DL (ref 8.5–10.1)
CHLORIDE SERPL-SCNC: 102 MMOL/L (ref 100–108)
CO2 SERPL-SCNC: 28 MMOL/L (ref 21–32)
CREAT SERPL-MCNC: 0.78 MG/DL (ref 0.6–1.3)
DIFFERENTIAL METHOD BLD: ABNORMAL
EOSINOPHIL # BLD: 0.1 K/UL (ref 0–0.4)
EOSINOPHIL NFR BLD: 2 % (ref 0–5)
ERYTHROCYTE [DISTWIDTH] IN BLOOD BY AUTOMATED COUNT: 16.9 % (ref 11.6–14.5)
GLUCOSE BLD STRIP.AUTO-MCNC: 159 MG/DL (ref 70–110)
GLUCOSE BLD STRIP.AUTO-MCNC: 225 MG/DL (ref 70–110)
GLUCOSE SERPL-MCNC: 140 MG/DL (ref 74–99)
HCT VFR BLD AUTO: 37.3 % (ref 35–45)
HGB BLD-MCNC: 12.3 G/DL (ref 12–16)
INR PPP: 0.8 (ref 0.8–1.2)
LYMPHOCYTES # BLD: 1.7 K/UL (ref 0.9–3.6)
LYMPHOCYTES NFR BLD: 37 % (ref 21–52)
MAGNESIUM SERPL-MCNC: 1.7 MG/DL (ref 1.6–2.6)
MCH RBC QN AUTO: 29.4 PG (ref 24–34)
MCHC RBC AUTO-ENTMCNC: 33 G/DL (ref 31–37)
MCV RBC AUTO: 89.2 FL (ref 74–97)
MONOCYTES # BLD: 0.4 K/UL (ref 0.05–1.2)
MONOCYTES NFR BLD: 10 % (ref 3–10)
NEUTS SEG # BLD: 2.3 K/UL (ref 1.8–8)
NEUTS SEG NFR BLD: 50 % (ref 40–73)
PLATELET # BLD AUTO: 391 K/UL (ref 135–420)
PMV BLD AUTO: 8.8 FL (ref 9.2–11.8)
POTASSIUM SERPL-SCNC: 3.9 MMOL/L (ref 3.5–5.5)
PROTHROMBIN TIME: 10.8 SEC (ref 11.5–15.2)
RBC # BLD AUTO: 4.18 M/UL (ref 4.2–5.3)
SODIUM SERPL-SCNC: 139 MMOL/L (ref 136–145)
WBC # BLD AUTO: 4.5 K/UL (ref 4.6–13.2)

## 2017-10-20 PROCEDURE — 85610 PROTHROMBIN TIME: CPT | Performed by: HOSPITALIST

## 2017-10-20 PROCEDURE — 74011250636 HC RX REV CODE- 250/636: Performed by: RADIOLOGY

## 2017-10-20 PROCEDURE — 80048 BASIC METABOLIC PNL TOTAL CA: CPT | Performed by: HOSPITALIST

## 2017-10-20 PROCEDURE — 74011250636 HC RX REV CODE- 250/636: Performed by: HOSPITALIST

## 2017-10-20 PROCEDURE — 85025 COMPLETE CBC W/AUTO DIFF WBC: CPT | Performed by: HOSPITALIST

## 2017-10-20 PROCEDURE — 74011250636 HC RX REV CODE- 250/636: Performed by: PODIATRIST

## 2017-10-20 PROCEDURE — C1751 CATH, INF, PER/CENT/MIDLINE: HCPCS

## 2017-10-20 PROCEDURE — 90471 IMMUNIZATION ADMIN: CPT

## 2017-10-20 PROCEDURE — 74011000250 HC RX REV CODE- 250: Performed by: RADIOLOGY

## 2017-10-20 PROCEDURE — 82962 GLUCOSE BLOOD TEST: CPT

## 2017-10-20 PROCEDURE — 02HV33Z INSERTION OF INFUSION DEVICE INTO SUPERIOR VENA CAVA, PERCUTANEOUS APPROACH: ICD-10-PCS | Performed by: RADIOLOGY

## 2017-10-20 PROCEDURE — 83735 ASSAY OF MAGNESIUM: CPT | Performed by: HOSPITALIST

## 2017-10-20 PROCEDURE — 74011250637 HC RX REV CODE- 250/637: Performed by: HOSPITALIST

## 2017-10-20 PROCEDURE — 36569 INSJ PICC 5 YR+ W/O IMAGING: CPT

## 2017-10-20 PROCEDURE — 36415 COLL VENOUS BLD VENIPUNCTURE: CPT | Performed by: HOSPITALIST

## 2017-10-20 PROCEDURE — 90686 IIV4 VACC NO PRSV 0.5 ML IM: CPT | Performed by: PODIATRIST

## 2017-10-20 PROCEDURE — 74011250637 HC RX REV CODE- 250/637: Performed by: INTERNAL MEDICINE

## 2017-10-20 PROCEDURE — 74011000258 HC RX REV CODE- 258: Performed by: HOSPITALIST

## 2017-10-20 RX ORDER — HEPARIN SODIUM 200 [USP'U]/100ML
500 INJECTION, SOLUTION INTRAVENOUS
Status: COMPLETED | OUTPATIENT
Start: 2017-10-20 | End: 2017-10-20

## 2017-10-20 RX ORDER — HEPARIN SODIUM (PORCINE) LOCK FLUSH IV SOLN 100 UNIT/ML 100 UNIT/ML
500 SOLUTION INTRAVENOUS
Status: COMPLETED | OUTPATIENT
Start: 2017-10-20 | End: 2017-10-20

## 2017-10-20 RX ORDER — LIDOCAINE HYDROCHLORIDE 10 MG/ML
1-20 INJECTION INFILTRATION; PERINEURAL
Status: COMPLETED | OUTPATIENT
Start: 2017-10-20 | End: 2017-10-20

## 2017-10-20 RX ADMIN — CYCLOBENZAPRINE HYDROCHLORIDE 10 MG: 10 TABLET, FILM COATED ORAL at 08:43

## 2017-10-20 RX ADMIN — HEPARIN SODIUM (PORCINE) LOCK FLUSH IV SOLN 100 UNIT/ML 500 UNITS: 100 SOLUTION at 10:28

## 2017-10-20 RX ADMIN — CYANOCOBALAMIN TAB 500 MCG 1000 MCG: 500 TAB at 08:33

## 2017-10-20 RX ADMIN — LIDOCAINE HYDROCHLORIDE 5 ML: 10 INJECTION, SOLUTION INFILTRATION; PERINEURAL at 10:28

## 2017-10-20 RX ADMIN — DOCUSATE SODIUM 100 MG: 100 CAPSULE, LIQUID FILLED ORAL at 08:33

## 2017-10-20 RX ADMIN — Medication 5 MG: at 08:43

## 2017-10-20 RX ADMIN — CLONIDINE HYDROCHLORIDE 0.1 MG: 0.1 TABLET ORAL at 08:33

## 2017-10-20 RX ADMIN — HYDROMORPHONE HYDROCHLORIDE 4 MG: 4 TABLET ORAL at 05:21

## 2017-10-20 RX ADMIN — DRONABINOL 2.5 MG: 2.5 CAPSULE ORAL at 12:52

## 2017-10-20 RX ADMIN — HEPARIN SODIUM 1000 UNITS: 200 INJECTION, SOLUTION INTRAVENOUS at 10:27

## 2017-10-20 RX ADMIN — INFLUENZA VIRUS VACCINE 0.5 ML: 15; 15; 15; 15 SUSPENSION INTRAMUSCULAR at 14:50

## 2017-10-20 RX ADMIN — TERCONAZOLE 1 APPLICATOR: 4 CREAM VAGINAL at 08:36

## 2017-10-20 RX ADMIN — ONDANSETRON HYDROCHLORIDE 4 MG: 4 TABLET, FILM COATED ORAL at 05:21

## 2017-10-20 RX ADMIN — HYDROMORPHONE HYDROCHLORIDE 4 MG: 4 TABLET ORAL at 13:01

## 2017-10-20 RX ADMIN — DAPTOMYCIN 326 MG: 500 INJECTION, POWDER, LYOPHILIZED, FOR SOLUTION INTRAVENOUS at 12:52

## 2017-10-20 RX ADMIN — TRAMADOL HYDROCHLORIDE 50 MG: 50 TABLET, FILM COATED ORAL at 08:43

## 2017-10-20 NOTE — DISCHARGE INSTRUCTIONS
Osteomyelitis: Care Instructions  Your Care Instructions  Osteomyelitis (say \"cf-hqln-[id number]-pt-wm-PA-tus\") is a bone infection. It is caused by bacteria. The bacteria can infect the bone where it has been injured, or they can be carried through the blood from another area in the body. Osteomyelitis can be a short- or long-term problem. It is treated with antibiotics. You may get the antibiotics as pills or through a needle in a vein (IV). You will probably get treatment in the hospital at first. The type of treatment depends on the type of bacteria causing the infection, the bones affected, and how bad the infection is. Sometimes people need surgery to drain pus from bone or to fix damaged bone. Short-term osteomyelitis that is treated right away usually can be cured. But the long-term form sometimes comes back after treatment. You can help your chances of stopping the infection by taking your medicines as directed. Follow-up care is a key part of your treatment and safety. Be sure to make and go to all appointments, and call your doctor if you are having problems. It's also a good idea to know your test results and keep a list of the medicines you take. How can you care for yourself at home? · Take your antibiotics as directed. Do not stop taking them just because you feel better. You need to take the full course of antibiotics. · Take pain medicines exactly as directed. ¨ If the doctor gave you a prescription medicine for pain, take it as prescribed. ¨ If you are not taking a prescription pain medicine, ask your doctor if you can take an over-the-counter medicine. · Do mild exercise and stretching if your doctor says it is okay. This can help keep your bones and muscles healthy. Avoid strenuous work or exercise until your doctor says you can do it. · Consider physical therapy if your doctor suggests it. Physical therapy may help you have a normal range of movement. · Do not smoke.  Smoking can slow healing of the infection. If you need help quitting, talk to your doctor about stop-smoking programs and medicines. These can increase your chances of quitting for good. When should you call for help? Call 911 anytime you think you may need emergency care. For example, call if:  · You have severe bone pain. Call your doctor now or seek immediate medical care if:  · You continue to have bone pain. · You have signs of infection, such as:  ¨ Increased pain, swelling, warmth, or redness. ¨ Red streaks leading from a wound. ¨ Pus draining from a wound. ¨ A fever. Watch closely for changes in your health, and be sure to contact your doctor if:  · You do not get better as expected. Where can you learn more? Go to http://lisaRuckus Media Groupkenan.info/. Enter D234 in the search box to learn more about \"Osteomyelitis: Care Instructions. \"  Current as of: March 20, 2017  Content Version: 11.3  © 6588-4683 [employer]. Care instructions adapted under license by [employer] (which disclaims liability or warranty for this information). If you have questions about a medical condition or this instruction, always ask your healthcare professional. [place] [address] any warranty or liability for your use of this information. Diabetes Foot Health: Care Instructions  Your Care Instructions    When you have diabetes, your feet need extra care and attention. Diabetes can damage the nerve endings and blood vessels in your feet, making you less likely to notice when your feet are injured. Diabetes also limits your body's ability to fight infection and get blood to areas that need it. If you get a minor foot injury, it could become an ulcer or a serious infection. With good foot care, you can prevent most of these problems. Caring for your feet can be quick and easy. Most of the care can be done when you are bathing or getting ready for bed.   Follow-up care is a key part of your treatment and safety. Be sure to make and go to all appointments, and call your doctor if you are having problems. Its also a good idea to know your test results and keep a list of the medicines you take. How can you care for yourself at home? · Keep your blood sugar close to normal by watching what and how much you eat, monitoring blood sugar, taking medicines if prescribed, and getting regular exercise. · Do not smoke. Smoking affects blood flow and can make foot problems worse. If you need help quitting, talk to your doctor about stop-smoking programs and medicines. These can increase your chances of quitting for good. · Eat a diet that is low in fats. High fat intake can cause fat to build up in your blood vessels and decrease blood flow. · Inspect your feet daily for blisters, cuts, cracks, or sores. If you cannot see well, use a mirror or have someone help you. · Take care of your feet:  [facility] your feet every day. Use warm (not hot) water. Check the water temperature with your wrists or other part of your body, not your feet. ¨ Dry your feet well. Pat them dry. Do not rub the skin on your feet too hard. Dry well between your toes. If the skin on your feet stays moist, bacteria or a fungus can grow, which can lead to infection. ¨ Keep your skin soft. Use moisturizing skin cream to keep the skin on your feet soft and prevent calluses and cracks. But do not put the cream between your toes, and stop using any cream that causes a rash. ¨ Clean underneath your toenails carefully. Do not use a sharp object to clean underneath your toenails. Use the blunt end of a nail file or other rounded tool. ¨ Trim and file your toenails straight across to prevent ingrown toenails. Use a nail clipper, not scissors. Use an emery board to smooth the edges. · Change socks daily. Socks without seams are best, because seams often rub the feet. You can find socks for people with diabetes from specialty catalogs.   · Look inside your shoes every day for things like gravel or torn linings, which could cause blisters or sores. · Buy shoes that fit well:  ¨ Look for shoes that have plenty of space around the toes. This helps prevent bunions and blisters. ¨ Try on shoes while wearing the kind of socks you will usually wear with the shoes. ¨ Avoid plastic shoes. They may rub your feet and cause blisters. Good shoes should be made of materials that are flexible and breathable, such as leather or cloth. ¨ Break in new shoes slowly by wearing them for no more than an hour a day for several days. Take extra time to check your feet for red areas, blisters, or other problems after you wear new shoes. · Do not go barefoot. Do not wear sandals, and do not wear shoes with very thin soles. Thin soles are easy to puncture. They also do not protect your feet from hot pavement or cold weather. · Have your doctor check your feet during each visit. If you have a foot problem, see your doctor. Do not try to treat an early foot problem at home. Home remedies or treatments that you can buy without a prescription (such as corn removers) can be harmful. · Always get early treatment for foot problems. A minor irritation can lead to a major problem if not properly cared for early. When should you call for help? Call your doctor now or seek immediate medical care if:  · You have a foot sore, an ulcer or break in the skin that is not healing after 4 days, bleeding corns or calluses, or an ingrown toenail. · You have blue or black areas, which can mean bruising or blood flow problems. · You have peeling skin or tiny blisters between your toes or cracking or oozing of the skin. · You have a fever for more than 24 hours and a foot sore. · You have new numbness or tingling in your feet that does not go away after you move your feet or change positions. · You have unexplained or unusual swelling of the foot or ankle.   Watch closely for changes in your health, and be sure to contact your doctor if:  · You cannot do proper foot care. Where can you learn more? Go to http://lisa-kenan.info/. Enter A739 in the search box to learn more about \"Diabetes Foot Health: Care Instructions. \"  Current as of: March 13, 2017  Content Version: 11.3  © 2485-1246 [employer]. Care instructions adapted under license by [email or web address] (which disclaims liability or warranty for this information). If you have questions about a medical condition or this instruction, always ask your healthcare professional. [address] any warranty or liability for your use of this information. Diabetic Neuropathy: Care Instructions  Your Care Instructions  When you have diabetes, your blood sugar level may get too high. Over time, high blood sugar levels can damage nerves. This is called diabetic neuropathy. Nerve damage can cause pain, burning, tingling, and numbness and may leave you feeling weak. The feet are often affected. When you have nerve damage in your feet, you cannot feel your feet and toes as well as normal and may not notice cuts or sores. Even a small injury can lead to a serious infection. It is very important that you follow your doctor's advice on foot care. Sometimes diabetes damages nerves that help the body function. If this happens, your blood pressure, sweating, digestion, and urination might be affected. Your doctor may give you a target blood sugar level that is higher or lower than you are used to. Try to keep your blood sugar very close to this target level to prevent more damage. Follow-up care is a key part of your treatment and safety. Be sure to make and go to all appointments, and call your doctor if you are having problems. It's also a good idea to know your test results and keep a list of the medicines you take. How can you care for yourself at home?   · Take your medicines exactly as prescribed. Call your doctor if you think you are having a problem with your medicine. It is very important that you take your insulin or diabetes pills as your doctor tells you. · Try to keep blood sugar at your target level. ¨ Eat a variety of healthy foods, with carbohydrate spread out in your meals. A dietitian can help you plan meals. ¨ Try to get at least 30 minutes of exercise on most days. ¨ Check your blood sugar as many times each day as your doctor recommends. · Take and record your blood pressure at home if your doctor tells you to. Learn the importance of the two measures of blood pressure (such as 130 over 80, or 130/80). To take your blood pressure at home:  ¨ Ask your doctor to check your blood pressure monitor to be sure it is accurate and the cuff fits you. Also ask your doctor to watch you to make sure that you are using it right. ¨ Do not use medicine known to raise blood pressure (such as some nasal decongestant sprays) before taking your blood pressure. ¨ Avoid taking your blood pressure if you have just exercised or are nervous or upset. Rest at least 15 minutes before you take a reading. · Take pain medicines exactly as directed. ¨ If the doctor gave you a prescription medicine for pain, take it as prescribed. ¨ If you are not taking a prescription pain medicine, ask your doctor if you can take an over-the-counter medicine. · Do not smoke. Smoking can increase your chance for a heart attack or stroke. If you need help quitting, talk to your doctor about stop-smoking programs and medicines. These can increase your chances of quitting for good. · Limit alcohol to 2 drinks a day for men and 1 drink a day for women. Too much alcohol can cause health problems. · Eat small meals often, rather than 2 or 3 large meals a day. To care for your feet  · Prevent injury by wearing shoes at all times, even when you are indoors. · Do foot care as part of your daily routine.  Wash your feet and then rub lotion on your feet, but not between your toes. Use a handheld mirror or magnifying mirror to inspect your feet for blisters, cuts, cracks, or sores. · Have your toenails trimmed and filed straight across. · Wear shoes and socks that fit well. Soft shoes that have good support and that fit well (such as tennis shoes) are best for your feet. · Check your shoes for any loose objects or rough edges before you put them on. · Ask your doctor to check your feet during each visit. Your doctor may notice a foot problem you have missed. · Get early treatment for any foot problem, even a minor one. When should you call for help? Call your doctor now or seek immediate medical care if:  · You have symptoms of infection, such as:  ¨ Increased pain, swelling, warmth, or redness. ¨ Red streaks leading from the area. ¨ Pus draining from the area. ¨ A fever. · You have new or worse numbness, pain, or tingling in any part of your body. Watch closely for changes in your health, and be sure to contact your doctor if:  · You have a new problem with your feet, such as:  ¨ A new sore or ulcer. ¨ A break in the skin that is not healing after several days. ¨ Bleeding corns or calluses. ¨ An ingrown toenail. · You do not get better as expected. Where can you learn more? Go to http://lisa-kenan.info/. Enter F129 in the search box to learn more about \"Diabetic Neuropathy: Care Instructions. \"  Current as of: March 13, 2017  Content Version: 11.3  © 2640-5292 [employer]. Care instructions adapted under license by [employer] (which disclaims liability or warranty for this information). If you have questions about a medical condition or this instruction, always ask your healthcare professional. [address] any warranty or liability for your use of this information.          Toe Amputation: What to Expect at [address] had amputation surgery to remove one or more of your toes. For most people, pain improves within a week after surgery. You may have stitches or sutures. The doctor will probably take these out about 10 days after the surgery. You may need to wear a cast or a special type of shoe for about 2 to 4 weeks. You may think you have feeling or pain where your toe had been. This is called phantom pain. It is common, and it may come and go for a year or longer. If you have this kind of pain, your doctor may prescribe medicine to treat it. This care sheet gives you a general idea about how long it will take for you to recover. But each person recovers at a different pace. Follow the steps below to get better as quickly as possible. How can you care for yourself at home? Activity  · Rest when you feel tired. Getting enough sleep will help you recover. · You may notice some changes in your balance when you walk. Your balance will improve over time. · Try to walk each day if you are able. Start by walking a little more than you did the day before. Bit by bit, increase the amount you walk. Walking boosts blood flow and helps prevent blood clots. · Prop up your foot and leg on a pillow when you ice it or anytime you sit or lie down during the next 3 days. Try to keep it above the level of your heart. This will help reduce swelling. · Ask your doctor when you can drive again. · You may shower, unless your doctor tells you not to. Keep the bandage dry. If the bandage has been removed, you can wash the area with warm water and soap. Pat the area dry. · You will probably need to take about 4 weeks off from work or your normal routine. How much time you need to take off depends on the type of work you do and your overall health. Diet  · You can eat your normal diet. If your stomach is upset, try bland, low-fat foods like plain rice, broiled chicken, toast, and yogurt.   · You may notice that your bowel movements are not regular right after your surgery. This is common. Try to avoid constipation and straining with bowel movements. You may want to take a fiber supplement every day. If you have not had a bowel movement after a couple of days, ask your doctor about taking a mild laxative. Medicines  · Your doctor will tell you if and when you can restart your medicines. He or she will also give you instructions about taking any new medicines. · If you take blood thinners, such as warfarin (Coumadin), clopidogrel (Plavix), or aspirin, be sure to talk to your doctor. He or she will tell you if and when to start taking those medicines again. Make sure that you understand exactly what your doctor wants you to do. · Take pain medicines exactly as directed. ¨ If the doctor gave you a prescription medicine for pain, take it as prescribed. ¨ If you are not taking a prescription pain medicine, ask your doctor if you can take an over-the-counter medicine. · If your doctor prescribed antibiotics, take them as directed. Do not stop taking them just because you feel better. You need to take the full course of antibiotics. · If you think your pain medicine is making you sick to your stomach:  ¨ Take your medicine after meals (unless your doctor has told you not to). ¨ Ask your doctor for a different pain medicine. Incision care  · Your doctor will probably remove the bandages after several days. Or your doctor may have you remove your bandages at home. Do not touch the surgery area. Keep it dry. · Do not soak your foot until your doctor says it is okay. Follow-up care is a key part of your treatment and safety. Be sure to make and go to all appointments, and call your doctor if you are having problems. It's also a good idea to know your test results and keep a list of the medicines you take. When should you call for help? Call 911 anytime you think you may need emergency care. For example, call if:  · You passed out (lost consciousness).   · You have sudden chest pain and shortness of breath, or you cough up blood. · You have severe trouble breathing. Call your doctor now or seek immediate medical care if:  · Your foot is cool or pale or changes color. · You have numbness, tingling, or less feeling in your foot or your toes. · You have pain that does not get better after you take pain medicine. · You have loose stitches, or your incision comes open. · Bright red blood has soaked through the bandage over your incision. · You have signs of infection, such as:  ¨ Increased pain, swelling, warmth, or redness. ¨ Red streaks leading from the incision. ¨ Pus draining from the incision. ¨ A fever. Watch closely for any changes in your health, and be sure to contact your doctor if:  · You do not have a bowel movement after taking a laxative. Where can you learn more? Go to http://lisa-kenan.info/. Enter F481 in the search box to learn more about \"Toe Amputation: What to Expect at Home. \"  Current as of: March 21, 2017  Content Version: 11.3  © 8861-7588 [employer]. Care instructions adapted under license by [employer] (which disclaims liability or warranty for this information). If you have questions about a medical condition or this instruction, always ask your healthcare professional. [place] [address] any warranty or liability for your use of this information.

## 2017-10-20 NOTE — PROGRESS NOTES
Vascular & Interventional Radiology Brief Procedure Note    Interventional Radiologist: Inga Sacks, MD    Assistants: None    Pre-operative Diagnosis:  Venous access    Post-operative Diagnosis: Same as pre-op dx    Procedure(s) Performed:  Ultrasound  PICC insertion    Anesthesia:  Local     Findings:  5 FR PICC inserted via right basilic vein    Complications: None    Estimated Blood Loss:  minimal    Tubes and Drains: 5 FR dual lumen PICC    Specimens: None    Condition: Good    Disposition:  Return to floor    Plan: may use PICC      Inga Sacks, MD  [facility]  Vascular & Interventional Radiology  10/20/2017

## 2017-10-20 NOTE — PROGRESS NOTES
Bedside and Verbal shift change report given to FATEMEH Ruff RN (oncoming nurse) by Arash Goldberg RN   (offgoing nurse).  Report included the following information SBAR, Kardex, Procedure Summary, Intake/Output, MAR and Recent Results.

## 2017-10-20 NOTE — DISCHARGE SUMMARY
Discharge Summary    Patient: Demetrius Mayers MRN: [id number]  CSN: [id number]    YOB: 1950  Age: 78 y.o.   Sex: female    DOA: 10/12/2017 LOS:  LOS: 8 days   Discharge Date:      Primary Care Provider:  Erica Rm MD    Admission Diagnoses: Osteomyelitis [facility])  Foot osteomyelitis, right [facility])  INFECTION/ABSCESS RIGHT FOOT    Discharge Diagnoses:    Problem List as of 10/20/2017  Date Reviewed: 10/12/2017          Codes Class Noted - Resolved    * (Principal)Osteomyelitis of right foot ([facility] ICD-10-CM: M86.9  ICD-9-CM: 730.27  10/16/2017 - Present        Hypokalemia ICD-10-CM: E87.6  ICD-9-CM: 276.8  10/16/2017 - Present        Constipation ICD-10-CM: K59.00  ICD-9-CM: 564.00  10/15/2017 - Present        Anxiety ICD-10-CM: F41.9  ICD-9-CM: 300.00  10/15/2017 - Present        Sepsis ([facility] ICD-10-CM: A41.9  ICD-9-CM: 038.9, 995.91  7/22/2017 - Present        Chronic pain ICD-10-CM: G89.29  ICD-9-CM: 338.29  Unknown - Present    Overview Signed 7/22/2017 10:41 PM by Pérez Romero DO     Left foot             PAD (peripheral artery disease) ([facility] (Chronic) ICD-10-CM: I73.9  ICD-9-CM: 443.9  7/22/2017 - Present        Hyponatremia ICD-10-CM: E87.1  ICD-9-CM: 276.1  7/22/2017 - Present        Sleepwalking disorder (Chronic) ICD-10-CM: F51.3  ICD-9-CM: 307.46  7/22/2017 - Present        CAP (community acquired pneumonia) ICD-10-CM: J18.9  ICD-9-CM: 270  7/22/2017 - Present        Lupus anticoagulant syndrome ([facility] ICD-10-CM: U36.78  ICD-9-CM: 289.81  12/29/2016 - Present        Cellulitis of right foot ICD-10-CM: L03.115  ICD-9-CM: 682.7  12/27/2016 - Present        Abscess of right foot ICD-10-CM: L02.611  ICD-9-CM: 682.7  9/9/2016 - Present        Anemia ICD-10-CM: D64.9  ICD-9-CM: 285.9  3/1/2016 - Present        Syncope ICD-10-CM: R55  ICD-9-CM: 780.2  2/29/2016 - Present        Anemia associated with acute blood loss ICD-10-CM: D62  ICD-9-CM: 285.1  2/29/2016 - Present Atherosclerotic PVD with ulceration (HCC) (Chronic) ICD-10-CM: I70.209, L98.499  ICD-9-CM: 440.23, 707.9  3/13/2015 - Present        Diabetes ([facility] ICD-10-CM: E11.9  ICD-9-CM: 250.00  1/1/2013 - Present        Heme positive stool ICD-10-CM: R19.5  ICD-9-CM: 792.1  5/23/2012 - Present        Iron deficiency anemia ICD-10-CM: D50.9  ICD-9-CM: 280.9  5/23/2012 - Present        Nausea & vomiting ICD-10-CM: R11.2  ICD-9-CM: 787.01  5/23/2012 - Present        Pancreatic cyst ICD-10-CM: K86.2  ICD-9-CM: 577.2  5/23/2012 - Present        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 ICD-10-CM: Z90.3, Z98.0  ICD-9-CM: V45.89  5/23/2012 - Present        RESOLVED: Osteomyelitis ([facility] ICD-10-CM: M86.9  ICD-9-CM: 730.20  10/12/2017 - 10/14/2017        RESOLVED: Chronic multifocal osteomyelitis, right ankle and foot ([facility] ICD-10-CM: M86.371  ICD-9-CM: 730.17  9/15/2017 - 10/14/2017        RESOLVED: Foot osteomyelitis, right ([facility] ICD-10-CM: M86.9  ICD-9-CM: 730.27  12/27/2016 - 10/14/2017        RESOLVED: Acute hematogenous osteomyelitis, right ankle and foot ([facility] ICD-10-CM: M86.071  ICD-9-CM: 730.07  9/9/2016 - 9/9/2016        RESOLVED: Diabetes mellitus with skin ulcer ([facility] ICD-10-CM: E11.622, L98.499  ICD-9-CM: 250.80, 707.9  9/9/2016 - 9/9/2016        RESOLVED: Asymmetric diabetic proximal motor neuropathy (HCC) ICD-10-CM: E11.44  ICD-9-CM: 250.60, 357.2  9/9/2016 - 9/9/2016        RESOLVED: Type II diabetes mellitus with peripheral circulatory disorder ([facility] ICD-10-CM: E11.51  ICD-9-CM: 250.70, 443.81  9/9/2016 - 9/9/2016        RESOLVED: Gangrene ([facility] ICD-10-CM: V53  ICD-9-CM: 785.4  4/2/2015 - 4/2/2015        RESOLVED: Right foot ulcer ([facility] ICD-10-CM: L97.519  ICD-9-CM: 707.15  3/9/2015 - 10/14/2017        RESOLVED: Weight loss ICD-10-CM: R63.4  ICD-9-CM: 783.21  5/23/2012 - 2/29/2016              Discharge Medications:     Current Discharge Medication List      START taking these medications Details   DAPTOmycin (CUBICIN RF) IVPB 326 mg by IntraVENous route every twenty-four (24) hours. Qty: 9 Dose, Refills: 0         CONTINUE these medications which have NOT CHANGED    Details   Cetirizine (ZYRTEC) 10 mg cap Take  by mouth. Minoxidil 5 % foam by Apply Externally route. cyanocobalamin 1,000 mcg tablet Take 1,000 mcg by mouth daily. glimepiride (AMARYL) 2 mg tablet Take 2 mg by mouth every morning. melatonin 3 mg tablet Take 6 mg by mouth nightly. warfarin (COUMADIN) 7.5 mg tablet Take 7.5 mg by mouth daily. ondansetron hcl (ZOFRAN, AS HYDROCHLORIDE,) 4 mg tablet Take 4 mg by mouth every eight (8) hours as needed for Nausea. HYDROmorphone (DILAUDID) 4 mg tablet Take 4 mg by mouth every six (6) hours as needed for Pain. cyclobenzaprine (FLEXERIL) 10 mg tablet Take  by mouth three (3) times daily as needed for Muscle Spasm(s).      enoxaparin (LOVENOX) 100 mg/mL by SubCUTAneous route. miconazole (MICOTIN) 2 % vaginal cream Insert 1 Applicator into vagina every evening. Qty: 45 g, Refills: 0      promethazine (PHENERGAN) 25 mg tablet Take 25 mg by mouth every six (6) hours as needed for Nausea. LORazepam (ATIVAN) 2 mg tablet Take 1 mg by mouth every six (6) hours as needed. BIOTIN PO Take 5,000 mcg by mouth daily. diphenhydrAMINE (BENADRYL) 25 mg capsule Take 50 mg by mouth every six (6) hours as needed. Indications: ALLERGIC RHINITIS      Mv,Ca,Min-FA-Herbal No.157 (ESTROVEN MAXIMUM STRENGTH) 400 mcg Tab Take 1 Tab by mouth two (2) times a day. albuterol (PROVENTIL HFA, VENTOLIN HFA) 90 mcg/actuation inhaler Take 2 Puffs by inhalation every four (4) hours as needed. Indications: ACUTE ASTHMA ATTACK      cloNIDine (CATAPRES) 0.1 mg tablet Take 0.1 mg by mouth two (2) times a day. Indications: hot flashes;      dronabinol (MARINOL) 2.5 mg capsule Take 2.5 mg by mouth three (3) times daily as needed.  Indications: nausea      metFORMIN (GLUCOPHAGE) 500 mg tablet Take 500 mg by mouth two (2) times daily (with meals). Indications: TYPE 2 DIABETES MELLITUS      ropinirole (REQUIP) 4 mg Tab TAB Take 4 mg by mouth nightly. Indications: RESTLESS LEGS SYNDROME      traMADol (ULTRAM) 50 mg tablet Take 50 mg by mouth every eight (8) hours as needed. Indications: PAIN             Discharge Condition: Good    Procedures : 1. Partial excision of remaining right second metatarsal secondary to  osteomyelitis. 2. Partial excision of remaining right fourth metatarsal secondary to  osteomyelitis. 3. Incision and drainage, complicated right foot, postoperative  infection/abscess. Consults: Infectious Disease and Podiatry      PHYSICAL EXAM   Visit Vitals    /74 (BP 1 Location: Right arm, BP Patient Position: At rest)    Pulse 100    Temp 99.5 °F (37.5 °C)    Resp 20    Ht 5' 5\" (1.651 m)    Wt 59.7 kg (131 lb 9.6 oz)    SpO2 100%    BMI 21.9 kg/m2     General: Awake, cooperative, no acute distress    HEENT: NC, Atraumatic. PERRLA, EOMI. Anicteric sclerae. Lungs:  CTA Bilaterally. No Wheezing/Rhonchi/Rales. Heart:  Regular  rhythm,  No murmur, No Rubs, No Gallops  Abdomen: Soft, Non distended, Non tender. +Bowel sounds,   Extremities: No c/c/e. Right foot with dressing and drain in place. Dressing clean, dry and intact. Minimal serosanguinous drainage into drain. Psych:   Not anxious or agitated. Neurologic:  No acute neurological deficits. Admission HPI : Williams Client is a 78 y.o. female who was sent to the ed by Dr Saeid Watson for evaluation of right foot infection and IV Abx. Patient has been dealing with right foot osteo and recurrent infections for several months now.  She has undergone multiple surgeries, wound debridement etc. About a month ago, she underwent removal of her right 2nd, 3rd and 4th metatarsal. Cultures grew Staph epi and strep viridans, according to her she was put on tetracycline but have failed outpatient therapy. Despite of doing her best ot take care of it, she states it has been getting worst. It continues to have foul smell to it, along with pus like drainage. She has also been going to wound care clinic where she had fever last time. Today she was seen by Dr Adalid Gil who thought she continous to get worst therefore sent here. In the ED the case was discussed with Dr Rani Grove by Dr Lorena Donald who suggested Daptomycin IV for now.      No other complaints to me. Hospital Course : This patient was admitted to medical services on October 12, 2017 for a right foot infection requiring IV abx. Podiatry and Infectious Disease were consulted regarding care for this patient. Podiatry performed the procedures listed above, and the patient tolerated them well. Dr Loa Cogan has been following the patient and recommended a PICC with outpatient IV Daptomycin. Her laboratory data has remained unremarkable throughout her stay in the hospital. She has remained afebrile, nontoxic, normotensive, with a few bouts of mild tachycardia. She has remained in good spirits, active, and anxious for discharge throughout her stay. On the day of discharge, she had a PICC line placed, the report may be seen below, and she received her first dose of Daptomycin prior to discharge. She will start daily infusions of Daptomycin tomorrow at [facility]. Activity: Activity as tolerated    Diet: Regular Diet    Follow-up: The patient was instructed to follow up with Podiatry within the week via phone call to schedule an appointment. The patient was also instructed to schedule an appointment with her PCP for the recent hospitalization. Disposition: This patient will be discharged home in good condition. The patient tolerated her procedure well at the beginning of her admission, and remained stable throughout the hospitalization. She is well known to Dr Adalid Gil, and will have outpatient follow up with him.     Minutes spent on discharge: 40 min.        Labs: Results:       Chemistry Recent Labs      10/20/17   0340  10/19/17   0406  10/18/17   0530   GLU  140*  125*  123*   NA  139  138  137   K  3.9  5.1  3.9   CL  102  100  100   CO2  28  29  29   BUN  14  14  14   CREA  0.78  0.72  0.71   CA  8.9  9.2  8.8   AGAP  9  9  8   BUCR  18  19  20      CBC w/Diff Recent Labs      10/20/17   0340  10/19/17   0406  10/18/17   0530   WBC  4.5*  7.1  4.5*   RBC  4.18*  3.84*  3.75*   HGB  12.3  12.2  11.5*   HCT  37.3  36.1  34.6*   PLT  391  348  304   GRANS  50  59  51   LYMPH  37  27  36   EOS  2  2  2      Cardiac Enzymes No results for input(s): CPK, CKND1, RAYNA in the last 72 hours. No lab exists for component: CKRMB, TROIP   Coagulation Recent Labs      10/20/17   0340   PTP  10.8*   INR  0.8       Lipid Panel No results found for: CHOL, CHOLPOCT, CHOLX, CHLST, CHOLV, 760079, HDL, LDL, LDLC, DLDLP, 059444, VLDLC, VLDL, TGLX, TRIGL, TRIGP, TGLPOCT, CHHD, CHHDX   BNP No results for input(s): BNPP in the last 72 hours. Liver Enzymes No results for input(s): TP, ALB, TBIL, AP, SGOT, GPT in the last 72 hours. No lab exists for component: DBIL   Thyroid Studies No results found for: T4, T3U, TSH, TSHEXT, TSHEXT         Significant Diagnostic Studies: Xr Foot Rt Ap/lat    Result Date: 10/14/2017  Indication:  Removal of bone fragments Comparison:  09/15/17 Findings:  2 views of the right foot on an image intensifier were performed. The images show soft tissue postoperative changes involving the distal aspect of the remaining right foot. The previously evident bone fragments no longer appear evident. The mid and hindfoot osseous structures appear stable. Radiation dose:  (reference air kerma) not given mGy fluoroscopy time: 1.6 seconds , fluoroscopy images 2     IMPRESSION: Images show the osseous fragments of the distal remaining right foot have been removed.       Xr Foot Rt Min 3 V    Result Date: 10/14/2017  Indication:  Post op Comparison:  09-15-17 Findings: Three views of the right foot were performed. Postoperative soft tissue changes are evident involving the remaining distal right foot. A drainage catheter is present. The majority of the osseous densities from the previous study have been removed. 2 ossific densities appear present along the lateral aspect. IMPRESSION: Postoperative changes with CABG cardiomegaly of the ossific densities removed. At least 2 residual ossific densities are evident along the lateral aspect of the foot. Ir Fluoro Guide Picc Insertion    Result Date: 10/20/2017  PICC line Insertion History: Needs long-term IV access Technique: The patient's right upper extremity was prepped and draped in a sterile fashion. Maximal sterile barrier technique (including cap, mask, sterile gown, sterile gloves,sterile ultrasound probe cover, sterile ultrasound gel, large sterile sheet, hand hygiene, and cutaneous antisepsis per current guidelines) was followed. 1% lidocaine was used for local anesthetic. Sonographic imaging of the upper extremity demonstrates a patent, compressible basilic vein. The needle was documented entering the target vessel with ultrasound guidance, and a record was created. Ultrasound image was stored. A guidewire was placed through the needle and advanced into the superior vena cava, confirmed with fluoroscopy. The needle was removed and a peel-away dilator sheath was placed over the wire. A dual lumen 5 Fr PICC line was trimmed to a measured length (40 cm) and was inserted through the dilator sheath after removal of the guidewire. Positioning of the tip of the PICC line within the superior vena cava was confirmed with fluoroscopy. Fluoroscopic image was stored. Good venous return was seen. PICC line was flushed with heparinized saline and sterile cap applied. The PICC line was secured into place and a sterile dressing was applied.   GUIDANCE: Ultrasound/fluoroscopy guidance was used to position (and confirm the position of) the needle / catheter. Image(s) saved in PACS: Ultrasound / Ultrasound and fluoroscopy / CT Patient tolerated the procedure. There were no immediate complications. Fluoro time:   0.9  minutes  Fluoro images: 1    images Radiation dose (reference air kerma): 3.0 mGy     IMPRESSION: Successful placement of right upper extremity dual lumen 5 Fr PICC line with its tip in the SVC. PICC line is ready for use. Manny Melton Technologist Service    Result Date: 10/16/2017  See impression. Impression:  Fluoroscopy was provided for this procedure under the supervision and/or direction of the attending provider. ? For further information regarding this procedure, see patient medical record. No results found for this or any previous visit.         CC: Thierno Romeo MD

## 2017-10-20 NOTE — PROGRESS NOTES
INITIAL NUTRITION ASSESSMENT     RECOMMENDATIONS/PLAN:   [facility] Miralax-Pt has not had BM since admission   Monitor labs/lytes, PO intakes, skin integrity, wt, fluid status, BM  REASON FOR ASSESSMENT:        [x] LOS    NUTRITION ASSESSMENT:   Client History: 78 yrs old Female admitted with right foot osteomyelitis. S/p distal amputation PICC line getting placed today. 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 Pt also, does not eat corn, creamed corn, or cornbread.     Food Allergies:  [x] NKFA      Pertinent PTA Medications: cyanocobalamin, melatonin, warfarin, biotin,  estroven maximum strength, marinol, metformin,     NUTRITION INTAKE   Diet Order:  Regular-No corn, cornbread, or cream corned      Average PO Intake:       Patient Vitals for the past 100 hrs:   % Diet Eaten   10/19/17 0616 75 %   10/18/17 1138 100 %   10/17/17 1300 30 %   10/17/17 0828 40 %   10/16/17 0630 0 %      Pertinent Medications:  [x] Reviewed; biotin, vit b12, marinol, heparin, milk of magnesia,  Melatonin,   Electrolyte Replacement Protocol: []K  []Mg  []PO4    Insulin:  [x] SSI  [x] Pre-meal   []  Basal   [] Drip  [] None  Pt expected to meet estimated nutrient needs through next review:          [x]  Yes     [] No;  ANTHROPOMETRICS  Height: 5' 5\" (165.1 cm)       Weight: 59.7 kg (131 lb 9.6 oz)    BMI: 21.9 kg/m^2  -  normal weight (18.5%-24.9% BMI)        Weight change: pt was 132# on 7/18/17 currently pt is 131# no significant wt loss Comparison to Reference Standards:  IBW: 125 lbs      %IBW: 105%      AdjBW: n/a    NUTRITION-FOCUSED PHYSICAL ASSESSMENT  Skin: No pressure injury noted . GI: No BM noted     BIOCHEMICAL DATA & MEDICAL TESTS  Pertinent Labs:  [x] Reviewed;      NUTRITION PRESCRIPTION  Calories: 1791 kcal/day based on 30kcal/kg   Protein: 72-84 g/day based on 1.2-1.4 g/kg  CHO: 224 g/day based on 50% of total energy  Fluid: 1791 ml/day based on 1 kcal/ml      NUTRITION DIAGNOSES:   1. No nutritional problems at this time     NUTRITION INTERVENTIONS:   INTERVENTIONS:        GOALS:  1. Other: Continue w/ POC 1. Continue to meet estimated needs by consuming >75% PO intake by next review 7 days             LEARNING NEEDS (Diet, Supplementation, Food/Nutrient-Drug Interaction):   [x] None Identified  [] Inpatient education provided/documented    [] Identified and patient:  [] Declined     [] Was not appropriate/indicated  NUTRITION MONITORING /EVALUATION:   Follow PO intake  Monitor wt  Monitor renal labs, electrolytes, fluid status    [] Participated in Interdisciplinary Rounds  [x] [address] [place] [address] Reviewed/Documented  DISCHARGE NUTRITION RECOMMENDATIONS ADDRESSED:     [x] Yes- recommended Regular diet    NUTRITION RISK:     []  At risk                     [x]  Not currently at risk     Will follow-up per policy.   Eloy Worthington RD  PAGER:  [phone]

## 2017-10-20 NOTE — PROGRESS NOTES
Shift Summary :  No signs of distress. Dressing off during the shift. Redressed after cleansing with no signs of redness or drainage. ANDER drain to right foot with 5 ml of serosanguinous drainage. Up and about in room. For PICC placement today. Heparin held.

## 2017-10-20 NOTE — ROUTINE PROCESS
Bedside and Verbal shift change report given to HUMBERTO Ram RN  (oncoming nurse) by  ADWOA Ruff RN  (offgoing nurse). Report included the following information SBAR, Kardex, Recent Results and Med Rec Status.

## 2017-10-20 NOTE — PROGRESS NOTES
D/C Plan: [facility] (starting at [address] [place] [address] 10/21/17)    Pt has been scheduled for an [facility] appointment starting tomorrow at [address] [place] [address].  Pt has been made aware. Pt's family will transport her home later today following dose of IVABX.

## 2017-10-21 ENCOUNTER — HOSPITAL ENCOUNTER (OUTPATIENT)
Dept: INFUSION THERAPY | Age: 67
Discharge: HOME | End: 2017-10-21
Payer: MEDICARE

## 2017-10-21 VITALS
OXYGEN SATURATION: 98 % | HEART RATE: 68 BPM | TEMPERATURE: 98.8 F | DIASTOLIC BLOOD PRESSURE: 65 MMHG | RESPIRATION RATE: 18 BRPM | SYSTOLIC BLOOD PRESSURE: 96 MMHG

## 2017-10-21 PROCEDURE — 96374 THER/PROPH/DIAG INJ IV PUSH: CPT

## 2017-10-21 PROCEDURE — 74011250636 HC RX REV CODE- 250/636: Performed by: INTERNAL MEDICINE

## 2017-10-21 PROCEDURE — 74011250636 HC RX REV CODE- 250/636

## 2017-10-21 RX ORDER — HEPARIN 100 UNIT/ML
SYRINGE INTRAVENOUS
Status: COMPLETED
Start: 2017-10-21 | End: 2017-10-21

## 2017-10-21 RX ORDER — SODIUM CHLORIDE 0.9 % (FLUSH) 0.9 %
10 SYRINGE (ML) INJECTION EVERY 8 HOURS
Status: DISCONTINUED | OUTPATIENT
Start: 2017-10-21 | End: 2017-10-25 | Stop reason: HOSPADM

## 2017-10-21 RX ORDER — HEPARIN 100 UNIT/ML
500 SYRINGE INTRAVENOUS ONCE
Status: ACTIVE | OUTPATIENT
Start: 2017-10-21 | End: 2017-10-21

## 2017-10-21 RX ADMIN — HEPARIN 500 UNITS: 100 SYRINGE at 09:48

## 2017-10-21 RX ADMIN — Medication 10 ML: at 09:20

## 2017-10-21 RX ADMIN — DAPTOMYCIN 360 MG: 500 INJECTION, POWDER, LYOPHILIZED, FOR SOLUTION INTRAVENOUS at 09:23

## 2017-10-21 NOTE — PROGRESS NOTES
[facility] Progress Note    Date: 2017    Name: Gean Mortimer    MRN: [id number]         : 1950    Ms. Oh Nelson was assessed and education was provided. Pt is escorted today with brother at side using a rolling walker,  She left hospital yesterday and states she had no reactions or complications related to the Dapto ( patient has multiple antibiotic allergies). Pt is very talkative but easily directed back to topic of conversation. Ms. Karina Su vitals were reviewed. Visit Vitals    BP 96/65 (BP 1 Location: Left arm)    Pulse 68    Temp 98.8 °F (37.1 °C)    Resp 18    SpO2 98%    Breastfeeding No             []  Vancomycin     []  Invanz     [x]  Cubicin 360 mg     []  Rocephin    was infused over 2 minutes, followed by 5 cc normal saline in bilateral lumens and 250 units Hepatin in bilaleral lumens, covered with curos, secured with 4x4, paper tape and stockinette. Monitored patient 30 minutes post no reactions    Ms. Pineda tolerated infusion, and had no complaints at this time. Patient armband removed and shredded. Ms. Oh Nelson was discharged from [place] [address] in stable condition at 6324. She is to return on 10/22/2017 at 0830 for her next appointment. Provided patient a hard copy of her calendar.     Valorie An RN  2017  9:50 AM

## 2017-10-21 NOTE — ROUTINE PROCESS
Dual AVS reviewed with SARAH Villalobos RN. All medications reviewed individually with patient. Opportunities for questions and concerns provided. Patient discharged via (mode of transport ie. Car, ambulance or air transport) car  Patient's arm band appropriately discarded.

## 2017-10-22 ENCOUNTER — HOSPITAL ENCOUNTER (OUTPATIENT)
Dept: INFUSION THERAPY | Age: 67
Discharge: HOME | End: 2017-10-22
Payer: MEDICARE

## 2017-10-22 VITALS
RESPIRATION RATE: 18 BRPM | OXYGEN SATURATION: 94 % | HEART RATE: 91 BPM | TEMPERATURE: 98.2 F | SYSTOLIC BLOOD PRESSURE: 139 MMHG | DIASTOLIC BLOOD PRESSURE: 86 MMHG

## 2017-10-22 PROCEDURE — 74011250636 HC RX REV CODE- 250/636: Performed by: INTERNAL MEDICINE

## 2017-10-22 PROCEDURE — 96374 THER/PROPH/DIAG INJ IV PUSH: CPT

## 2017-10-22 RX ORDER — HEPARIN 100 UNIT/ML
500 SYRINGE INTRAVENOUS ONCE
Status: COMPLETED | OUTPATIENT
Start: 2017-10-22 | End: 2017-10-22

## 2017-10-22 RX ORDER — SODIUM CHLORIDE 0.9 % (FLUSH) 0.9 %
10-40 SYRINGE (ML) INJECTION AS NEEDED
Status: DISCONTINUED | OUTPATIENT
Start: 2017-10-22 | End: 2017-10-26 | Stop reason: HOSPADM

## 2017-10-22 RX ADMIN — DAPTOMYCIN 360 MG: 500 INJECTION, POWDER, LYOPHILIZED, FOR SOLUTION INTRAVENOUS at 09:05

## 2017-10-22 RX ADMIN — HEPARIN 500 UNITS: 100 SYRINGE at 09:07

## 2017-10-22 RX ADMIN — Medication 30 ML: at 09:07

## 2017-10-22 NOTE — PROGRESS NOTES
[facility] Progress Note    Date: 2017    Name: Toy Ortega    MRN: [id number]         : 1950    Cubicin Infusion    Ms. Pineda to [facility], ambulatory at 7315. Pt was assessed and education was provided. Ms Terry Mccracken reports that she was late this morning because she was vomiting. She states that she vomits \"every day\" and falls \"all the time\". She reports that she fell this morning in her kitchen. She denies injury from the fall. Ms. Saray Harding vitals were reviewed. Visit Vitals    /86 (BP 1 Location: Left arm)    Pulse 91    Temp 98.2 °F (36.8 °C)    Resp 18    SpO2 94%       Right upper arm PICC flushed easily and had brisk blood return via both ports. PICC dressing c/d/i and not due to be changed. No swelling, redness, streaking, warmth or drainage noted in arm. Pt denied c/o pain in arm.      []  Vancomycin     []  Invanz     [x]  Cubicin 360 mg     []  Rocephin   Slowly over 2 minutes. PICC dressing and stat lock changed per protocol. Biopatch applied. No redness, streaking, warmth, or drainage noted at site. Microclaves changed. Ms. Terry Mccracken tolerated infusion, and had no complaints at this time. Both lumens of PICC flushed with NS 10 ml and Heparin 250 units. Lumens wrapped with guaze and paper tape. Stockinette placed over dressing for protection. Patient armband removed and shredded. Ms. Terry Mccracken was discharged from [place] [address] in stable condition at [address] [place] [address]. She is to return on 10/23/17 at 0900 for her next antibiotic appointment.     Caroline Lowe RN  2017

## 2017-10-25 ENCOUNTER — HOSPITAL ENCOUNTER (OUTPATIENT)
Dept: INFUSION THERAPY | Age: 67
Discharge: HOME | End: 2017-10-25
Payer: MEDICARE

## 2017-10-25 VITALS
TEMPERATURE: 99.1 F | SYSTOLIC BLOOD PRESSURE: 149 MMHG | OXYGEN SATURATION: 99 % | DIASTOLIC BLOOD PRESSURE: 82 MMHG | HEART RATE: 99 BPM | RESPIRATION RATE: 18 BRPM

## 2017-10-25 LAB
ANION GAP SERPL CALC-SCNC: 10 MMOL/L (ref 3–18)
BASO+EOS+MONOS # BLD AUTO: 0.8 K/UL (ref 0–2.3)
BASO+EOS+MONOS # BLD AUTO: 13 % (ref 0.1–17)
BUN SERPL-MCNC: 20 MG/DL (ref 7–18)
BUN/CREAT SERPL: 28 (ref 12–20)
CALCIUM SERPL-MCNC: 9 MG/DL (ref 8.5–10.1)
CHLORIDE SERPL-SCNC: 107 MMOL/L (ref 100–108)
CK SERPL-CCNC: 224 U/L (ref 26–192)
CO2 SERPL-SCNC: 26 MMOL/L (ref 21–32)
CREAT SERPL-MCNC: 0.71 MG/DL (ref 0.6–1.3)
CRP SERPL-MCNC: 0.5 MG/DL (ref 0–0.3)
DIFFERENTIAL METHOD BLD: ABNORMAL
ERYTHROCYTE [DISTWIDTH] IN BLOOD BY AUTOMATED COUNT: 16.2 % (ref 11.5–14.5)
ERYTHROCYTE [SEDIMENTATION RATE] IN BLOOD: 32 MM/HR (ref 0–30)
GLUCOSE SERPL-MCNC: 151 MG/DL (ref 74–99)
HCT VFR BLD AUTO: 36.1 % (ref 36–48)
HGB BLD-MCNC: 11.4 G/DL (ref 12–16)
LYMPHOCYTES # BLD: 1.7 K/UL (ref 1.1–5.9)
LYMPHOCYTES NFR BLD: 28 % (ref 14–44)
MCH RBC QN AUTO: 29.9 PG (ref 25–35)
MCHC RBC AUTO-ENTMCNC: 31.6 G/DL (ref 31–37)
MCV RBC AUTO: 94.8 FL (ref 78–102)
NEUTS SEG # BLD: 3.4 K/UL (ref 1.8–9.5)
NEUTS SEG NFR BLD: 59 % (ref 40–70)
PLATELET # BLD AUTO: 426 K/UL (ref 140–440)
POTASSIUM SERPL-SCNC: 3.8 MMOL/L (ref 3.5–5.5)
RBC # BLD AUTO: 3.81 M/UL (ref 4.1–5.1)
SODIUM SERPL-SCNC: 143 MMOL/L (ref 136–145)
WBC # BLD AUTO: 5.9 K/UL (ref 4.5–13)

## 2017-10-25 PROCEDURE — 86140 C-REACTIVE PROTEIN: CPT | Performed by: INTERNAL MEDICINE

## 2017-10-25 PROCEDURE — 74011250636 HC RX REV CODE- 250/636: Performed by: INTERNAL MEDICINE

## 2017-10-25 PROCEDURE — 96374 THER/PROPH/DIAG INJ IV PUSH: CPT

## 2017-10-25 PROCEDURE — 85652 RBC SED RATE AUTOMATED: CPT | Performed by: INTERNAL MEDICINE

## 2017-10-25 PROCEDURE — 85025 COMPLETE CBC W/AUTO DIFF WBC: CPT | Performed by: INTERNAL MEDICINE

## 2017-10-25 PROCEDURE — 82550 ASSAY OF CK (CPK): CPT | Performed by: INTERNAL MEDICINE

## 2017-10-25 PROCEDURE — 80048 BASIC METABOLIC PNL TOTAL CA: CPT | Performed by: INTERNAL MEDICINE

## 2017-10-25 RX ORDER — SODIUM CHLORIDE 0.9 % (FLUSH) 0.9 %
10-40 SYRINGE (ML) INJECTION AS NEEDED
Status: DISCONTINUED | OUTPATIENT
Start: 2017-10-25 | End: 2017-10-29 | Stop reason: HOSPADM

## 2017-10-25 RX ORDER — HEPARIN 100 UNIT/ML
500 SYRINGE INTRAVENOUS ONCE
Status: COMPLETED | OUTPATIENT
Start: 2017-10-25 | End: 2017-10-25

## 2017-10-25 RX ADMIN — DAPTOMYCIN 360 MG: 500 INJECTION, POWDER, LYOPHILIZED, FOR SOLUTION INTRAVENOUS at 09:25

## 2017-10-25 RX ADMIN — HEPARIN 500 UNITS: 100 SYRINGE at 09:27

## 2017-10-25 RX ADMIN — Medication 30 ML: at 09:27

## 2017-10-25 NOTE — PROGRESS NOTES
[facility] Progress Note    Date: 2017    Name: Sarah Santiago    MRN: [id number]         : 1950    [facility]    Ms. Pineda to [facility], ambulatory at 0915 accompanied by a friend. Pt was assessed and education was provided. Ms Radha Perez missed her appointment yesterday. She states that she was very tired and slept all day. She called the [facility] yesterday at 1600 to ask if she could still come in for her appointment. Patient informed that the [facility] closes at 1630. Patient reports that she has bruises from her right hip all the way down her right legs frok falling on Monday on the way in to the hospital . She states that she had an xray and that she has no broken bones. Ms. Yamilex Smith vitals were reviewed. Visit Vitals    /82 (BP 1 Location: Left arm)    Pulse 99    Temp 99.1 °F (37.3 °C)    Resp 18    SpO2 99%     Recent Results (from the past 12 hour(s))   CBC WITH 3 PART DIFF    Collection Time: 10/25/17  9:15 AM   Result Value Ref Range    WBC 5.9 4.5 - 13.0 K/uL    RBC 3.81 (L) 4.10 - 5.10 M/uL    HGB 11.4 (L) 12.0 - 16.0 g/dL    HCT 36.1 36 - 48 %    MCV 94.8 78 - 102 FL    MCH 29.9 25.0 - 35.0 PG    MCHC 31.6 31 - 37 g/dL    RDW 16.2 (H) 11.5 - 14.5 %    PLATELET 376 903 - 927 K/uL    NEUTROPHILS 59 40 - 70 %    MIXED CELLS 13 0.1 - 17 %    LYMPHOCYTES 28 14 - 44 %    ABS. NEUTROPHILS 3.4 1.8 - 9.5 K/UL    ABS. MIXED CELLS 0.8 0.0 - 2.3 K/uL    ABS.  LYMPHOCYTES 1.7 1.1 - 5.9 K/UL    DF AUTOMATED     METABOLIC PANEL, BASIC    Collection Time: 10/25/17  9:15 AM   Result Value Ref Range    Sodium 143 136 - 145 mmol/L    Potassium 3.8 3.5 - 5.5 mmol/L    Chloride 107 100 - 108 mmol/L    CO2 26 21 - 32 mmol/L    Anion gap 10 3.0 - 18 mmol/L    Glucose 151 (H) 74 - 99 mg/dL    BUN 20 (H) 7.0 - 18 MG/DL    Creatinine 0.71 0.6 - 1.3 MG/DL    BUN/Creatinine ratio 28 (H) 12 - 20      GFR est AA >60 >60 ml/min/1.73m2    GFR est non-AA >60 >60 ml/min/1.73m2    Calcium 9.0 8.5 - 10.1 MG/DL   SED RATE (ESR)    Collection Time: 10/25/17  9:15 AM   Result Value Ref Range    Sed rate, automated 32 (H) 0 - 30 mm/hr   CK    Collection Time: 10/25/17  9:15 AM   Result Value Ref Range     (H) 26 - 192 U/L       Right upper arm PICC flushed easily and had brisk blood return via both ports. Blood drawn off and sent to lab for CBC, BMP, sed rate CPK, and C-Reactive protein after 10 ml waste per written orders. Labs drawn today ulises to patient missing Monday's and Tuesday's appointments. PICC dressing c/d/i and not due to be changed. No swelling, redness, streaking, warmth or drainage noted in arm. Pt denied c/o pain in arm.      []  Vancomycin     []  Invanz     [x]  Cubicin 360 mg     []  Rocephin   Was infused over 2 minutes. Ms. Sofia Arredondo tolerated infusion, and had no complaints at this time. Both lumens of PICC flushed with NS 10 ml and Heparin 250 units. Lumens wrapped with guaze and paper tape. Stockinette placed over dressing for protection. Patient armband removed and shredded. Ms. Sofia Arredondo was discharged from [place] [address] in stable condition at 0930. She is to return on 10/26/17 at 0900 for her next antibiotic appointment.     Isabella Gupta RN  October 25, 2017

## 2017-10-26 ENCOUNTER — HOSPITAL ENCOUNTER (OUTPATIENT)
Dept: INFUSION THERAPY | Age: 67
Discharge: HOME | End: 2017-10-26
Payer: MEDICARE

## 2017-10-26 VITALS
DIASTOLIC BLOOD PRESSURE: 73 MMHG | SYSTOLIC BLOOD PRESSURE: 127 MMHG | OXYGEN SATURATION: 96 % | HEART RATE: 96 BPM | RESPIRATION RATE: 18 BRPM | TEMPERATURE: 97.9 F

## 2017-10-26 PROCEDURE — 96374 THER/PROPH/DIAG INJ IV PUSH: CPT

## 2017-10-26 PROCEDURE — 74011250636 HC RX REV CODE- 250/636: Performed by: INTERNAL MEDICINE

## 2017-10-26 RX ORDER — SODIUM CHLORIDE 0.9 % (FLUSH) 0.9 %
5-10 SYRINGE (ML) INJECTION AS NEEDED
Status: DISCONTINUED | OUTPATIENT
Start: 2017-10-26 | End: 2017-10-30 | Stop reason: HOSPADM

## 2017-10-26 RX ORDER — HEPARIN 100 UNIT/ML
500 SYRINGE INTRAVENOUS ONCE
Status: COMPLETED | OUTPATIENT
Start: 2017-10-26 | End: 2017-10-26

## 2017-10-26 RX ADMIN — Medication 500 UNITS: at 09:53

## 2017-10-26 RX ADMIN — DAPTOMYCIN 360 MG: 500 INJECTION, POWDER, LYOPHILIZED, FOR SOLUTION INTRAVENOUS at 09:46

## 2017-10-26 RX ADMIN — Medication 10 ML: at 09:53

## 2017-10-26 NOTE — PROGRESS NOTES
[facility] Progress Note    Date: 2017    Name: Zachary Posada    MRN: [id number]         : 1950     IV Antibiotics    Ms. Magi Wilson was assessed and education was provided. Ms. Maurice Rivera vitals were reviewed. Visit Vitals    /73 (BP 1 Location: Left arm, BP Patient Position: Sitting)    Pulse 96    Temp 97.9 °F (36.6 °C)    Resp 18    SpO2 96%             []  Vancomycin     []  Invanz     [x]  Cubicin 360 mg IV push     []  Rocephin    was infused per orders without complications    Ms. Pineda tolerated infusion, and had no complaints at this time. Patient armband removed and shredded. Ms. Magi Wilson was discharged from [place] [address] in stable condition at [phone]. She is to return on 10/27/17 for her next appointment.     Trixie Moulton RN  2017  12:53 PM

## 2017-10-27 ENCOUNTER — HOSPITAL ENCOUNTER (OUTPATIENT)
Dept: INFUSION THERAPY | Age: 67
Discharge: HOME | End: 2017-10-27
Payer: MEDICARE

## 2017-10-27 VITALS
HEART RATE: 80 BPM | RESPIRATION RATE: 18 BRPM | OXYGEN SATURATION: 98 % | DIASTOLIC BLOOD PRESSURE: 75 MMHG | SYSTOLIC BLOOD PRESSURE: 128 MMHG | TEMPERATURE: 99 F

## 2017-10-27 PROCEDURE — 96374 THER/PROPH/DIAG INJ IV PUSH: CPT

## 2017-10-27 PROCEDURE — 74011250636 HC RX REV CODE- 250/636: Performed by: INTERNAL MEDICINE

## 2017-10-27 PROCEDURE — 77030020847 HC STATLOK BARD -A

## 2017-10-27 RX ORDER — SODIUM CHLORIDE 0.9 % (FLUSH) 0.9 %
5-10 SYRINGE (ML) INJECTION AS NEEDED
Status: DISCONTINUED | OUTPATIENT
Start: 2017-10-27 | End: 2017-10-31 | Stop reason: HOSPADM

## 2017-10-27 RX ORDER — HEPARIN 100 UNIT/ML
500 SYRINGE INTRAVENOUS ONCE
Status: COMPLETED | OUTPATIENT
Start: 2017-10-27 | End: 2017-10-27

## 2017-10-27 RX ADMIN — DAPTOMYCIN 360 MG: 500 INJECTION, POWDER, LYOPHILIZED, FOR SOLUTION INTRAVENOUS at 10:14

## 2017-10-27 RX ADMIN — Medication 10 ML: at 10:17

## 2017-10-27 RX ADMIN — Medication 500 UNITS: at 10:17

## 2017-10-27 NOTE — PROGRESS NOTES
[facility] Progress Note    Date: 2017    Name: Yuri Akers    MRN: [id number]         : 1950     IV Antibiotics    Ms. Jeremiah Cuevas was assessed and education was provided. Ms. Aly Kelley vitals were reviewed. Visit Vitals    /75 (BP 1 Location: Left arm, BP Patient Position: Sitting)    Pulse 80    Temp 99 °F (37.2 °C)    Resp 18    SpO2 98%       Lab results were obtained and reviewed. No results found for this or any previous visit (from the past 12 hour(s)). []  Vancomycin     []  Invanz     [x]  Cubicin  360 mg IV push     []  Rocephin    was infused per orders. Ms. Jeremiah Cuevas tolerated infusion, and had no complaints at this time. PICC line flushed and dressing changed per orders, per protocol. Ms. Jeremiah Cuevas tolerated well. Ms. Jeremiah Cuevas was discharged from [place] [address] in stable condition at 1030. She is to return on 10/28/17 for her next appointment. Armband removed and shredded.     Oswaldo Dill RN  2017  10:42 AM

## 2017-10-28 ENCOUNTER — HOSPITAL ENCOUNTER (OUTPATIENT)
Dept: INFUSION THERAPY | Age: 67
Discharge: HOME | End: 2017-10-28
Payer: MEDICARE

## 2017-10-28 VITALS
RESPIRATION RATE: 18 BRPM | TEMPERATURE: 97.2 F | DIASTOLIC BLOOD PRESSURE: 74 MMHG | SYSTOLIC BLOOD PRESSURE: 118 MMHG | OXYGEN SATURATION: 93 % | HEART RATE: 124 BPM

## 2017-10-28 PROCEDURE — 74011250636 HC RX REV CODE- 250/636: Performed by: INTERNAL MEDICINE

## 2017-10-28 PROCEDURE — 96374 THER/PROPH/DIAG INJ IV PUSH: CPT

## 2017-10-28 RX ORDER — HEPARIN 100 UNIT/ML
500 SYRINGE INTRAVENOUS ONCE
Status: COMPLETED | OUTPATIENT
Start: 2017-10-28 | End: 2017-10-28

## 2017-10-28 RX ORDER — SODIUM CHLORIDE 0.9 % (FLUSH) 0.9 %
10-40 SYRINGE (ML) INJECTION AS NEEDED
Status: DISCONTINUED | OUTPATIENT
Start: 2017-10-28 | End: 2017-11-01 | Stop reason: HOSPADM

## 2017-10-28 RX ADMIN — Medication 10 ML: at 08:52

## 2017-10-28 RX ADMIN — DAPTOMYCIN 360 MG: 500 INJECTION, POWDER, LYOPHILIZED, FOR SOLUTION INTRAVENOUS at 08:49

## 2017-10-28 RX ADMIN — HEPARIN 500 UNITS: 100 SYRINGE at 08:53

## 2017-10-28 NOTE — PROGRESS NOTES
\[facility] [place] [facility]\"" Progress Note    Date: 2017    Name: Paul Tyson    MRN: [id number]         : 1950    [facility]    Ms. Pineda to [facility], ambulatory at [place] [address] [facility] accompanied by her son. Pt was assessed and education was provided. Ms. Arsalan Bright vitals were reviewed. Visit Vitals    /74 (BP 1 Location: Left arm, BP Patient Position: Sitting)    Pulse (!) 124    Temp 97.2 °F (36.2 °C)    Resp 18    SpO2 93%       Right upper arm PICC flushed easily and had brisk blood return via both ports. PICC dressing c/d/i and not due to be changed. No swelling, redness, streaking, warmth or drainage noted in arm. Pt denied c/o pain in arm.      []  Vancomycin     []  Invanz     [x]  Cubicin 360 mg     []  Rocephin    was infused over 2 minutes. Ms. Joaquin Brown tolerated infusion, and had no complaints at this time. Both lumens of PICC flushed with NS 10 ml and Heparin 250 units. Lumens wrapped with guaze and paper tape. Stockinette placed over dressing for protection. Patient armband removed and shredded. Ms. Joaquin Brown was discharged from [place] [address] in stable condition at 0900. She is to return on 10/29/17 at 0830 for her next antibiotic appointment.     Glenn Dee RN  2017

## 2017-10-29 ENCOUNTER — HOSPITAL ENCOUNTER (OUTPATIENT)
Dept: INFUSION THERAPY | Age: 67
Discharge: HOME | End: 2017-10-29
Payer: MEDICARE

## 2017-10-29 VITALS
TEMPERATURE: 98.2 F | RESPIRATION RATE: 18 BRPM | SYSTOLIC BLOOD PRESSURE: 132 MMHG | DIASTOLIC BLOOD PRESSURE: 97 MMHG | HEART RATE: 101 BPM | OXYGEN SATURATION: 98 %

## 2017-10-29 PROCEDURE — 99212 OFFICE O/P EST SF 10 MIN: CPT

## 2017-10-29 PROCEDURE — 96374 THER/PROPH/DIAG INJ IV PUSH: CPT

## 2017-10-29 PROCEDURE — 74011250636 HC RX REV CODE- 250/636: Performed by: INTERNAL MEDICINE

## 2017-10-29 PROCEDURE — 74011250636 HC RX REV CODE- 250/636: Performed by: PODIATRIST

## 2017-10-29 RX ORDER — SODIUM CHLORIDE 0.9 % (FLUSH) 0.9 %
10 SYRINGE (ML) INJECTION AS NEEDED
Status: DISCONTINUED | OUTPATIENT
Start: 2017-10-29 | End: 2017-11-02 | Stop reason: HOSPADM

## 2017-10-29 RX ORDER — HEPARIN 100 UNIT/ML
500 SYRINGE INTRAVENOUS ONCE
Status: COMPLETED | OUTPATIENT
Start: 2017-10-29 | End: 2017-10-29

## 2017-10-29 RX ADMIN — Medication 10 ML: at 08:45

## 2017-10-29 RX ADMIN — Medication 10 ML: at 08:22

## 2017-10-29 RX ADMIN — DAPTOMYCIN 360 MG: 500 INJECTION, POWDER, LYOPHILIZED, FOR SOLUTION INTRAVENOUS at 08:43

## 2017-10-29 RX ADMIN — HEPARIN 500 UNITS: 100 SYRINGE at 08:45

## 2017-10-29 NOTE — PROGRESS NOTES
[facility]    Date: 2017    Name: Walker Wells    MRN: [id number]         : 1950    [facility]    Ms. Pineda to [facility], ambulatory at 0830 accompanied by her brother. Pt was assessed and education was provided. Pt talks consecutively out of context of conversation. Ms. Pedrito Gonzalez vitals were reviewed. Visit Vitals    BP (!) 132/97 (BP 1 Location: Left arm, BP Patient Position: Sitting)    Pulse (!) 101    Temp 98.2 °F (36.8 °C)    Resp 18    SpO2 98%       Right upper arm PICC flushed easily and had brisk blood return via both ports. PICC dressing c/d/i and not due to be changed. No swelling, redness, streaking, warmth or drainage noted in arm. Pt denied c/o pain in arm.      []  Vancomycin     []  Invanz     [x]  Cubicin 360 mg     []  Rocephin    was infused over 2 minutes. Explained to patient that we need to hear from referring physician to see if he wanted to add therapy for the days she missed,  \" I am getting this line out today I dont care what the doctor says, I am going to court tomorrow for a 5 million dollar law suit and I will not be here, I am done. \"  Ms. Devaughn Claude tolerated infusion, and had no complaints at this time. Both lumens of PICC flushed with NS 10 ml and Heparin 250 units. Patient place in supine position, in sterile technique removed PICC line, then covered with 4x4, and transparent, pt declines any written education \" I have had many of these and I dont need to know anything else\"  Verbally educated her to monitor site for warmth, streaking, pain, \" I will I will\"  Pt declined to wait 30 minutes post PICC removal .     Patient armband removed and shredded. Ms. Devaughn Claude was discharged from [place] [address] in stable condition at 12. Pt is to follow-up only as referred by physician.   Segun Prado RN  2017

## 2018-04-10 ENCOUNTER — HOSPITAL ENCOUNTER (INPATIENT)
Age: 68
LOS: 1 days | Discharge: ANOTHER HOSPITAL | DRG: 287 | End: 2018-04-13
Attending: EMERGENCY MEDICINE | Admitting: HOSPITALIST
Payer: MEDICARE

## 2018-04-10 ENCOUNTER — APPOINTMENT (OUTPATIENT)
Dept: CT IMAGING | Age: 68
DRG: 287 | End: 2018-04-10
Attending: PHYSICIAN ASSISTANT
Payer: MEDICARE
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
## 2018-04-10 DIAGNOSIS — R94.31 ABNORMAL EKG: ICD-10-CM

## 2018-04-10 DIAGNOSIS — S60.512A HAND ABRASION, LEFT, INITIAL ENCOUNTER: ICD-10-CM

## 2018-04-10 DIAGNOSIS — R73.9 HYPERGLYCEMIA: ICD-10-CM

## 2018-04-10 DIAGNOSIS — R55 SYNCOPE AND COLLAPSE: Primary | ICD-10-CM

## 2018-04-10 LAB
ALBUMIN SERPL-MCNC: 3.3 G/DL (ref 3.4–5)
ALBUMIN/GLOB SERPL: 1.1 {RATIO} (ref 0.8–1.7)
ALP SERPL-CCNC: 107 U/L (ref 45–117)
ALT SERPL-CCNC: 47 U/L (ref 13–56)
AMPHET UR QL SCN: NEGATIVE
ANION GAP SERPL CALC-SCNC: 8 MMOL/L (ref 3–18)
APPEARANCE UR: CLEAR
AST SERPL-CCNC: 34 U/L (ref 15–37)
BACTERIA URNS QL MICRO: ABNORMAL /HPF
BARBITURATES UR QL SCN: NEGATIVE
BASOPHILS # BLD: 0 K/UL (ref 0–0.06)
BASOPHILS NFR BLD: 0 % (ref 0–2)
BENZODIAZ UR QL: NEGATIVE
BILIRUB SERPL-MCNC: 0.2 MG/DL (ref 0.2–1)
BILIRUB UR QL: NEGATIVE
BUN SERPL-MCNC: 17 MG/DL (ref 7–18)
BUN/CREAT SERPL: 22 (ref 12–20)
CALCIUM SERPL-MCNC: 9.2 MG/DL (ref 8.5–10.1)
CANNABINOIDS UR QL SCN: POSITIVE
CHLORIDE SERPL-SCNC: 105 MMOL/L (ref 100–108)
CK MB CFR SERPL CALC: 1.4 % (ref 0–4)
CK MB SERPL-MCNC: 3.1 NG/ML (ref 5–25)
CK SERPL-CCNC: 215 U/L (ref 26–192)
CO2 SERPL-SCNC: 25 MMOL/L (ref 21–32)
COCAINE UR QL SCN: NEGATIVE
COLOR UR: YELLOW
CREAT SERPL-MCNC: 0.78 MG/DL (ref 0.6–1.3)
DIFFERENTIAL METHOD BLD: ABNORMAL
EOSINOPHIL # BLD: 0 K/UL (ref 0–0.4)
EOSINOPHIL NFR BLD: 0 % (ref 0–5)
EPITH CASTS URNS QL MICRO: ABNORMAL /LPF (ref 0–5)
ERYTHROCYTE [DISTWIDTH] IN BLOOD BY AUTOMATED COUNT: 14 % (ref 11.6–14.5)
ETHANOL SERPL-MCNC: 5 MG/DL (ref 0–3)
GLOBULIN SER CALC-MCNC: 3.1 G/DL (ref 2–4)
GLUCOSE BLD STRIP.AUTO-MCNC: 329 MG/DL (ref 70–110)
GLUCOSE BLD STRIP.AUTO-MCNC: 394 MG/DL (ref 70–110)
GLUCOSE BLD STRIP.AUTO-MCNC: 405 MG/DL (ref 70–110)
GLUCOSE SERPL-MCNC: 320 MG/DL (ref 74–99)
GLUCOSE UR STRIP.AUTO-MCNC: >1000 MG/DL
HCT VFR BLD AUTO: 38.9 % (ref 35–45)
HDSCOM,HDSCOM: ABNORMAL
HGB BLD-MCNC: 12.9 G/DL (ref 12–16)
HGB UR QL STRIP: ABNORMAL
KETONES UR QL STRIP.AUTO: NEGATIVE MG/DL
LEUKOCYTE ESTERASE UR QL STRIP.AUTO: NEGATIVE
LYMPHOCYTES # BLD: 1.4 K/UL (ref 0.9–3.6)
LYMPHOCYTES NFR BLD: 17 % (ref 21–52)
MAGNESIUM SERPL-MCNC: 1.6 MG/DL (ref 1.6–2.6)
MCH RBC QN AUTO: 30.9 PG (ref 24–34)
MCHC RBC AUTO-ENTMCNC: 33.2 G/DL (ref 31–37)
MCV RBC AUTO: 93.3 FL (ref 74–97)
METHADONE UR QL: NEGATIVE
MONOCYTES # BLD: 0.3 K/UL (ref 0.05–1.2)
MONOCYTES NFR BLD: 3 % (ref 3–10)
NEUTS SEG # BLD: 6.2 K/UL (ref 1.8–8)
NEUTS SEG NFR BLD: 80 % (ref 40–73)
NITRITE UR QL STRIP.AUTO: NEGATIVE
OPIATES UR QL: NEGATIVE
PCP UR QL: NEGATIVE
PH UR STRIP: 6 [PH] (ref 5–8)
PLATELET # BLD AUTO: 252 K/UL (ref 135–420)
PMV BLD AUTO: 9.7 FL (ref 9.2–11.8)
POTASSIUM SERPL-SCNC: 4.7 MMOL/L (ref 3.5–5.5)
PROT SERPL-MCNC: 6.4 G/DL (ref 6.4–8.2)
PROT UR STRIP-MCNC: NEGATIVE MG/DL
RBC # BLD AUTO: 4.17 M/UL (ref 4.2–5.3)
RBC #/AREA URNS HPF: ABNORMAL /HPF (ref 0–5)
SODIUM SERPL-SCNC: 138 MMOL/L (ref 136–145)
SP GR UR REFRACTOMETRY: >1.03 (ref 1–1.03)
TROPONIN I SERPL-MCNC: <0.02 NG/ML (ref 0–0.06)
UROBILINOGEN UR QL STRIP.AUTO: 0.2 EU/DL (ref 0.2–1)
WBC # BLD AUTO: 7.8 K/UL (ref 4.6–13.2)
WBC URNS QL MICRO: ABNORMAL /HPF (ref 0–5)

## 2018-04-10 PROCEDURE — 72125 CT NECK SPINE W/O DYE: CPT

## 2018-04-10 PROCEDURE — 99218 HC RM OBSERVATION: CPT

## 2018-04-10 PROCEDURE — 70450 CT HEAD/BRAIN W/O DYE: CPT

## 2018-04-10 PROCEDURE — 82550 ASSAY OF CK (CPK): CPT | Performed by: EMERGENCY MEDICINE

## 2018-04-10 PROCEDURE — 83735 ASSAY OF MAGNESIUM: CPT | Performed by: EMERGENCY MEDICINE

## 2018-04-10 PROCEDURE — 80307 DRUG TEST PRSMV CHEM ANLYZR: CPT | Performed by: EMERGENCY MEDICINE

## 2018-04-10 PROCEDURE — 85025 COMPLETE CBC W/AUTO DIFF WBC: CPT | Performed by: EMERGENCY MEDICINE

## 2018-04-10 PROCEDURE — 74011636637 HC RX REV CODE- 636/637: Performed by: HOSPITALIST

## 2018-04-10 PROCEDURE — 74011250637 HC RX REV CODE- 250/637: Performed by: INTERNAL MEDICINE

## 2018-04-10 PROCEDURE — 82962 GLUCOSE BLOOD TEST: CPT

## 2018-04-10 PROCEDURE — 80053 COMPREHEN METABOLIC PANEL: CPT | Performed by: EMERGENCY MEDICINE

## 2018-04-10 PROCEDURE — 99285 EMERGENCY DEPT VISIT HI MDM: CPT

## 2018-04-10 PROCEDURE — 77030011256 HC DRSG MEPILEX <16IN NO BORD MOLN -A

## 2018-04-10 PROCEDURE — 74011250637 HC RX REV CODE- 250/637: Performed by: HOSPITALIST

## 2018-04-10 PROCEDURE — 93005 ELECTROCARDIOGRAM TRACING: CPT

## 2018-04-10 PROCEDURE — 96360 HYDRATION IV INFUSION INIT: CPT

## 2018-04-10 PROCEDURE — 81001 URINALYSIS AUTO W/SCOPE: CPT | Performed by: EMERGENCY MEDICINE

## 2018-04-10 PROCEDURE — 74011250636 HC RX REV CODE- 250/636: Performed by: EMERGENCY MEDICINE

## 2018-04-10 RX ORDER — WARFARIN SODIUM 5 MG/1
5 TABLET ORAL EVERY OTHER DAY
Status: DISCONTINUED | OUTPATIENT
Start: 2018-04-10 | End: 2018-04-11

## 2018-04-10 RX ORDER — LORAZEPAM 1 MG/1
2 TABLET ORAL
Status: DISCONTINUED | OUTPATIENT
Start: 2018-04-10 | End: 2018-04-14 | Stop reason: HOSPADM

## 2018-04-10 RX ORDER — WARFARIN 7.5 MG/1
7.5 TABLET ORAL EVERY OTHER DAY
Status: DISCONTINUED | OUTPATIENT
Start: 2018-04-11 | End: 2018-04-11

## 2018-04-10 RX ORDER — LANOLIN ALCOHOL/MO/W.PET/CERES
1000 CREAM (GRAM) TOPICAL DAILY
Status: DISCONTINUED | OUTPATIENT
Start: 2018-04-11 | End: 2018-04-14 | Stop reason: HOSPADM

## 2018-04-10 RX ORDER — MAGNESIUM SULFATE 100 %
16 CRYSTALS MISCELLANEOUS AS NEEDED
Status: DISCONTINUED | OUTPATIENT
Start: 2018-04-10 | End: 2018-04-11 | Stop reason: SDUPTHER

## 2018-04-10 RX ORDER — DEXTROSE 50 % IN WATER (D50W) INTRAVENOUS SYRINGE
50 AS NEEDED
Status: DISCONTINUED | OUTPATIENT
Start: 2018-04-10 | End: 2018-04-11 | Stop reason: SDUPTHER

## 2018-04-10 RX ORDER — ROPINIROLE 1 MG/1
4 TABLET, FILM COATED ORAL
Status: DISCONTINUED | OUTPATIENT
Start: 2018-04-10 | End: 2018-04-14 | Stop reason: HOSPADM

## 2018-04-10 RX ORDER — LORAZEPAM 1 MG/1
1 TABLET ORAL DAILY
Status: DISCONTINUED | OUTPATIENT
Start: 2018-04-11 | End: 2018-04-14 | Stop reason: HOSPADM

## 2018-04-10 RX ORDER — TRAMADOL HYDROCHLORIDE 50 MG/1
50 TABLET ORAL
Status: DISCONTINUED | OUTPATIENT
Start: 2018-04-10 | End: 2018-04-14 | Stop reason: HOSPADM

## 2018-04-10 RX ORDER — CLONIDINE HYDROCHLORIDE 0.1 MG/1
0.1 TABLET ORAL 2 TIMES DAILY
Status: DISCONTINUED | OUTPATIENT
Start: 2018-04-10 | End: 2018-04-14 | Stop reason: HOSPADM

## 2018-04-10 RX ORDER — INSULIN LISPRO 100 [IU]/ML
INJECTION, SOLUTION INTRAVENOUS; SUBCUTANEOUS
Status: DISCONTINUED | OUTPATIENT
Start: 2018-04-10 | End: 2018-04-11

## 2018-04-10 RX ORDER — WARFARIN SODIUM 5 MG/1
5 TABLET ORAL EVERY OTHER DAY
COMMUNITY
End: 2020-09-29

## 2018-04-10 RX ADMIN — INSULIN LISPRO 10 UNITS: 100 INJECTION, SOLUTION INTRAVENOUS; SUBCUTANEOUS at 21:40

## 2018-04-10 RX ADMIN — CLONIDINE HYDROCHLORIDE 0.1 MG: 0.1 TABLET ORAL at 22:02

## 2018-04-10 RX ADMIN — LORAZEPAM 2 MG: 1 TABLET ORAL at 22:00

## 2018-04-10 RX ADMIN — SODIUM CHLORIDE 1000 ML: 900 INJECTION, SOLUTION INTRAVENOUS at 14:35

## 2018-04-10 RX ADMIN — ROPINIROLE HYDROCHLORIDE 4 MG: 1 TABLET, FILM COATED ORAL at 22:00

## 2018-04-10 RX ADMIN — WARFARIN SODIUM 5 MG: 5 TABLET ORAL at 21:37
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
## 2018-04-10 NOTE — PROGRESS NOTES
Problem: Falls - Risk of  Goal: *Absence of Falls  Document Cristine Fall Risk and appropriate interventions in the flowsheet. Outcome: Progressing Towards Goal  Fall Risk Interventions:            Medication Interventions: Bed/chair exit alarm, Patient to call before getting OOB, Teach patient to arise slowly         History of Falls Interventions:  Investigate reason for fall, Room close to nurse's station, Bed/chair exit alarm

## 2018-04-10 NOTE — ROUTINE PROCESS
TRANSFER - OUT REPORT:    Verbal report given to Shanta Farah RN(name) on Vance Dent  being transferred to [facility](unit) for routine progression of care       Report consisted of patients Situation, Background, Assessment and   Recommendations(SBAR). Information from the following report(s) SBAR, Allergies, PMH, ER summary, Skin assessment was reviewed with the receiving nurse. Lines:   Peripheral IV 04/10/18 Right Antecubital (Active)        Opportunity for questions and clarification was provided.       Patient transported with:   Registered Nurse
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
## 2018-04-10 NOTE — PROGRESS NOTES
1730 TRANSFER - IN REPORT:    Verbal report received from Cari HOLBROOK(name) on Anita Phillip  being received from ED(unit) for routine progression of care      Report consisted of patients Situation, Background, Assessment and   Recommendations(SBAR). Information from the following report(s) SBAR, Kardex, ED Summary, Procedure Summary, Intake/Output, MAR, Accordion, Recent Results and Med Rec Status was reviewed with the receiving nurse. Opportunity for questions and clarification was provided. Assessment completed upon patients arrival to unit and care assumed. Primary Nurse Aristeo Islas RN and SHERON Alcala performed a dual skin assessment on this patient No impairment noted        Shift Summary- Shift uneventful. Pt passed egress test, denied shortness of breath or pain.

## 2018-04-10 NOTE — ROUTINE PROCESS
Bedside and Verbal shift change report given to Erick Durand RN (oncoming nurse) by Nino Rinne RN (offgoing nurse). Report included the following information SBAR, Kardex, ED Summary, Procedure Summary, Intake/Output, MAR, Accordion, Recent Results and Med Rec Status.

## 2018-04-11 ENCOUNTER — APPOINTMENT (OUTPATIENT)
Dept: NUCLEAR MEDICINE | Age: 68
DRG: 287 | End: 2018-04-11
Attending: HOSPITALIST
Payer: MEDICARE

## 2018-04-11 LAB
ANION GAP SERPL CALC-SCNC: 4 MMOL/L (ref 3–18)
ATRIAL RATE: 73 BPM
ATTENDING PHYSICIAN, CST07: NORMAL
BUN SERPL-MCNC: 13 MG/DL (ref 7–18)
BUN/CREAT SERPL: 20 (ref 12–20)
CALCIUM SERPL-MCNC: 8.8 MG/DL (ref 8.5–10.1)
CALCULATED P AXIS, ECG09: 22 DEGREES
CALCULATED R AXIS, ECG10: 63 DEGREES
CALCULATED T AXIS, ECG11: 91 DEGREES
CHLORIDE SERPL-SCNC: 108 MMOL/L (ref 100–108)
CK MB CFR SERPL CALC: 1.6 % (ref 0–4)
CK MB CFR SERPL CALC: 1.8 % (ref 0–4)
CK MB CFR SERPL CALC: 1.9 % (ref 0–4)
CK MB SERPL-MCNC: 2.5 NG/ML (ref 5–25)
CK MB SERPL-MCNC: 2.7 NG/ML (ref 5–25)
CK MB SERPL-MCNC: 2.8 NG/ML (ref 5–25)
CK SERPL-CCNC: 142 U/L (ref 26–192)
CK SERPL-CCNC: 152 U/L (ref 26–192)
CK SERPL-CCNC: 158 U/L (ref 26–192)
CO2 SERPL-SCNC: 29 MMOL/L (ref 21–32)
CREAT SERPL-MCNC: 0.65 MG/DL (ref 0.6–1.3)
DIAGNOSIS, 93000: NORMAL
DIAGNOSIS, 93000: NORMAL
DUKE TM SCORE RESULT, CST14: NORMAL
DUKE TREADMILL SCORE, CST13: NORMAL
ECG INTERP BEFORE EX, CST11: NORMAL
ECG INTERP DURING EX, CST12: NORMAL
ERYTHROCYTE [DISTWIDTH] IN BLOOD BY AUTOMATED COUNT: 14 % (ref 11.6–14.5)
FUNCTIONAL CAPACITY, CST17: NORMAL
GLUCOSE BLD STRIP.AUTO-MCNC: 119 MG/DL (ref 70–110)
GLUCOSE BLD STRIP.AUTO-MCNC: 130 MG/DL (ref 70–110)
GLUCOSE BLD STRIP.AUTO-MCNC: 373 MG/DL (ref 70–110)
GLUCOSE BLD STRIP.AUTO-MCNC: 448 MG/DL (ref 70–110)
GLUCOSE BLD STRIP.AUTO-MCNC: 468 MG/DL (ref 70–110)
GLUCOSE SERPL-MCNC: 78 MG/DL (ref 74–99)
HCT VFR BLD AUTO: 38 % (ref 35–45)
HGB BLD-MCNC: 12.5 G/DL (ref 12–16)
INR PPP: 1.6 (ref 0.8–1.2)
KNOWN CARDIAC CONDITION, CST08: NORMAL
MAX. DIASTOLIC BP, CST04: 106 MMHG
MAX. HEART RATE, CST05: 99 BPM
MAX. SYSTOLIC BP, CST03: 167 MMHG
MCH RBC QN AUTO: 30.8 PG (ref 24–34)
MCHC RBC AUTO-ENTMCNC: 32.9 G/DL (ref 31–37)
MCV RBC AUTO: 93.6 FL (ref 74–97)
OVERALL BP RESPONSE TO EXERCISE, CST16: NORMAL
OVERALL HR RESPONSE TO EXERCISE, CST15: NORMAL
P-R INTERVAL, ECG05: 158 MS
PEAK EX METS, CST10: 1 METS
PLATELET # BLD AUTO: 209 K/UL (ref 135–420)
PMV BLD AUTO: 9 FL (ref 9.2–11.8)
POTASSIUM SERPL-SCNC: 4.1 MMOL/L (ref 3.5–5.5)
PROTHROMBIN TIME: 18.1 SEC (ref 11.5–15.2)
PROTOCOL NAME, CST01: NORMAL
Q-T INTERVAL, ECG07: 404 MS
QRS DURATION, ECG06: 84 MS
QTC CALCULATION (BEZET), ECG08: 445 MS
RBC # BLD AUTO: 4.06 M/UL (ref 4.2–5.3)
SODIUM SERPL-SCNC: 141 MMOL/L (ref 136–145)
TEST INDICATION, CST09: NORMAL
TROPONIN I SERPL-MCNC: 0.02 NG/ML (ref 0–0.06)
TROPONIN I SERPL-MCNC: <0.02 NG/ML (ref 0–0.06)
TROPONIN I SERPL-MCNC: <0.02 NG/ML (ref 0–0.06)
TSH SERPL DL<=0.05 MIU/L-ACNC: 0.74 UIU/ML (ref 0.36–3.74)
VENTRICULAR RATE, ECG03: 73 BPM
WBC # BLD AUTO: 6.5 K/UL (ref 4.6–13.2)

## 2018-04-11 PROCEDURE — 74011250636 HC RX REV CODE- 250/636: Performed by: HOSPITALIST

## 2018-04-11 PROCEDURE — 80048 BASIC METABOLIC PNL TOTAL CA: CPT | Performed by: HOSPITALIST

## 2018-04-11 PROCEDURE — 74011250637 HC RX REV CODE- 250/637: Performed by: INTERNAL MEDICINE

## 2018-04-11 PROCEDURE — 82962 GLUCOSE BLOOD TEST: CPT

## 2018-04-11 PROCEDURE — 85027 COMPLETE CBC AUTOMATED: CPT | Performed by: HOSPITALIST

## 2018-04-11 PROCEDURE — 85610 PROTHROMBIN TIME: CPT | Performed by: INTERNAL MEDICINE

## 2018-04-11 PROCEDURE — 82550 ASSAY OF CK (CPK): CPT | Performed by: HOSPITALIST

## 2018-04-11 PROCEDURE — 74011636637 HC RX REV CODE- 636/637: Performed by: HOSPITALIST

## 2018-04-11 PROCEDURE — 99218 HC RM OBSERVATION: CPT

## 2018-04-11 PROCEDURE — A9500 TC99M SESTAMIBI: HCPCS

## 2018-04-11 PROCEDURE — 74011636637 HC RX REV CODE- 636/637: Performed by: INTERNAL MEDICINE

## 2018-04-11 PROCEDURE — 74011250637 HC RX REV CODE- 250/637: Performed by: HOSPITALIST

## 2018-04-11 PROCEDURE — 84443 ASSAY THYROID STIM HORMONE: CPT | Performed by: HOSPITALIST

## 2018-04-11 PROCEDURE — 36415 COLL VENOUS BLD VENIPUNCTURE: CPT | Performed by: HOSPITALIST

## 2018-04-11 PROCEDURE — 74011250636 HC RX REV CODE- 250/636: Performed by: INTERNAL MEDICINE

## 2018-04-11 PROCEDURE — 93306 TTE W/DOPPLER COMPLETE: CPT

## 2018-04-11 RX ORDER — ENOXAPARIN SODIUM 100 MG/ML
1 INJECTION SUBCUTANEOUS EVERY 12 HOURS
Status: DISCONTINUED | OUTPATIENT
Start: 2018-04-11 | End: 2018-04-12

## 2018-04-11 RX ORDER — MAGNESIUM SULFATE 100 %
16 CRYSTALS MISCELLANEOUS AS NEEDED
Status: DISCONTINUED | OUTPATIENT
Start: 2018-04-11 | End: 2018-04-14 | Stop reason: HOSPADM

## 2018-04-11 RX ORDER — ATORVASTATIN CALCIUM 20 MG/1
20 TABLET, FILM COATED ORAL
Status: DISCONTINUED | OUTPATIENT
Start: 2018-04-11 | End: 2018-04-12

## 2018-04-11 RX ORDER — DEXTROSE 50 % IN WATER (D50W) INTRAVENOUS SYRINGE
25-50 AS NEEDED
Status: DISCONTINUED | OUTPATIENT
Start: 2018-04-11 | End: 2018-04-14 | Stop reason: HOSPADM

## 2018-04-11 RX ORDER — CLOPIDOGREL BISULFATE 75 MG/1
75 TABLET ORAL DAILY
Status: DISCONTINUED | OUTPATIENT
Start: 2018-04-11 | End: 2018-04-12

## 2018-04-11 RX ORDER — METOPROLOL TARTRATE 25 MG/1
12.5 TABLET, FILM COATED ORAL EVERY 12 HOURS
Status: DISCONTINUED | OUTPATIENT
Start: 2018-04-11 | End: 2018-04-12

## 2018-04-11 RX ORDER — INSULIN LISPRO 100 [IU]/ML
INJECTION, SOLUTION INTRAVENOUS; SUBCUTANEOUS
Status: DISCONTINUED | OUTPATIENT
Start: 2018-04-11 | End: 2018-04-14 | Stop reason: HOSPADM

## 2018-04-11 RX ORDER — INSULIN GLARGINE 100 [IU]/ML
20 INJECTION, SOLUTION SUBCUTANEOUS
Status: DISCONTINUED | OUTPATIENT
Start: 2018-04-11 | End: 2018-04-14 | Stop reason: HOSPADM

## 2018-04-11 RX ADMIN — LORAZEPAM 2 MG: 1 TABLET ORAL at 21:41

## 2018-04-11 RX ADMIN — CYANOCOBALAMIN TAB 500 MCG 1000 MCG: 500 TAB at 13:13

## 2018-04-11 RX ADMIN — ENOXAPARIN SODIUM 60 MG: 60 INJECTION SUBCUTANEOUS at 21:48

## 2018-04-11 RX ADMIN — LORAZEPAM 1 MG: 1 TABLET ORAL at 13:14

## 2018-04-11 RX ADMIN — INSULIN GLARGINE 20 UNITS: 100 INJECTION, SOLUTION SUBCUTANEOUS at 21:42

## 2018-04-11 RX ADMIN — CLONIDINE HYDROCHLORIDE 0.1 MG: 0.1 TABLET ORAL at 21:47

## 2018-04-11 RX ADMIN — METOPROLOL TARTRATE 12.5 MG: 25 TABLET ORAL at 21:48

## 2018-04-11 RX ADMIN — REGADENOSON 0.4 MG: 0.08 INJECTION, SOLUTION INTRAVENOUS at 12:31

## 2018-04-11 RX ADMIN — ROPINIROLE HYDROCHLORIDE 4 MG: 1 TABLET, FILM COATED ORAL at 21:41

## 2018-04-11 RX ADMIN — ATORVASTATIN CALCIUM 20 MG: 20 TABLET, FILM COATED ORAL at 21:41

## 2018-04-11 RX ADMIN — CLONIDINE HYDROCHLORIDE 0.1 MG: 0.1 TABLET ORAL at 13:14

## 2018-04-11 RX ADMIN — INSULIN LISPRO 15 UNITS: 100 INJECTION, SOLUTION INTRAVENOUS; SUBCUTANEOUS at 21:43

## 2018-04-11 RX ADMIN — TRAMADOL HYDROCHLORIDE 50 MG: 50 TABLET, FILM COATED ORAL at 13:18

## 2018-04-11 RX ADMIN — INSULIN LISPRO 10 UNITS: 100 INJECTION, SOLUTION INTRAVENOUS; SUBCUTANEOUS at 17:47

## 2018-04-11 RX ADMIN — CLOPIDOGREL BISULFATE 75 MG: 75 TABLET ORAL at 21:47
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
## 2018-04-11 NOTE — ROUTINE PROCESS
Bedside and Verbal shift change report given to Norah Muñoz (oncoming nurse) by Nicole Kelley RN   (offgoing nurse). Report included the following information SBAR, Kardex, Intake/Output, MAR and Recent Results.

## 2018-04-11 NOTE — PROGRESS NOTES
Shift progress Notes:  Assumed care of patient in bed awake, no complaints offered, NPO since midnight, uneventful night, call bell within reach reach VSS, continue to monitor, remains on telemetry, call bell within reach.   Patient Vitals for the past 12 hrs:   Temp Pulse Resp BP SpO2   04/11/18 0631 98.2 °F (36.8 °C) 64 18 155/82 99 %   04/11/18 0245 98 °F (36.7 °C) 63 18 116/79 100 %   04/10/18 2332 98.7 °F (37.1 °C) 89 17 143/62 100 %   04/10/18 1917 98.6 °F (37 °C) 97 16 129/62 100 %

## 2018-04-11 NOTE — PROGRESS NOTES
Hospitalist Progress Note    Patient: Mika Pimentel MRN: [id number]  CSN: [id number]    YOB: 1950  Age: 78 y.o. Sex: female    DOA: 4/10/2018 LOS:  LOS: 0 days                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 Z90.3, Z98.0    Atherosclerotic PVD with ulceration ([facility]) I70.209, L98.499    Syncope R55    Anemia associated with acute blood loss D62    Anemia D64.9    Lupus anticoagulant syndrome (HCC) D68.62    Diabetes (HCC) E11.9    Chronic pain G89.29    PAD (peripheral artery disease) ([facility]) I73.9    Sleepwalking disorder F51.3    Constipation K59.00    Anxiety F41.9    Abnormal EKG R94.31            77 yo female admitted for syncopal episode. Syncopal episode - with abnormal EKG. CE negative  Discussed with , stress test abnormal.   Discussed with patient, plan for cath. DM - history of brittle DM, on SSI.     PVD     On anticoagulation with coumadin. On hold for cath, follow PT/INR    Disposition : TBD    Review of systems  General: No fevers or chills. Cardiovascular: No chest pain or pressure. No palpitations. Pulmonary: No shortness of breath. Gastrointestinal: No nausea, vomiting. Physical Exam:  General: Awake, cooperative, no acute distress    HEENT: NC, Atraumatic. PERRLA, anicteric sclerae. Lungs: CTA Bilaterally. No Wheezing/Rhonchi/Rales. Heart:  Regular  rhythm,  No murmur, No Rubs, No Gallops  Abdomen: Soft, Non distended, Non tender.  +Bowel sounds,   Extremities: No c/c/e  Psych:   Not anxious or agitated. Neurologic:  No acute neurological deficit.            Vital signs/Intake and Output:  Visit Vitals    /80 (BP 1 Location: Left arm, BP Patient Position: Sitting)    Pulse 88    Temp 98.7 °F (37.1 °C)    Resp 14    Ht 5' 5\" (1.651 m)    Wt 63.3 kg (139 lb 8.8 oz)    SpO2 100%    Breastfeeding No    BMI 23.22 kg/m2     Current Shift:  04/11 0701 - 04/11 1900  In: 600 [P.O.:600]  Out: 280 [Urine:280]  Last three shifts:  04/09 1901 - 04/11 0700  In: 240 [P.O.:240]  Out: 900 [Urine:900]            Labs: Results:       Chemistry Recent Labs      04/11/18   0030  04/10/18   1400   GLU  78  320*   NA  141  138   K  4.1  4.7   CL  108  105   CO2  29  25   BUN  13  17   CREA  0.65  0.78   CA  8.8  9.2   AGAP  4  8   BUCR  20  22*   AP   --   107   TP   --   6.4   ALB   --   3.3*   GLOB   --   3.1   AGRAT   --   1.1      CBC w/Diff Recent Labs      04/11/18   0030  04/10/18   1400   WBC  6.5  7.8   RBC  4.06*  4.17*   HGB  12.5  12.9   HCT  38.0  38.9   PLT  209  252   GRANS   --   80*   LYMPH   --   17*   EOS   --   0      Cardiac Enzymes Recent Labs      04/11/18   1355  04/11/18   0635   CPK  152  142   CKND1  1.8  1.9      Coagulation Recent Labs      04/11/18   0030   PTP  18.1*   INR  1.6*       Lipid Panel No results found for: CHOL, CHOLPOCT, CHOLX, CHLST, CHOLV, 175662, HDL, LDL, LDLC, DLDLP, 102005, VLDLC, VLDL, TGLX, TRIGL, TRIGP, TGLPOCT, CHHD, CHHDX   BNP No results for input(s): BNPP in the last 72 hours.    Liver Enzymes Recent Labs      04/10/18   1400   TP  6.4   ALB  3.3*   AP  107   SGOT  34      Thyroid Studies Lab Results   Component Value Date/Time    TSH 0.74 04/11/2018 12:30 AM        Procedures/imaging: see electronic medical records for all procedures/Xrays and details which were not copied into this note but were reviewed prior to creation of Plan
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
## 2018-04-11 NOTE — ROUTINE PROCESS
Bedside and Verbal shift change report given to Amber Ceron RN (oncoming nurse) by JEFFRY Gallagher (offgoing nurse). Report included the following information SBAR, Kardex, Intake/Output and MAR.

## 2018-04-11 NOTE — PROGRESS NOTES
8220 Assumed patient care from off going nurse Neomi Carrel, RN. Patient is alert x 4 resting in bed and appears to be in no sign of distress. Updated on patient's NPO status for stress test this am. Bed left in lowest position with call bell left within reach.
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
## 2018-04-12 LAB
ACT BLD: 143 SECS (ref 79–138)
ANION GAP SERPL CALC-SCNC: 8 MMOL/L (ref 3–18)
APTT PPP: 35.1 SEC (ref 23–36.4)
BASOPHILS # BLD: 0 K/UL (ref 0–0.06)
BASOPHILS NFR BLD: 1 % (ref 0–2)
BUN SERPL-MCNC: 17 MG/DL (ref 7–18)
BUN/CREAT SERPL: 28 (ref 12–20)
CALCIUM SERPL-MCNC: 8 MG/DL (ref 8.5–10.1)
CHLORIDE SERPL-SCNC: 106 MMOL/L (ref 100–108)
CO2 SERPL-SCNC: 25 MMOL/L (ref 21–32)
CREAT SERPL-MCNC: 0.6 MG/DL (ref 0.6–1.3)
DIFFERENTIAL METHOD BLD: ABNORMAL
EOSINOPHIL # BLD: 0.1 K/UL (ref 0–0.4)
EOSINOPHIL NFR BLD: 2 % (ref 0–5)
ERYTHROCYTE [DISTWIDTH] IN BLOOD BY AUTOMATED COUNT: 13.8 % (ref 11.6–14.5)
ERYTHROCYTE [DISTWIDTH] IN BLOOD BY AUTOMATED COUNT: 14 % (ref 11.6–14.5)
GLUCOSE BLD STRIP.AUTO-MCNC: 115 MG/DL (ref 70–110)
GLUCOSE BLD STRIP.AUTO-MCNC: 192 MG/DL (ref 70–110)
GLUCOSE BLD STRIP.AUTO-MCNC: 272 MG/DL (ref 70–110)
GLUCOSE BLD STRIP.AUTO-MCNC: 337 MG/DL (ref 70–110)
GLUCOSE SERPL-MCNC: 111 MG/DL (ref 74–99)
HCT VFR BLD AUTO: 37.3 % (ref 35–45)
HCT VFR BLD AUTO: 37.9 % (ref 35–45)
HGB BLD-MCNC: 12.3 G/DL (ref 12–16)
HGB BLD-MCNC: 12.5 G/DL (ref 12–16)
INR PPP: 1.3 (ref 0.8–1.2)
LYMPHOCYTES # BLD: 1.5 K/UL (ref 0.9–3.6)
LYMPHOCYTES NFR BLD: 29 % (ref 21–52)
MCH RBC QN AUTO: 30.4 PG (ref 24–34)
MCH RBC QN AUTO: 30.6 PG (ref 24–34)
MCHC RBC AUTO-ENTMCNC: 32.5 G/DL (ref 31–37)
MCHC RBC AUTO-ENTMCNC: 33.5 G/DL (ref 31–37)
MCV RBC AUTO: 91.2 FL (ref 74–97)
MCV RBC AUTO: 93.8 FL (ref 74–97)
MONOCYTES # BLD: 0.5 K/UL (ref 0.05–1.2)
MONOCYTES NFR BLD: 9 % (ref 3–10)
NEUTS SEG # BLD: 3.3 K/UL (ref 1.8–8)
NEUTS SEG NFR BLD: 59 % (ref 40–73)
PLATELET # BLD AUTO: 226 K/UL (ref 135–420)
PLATELET # BLD AUTO: 244 K/UL (ref 135–420)
PMV BLD AUTO: 9.6 FL (ref 9.2–11.8)
PMV BLD AUTO: 9.6 FL (ref 9.2–11.8)
POTASSIUM SERPL-SCNC: 4.2 MMOL/L (ref 3.5–5.5)
PROTHROMBIN TIME: 15.3 SEC (ref 11.5–15.2)
RBC # BLD AUTO: 4.04 M/UL (ref 4.2–5.3)
RBC # BLD AUTO: 4.09 M/UL (ref 4.2–5.3)
SODIUM SERPL-SCNC: 139 MMOL/L (ref 136–145)
WBC # BLD AUTO: 5.4 K/UL (ref 4.6–13.2)
WBC # BLD AUTO: 5.9 K/UL (ref 4.6–13.2)

## 2018-04-12 PROCEDURE — 74011250637 HC RX REV CODE- 250/637: Performed by: HOSPITALIST

## 2018-04-12 PROCEDURE — 99218 HC RM OBSERVATION: CPT

## 2018-04-12 PROCEDURE — 85347 COAGULATION TIME ACTIVATED: CPT

## 2018-04-12 PROCEDURE — 74011000250 HC RX REV CODE- 250: Performed by: INTERNAL MEDICINE

## 2018-04-12 PROCEDURE — 82962 GLUCOSE BLOOD TEST: CPT

## 2018-04-12 PROCEDURE — 85610 PROTHROMBIN TIME: CPT | Performed by: HOSPITALIST

## 2018-04-12 PROCEDURE — 74011250636 HC RX REV CODE- 250/636

## 2018-04-12 PROCEDURE — 77030010221 CARDIAC CATHETERIZATION

## 2018-04-12 PROCEDURE — 65270000029 HC RM PRIVATE

## 2018-04-12 PROCEDURE — 74011636320 HC RX REV CODE- 636/320: Performed by: INTERNAL MEDICINE

## 2018-04-12 PROCEDURE — 4A023N7 MEASUREMENT OF CARDIAC SAMPLING AND PRESSURE, LEFT HEART, PERCUTANEOUS APPROACH: ICD-10-PCS | Performed by: INTERNAL MEDICINE

## 2018-04-12 PROCEDURE — 85730 THROMBOPLASTIN TIME PARTIAL: CPT | Performed by: INTERNAL MEDICINE

## 2018-04-12 PROCEDURE — B2111ZZ FLUOROSCOPY OF MULTIPLE CORONARY ARTERIES USING LOW OSMOLAR CONTRAST: ICD-10-PCS | Performed by: INTERNAL MEDICINE

## 2018-04-12 PROCEDURE — 74011636637 HC RX REV CODE- 636/637: Performed by: HOSPITALIST

## 2018-04-12 PROCEDURE — 74011636637 HC RX REV CODE- 636/637: Performed by: INTERNAL MEDICINE

## 2018-04-12 PROCEDURE — 74011250637 HC RX REV CODE- 250/637: Performed by: INTERNAL MEDICINE

## 2018-04-12 PROCEDURE — 80048 BASIC METABOLIC PNL TOTAL CA: CPT | Performed by: HOSPITALIST

## 2018-04-12 PROCEDURE — 85025 COMPLETE CBC W/AUTO DIFF WBC: CPT | Performed by: INTERNAL MEDICINE

## 2018-04-12 PROCEDURE — 36415 COLL VENOUS BLD VENIPUNCTURE: CPT | Performed by: HOSPITALIST

## 2018-04-12 PROCEDURE — 85027 COMPLETE CBC AUTOMATED: CPT | Performed by: HOSPITALIST

## 2018-04-12 PROCEDURE — 74011250636 HC RX REV CODE- 250/636: Performed by: INTERNAL MEDICINE

## 2018-04-12 RX ORDER — FENTANYL CITRATE 50 UG/ML
25-100 INJECTION, SOLUTION INTRAMUSCULAR; INTRAVENOUS
Status: DISCONTINUED | OUTPATIENT
Start: 2018-04-12 | End: 2018-04-12 | Stop reason: HOSPADM

## 2018-04-12 RX ORDER — METOPROLOL TARTRATE 25 MG/1
25 TABLET, FILM COATED ORAL EVERY 12 HOURS
Status: DISCONTINUED | OUTPATIENT
Start: 2018-04-12 | End: 2018-04-14 | Stop reason: HOSPADM

## 2018-04-12 RX ORDER — HEPARIN SODIUM 200 [USP'U]/100ML
INJECTION, SOLUTION INTRAVENOUS
Status: COMPLETED
Start: 2018-04-12 | End: 2018-04-12

## 2018-04-12 RX ORDER — EPTIFIBATIDE 0.75 MG/ML
2 INJECTION, SOLUTION INTRAVENOUS CONTINUOUS
Status: DISCONTINUED | OUTPATIENT
Start: 2018-04-12 | End: 2018-04-12

## 2018-04-12 RX ORDER — SODIUM CHLORIDE 9 MG/ML
50 INJECTION, SOLUTION INTRAVENOUS CONTINUOUS
Status: DISPENSED | OUTPATIENT
Start: 2018-04-12 | End: 2018-04-12

## 2018-04-12 RX ORDER — HEPARIN SODIUM 1000 [USP'U]/ML
INJECTION, SOLUTION INTRAVENOUS; SUBCUTANEOUS
Status: DISPENSED
Start: 2018-04-12 | End: 2018-04-13

## 2018-04-12 RX ORDER — MIDAZOLAM HYDROCHLORIDE 1 MG/ML
INJECTION, SOLUTION INTRAMUSCULAR; INTRAVENOUS
Status: COMPLETED
Start: 2018-04-12 | End: 2018-04-12

## 2018-04-12 RX ORDER — FENTANYL CITRATE 50 UG/ML
INJECTION, SOLUTION INTRAMUSCULAR; INTRAVENOUS
Status: COMPLETED
Start: 2018-04-12 | End: 2018-04-12

## 2018-04-12 RX ORDER — ONDANSETRON 2 MG/ML
INJECTION INTRAMUSCULAR; INTRAVENOUS
Status: COMPLETED
Start: 2018-04-12 | End: 2018-04-12

## 2018-04-12 RX ORDER — LIDOCAINE HYDROCHLORIDE 10 MG/ML
3-20 INJECTION, SOLUTION EPIDURAL; INFILTRATION; INTRACAUDAL; PERINEURAL ONCE
Status: COMPLETED | OUTPATIENT
Start: 2018-04-12 | End: 2018-04-12

## 2018-04-12 RX ORDER — ONDANSETRON 2 MG/ML
4 INJECTION INTRAMUSCULAR; INTRAVENOUS
Status: COMPLETED | OUTPATIENT
Start: 2018-04-12 | End: 2018-04-12

## 2018-04-12 RX ORDER — ENOXAPARIN SODIUM 100 MG/ML
60 INJECTION SUBCUTANEOUS EVERY 12 HOURS
Status: DISCONTINUED | OUTPATIENT
Start: 2018-04-12 | End: 2018-04-12

## 2018-04-12 RX ORDER — ATORVASTATIN CALCIUM 20 MG/1
40 TABLET, FILM COATED ORAL
Status: DISCONTINUED | OUTPATIENT
Start: 2018-04-12 | End: 2018-04-14 | Stop reason: HOSPADM

## 2018-04-12 RX ORDER — EPTIFIBATIDE 2 MG/ML
180 INJECTION, SOLUTION INTRAVENOUS
Status: DISCONTINUED | OUTPATIENT
Start: 2018-04-12 | End: 2018-04-12 | Stop reason: HOSPADM

## 2018-04-12 RX ORDER — HEPARIN SODIUM 200 [USP'U]/100ML
500 INJECTION, SOLUTION INTRAVENOUS
Status: DISCONTINUED | OUTPATIENT
Start: 2018-04-12 | End: 2018-04-12 | Stop reason: HOSPADM

## 2018-04-12 RX ORDER — LIDOCAINE HYDROCHLORIDE 10 MG/ML
3-20 INJECTION INFILTRATION; PERINEURAL ONCE
Status: ACTIVE | OUTPATIENT
Start: 2018-04-12 | End: 2018-04-13

## 2018-04-12 RX ORDER — HEPARIN SODIUM 10000 [USP'U]/100ML
12-25 INJECTION, SOLUTION INTRAVENOUS
Status: DISCONTINUED | OUTPATIENT
Start: 2018-04-12 | End: 2018-04-12

## 2018-04-12 RX ORDER — MIDAZOLAM HYDROCHLORIDE 1 MG/ML
.5-2 INJECTION, SOLUTION INTRAMUSCULAR; INTRAVENOUS
Status: DISCONTINUED | OUTPATIENT
Start: 2018-04-12 | End: 2018-04-12 | Stop reason: HOSPADM

## 2018-04-12 RX ORDER — LIDOCAINE HYDROCHLORIDE 10 MG/ML
INJECTION, SOLUTION EPIDURAL; INFILTRATION; INTRACAUDAL; PERINEURAL
Status: DISPENSED
Start: 2018-04-12 | End: 2018-04-13

## 2018-04-12 RX ORDER — ONDANSETRON 2 MG/ML
INJECTION INTRAMUSCULAR; INTRAVENOUS
Status: DISPENSED
Start: 2018-04-12 | End: 2018-04-13

## 2018-04-12 RX ORDER — ENOXAPARIN SODIUM 100 MG/ML
60 INJECTION SUBCUTANEOUS EVERY 12 HOURS
Status: DISCONTINUED | OUTPATIENT
Start: 2018-04-12 | End: 2018-04-14 | Stop reason: HOSPADM

## 2018-04-12 RX ORDER — HEPARIN SODIUM 1000 [USP'U]/ML
1000-4000 INJECTION, SOLUTION INTRAVENOUS; SUBCUTANEOUS
Status: DISCONTINUED | OUTPATIENT
Start: 2018-04-12 | End: 2018-04-12 | Stop reason: HOSPADM

## 2018-04-12 RX ORDER — HEPARIN SODIUM 1000 [USP'U]/ML
60 INJECTION, SOLUTION INTRAVENOUS; SUBCUTANEOUS ONCE
Status: COMPLETED | OUTPATIENT
Start: 2018-04-12 | End: 2018-04-12

## 2018-04-12 RX ORDER — VERAPAMIL HYDROCHLORIDE 2.5 MG/ML
INJECTION, SOLUTION INTRAVENOUS
Status: DISPENSED
Start: 2018-04-12 | End: 2018-04-13

## 2018-04-12 RX ADMIN — ONDANSETRON 4 MG: 2 INJECTION INTRAMUSCULAR; INTRAVENOUS at 17:01

## 2018-04-12 RX ADMIN — INSULIN GLARGINE 20 UNITS: 100 INJECTION, SOLUTION SUBCUTANEOUS at 22:04

## 2018-04-12 RX ADMIN — INSULIN LISPRO 3 UNITS: 100 INJECTION, SOLUTION INTRAVENOUS; SUBCUTANEOUS at 18:25

## 2018-04-12 RX ADMIN — INSULIN LISPRO 12 UNITS: 100 INJECTION, SOLUTION INTRAVENOUS; SUBCUTANEOUS at 22:03

## 2018-04-12 RX ADMIN — CYANOCOBALAMIN TAB 500 MCG 1000 MCG: 500 TAB at 09:57

## 2018-04-12 RX ADMIN — LORAZEPAM 2 MG: 1 TABLET ORAL at 22:06

## 2018-04-12 RX ADMIN — VERAPAMIL HYDROCHLORIDE 3 ML: 2.5 INJECTION INTRAVENOUS at 15:21

## 2018-04-12 RX ADMIN — SODIUM CHLORIDE 50 ML/HR: 900 INJECTION, SOLUTION INTRAVENOUS at 18:14

## 2018-04-12 RX ADMIN — MIDAZOLAM HYDROCHLORIDE 1 MG: 1 INJECTION, SOLUTION INTRAMUSCULAR; INTRAVENOUS at 15:21

## 2018-04-12 RX ADMIN — FENTANYL CITRATE 50 MCG: 50 INJECTION, SOLUTION INTRAMUSCULAR; INTRAVENOUS at 15:21

## 2018-04-12 RX ADMIN — CLONIDINE HYDROCHLORIDE 0.1 MG: 0.1 TABLET ORAL at 09:57

## 2018-04-12 RX ADMIN — HEPARIN SODIUM 5000 UNITS: 1000 INJECTION, SOLUTION INTRAVENOUS; SUBCUTANEOUS at 15:22

## 2018-04-12 RX ADMIN — METOPROLOL TARTRATE 25 MG: 25 TABLET ORAL at 22:06

## 2018-04-12 RX ADMIN — Medication 1000 UNITS: at 15:20

## 2018-04-12 RX ADMIN — HEPARIN SODIUM 12 UNITS/KG/HR: 10000 INJECTION, SOLUTION INTRAVENOUS at 10:02

## 2018-04-12 RX ADMIN — CLONIDINE HYDROCHLORIDE 0.1 MG: 0.1 TABLET ORAL at 22:06

## 2018-04-12 RX ADMIN — Medication 1000 UNITS: at 15:24

## 2018-04-12 RX ADMIN — ROPINIROLE HYDROCHLORIDE 4 MG: 1 TABLET, FILM COATED ORAL at 22:05

## 2018-04-12 RX ADMIN — ATORVASTATIN CALCIUM 40 MG: 20 TABLET, FILM COATED ORAL at 22:06

## 2018-04-12 RX ADMIN — HEPARIN SODIUM 3770 UNITS: 1000 INJECTION, SOLUTION INTRAVENOUS; SUBCUTANEOUS at 09:56

## 2018-04-12 RX ADMIN — ENOXAPARIN SODIUM 60 MG: 60 INJECTION SUBCUTANEOUS at 22:04

## 2018-04-12 RX ADMIN — IOPAMIDOL 60 ML: 612 INJECTION, SOLUTION INTRAVENOUS at 16:11

## 2018-04-12 RX ADMIN — FENTANYL CITRATE 50 MCG: 50 INJECTION, SOLUTION INTRAMUSCULAR; INTRAVENOUS at 15:55

## 2018-04-12 RX ADMIN — LIDOCAINE HYDROCHLORIDE 3 ML: 10 INJECTION, SOLUTION EPIDURAL; INFILTRATION; INTRACAUDAL; PERINEURAL at 15:20

## 2018-04-12 RX ADMIN — METOPROLOL TARTRATE 12.5 MG: 25 TABLET ORAL at 09:58

## 2018-04-12 RX ADMIN — LORAZEPAM 1 MG: 1 TABLET ORAL at 09:58

## 2018-04-12 NOTE — ROUTINE PROCESS
Cardiac Cath Lab:  Pre Procedure Chart Check     Patients chart was accessed and reviewed for possible and/or scheduled procedure. Creatinine Clearance:  CREATININE: 0.6 MG/DL (04/12/18 0409)  Estimated creatinine clearance: 83 mL/min    Total Contrast  Load:  3 x estimated clearance amount=  249ml    75% of Contrast Load:  0.75 x Total Contrast Load=    186.75ml    Recent Labs      04/12/18   0409  04/11/18   1355   WBC  5.9   --    RBC  4.04*   --    HCT  37.9   --    HGB  12.3   --    PLT  244   --    INR  1.3*   --    APTT  35.1   --    PTP  15.3*   --    NA  139   --    K  4.2   --    BUN  17   --    CREA  0.60   --    GFRAA  >60   --    GFRNA  >60   --    CA  8.0*   --    CPK   --   152   CKMB   --   2.8   CKND1   --   1.8   TROIQ   --   <0.02       BMI: Body mass index is 23.08 kg/(m^2).     ALLERGIES:   Allergies   Allergen Reactions    Rifampin Hives and Swelling     Swelling of mouth and tounge    Levaquin [Levofloxacin] Itching    Amoxicillin Hives    Aspirin Nausea and Vomiting    Azithromycin Angioedema    Bactrim [Sulfamethoprim Ds] Hives    Cefatrizine Hives    Erythromycin Hives    Rocephin [Ceftriaxone] Hives    Tylenol [Acetaminophen] Hives    Vancomycin Hives       Lines:        Peripheral IV 04/10/18 Right Antecubital (Active)   Site Assessment Clean, dry, & intact 4/11/2018 11:00 PM   Phlebitis Assessment 0 4/11/2018 11:00 PM   Infiltration Assessment 0 4/11/2018 11:00 PM   Dressing Status Clean, dry, & intact 4/11/2018 11:00 PM   Dressing Type Transparent;Tape 4/11/2018 11:00 PM   Hub Color/Line Status Pink;Capped;Flushed;Patent 4/11/2018 11:00 PM   Action Taken Open ports on tubing capped 4/11/2018 11:00 PM   Alcohol Cap Used Yes 4/11/2018 11:00 PM          History:    Past Medical History:   Diagnosis Date    Arthritis     hands and feet    Asthma     Autoimmune disease ([address]     lupus anticoagulant-causes clotting    Cancer ([address]     carcinoma in situ of cervix and uterus; inside bottom lip    Chronic pain     Left foot    Diabetes ([facility] [address] 2013    Dizzy spells     h/o    GERD (gastroesophageal reflux disease)     Liver disease     1/2 of my liver was removed for ulcers.     Lung collapse     2015    Nausea & vomiting 5/23/2012    Neuropathy     right foot    Other ill-defined conditions(799.89)     arterial clot in rt leg    Other ill-defined conditions(799.89)     restless leg syndrome    Other ill-defined conditions(799.89)     raynaud's syndrome    Other ill-defined conditions(799.89)     chronic N/V    Other ill-defined conditions(799.89)     (2 mutated genes) hyper coag. state     Pneumonia 07/22/2017    Positive cardiolipin antibodies     Psychiatric disorder     depression    PUD (peptic ulcer disease) 2009    H/O requiring partial gastrectomy and small intestine    Thromboembolus ([facility] [address]     artery right leg     Past Surgical History:   Procedure Laterality Date    ABDOMEN SURGERY PROC UNLISTED      duodenal ulcer rupture    BREAST SURGERY PROCEDURE UNLISTED Bilateral 02/2016    hemorrhaged after breast surgery    HX APPENDECTOMY      HX BREAST AUGMENTATION      developed infection and implant removed    HX CHOLECYSTECTOMY      HX GASTRECTOMY      due to ulcers    HX GASTRIC BYPASS  1996    gastric by-pass    HX GI      total gastrectomy, partial colectomy    HX GI      colostomy and reversal    HX HEENT      cancer of lip removed    HX HYSTERECTOMY      for cervical cancer    HX ORTHOPAEDIC      rt foot surgery x10    HX ORTHOPAEDIC Left     great toe gangrenous and amputated    HX OTHER SURGICAL      part of liver removal    HX OTHER SURGICAL      ventral hernia repair x10    HX OTHER SURGICAL      Exc pilonial cystectomy    VASCULAR SURGERY PROCEDURE UNLIST      iliac aorta stents x 6     Patient Active Problem List   Diagnosis Code    Pancreatic cyst K86.2    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 Z90.3, Z98.0    Atherosclerotic PVD with ulceration ([facility]) I70.209, L98.499    Syncope R55    Anemia associated with acute blood loss D62    Anemia D64.9    Lupus anticoagulant syndrome ([facility]) D68.62    Diabetes ([facility]) E11.9    Chronic pain G89.29    PAD (peripheral artery disease) ([facility]) I73.9    Sleepwalking disorder F51.3    Constipation K59.00    Anxiety F41.9    Abnormal EKG R94.31

## 2018-04-12 NOTE — PHYSICIAN ADVISORY
Letter of Admission Status Determination: Upgrade to Inpatient       Martell Davis was hospitalized as observation status on 4/10/2018. Her hospital stay has already crossed 2 medically necessary midnights; ongoing hospitalization was warranted for this patient with syncope, abnormal EKG and significantly abnormal nuclear stress test requiring heart catheterization, further evaluation, and close monitoring. Since Martell Davis required medically necessary hospital care spanning over at least two midnights, she has met the benchmark for Inpatient Admission status in accordance with CMS regulation Section 43 .3. Based on the documented clinical condition and care plan, we recommend upgrading patient's hospitalization status from Outpatient Observation to INPATIENT status. The final decision regarding the patient's hospitalization status depends on the attending physician's clinical judgment.        Camilla Fang MD, DILIA, Jazmyne Jean [facility]  Physician Tapan Ellison.  [phone]    April 12, 2018   8:25 AM

## 2018-04-12 NOTE — PROGRESS NOTES
TRANSFER - OUT REPORT:  Verbal report given to alan carrillo(name) on Kana Ramsay being transferred to care(unit) for routine progression of care   Report consisted of patients Situation, Background, Assessment and   Recommendations(SBAR). Information from the following report(s) SBAR, Kardex, Procedure Summary, MAR and Cardiac Rhythm nsr was reviewed with the receiving nurse.     Cath Lab Report:    Procedure:  [x ] LHC  [ ] RHC  [ ] PTCA   [ ] Peripheral   [ ] Pacemaker [ ] ZORAN  [ ] [address]    Access site:   [ ] Right [x ] Left  [x ] Radial [ ] Brachial [ ] Femoral [ ] Jugular [ ] Chest Wall      Sheath:           [x ] Pulled in Cath Lab   [ ] In place   [ ] To be pulled after:         Closure:          [x ] Radial Band  [ ] Manual Pressure     [ ] Demond Minus     [ ] Star Close    [ ] Per Close    [ ] Safe Guard    Site Assessment:   [x ] Clean, Dry, No bleeding    [ ] Minor oozing          [ ] Hematoma: Description:    Stents(s) Placement:  [ ] Left Main:                 [ ] LAD:                [ ] Circ:                [ ] RCA:                [ ] EF:     [ ] Peripheral:      [ ] N/A    Infusion [ ]Angiomax [ ] Integrelin [ ] Heparin d/c'd: 5791    Intra procedure Medications:    Fentanyl:100  Versed:1  Heparin:5000  Antiplatelet:  Other:    Lines:        Peripheral IV 04/10/18 Right Antecubital (Active)   Site Assessment Clean, dry, & intact 4/11/2018 11:00 PM   Phlebitis Assessment 0 4/11/2018 11:00 PM   Infiltration Assessment 0 4/11/2018 11:00 PM   Dressing Status Clean, dry, & intact 4/11/2018 11:00 PM   Dressing Type Transparent;Tape 4/11/2018 11:00 PM   Hub Color/Line Status Pink;Capped;Flushed;Patent 4/11/2018 11:00 PM   Action Taken Open ports on tubing capped 4/11/2018 11:00 PM   Alcohol Cap Used Yes 4/11/2018 11:00 PM     Sheath 04/12/18 (Active)   Site Assessment Clean, dry, & intact 4/12/2018  4:10 PM   Dressing Status Clean, dry, & intact 4/12/2018  4:10 PM   Dressing Type Immobilizer 4/12/2018  4:10 PM   Hub Color/Line Status Green 4/12/2018  4:10 PM   Closure Device Compression device (comment) 4/12/2018  4:10 PM   Hemostasis  TR Band 4/12/2018  4:10 PM       Patient Vitals for the past 4 hrs:   Temp Pulse Resp BP SpO2   04/12/18 1620 98.2 °F (36.8 °C) 61 9 149/87 100 %   04/12/18 1438 98.8 °F (37.1 °C) 64 16 146/70 98 %         Extended / Orthostatic Vitals:    Vital Signs  Level of Consciousness: Alert (04/12/18 1620)  Temp: 98.2 °F (36.8 °C) (04/12/18 1620)  Temp Source: Oral (04/12/18 1620)  Pulse (Heart Rate): 61 (04/12/18 1620)  Heart Rate Source: Monitor (04/12/18 1620)  Cardiac Rhythm: Normal sinus rhythm (04/12/18 1620)  Resp Rate: 9 (04/12/18 1620)  BP: 149/87 (04/12/18 1620)  MAP (Calculated): 108 (04/12/18 1620)  BP 1 Location: Left arm (04/12/18 1620)  BP 1 Method: Automatic (04/12/18 1620)  BP Patient Position: At rest;Supine (04/12/18 1620)  MEWS Score: 0 (04/12/18 1620)         Oxygen Therapy  O2 Sat (%): 100 % (04/12/18 1620)  O2 Device: Room air (04/12/18 1438)          Opportunity for questions and clarification was provided.

## 2018-04-12 NOTE — PROGRESS NOTES
Tr band removed, no active drainage noted from site, 2x2 and tegaderm applied, neuro/vasc assessment of site WNL

## 2018-04-12 NOTE — H&P
Date of Surgery Update:  Magda Galloway was seen and examined. History and physical has been reviewed. The patient has been examined.  There have been no significant clinical changes since the completion of the originally dated History and Physical.    Signed By: Yoly Gallegos MD     April 12, 2018 3:12 PM
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
## 2018-04-12 NOTE — WOUND CARE
Pt assessed by wound care during monthly prevalence. Pt has current rufus score 23, no pressure injuries noted at this time. Wound care will be available if needed during this hospitalization.

## 2018-04-12 NOTE — PROGRESS NOTES
8498:  Assumed care for patient, received bedside report from Lisandro Rosas RN. Patient lying in bed watching television, with no complaints of pain or discomfort at the time. Whiteboard updated, bed at the lowest position with call bell within reach. 0794:  Call from cath lab informing that patient is to have breakfast and NPO after that. Patient is also to be started on \"standard protocol for ACS. \"    [phone]:  Patient off floor to cath lab.    1723:  Received report from Lamont Wayne RN in cath lab, awaiting patient to return to unit. 1745:  Patient back on unit, advised that patient had a hematoma to right wrist.  Lamont Wayne RN wrapped area, hematoma no longer noted. Patient has no complaints of pain or discomfort at the time. Bed at the lowest position with call bell within reach. 1829:  Dr. Rupinder Hernandez at bedside. Bedside and Verbal shift change report given to Lisandro Rosas RN (oncoming nurse) by Dilan Macdonald RN   (offgoing nurse). Report included the following information SBAR, Kardex, Procedure Summary, Intake/Output, MAR, Med Rec Status, Cardiac Rhythm SR and Alarm Parameters .

## 2018-04-12 NOTE — PROGRESS NOTES
D/c plan: home when medically cleared  Met with patient at bedside during IDR's. Plan for patint to go to cath. Denies needs or dme at this time or for d/c  Care Management Interventions  PCP Verified by CM: Yes  Transition of Care Consult (CM Consult): Discharge Planning  Current Support Network:  Other (son lives with patient)  Confirm Follow Up Transport: Family  Plan discussed with Pt/Family/Caregiver: Yes  Freedom of Choice Offered: Yes  Discharge Location  Discharge Placement: Home with family assistance

## 2018-04-12 NOTE — PROGRESS NOTES
Pt returned from cath lab, pt aaox3, denies any pain or distress, coffee and lunch box given,  Pt began eating then became nauseated, Dr Salazar Chowdhury texted, pt medicated for nausea, vomited small amt undigested food and yellow fluid

## 2018-04-12 NOTE — ROUTINE PROCESS
Cardiac Cath Lab:  Pre Procedure Chart Check     Patients chart was accessed and reviewed for possible and/or scheduled procedure. Creatinine Clearance:  CREATININE: 0.6 MG/DL (04/12/18 0409)  Estimated creatinine clearance: 83 mL/min    Total Contrast  Load:  3 x estimated clearance amount=  249ml    75% of Contrast Load:  0.75 x Total Contrast Load=    186ml    Recent Labs      04/12/18   0409  04/11/18   1355   WBC  5.9   --    RBC  4.04*   --    HCT  37.9   --    HGB  12.3   --    PLT  244   --    INR  1.3*   --    APTT  35.1   --    PTP  15.3*   --    NA  139   --    K  4.2   --    BUN  17   --    CREA  0.60   --    GFRAA  >60   --    GFRNA  >60   --    CA  8.0*   --    CPK   --   152   CKMB   --   2.8   CKND1   --   1.8   TROIQ   --   <0.02       BMI: Body mass index is 23.08 kg/(m^2).     ALLERGIES:   Allergies   Allergen Reactions    Rifampin Hives and Swelling     Swelling of mouth and tounge    Levaquin [Levofloxacin] Itching    Amoxicillin Hives    Aspirin Nausea and Vomiting    Azithromycin Angioedema    Bactrim [Sulfamethoprim Ds] Hives    Cefatrizine Hives    Erythromycin Hives    Rocephin [Ceftriaxone] Hives    Tylenol [Acetaminophen] Hives    Vancomycin Hives       Lines:        Peripheral IV 04/10/18 Right Antecubital (Active)   Site Assessment Clean, dry, & intact 4/11/2018 11:00 PM   Phlebitis Assessment 0 4/11/2018 11:00 PM   Infiltration Assessment 0 4/11/2018 11:00 PM   Dressing Status Clean, dry, & intact 4/11/2018 11:00 PM   Dressing Type Transparent;Tape 4/11/2018 11:00 PM   Hub Color/Line Status Pink;Capped;Flushed;Patent 4/11/2018 11:00 PM   Action Taken Open ports on tubing capped 4/11/2018 11:00 PM   Alcohol Cap Used Yes 4/11/2018 11:00 PM          History:    Past Medical History:   Diagnosis Date    Arthritis     hands and feet    Asthma     Autoimmune disease ([address]     lupus anticoagulant-causes clotting    Cancer ([address]     carcinoma in situ of cervix and uterus; inside bottom lip    Chronic pain     Left foot    Diabetes ([facility] 2013    Dizzy spells     h/o    GERD (gastroesophageal reflux disease)     Liver disease     1/2 of my liver was removed for ulcers.     Lung collapse     2015    Nausea & vomiting 5/23/2012    Neuropathy     right foot    Other ill-defined conditions(799.89)     arterial clot in rt leg    Other ill-defined conditions(799.89)     restless leg syndrome    Other ill-defined conditions(799.89)     raynaud's syndrome    Other ill-defined conditions(799.89)     chronic N/V    Other ill-defined conditions(799.89)     (2 mutated genes) hyper coag. state     Pneumonia 07/22/2017    Positive cardiolipin antibodies     Psychiatric disorder     depression    PUD (peptic ulcer disease) 2009    H/O requiring partial gastrectomy and small intestine    Thromboembolus ([facility]     artery right leg     Past Surgical History:   Procedure Laterality Date    ABDOMEN SURGERY PROC UNLISTED      duodenal ulcer rupture    BREAST SURGERY PROCEDURE UNLISTED Bilateral 02/2016    hemorrhaged after breast surgery    HX APPENDECTOMY      HX BREAST AUGMENTATION      developed infection and implant removed    HX CHOLECYSTECTOMY      HX GASTRECTOMY      due to ulcers    HX GASTRIC BYPASS  1996    gastric by-pass    HX GI      total gastrectomy, partial colectomy    HX GI      colostomy and reversal    HX HEENT      cancer of lip removed    HX HYSTERECTOMY      for cervical cancer    HX ORTHOPAEDIC      rt foot surgery x10    HX ORTHOPAEDIC Left     great toe gangrenous and amputated    HX OTHER SURGICAL      part of liver removal    HX OTHER SURGICAL      ventral hernia repair x10    HX OTHER SURGICAL      Exc pilonial cystectomy    VASCULAR SURGERY PROCEDURE UNLIST      iliac aorta stents x 6     Patient Active Problem List   Diagnosis Code    Pancreatic cyst K86.2    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 Z90.3, Z98.0    Atherosclerotic PVD with ulceration ([facility]) I70.209, L98.499    Syncope R55    Anemia associated with acute blood loss D62    Anemia D64.9    Lupus anticoagulant syndrome ([facility]) D68.62    Diabetes ([facility]) E11.9    Chronic pain G89.29    PAD (peripheral artery disease) ([facility]) I73.9    Sleepwalking disorder F51.3    Constipation K59.00    Anxiety F41.9    Abnormal EKG R94.31

## 2018-04-12 NOTE — PROGRESS NOTES
Shift Progress note:  Assumed care of patient in bed awake and alert. No c/o chest pain nor shortness of breath or any s/s of distress or discomfort. Call bell remains within reach as well as telemetryy monitoring. No ectopy noted during shift. Patient to be npo after breakfast. Uneventful night except for high accuchks requiring switch to highly resistant scale and lantus administration. VSS.   Patient Vitals for the past 12 hrs:   Temp Pulse Resp BP SpO2   04/12/18 0258 98 °F (36.7 °C) 74 16 141/75 100 %   04/11/18 2349 98 °F (36.7 °C) 61 16 103/67 99 %   04/11/18 1925 98.6 °F (37 °C) 82 16 153/83 99 %

## 2018-04-12 NOTE — PROGRESS NOTES
Hospitalist Progress Note    Patient: Miguel Sandhu MRN: [id number]  CSN: [id number]    YOB: 1950  Age: 78 y.o. Sex: female    DOA: 4/10/2018 LOS:  LOS: 0 days                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 Z90.3, Z98.0    Atherosclerotic PVD with ulceration ([facility]) I70.209, L98.499    Syncope R55    Anemia associated with acute blood loss D62    Anemia D64.9    Lupus anticoagulant syndrome ([facility]) D68.62    Diabetes ([facility]) E11.9    Chronic pain G89.29    PAD (peripheral artery disease) ([facility]) I73.9    Sleepwalking disorder F51.3    Constipation K59.00    Anxiety F41.9    Abnormal EKG R94.31            79 yo female admitted for syncopal episode. Syncopal episode - with abnormal EKG. CE negative  stress test abnormal.   plan for cath today. DM - history of brittle DM, on SSI.     PVD     On anticoagulation with coumadin. On hold for cath, follow PT/INR    Disposition : TBD    Review of systems  General: No fevers or chills. Cardiovascular: No chest pain or pressure. No palpitations. Pulmonary: No shortness of breath. Gastrointestinal: No nausea, vomiting. Physical Exam:  General: Awake, cooperative, no acute distress    HEENT: NC, Atraumatic. PERRLA, anicteric sclerae. Lungs: CTA Bilaterally. No Wheezing/Rhonchi/Rales. Heart:  Regular  rhythm,  No murmur, No Rubs, No Gallops  Abdomen: Soft, Non distended, Non tender.  +Bowel sounds,   Extremities: No c/c/e  Psych:   Not anxious or agitated. Neurologic:  No acute neurological deficit.            Vital signs/Intake and Output:  Visit Vitals    /70 (BP 1 Location: Right arm, BP Patient Position: At rest)    Pulse 64    Temp 98.8 °F (37.1 °C)    Resp 16    Ht 5' 5\" (1.651 m)    Wt 62.9 kg (138 lb 10.7 oz)    SpO2 98%    Breastfeeding No    BMI 23.08 kg/m2     Current Shift:  04/12 0701 - 04/12 1900  In: -   Out: 900 [Urine:900]  Last three shifts:  04/10 1901 - 04/12 0700  In: 1320 [P.O.:1320]  Out: 680 [Urine:680]            Labs: Results:       Chemistry Recent Labs      04/12/18   0409  04/11/18   0030  04/10/18   1400   GLU  111*  78  320*   NA  139  141  138   K  4.2  4.1  4.7   CL  106  108  105   CO2  25  29  25   BUN  17  13  17   CREA  0.60  0.65  0.78   CA  8.0*  8.8  9.2   AGAP  8  4  8   BUCR  28*  20  22*   AP   --    --   107   TP   --    --   6.4   ALB   --    --   3.3*   GLOB   --    --   3.1   AGRAT   --    --   1.1      CBC w/Diff Recent Labs      04/12/18   0409  04/11/18   0030  04/10/18   1400   WBC  5.9  6.5  7.8   RBC  4.04*  4.06*  4.17*   HGB  12.3  12.5  12.9   HCT  37.9  38.0  38.9   PLT  244  209  252   GRANS   --    --   80*   LYMPH   --    --   17*   EOS   --    --   0      Cardiac Enzymes Recent Labs      04/11/18   1355  04/11/18   0635   CPK  152  142   CKND1  1.8  1.9      Coagulation Recent Labs      04/12/18   0409  04/11/18   0030   PTP  15.3*  18.1*   INR  1.3*  1.6*   APTT  35.1   --        Lipid Panel No results found for: CHOL, CHOLPOCT, CHOLX, CHLST, CHOLV, 946392, HDL, LDL, LDLC, DLDLP, 214067, VLDLC, VLDL, TGLX, TRIGL, TRIGP, TGLPOCT, CHHD, CHHDX   BNP No results for input(s): BNPP in the last 72 hours.    Liver Enzymes Recent Labs      04/10/18   1400   TP  6.4   ALB  3.3*   AP  107   SGOT  34      Thyroid Studies Lab Results   Component Value Date/Time    TSH 0.74 04/11/2018 12:30 AM        Procedures/imaging: see electronic medical records for all procedures/Xrays and details which were not copied into this note but were reviewed prior to creation of Plan

## 2018-04-12 NOTE — PROGRESS NOTES
Pt up to bathroom to void, back to bed, Dr Eunice Doshi to bedside, 3in hematoma noted over site,  Jay pressure applied

## 2018-04-13 PROBLEM — E11.8 TYPE II DIABETES MELLITUS WITH MANIFESTATIONS (HCC): Status: ACTIVE | Noted: 2018-04-13

## 2018-04-13 PROBLEM — I25.10 CAD (CORONARY ARTERY DISEASE): Status: ACTIVE | Noted: 2018-04-13

## 2018-04-13 LAB
ANION GAP SERPL CALC-SCNC: 11 MMOL/L (ref 3–18)
BUN SERPL-MCNC: 15 MG/DL (ref 7–18)
BUN/CREAT SERPL: 23 (ref 12–20)
CALCIUM SERPL-MCNC: 8.4 MG/DL (ref 8.5–10.1)
CHLORIDE SERPL-SCNC: 106 MMOL/L (ref 100–108)
CO2 SERPL-SCNC: 24 MMOL/L (ref 21–32)
CREAT SERPL-MCNC: 0.64 MG/DL (ref 0.6–1.3)
ERYTHROCYTE [DISTWIDTH] IN BLOOD BY AUTOMATED COUNT: 14 % (ref 11.6–14.5)
GLUCOSE BLD STRIP.AUTO-MCNC: 144 MG/DL (ref 70–110)
GLUCOSE BLD STRIP.AUTO-MCNC: 158 MG/DL (ref 70–110)
GLUCOSE BLD STRIP.AUTO-MCNC: 229 MG/DL (ref 70–110)
GLUCOSE SERPL-MCNC: 171 MG/DL (ref 74–99)
HCT VFR BLD AUTO: 39.3 % (ref 35–45)
HGB BLD-MCNC: 12.8 G/DL (ref 12–16)
MCH RBC QN AUTO: 30.3 PG (ref 24–34)
MCHC RBC AUTO-ENTMCNC: 32.6 G/DL (ref 31–37)
MCV RBC AUTO: 92.9 FL (ref 74–97)
PLATELET # BLD AUTO: 251 K/UL (ref 135–420)
PMV BLD AUTO: 9.9 FL (ref 9.2–11.8)
POTASSIUM SERPL-SCNC: 4.2 MMOL/L (ref 3.5–5.5)
RBC # BLD AUTO: 4.23 M/UL (ref 4.2–5.3)
SODIUM SERPL-SCNC: 141 MMOL/L (ref 136–145)
WBC # BLD AUTO: 6.3 K/UL (ref 4.6–13.2)

## 2018-04-13 PROCEDURE — 74011636637 HC RX REV CODE- 636/637: Performed by: HOSPITALIST

## 2018-04-13 PROCEDURE — 74011250636 HC RX REV CODE- 250/636: Performed by: INTERNAL MEDICINE

## 2018-04-13 PROCEDURE — 74011250637 HC RX REV CODE- 250/637: Performed by: INTERNAL MEDICINE

## 2018-04-13 PROCEDURE — 80048 BASIC METABOLIC PNL TOTAL CA: CPT | Performed by: HOSPITALIST

## 2018-04-13 PROCEDURE — 36415 COLL VENOUS BLD VENIPUNCTURE: CPT | Performed by: HOSPITALIST

## 2018-04-13 PROCEDURE — 85027 COMPLETE CBC AUTOMATED: CPT | Performed by: HOSPITALIST

## 2018-04-13 PROCEDURE — 82962 GLUCOSE BLOOD TEST: CPT

## 2018-04-13 PROCEDURE — 74011250637 HC RX REV CODE- 250/637: Performed by: HOSPITALIST

## 2018-04-13 RX ORDER — SODIUM CHLORIDE 0.9 % (FLUSH) 0.9 %
5-10 SYRINGE (ML) INJECTION AS NEEDED
Status: DISCONTINUED | OUTPATIENT
Start: 2018-04-13 | End: 2018-04-14 | Stop reason: HOSPADM

## 2018-04-13 RX ORDER — SODIUM CHLORIDE 0.9 % (FLUSH) 0.9 %
5-10 SYRINGE (ML) INJECTION EVERY 8 HOURS
Status: DISCONTINUED | OUTPATIENT
Start: 2018-04-13 | End: 2018-04-14 | Stop reason: HOSPADM

## 2018-04-13 RX ADMIN — ENOXAPARIN SODIUM 60 MG: 60 INJECTION SUBCUTANEOUS at 08:59

## 2018-04-13 RX ADMIN — CYANOCOBALAMIN TAB 500 MCG 1000 MCG: 500 TAB at 08:59

## 2018-04-13 RX ADMIN — METOPROLOL TARTRATE 25 MG: 25 TABLET ORAL at 20:55

## 2018-04-13 RX ADMIN — INSULIN LISPRO 3 UNITS: 100 INJECTION, SOLUTION INTRAVENOUS; SUBCUTANEOUS at 17:30

## 2018-04-13 RX ADMIN — LORAZEPAM 1 MG: 1 TABLET ORAL at 09:00

## 2018-04-13 RX ADMIN — METOPROLOL TARTRATE 25 MG: 25 TABLET ORAL at 09:00

## 2018-04-13 RX ADMIN — INSULIN LISPRO 6 UNITS: 100 INJECTION, SOLUTION INTRAVENOUS; SUBCUTANEOUS at 12:35

## 2018-04-13 RX ADMIN — CLONIDINE HYDROCHLORIDE 0.1 MG: 0.1 TABLET ORAL at 20:55

## 2018-04-13 RX ADMIN — Medication 10 ML: at 13:08

## 2018-04-13 NOTE — PROGRESS NOTES
Report called to [facility] to nurse Latasha Kendall. Opportunity for questions with call back number provided.
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
## 2018-04-13 NOTE — PROGRESS NOTES
Spoke with Josemanuel Gallagher at [facility] transfer center. Still waiting for a bed assignment, will call [facility] when bed is available.

## 2018-04-13 NOTE — PROGRESS NOTES
[facility] transfer center called to update that they still have no bed available for this patient and will call with any further updates or assignments.

## 2018-04-13 NOTE — PROGRESS NOTES
Hospitalist Progress Note    Patient: Martell Davis MRN: [id number]  CSN: [id number]    YOB: 1950  Age: 78 y.o. Sex: female    DOA: 4/10/2018 LOS:  LOS: 1 day            Assessment/Plan     Principal Problem:    Abnormal EKG (4/10/2018)    Active Problems:    Syncope (2/29/2016)      Lupus anticoagulant syndrome ([facility] (12/29/2016)      PAD (peripheral artery disease) ([facility] (7/22/2017)      Type II diabetes mellitus with manifestations ([facility] (4/13/2018)      CAD (coronary artery disease) (4/13/2018)         Syncopal episode: with abnormal EKG. 2nd to CAD  On Statin, Lovenox 1mg/kg SQ BID    Cath: LAD is heavily calcified. Mid LAD has 80% stenosis followed by 99% stenosis. LCx is non dominant vessel and has 85% eccentric stenosis. RCA is dominant vessel and has luminal irregularities. Per Dr. Oliver Bliss: As patient has compliance issue PCI is not best option for her. Will transfer her to [facility] for CABG consult. Pending transfer 2nd to bed issue. DM:  history of brittle DM, on SSI.      PVD: Requip, on lipitor. Hx of non compliance with medication regimen.       Supportive care    Pending transfer tp [facility]    CC:   77 yo female admitted for syncopal episode 2nd LAD, DM, PVD  non compliance with medication regimen. Subjective:     Pt was seen and examined with the nurse in the morning round. No CP/SOB. Review of systems  General: No fevers or chills. Cardiovascular: No chest pain or pressure. No palpitations. Pulmonary: No cough, SOB  Gastrointestinal: No nausea, vomiting. Objective:      Visit Vitals    /83 (BP 1 Location: Right arm, BP Patient Position: At rest;Sitting)    Pulse 78    Temp 98.2 °F (36.8 °C)    Resp 18    Ht 5' 5\" (1.651 m)    Wt 63 kg (138 lb 12.8 oz)    SpO2 100%    Breastfeeding No    BMI 23.1 kg/m2       Physical Exam:    Gen: NAD, non-toxic. Heent:  MMM, NC, AT. Cor: s1s2 RRR. No MRG. PMI mid 5th intercostal space. Resp:  CTA b/l. No w/r/r. Nml effort and diaphragmatic excursion. Abd:  NT ND.  BS positive. No rebound or guarding. No masses. Ext: No edema or cyanosis. Intake and Output:  Current Shift:  04/13 0701 - 04/13 1900  In: 720 [P.O.:720]  Out: 300 [Urine:300]  Last three shifts:  04/11 1901 - 04/13 0700  In: 240 [P.O.:240]  Out: 1300 [Urine:1300]    Labs: Results:       Chemistry Recent Labs      04/13/18   0100  04/12/18   0409  04/11/18   0030   GLU  171*  111*  78   NA  141  139  141   K  4.2  4.2  4.1   CL  106  106  108   CO2  24  25  29   BUN  15  17  13   CREA  0.64  0.60  0.65   CA  8.4*  8.0*  8.8   AGAP  11  8  4   BUCR  23*  28*  20      CBC w/Diff Recent Labs      04/13/18   0100  04/12/18   1520  04/12/18   0409   WBC  6.3  5.4  5.9   RBC  4.23  4.09*  4.04*   HGB  12.8  12.5  12.3   HCT  39.3  37.3  37.9   PLT  251  226  244   GRANS   --   59   --    LYMPH   --   29   --    EOS   --   2   --       Cardiac Enzymes Recent Labs      04/11/18   1355  04/11/18   0635   CPK  152  142   CKND1  1.8  1.9      Coagulation Recent Labs      04/12/18   0409  04/11/18   0030   PTP  15.3*  18.1*   INR  1.3*  1.6*   APTT  35.1   --        Lipid Panel No results found for: CHOL, CHOLPOCT, CHOLX, CHLST, CHOLV, 266258, HDL, LDL, LDLC, DLDLP, 753858, VLDLC, VLDL, TGLX, TRIGL, TRIGP, TGLPOCT, CHHD, CHHDX   BNP No results for input(s): BNPP in the last 72 hours. Liver Enzymes No results for input(s): TP, ALB, TBIL, AP, SGOT, GPT in the last 72 hours.     No lab exists for component: DBIL   Thyroid Studies Lab Results   Component Value Date/Time    TSH 0.74 04/11/2018 12:30 AM        Procedures/imaging: see electronic medical records for all procedures/Xrays and details which were not copied into this note but were reviewed prior to creation of Plan      Medications Reviewed  Dora Jose MD

## 2018-04-13 NOTE — ROUTINE PROCESS
Bedside and Verbal shift change report given to [address] [place] [address] (oncoming nurse) by Xander Griffin RN   (offgoing nurse). Report included the following information SBAR, Kardex, Intake/Output, MAR and Recent Results.

## 2018-04-13 NOTE — PROGRESS NOTES
Bedside and Verbal shift change report given to DELORES Henderson (oncoming nurse) by Guido (offgoing nurse).  Report included the following information SBAR, Kardex, Intake/Output, MAR, Recent Results and Cardiac Rhythm NSr.

## 2018-04-13 NOTE — PROGRESS NOTES
Shift Progress Note:  Assumed care of patient awake and sitting on side of bed. No complaints of paint. Left arm with strap in place since cardiac cath. No complaints offered. ano s/s of distress, uneventful night, call bell within reach.   Patient Vitals for the past 12 hrs:   Temp Pulse Resp BP SpO2   04/13/18 0333 97.8 °F (36.6 °C) 65 18 122/79 100 %   04/12/18 2320 97.9 °F (36.6 °C) (!) 55 16 (!) 147/95 96 %   04/12/18 1915 98.7 °F (37.1 °C) 71 15 151/74 100 %

## 2018-04-13 NOTE — PROGRESS NOTES
Spoke with Jack Camarena at [facility] transfer center.  Still waiting for a bed assignment, will call [facility] when bed is available.

## 2018-04-13 NOTE — PROCEDURES
[facility] and coronary angiogram done via left radial artery. Left main is normal.  LAD is heavily calcified. Mid LAD has 80% stenosis followed by 99% stenosis. LCx is non dominant vessel and has 85% eccentric stenosis. RCA is dominant vessel and has luminal irregularities. Treatment options discussed with patient and family member. LAD lesion is very complex and is high risk due to heavy calcification. As per patient's son patient has problem with compliance. She is not compliance with medication. As patient has compliance issue PCI is not best option for her. Will transfer her to [facility] for CABG consult. Case discussed with Dr.Mohammed Harris CT surgeon at [facility]. Surgeon advised to Discontinue Plavix for now. Patient will be transferred to [facility] for CABG evaluation. Plan discussed with patient and family member and they verbally understood.

## 2018-04-13 NOTE — PROGRESS NOTES
D/c plan: Higher level of care. Waiting on [facility] to call with bed. Patient will need ALS transport arranged. Paul Rivas is aware and patient will need EMTALA completed prior to transfer. Patient states she lives with her son and she ill make him aware of when she is transferred. No needs at this time from cm at this time  Care Management Interventions  PCP Verified by CM: Yes  Mode of Transport at Discharge: ALS (once she has bed assigned at [facility] patient will need EMTALA and ALS transport)  Transition of Care Consult (CM Consult): Discharge Planning  Current Support Network:  Other (son lives with patient)  Confirm Follow Up Transport: Family  Plan discussed with Pt/Family/Caregiver: Yes  Freedom of Choice Offered: Yes  Discharge Location  Discharge Placement: Transferred to higher level of care

## 2018-04-14 VITALS
RESPIRATION RATE: 19 BRPM | HEIGHT: 65 IN | BODY MASS INDEX: 23.13 KG/M2 | TEMPERATURE: 98.7 F | HEART RATE: 103 BPM | DIASTOLIC BLOOD PRESSURE: 87 MMHG | WEIGHT: 138.8 LBS | OXYGEN SATURATION: 98 % | SYSTOLIC BLOOD PRESSURE: 167 MMHG

## 2018-04-14 NOTE — PROGRESS NOTES
1915: Assumed patient care, she was alert and oriented to person, place, time and situation. Respiratory status was stable on room air. Vital signs were stable. MEWS score was a one. Patient denied any nausea vomiting dizziness or anxiety. White board was updated and explained. Bed was locked and in lowest position. Call bell, water and personal belongings were within reach. Patient had no questions, comments or concerns after bedside shift report. 0700: Patient had an uneventful shift. Respiratory status, vital signs and MEWS score remained stable. Patient was resting quietly with no signs of distress noted. Bed locked and in lowest position. Call bell and water were within reach. Personal belongings were packed and given to the patient's family member. Patient  had no questions, comments or concerns before being transferred to [facility].

## 2018-04-14 NOTE — PROGRESS NOTES
Cardiology Progress Note        Patient: Lj Hidalgo        Sex: female          DOA: 4/10/2018  YOB: 1950      Age:  78 y.o.        LOS:  LOS: 1 day    Patient seen and examined, chart reviewed. 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 Z90.3, Z98.0    Atherosclerotic PVD with ulceration ([facility]) I70.209, L98.499    Syncope R55              Lupus anticoagulant syndrome ([facility]) D68.62    Diabetes ([facility]) E11.9    Chronic pain G89.29    PAD (peripheral artery disease) ([facility]) I73.9              Anxiety F41.9    Abnormal EKG R94.31    Type II diabetes mellitus with manifestations ([facility]) E11.8    CAD (coronary artery disease) I25.10      CAD with critical 2 vessel disease  Syncope   Abnormal EKG  Abnormal stress test    Coronary angiogram revealed critical 2 vessel disease with heavily calcified LAD and LCx. As per son patient is not complaint with medications. PCI of LAD is very high risk due to heavily calcification and complex stenosis. Best option for patient is CABG. Case was discussed with surgeon at [facility] Dr. Yuliana Hurt. Plan:    Continue beta blocker, statin and full dose of anticoagulation  Patient will be transferred to [facility] [place] CABG evaluation  Continue management as per hospital medicine                  Subjective:    cc:  Denies any chest pain or shortness of breath       REVIEW OF SYSTEMS:     General: No fevers or chills. Cardiovascular: No chest pain,No palpitations, No orthopnea, No PND, No leg swelling, No claudication  Pulmonary: No dyspnea.    Gastrointestinal: No nausea, vomiting, bleeding  Neurology: No Dizziness    Objective:      Visit Vitals    /87 (BP 1 Location: Right arm, BP Patient Position: At rest)    Pulse (!) 103    Temp 98.7 °F (37.1 °C)    Resp 19    Ht 5' 5\" (1.651 m)    Wt 63 kg (138 lb 12.8 oz)    SpO2 98%    Breastfeeding No    BMI 23.1 kg/m2     Body mass index is 23.1 kg/(m^2). Physical Exam:  General Appearance: Comfortable, not using accessory muscles of respiration. HEENT: JOÃO. HEAD: Atraumatic  NECK: No JVD, no thyroidomeglay. CAROTIDS: No bruit  LUNGS: Clear bilaterally. HEART: S1+S2 audible, 3/6 systolic murmur in aortic and mitral area, no pericardial rub. Extremity: left wrist: no swelling, no hematoma, dressing on, normal sensory and motor exam of left hand  ABD: Non-tender, BS Audible    NEUROLOGICAL: AAO times 3, No motor and sensory deficit  Medication:  Current Facility-Administered Medications   Medication Dose Route Frequency    sodium chloride (NS) flush 5-10 mL  5-10 mL IntraVENous Q8H    sodium chloride (NS) flush 5-10 mL  5-10 mL IntraVENous PRN    metoprolol tartrate (LOPRESSOR) tablet 25 mg  25 mg Oral Q12H    atorvastatin (LIPITOR) tablet 40 mg  40 mg Oral QHS    enoxaparin (LOVENOX) injection 60 mg  60 mg SubCUTAneous Q12H    insulin lispro (HUMALOG) injection   SubCUTAneous AC&HS    glucose chewable tablet 16 g  16 g Oral PRN    glucagon (GLUCAGEN) injection 1 mg  1 mg IntraMUSCular PRN    dextrose (D50W) injection syrg 12.5-25 g  25-50 mL IntraVENous PRN    insulin glargine (LANTUS) injection 20 Units  20 Units SubCUTAneous QHS    cloNIDine HCl (CATAPRES) tablet 0.1 mg  0.1 mg Oral BID    cyanocobalamin (VITAMIN B12) tablet 1,000 mcg  1,000 mcg Oral DAILY    rOPINIRole (REQUIP) tablet 4 mg  4 mg Oral QHS    traMADol (ULTRAM) tablet 50 mg  50 mg Oral Q6H PRN    LORazepam (ATIVAN) tablet 1 mg  1 mg Oral DAILY    LORazepam (ATIVAN) tablet 2 mg  2 mg Oral QHS     Current Outpatient Prescriptions   Medication Sig    warfarin (COUMADIN) 5 mg tablet Take 5 mg by mouth every other day.  Cetirizine (ZYRTEC) 10 mg cap Take  by mouth.  cyanocobalamin 1,000 mcg tablet Take 1,000 mcg by mouth daily.     warfarin (COUMADIN) 7.5 mg tablet Take 7.5 mg by mouth every other day.  LORazepam (ATIVAN) 2 mg tablet Take 1 mg by mouth every six (6) hours as needed.  cloNIDine (CATAPRES) 0.1 mg tablet Take 0.1 mg by mouth two (2) times a day. Indications: hot flashes;    metFORMIN (GLUCOPHAGE) 500 mg tablet Take 500 mg by mouth two (2) times daily (with meals). Indications: TYPE 2 DIABETES MELLITUS    ropinirole (REQUIP) 4 mg Tab TAB Take 4 mg by mouth nightly. Indications: RESTLESS LEGS SYNDROME    Minoxidil 5 % foam by Apply Externally route.  glimepiride (AMARYL) 2 mg tablet Take 2 mg by mouth every morning.  melatonin 3 mg tablet Take 6 mg by mouth nightly.  ondansetron hcl (ZOFRAN, AS HYDROCHLORIDE,) 4 mg tablet Take 4 mg by mouth every eight (8) hours as needed for Nausea.  cyclobenzaprine (FLEXERIL) 10 mg tablet Take  by mouth three (3) times daily as needed for Muscle Spasm(s).  miconazole (MICOTIN) 2 % vaginal cream Insert 1 Applicator into vagina every evening.  promethazine (PHENERGAN) 25 mg tablet Take 25 mg by mouth every six (6) hours as needed for Nausea.  BIOTIN PO Take 5,000 mcg by mouth daily.  diphenhydrAMINE (BENADRYL) 25 mg capsule Take 50 mg by mouth every six (6) hours as needed. Indications: ALLERGIC RHINITIS    Mv,Ca,Min-FA-Herbal No.157 (ESTROVEN MAXIMUM STRENGTH) 400 mcg Tab Take 1 Tab by mouth two (2) times a day.  albuterol (PROVENTIL HFA, VENTOLIN HFA) 90 mcg/actuation inhaler Take 2 Puffs by inhalation every four (4) hours as needed. Indications: ACUTE ASTHMA ATTACK    traMADol (ULTRAM) 50 mg tablet Take 50 mg by mouth every eight (8) hours as needed.  Indications: PAIN               Lab/Data Reviewed:       Recent Labs      04/13/18   0100  04/12/18   1520  04/12/18   0409   WBC  6.3  5.4  5.9   HGB  12.8  12.5  12.3   HCT  39.3  37.3  37.9   PLT  251  226  244     Recent Labs      04/13/18   0100  04/12/18   0409   NA  141  139   K  4.2  4.2   CL  106  106   CO2  24  25   GLU  171*  111*   BUN  15  17 CREA  0.64  0.60   CA  8.4*  8.0*       Signed By: Gregory Marquez MD     April 14, 2018

## 2018-11-16 NOTE — PROGRESS NOTES
PLEASE CLICK THE EDIT BUTTON, ENTER YOUR RESPONSE TO THE QUERY AND SIGN THE NOTE.    Dr Tellez,    For accurate coding and severity of illness reflection, please clarify the following.    Your progress note on 11/15 notes:   Possible urinary tract infection present on admission   - Chronic indwelling urinary catheter. Will change esqueda if not changed beyond 30 days . Continue on IV abx as above. noted MDR urine cx.     Please clarify if any of the following diagnoses are appropriate for your patient.     Urinary Tract Infection secondary to urinary catheter  Urinary Tract Infection unrelated to urinary catheter  Unable to determine  Unknown  Other    Thank you,      Marianne Lozoya RN CDI  Clinical         Phone:  [phone]      Please respond here:  Urinary Tract Infection secondary to urinary catheter   Problem: Falls - Risk of  Goal: *Absence of Falls  Document Cristine Fall Risk and appropriate interventions in the flowsheet.    Outcome: Progressing Towards Goal  Fall Risk Interventions:  Mobility Interventions: Patient to call before getting OOB, PT Consult for mobility concerns           Medication Interventions: Patient to call before getting OOB, Teach patient to arise slowly

## 2018-11-27 ENCOUNTER — HOSPITAL ENCOUNTER (OUTPATIENT)
Dept: LAB | Age: 68
Discharge: HOME | End: 2018-11-27
Payer: MEDICARE

## 2018-11-27 ENCOUNTER — HOSPITAL ENCOUNTER (OUTPATIENT)
Dept: GENERAL RADIOLOGY | Age: 68
Discharge: HOME | End: 2018-11-27
Payer: MEDICARE

## 2018-11-27 DIAGNOSIS — M86.071 ACUTE HEMATOGENOUS OSTEOMYELITIS, RIGHT ANKLE AND FOOT (HCC): ICD-10-CM

## 2018-11-27 PROCEDURE — 87186 SC STD MICRODIL/AGAR DIL: CPT

## 2018-11-27 PROCEDURE — 73630 X-RAY EXAM OF FOOT: CPT

## 2018-11-27 PROCEDURE — 87070 CULTURE OTHR SPECIMN AEROBIC: CPT

## 2018-11-27 PROCEDURE — 87205 SMEAR GRAM STAIN: CPT

## 2018-11-27 PROCEDURE — 87077 CULTURE AEROBIC IDENTIFY: CPT

## 2018-12-04 ENCOUNTER — HOSPITAL ENCOUNTER (INPATIENT)
Age: 68
LOS: 3 days | Discharge: HOME HEALTH | DRG: 982 | End: 2018-12-07
Attending: EMERGENCY MEDICINE | Admitting: INTERNAL MEDICINE
Payer: MEDICARE

## 2018-12-04 DIAGNOSIS — L08.9 DIABETIC FOOT INFECTION (HCC): Primary | ICD-10-CM

## 2018-12-04 DIAGNOSIS — E11.628 DIABETIC FOOT INFECTION (HCC): Primary | ICD-10-CM

## 2018-12-04 PROBLEM — L97.509 DIABETIC FOOT ULCER (HCC): Status: ACTIVE | Noted: 2018-12-04

## 2018-12-04 PROBLEM — E11.621 DIABETIC FOOT ULCER (HCC): Status: ACTIVE | Noted: 2018-12-04

## 2018-12-04 PROBLEM — E11.8 DIABETIC FOOT (HCC): Status: ACTIVE | Noted: 2018-12-04

## 2018-12-04 PROBLEM — L03.90 CELLULITIS: Status: ACTIVE | Noted: 2018-12-04

## 2018-12-04 LAB
ALBUMIN SERPL-MCNC: 3 G/DL (ref 3.4–5)
ALBUMIN/GLOB SERPL: 0.8 {RATIO} (ref 0.8–1.7)
ALP SERPL-CCNC: 195 U/L (ref 45–117)
ALT SERPL-CCNC: 20 U/L (ref 13–56)
ANION GAP SERPL CALC-SCNC: 8 MMOL/L (ref 3–18)
AST SERPL-CCNC: 20 U/L (ref 15–37)
BASOPHILS # BLD: 0 K/UL (ref 0–0.1)
BASOPHILS NFR BLD: 0 % (ref 0–2)
BILIRUB SERPL-MCNC: 0.1 MG/DL (ref 0.2–1)
BUN SERPL-MCNC: 23 MG/DL (ref 7–18)
BUN/CREAT SERPL: 37
CALCIUM SERPL-MCNC: 8.4 MG/DL (ref 8.5–10.1)
CHLORIDE SERPL-SCNC: 105 MMOL/L (ref 100–108)
CO2 SERPL-SCNC: 26 MMOL/L (ref 21–32)
CREAT SERPL-MCNC: 0.62 MG/DL (ref 0.6–1.3)
DIFFERENTIAL METHOD BLD: ABNORMAL
EOSINOPHIL # BLD: 0.2 K/UL (ref 0–0.4)
EOSINOPHIL NFR BLD: 1 % (ref 0–5)
ERYTHROCYTE [DISTWIDTH] IN BLOOD BY AUTOMATED COUNT: 16.9 % (ref 11.6–14.5)
GLOBULIN SER CALC-MCNC: 3.9 G/DL (ref 2–4)
GLUCOSE BLD STRIP.AUTO-MCNC: 199 MG/DL (ref 70–110)
GLUCOSE SERPL-MCNC: 94 MG/DL (ref 74–99)
HCT VFR BLD AUTO: 34.2 % (ref 35–45)
HGB BLD-MCNC: 10.5 G/DL (ref 12–16)
INR PPP: 1.5 (ref 0.8–1.2)
LYMPHOCYTES # BLD: 1.6 K/UL (ref 0.9–3.6)
LYMPHOCYTES NFR BLD: 14 % (ref 21–52)
MCH RBC QN AUTO: 24.9 PG (ref 24–34)
MCHC RBC AUTO-ENTMCNC: 30.7 G/DL (ref 31–37)
MCV RBC AUTO: 81.2 FL (ref 74–97)
MONOCYTES # BLD: 0.6 K/UL (ref 0.05–1.2)
MONOCYTES NFR BLD: 5 % (ref 3–10)
NEUTS SEG # BLD: 9.2 K/UL (ref 1.8–8)
NEUTS SEG NFR BLD: 80 % (ref 40–73)
PLATELET # BLD AUTO: 465 K/UL (ref 135–420)
PMV BLD AUTO: 8.7 FL (ref 9.2–11.8)
POTASSIUM SERPL-SCNC: 4.7 MMOL/L (ref 3.5–5.5)
PROT SERPL-MCNC: 6.9 G/DL (ref 6.4–8.2)
PROTHROMBIN TIME: 17.5 SEC (ref 11.5–15.2)
RBC # BLD AUTO: 4.21 M/UL (ref 4.2–5.3)
SODIUM SERPL-SCNC: 139 MMOL/L (ref 136–145)
WBC # BLD AUTO: 11.5 K/UL (ref 4.6–13.2)

## 2018-12-04 PROCEDURE — 96375 TX/PRO/DX INJ NEW DRUG ADDON: CPT

## 2018-12-04 PROCEDURE — 65270000029 HC RM PRIVATE

## 2018-12-04 PROCEDURE — 96365 THER/PROPH/DIAG IV INF INIT: CPT

## 2018-12-04 PROCEDURE — 85610 PROTHROMBIN TIME: CPT

## 2018-12-04 PROCEDURE — 80053 COMPREHEN METABOLIC PANEL: CPT

## 2018-12-04 PROCEDURE — 82962 GLUCOSE BLOOD TEST: CPT

## 2018-12-04 PROCEDURE — 74011250636 HC RX REV CODE- 250/636: Performed by: PHYSICIAN ASSISTANT

## 2018-12-04 PROCEDURE — 74011636637 HC RX REV CODE- 636/637: Performed by: INTERNAL MEDICINE

## 2018-12-04 PROCEDURE — 99283 EMERGENCY DEPT VISIT LOW MDM: CPT

## 2018-12-04 PROCEDURE — 74011000250 HC RX REV CODE- 250: Performed by: PHYSICIAN ASSISTANT

## 2018-12-04 PROCEDURE — 85025 COMPLETE CBC W/AUTO DIFF WBC: CPT

## 2018-12-04 RX ORDER — CLONIDINE HYDROCHLORIDE 0.1 MG/1
0.1 TABLET ORAL 2 TIMES DAILY
Status: DISCONTINUED | OUTPATIENT
Start: 2018-12-05 | End: 2018-12-07 | Stop reason: HOSPADM

## 2018-12-04 RX ORDER — PROMETHAZINE HYDROCHLORIDE 25 MG/1
25 TABLET ORAL
Status: DISCONTINUED | OUTPATIENT
Start: 2018-12-04 | End: 2018-12-05

## 2018-12-04 RX ORDER — CHOLECALCIFEROL (VITAMIN D3) 125 MCG
5 CAPSULE ORAL
Status: DISCONTINUED | OUTPATIENT
Start: 2018-12-04 | End: 2018-12-07 | Stop reason: HOSPADM

## 2018-12-04 RX ORDER — SODIUM CHLORIDE 0.9 % (FLUSH) 0.9 %
5-10 SYRINGE (ML) INJECTION EVERY 8 HOURS
Status: DISCONTINUED | OUTPATIENT
Start: 2018-12-04 | End: 2018-12-07 | Stop reason: HOSPADM

## 2018-12-04 RX ORDER — MAGNESIUM SULFATE 100 %
4 CRYSTALS MISCELLANEOUS AS NEEDED
Status: DISCONTINUED | OUTPATIENT
Start: 2018-12-04 | End: 2018-12-07 | Stop reason: HOSPADM

## 2018-12-04 RX ORDER — LORAZEPAM 1 MG/1
1 TABLET ORAL
Status: DISCONTINUED | OUTPATIENT
Start: 2018-12-04 | End: 2018-12-07 | Stop reason: HOSPADM

## 2018-12-04 RX ORDER — SODIUM CHLORIDE 0.9 % (FLUSH) 0.9 %
5-10 SYRINGE (ML) INJECTION AS NEEDED
Status: DISCONTINUED | OUTPATIENT
Start: 2018-12-04 | End: 2018-12-07 | Stop reason: HOSPADM

## 2018-12-04 RX ORDER — DEXTROSE 50 % IN WATER (D50W) INTRAVENOUS SYRINGE
25-50 AS NEEDED
Status: DISCONTINUED | OUTPATIENT
Start: 2018-12-04 | End: 2018-12-07 | Stop reason: HOSPADM

## 2018-12-04 RX ORDER — METRONIDAZOLE 500 MG/100ML
500 INJECTION, SOLUTION INTRAVENOUS EVERY 8 HOURS
Status: DISCONTINUED | OUTPATIENT
Start: 2018-12-04 | End: 2018-12-06

## 2018-12-04 RX ORDER — LANOLIN ALCOHOL/MO/W.PET/CERES
1000 CREAM (GRAM) TOPICAL DAILY
Status: DISCONTINUED | OUTPATIENT
Start: 2018-12-05 | End: 2018-12-07 | Stop reason: HOSPADM

## 2018-12-04 RX ORDER — LANOLIN ALCOHOL/MO/W.PET/CERES
3 CREAM (GRAM) TOPICAL
Status: DISCONTINUED | OUTPATIENT
Start: 2018-12-04 | End: 2018-12-04

## 2018-12-04 RX ORDER — DIPHENHYDRAMINE HCL 25 MG
50 CAPSULE ORAL
Status: DISCONTINUED | OUTPATIENT
Start: 2018-12-04 | End: 2018-12-07 | Stop reason: HOSPADM

## 2018-12-04 RX ORDER — TRAMADOL HYDROCHLORIDE 50 MG/1
50 TABLET ORAL
Status: DISCONTINUED | OUTPATIENT
Start: 2018-12-04 | End: 2018-12-07 | Stop reason: HOSPADM

## 2018-12-04 RX ORDER — INSULIN LISPRO 100 [IU]/ML
INJECTION, SOLUTION INTRAVENOUS; SUBCUTANEOUS
Status: DISCONTINUED | OUTPATIENT
Start: 2018-12-04 | End: 2018-12-07 | Stop reason: HOSPADM

## 2018-12-04 RX ORDER — ROPINIROLE 1 MG/1
4 TABLET, FILM COATED ORAL
Status: DISCONTINUED | OUTPATIENT
Start: 2018-12-05 | End: 2018-12-07 | Stop reason: HOSPADM

## 2018-12-04 RX ADMIN — METRONIDAZOLE 500 MG: 500 INJECTION, SOLUTION INTRAVENOUS at 19:57

## 2018-12-04 RX ADMIN — INSULIN LISPRO 2 UNITS: 100 INJECTION, SOLUTION INTRAVENOUS; SUBCUTANEOUS at 23:16

## 2018-12-04 RX ADMIN — AZTREONAM 2 G: 2 INJECTION, POWDER, LYOPHILIZED, FOR SOLUTION INTRAMUSCULAR; INTRAVENOUS at 19:57

## 2018-12-04 RX ADMIN — Medication 10 ML: at 23:17

## 2018-12-04 NOTE — ED TRIAGE NOTES
Pt states she is here to get an IV and she is having foot surgery by Dr Charlotte Valles tomorrow;

## 2018-12-04 NOTE — ED PROVIDER NOTES
EMERGENCY DEPARTMENT HISTORY AND PHYSICAL EXAM 
 
Date: 12/4/2018 Patient Name: Maria Luz Quinn History of Presenting Illness Chief Complaint Patient presents with  Wound Infection History Provided By: Patient Chief Complaint: Wound infection Duration: 1 Weeks Timing:  Worsening Location: Right foot Severity: 7 out of 10 Modifying Factors: PO Clindamycin taken to no relief Associated Symptoms: denies any other associated signs or symptoms Additional History (Context):  
5:54 PM 
Maria Luz Quinn is a 80 y.o. female with PMHX of GERD, chronic left foot pain, asthma, neuropathy, cervical carcinoma, DM, PUD, liver disease, and thromboembolus who presents to the emergency department C/O 7/10 right foot pain from an infection that began 1 week ago. Reports that Kacy Daley wants to drain it tomorrow and would like her to be on a change of antibiotics. States that she saw him 4 days ago and was placed on PO Clindamycin to no relief (last dose was this morning). Notes that her blood glucose typically ranges from 200-250. Reports a PMHx of \"MRSA that had entered her bones,\" but states that the current infection does not go past her tissue. Notes chronic nausea and vomiting (reports PSHx of total gastrectomy and partial colectomy). Pt denies CP, abdominal pain, cough, wheezing, constipation, dysuria, and any other sxs or complaints. PCP: Kym Davidson MD 
 
Current Facility-Administered Medications Medication Dose Route Frequency Provider Last Rate Last Dose  aztreonam (AZACTAM) 2 g in sterile water (preservative free) 10 mL IV syringe  2 g IntraVENous Q8H Kolby Wise PA   2 g at 12/04/18 1814  metroNIDAZOLE (FLAGYL) IVPB premix 500 mg  500 mg IntraVENous Q8H Kolby Wise  mL/hr at 12/04/18 1957 500 mg at 12/04/18 1231 Current Outpatient Medications Medication Sig Dispense Refill  CLINDAMYCIN HCL, BULK, by Does Not Apply route.  warfarin (COUMADIN) 5 mg tablet Take 5 mg by mouth every other day.  Cetirizine (ZYRTEC) 10 mg cap Take  by mouth.  Minoxidil 5 % foam by Apply Externally route.  cyanocobalamin 1,000 mcg tablet Take 1,000 mcg by mouth daily.  glimepiride (AMARYL) 2 mg tablet Take 2 mg by mouth every morning.  melatonin 3 mg tablet Take 6 mg by mouth nightly.  warfarin (COUMADIN) 7.5 mg tablet Take 7.5 mg by mouth every other day.  ondansetron hcl (ZOFRAN, AS HYDROCHLORIDE,) 4 mg tablet Take 4 mg by mouth every eight (8) hours as needed for Nausea.  cyclobenzaprine (FLEXERIL) 10 mg tablet Take  by mouth three (3) times daily as needed for Muscle Spasm(s).  miconazole (MICOTIN) 2 % vaginal cream Insert 1 Applicator into vagina every evening. 45 g 0  
 promethazine (PHENERGAN) 25 mg tablet Take 25 mg by mouth every six (6) hours as needed for Nausea.  LORazepam (ATIVAN) 2 mg tablet Take 1 mg by mouth every six (6) hours as needed.  BIOTIN PO Take 5,000 mcg by mouth daily.  diphenhydrAMINE (BENADRYL) 25 mg capsule Take 50 mg by mouth every six (6) hours as needed. Indications: ALLERGIC RHINITIS  Mv,Ca,Min-FA-Herbal No.157 (ESTROVEN MAXIMUM STRENGTH) 400 mcg Tab Take 1 Tab by mouth two (2) times a day.  albuterol (PROVENTIL HFA, VENTOLIN HFA) 90 mcg/actuation inhaler Take 2 Puffs by inhalation every four (4) hours as needed. Indications: ACUTE ASTHMA ATTACK  cloNIDine (CATAPRES) 0.1 mg tablet Take 0.1 mg by mouth two (2) times a day. Indications: hot flashes;    
 metFORMIN (GLUCOPHAGE) 500 mg tablet Take 500 mg by mouth two (2) times daily (with meals). Indications: TYPE 2 DIABETES MELLITUS  ropinirole (REQUIP) 4 mg Tab TAB Take 4 mg by mouth nightly. Indications: RESTLESS LEGS SYNDROME  traMADol (ULTRAM) 50 mg tablet Take 50 mg by mouth every eight (8) hours as needed. Indications: PAIN Past History Past Medical History: Past Medical History:  
Diagnosis Date  Arthritis   
 hands and feet  Asthma  Autoimmune disease ([address] lupus anticoagulant-causes clotting  Cancer ([address]   
 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  Lung collapse 2015  Nausea & vomiting 5/23/2012  Neuropathy   
 right foot  Other ill-defined conditions(799.89) arterial clot in rt leg  Other ill-defined conditions(799.89) restless leg syndrome  Other ill-defined conditions(799.89) raynaud's syndrome  Other ill-defined conditions(799.89) chronic N/V  
 Other ill-defined conditions(799.89)   
 (2 mutated genes) hyper coag. state  Pneumonia 07/22/2017  Positive cardiolipin antibodies  Psychiatric disorder   
 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 
Past Surgical History:  
Procedure Laterality Date  ABDOMEN SURGERY PROC UNLISTED    
 duodenal ulcer rupture  BREAST SURGERY PROCEDURE UNLISTED Bilateral 02/2016  
 hemorrhaged after breast surgery  HX APPENDECTOMY  HX BREAST AUGMENTATION    
 developed infection and implant removed  HX CHOLECYSTECTOMY  HX GASTRECTOMY    
 due to ulcers  HX GASTRIC BYPASS  1996  
 gastric by-pass  HX GI    
 total gastrectomy, partial colectomy  HX GI    
 colostomy and reversal  
 HX HEENT    
 cancer of lip removed  HX HYSTERECTOMY for cervical cancer  HX ORTHOPAEDIC    
 rt foot surgery x10  
 HX ORTHOPAEDIC Left   
 great toe gangrenous and amputated  HX OTHER SURGICAL    
 part of liver removal  
 HX OTHER SURGICAL    
 ventral hernia repair x10  
 HX OTHER SURGICAL Exc pilonial cystectomy  VASCULAR SURGERY PROCEDURE UNLIST    
 iliac aorta stents x 6 Family History: 
Family History Problem Relation Age of Onset  Malignant Hyperthermia Neg Hx  Pseudocholinesterase Deficiency Neg Hx  Delayed Awakening Neg Hx  Post-op Nausea/Vomiting Neg Hx  Emergence Delirium Neg Hx  Post-op Cognitive Dysfunction Neg Hx   
 Other Neg Hx Social History: 
Social History Tobacco Use  Smoking status: Former Smoker Years: 0.50 Last attempt to quit: 1996 Years since quittin.9  Smokeless tobacco: Never Used Substance Use Topics  Alcohol use: Yes Comment: 1 or 2 glasses wine or mixed drinks monthly  Drug use: Yes Types: Marijuana Comment: daily Allergies: Allergies Allergen Reactions  Rifampin Hives and Swelling Swelling of mouth and tounge  Levaquin [Levofloxacin] Itching  Amoxicillin Hives  Aspirin Nausea and Vomiting  Azithromycin Angioedema  Bactrim [Sulfamethoprim Ds] Hives  Cefatrizine Hives  Erythromycin Hives  Rocephin [Ceftriaxone] Hives  Tylenol [Acetaminophen] Hives  Vancomycin Hives Review of Systems Review of Systems Constitutional: Positive for fever. Respiratory: Negative for cough and wheezing. Cardiovascular: Negative for chest pain. Gastrointestinal: Positive for nausea (chronic) and vomiting (chronic). Negative for abdominal pain and constipation. Genitourinary: Negative for dysuria. Musculoskeletal: Positive for myalgias (right foot infection). All other systems reviewed and are negative. Physical Exam  
 
Vitals:  
 18 1741 18 1954 BP: 138/76 (!) 133/91 Pulse: 97 91 Resp: 18 14 Temp: 99.9 °F (37.7 °C) 99.8 °F (37.7 °C) SpO2: 99% 100% Weight: 59 kg (130 lb) Height: 5' 4.5\" (1.638 m) Physical Exam  
Constitutional: She is oriented to person, place, and time.  She appears well-developed and well-nourished. No distress. HENT:  
Head: Normocephalic and atraumatic. Eyes: Conjunctivae and EOM are normal. Pupils are equal, round, and reactive to light. Neck: Normal range of motion. Neck supple. Cardiovascular: Normal rate and regular rhythm. Pulmonary/Chest: Effort normal and breath sounds normal.  
Musculoskeletal: Normal range of motion. She exhibits no edema. Right foot with all toes missing & some well healed surgical scars noted, +3cm ulcer to plantar portion with mild erythematous halo with purulent exudate, +tender swollen reddened area lateral aspect Neurological: She is alert and oriented to person, place, and time. Skin: Skin is warm and dry. Psychiatric: She has a normal mood and affect. Her behavior is normal.  
Nursing note and vitals reviewed. Diagnostic Study Results Labs - Recent Results (from the past 12 hour(s)) CBC WITH AUTOMATED DIFF Collection Time: 12/04/18  7:25 PM  
Result Value Ref Range WBC 11.5 4.6 - 13.2 K/uL  
 RBC 4.21 4.20 - 5.30 M/uL  
 HGB 10.5 (L) 12.0 - 16.0 g/dL HCT 34.2 (L) 35.0 - 45.0 % MCV 81.2 74.0 - 97.0 FL  
 MCH 24.9 24.0 - 34.0 PG  
 MCHC 30.7 (L) 31.0 - 37.0 g/dL  
 RDW 16.9 (H) 11.6 - 14.5 % PLATELET 803 (H) 963 - 420 K/uL MPV 8.7 (L) 9.2 - 11.8 FL  
 NEUTROPHILS 80 (H) 40 - 73 % LYMPHOCYTES 14 (L) 21 - 52 % MONOCYTES 5 3 - 10 % EOSINOPHILS 1 0 - 5 % BASOPHILS 0 0 - 2 %  
 ABS. NEUTROPHILS 9.2 (H) 1.8 - 8.0 K/UL  
 ABS. LYMPHOCYTES 1.6 0.9 - 3.6 K/UL  
 ABS. MONOCYTES 0.6 0.05 - 1.2 K/UL  
 ABS. EOSINOPHILS 0.2 0.0 - 0.4 K/UL  
 ABS. BASOPHILS 0.0 0.0 - 0.1 K/UL  
 DF AUTOMATED METABOLIC PANEL, COMPREHENSIVE Collection Time: 12/04/18  7:25 PM  
Result Value Ref Range Sodium 139 136 - 145 mmol/L Potassium 4.7 3.5 - 5.5 mmol/L Chloride 105 100 - 108 mmol/L  
 CO2 26 21 - 32 mmol/L Anion gap 8 3.0 - 18 mmol/L Glucose 94 74 - 99 mg/dL BUN 23 (H) 7.0 - 18 MG/DL Creatinine 0.62 0.6 - 1.3 MG/DL  
 BUN/Creatinine ratio 37 GFR est AA >60 >60 ml/min/1.73m2 GFR est non-AA >60 >60 ml/min/1.73m2 Calcium 8.4 (L) 8.5 - 10.1 MG/DL Bilirubin, total 0.1 (L) 0.2 - 1.0 MG/DL  
 ALT (SGPT) 20 13 - 56 U/L  
 AST (SGOT) 20 15 - 37 U/L Alk. phosphatase 195 (H) 45 - 117 U/L Protein, total 6.9 6.4 - 8.2 g/dL Albumin 3.0 (L) 3.4 - 5.0 g/dL Globulin 3.9 2.0 - 4.0 g/dL A-G Ratio 0.8 0.8 - 1.7 PROTHROMBIN TIME + INR Collection Time: 12/04/18  7:25 PM  
Result Value Ref Range Prothrombin time 17.5 (H) 11.5 - 15.2 sec INR 1.5 (H) 0.8 - 1.2 Radiologic Studies - No orders to display CT Results  (Last 48 hours) None CXR Results  (Last 48 hours) None Medications given in the ED- Medications  
aztreonam (AZACTAM) 2 g in sterile water (preservative free) 10 mL IV syringe (2 g IntraVENous Given 12/4/18 1957) metroNIDAZOLE (FLAGYL) IVPB premix 500 mg (500 mg IntraVENous New Bag 12/4/18 1957) Medical Decision Making I am the first provider for this patient. I reviewed the vital signs, available nursing notes, past medical history, past surgical history, family history and social history. Vital Signs-Reviewed the patient's vital signs. Pulse Oximetry Analysis - 99% on RA Records Reviewed: Nursing Notes and Old Medical Records Procedures: 
Procedures ED Course:  
5:54 PM Initial assessment performed. The patients presenting problems have been discussed, and they are in agreement with the care plan formulated and outlined with them. I have encouraged them to ask questions as they arise throughout their visit. 
 
6:21 PM Discussed patient's history, exam, and available diagnostics results with [facility], who states that he wants the patient admitted and on IV antibiotics. States that the plan tomorrow is for surgical debridement and I&D. 8:23 PM Discussed patient's history, exam, and available diagnostics results with Jing Soni MD, hospitalist, who agrees to admit to Medical. 
 
Diagnosis and Disposition Critical Care Time: none Core Measures: 
For Hospitalized Patients: 
 
1. Hospitalization Decision Time: The decision to hospitalize the patient was made by Josemanuel Cruz PA-C at 6:21 PM on 12/4/2018 2. Aspirin: Aspirin was not given because the patient did not present with a stroke at the time of their Emergency Department evaluation 8:27 PM  Patient is being admitted to the hospital by Jing Soni MD. The results of their tests and reasons for their admission have been discussed with them and/or available family. They convey agreement and understanding for the need to be admitted and for their admission diagnosis. CONDITIONS ON ADMISSION: 
Sepsis is not present at the time of admission. Deep Vein Thrombosis is not present at the time of admission. Thrombosis is not present at the time of admission. Urinary Tract Infection is not present at the time of admission. Pneumonia is not present at the time of admission. MRSA is not present at the time of admission. Wound infection is not present at the time of admission. Pressure Ulcer is not present at the time of admission. CLINICAL IMPRESSION: 
 
1. Diabetic foot infection ([address] PLAN: 
1. Admit to Medical 
_______________________________ Attestations: This note is prepared by Paul Hutson, acting as Scribe for Josemanuel Cruz PA-C. Josemanuel Cruz PA-C:  The scribe's documentation has been prepared under my direction and personally reviewed by me in its entirety. I confirm that the note above accurately reflects all work, treatment, procedures, and medical decision making performed by me. 
_______________________________

## 2018-12-05 ENCOUNTER — ANESTHESIA EVENT (OUTPATIENT)
Dept: SURGERY | Age: 68
DRG: 982 | End: 2018-12-05
Payer: MEDICARE

## 2018-12-05 ENCOUNTER — ANESTHESIA (OUTPATIENT)
Dept: SURGERY | Age: 68
DRG: 982 | End: 2018-12-05
Payer: MEDICARE

## 2018-12-05 ENCOUNTER — APPOINTMENT (OUTPATIENT)
Dept: MRI IMAGING | Age: 68
DRG: 982 | End: 2018-12-05
Attending: INTERNAL MEDICINE
Payer: MEDICARE

## 2018-12-05 PROBLEM — L03.115 CELLULITIS OF RIGHT FOOT: Status: ACTIVE | Noted: 2018-12-05

## 2018-12-05 PROBLEM — M86.9 DIABETIC FOOT ULCER WITH OSTEOMYELITIS (HCC): Status: ACTIVE | Noted: 2018-12-05

## 2018-12-05 PROBLEM — E11.69 DIABETIC FOOT ULCER WITH OSTEOMYELITIS (HCC): Status: ACTIVE | Noted: 2018-12-05

## 2018-12-05 PROBLEM — L97.512 RIGHT FOOT ULCER, WITH FAT LAYER EXPOSED (HCC): Status: ACTIVE | Noted: 2018-12-05

## 2018-12-05 PROBLEM — E11.621 DIABETIC FOOT ULCER WITH OSTEOMYELITIS (HCC): Status: ACTIVE | Noted: 2018-12-05

## 2018-12-05 PROBLEM — L97.509 DIABETIC FOOT ULCER WITH OSTEOMYELITIS (HCC): Status: ACTIVE | Noted: 2018-12-05

## 2018-12-05 LAB
ALBUMIN SERPL-MCNC: 2.5 G/DL (ref 3.4–5)
ALBUMIN/GLOB SERPL: 0.7 {RATIO} (ref 0.8–1.7)
ALP SERPL-CCNC: 157 U/L (ref 45–117)
ALT SERPL-CCNC: 15 U/L (ref 13–56)
ANION GAP SERPL CALC-SCNC: 5 MMOL/L (ref 3–18)
AST SERPL-CCNC: 17 U/L (ref 15–37)
BASOPHILS # BLD: 0 K/UL (ref 0–0.1)
BASOPHILS NFR BLD: 0 % (ref 0–2)
BILIRUB SERPL-MCNC: 0.1 MG/DL (ref 0.2–1)
BUN SERPL-MCNC: 19 MG/DL (ref 7–18)
BUN/CREAT SERPL: 37
CALCIUM SERPL-MCNC: 7.9 MG/DL (ref 8.5–10.1)
CHLORIDE SERPL-SCNC: 104 MMOL/L (ref 100–108)
CO2 SERPL-SCNC: 26 MMOL/L (ref 21–32)
CREAT SERPL-MCNC: 0.52 MG/DL (ref 0.6–1.3)
DIFFERENTIAL METHOD BLD: ABNORMAL
EOSINOPHIL # BLD: 0.1 K/UL (ref 0–0.4)
EOSINOPHIL NFR BLD: 2 % (ref 0–5)
ERYTHROCYTE [DISTWIDTH] IN BLOOD BY AUTOMATED COUNT: 16.9 % (ref 11.6–14.5)
EST. AVERAGE GLUCOSE BLD GHB EST-MCNC: 260 MG/DL
GLOBULIN SER CALC-MCNC: 3.4 G/DL (ref 2–4)
GLUCOSE BLD STRIP.AUTO-MCNC: 101 MG/DL (ref 70–110)
GLUCOSE BLD STRIP.AUTO-MCNC: 119 MG/DL (ref 70–110)
GLUCOSE BLD STRIP.AUTO-MCNC: 144 MG/DL (ref 70–110)
GLUCOSE BLD STRIP.AUTO-MCNC: 93 MG/DL (ref 70–110)
GLUCOSE BLD STRIP.AUTO-MCNC: 99 MG/DL (ref 70–110)
GLUCOSE SERPL-MCNC: 98 MG/DL (ref 74–99)
HBA1C MFR BLD: 10.7 % (ref 4.2–5.6)
HCT VFR BLD AUTO: 31.2 % (ref 35–45)
HGB BLD-MCNC: 9.5 G/DL (ref 12–16)
INR PPP: 1.3 (ref 0.8–1.2)
LYMPHOCYTES # BLD: 1.6 K/UL (ref 0.9–3.6)
LYMPHOCYTES NFR BLD: 17 % (ref 21–52)
MAGNESIUM SERPL-MCNC: 1.9 MG/DL (ref 1.6–2.6)
MCH RBC QN AUTO: 25.3 PG (ref 24–34)
MCHC RBC AUTO-ENTMCNC: 30.4 G/DL (ref 31–37)
MCV RBC AUTO: 83 FL (ref 74–97)
MONOCYTES # BLD: 0.9 K/UL (ref 0.05–1.2)
MONOCYTES NFR BLD: 10 % (ref 3–10)
NEUTS SEG # BLD: 6.4 K/UL (ref 1.8–8)
NEUTS SEG NFR BLD: 71 % (ref 40–73)
PLATELET # BLD AUTO: 373 K/UL (ref 135–420)
PMV BLD AUTO: 8.4 FL (ref 9.2–11.8)
POTASSIUM SERPL-SCNC: 4.6 MMOL/L (ref 3.5–5.5)
PROT SERPL-MCNC: 5.9 G/DL (ref 6.4–8.2)
PROTHROMBIN TIME: 16.4 SEC (ref 11.5–15.2)
RBC # BLD AUTO: 3.76 M/UL (ref 4.2–5.3)
SODIUM SERPL-SCNC: 135 MMOL/L (ref 136–145)
WBC # BLD AUTO: 9.1 K/UL (ref 4.6–13.2)

## 2018-12-05 PROCEDURE — 74011250637 HC RX REV CODE- 250/637: Performed by: INTERNAL MEDICINE

## 2018-12-05 PROCEDURE — 74011250636 HC RX REV CODE- 250/636: Performed by: PHYSICIAN ASSISTANT

## 2018-12-05 PROCEDURE — 0KBV0ZZ EXCISION OF RIGHT FOOT MUSCLE, OPEN APPROACH: ICD-10-PCS | Performed by: PODIATRIST

## 2018-12-05 PROCEDURE — 76060000033 HC ANESTHESIA 1 TO 1.5 HR: Performed by: PODIATRIST

## 2018-12-05 PROCEDURE — 77030032490 HC SLV COMPR SCD KNE COVD -B: Performed by: PODIATRIST

## 2018-12-05 PROCEDURE — 74011000250 HC RX REV CODE- 250: Performed by: PODIATRIST

## 2018-12-05 PROCEDURE — 87077 CULTURE AEROBIC IDENTIFY: CPT

## 2018-12-05 PROCEDURE — 74011000258 HC RX REV CODE- 258

## 2018-12-05 PROCEDURE — 87075 CULTR BACTERIA EXCEPT BLOOD: CPT

## 2018-12-05 PROCEDURE — 83735 ASSAY OF MAGNESIUM: CPT

## 2018-12-05 PROCEDURE — 74011250636 HC RX REV CODE- 250/636

## 2018-12-05 PROCEDURE — 82962 GLUCOSE BLOOD TEST: CPT

## 2018-12-05 PROCEDURE — 87185 SC STD ENZYME DETCJ PER NZM: CPT

## 2018-12-05 PROCEDURE — 74011250636 HC RX REV CODE- 250/636: Performed by: PODIATRIST

## 2018-12-05 PROCEDURE — 83036 HEMOGLOBIN GLYCOSYLATED A1C: CPT

## 2018-12-05 PROCEDURE — 77030010545

## 2018-12-05 PROCEDURE — 74011250637 HC RX REV CODE- 250/637: Performed by: ANESTHESIOLOGY

## 2018-12-05 PROCEDURE — 87070 CULTURE OTHR SPECIMN AEROBIC: CPT

## 2018-12-05 PROCEDURE — 36415 COLL VENOUS BLD VENIPUNCTURE: CPT

## 2018-12-05 PROCEDURE — 73718 MRI LOWER EXTREMITY W/O DYE: CPT

## 2018-12-05 PROCEDURE — 74011000250 HC RX REV CODE- 250

## 2018-12-05 PROCEDURE — 65270000029 HC RM PRIVATE

## 2018-12-05 PROCEDURE — 74011000250 HC RX REV CODE- 250: Performed by: PHYSICIAN ASSISTANT

## 2018-12-05 PROCEDURE — 85025 COMPLETE CBC W/AUTO DIFF WBC: CPT

## 2018-12-05 PROCEDURE — 85610 PROTHROMBIN TIME: CPT

## 2018-12-05 PROCEDURE — 80053 COMPREHEN METABOLIC PANEL: CPT

## 2018-12-05 PROCEDURE — 87076 CULTURE ANAEROBE IDENT EACH: CPT

## 2018-12-05 PROCEDURE — 93005 ELECTROCARDIOGRAM TRACING: CPT

## 2018-12-05 PROCEDURE — 76010000149 HC OR TIME 1 TO 1.5 HR: Performed by: PODIATRIST

## 2018-12-05 PROCEDURE — 87186 SC STD MICRODIL/AGAR DIL: CPT

## 2018-12-05 PROCEDURE — 76210000006 HC OR PH I REC 0.5 TO 1 HR: Performed by: PODIATRIST

## 2018-12-05 PROCEDURE — 77030019895 HC PCKNG STRP IODO -A: Performed by: PODIATRIST

## 2018-12-05 PROCEDURE — 74011250636 HC RX REV CODE- 250/636: Performed by: HOSPITALIST

## 2018-12-05 RX ORDER — OXYCODONE HYDROCHLORIDE 5 MG/1
5 TABLET ORAL
Status: DISCONTINUED | OUTPATIENT
Start: 2018-12-05 | End: 2018-12-07 | Stop reason: HOSPADM

## 2018-12-05 RX ORDER — WARFARIN SODIUM 2 MG/1
2 TABLET ORAL DAILY
COMMUNITY
End: 2019-08-09

## 2018-12-05 RX ORDER — PROPOFOL 10 MG/ML
INJECTION, EMULSION INTRAVENOUS AS NEEDED
Status: DISCONTINUED | OUTPATIENT
Start: 2018-12-05 | End: 2018-12-05 | Stop reason: HOSPADM

## 2018-12-05 RX ORDER — DRONABINOL 2.5 MG/1
2.5 CAPSULE ORAL 3 TIMES DAILY
COMMUNITY

## 2018-12-05 RX ORDER — METOPROLOL TARTRATE 25 MG/1
25 TABLET, FILM COATED ORAL 2 TIMES DAILY
COMMUNITY

## 2018-12-05 RX ORDER — MIDAZOLAM HYDROCHLORIDE 1 MG/ML
INJECTION, SOLUTION INTRAMUSCULAR; INTRAVENOUS AS NEEDED
Status: DISCONTINUED | OUTPATIENT
Start: 2018-12-05 | End: 2018-12-05 | Stop reason: HOSPADM

## 2018-12-05 RX ORDER — SODIUM CHLORIDE, SODIUM LACTATE, POTASSIUM CHLORIDE, CALCIUM CHLORIDE 600; 310; 30; 20 MG/100ML; MG/100ML; MG/100ML; MG/100ML
INJECTION, SOLUTION INTRAVENOUS
Status: DISCONTINUED | OUTPATIENT
Start: 2018-12-05 | End: 2018-12-05 | Stop reason: HOSPADM

## 2018-12-05 RX ORDER — METOPROLOL TARTRATE 25 MG/1
25 TABLET, FILM COATED ORAL
Status: COMPLETED | OUTPATIENT
Start: 2018-12-05 | End: 2018-12-05

## 2018-12-05 RX ORDER — ONDANSETRON 2 MG/ML
INJECTION INTRAMUSCULAR; INTRAVENOUS AS NEEDED
Status: DISCONTINUED | OUTPATIENT
Start: 2018-12-05 | End: 2018-12-05 | Stop reason: HOSPADM

## 2018-12-05 RX ORDER — PANTOPRAZOLE SODIUM 40 MG/1
40 TABLET, DELAYED RELEASE ORAL DAILY
COMMUNITY
End: 2020-09-29

## 2018-12-05 RX ORDER — MORPHINE SULFATE 2 MG/ML
1 INJECTION, SOLUTION INTRAMUSCULAR; INTRAVENOUS
Status: DISCONTINUED | OUTPATIENT
Start: 2018-12-05 | End: 2018-12-06

## 2018-12-05 RX ORDER — NITROGLYCERIN 0.3 MG/1
TABLET SUBLINGUAL
COMMUNITY

## 2018-12-05 RX ORDER — ONDANSETRON 2 MG/ML
4 INJECTION INTRAMUSCULAR; INTRAVENOUS
Status: DISCONTINUED | OUTPATIENT
Start: 2018-12-05 | End: 2018-12-07 | Stop reason: HOSPADM

## 2018-12-05 RX ORDER — ATORVASTATIN CALCIUM 20 MG/1
20 TABLET, FILM COATED ORAL DAILY
COMMUNITY

## 2018-12-05 RX ORDER — HYDROMORPHONE HYDROCHLORIDE 1 MG/ML
1 INJECTION, SOLUTION INTRAMUSCULAR; INTRAVENOUS; SUBCUTANEOUS
Status: DISCONTINUED | OUTPATIENT
Start: 2018-12-05 | End: 2018-12-07 | Stop reason: HOSPADM

## 2018-12-05 RX ORDER — BUPIVACAINE HYDROCHLORIDE 5 MG/ML
INJECTION, SOLUTION EPIDURAL; INTRACAUDAL AS NEEDED
Status: DISCONTINUED | OUTPATIENT
Start: 2018-12-05 | End: 2018-12-05 | Stop reason: HOSPADM

## 2018-12-05 RX ORDER — OXYCODONE HYDROCHLORIDE 5 MG/1
5 TABLET ORAL
Status: DISCONTINUED | OUTPATIENT
Start: 2018-12-05 | End: 2018-12-05

## 2018-12-05 RX ORDER — EPHEDRINE SULFATE/0.9% NACL/PF 25 MG/5 ML
SYRINGE (ML) INTRAVENOUS AS NEEDED
Status: DISCONTINUED | OUTPATIENT
Start: 2018-12-05 | End: 2018-12-05 | Stop reason: HOSPADM

## 2018-12-05 RX ORDER — METOPROLOL TARTRATE 25 MG/1
25 TABLET, FILM COATED ORAL EVERY 12 HOURS
Status: DISCONTINUED | OUTPATIENT
Start: 2018-12-05 | End: 2018-12-05

## 2018-12-05 RX ORDER — CLOPIDOGREL BISULFATE 75 MG/1
75 TABLET ORAL DAILY
COMMUNITY

## 2018-12-05 RX ORDER — PROPOFOL 10 MG/ML
INJECTION, EMULSION INTRAVENOUS
Status: DISCONTINUED | OUTPATIENT
Start: 2018-12-05 | End: 2018-12-05 | Stop reason: HOSPADM

## 2018-12-05 RX ADMIN — MIDAZOLAM HYDROCHLORIDE 0.5 MG: 1 INJECTION, SOLUTION INTRAMUSCULAR; INTRAVENOUS at 18:41

## 2018-12-05 RX ADMIN — HYDROMORPHONE HYDROCHLORIDE 0.25 MG: 1 INJECTION, SOLUTION INTRAMUSCULAR; INTRAVENOUS; SUBCUTANEOUS at 18:26

## 2018-12-05 RX ADMIN — CLONIDINE HYDROCHLORIDE 0.1 MG: 0.1 TABLET ORAL at 21:42

## 2018-12-05 RX ADMIN — MIDAZOLAM HYDROCHLORIDE 1 MG: 1 INJECTION, SOLUTION INTRAMUSCULAR; INTRAVENOUS at 18:22

## 2018-12-05 RX ADMIN — Medication 10 ML: at 18:45

## 2018-12-05 RX ADMIN — PROPOFOL 100 MCG/KG/MIN: 10 INJECTION, EMULSION INTRAVENOUS at 18:22

## 2018-12-05 RX ADMIN — ROPINIROLE HYDROCHLORIDE 4 MG: 1 TABLET, FILM COATED ORAL at 21:42

## 2018-12-05 RX ADMIN — Medication 5 MG: at 00:58

## 2018-12-05 RX ADMIN — AZTREONAM 2 G: 2 INJECTION, POWDER, LYOPHILIZED, FOR SOLUTION INTRAMUSCULAR; INTRAVENOUS at 18:49

## 2018-12-05 RX ADMIN — METOPROLOL TARTRATE 25 MG: 25 TABLET ORAL at 18:00

## 2018-12-05 RX ADMIN — METRONIDAZOLE 500 MG: 500 INJECTION, SOLUTION INTRAVENOUS at 11:41

## 2018-12-05 RX ADMIN — ONDANSETRON 4 MG: 2 INJECTION INTRAMUSCULAR; INTRAVENOUS at 18:33

## 2018-12-05 RX ADMIN — AZTREONAM 2 G: 2 INJECTION, POWDER, LYOPHILIZED, FOR SOLUTION INTRAMUSCULAR; INTRAVENOUS at 11:40

## 2018-12-05 RX ADMIN — PROPOFOL 20 MG: 10 INJECTION, EMULSION INTRAVENOUS at 18:30

## 2018-12-05 RX ADMIN — Medication 5 MG: at 18:55

## 2018-12-05 RX ADMIN — HYDROMORPHONE HYDROCHLORIDE 1 MG: 1 INJECTION, SOLUTION INTRAMUSCULAR; INTRAVENOUS; SUBCUTANEOUS at 13:37

## 2018-12-05 RX ADMIN — AZTREONAM 2 G: 2 INJECTION, POWDER, LYOPHILIZED, FOR SOLUTION INTRAMUSCULAR; INTRAVENOUS at 04:56

## 2018-12-05 RX ADMIN — MIDAZOLAM HYDROCHLORIDE 2 MG: 1 INJECTION, SOLUTION INTRAMUSCULAR; INTRAVENOUS at 18:14

## 2018-12-05 RX ADMIN — PROPOFOL 30 MG: 10 INJECTION, EMULSION INTRAVENOUS at 18:26

## 2018-12-05 RX ADMIN — SODIUM CHLORIDE, SODIUM LACTATE, POTASSIUM CHLORIDE, CALCIUM CHLORIDE: 600; 310; 30; 20 INJECTION, SOLUTION INTRAVENOUS at 19:26

## 2018-12-05 RX ADMIN — HYDROMORPHONE HYDROCHLORIDE 0.25 MG: 1 INJECTION, SOLUTION INTRAMUSCULAR; INTRAVENOUS; SUBCUTANEOUS at 18:22

## 2018-12-05 RX ADMIN — TRAMADOL HYDROCHLORIDE 50 MG: 50 TABLET, FILM COATED ORAL at 00:59

## 2018-12-05 RX ADMIN — PROPOFOL 20 MG: 10 INJECTION, EMULSION INTRAVENOUS at 18:27

## 2018-12-05 RX ADMIN — PROPOFOL 50 MG: 10 INJECTION, EMULSION INTRAVENOUS at 18:22

## 2018-12-05 RX ADMIN — TRAMADOL HYDROCHLORIDE 50 MG: 50 TABLET, FILM COATED ORAL at 04:57

## 2018-12-05 RX ADMIN — METRONIDAZOLE 500 MG: 500 INJECTION, SOLUTION INTRAVENOUS at 05:01

## 2018-12-05 RX ADMIN — CYANOCOBALAMIN TAB 500 MCG 1000 MCG: 500 TAB at 10:25

## 2018-12-05 RX ADMIN — CLONIDINE HYDROCHLORIDE 0.1 MG: 0.1 TABLET ORAL at 10:25

## 2018-12-05 RX ADMIN — HYDROMORPHONE HYDROCHLORIDE 0.25 MG: 1 INJECTION, SOLUTION INTRAMUSCULAR; INTRAVENOUS; SUBCUTANEOUS at 18:30

## 2018-12-05 RX ADMIN — Medication 10 MG: at 19:00

## 2018-12-05 RX ADMIN — METRONIDAZOLE 500 MG: 500 INJECTION, SOLUTION INTRAVENOUS at 18:49

## 2018-12-05 RX ADMIN — MIDAZOLAM HYDROCHLORIDE 0.5 MG: 1 INJECTION, SOLUTION INTRAMUSCULAR; INTRAVENOUS at 18:24

## 2018-12-05 RX ADMIN — ROPINIROLE HYDROCHLORIDE 4 MG: 1 TABLET, FILM COATED ORAL at 00:58

## 2018-12-05 NOTE — PROGRESS NOTES
Hospitalist Progress Note Patient: Augustus Nix MRN: [id number]  CSN: [id number] YOB: 1950  Age: 80 y.o. Sex: female DOA: 12/4/2018 LOS:  LOS: 1 day Chief Complaint: 
 
Foot Ulcer Assessment/Plan 1. DM Foot Ulcer and Cellulitis 2. Uncontrolled Type 2 DM 3. CAD with hx of CABG 4. Lupus Anticoagulant Syndrome 5. Chronic Anemia 1. Continue IV antibiotic regimen, plan for podiatry to take patient for I&D of wound. MRI ordered and pending. Await further podiatry input. 2. On oral medications at home. A1c is 10.7%. Will have diabetic educators meet with patient after surgery for education. Continue sliding scale coverage, ADA diet once able to eat, and hypoglycemia protocol. 3. CABG in April, will need med reconciliation by pharmacy to ensure proper medications given during hospitalization. 4. On warfarin. Warfarin currently on hold in preparation for procedure. Will resume warfarin once able to after procedure. 5. Hemoglobin is stable, will continue to monitor after procedure. DVT Prophylaxis - SCD Disposition - TBD, pending podiatry intervention Patient Active Problem List  
Diagnosis Code  Pancreatic cyst K86.2  
 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 Z90.3, Z98.0  Atherosclerotic PVD with ulceration ([facility]) I70.209, L98.499  Anemia D64.9  Lupus anticoagulant syndrome ([facility]) D68.62  
 PAD (peripheral artery disease) ([facility]) I73.9  Sleepwalking disorder F51.3  Anxiety F41.9  Type II diabetes mellitus with complication, uncontrolled ([facility]) E11.8, E11.65  
 CAD (coronary artery disease) I25.10  Diabetic foot ([facility] [address] E11.8  Cellulitis L03.90  
 Diabetic foot ulcer ([facility] [address] E11.621, L97.509 Subjective: No complaints Feeling well otherwise Review of systems: 
 
Constitutional: denies fevers, chills, myalgias. +foot pain Respiratory: denies SOB, cough Cardiovascular: denies chest pain, palpitations Gastrointestinal: denies nausea, vomiting, diarrhea Vital signs/Intake and Output: 
Visit Vitals /58 (BP 1 Location: Right arm, BP Patient Position: At rest) Pulse 81 Temp 98.3 °F (36.8 °C) Resp 20 Ht 5' 4.5\" (1.638 m) Wt 59 kg (130 lb) SpO2 98% BMI 21.97 kg/m² Current Shift:  12/05 0701 - 12/05 1900 In: 240 [P.O.:240] Out: - Last three shifts:  No intake/output data recorded. Exam: 
 
General: Elderly appearing white female, alert, NAD, OX3 Head/Neck: NCAT, supple, No masses, No lymphadenopathy CVS:Regular rate and rhythm, no M/R/G, S1/S2 heard, no thrill Lungs:Clear to auscultation bilaterally, no wheezes, rhonchi, or rales Abdomen: Soft, Nontender, No distention, Normal Bowel sounds, No hepatomegaly Extremities: Right foot without toes, plantar ulceration with erythema. Skin:normal texture and turgor. Plantar ulceration of right foot Neuro:grossly normal , follows commands Psych:appropriate Labs: Results:  
   
Chemistry Recent Labs 12/05/18 0259 12/04/18 1925 GLU 98 94 * 139  
K 4.6 4.7  105 CO2 26 26 BUN 19* 23* CREA 0.52* 0.62  
CA 7.9* 8.4* AGAP 5 8 BUCR 37 37 * 195* TP 5.9* 6.9 ALB 2.5* 3.0*  
GLOB 3.4 3.9 AGRAT 0.7* 0.8 CBC w/Diff Recent Labs 12/05/18 0259 12/04/18 1925 WBC 9.1 11.5  
RBC 3.76* 4.21  
HGB 9.5* 10.5* HCT 31.2* 34.2*  
 465* GRANS 71 80* LYMPH 17* 14* EOS 2 1 Cardiac Enzymes No results for input(s): CPK, CKND1, RAYNA in the last 72 hours. No lab exists for component: Hezzie Chick Coagulation Recent Labs 12/05/18 0259 12/04/18 1925 PTP 16.4* 17.5* INR 1.3* 1.5* Lipid Panel No results found for: CHOL, CHOLPOCT, CHOLX, CHLST, CHOLV, 141437, HDL, LDL, LDLC, DLDLP, 853403, VLDLC, VLDL, TGLX, TRIGL, TRIGP, TGLPOCT, CHHD, CHHDX  
BNP No results for input(s): BNPP in the last 72 hours. Liver Enzymes Recent Labs 12/05/18 
0254 TP 5.9* ALB 2.5* * SGOT 17 Thyroid Studies Lab Results Component Value Date/Time TSH 0.74 04/11/2018 12:30 AM  
    
Procedures/imaging: see electronic medical records for all procedures/Xrays and details which were not copied into this note but were reviewed prior to creation of Plan Juan Langley PA-C

## 2018-12-05 NOTE — ED NOTES
Pt being admitted to room 308. Called report to Catherne Gaucher, RN. Pt being transported to the unit via [facility] by Zain VAUGHAN. Pt has her personal belongings.

## 2018-12-05 NOTE — ROUTINE PROCESS
TRANSFER - OUT REPORT: 
 
Verbal report given to Tommy Portillo RN (name) on Olga Espino  being transferred to OR (unit) for ordered procedure Report consisted of patients Situation, Background, Assessment and  
Recommendations(SBAR). Information from the following report(s) SBAR, Kardex, ED Summary, Procedure Summary, Intake/Output and Recent Results was reviewed with the receiving nurse. Lines:  
Peripheral IV 12/05/18 Left Wrist (Active) Site Assessment Clean, dry, & intact 12/5/2018  3:33 PM  
Phlebitis Assessment 0 12/5/2018  3:33 PM  
Infiltration Assessment 0 12/5/2018  3:33 PM  
Dressing Status Clean, dry, & intact 12/5/2018  3:33 PM  
Dressing Type Transparent 12/5/2018 11:00 AM  
Hub Color/Line Status Infusing;Patent 12/5/2018  3:33 PM  
Action Taken Open ports on tubing capped 12/5/2018 11:00 AM  
Alcohol Cap Used Yes 12/5/2018  3:33 PM  
  
 
Opportunity for questions and clarification was provided.

## 2018-12-05 NOTE — DIABETES MGMT
NUTRITIONAL ASSESSMENT GLYCEMIC CONTROL/ PLAN OF CARE Irineo Duron           80 y.o.           12/4/2018 1. Diabetic foot infection ([address] PMHx: Diabetes, Arthritis, Asthma, Lupus Anticoagulant Autoimmune disease, Cancer (carcinoma, Chronic pain, GERD, Liver Disease, neuropathy,  Depression, Peptic ulcer disease, Thromboembolus Past surgical hx: Appendectomy, Cholecystectomy, Gastrectomy, Gastric Bypass, Partial colectomy, Colostomy and Reversal  
 
Brief update: Pt instructed by her Podiatrist to come to the hospital for further treatment of food infection. Pt currently NPO in preparation for I&D procedure this afternoon 5:30 p.m. INTERVENTIONS/PLAN:  
-Recommend resuming Consistent Carbohydrate diet post I&D once medically appropriate to resume diet. -Recommend Nutrition Education p/t d/c. ASSESSMENT:  
Nutritional Status: 
Normal weight per BMI 22 kg/m2 (18.5-24.9 kg/m2) Diabetes Management:     
Recent Glucose Results:  
Lab Results Component Value Date/Time GLU 98 12/05/2018 02:59 AM  
 GLU 94 12/04/2018 07:25 PM  
 GLUCPOC 119 (H) 12/05/2018 12:29 PM  
 GLUCPOC 101 12/05/2018 07:43 AM  
 GLUCPOC 199 (H) 12/04/2018 11:12 PM  
 
 
Within target range (non-ICU: <140; ICU<180): [x] Yes   []  No 
 
Current Insulin regimen:  
Humalog, corrective, normal insulin sensitivity Home medication/insulin regimen:  
glimepiride 2 mg Metformin 500 mg 2x daily HbA1c: (12/8/18) 10.7% equivalent to average blood glucose level 260 mg/dL. Adequate glycemic control PTA:  [] Yes  [x] No 
  
SUBJECTIVE/OBJECTIVE: Information obtained from: Pt confirmed PTA medications. States that her blood sugars have been running higher than usual d/t her foot infection. Pt denies low blood sugars. CDE spent time explaining the use of insulin in the inpatient setting. Pt had voiced some concern d/t hypoglycemic episode in previous admission.  Pt seemed receptive and understood why we use insulin in the inpatient setting. Pt informed pt she was not interested in nutrition education. Will follow- up after pt has I&D to offer Nutrition Education again. Diet:DIET NPO Patient Vitals for the past 100 hrs: 
 % Diet Eaten 12/05/18 0848 20 % Medications: [x]                Reviewed Most Recent POC Glucose:  
Recent Labs 12/05/18 
0259 12/04/18 
1925 GLU 98 94 Labs:  
Lab Results Component Value Date/Time Hemoglobin A1c 10.7 (H) 12/05/2018 02:59 AM  
 
Lab Results Component Value Date/Time Sodium 135 (L) 12/05/2018 02:59 AM  
 Potassium 4.6 12/05/2018 02:59 AM  
 Chloride 104 12/05/2018 02:59 AM  
 CO2 26 12/05/2018 02:59 AM  
 Anion gap 5 12/05/2018 02:59 AM  
 Glucose 98 12/05/2018 02:59 AM  
 BUN 19 (H) 12/05/2018 02:59 AM  
 Creatinine 0.52 (L) 12/05/2018 02:59 AM  
 Calcium 7.9 (L) 12/05/2018 02:59 AM  
 Magnesium 1.9 12/05/2018 02:59 AM  
 Phosphorus 2.3 (L) 03/01/2016 01:05 AM  
 Albumin 2.5 (L) 12/05/2018 02:59 AM  
 
 
Anthropometrics: BMI (calculated): 22 Wt Readings from Last 1 Encounters:  
12/04/18 59 kg (130 lb) Ht Readings from Last 1 Encounters:  
12/04/18 5' 4.5\" (1.638 m) Estimated Nutrition Needs: 1622 kcal/day (MSJ x 1.45 ), 65 g PRO/day ( 1.1 g PRO/kg),  2065 mL fluid/day (35 mL fluid/ kg or kcal) Based on:   [x]          Actual BW: 59 kg Nutrition Diagnoses: Altered nutrition-related lab value related to recent foot infection as evidenced by A1C 10.7%. Nutrition Interventions: 
-Continue Diabetic Consistent Carbohydrate Diet Goal:  
Blood glucose will be within target range of  mg/dL by 12/8/18. Nutrition Monitoring and Evaluation []     Monitor po intake on meal rounds 
[x]     Continue inpatient monitoring and intervention 
[]     Other: 
 
 
Nutrition Risk:  []   High     [x]  Moderate    []  Minimal/Uncompromised Dilcia Ying RD 
pgr [phone]

## 2018-12-05 NOTE — PROGRESS NOTES
Summary - Pt comfortable and engaged in care throughout morning. Very excited/appreciative of teaching. Seen by diabetes care team and received MRI prior to surgery. Bedside and Verbal shift change report given to Merlin Peng, RN (oncoming nurse) by Mark Coello RN (offgoing nurse). Report included the following information SBAR, Kardex, Intake/Output, MAR, Recent Results and Med Rec Status. At time of evening handover, pt not yet back on unit. Patient Vitals for the past 12 hrs: 
 Temp Pulse Resp BP SpO2  
12/05/18 1950  76 17  100 % 12/05/18 1945  73 17 120/70 100 % 12/05/18 1940   11 122/75 98 % 12/05/18 1935  76 19 141/61 100 % 12/05/18 1931 97.5 °F (36.4 °C) 74 16 131/57 100 % 12/05/18 1930  74 17 138/52 100 % 12/05/18 1927  78 21 131/57 100 % 12/05/18 1745  81  147/79   
12/05/18 1646 98.9 °F (37.2 °C) 88 20 148/85 100 % 12/05/18 1236 98.2 °F (36.8 °C) 88 20 126/62 99 %

## 2018-12-05 NOTE — ROUTINE PROCESS
Bedside shift change report given to Ebonie Johnson RN (oncoming nurse) by Srinivasa Miranda RN (offgoing nurse). Report included the following information SBAR, Kardex, ED Summary, Intake/Output, MAR and Recent Results. Pt is preparing for 1630hrs I&D, per report.   To be NPO after breakfast.

## 2018-12-05 NOTE — ANESTHESIA PREPROCEDURE EVALUATION
Anesthetic History PONV Review of Systems / Medical History Patient summary reviewed, nursing notes reviewed and pertinent labs reviewed Pulmonary Asthma Neuro/Psych Psychiatric history Cardiovascular CAD Exercise tolerance: >4 METS 
  
GI/Hepatic/Renal 
  
GERD 
 
 
PUD and liver disease Endo/Other Diabetes Arthritis Other Findings Physical Exam 
 
Airway Mallampati: II 
TM Distance: 4 - 6 cm Neck ROM: normal range of motion Mouth opening: Normal 
 
 Cardiovascular Regular rate and rhythm,  S1 and S2 normal,  no murmur, click, rub, or gallop Dental 
No notable dental hx Pulmonary Breath sounds clear to auscultation Abdominal 
GI exam deferred Other Findings Anesthetic Plan ASA: 3 Anesthesia type: MAC Induction: Intravenous Anesthetic plan and risks discussed with: Patient

## 2018-12-05 NOTE — DIABETES MGMT
Diabetes Patient/Family Education RecordFactors That  May Influence Patients Ability  to Learn or  Comply with Recommendations []   Language barrier    []   Cultural needs   []   Motivation  
 []   Cognitive limitation    []   Physical   []   Education  
 []   Physiological factors   []   Hearing/vision/speaking impairment   []   Nondenominational beliefs []   Financial factors   []  Other:   [x]  No factors identified at this time. Person Instructed: [x]   Patient   []   Family   []  Other Preference for Learning: 
 [x]   Verbal   []   Written   []  Demonstration Level of Comprehension & Competence:   
[x]  Good                                      [] Fair                                     []  Poor                             []  Needs Reinforcement  
[x]  Teachback completed Education Component:  
[x]  Medication management, including confirmation of home regimen   
[x]  Nutritional management -obtain usual meal pattern; h/o gastric bypass; reports she using [employer] Carb Counting guide for meal planning  
[]  Exercise  
[]  Signs, symptoms, and treatment of hyperglycemia and hypoglycemia  
[] Prevention, recognition and treatment of hyperglycemia and hypoglycemia [x]  Importance of blood glucose monitoring; SMBG 2-3x/day   
[]  Instruction on use of the blood glucose meter [x]  Discuss the importance of HbA1C monitoring   
[]  Sick day guidelines  
[]  Proper use and disposal of lancets, needles, syringes or insulin pens (if appropriate) [x]  Potential short-term complications; impact BG have on wound healing  
[] Information about whom to contact in case of emergency or for more information   
[x]  Goal:  Patient/family will demonstrate understanding of Diabetes Self Management Skills by: 1/30/19 Plan for post-discharge education or self-management support: 
  [x] Outpatient class schedule provided            [] Patient Declined [] Scheduled for outpatient classes (date) _______ Verify: 
Does patient understand how diabetes medications work? ____________________________ Does patient know what their most recent A1c is? yes Does patient monitor glucose at home? yes Does patient have a glucometer, testing supplies or difficulty obtaining diabetes medications? Pt does not have difficulty obtaining DM meds Bryanna Moore MS, RN, CDE Glycemic Control Team 
[phone] Pager [phone] (M-TH 8:00-4:30P) *After Hours pager [phone]

## 2018-12-05 NOTE — PROGRESS NOTES
Chart reviewed. Pt admitted for foot wound infection. Pt to have surgical intervention with MD Joanna Bonner today. Met with pt at bedside, no friend/family present. Pt lives at home with son and roommate. Pt will have friend/family drive her home at discharge. Pts home address confirmed per face sheet. Pt states she has no DME need; has cane, RW and knee scooter. Pt states if home health needed she would like [facility], [facility] offered. Pt states in past she has needed long term IV abx and used [facility]. Provider, please advise if pt will need long term IV abx after this hospitalization. If so, please provide hard scripts/signed [facility] form. CM will cont to follow.

## 2018-12-05 NOTE — ED NOTES
Assumed care of pt from \[facility]\"". Have been informed that pt is potential admission for wound care. EDT García Parada reports she has placed PIV access/collected blood for labs. This RBN will start IV abxs.

## 2018-12-05 NOTE — WOUND CARE
Wound care consulted for diabetic foot wound. Patient is a patient of Dr. Zane Rahman and is scheduled for surgery this afternoon. Spoke with patient who verbalizes understanding. Dry protective dressing intact on wound. Please consult/contact wound care if any further questions or concerns arise.

## 2018-12-05 NOTE — H&P
History & Physical 
Patient: Maria Luz Quinn MRN: [id number]  CSN: [id number] YOB: 1950  Age: 80 y.o. Sex: female DOA: 12/4/2018 Chief Complaint:  
Chief Complaint Patient presents with  Wound Infection HPI:  
 
Maria Luz Quinn is a 80 y.o.  female who has hx of DM CABG Lupus anticoagulant present to ER from podiatrist office with worsening foot pain and swelling as well as drainage despite several weeks of Clindamycin Has worening erythema and drainage instructed to come to ER for IV Abx and surgical debridement 12/5 in pm in ER given Aztreonam and Flagyl patient with multiple antibiotics allergies. Asked to admit for IV abx prior to surgical debridement Past Medical History:  
Diagnosis Date  Arthritis   
 hands and feet  Asthma  Autoimmune disease ([address] lupus anticoagulant-causes clotting  Cancer ([address]   
 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  Lung collapse 2015  Nausea & vomiting 5/23/2012  Neuropathy   
 right foot  Other ill-defined conditions(799.89) arterial clot in rt leg  Other ill-defined conditions(799.89) restless leg syndrome  Other ill-defined conditions(799.89) raynaud's syndrome  Other ill-defined conditions(799.89) chronic N/V  
 Other ill-defined conditions(799.89)   
 (2 mutated genes) hyper coag. state  Pneumonia 07/22/2017  Positive cardiolipin antibodies  Psychiatric disorder   
 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  
Procedure Laterality Date  ABDOMEN SURGERY PROC UNLISTED    
 duodenal ulcer rupture  BREAST SURGERY PROCEDURE UNLISTED Bilateral 2016  
 hemorrhaged after breast surgery  HX APPENDECTOMY  HX BREAST AUGMENTATION    
 developed infection and implant removed  HX CHOLECYSTECTOMY  HX GASTRECTOMY    
 due to ulcers  HX GASTRIC BYPASS    
 gastric by-pass  HX GI    
 total gastrectomy, partial colectomy  HX GI    
 colostomy and reversal  
 HX HEENT    
 cancer of lip removed  HX HYSTERECTOMY for cervical cancer  HX ORTHOPAEDIC    
 rt foot surgery x10  
 HX ORTHOPAEDIC Left   
 great toe gangrenous and amputated  HX OTHER SURGICAL    
 part of liver removal  
 HX OTHER SURGICAL    
 ventral hernia repair x10  
 HX OTHER SURGICAL Exc pilonial cystectomy  VASCULAR SURGERY PROCEDURE UNLIST    
 iliac aorta stents x 6 Family History Problem Relation Age of Onset  Malignant Hyperthermia Neg Hx  Pseudocholinesterase Deficiency Neg Hx  Delayed Awakening Neg Hx  Post-op Nausea/Vomiting Neg Hx  Emergence Delirium Neg Hx  Post-op Cognitive Dysfunction Neg Hx   
 Other Neg Hx Social History Socioeconomic History  Marital status:  Spouse name: Not on file  Number of children: Not on file  Years of education: Not on file  Highest education level: Not on file Tobacco Use  Smoking status: Former Smoker Years: 0.50 Last attempt to quit: 1996 Years since quittin.9  Smokeless tobacco: Never Used Substance and Sexual Activity  Alcohol use: Yes Comment: 1 or 2 glasses wine or mixed drinks monthly  Drug use: Yes Types: Marijuana Comment: daily  Sexual activity: No  
 
 
Prior to Admission medications Medication Sig Start Date End Date Taking? Authorizing Provider CLINDAMYCIN HCL, BULK, by Does Not Apply route. Yes Other, MD Jay  
warfarin (COUMADIN) 5 mg tablet Take 5 mg by mouth every other day.     Provider, Historical  
 Cetirizine (ZYRTEC) 10 mg cap Take  by mouth. Provider, Historical  
Minoxidil 5 % foam by Apply Externally route. Provider, Historical  
cyanocobalamin 1,000 mcg tablet Take 1,000 mcg by mouth daily. Provider, Historical  
glimepiride (AMARYL) 2 mg tablet Take 2 mg by mouth every morning. Provider, Historical  
melatonin 3 mg tablet Take 6 mg by mouth nightly. Provider, Historical  
warfarin (COUMADIN) 7.5 mg tablet Take 7.5 mg by mouth every other day. Provider, Historical  
ondansetron hcl (ZOFRAN, AS HYDROCHLORIDE,) 4 mg tablet Take 4 mg by mouth every eight (8) hours as needed for Nausea. Provider, Historical  
cyclobenzaprine (FLEXERIL) 10 mg tablet Take  by mouth three (3) times daily as needed for Muscle Spasm(s). Provider, Historical  
miconazole (MICOTIN) 2 % vaginal cream Insert 1 Applicator into vagina every evening. 1/9/17   Betsy Vaughan MD  
promethazine (PHENERGAN) 25 mg tablet Take 25 mg by mouth every six (6) hours as needed for Nausea. Provider, Historical  
LORazepam (ATIVAN) 2 mg tablet Take 1 mg by mouth every six (6) hours as needed. Provider, Historical  
BIOTIN PO Take 5,000 mcg by mouth daily. Provider, Historical  
diphenhydrAMINE (BENADRYL) 25 mg capsule Take 50 mg by mouth every six (6) hours as needed. Indications: ALLERGIC RHINITIS    Other, MD Jay  
Mv,Ca,Min-FA-Herbal No.157 (ESTROVEN MAXIMUM STRENGTH) 400 mcg Tab Take 1 Tab by mouth two (2) times a day. Kaylie, MD Jay  
albuterol (PROVENTIL HFA, VENTOLIN HFA) 90 mcg/actuation inhaler Take 2 Puffs by inhalation every four (4) hours as needed. Indications: ACUTE ASTHMA ATTACK    Provider, Historical  
cloNIDine (CATAPRES) 0.1 mg tablet Take 0.1 mg by mouth two (2) times a day. Indications: hot flashes;    Provider, Historical  
metFORMIN (GLUCOPHAGE) 500 mg tablet Take 500 mg by mouth two (2) times daily (with meals).  Indications: TYPE 2 DIABETES MELLITUS    Provider, Historical  
 ropinirole (REQUIP) 4 mg Tab TAB Take 4 mg by mouth nightly. Indications: RESTLESS LEGS SYNDROME    Provider, Historical  
traMADol (ULTRAM) 50 mg tablet Take 50 mg by mouth every eight (8) hours as needed. Indications: PAIN    Provider, Historical  
 
 
Allergies Allergen Reactions  Rifampin Hives and Swelling Swelling of mouth and tounge  Levaquin [Levofloxacin] Itching  Amoxicillin Hives  Aspirin Nausea and Vomiting  Azithromycin Angioedema  Bactrim [Sulfamethoprim Ds] Hives  Cefatrizine Hives  Erythromycin Hives  Rocephin [Ceftriaxone] Hives  Tylenol [Acetaminophen] Hives  Vancomycin Hives Review of Systems GENERAL: Patient alert, awake and oriented times 3, able to communicate full sentences and not in distress. HEENT: No change in vision, no earache, tinnitus, sore throat or sinus congestion. NECK: No pain or stiffness. PULMONARY: No shortness of breath, cough or wheeze. Cardiovascular: no pnd or orthopnea, no CP GASTROINTESTINAL: No abdominal pain, nausea, vomiting or diarrhea, melena or bright red blood per rectum. GENITOURINARY: No urinary frequency, urgency, hesitancy or dysuria. MUSCULOSKELETAL+joint or muscle pain, no back pain, + recent trauma. Fell down 14 steps hurts shoulder scapular do to loss of balance DERMATOLOGIC: + rash, no itching, + lesions. Quarter size ulcer plantar right foot with metaphalnges amputation weak dp warmth erythema noted though dorsal aspect of feet demarcation advised ENDOCRINE: No polyuria, polydipsia, no heat or cold intolerance. No recent change in weight. HEMATOLOGICAL: No anemia or easy bruising or bleeding. NEUROLOGIC: No headache, seizures,+ numbness,+ tingling or weakness. Physical Exam:  
 
Physical Exam: 
Visit Vitals BP (!) 133/91 (BP 1 Location: Right arm, BP Patient Position: At rest) Pulse 91 Temp 99.8 °F (37.7 °C) Resp 14 Ht 5' 4.5\" (1.638 m) Wt 59 kg (130 lb) SpO2 100% BMI 21.97 kg/m² O2 Device: Room air Temp (24hrs), Av.9 °F (37.7 °C), Min:99.8 °F (37.7 °C), Max:99.9 °F (37.7 °C) No intake/output data recorded. No intake/output data recorded. General:  Alert, cooperative, no distress, appears stated age. Head: Normocephalic, without obvious abnormality, atraumatic. Eyes:  Conjunctivae/corneas clear. PERRL, EOMs intact. Nose: Nares normal. No drainage or sinus tenderness. Neck: Supple, symmetrical, trachea midline, no adenopathy, thyroid: no enlargement, no carotid bruit and no JVD. Lungs:   Clear to auscultation bilaterally. Heart:  Regular rate and rhythm, S1, S2 normal.  
  Abdomen: Soft, non-tender. Bowel sounds normal.   
Extremities: Extremities normal, atraumatic, no cyanosis or edema. Pulses: 1+dinished right foot forefoot  and asymmetric all extremities. Skin:  quarter size ulcer right foot plantar mid section Foul smelling purulent drainage with surrunding erythema and warmth up to dorsum of foot Neurologic: AAOx3, No focal motor or sensory deficit. Labs Reviewed: All lab results for the last 24 hours reviewed. and EKG Procedures/imaging: see electronic medical records for all procedures/Xrays and details which were not copied into this note but were reviewed prior to creation of Plan Recent Results (from the past 24 hour(s)) CBC WITH AUTOMATED DIFF Collection Time: 18  7:25 PM  
Result Value Ref Range WBC 11.5 4.6 - 13.2 K/uL  
 RBC 4.21 4.20 - 5.30 M/uL  
 HGB 10.5 (L) 12.0 - 16.0 g/dL HCT 34.2 (L) 35.0 - 45.0 % MCV 81.2 74.0 - 97.0 FL  
 MCH 24.9 24.0 - 34.0 PG  
 MCHC 30.7 (L) 31.0 - 37.0 g/dL  
 RDW 16.9 (H) 11.6 - 14.5 % PLATELET 363 (H) 282 - 420 K/uL MPV 8.7 (L) 9.2 - 11.8 FL  
 NEUTROPHILS 80 (H) 40 - 73 % LYMPHOCYTES 14 (L) 21 - 52 % MONOCYTES 5 3 - 10 % EOSINOPHILS 1 0 - 5 % BASOPHILS 0 0 - 2 %  
 ABS. NEUTROPHILS 9.2 (H) 1.8 - 8.0 K/UL ABS. LYMPHOCYTES 1.6 0.9 - 3.6 K/UL  
 ABS. MONOCYTES 0.6 0.05 - 1.2 K/UL  
 ABS. EOSINOPHILS 0.2 0.0 - 0.4 K/UL  
 ABS. BASOPHILS 0.0 0.0 - 0.1 K/UL  
 DF AUTOMATED METABOLIC PANEL, COMPREHENSIVE Collection Time: 12/04/18  7:25 PM  
Result Value Ref Range Sodium 139 136 - 145 mmol/L Potassium 4.7 3.5 - 5.5 mmol/L Chloride 105 100 - 108 mmol/L  
 CO2 26 21 - 32 mmol/L Anion gap 8 3.0 - 18 mmol/L Glucose 94 74 - 99 mg/dL BUN 23 (H) 7.0 - 18 MG/DL Creatinine 0.62 0.6 - 1.3 MG/DL  
 BUN/Creatinine ratio 37 GFR est AA >60 >60 ml/min/1.73m2 GFR est non-AA >60 >60 ml/min/1.73m2 Calcium 8.4 (L) 8.5 - 10.1 MG/DL Bilirubin, total 0.1 (L) 0.2 - 1.0 MG/DL  
 ALT (SGPT) 20 13 - 56 U/L  
 AST (SGOT) 20 15 - 37 U/L Alk. phosphatase 195 (H) 45 - 117 U/L Protein, total 6.9 6.4 - 8.2 g/dL Albumin 3.0 (L) 3.4 - 5.0 g/dL Globulin 3.9 2.0 - 4.0 g/dL A-G Ratio 0.8 0.8 - 1.7 PROTHROMBIN TIME + INR Collection Time: 12/04/18  7:25 PM  
Result Value Ref Range Prothrombin time 17.5 (H) 11.5 - 15.2 sec INR 1.5 (H) 0.8 - 1.2 Assessment/Plan Active Problems: * No active hospital problems. * 
1. Diabetic Foot ulcer with cellulitis Azeronam plus Jalloh Fought for now Npo 8 hrs prior to surgery Check MRI foot for osteo Check LEADS 2. Lupus Anticoag High risk for DVT /PE Coumadin hold today and tomorrow Bridge therapy post op with heparin or lovenox Pharmacy to dose after surgery tomorrow coumadin 3. dM Hold po meds Sliding scale insulin for now 4. CAD recent Cabg in April doing well Cont cardiac regimen DVT/GI Prophylaxis: Hep SQ Discussed with patient at bedside about hospital admission and my plan care, who understood and agree with my plan care.  
 
Bryant Huston MD 
12/4/2018 8:27 PM

## 2018-12-05 NOTE — PROGRESS NOTES
Physical Therapy Screening: 
Services are indicated and therapy order is required. An InBasket screening referral was triggered for physical therapy based on results obtained during the nursing admission assessment. The patients chart was reviewed and the patient is appropriate for a skilled therapy evaluation. Please order a consult for physical therapy if you are in agreement and would like an evaluation to be completed. Thank you.  
 
Priya Shelby, PTA

## 2018-12-05 NOTE — H&P
Date of Surgery Update: 
Alcides Dillon was seen and examined. History and physical has been reviewed. The patient has been examined.  There have been no significant clinical changes since the completion of the originally dated History and Physical. 
 
Signed By: Iman Ibrahim DPM   
 December 5, 2018 6:00 PM

## 2018-12-05 NOTE — PROGRESS NOTES
Patient was seen in my office yesterday and is on call to the OR for right foot diabetic ulcer/abscess Incision and Drainage today at 5:30 PM.

## 2018-12-05 NOTE — PROGRESS NOTES
Shift Notes: 
 Assumed care of patient in wheelchair, transferred to bed, no complaints offered Bottom of right foot open to air and draining serous fluid. Cleansed with wound cleanser and dressed with abd pad  and mepilex Oriented to unit, call bell within reach. Healthy choice meal bought to bedside. 11:28 PM 
No change in status Requesting pain medication for foot VSS, call bell available. Patient Vitals for the past 12 hrs: 
 Temp Pulse Resp BP SpO2  
12/05/18 0255 98.1 °F (36.7 °C) 80 18 136/78 98 % 12/04/18 2239 99.8 °F (37.7 °C) 95 16 141/76 100 % 12/04/18 2115 99.8 °F (37.7 °C) 93 16 138/69 95 % 12/04/18 1954 99.8 °F (37.7 °C) 91 14 (!) 133/91 100 %

## 2018-12-05 NOTE — PROGRESS NOTES
Problem: Falls - Risk of 
Goal: *Absence of Falls Document Loulou Page Fall Risk and appropriate interventions in the flowsheet. Outcome: Progressing Towards Goal 
Fall Risk Interventions: 
Mobility Interventions: Assess mobility with egress test, Communicate number of staff needed for ambulation/transfer, Patient to call before getting OOB Medication Interventions: Evaluate medications/consider consulting pharmacy, Teach patient to arise slowly, Patient to call before getting OOB, Bed/chair exit alarm History of Falls Interventions: Bed/chair exit alarm, Consult care management for discharge planning, Door open when patient unattended, Evaluate medications/consider consulting pharmacy, Investigate reason for fall(describes self as \"clumsy\", frequent falls)

## 2018-12-05 NOTE — ROUTINE PROCESS
TRANSFER - IN REPORT: 
 
Verbal report received from Rickard Cabot RN(name) on Avery Gabriel  being received from emergency(unit) for routine progression of care Report consisted of patients Situation, Background, Assessment and  
Recommendations(SBAR). Information from the following report(s) SBAR, ED Summary, [facility] and Recent Results was reviewed with the receiving nurse. Opportunity for questions and clarification was provided. Assessment  Will be completed upon patients arrival to unit and care assumed.

## 2018-12-05 NOTE — PROGRESS NOTES
Reason for Admission:    IV abx prior to scheduled surgical debridement RRAT Score:    High; 22  
          
Resources/supports as identified by patient/family:    
             
Top Challenges facing patient (as identified by patient/family and CM): Finances/Medication cost?       
           
Transportation? Support system or lack thereof? Living arrangements? Self-care/ADLs/Cognition? Current Advanced Directive/Advance Care Plan: On file Plan for utilizing home health:    TBD Likelihood of readmission:  TBD Transition of Care Plan:     TBD Chart reviewed. Pt has hx of DM CABG Lupus anticoagulant present to ER from podiatrist office with worsening foot pain and swelling as well as drainage despite several weeks of Clindamycin Pt with worening erythema and drainage and was instructed to come to ER for IV Abx and surgical debridement 12/5 in pm in ER and was given Aztreonam and Flagyl. Patient with multiple antibiotics allergies. Pt is being admitted for IV abx prior to surgical debridement. Care Management Interventions PCP Verified by CM: Yes Transition of Care Consult (CM Consult): Discharge Planning Health Maintenance Reviewed: Yes 
Confirm Follow Up Transport: Family

## 2018-12-05 NOTE — ROUTINE PROCESS
Bedside and Verbal shift change report given to [address] [place] [address] Avenue (oncoming nurse) by Nikolas Stringer RN 
 (offgoing nurse). Report included the following information Kardex, Intake/Output, MAR and Recent Results.

## 2018-12-06 LAB
BASOPHILS # BLD: 0 K/UL (ref 0–0.1)
BASOPHILS NFR BLD: 0 % (ref 0–2)
DIFFERENTIAL METHOD BLD: ABNORMAL
EOSINOPHIL # BLD: 0.1 K/UL (ref 0–0.4)
EOSINOPHIL NFR BLD: 1 % (ref 0–5)
ERYTHROCYTE [DISTWIDTH] IN BLOOD BY AUTOMATED COUNT: 16.9 % (ref 11.6–14.5)
GLUCOSE BLD STRIP.AUTO-MCNC: 142 MG/DL (ref 70–110)
GLUCOSE BLD STRIP.AUTO-MCNC: 156 MG/DL (ref 70–110)
GLUCOSE BLD STRIP.AUTO-MCNC: 261 MG/DL (ref 70–110)
GLUCOSE BLD STRIP.AUTO-MCNC: 282 MG/DL (ref 70–110)
HCT VFR BLD AUTO: 31.8 % (ref 35–45)
HGB BLD-MCNC: 9.7 G/DL (ref 12–16)
INR PPP: 1.2 (ref 0.8–1.2)
LYMPHOCYTES # BLD: 1 K/UL (ref 0.9–3.6)
LYMPHOCYTES NFR BLD: 10 % (ref 21–52)
MCH RBC QN AUTO: 25.3 PG (ref 24–34)
MCHC RBC AUTO-ENTMCNC: 30.5 G/DL (ref 31–37)
MCV RBC AUTO: 82.8 FL (ref 74–97)
MONOCYTES # BLD: 0.8 K/UL (ref 0.05–1.2)
MONOCYTES NFR BLD: 8 % (ref 3–10)
NEUTS SEG # BLD: 8.3 K/UL (ref 1.8–8)
NEUTS SEG NFR BLD: 81 % (ref 40–73)
PLATELET # BLD AUTO: 407 K/UL (ref 135–420)
PMV BLD AUTO: 8.7 FL (ref 9.2–11.8)
PROTHROMBIN TIME: 14.9 SEC (ref 11.5–15.2)
RBC # BLD AUTO: 3.84 M/UL (ref 4.2–5.3)
WBC # BLD AUTO: 10.1 K/UL (ref 4.6–13.2)

## 2018-12-06 PROCEDURE — 65270000029 HC RM PRIVATE

## 2018-12-06 PROCEDURE — 36415 COLL VENOUS BLD VENIPUNCTURE: CPT

## 2018-12-06 PROCEDURE — 74011636637 HC RX REV CODE- 636/637: Performed by: PHYSICIAN ASSISTANT

## 2018-12-06 PROCEDURE — 82962 GLUCOSE BLOOD TEST: CPT

## 2018-12-06 PROCEDURE — 74011250637 HC RX REV CODE- 250/637: Performed by: HOSPITALIST

## 2018-12-06 PROCEDURE — 77030018846 HC SOL IRR STRL H20 ICUM -A

## 2018-12-06 PROCEDURE — 74011000250 HC RX REV CODE- 250: Performed by: PHYSICIAN ASSISTANT

## 2018-12-06 PROCEDURE — 80048 BASIC METABOLIC PNL TOTAL CA: CPT

## 2018-12-06 PROCEDURE — 74011250636 HC RX REV CODE- 250/636: Performed by: PHYSICIAN ASSISTANT

## 2018-12-06 PROCEDURE — 90471 IMMUNIZATION ADMIN: CPT

## 2018-12-06 PROCEDURE — 74011250637 HC RX REV CODE- 250/637: Performed by: PHYSICIAN ASSISTANT

## 2018-12-06 PROCEDURE — 74011250636 HC RX REV CODE- 250/636: Performed by: HOSPITALIST

## 2018-12-06 PROCEDURE — 85025 COMPLETE CBC W/AUTO DIFF WBC: CPT

## 2018-12-06 PROCEDURE — 90686 IIV4 VACC NO PRSV 0.5 ML IM: CPT | Performed by: PHYSICIAN ASSISTANT

## 2018-12-06 PROCEDURE — 85610 PROTHROMBIN TIME: CPT

## 2018-12-06 PROCEDURE — 74011250637 HC RX REV CODE- 250/637: Performed by: INTERNAL MEDICINE

## 2018-12-06 PROCEDURE — 77030018836 HC SOL IRR NACL ICUM -A

## 2018-12-06 RX ORDER — LOPERAMIDE HYDROCHLORIDE 2 MG/1
4 CAPSULE ORAL
Status: DISCONTINUED | OUTPATIENT
Start: 2018-12-06 | End: 2018-12-07 | Stop reason: HOSPADM

## 2018-12-06 RX ORDER — GLIMEPIRIDE 4 MG/1
4 TABLET ORAL
Status: DISCONTINUED | OUTPATIENT
Start: 2018-12-07 | End: 2018-12-07 | Stop reason: HOSPADM

## 2018-12-06 RX ORDER — METFORMIN HYDROCHLORIDE 500 MG/1
1000 TABLET ORAL 2 TIMES DAILY WITH MEALS
Status: DISCONTINUED | OUTPATIENT
Start: 2018-12-06 | End: 2018-12-07 | Stop reason: HOSPADM

## 2018-12-06 RX ORDER — METOPROLOL TARTRATE 25 MG/1
25 TABLET, FILM COATED ORAL 2 TIMES DAILY
Status: DISCONTINUED | OUTPATIENT
Start: 2018-12-06 | End: 2018-12-07 | Stop reason: HOSPADM

## 2018-12-06 RX ORDER — CLOPIDOGREL BISULFATE 75 MG/1
75 TABLET ORAL DAILY
Status: DISCONTINUED | OUTPATIENT
Start: 2018-12-06 | End: 2018-12-07 | Stop reason: HOSPADM

## 2018-12-06 RX ORDER — SAME BUTANEDISULFONATE/BETAINE 400-600 MG
250 POWDER IN PACKET (EA) ORAL 2 TIMES DAILY
Status: DISCONTINUED | OUTPATIENT
Start: 2018-12-06 | End: 2018-12-07 | Stop reason: HOSPADM

## 2018-12-06 RX ORDER — NYSTATIN 100000 U/G
CREAM TOPICAL 2 TIMES DAILY
Status: DISCONTINUED | OUTPATIENT
Start: 2018-12-06 | End: 2018-12-07 | Stop reason: HOSPADM

## 2018-12-06 RX ORDER — PANTOPRAZOLE SODIUM 40 MG/1
40 TABLET, DELAYED RELEASE ORAL DAILY
Status: DISCONTINUED | OUTPATIENT
Start: 2018-12-06 | End: 2018-12-07 | Stop reason: HOSPADM

## 2018-12-06 RX ORDER — ATORVASTATIN CALCIUM 20 MG/1
20 TABLET, FILM COATED ORAL DAILY
Status: DISCONTINUED | OUTPATIENT
Start: 2018-12-06 | End: 2018-12-07 | Stop reason: HOSPADM

## 2018-12-06 RX ADMIN — INSULIN LISPRO 4 UNITS: 100 INJECTION, SOLUTION INTRAVENOUS; SUBCUTANEOUS at 12:26

## 2018-12-06 RX ADMIN — METFORMIN HYDROCHLORIDE 1000 MG: 500 TABLET ORAL at 17:51

## 2018-12-06 RX ADMIN — CYANOCOBALAMIN TAB 500 MCG 1000 MCG: 500 TAB at 07:59

## 2018-12-06 RX ADMIN — INSULIN LISPRO 4 UNITS: 100 INJECTION, SOLUTION INTRAVENOUS; SUBCUTANEOUS at 23:40

## 2018-12-06 RX ADMIN — INSULIN LISPRO 1 UNITS: 100 INJECTION, SOLUTION INTRAVENOUS; SUBCUTANEOUS at 09:19

## 2018-12-06 RX ADMIN — METRONIDAZOLE 500 MG: 500 INJECTION, SOLUTION INTRAVENOUS at 11:11

## 2018-12-06 RX ADMIN — Medication 10 ML: at 02:44

## 2018-12-06 RX ADMIN — CLONIDINE HYDROCHLORIDE 0.1 MG: 0.1 TABLET ORAL at 23:37

## 2018-12-06 RX ADMIN — CLONIDINE HYDROCHLORIDE 0.1 MG: 0.1 TABLET ORAL at 08:00

## 2018-12-06 RX ADMIN — Medication 10 ML: at 06:00

## 2018-12-06 RX ADMIN — HYDROMORPHONE HYDROCHLORIDE 1 MG: 1 INJECTION, SOLUTION INTRAMUSCULAR; INTRAVENOUS; SUBCUTANEOUS at 12:26

## 2018-12-06 RX ADMIN — METOPROLOL TARTRATE 25 MG: 25 TABLET ORAL at 23:37

## 2018-12-06 RX ADMIN — HYDROMORPHONE HYDROCHLORIDE 1 MG: 1 INJECTION, SOLUTION INTRAMUSCULAR; INTRAVENOUS; SUBCUTANEOUS at 23:46

## 2018-12-06 RX ADMIN — AZTREONAM 2 G: 2 INJECTION, POWDER, LYOPHILIZED, FOR SOLUTION INTRAMUSCULAR; INTRAVENOUS at 18:12

## 2018-12-06 RX ADMIN — ATORVASTATIN CALCIUM 20 MG: 20 TABLET, FILM COATED ORAL at 12:31

## 2018-12-06 RX ADMIN — CLOPIDOGREL BISULFATE 75 MG: 75 TABLET ORAL at 12:26

## 2018-12-06 RX ADMIN — NYSTATIN: 100000 CREAM TOPICAL at 20:30

## 2018-12-06 RX ADMIN — PANTOPRAZOLE SODIUM 40 MG: 40 TABLET, DELAYED RELEASE ORAL at 12:25

## 2018-12-06 RX ADMIN — METRONIDAZOLE 500 MG: 500 INJECTION, SOLUTION INTRAVENOUS at 02:59

## 2018-12-06 RX ADMIN — Medication 10 ML: at 23:46

## 2018-12-06 RX ADMIN — AZTREONAM 2 G: 2 INJECTION, POWDER, LYOPHILIZED, FOR SOLUTION INTRAMUSCULAR; INTRAVENOUS at 02:45

## 2018-12-06 RX ADMIN — Medication 250 MG: at 21:00

## 2018-12-06 RX ADMIN — METOPROLOL TARTRATE 25 MG: 25 TABLET ORAL at 14:10

## 2018-12-06 RX ADMIN — HYDROMORPHONE HYDROCHLORIDE 1 MG: 1 INJECTION, SOLUTION INTRAMUSCULAR; INTRAVENOUS; SUBCUTANEOUS at 02:45

## 2018-12-06 RX ADMIN — NYSTATIN: 100000 CREAM TOPICAL at 12:26

## 2018-12-06 RX ADMIN — INFLUENZA VIRUS VACCINE 0.5 ML: 15; 15; 15; 15 SUSPENSION INTRAMUSCULAR at 03:01

## 2018-12-06 RX ADMIN — AZTREONAM 2 G: 2 INJECTION, POWDER, LYOPHILIZED, FOR SOLUTION INTRAMUSCULAR; INTRAVENOUS at 11:11

## 2018-12-06 RX ADMIN — WARFARIN SODIUM 7 MG: 3 TABLET ORAL at 18:08

## 2018-12-06 RX ADMIN — ROPINIROLE HYDROCHLORIDE 4 MG: 1 TABLET, FILM COATED ORAL at 23:39

## 2018-12-06 RX ADMIN — HYDROMORPHONE HYDROCHLORIDE 1 MG: 1 INJECTION, SOLUTION INTRAMUSCULAR; INTRAVENOUS; SUBCUTANEOUS at 17:51

## 2018-12-06 RX ADMIN — HYDROMORPHONE HYDROCHLORIDE 1 MG: 1 INJECTION, SOLUTION INTRAMUSCULAR; INTRAVENOUS; SUBCUTANEOUS at 08:00

## 2018-12-06 NOTE — PROGRESS NOTES
DC Plan:  TBD, pt has signed [address] for [facility] if needed Chart reviewed. Pt admitted for foot wound infection. Pt had surgical intervention with MD Gabrielle Redmond. Pt lives at home with son and roommate. Pt will have friend/family drive her home at discharge. Pts home address confirmed per face sheet. Pt states she has no DME need; has cane, RW and knee scooter. Pt stated if home health needed she would like [facility], [address] offered. Pt states in past she has needed long term IV abx and used [facility]. Provider, please advise if pt will need long term IV abx after this hospitalization. If so, please provide hard scripts/signed [facility] form. CM will cont to follow.

## 2018-12-06 NOTE — ACP (ADVANCE CARE PLANNING)
AMD Reviewed    provided education on Advance Medical Directives and left a copy with the patient. Patient was encouraged to call the  if it is completed while here or if more clarification was needed.  completed visit with patient and offered Pastoral care. Chaplains will continue to follow and will provide pastoral care as needed or requested. Rev.  [phone] [place] [address]  [phone]

## 2018-12-06 NOTE — PERIOP NOTES
TRANSFER - IN REPORT: 
 
Verbal report received from Dr Carolyn Harris and Alysha Rocha, RN (name) on Lucia Díaz  being received from OR (unit) for routine post - op Report consisted of patients Situation, Background, Assessment and  
Recommendations(SBAR). Information from the following report(s) SBAR, Kardex, OR Summary, Procedure Summary, Intake/Output and MAR was reviewed with the receiving nurse. Opportunity for questions and clarification was provided. Assessment completed upon patients arrival to unit and care assumed.

## 2018-12-06 NOTE — ROUTINE PROCESS
Bedside and Verbal shift change report given to JUNIOR Aceves RN (oncoming nurse) by Jerry Broderick RN 
 (offgoing nurse). Report included the following information SBAR, Kardex, Procedure Summary and MAR.

## 2018-12-06 NOTE — ROUTINE PROCESS
Per pt, MD states he will come in later this evening to do daily dressing change. Will continue to monitor site.

## 2018-12-06 NOTE — PROGRESS NOTES
Podiatry Patient is status post right foot I&D abscess. Deep cultures were taken. Nursing to do daily dressing changes right foot as ordered. She is non-weight bearing right foot. Will continue IV antibiotics as per medicine. I will follow as needed. Call Dr. Vanesa Carcamo with any [phone]

## 2018-12-06 NOTE — PROGRESS NOTES
0000 Pt resting in bed, no complaint of pain, dressing to R foot C/D/I, SCD's on. Using IS independently. Noted scattered expiratory wheezing, no cough nor SOB noted 
 
0303 Assisted to bedpan twice by CNA, had 2 loose BM. Flu vaccine given at this time, education provided. 0630 Pt did not need any pain med except for once during shift. Instructed on NWB to R foot. Bed alarm on. BS ACHS, on IV antibiotics. Shift uneventful

## 2018-12-06 NOTE — PROGRESS NOTES
Pharmacy Dosing Services: Warfarin Consult for Warfarin Dosing by Pharmacy by TORRIE Wolf Consult provided for this 80 y.o.  female , for indication of Lupus anticoagulant syndrome. Day of Therapy (continue home regimen) Pt reports: Warfarin 7 mg PO every other day, alternating with warfarin 5 mg po every other day. Dose to achieve an INR goal of 2-3 Order entered for Warfarin 7 mg to be given today at 18:00. LABS   
PT/INR Lab Results Component Value Date/Time INR 1.2 12/06/2018 04:30 AM  
  
Platelets Lab Results Component Value Date/Time PLATELET 832 60/87/4110 04:30 AM  
  
H/H Lab Results Component Value Date/Time HGB 9.7 (L) 12/06/2018 04:30 AM  
  
 
Warfarin Administrations (last 168 hours) None Pharmacy to follow daily and will provide subsequent Warfarin dosing based on clinical status. ITZ Blankenship  Contact information [phone]

## 2018-12-06 NOTE — BRIEF OP NOTE
BRIEF OPERATIVE NOTE Date of Procedure: 12/5/2018 Preoperative Diagnosis: RIGHT FOOT ABSCESS Postoperative Diagnosis: * No post-op diagnosis entered * Procedure(s): CUTTING OF SKIN, SOFT TISSUE, AND BONE RIGHT FOOT TO FLESH OUT INFECTION- RIGHT FOOT Surgeon(s) and Role: * Rea Gambino DPM - Primary Surgical Assistant: Yesenia Samaniego Surgical Staff: 
Circ-1: Lewis Poon Scrub Tech-1: Nirali Sampson Surg Asst-1: Zi Pap Event Time In Time Out Incision Start 12/05/2018 1841 Incision Close Anesthesia: MAC Estimated Blood Loss: min<5ml Specimens:  
ID Type Source Tests Collected by Time Destination 1 : Right Foot Culture Deep Wound Foot, Right CULTURE, ANAEROBIC, CULTURE, WOUND W Pernijess Clifton [address] Rea Gambino [place] 12/5/2018 1841 Microbiology 2 : Right Foot Culture Superficial Wound Foot, Right CULTURE, ANAEROBIC, CULTURE, WOUND W Perjess Christianson 115 Anay Salguero 12/5/2018 1844 Microbiology Findings: deep abscess right foot Complications: none Implants: * No implants in log *

## 2018-12-06 NOTE — PROGRESS NOTES
Hospitalist Progress Note Patient: Cassie Saini MRN: [id number]  CSN: [id number] YOB: 1950  Age: 80 y.o. Sex: female DOA: 12/4/2018 LOS:  LOS: 2 days Chief Complaint: 
 
Foot Pain Assessment/Plan 1. DM Foot Ulcer and Cellulitis 2. Uncontrolled Type 2 DM 3. CAD w hx of CABG 4. Lupus Anticoagulant Syndrome 5. Anemia 1. Patient is s/p I&D with podiatry. Surgical samples sent to lab for culture. Will continue to monitor culture results and streamline abx once able. In the interim, continue Azactam and Flagyl. Patient has Roxicodone and Dilaudid ordered for pain control. Repeat labs ordered this morning are pending. 2. Patient on oral medications at home, elevated A1c. Will start her oral regimen and monitor response. Continue current sliding scale coverage, ADA diet, hypoglycemia protocol. BGL relatively well controlled while hospitalized. 3. CABG in April. Patient reports taking Plavix and Coumadin. Will resume Plavix today, pharmacy consultation requested for dosing of coumadin. 4. As above, pharmacy consultation requested for dosing of coumadin. 5. Mild blood lost anemia from procedure yesterday, asymptomatic. Will continue to monitor closely. Patient requesting Nystatin cream, order placed in chart. DVT Prophylaxis - SCDs, Pharmacy to dose Coumadin Disposition - 2-3 days, pending culture data Patient Active Problem List  
Diagnosis Code  Pancreatic cyst K86.2  
 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 Z90.3, Z98.0  Atherosclerotic PVD with ulceration ([facility]) I70.209, L98.499  Anemia D64.9  Lupus anticoagulant syndrome ([facility]) D68.62  
 PAD (peripheral artery disease) ([facility]) I73.9  Sleepwalking disorder F51.3  Anxiety F41.9  Type II diabetes mellitus with complication, uncontrolled ([facility]) E11.8, E11.65  
 CAD (coronary artery disease) I25.10  Diabetic foot ([facility] [address] E11.8  Cellulitis L03.90  Diabetic foot ulcer ([facility] E11.621, L97.509  Diabetic foot ulcer with osteomyelitis ([facility] E11.621, E11.69, L97.509, M86.9  Cellulitis of right foot L03.115  Right foot ulcer, with fat layer exposed ([facility] L97.512 Subjective: 
 
Some foot pain, otherwise doing well. Review of systems: 
 
Constitutional: denies fevers, chills, myalgias. +foot pain Respiratory: denies SOB, cough Cardiovascular: denies chest pain, palpitations Gastrointestinal: denies nausea, vomiting, diarrhea Vital signs/Intake and Output: 
Visit Vitals /85 (BP 1 Location: Right arm, BP Patient Position: At rest) Pulse 82 Temp 98.8 °F (37.1 °C) Resp 18 Ht 5' 4.5\" (1.638 m) Wt 67.8 kg (149 lb 7.6 oz) SpO2 99% Breastfeeding? No  
BMI 25.26 kg/m² Current Shift:  No intake/output data recorded. Last three shifts:  12/04 1901 - 12/06 0700 In: 200 [P.O.:440; I.V.:330] Out: 850 [Urine:850] Exam: 
 
General: Elderly appearing white female, alert, NAD, OX3 Head/Neck: NCAT, supple, No masses, No lymphadenopathy CVS:Regular rate and rhythm, no M/R/G, S1/S2 heard, no thrill Lungs:Clear to auscultation bilaterally, no wheezes, rhonchi, or rales Abdomen: Soft, Nontender, No distention, Normal Bowel sounds, No hepatomegaly Extremities: No C/C/E, pulses palpable 2+. Right foot dressed without drainage. Skin:normal texture and turgor. Right foot dressed. Neuro:grossly normal , follows commands Psych:appropriate Labs: Results:  
   
Chemistry Recent Labs 12/05/18 
0259 12/04/18 
1925 GLU 98 94 * 139  
K 4.6 4.7  105 CO2 26 26 BUN 19* 23* CREA 0.52* 0.62  
CA 7.9* 8.4* AGAP 5 8 BUCR 37 37 * 195* TP 5.9* 6.9 ALB 2.5* 3.0*  
GLOB 3.4 3.9 AGRAT 0.7* 0.8 CBC w/Diff Recent Labs 12/05/18 
0259 12/04/18 
1925 WBC 9.1 11.5  
RBC 3.76* 4.21  
HGB 9.5* 10.5* HCT 31.2* 34.2*  
 465* GRANS 71 80* LYMPH 17* 14* EOS 2 1 Cardiac Enzymes No results for input(s): CPK, CKND1, RAYNA in the last 72 hours. No lab exists for component: Arnaldo Schaeffer Coagulation Recent Labs 12/06/18 
0430 12/05/18 
0259 PTP 14.9 16.4* INR 1.2 1.3* Lipid Panel No results found for: CHOL, CHOLPOCT, CHOLX, CHLST, CHOLV, 994018, HDL, LDL, LDLC, DLDLP, 136558, VLDLC, VLDL, TGLX, TRIGL, TRIGP, TGLPOCT, CHHD, CHHDX  
BNP No results for input(s): BNPP in the last 72 hours. Liver Enzymes Recent Labs 12/05/18 
0259 TP 5.9* ALB 2.5* * SGOT 17 Thyroid Studies Lab Results Component Value Date/Time TSH 0.74 04/11/2018 12:30 AM  
    
Procedures/imaging: see electronic medical records for all procedures/Xrays and details which were not copied into this note but were reviewed prior to creation of Plan Cara Curry PA-C

## 2018-12-06 NOTE — ANESTHESIA POSTPROCEDURE EVALUATION
Procedure(s): CUTTING OF SKIN, SOFT TISSUE, AND BONE RIGHT FOOT TO FLESH OUT INFECTION- RIGHT FOOT. Anesthesia Post Evaluation Multimodal analgesia: multimodal analgesia used between 6 hours prior to anesthesia start to PACU discharge Patient location during evaluation: PACU Patient participation: complete - patient participated Level of consciousness: awake Pain management: adequate Anesthetic complications: no 
Cardiovascular status: acceptable Respiratory status: acceptable Hydration status: acceptable Post anesthesia nausea and vomiting:  none Visit Vitals /70 Pulse 76 Temp 36.4 °C (97.5 °F) Resp 17 Ht 5' 4.5\" (1.638 m) Wt 59 kg (130 lb) SpO2 100% Breastfeeding? No  
BMI 21.97 kg/m²

## 2018-12-06 NOTE — PROGRESS NOTES
Problem: Falls - Risk of 
Goal: *Absence of Falls Document Aleena Gonzalez Fall Risk and appropriate interventions in the flowsheet. Outcome: Progressing Towards Goal 
Fall Risk Interventions: 
Mobility Interventions: Assess mobility with egress test, Communicate number of staff needed for ambulation/transfer, Patient to call before getting OOB Medication Interventions: Evaluate medications/consider consulting pharmacy, Teach patient to arise slowly, Patient to call before getting OOB, Bed/chair exit alarm History of Falls Interventions: Bed/chair exit alarm, Consult care management for discharge planning, Door open when patient unattended, Evaluate medications/consider consulting pharmacy, Investigate reason for fall(describes self as \"clumsy\", frequent falls)

## 2018-12-06 NOTE — ROUTINE PROCESS
Bedside and Verbal shift change report given to Estevan Saunders RN (oncoming nurse) by Nixon Wills RN 
 (offgoing nurse). Report included the following information SBAR, Kardex, Procedure Summary, Intake/Output, MAR and Recent Results.

## 2018-12-06 NOTE — ROUTINE PROCESS
TRANSFER - IN REPORT: 
 
Verbal report received from Colby Bailey RN (name) on Joie Worthington  being received from PACU (unit) for routine post - op Report consisted of patients Situation, Background, Assessment and  
Recommendations(SBAR). Information from the following report(s) SBAR, Kardex and Procedure Summary was reviewed with the receiving nurse. Opportunity for questions and clarification was provided. Assessment completed upon patients arrival to unit and care assumed.

## 2018-12-06 NOTE — ROUTINE PROCESS
Bedside and Verbal shift change report given to [address] (oncoming nurse) by Fabby Obregon RN (offgoing nurse). Report included the following information SBAR.

## 2018-12-06 NOTE — PERIOP NOTES
TRANSFER - OUT REPORT: 
 
Verbal report given to Laura RN (name) on Silvana Pickard  being transferred to [facility](unit) for routine post - op Report consisted of patients Situation, Background, Assessment and  
Recommendations(SBAR). Information from the following report(s) SBAR, Kardex, Procedure Summary, Intake/Output, MAR, Recent Results and Cardiac Rhythm NSR was reviewed with the receiving nurse. Lines:  
Peripheral IV 12/05/18 Left Wrist (Active) Site Assessment Clean, dry, & intact 12/5/2018  7:50 PM  
Phlebitis Assessment 0 12/5/2018  7:50 PM  
Infiltration Assessment 0 12/5/2018  7:50 PM  
Dressing Status Clean, dry, & intact 12/5/2018  7:50 PM  
Dressing Type Tape;Transparent 12/5/2018  7:50 PM  
Hub Color/Line Status Blue; Infusing;Patent 12/5/2018  7:50 PM  
Action Taken Open ports on tubing capped 12/5/2018 11:00 AM  
Alcohol Cap Used Yes 12/5/2018  3:33 PM  
  
 
Opportunity for questions and clarification was provided. Patient transported with: 
 O2 @ 2 liters

## 2018-12-06 NOTE — PROGRESS NOTES
Raysa Dolan assumed pt care. Dual skin assessment and progress of care completed. Patient stable; dressing CDI. Opportunity for questions and clarification was provided. Intake/Output Summary (Last 24 hours) at 12/5/2018 2110 Last data filed at 12/5/2018 2019 Gross per 24 hour Intake 660 ml Output 250 ml Net 410 ml

## 2018-12-07 ENCOUNTER — APPOINTMENT (OUTPATIENT)
Dept: INTERVENTIONAL RADIOLOGY/VASCULAR | Age: 68
DRG: 982 | End: 2018-12-07
Attending: HOSPITALIST
Payer: MEDICARE

## 2018-12-07 VITALS
HEIGHT: 65 IN | RESPIRATION RATE: 18 BRPM | DIASTOLIC BLOOD PRESSURE: 66 MMHG | HEART RATE: 66 BPM | SYSTOLIC BLOOD PRESSURE: 136 MMHG | BODY MASS INDEX: 24.9 KG/M2 | OXYGEN SATURATION: 99 % | TEMPERATURE: 98.3 F | WEIGHT: 149.47 LBS

## 2018-12-07 PROBLEM — L97.509 DIABETIC FOOT ULCER WITH OSTEOMYELITIS (HCC): Status: RESOLVED | Noted: 2018-12-05 | Resolved: 2018-12-07

## 2018-12-07 PROBLEM — E11.621 DIABETIC FOOT ULCER WITH OSTEOMYELITIS (HCC): Status: RESOLVED | Noted: 2018-12-05 | Resolved: 2018-12-07

## 2018-12-07 PROBLEM — L03.90 CELLULITIS: Status: RESOLVED | Noted: 2018-12-04 | Resolved: 2018-12-07

## 2018-12-07 PROBLEM — E11.69 DIABETIC FOOT ULCER WITH OSTEOMYELITIS (HCC): Status: RESOLVED | Noted: 2018-12-05 | Resolved: 2018-12-07

## 2018-12-07 PROBLEM — M86.9 DIABETIC FOOT ULCER WITH OSTEOMYELITIS (HCC): Status: RESOLVED | Noted: 2018-12-05 | Resolved: 2018-12-07

## 2018-12-07 LAB
ANION GAP SERPL CALC-SCNC: 6 MMOL/L (ref 3–18)
BASOPHILS # BLD: 0 K/UL (ref 0–0.1)
BASOPHILS NFR BLD: 0 % (ref 0–2)
BUN SERPL-MCNC: 17 MG/DL (ref 7–18)
BUN/CREAT SERPL: 38
CALCIUM SERPL-MCNC: 8.2 MG/DL (ref 8.5–10.1)
CHLORIDE SERPL-SCNC: 103 MMOL/L (ref 100–108)
CO2 SERPL-SCNC: 28 MMOL/L (ref 21–32)
CREAT SERPL-MCNC: 0.45 MG/DL (ref 0.6–1.3)
DIFFERENTIAL METHOD BLD: ABNORMAL
EOSINOPHIL # BLD: 0.1 K/UL (ref 0–0.4)
EOSINOPHIL NFR BLD: 2 % (ref 0–5)
ERYTHROCYTE [DISTWIDTH] IN BLOOD BY AUTOMATED COUNT: 16.6 % (ref 11.6–14.5)
GLUCOSE BLD STRIP.AUTO-MCNC: 128 MG/DL (ref 70–110)
GLUCOSE BLD STRIP.AUTO-MCNC: 217 MG/DL (ref 70–110)
GLUCOSE SERPL-MCNC: 93 MG/DL (ref 74–99)
HCT VFR BLD AUTO: 32.9 % (ref 35–45)
HGB BLD-MCNC: 9.9 G/DL (ref 12–16)
INR PPP: 1.1 (ref 0.8–1.2)
LYMPHOCYTES # BLD: 1.1 K/UL (ref 0.9–3.6)
LYMPHOCYTES NFR BLD: 14 % (ref 21–52)
MCH RBC QN AUTO: 24.4 PG (ref 24–34)
MCHC RBC AUTO-ENTMCNC: 30.1 G/DL (ref 31–37)
MCV RBC AUTO: 81.2 FL (ref 74–97)
MONOCYTES # BLD: 0.7 K/UL (ref 0.05–1.2)
MONOCYTES NFR BLD: 9 % (ref 3–10)
NEUTS SEG # BLD: 5.7 K/UL (ref 1.8–8)
NEUTS SEG NFR BLD: 75 % (ref 40–73)
PLATELET # BLD AUTO: 422 K/UL (ref 135–420)
PMV BLD AUTO: 8.5 FL (ref 9.2–11.8)
POTASSIUM SERPL-SCNC: 4.9 MMOL/L (ref 3.5–5.5)
PROTHROMBIN TIME: 13.7 SEC (ref 11.5–15.2)
RBC # BLD AUTO: 4.05 M/UL (ref 4.2–5.3)
SODIUM SERPL-SCNC: 137 MMOL/L (ref 136–145)
WBC # BLD AUTO: 7.6 K/UL (ref 4.6–13.2)

## 2018-12-07 PROCEDURE — 74011250636 HC RX REV CODE- 250/636: Performed by: PHYSICIAN ASSISTANT

## 2018-12-07 PROCEDURE — 74011000258 HC RX REV CODE- 258: Performed by: HOSPITALIST

## 2018-12-07 PROCEDURE — 74011250637 HC RX REV CODE- 250/637: Performed by: HOSPITALIST

## 2018-12-07 PROCEDURE — 74011250637 HC RX REV CODE- 250/637: Performed by: PHYSICIAN ASSISTANT

## 2018-12-07 PROCEDURE — 76937 US GUIDE VASCULAR ACCESS: CPT

## 2018-12-07 PROCEDURE — 85610 PROTHROMBIN TIME: CPT

## 2018-12-07 PROCEDURE — 74011636637 HC RX REV CODE- 636/637: Performed by: PHYSICIAN ASSISTANT

## 2018-12-07 PROCEDURE — 36415 COLL VENOUS BLD VENIPUNCTURE: CPT

## 2018-12-07 PROCEDURE — 74011000250 HC RX REV CODE- 250: Performed by: PHYSICIAN ASSISTANT

## 2018-12-07 PROCEDURE — 74011250637 HC RX REV CODE- 250/637: Performed by: INTERNAL MEDICINE

## 2018-12-07 PROCEDURE — 80048 BASIC METABOLIC PNL TOTAL CA: CPT

## 2018-12-07 PROCEDURE — 74011250636 HC RX REV CODE- 250/636: Performed by: INTERNAL MEDICINE

## 2018-12-07 PROCEDURE — 82962 GLUCOSE BLOOD TEST: CPT

## 2018-12-07 PROCEDURE — 85025 COMPLETE CBC W/AUTO DIFF WBC: CPT

## 2018-12-07 PROCEDURE — 02HV33Z INSERTION OF INFUSION DEVICE INTO SUPERIOR VENA CAVA, PERCUTANEOUS APPROACH: ICD-10-PCS | Performed by: RADIOLOGY

## 2018-12-07 PROCEDURE — 74011250636 HC RX REV CODE- 250/636: Performed by: HOSPITALIST

## 2018-12-07 PROCEDURE — 74011636320 HC RX REV CODE- 636/320: Performed by: RADIOLOGY

## 2018-12-07 RX ORDER — NYSTATIN 100000 U/G
CREAM TOPICAL 2 TIMES DAILY
Qty: 60 G | Refills: 0 | Status: SHIPPED | OUTPATIENT
Start: 2018-12-07 | End: 2021-08-05

## 2018-12-07 RX ORDER — HEPARIN SODIUM 200 [USP'U]/100ML
INJECTION, SOLUTION INTRAVENOUS
Status: DISCONTINUED
Start: 2018-12-07 | End: 2018-12-07 | Stop reason: HOSPADM

## 2018-12-07 RX ORDER — DIPHENHYDRAMINE HYDROCHLORIDE 50 MG/ML
12.5 INJECTION, SOLUTION INTRAMUSCULAR; INTRAVENOUS
Status: DISCONTINUED | OUTPATIENT
Start: 2018-12-07 | End: 2018-12-07 | Stop reason: HOSPADM

## 2018-12-07 RX ORDER — SAME BUTANEDISULFONATE/BETAINE 400-600 MG
250 POWDER IN PACKET (EA) ORAL 2 TIMES DAILY
Qty: 14 CAP | Refills: 0 | Status: SHIPPED | OUTPATIENT
Start: 2018-12-07 | End: 2018-12-14

## 2018-12-07 RX ORDER — HEPARIN SODIUM (PORCINE) LOCK FLUSH IV SOLN 100 UNIT/ML 100 UNIT/ML
500 SOLUTION INTRAVENOUS
Status: COMPLETED | OUTPATIENT
Start: 2018-12-07 | End: 2018-12-07

## 2018-12-07 RX ORDER — WARFARIN SODIUM 5 MG/1
5 TABLET ORAL ONCE
Status: DISCONTINUED | OUTPATIENT
Start: 2018-12-07 | End: 2018-12-07 | Stop reason: HOSPADM

## 2018-12-07 RX ORDER — HEPARIN SODIUM 200 [USP'U]/100ML
500 INJECTION, SOLUTION INTRAVENOUS
Status: COMPLETED | OUTPATIENT
Start: 2018-12-07 | End: 2018-12-07

## 2018-12-07 RX ORDER — HEPARIN SODIUM (PORCINE) LOCK FLUSH IV SOLN 100 UNIT/ML 100 UNIT/ML
SOLUTION INTRAVENOUS
Status: DISCONTINUED
Start: 2018-12-07 | End: 2018-12-07 | Stop reason: HOSPADM

## 2018-12-07 RX ORDER — LIDOCAINE HYDROCHLORIDE 10 MG/ML
1-20 INJECTION INFILTRATION; PERINEURAL
Status: COMPLETED | OUTPATIENT
Start: 2018-12-07 | End: 2018-12-07

## 2018-12-07 RX ADMIN — INSULIN LISPRO 2 UNITS: 100 INJECTION, SOLUTION INTRAVENOUS; SUBCUTANEOUS at 12:57

## 2018-12-07 RX ADMIN — METOPROLOL TARTRATE 25 MG: 25 TABLET ORAL at 08:02

## 2018-12-07 RX ADMIN — OXYCODONE HYDROCHLORIDE 5 MG: 5 TABLET ORAL at 15:03

## 2018-12-07 RX ADMIN — LIDOCAINE HYDROCHLORIDE 2 ML: 10 INJECTION, SOLUTION INFILTRATION; PERINEURAL at 13:44

## 2018-12-07 RX ADMIN — PANTOPRAZOLE SODIUM 40 MG: 40 TABLET, DELAYED RELEASE ORAL at 08:02

## 2018-12-07 RX ADMIN — AZTREONAM 2 G: 2 INJECTION, POWDER, LYOPHILIZED, FOR SOLUTION INTRAMUSCULAR; INTRAVENOUS at 03:18

## 2018-12-07 RX ADMIN — METFORMIN HYDROCHLORIDE 1000 MG: 500 TABLET ORAL at 07:49

## 2018-12-07 RX ADMIN — ERTAPENEM SODIUM 1 G: 1 INJECTION, POWDER, LYOPHILIZED, FOR SOLUTION INTRAMUSCULAR; INTRAVENOUS at 12:59

## 2018-12-07 RX ADMIN — CLOPIDOGREL BISULFATE 75 MG: 75 TABLET ORAL at 08:02

## 2018-12-07 RX ADMIN — CYANOCOBALAMIN TAB 500 MCG 1000 MCG: 500 TAB at 08:02

## 2018-12-07 RX ADMIN — NYSTATIN: 100000 CREAM TOPICAL at 08:05

## 2018-12-07 RX ADMIN — ATORVASTATIN CALCIUM 20 MG: 20 TABLET, FILM COATED ORAL at 08:02

## 2018-12-07 RX ADMIN — HEPARIN SODIUM IN SODIUM CHLORIDE 1000 UNITS: 200 INJECTION INTRAVENOUS at 13:44

## 2018-12-07 RX ADMIN — DIPHENHYDRAMINE HYDROCHLORIDE 50 MG: 25 CAPSULE ORAL at 11:16

## 2018-12-07 RX ADMIN — GLIMEPIRIDE 4 MG: 4 TABLET ORAL at 08:02

## 2018-12-07 RX ADMIN — IOPAMIDOL 5 ML: 612 INJECTION, SOLUTION INTRAVENOUS at 14:23

## 2018-12-07 RX ADMIN — Medication 10 ML: at 08:08

## 2018-12-07 RX ADMIN — HEPARIN SODIUM (PORCINE) LOCK FLUSH IV SOLN 100 UNIT/ML 500 UNITS: 100 SOLUTION at 14:29

## 2018-12-07 RX ADMIN — CLONIDINE HYDROCHLORIDE 0.1 MG: 0.1 TABLET ORAL at 08:02

## 2018-12-07 RX ADMIN — Medication 250 MG: at 08:02

## 2018-12-07 RX ADMIN — OXYCODONE HYDROCHLORIDE 5 MG: 5 TABLET ORAL at 08:02

## 2018-12-07 RX ADMIN — ONDANSETRON 4 MG: 2 INJECTION INTRAMUSCULAR; INTRAVENOUS at 08:08

## 2018-12-07 NOTE — PROGRESS NOTES
Shift Summary:  Patient slept off and on through the night. Denied pain and discomfort overnight. Dressing to right foot CDI. Vital signs stable.

## 2018-12-07 NOTE — PROGRESS NOTES
Shift Progress Note: Assumed care of patient in bed awake and quiet. Medicated x1 for pain Foot dressing dry and intact. Uneventful nght, call bell within reach. Patient Vitals for the past 8 hrs: 
 Temp Pulse Resp BP SpO2  
12/06/18 2311   18 145/75 96 % 12/06/18 1945 98.8 °F (37.1 °C) 89 18 (!) 175/92 96 %

## 2018-12-07 NOTE — ROUTINE PROCESS
Attempted to hang Invanz dose that arrived from pharmacy, however pt transport here to bring her down to IR for PICC insertion. Will change tubing setup and infuse dose when pt back upstairs with PICC line. Informed Dr. Moy Myrick of this. Per MD, will infuse dose and monitor to ensure no reaction and then pt can be discharged later today if tolerates. 1413 - Received call from IR regarding PICC line insertion. Fawn Dumont states that they are having difficulty with PICC insertion. They have attempted on the left arm and were about to try on the right (which has proven difficult in the past). Stated that they would be able to insert a midline. However, RN informed them that pt needs it for at home antibiotics (per radiology midline unable to be send home). Instructed to call MD regarding this situation. Ruthie 24 - Spoke to Dr Amanda Peters her of current situation in radiology. MD expresses understanding and states to have radiology to directly contact her [address] [place] [address] radiology back with hospitalist number. Instructed them to call Dr Moy Myrick and speak to her regarding PICC line situation. 1632 - Observed pt post transfusion for 1 hour, pt denies any itching, swelling, chest pain, trouble breathing, numbness, tingling, or any other signs or symptoms of an infusion reaction to IV medication. Pt states \"I feel great! And I would know what a reaction would be like\". Per order, will discharge patient. Reviewed discharge instructions with pt. No questions or concerns at this time. Pt had her dressing change before leaving. Pt has prescriptions and knows to resume home meds, including Coumadin. Pt knows when all of her appts are. Pt will be sent home with PICC line, and expresses no needs or concerns with that at this time.

## 2018-12-07 NOTE — PROGRESS NOTES
Pharmacy Dosing Services: Warfarin Consult for Warfarin Dosing by Pharmacy by Glo Juarez Consult provided for this 80 y.o.  female , for indication of Lupus anticoagulant syndrome. Day of Therapy (continue home regimen) Pt reports: Warfarin 7 mg PO every other day, alternating with warfarin 5 mg po every other day. Dose to achieve an INR goal of 2-3 Order entered for Warfarin 5 mg be given today at 18:00. LABS   
PT/INR Lab Results Component Value Date/Time INR 1.1 12/07/2018 03:13 AM  
  
Platelets Lab Results Component Value Date/Time PLATELET 858 (H) 93/74/6709 03:13 AM  
  
H/H Lab Results Component Value Date/Time HGB 9.9 (L) 12/07/2018 03:13 AM  
  
 
Warfarin Administrations (last 168 hours) Date/Time Action Medication Dose  
 12/06/18 1808 Given  
 warfarin (COUMADIN) tablet 7 mg 7 mg Pharmacy to follow daily and will provide subsequent Warfarin dosing based on clinical status. ITZ Gomes  Contact information [phone]

## 2018-12-07 NOTE — CONSULTS
Infectious Disease Consult    Impression:   · R DFU infection - polymicrobial    Plan:   Aztreonam #3    Definitive wound cultures still pending, but appear to be growing a Proteus along with another (yet to be named) GNR and some alpha-hemolytic streptococci. With her impressive list of drug allergies, we have 3 options here:  (1) trial of once-daily ertapenem (as long as P aeruginosa doesn't eventually grow); failing that (2) ICU transfer to slowly desensitize her to PCN, and then begin either ertapenem (if she reacts to #1) or pip/tzb or amp/sulb based off final culture results. See page 80 of the current/48th edition of [facility] Guide for the particulars; or failing that (3) BKA. I'd favor option (1) to start and go down the list from there. IF she is still admitted on Monday, I'll pick her up again. Don Pena MD FACP  Infectious Diseases  Cell [phone]    Subjective:   Date of Consultation:  December 7, 2018  Referring Physician: Dr Mya Jolly is a 80 y.o. diabetic WF who is being seen for chronic/progressive R DFU mid-foot at [facility] amputation site. Admitted this week from [facility] office for failed medical therapy (PO clindamycin) for last few weeks. Has multiple drug allergies - PCN (hives/anaphylaxis), Cephalosporins (hives), FQ (anaphylaxis), and Vanco (hives/anaphylaxis).      Patient Active Problem List   Diagnosis Code    Pancreatic cyst K86.2    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 Z90.3, Z98.0    Atherosclerotic PVD with ulceration ([facility]) I70.209, L98.499    Anemia D64.9    Lupus anticoagulant syndrome ([facility]) D68.62    PAD (peripheral artery disease) ([facility]) I73.9    Sleepwalking disorder F51.3    Anxiety F41.9    Type II diabetes mellitus with complication, uncontrolled ([facility]) E11.8, E11.65    CAD (coronary artery disease) I25.10    Diabetic foot ([facility]) E11.8    Cellulitis L03.90    Diabetic foot ulcer ([facility]) E11.621, L97.509    Diabetic foot ulcer with osteomyelitis ([facility] E11.621, E11.69, L97.509, M86.9    Cellulitis of right foot L03.115    Right foot ulcer, with fat layer exposed [facility] L97.512     Past Medical History:   Diagnosis Date    Arthritis     hands and feet    Asthma     Autoimmune disease ([facility]     lupus anticoagulant-causes clotting    Cancer ([facility]     carcinoma in situ of cervix and uterus; inside bottom lip    Chronic pain     Left foot    Diabetes ([facility] 2013    Dizzy spells     h/o    GERD (gastroesophageal reflux disease)     Liver disease     1/2 of my liver was removed for ulcers.     Lung collapse         Nausea & vomiting 2012    Neuropathy     right foot    Other ill-defined conditions(799.89)     arterial clot in rt leg    Other ill-defined conditions(799.89)     restless leg syndrome    Other ill-defined conditions(799.89)     raynaud's syndrome    Other ill-defined conditions(799.89)     chronic N/V    Other ill-defined conditions(799.89)     (2 mutated genes) hyper coag. state     Pneumonia 2017    Positive cardiolipin antibodies     Psychiatric disorder     depression    PUD (peptic ulcer disease)     H/O requiring partial gastrectomy and small intestine    Thromboembolus (HCC)     artery right leg      Family History   Problem Relation Age of Onset    Malignant Hyperthermia Neg Hx     Pseudocholinesterase Deficiency Neg Hx     Delayed Awakening Neg Hx     Post-op Nausea/Vomiting Neg Hx     Emergence Delirium Neg Hx     Post-op Cognitive Dysfunction Neg Hx     Other Neg Hx       Social History     Tobacco Use    Smoking status: Former Smoker     Years: 0.50     Last attempt to quit: 1996     Years since quittin.9    Smokeless tobacco: Never Used   Substance Use Topics    Alcohol use: Yes     Comment: 1 or 2 glasses wine or mixed drinks monthly      Past Surgical History:   Procedure Laterality Date    ABDOMEN SURGERY PROC UNLISTED      duodenal ulcer rupture  BREAST SURGERY PROCEDURE UNLISTED Bilateral 02/2016    hemorrhaged after breast surgery    HX APPENDECTOMY      HX BREAST AUGMENTATION      developed infection and implant removed    HX CHOLECYSTECTOMY      HX GASTRECTOMY      due to ulcers    HX GASTRIC BYPASS  1996    gastric by-pass    HX GI      total gastrectomy, partial colectomy    HX GI      colostomy and reversal    HX HEENT      cancer of lip removed    HX HYSTERECTOMY      for cervical cancer    HX ORTHOPAEDIC      rt foot surgery x10    HX ORTHOPAEDIC Left     great toe gangrenous and amputated    HX OTHER SURGICAL      part of liver removal    HX OTHER SURGICAL      ventral hernia repair x10    HX OTHER SURGICAL      Exc pilonial cystectomy    VASCULAR SURGERY PROCEDURE UNLIST      iliac aorta stents x 6      Prior to Admission medications    Medication Sig Start Date End Date Taking? Authorizing Provider   atorvastatin (LIPITOR) 20 mg tablet Take 20 mg by mouth daily. Yes Provider, Historical   warfarin (COUMADIN) 2 mg tablet Take 2 mg by mouth daily. Yes Provider, Historical   dronabinol (MARINOL) 2.5 mg capsule Take 2.5 mg by mouth three (3) times daily. Yes Provider, Historical   metoprolol tartrate (LOPRESSOR) 25 mg tablet Take 25 mg by mouth two (2) times a day. Yes Provider, Historical   nitroglycerin (NITROSTAT) 0.3 mg SL tablet by SubLINGual route every five (5) minutes as needed for Chest Pain (x3). Yes Provider, Historical   pantoprazole (PROTONIX) 40 mg tablet Take 40 mg by mouth daily. Yes Provider, Historical   clopidogrel (PLAVIX) 75 mg tab Take 75 mg by mouth daily. Yes Provider, Historical   lactobacillus combination no.4 (PROBIOTIC) 3 billion cell cap Take 1 Cap by mouth daily. Yes Provider, Historical   CLINDAMYCIN HCL, BULK, by Does Not Apply route. Yes Other, MD Jay   warfarin (COUMADIN) 5 mg tablet Take 5 mg by mouth daily.     Provider, Historical   Cetirizine (ZYRTEC) 10 mg cap Take  by mouth.    Provider, Historical   cyanocobalamin 1,000 mcg tablet Take 1,000 mcg by mouth daily. Provider, Historical   glimepiride (AMARYL) 2 mg tablet Take 2 mg by mouth every morning. Provider, Historical   melatonin 3 mg tablet Take 6 mg by mouth nightly. Provider, Historical   ondansetron hcl (ZOFRAN, AS HYDROCHLORIDE,) 4 mg tablet Take 4 mg by mouth every eight (8) hours as needed for Nausea. Provider, Historical   cyclobenzaprine (FLEXERIL) 10 mg tablet Take  by mouth three (3) times daily as needed for Muscle Spasm(s). Provider, Historical   promethazine (PHENERGAN) 25 mg tablet Take 25 mg by mouth every six (6) hours as needed for Nausea. Provider, Historical   LORazepam (ATIVAN) 2 mg tablet Take 1 mg by mouth every six (6) hours as needed. Provider, Historical   BIOTIN PO Take 5,000 mcg by mouth daily. Provider, Historical   diphenhydrAMINE (BENADRYL) 25 mg capsule Take 50 mg by mouth every six (6) hours as needed. Indications: ALLERGIC RHINITIS    Other, MD Jay   albuterol (PROVENTIL HFA, VENTOLIN HFA) 90 mcg/actuation inhaler Take 2 Puffs by inhalation every four (4) hours as needed. Indications: ACUTE ASTHMA ATTACK    Provider, Historical   cloNIDine (CATAPRES) 0.1 mg tablet Take 0.1 mg by mouth two (2) times a day. Indications: hot flashes;    Provider, Historical   metFORMIN (GLUCOPHAGE) 500 mg tablet Take 500 mg by mouth two (2) times daily (with meals). Indications: TYPE 2 DIABETES MELLITUS    Provider, Historical   ropinirole (REQUIP) 4 mg Tab TAB Take 4 mg by mouth nightly. Indications: RESTLESS LEGS SYNDROME    Provider, Historical   traMADol (ULTRAM) 50 mg tablet Take 50 mg by mouth every eight (8) hours as needed.  Indications: PAIN    Provider, Historical     Allergies   Allergen Reactions    Rifampin Hives and Swelling     Swelling of mouth and tounge    Levaquin [Levofloxacin] Itching    Amoxicillin Hives    Aspirin Nausea and Vomiting    Azithromycin Angioedema    Bactrim [Sulfamethoprim Ds] Hives    Cefatrizine Hives    Erythromycin Hives    Rocephin [Ceftriaxone] Hives    Tylenol [Acetaminophen] Hives    Vancomycin Hives        Review of Systems:  Constitutional: negative for fevers, chills, sweats and anorexia    Objective:   Blood pressure 148/69, pulse 70, temperature 98.6 °F (37 °C), resp. rate 20, height 5' 4.5\" (1.638 m), weight 67.8 kg (149 lb 7.6 oz), SpO2 98 %, not currently breastfeeding.   Temp (24hrs), Av.7 °F (37.1 °C), Min:98.6 °F (37 °C), Max:98.8 °F (37.1 °C)    Current Facility-Administered Medications   Medication Dose Route Frequency    ertapenem (INVANZ) 1 g in 0.9% sodium chloride (MBP/ADV) 50 mL MBP  1 g IntraVENous Q24H    diphenhydrAMINE (BENADRYL) injection 12.5 mg  12.5 mg IntraVENous Q6H PRN    nystatin (MYCOSTATIN) 100,000 unit/gram cream   Topical BID    atorvastatin (LIPITOR) tablet 20 mg  20 mg Oral DAILY    clopidogrel (PLAVIX) tablet 75 mg  75 mg Oral DAILY    metoprolol tartrate (LOPRESSOR) tablet 25 mg  25 mg Oral BID    pantoprazole (PROTONIX) tablet 40 mg  40 mg Oral DAILY    metFORMIN (GLUCOPHAGE) tablet 1,000 mg  1,000 mg Oral BID WITH MEALS    glimepiride (AMARYL) tablet 4 mg  4 mg Oral ACB    Saccharomyces boulardii (FLORASTOR) capsule 250 mg  250 mg Oral BID    loperamide (IMODIUM) capsule 4 mg  4 mg Oral Q4H PRN    Warfarin - Pharmacy to Dose  1 Each Other Rx Dosing/Monitoring    HYDROmorphone (PF) (DILAUDID) injection 1 mg  1 mg IntraVENous Q4H PRN    oxyCODONE IR (ROXICODONE) tablet 5 mg  5 mg Oral Q4H PRN    ondansetron (ZOFRAN) injection 4 mg  4 mg IntraVENous Q6H PRN    insulin lispro (HUMALOG) injection   SubCUTAneous AC&HS    glucose chewable tablet 16 g  4 Tab Oral PRN    glucagon (GLUCAGEN) injection 1 mg  1 mg IntraMUSCular PRN    dextrose (D50W) injection syrg 12.5-25 g  25-50 mL IntraVENous PRN    traMADol (ULTRAM) tablet 50 mg  50 mg Oral Q6H PRN    rOPINIRole (REQUIP) tablet 4 mg  4 mg Oral QHS    LORazepam (ATIVAN) tablet 1 mg  1 mg Oral Q6H PRN    cloNIDine HCl (CATAPRES) tablet 0.1 mg  0.1 mg Oral BID    cyanocobalamin (VITAMIN B12) tablet 1,000 mcg  1,000 mcg Oral DAILY    diphenhydrAMINE (BENADRYL) capsule 50 mg  50 mg Oral Q6H PRN    sodium chloride (NS) flush 5-10 mL  5-10 mL IntraVENous Q8H    sodium chloride (NS) flush 5-10 mL  5-10 mL IntraVENous PRN    melatonin tablet 5 mg  5 mg Oral QHS PRN        Exam:  General:  alert, cooperative, no distress, appears stated age  Eye:  negative findings: anicteric sclera  Skin:  L foot unremarkable; R foot bandaged, not taken down    Data Review:     Mri Foot Rt Wo Cont    Result Date: 12/6/2018  Impression:  1. Noncontrast exam. Interval forefoot amputation at the tarsometatarsal joint level since prior MR . Mild cutaneous flap with scarring with minimal edema, susceptibility artifact likely postoperative micrometal rather than gas at the distal stump. More local cellular edema over the lateral plantar and more proximal dorsum foot 2. Small bandaged deep lateral plantar ulcer in proximity to the inferior surface of the cuboid with edema and/or granulation tissue. Small oval nonsimple fluid collections; findings suggest focal phlegmon or developing small immature soft tissue abscesses. Absence of contrast is somewhat limiting assessment. 3. Very minimal subcortical marrow edema only on STIR sequences, adjacent cuboid likely reflects low-grade osteitis. No definite evidence of osteomyelitis. Theadore Maine 4. 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          All Micro Results     Procedure Component Value Units Date/Time    CULTURE, Hiral Weaver STAIN [[id number]]  (Abnormal) Collected:  12/05/18 1841    Order Status:  Completed Specimen:  Wound from Foot Updated:  12/07/18 1106     Special Requests: NO SPECIAL REQUESTS        GRAM STAIN MODERATE WBC'S               FEW GRAM POSITIVE COCCI IN PAIRS           Culture result: FEW PROTEUS SPECIES               FEW STREPTOCOCCI,NON HEMOLYTIC                  MANY STREPTOCOCCI, ALPHA HEMOLYTIC                  MODERATE POSSIBLE 2ND STREPTOCOCCI, ALPHA HEMOLYTIC            RARE DIPHTHEROIDS       CULTURE, WOUND Virgle Senait STAIN [[id number]]  (Abnormal)  (Susceptibility) Collected:  12/05/18 1841    Order Status:  Completed Specimen:  Wound from Foot Updated:  12/07/18 1103     Special Requests: NO SPECIAL REQUESTS        GRAM STAIN MODERATE WBC'S               FEW GRAM POSITIVE COCCI IN PAIRS            RARE GRAM POSITIVE RODS         RARE GRAM NEGATIVE RODS        Culture result:       MODERATE PROTEUS MIRABILIS                  FEW STREPTOCOCCI,NON HEMOLYTIC                  MANY STREPTOCOCCI, ALPHA HEMOLYTIC                  FEW POSSIBLE 2ND STREPTOCOCCI, ALPHA HEMOLYTIC            RARE DIPHTHEROIDS       CULTURE, ANAEROBIC [[id number]] Collected:  12/05/18 1841    Order Status:  Completed Specimen:  Foot, Right Updated:  12/06/18 1411     Special Requests: NO SPECIAL REQUESTS        Culture result:       CULTURE IN PROGRESS,FURTHER UPDATES TO FOLLOW          CULTURE, ANAEROBIC [[id number]] Collected:  12/05/18 1841    Order Status:  Completed Specimen:  Foot, Right Updated:  12/06/18 1404     Special Requests: NO SPECIAL REQUESTS        Culture result:       CULTURE IN PROGRESS,FURTHER UPDATES TO FOLLOW          CULTURE, WOUND [[id number]] Collected:  12/04/18 2300    Order Status:  Canceled Specimen:  Wound from Foot           CBC:   Recent Labs     12/07/18  0313 12/06/18  0430 12/05/18  0259   WBC 7.6 10.1 9.1   RBC 4.05* 3.84* 3.76*   HGB 9.9* 9.7* 9.5*   HCT 32.9* 31.8* 31.2*   * 407 373   GRANS 75* 81* 71   LYMPH 14* 10* 17*   EOS 2 1 2     CMP:   Recent Labs     12/07/18  0313 12/05/18  0259 12/04/18  1925   GLU 93 98 94    135* 139   K 4.9 4.6 4.7   CL 103 104 105   CO2 28 26 26   BUN 17 19* 23*   CREA 0.45* 0.52* 0.62   CA 8.2* 7.9* 8.4*   AGAP 6 5 8   BUCR 38 37 37   AP  --  157* 195*   TP  --  5.9* 6.9   ALB  --  2.5* 3.0*   GLOB  --  3.4 3.9   AGRAT  --  0.7* 0.8     Inflammation studies:   Lab Results   Component Value Date/Time    Sed rate, automated 32 (H) 10/25/2017 09:15 AM    Sed rate, automated 23 12/27/2016 05:53 AM    Sed rate, automated 16 12/23/2016 06:35 AM    C-Reactive protein 0.5 (H) 10/25/2017 09:15 AM    C-Reactive protein 0.4 (H) 12/27/2016 05:53 AM       Signed By: Tammy Acosta MD     December 7, 2018

## 2018-12-07 NOTE — PROGRESS NOTES
DC Plan:  Discharge home with [facility], [address] [place] [address] for IV abx Chart reviewed. Pt should be able to dc today, per provider. Met with pt at bedside. Pt has already signed [facility] for [facility] and referral placed. Pt will require IV abx at discharge. [facility] form and hard script provided by MD Watson Decker. Pt states she would prefer to get IV abx at home with home health, if possible. CM reached out to Timi Bermeo @ [facility], pt would have a $30 per day cost for supplies and $165 for medication. Pt states cost is too expensive and therefore would like to go to [address] [place] [address] Street. Pt states she will have friend/family/taxi take her to [address] [place] [address]. Pt still needs PICC. CMS has assisted in getting [facility] appt, per AVS. Pt has no DME needs. No other dc concerns identified. CM will cont to follow. Care Management Interventions PCP Verified by CM: Yes Mode of Transport at Discharge: Other (see comment)(friend/family) Transition of Care Consult (CM Consult): Discharge Planning, Home Health [address]: No 
Reason Outside [place]: Patient already serviced by other home care/hospice agency([facility]) Health Maintenance Reviewed: Yes Current Support Network: Own Home(LIVES WITH SON, ROOMMATE) Confirm Follow Up Transport: Family Plan discussed with Pt/Family/Caregiver: Yes Freedom of Choice Offered: Yes Discharge Location Discharge Placement: Home with home health

## 2018-12-07 NOTE — PROGRESS NOTES
Hospitalist Progress Note Patient: Jermaine Delgado MRN: [id number]  CSN: [id number] YOB: 1950  Age: 80 y.o. Sex: female DOA: 12/4/2018 LOS:  LOS: 3 days Chief Complaint: 
 
 
 
 
Assessment/Plan Diabetic foot infection s/p I&D 
NIDDM uncontrolled, type 2 with neuropathy Lupus AC syndrome PVD AC with coumadin Discussed with ID Plan to start ertapenem, ensure no reaction, then plan for this once daily X 7 days if tolerated as outpt Plan picc line today D/c summary dictated in anticipatuion Disposition : 
Patient Active Problem List  
Diagnosis Code  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 Z90.3, Z98.0  Atherosclerotic PVD with ulceration ([facility]) I70.209, L98.499  Anemia D64.9  Lupus anticoagulant syndrome ([facility]) D68.62  
 PAD (peripheral artery disease) ([facility]) I73.9  Sleepwalking disorder F51.3  Anxiety F41.9  Type II diabetes mellitus with complication, uncontrolled ([facility]) E11.8, E11.65  
 CAD (coronary artery disease) I25.10  Diabetic foot ([address] E11.8  Diabetic foot ulcer ([address] E11.621, L97.509  Cellulitis of right foot L03.115  Right foot ulcer, with fat layer exposed ([address] L97.512 Subjective: 
 
Feels good and wants to go home No complaints Review of systems: 
 
Constitutional: denies fevers, chills Respiratory: denies SOB, cough Cardiovascular: denies chest pain, palpitations Gastrointestinal: denies nausea, vomiting, diarrhea Vital signs/Intake and Output: 
Visit Vitals /69 (BP 1 Location: Right arm, BP Patient Position: At rest) Pulse 70 Temp 98.6 °F (37 °C) Resp 20 Ht 5' 4.5\" (1.638 m) Wt 67.8 kg (149 lb 7.6 oz) SpO2 98% Breastfeeding? No  
BMI 25.26 kg/m² Current Shift:  No intake/output data recorded. Last three shifts:  12/05 1901 - 12/07 0700 In: 660 [P.O.:440; I.V.:220] Out: 850 [Urine:850] Exam: 
 
General: Well developed, alert, NAD, OX3 
 CVS:Regular rate and rhythm, no M/R/G, S1/S2 heard, no thrill Lungs:Clear to auscultation bilaterally, no wheezes, rhonchi, or rales Abdomen: Soft, Nontender, No distention, Normal Bowel sounds, No hepatomegaly Extremities: right foot dressed Neuro:grossly normal , follows commands Psych:appropriate Labs: Results:  
   
Chemistry Recent Labs 12/07/18 0313 12/05/18 
0259 12/04/18 
1925 GLU 93 98 94  135* 139  
K 4.9 4.6 4.7  104 105 CO2 28 26 26 BUN 17 19* 23* CREA 0.45* 0.52* 0.62  
CA 8.2* 7.9* 8.4* AGAP 6 5 8 BUCR 38 37 37 AP  --  157* 195* TP  --  5.9* 6.9 ALB  --  2.5* 3.0*  
GLOB  --  3.4 3.9 AGRAT  --  0.7* 0.8 CBC w/Diff Recent Labs 12/07/18 
7363 12/06/18 
0430 12/05/18 0259 WBC 7.6 10.1 9.1  
RBC 4.05* 3.84* 3.76* HGB 9.9* 9.7* 9.5* HCT 32.9* 31.8* 31.2*  
* 407 373 GRANS 75* 81* 71 LYMPH 14* 10* 17* EOS 2 1 2 Cardiac Enzymes No results for input(s): CPK, CKND1, RAYNA in the last 72 hours. No lab exists for component: Jame Spittle Coagulation Recent Labs 12/07/18 0313 12/06/18 
0430 PTP 13.7 14.9 INR 1.1 1.2 Lipid Panel No results found for: CHOL, CHOLPOCT, CHOLX, CHLST, CHOLV, 758913, HDL, LDL, LDLC, DLDLP, 939379, VLDLC, VLDL, TGLX, TRIGL, TRIGP, TGLPOCT, CHHD, CHHDX  
BNP No results for input(s): BNPP in the last 72 hours. Liver Enzymes Recent Labs 12/05/18 
0259 TP 5.9* ALB 2.5* * SGOT 17 Thyroid Studies Lab Results Component Value Date/Time TSH 0.74 04/11/2018 12:30 AM  
    
Procedures/imaging: see electronic medical records for all procedures/Xrays and details which were not copied into this note but were reviewed prior to creation of Plan Elvira Mcgrath MD

## 2018-12-07 NOTE — ROUTINE PROCESS
Bedside and Verbal shift change report given to [address] (oncoming nurse) by Re Garcia (offgoing nurse). Report included the following information SBAR.

## 2018-12-07 NOTE — ROUTINE PROCESS
Bedside and Verbal shift change report given to SHERON Ivan (oncoming nurse) by Lissy Mccracken (offgoing nurse). Report included the following information SBAR, Kardex, Intake/Output and MAR.

## 2018-12-07 NOTE — PROCEDURES
Vascular & Interventional Radiology Brief Procedure Note    Interventional Radiologist: Arielle Wu    Pre-operative Diagnosis:  Foot ulcer. For outpatient IV antibiotics. Post-operative Diagnosis: Same as pre-op dx    Procedure(s) Performed:    1. Left arm venogram.  2. Right arm PICC placement    Anesthesia:  Local.    Findings:    1. Accessed left basilic vein. Unable to pass wire centrally. Venogram shows tight stenosis/occlusion of left subclavian vein. 2. Successful placement of right arm PICC via right basilic vein. Tip in lower SVC. Length 42 cm. Complications: None    Estimated Blood Loss:  minimal    Tubes and Drains: None    Specimens: None    Condition: Good    Disposition: To floor. Plan: Per primary team.  Patient should no longer have left arm PICCs.       Mariia Wells MD  [facility]  Vascular & Interventional Radiology  12/7/2018

## 2018-12-07 NOTE — DISCHARGE SUMMARY
[address]    Rony Quinonez  MR#: [id number]  : 1950  ACCOUNT #: [de-identified]   ADMIT DATE: 2018  DISCHARGE DATE: 2018    DISCHARGE DIAGNOSES:  1. Right diabetic foot infection and ulcer, status post incision and drainage. 2.  Noninsulin dependent diabetes mellitus, uncontrolled with complications including vascular disease, coronary artery disease and neuropathy as well as recurrent foot infections. 3.  History of gastroesophageal reflux disease. 4.  Peripheral vascular disease. 5.  Lupus anticoagulant syndrome, chronically on Coumadin therapy. 6.  History of cervical and uterine cancer. 7.  History of asthma. 8.  Peptic ulcer disease. 9.  History of thromboembolism in the right leg. HOSPITAL CONSULTANTS:  Dr. Laith Sargent, podiatry; Dr. Ritchie Platt, infectious disease. HOSPITAL SUMMARY:  This is a 61-year-old diabetic female well known to the medical service from prior hospitalizations with recurrent infection and ulceration of the feet. She is a diabetic, under poor control with a hemoglobin A1c over 10. I suspect due to a notable degree of medical noncompliance and recurrent infection, but only takes oral medications for her diabetes at home. When she is here in the hospital and has been this last time, her blood sugars were between 93 and 282 seemingly reasonably controlled without extensive insulin so diabetes nurse saw her and recommended we put her back on her oral here to see how she would do. The diabetic nurse's recommendations are in the chart to continue metformin and Amaryl. She apparently has had a history of severe hypoglycemic events in the past so that has precipitated caution with insulin administration for this patient also. Nonetheless, the patient was started on antibiotics for her foot. She went to surgery with Dr. Laith Sargent. She has had a coronary bypass surgery earlier this year, again is on Coumadin.   The Coumadin was held for the surgery. Wound care has been involved also in her care here. The operation included skin incision, soft tissue debridement and flushing out of the infection per Dr. George Living surgical note. It is currently wrapped and he has been following along since the surgery. Infectious disease saw her today and considering her extensive list of ANTIBIOTIC ALLERGIES with previous intolerance and hives, the recommendation is to start ertapenem considering the culture results and hopefully get her through one dose today. If she tolerates that, we will plan on 7 days of once daily, ertapenem as an outpatient with a PICC line. Otherwise, she is going to need a below-knee amputation if she continues to have recurrent infections in this foot. With that said, I have discussed the plan of care with infectious disease today as well as the patient. The patient herself is very anxious to go home. She is awake and alert, in no acute distress. PHYSICAL EXAMINATION:  VITAL SIGNS:  Show blood pressure 148/69, pulse 70, temperature 98.6, respiratory rate 20, SaO2 98% on room air. EXTREMITIES:  Right foot is dressed. GENERAL:  She is sitting comfortably on the edge of the bed, talking with her son drinking [employer]. CARDIAC:  Unchanged. LUNGS:  Unchanged. ABDOMEN:  Benign. She denies chest pain, shortness of breath, uncontrolled pain, nausea, vomiting or diarrhea. Her metabolic panel is within normal limits today and her white count 7.6, H and H 9.9 and 32.9, platelets are 709. So we are going to transition her from the Azactam here to the ertapenem, start one dose of that today, work with the outpatient infusion center to ensure that they can do daily dosing for her for 7 more days and resume her oral hypoglycemic medications. Her Coumadin, which has been returned in place. Her last INR is 1.1 today. She will need followup for that as an outpatient.     Again, if she tolerates the ertapenem, to start at the infusion center tomorrow, [facility] home health. She needs a followup appointment in 3 days with Dr. Cassidy Has to have her INR checked and she is going to be mobile, so she can do out of the office. She is planning to come back to the infusion center daily. She should follow up with Dr. Surinder Boyle at his instructions also and without something specific, I am going to recommend that she see him next week in the office so in approximately 4-5 days and wound dressing instructions per his recommendations also. Home health has been arranged. They can help manage those wound care instructions. She is to be on a diabetic diet. DISCHARGE MEDICATIONS:  She will resume her other home medications, which include the following in addition to antibiotic noted, albuterol inhaler 2 puffs every 4 hours as needed, Lipitor 20 mg daily, biotin 5000 mcg daily, Zyrtec 10 mg daily, Catapres 0.1 mg twice daily, Plavix 75 mg daily,  vitamin B12 1000 mcg daily, Flexeril 10 mg 3 times daily as needed, Benadryl 25 mg as needed every 6 hours for allergic rhinitis or symptoms, Marinol 2.5 mg 3 times daily, Amaryl 2 mg every morning, lactobacillus 1 cap daily, Ativan 1 mg every 6 hours as needed for anxiety. Of note, she takes 1 tablet in the morning 2 tablets at night, melatonin 5 mg at night, metformin 500 mg twice daily, metoprolol 25 mg twice daily, Nystatin cream to the perineum for yeast infection twice daily for 7 days, Zofran 4 mg every 8 hours as needed for nausea, Protonix 40 mg daily, Requip 4 mg at night, Florastor one cap twice daily for 7 days, tramadol 50 mg every 8 hours as needed for pain, Coumadin, it looks like she takes 7 mg daily, INR check on Monday. DISPOSITION:  home    45 minutes spent on discharge time today.       MD MARIELA hSane/TN  D: 12/07/2018 11:24     T: 12/07/2018 13:00  JOB #: [id number]  CC: Faraz Fulton MD

## 2018-12-07 NOTE — PROGRESS NOTES
Podiatry: 
 
Patient seen at bedside for post op care right foot. She is stable and seen by ID for home antibiotics. PICC scheduled for today and then discharge to home. She will follow up in my office for right foot care.

## 2018-12-07 NOTE — ROUTINE PROCESS
Bedside and Verbal shift change report given to Phil Fournier (oncoming nurse) by Celestino Beckford RN 
 (offgoing nurse). Report included the following information SBAR, Kardex, Intake/Output, MAR and Recent Results.

## 2018-12-08 ENCOUNTER — HOSPITAL ENCOUNTER (OUTPATIENT)
Dept: INFUSION THERAPY | Age: 68
Discharge: HOME | End: 2018-12-08
Payer: MEDICARE

## 2018-12-08 VITALS
HEART RATE: 108 BPM | SYSTOLIC BLOOD PRESSURE: 159 MMHG | RESPIRATION RATE: 18 BRPM | TEMPERATURE: 97.2 F | DIASTOLIC BLOOD PRESSURE: 88 MMHG

## 2018-12-08 PROCEDURE — 74011250636 HC RX REV CODE- 250/636: Performed by: HOSPITALIST

## 2018-12-08 PROCEDURE — 74011000258 HC RX REV CODE- 258: Performed by: HOSPITALIST

## 2018-12-08 PROCEDURE — 74011250636 HC RX REV CODE- 250/636: Performed by: INTERNAL MEDICINE

## 2018-12-08 PROCEDURE — 96365 THER/PROPH/DIAG IV INF INIT: CPT

## 2018-12-08 RX ORDER — SODIUM CHLORIDE 0.9 % (FLUSH) 0.9 %
5-10 SYRINGE (ML) INJECTION AS NEEDED
Status: CANCELLED | OUTPATIENT
Start: 2018-12-08

## 2018-12-08 RX ORDER — HEPARIN 100 UNIT/ML
500 SYRINGE INTRAVENOUS ONCE
Status: CANCELLED | OUTPATIENT
Start: 2018-12-08 | End: 2018-12-08

## 2018-12-08 RX ORDER — SODIUM CHLORIDE 0.9 % (FLUSH) 0.9 %
5-10 SYRINGE (ML) INJECTION AS NEEDED
Status: DISCONTINUED | OUTPATIENT
Start: 2018-12-08 | End: 2018-12-12 | Stop reason: HOSPADM

## 2018-12-08 RX ORDER — HEPARIN 100 UNIT/ML
500 SYRINGE INTRAVENOUS ONCE
Status: COMPLETED | OUTPATIENT
Start: 2018-12-08 | End: 2018-12-08

## 2018-12-08 RX ADMIN — HEPARIN 500 UNITS: 100 SYRINGE at 08:53

## 2018-12-08 RX ADMIN — SODIUM CHLORIDE 1 G: 900 INJECTION INTRAVENOUS at 08:22

## 2018-12-08 RX ADMIN — Medication 10 ML: at 08:53

## 2018-12-08 NOTE — PROGRESS NOTES
[facility] Progress Note Date: 2018 Name: Denise Verde MRN: [id number] : 1950 IV Antibiotics Ms. Abdullahi Petersen was assessed and education was provided. Ms. Abdullahi Petersen arrived using knee scooter for infusion today with family friend at her side. Ms. Abdullahi Petersen stated she received Blaise Ny yesterday while in hospital and was observed for approximately 1.5 hours post infusion and tolerated well. Ms. Abdullahi Petersen denies having had any complications or s/s of reactions since having initial dose yesterday. PICC line dressing to right upper arm clean, dry and intact and flushes without resistance and displays brisk positive blood return. Ms. Abdullahi Petersen had dressing to right foot. Ms. Thad Chung vitals were reviewed. Visit Vitals /88 (BP 1 Location: Left arm, BP Patient Position: Sitting) Pulse (!) 108 Temp 97.2 °F (36.2 °C) Resp 18 Breastfeeding? No  
 
 
 
 
  []  Vancomycin  
  [x]  Invanz 1 gram IV infusion  
  []  Cubicin  
  []  Rocephin  
 was infused at  100 ml/hr. Ms. Abdullahi Petersen tolerated infusion, and had no complaints at this time. Ms. Abdullahi Petersen was discharged from [place] [address] in stable condition at [phone]. She is to return on 18 at 0815 for her next appointment. Armband removed and shredded. Govind Hope RN 2018 
9:06 AM

## 2018-12-09 ENCOUNTER — HOSPITAL ENCOUNTER (OUTPATIENT)
Dept: INFUSION THERAPY | Age: 68
Discharge: HOME | End: 2018-12-09
Payer: MEDICARE

## 2018-12-09 VITALS
RESPIRATION RATE: 18 BRPM | TEMPERATURE: 97.8 F | SYSTOLIC BLOOD PRESSURE: 152 MMHG | OXYGEN SATURATION: 98 % | DIASTOLIC BLOOD PRESSURE: 88 MMHG | HEART RATE: 98 BPM

## 2018-12-09 LAB
BACTERIA SPEC CULT: ABNORMAL
GRAM STN SPEC: ABNORMAL
SERVICE CMNT-IMP: ABNORMAL

## 2018-12-09 PROCEDURE — 74011250636 HC RX REV CODE- 250/636: Performed by: HOSPITALIST

## 2018-12-09 PROCEDURE — 96365 THER/PROPH/DIAG IV INF INIT: CPT

## 2018-12-09 PROCEDURE — 74011250636 HC RX REV CODE- 250/636: Performed by: INTERNAL MEDICINE

## 2018-12-09 PROCEDURE — 74011000258 HC RX REV CODE- 258: Performed by: HOSPITALIST

## 2018-12-09 RX ORDER — SODIUM CHLORIDE 0.9 % (FLUSH) 0.9 %
5-10 SYRINGE (ML) INJECTION AS NEEDED
Status: DISCONTINUED | OUTPATIENT
Start: 2018-12-09 | End: 2018-12-13 | Stop reason: HOSPADM

## 2018-12-09 RX ORDER — HEPARIN 100 UNIT/ML
500 SYRINGE INTRAVENOUS ONCE
Status: COMPLETED | OUTPATIENT
Start: 2018-12-09 | End: 2018-12-09

## 2018-12-09 RX ADMIN — HEPARIN 500 UNITS: 100 SYRINGE at 09:08

## 2018-12-09 RX ADMIN — Medication 10 ML: at 08:30

## 2018-12-09 RX ADMIN — SODIUM CHLORIDE 1 G: 900 INJECTION INTRAVENOUS at 08:35

## 2018-12-09 RX ADMIN — Medication 10 ML: at 09:07

## 2018-12-09 NOTE — PROGRESS NOTES
[facility] Progress Note Date: 2018 Name: Paul Tyson MRN: [id number] : 1950 IV Antibiotics Ms. Joaquin Brown was assessed and education was provided. Ms. Joaquin Brown arrived using knee scooter for infusion today with family friend at her side. Ms. Joaquin Brown denies having had any complications or s/s of reactions . PICC line dressing to right upper arm clean, dry and intact and flushes with 5 ml normal saline without resistance and displays brisk positive blood return. Ms. Joaquin Brown had dressing to right foot. Ms. Arsalan Bright vitals were reviewed. Visit Vitals /88 (BP 1 Location: Left arm, BP Patient Position: Sitting) Pulse 98 Temp 97.8 °F (36.6 °C) Resp 18 SpO2 98%  
 
 
 
 
  []  Vancomycin  
  [x]  Invanz 1 gram IV infusion in 50 ml normal saline  
  []  Cubicin  
  []  Rocephin  
 was infused at  100 ml/hr. Ms. Joaquin Brown tolerated infusion, and had no complaints at this time. Flushed bilateral lumens with 5 ml normal saline then instilled 250 units Heparin  applie green curos caps to end, covered lumens with 4x4, paper tape and stockinette Ms. Joaquin Brown was discharged from [place] [address] in stable condition at 6083. She is to return on 12/10/18 at 1130 for her next appointment. Armband removed and shredded. Kristopher Servin RN 2018 
9:06 AM

## 2018-12-10 ENCOUNTER — HOSPITAL ENCOUNTER (OUTPATIENT)
Dept: INFUSION THERAPY | Age: 68
Discharge: HOME | End: 2018-12-10
Payer: MEDICARE

## 2018-12-10 VITALS
HEART RATE: 91 BPM | TEMPERATURE: 98.6 F | RESPIRATION RATE: 18 BRPM | SYSTOLIC BLOOD PRESSURE: 151 MMHG | DIASTOLIC BLOOD PRESSURE: 71 MMHG | OXYGEN SATURATION: 98 %

## 2018-12-10 LAB
BACTERIA SPEC CULT: ABNORMAL
BACTERIA SPEC CULT: NORMAL
SERVICE CMNT-IMP: ABNORMAL
SERVICE CMNT-IMP: NORMAL

## 2018-12-10 PROCEDURE — 74011000258 HC RX REV CODE- 258: Performed by: HOSPITALIST

## 2018-12-10 PROCEDURE — 96365 THER/PROPH/DIAG IV INF INIT: CPT

## 2018-12-10 PROCEDURE — 74011250636 HC RX REV CODE- 250/636: Performed by: HOSPITALIST

## 2018-12-10 RX ORDER — HEPARIN 100 UNIT/ML
500 SYRINGE INTRAVENOUS ONCE
Status: COMPLETED | OUTPATIENT
Start: 2018-12-10 | End: 2018-12-10

## 2018-12-10 RX ORDER — SODIUM CHLORIDE 0.9 % (FLUSH) 0.9 %
10-40 SYRINGE (ML) INJECTION AS NEEDED
Status: DISCONTINUED | OUTPATIENT
Start: 2018-12-10 | End: 2018-12-14 | Stop reason: HOSPADM

## 2018-12-10 RX ADMIN — HEPARIN 500 UNITS: 100 SYRINGE at 12:07

## 2018-12-10 RX ADMIN — Medication 20 ML: at 12:07

## 2018-12-10 RX ADMIN — Medication 10 ML: at 11:30

## 2018-12-10 RX ADMIN — SODIUM CHLORIDE 1 G: 900 INJECTION INTRAVENOUS at 11:33

## 2018-12-10 NOTE — PROGRESS NOTES
[facility] Progress Note Date: December 10, 2018 Name: Roxy Query MRN: [id number] : 1950 IV Antibiotics Ms. Umer Tsang was assessed and education was provided. Ms. Umer Tsang arrived using knee scooter for infusion today. Ms. Umer Tsang denies having had any complications or s/s of reactions. PICC line dressing to right upper arm clean, dry and intact and flushes with 10 ml normal saline without resistance and haspositive blood return. dressing to the patients right foot. Ms. Jeanne Lara vitals were reviewed. Visit Vitals /71 (BP 1 Location: Left arm, BP Patient Position: Sitting) Pulse 91 Temp 98.6 °F (37 °C) Resp 18 SpO2 98%  
 
 
 
 
  []  Vancomycin  
  [x]  Invanz 1 gram IV infusion in 50 ml normal saline  
  []  Cubicin  
  []  Rocephin  
 was infused at  100 ml/hr. Ms. Umer Tsang tolerated infusion, and had no complaints at this time. Flushed bilateral lumens with10 ml normal saline then instilled 250 units Heparin. Green curos caps applied to both lumens, covered lumens with 4x4, paper tape and stockinette. Ms. Umer Tsang was discharged from [place] [address] in stable condition at 0910. She is to return on 18 at 1300 for her next appointment. Armband removed and shredded. Vahe Mary RN December 10, 2018 
9:06 AM

## 2018-12-11 ENCOUNTER — HOSPITAL ENCOUNTER (OUTPATIENT)
Dept: INFUSION THERAPY | Age: 68
Discharge: HOME | End: 2018-12-11
Payer: MEDICARE

## 2018-12-11 VITALS
OXYGEN SATURATION: 95 % | RESPIRATION RATE: 18 BRPM | DIASTOLIC BLOOD PRESSURE: 66 MMHG | TEMPERATURE: 97.6 F | SYSTOLIC BLOOD PRESSURE: 139 MMHG | HEART RATE: 62 BPM

## 2018-12-11 LAB
ALBUMIN SERPL-MCNC: 3.1 G/DL (ref 3.4–5)
ALBUMIN/GLOB SERPL: 0.9 {RATIO} (ref 0.8–1.7)
ALP SERPL-CCNC: 202 U/L (ref 45–117)
ALT SERPL-CCNC: 28 U/L (ref 13–56)
AST SERPL-CCNC: 30 U/L (ref 15–37)
BASO+EOS+MONOS # BLD AUTO: 0.2 K/UL (ref 0–2.3)
BASO+EOS+MONOS # BLD AUTO: 3 % (ref 0.1–17)
BILIRUB DIRECT SERPL-MCNC: <0.1 MG/DL (ref 0–0.2)
BILIRUB SERPL-MCNC: 0.1 MG/DL (ref 0.2–1)
DIFFERENTIAL METHOD BLD: ABNORMAL
ERYTHROCYTE [DISTWIDTH] IN BLOOD BY AUTOMATED COUNT: 15.8 % (ref 11.5–14.5)
GLOBULIN SER CALC-MCNC: 3.5 G/DL (ref 2–4)
HCT VFR BLD AUTO: 33.6 % (ref 36–48)
HGB BLD-MCNC: 9.8 G/DL (ref 12–16)
LYMPHOCYTES # BLD: 1.9 K/UL (ref 1.1–5.9)
LYMPHOCYTES NFR BLD: 32 % (ref 14–44)
MCH RBC QN AUTO: 25.1 PG (ref 25–35)
MCHC RBC AUTO-ENTMCNC: 29.2 G/DL (ref 31–37)
MCV RBC AUTO: 86.2 FL (ref 78–102)
NEUTS SEG # BLD: 3.9 K/UL (ref 1.8–9.5)
NEUTS SEG NFR BLD: 65 % (ref 40–70)
PLATELET # BLD AUTO: 501 K/UL (ref 140–440)
PROT SERPL-MCNC: 6.6 G/DL (ref 6.4–8.2)
RBC # BLD AUTO: 3.9 M/UL (ref 4.1–5.1)
WBC # BLD AUTO: 6 K/UL (ref 4.5–13)

## 2018-12-11 PROCEDURE — 74011000258 HC RX REV CODE- 258: Performed by: HOSPITALIST

## 2018-12-11 PROCEDURE — 74011250636 HC RX REV CODE- 250/636: Performed by: HOSPITALIST

## 2018-12-11 PROCEDURE — 85025 COMPLETE CBC W/AUTO DIFF WBC: CPT

## 2018-12-11 PROCEDURE — 96365 THER/PROPH/DIAG IV INF INIT: CPT

## 2018-12-11 PROCEDURE — 80076 HEPATIC FUNCTION PANEL: CPT

## 2018-12-11 RX ORDER — HEPARIN 100 UNIT/ML
500 SYRINGE INTRAVENOUS ONCE
Status: COMPLETED | OUTPATIENT
Start: 2018-12-11 | End: 2018-12-11

## 2018-12-11 RX ORDER — SODIUM CHLORIDE 0.9 % (FLUSH) 0.9 %
10-40 SYRINGE (ML) INJECTION AS NEEDED
Status: DISCONTINUED | OUTPATIENT
Start: 2018-12-11 | End: 2018-12-15 | Stop reason: HOSPADM

## 2018-12-11 RX ORDER — HEPARIN 100 UNIT/ML
SYRINGE INTRAVENOUS
Status: DISPENSED
Start: 2018-12-11 | End: 2018-12-12

## 2018-12-11 RX ADMIN — Medication 30 ML: at 13:49

## 2018-12-11 RX ADMIN — SODIUM CHLORIDE 1 G: 900 INJECTION INTRAVENOUS at 13:15

## 2018-12-11 RX ADMIN — HEPARIN 500 UNITS: 100 SYRINGE at 13:49

## 2018-12-11 NOTE — PROGRESS NOTES
[facility] Progress Note    Date: 2018    Name: Trish Willoughby    MRN: [id number]         : 1950    [facility]    Ms. Pineda to [facility], ambulatory at 1300. Pt was assessed and education was provided. Ms. Mega Pelaez vitals were reviewed. Visit Vitals  /66 (BP 1 Location: Left arm, BP Patient Position: Sitting)   Pulse 62   Temp 97.6 °F (36.4 °C)   SpO2 95%       Right upper arm PICC flushed easily and had brisk blood return via both ports. Blood drawn off and sent to lab for CBC and hepatic function panel after 10 ml waste per written orders. PICC dressing c/d/i and not due to be changed. No swelling, redness, streaking, warmth or drainage noted in arm. Pt denied c/o pain in arm.      []  Vancomycin     [x]  Invanz 1 gram     []  Cubicin     []  Rocephin    was infused at 100 ml/hr (over approximately 30 minutes). Ms. Wale Worthington tolerated infusion, and had no complaints at this time. Both lumens of PICC flushed with NS 10 ml and Heparin 250 units. Green end caps applied, and lumens wrapped with guaze and paper tape. Stockinette placed over dressing for protection. Patient armband removed and shredded. Ms. Wale Worthington was discharged from [place] [address] in stable condition at 1350. She is to return on 18  for her next antibiotic appointment.     Vanessa Hogue RN  2018

## 2018-12-12 ENCOUNTER — HOSPITAL ENCOUNTER (OUTPATIENT)
Dept: INFUSION THERAPY | Age: 68
Discharge: HOME | End: 2018-12-12
Payer: MEDICARE

## 2018-12-12 VITALS
RESPIRATION RATE: 18 BRPM | HEART RATE: 67 BPM | SYSTOLIC BLOOD PRESSURE: 135 MMHG | TEMPERATURE: 98.6 F | DIASTOLIC BLOOD PRESSURE: 72 MMHG | OXYGEN SATURATION: 100 %

## 2018-12-12 PROCEDURE — 74011250636 HC RX REV CODE- 250/636: Performed by: INTERNAL MEDICINE

## 2018-12-12 PROCEDURE — 96365 THER/PROPH/DIAG IV INF INIT: CPT

## 2018-12-12 PROCEDURE — 74011250636 HC RX REV CODE- 250/636: Performed by: HOSPITALIST

## 2018-12-12 PROCEDURE — 74011000258 HC RX REV CODE- 258: Performed by: HOSPITALIST

## 2018-12-12 RX ORDER — HEPARIN 100 UNIT/ML
500 SYRINGE INTRAVENOUS ONCE
Status: COMPLETED | OUTPATIENT
Start: 2018-12-12 | End: 2018-12-12

## 2018-12-12 RX ORDER — SODIUM CHLORIDE 0.9 % (FLUSH) 0.9 %
5-10 SYRINGE (ML) INJECTION AS NEEDED
Status: DISCONTINUED | OUTPATIENT
Start: 2018-12-12 | End: 2018-12-16 | Stop reason: HOSPADM

## 2018-12-12 RX ADMIN — SODIUM CHLORIDE 1 G: 900 INJECTION INTRAVENOUS at 14:07

## 2018-12-12 RX ADMIN — Medication 10 ML: at 14:42

## 2018-12-12 RX ADMIN — Medication 500 UNITS: at 14:43

## 2018-12-12 NOTE — PROGRESS NOTES
[facility] Progress Note    Date: 2018    Name: Ashutosh Currie    MRN: [id number]         : 1950     IV Antibiotics     Ms. Hardy Gonzalez was assessed and education was provided. Ms. Arben Chang vitals were reviewed. Visit Vitals  /72 (BP 1 Location: Left arm, BP Patient Position: Sitting)   Pulse 67   Temp 98.6 °F (37 °C)   Resp 18   SpO2 100%           []  Vancomycin     [x]  Invanz 1 gram IV infusion     []  Cubicin     []  Rocephin    was infused at  100 ml/hr. Ms. Hardy Gonzalez tolerated infusion, and had no complaints at this time. Ms. Hardy Gonzalez was discharged from [place] [address] in stable condition at [phone]. She is to return on 18 at [phone] for her next appointment. Armband removed and shredded.     Teagan Julio RN  2018  2:56 PM

## 2018-12-13 ENCOUNTER — HOSPITAL ENCOUNTER (OUTPATIENT)
Dept: INFUSION THERAPY | Age: 68
Discharge: HOME | End: 2018-12-13
Payer: MEDICARE

## 2018-12-13 VITALS
OXYGEN SATURATION: 98 % | SYSTOLIC BLOOD PRESSURE: 147 MMHG | TEMPERATURE: 98.3 F | HEART RATE: 75 BPM | RESPIRATION RATE: 18 BRPM | DIASTOLIC BLOOD PRESSURE: 60 MMHG

## 2018-12-13 PROCEDURE — 96365 THER/PROPH/DIAG IV INF INIT: CPT

## 2018-12-13 PROCEDURE — 74011250636 HC RX REV CODE- 250/636: Performed by: INTERNAL MEDICINE

## 2018-12-13 PROCEDURE — 74011000258 HC RX REV CODE- 258: Performed by: HOSPITALIST

## 2018-12-13 PROCEDURE — 74011250636 HC RX REV CODE- 250/636: Performed by: HOSPITALIST

## 2018-12-13 RX ORDER — HEPARIN 100 UNIT/ML
500 SYRINGE INTRAVENOUS ONCE
Status: COMPLETED | OUTPATIENT
Start: 2018-12-13 | End: 2018-12-13

## 2018-12-13 RX ORDER — SODIUM CHLORIDE 0.9 % (FLUSH) 0.9 %
5-10 SYRINGE (ML) INJECTION AS NEEDED
Status: DISCONTINUED | OUTPATIENT
Start: 2018-12-13 | End: 2018-12-17 | Stop reason: HOSPADM

## 2018-12-13 RX ADMIN — Medication 10 ML: at 11:47

## 2018-12-13 RX ADMIN — Medication 500 UNITS: at 11:47

## 2018-12-13 RX ADMIN — SODIUM CHLORIDE 1 G: 900 INJECTION INTRAVENOUS at 11:16

## 2018-12-14 ENCOUNTER — HOSPITAL ENCOUNTER (OUTPATIENT)
Dept: INFUSION THERAPY | Age: 68
Discharge: HOME | End: 2018-12-14
Payer: MEDICARE

## 2018-12-14 VITALS
HEART RATE: 78 BPM | OXYGEN SATURATION: 97 % | RESPIRATION RATE: 18 BRPM | SYSTOLIC BLOOD PRESSURE: 102 MMHG | TEMPERATURE: 98.4 F | DIASTOLIC BLOOD PRESSURE: 58 MMHG

## 2018-12-14 PROCEDURE — 74011000258 HC RX REV CODE- 258: Performed by: HOSPITALIST

## 2018-12-14 PROCEDURE — 99212 OFFICE O/P EST SF 10 MIN: CPT

## 2018-12-14 PROCEDURE — 74011250636 HC RX REV CODE- 250/636: Performed by: HOSPITALIST

## 2018-12-14 PROCEDURE — 96365 THER/PROPH/DIAG IV INF INIT: CPT

## 2018-12-14 RX ORDER — HEPARIN 100 UNIT/ML
500 SYRINGE INTRAVENOUS ONCE
Status: ACTIVE | OUTPATIENT
Start: 2018-12-14 | End: 2018-12-14

## 2018-12-14 RX ORDER — SODIUM CHLORIDE 0.9 % (FLUSH) 0.9 %
10-40 SYRINGE (ML) INJECTION AS NEEDED
Status: DISCONTINUED | OUTPATIENT
Start: 2018-12-14 | End: 2018-12-18 | Stop reason: HOSPADM

## 2018-12-14 RX ADMIN — Medication 20 ML: at 12:15

## 2018-12-14 RX ADMIN — SODIUM CHLORIDE 1 G: 900 INJECTION INTRAVENOUS at 11:42

## 2018-12-14 NOTE — PROGRESS NOTES
OUTPATIENT INFUSION CENTER    DISCHARGE INSTRUCTIONS FOR:  REMOVAL OFPICC  LINE    Now that your PICC Line has been removed, please follow the instructions below regarding your site care: You will be kept resting in a supine position for 3060 minutes immediately after the PICC has been removed. Avoid heavy lifting or excessive use of the extremity for 24 hours. If drainage or bleeding is present, apply direct pressure until it has stopped. If bleeding persists, continue pressure and seek emergency medical care. The area under the dressing must be kept clean and dry for 3 days. Do not submerge the area in water for 3 days. You may shower, but cover the dressing with clear plastic wrap and apply tape around the edges. Remove the plastic wrap after showering. After 3 days, the dressing may be removed. Before removing the dressing, wash hands thoroughly with soap and water. Inspect the site. Gently wash with warm soapy water. Pat dry and re-cover with a band aid until a scab forms. Report to your physician any of the following:    Chills and fever;  Swelling, redness, pain or if site is warm to the touch; Any pus or unusual drainage from the site; Any questions or concerns.     Shai Cochran, Signature: ______________________________ 12/14/2018  Isbaella Gupta RN

## 2018-12-14 NOTE — PROGRESS NOTES
[facility] Progress Note    Date: 2018    Name: Paul Tyson    MRN: [id number]         : 1950    [facility]    Ms. Pineda to [facility], ambulatory at 1135 . Pt was assessed and education was provided. Ms. Arsalan Bright vitals were reviewed. Visit Vitals  /58 (BP 1 Location: Left arm, BP Patient Position: At rest)   Pulse 78   Temp 98.4 °F (36.9 °C)   Resp 18   SpO2 97%       Right upper arm PICC flushed easily and had brisk blood return via both ports. PICC dressing c/d/i and not due to be changed. No swelling, redness, streaking, warmth or drainage noted in arm. Pt denied c/o pain in arm.      []  Vancomycin     [x]  Invanz 1 gram     []  Cubicin     []  Rocephin    was infused at 100 ml/hr (over approximately 30 minutes). Ms. Joaquin Brown tolerated infusion, and had no complaints at this time. Both lumens of PICC flushed with NS 10 ml. PICC line removed via sterile procedure and covered with sterile gauze and tegaderm. Patient remained in supine position for 35 minutes following removal. Discharge instructions reviewed. Patient armband removed and shredded. Ms. Joaquin Brown was discharged from [place] [address] in stable condition at 1300.      Glenn Dee RN  2018

## 2018-12-18 NOTE — OP NOTES
[facility] [place]  OPERATIVE REPORT    Sosa aLcy  MR#: [id number]  : 1950  ACCOUNT #: [de-identified]   DATE OF SERVICE: 2018    LOCATION:  OPS. SURGEON:  Maki Herman DPM    ASSISTANT:  Wilder Gillespie    PREOPERATIVE DIAGNOSIS:  Diabetic with painful cellulitis with deep abscess, right foot. POSTOPERATIVE DIAGNOSIS:  Diabetic with painful cellulitis with deep abscess, right foot. PROCEDURE PERFORMED:  Surgical incision and drainage, complicated, right foot, for deep abscess/multiple. ANESTHESIA:  MAC with local consisting of 10 mL of 0.5% Marcaine plain given to the right foot. HEMOSTASIS:  There was no tourniquet used in this procedure. ESTIMATED BLOOD LOSS:  Minimal, less than 5 mL. MATERIALS:  1-inch iodoform gauze. Aerobic and anaerobic cultures. INJECTABLES:  16 mL of 0.5% Marcaine plain. COMPLICATIONS:  None. SPECIMENS REMOVED:  None. IMPLANTS:  None. INDICATIONS FOR PROCEDURE:  The patient is a 61-year-old female diabetic admitted to [facility] on 2018 with painful infected/cellulitis with deep abscess of the right foot. She was receiving treatment in the office for her plantar ulcer of the right foot, but unfortunately got infected about a week ago and the patient requires surgical correction and IV antibiotics. All conservative treatments have failed to offer the patient adequate relief and the patient desired surgical correction. The planned procedure was explained to the patient in detail including all risks, benefits, and possible complications and the patient still desired surgical correction. Medical clearance was obtained prior to surgery. Consent was signed in our chart. All the patient's questions were answered and no guarantees were given.   Lastly, the patient was asked if she had any history of bleeding disorders, blood clots, or DVTs and she stated that she did including factor V Leiden and also a history of DVTs.  Therefore, no tourniquet was used in this procedure and bilateral SCDs were used on both legs. DESCRIPTION OF PROCEDURE:  The patient was brought to the operating room and placed on the operating table in a supine position. The patient's right foot was then anesthetized using 10 mL of 0.5% Marcaine plain as a local block. Next, the patient's right foot was then prepped and draped in the usual sterile manner. Again, no tourniquet was used. Attention was then drawn to the patient's right lateral midfoot where there was noted to be increased edema, cellulitis, and pain with a deep abscess. Therefore, a #15 blade was then used to make an approximately 6 cm linear skin incision at the dorsal lateral midfoot. A 2nd #15 blade was then used to deepen this incision through the subcutaneous layers, being careful to avoid any neurovascular structures. As dissection was further progressed deeper into the right lateral midfoot. Purulent drainage and exudate were then noted; therefore, aerobic and anaerobic cultures were then taken and sent to micro lab for Gram stain and sensitivity. The incision was then carried deeper into the subfascial and muscular layers where the entire deep abscess was then drained. This was complicated by multiple layers of infection that were opened and drained deep to the patient's midfoot area. The incision and drainage were then carried and exited out the plantar surface of her right foot. Next, sharp debridement was then used to remove all necrotic, and portions of necrotic tissue were then sent as cultures to Micro for Gram stain and sensitivity. Next, pulse lavage with bacitracin was then completed for deep flush of the infection. Next, the surgical site was then inspected for any remaining abscess or necrotic tissue and none were found. Therefore, the wound was then packed with 1-inch iodoform gauze and then dressed using 4 x 4's, Kerlix, ABD, and an Ace wrap.     The patient tolerated the incision and drainage/complicated surgery with no problems. The patient was then transported to the recovery room with vital signs stable and neurovascular status returned to baseline for the patient's right foot. POSTOPERATIVE CHECK:  The patient was later seen in the recovery room with her dressings clean, dry and intact with no bleeding noted to her right foot. The patient stated that she had no pain. The patient was later returned to her room on the 3rd floor for continued IV antibiotic care. Dr. Jose Armando Mcdonough will follow for continued care as needed.       KUN Degroot / OMEGA  D: 12/17/2018 19:26     T: 12/18/2018 09:54  JOB #: [id number]

## 2018-12-31 LAB
ATRIAL RATE: 74 BPM
CALCULATED P AXIS, ECG09: 62 DEGREES
CALCULATED R AXIS, ECG10: 73 DEGREES
CALCULATED T AXIS, ECG11: 58 DEGREES
DIAGNOSIS, 93000: NORMAL
P-R INTERVAL, ECG05: 156 MS
Q-T INTERVAL, ECG07: 404 MS
QRS DURATION, ECG06: 86 MS
QTC CALCULATION (BEZET), ECG08: 448 MS
VENTRICULAR RATE, ECG03: 74 BPM

## 2019-03-13 ENCOUNTER — HOSPITAL ENCOUNTER (OUTPATIENT)
Dept: VASCULAR SURGERY | Age: 69
Discharge: HOME | End: 2019-03-13
Attending: PODIATRIST
Payer: MEDICARE

## 2019-03-13 DIAGNOSIS — I70.213 ATHEROSCLEROSIS OF NATIVE ARTERY OF BOTH LOWER EXTREMITIES WITH INTERMITTENT CLAUDICATION (HCC): ICD-10-CM

## 2019-03-13 DIAGNOSIS — L97.509 DIABETIC FOOT ULCER WITH OSTEOMYELITIS (HCC): ICD-10-CM

## 2019-03-13 DIAGNOSIS — M86.9 DIABETIC FOOT ULCER WITH OSTEOMYELITIS (HCC): ICD-10-CM

## 2019-03-13 DIAGNOSIS — E11.621 DIABETIC FOOT ULCER WITH OSTEOMYELITIS (HCC): ICD-10-CM

## 2019-03-13 DIAGNOSIS — E11.42 DIABETIC POLYNEUROPATHY (HCC): ICD-10-CM

## 2019-03-13 DIAGNOSIS — E11.69 DIABETIC FOOT ULCER WITH OSTEOMYELITIS (HCC): ICD-10-CM

## 2019-03-13 DIAGNOSIS — E11.51 TYPE II DIABETES MELLITUS WITH PERIPHERAL CIRCULATORY DISORDER (HCC): ICD-10-CM

## 2019-03-13 PROCEDURE — 93923 UPR/LXTR ART STDY 3+ LVLS: CPT

## 2019-03-14 LAB
LEFT ABI: 1.14
LEFT ANTERIOR TIBIAL: 160 MMHG
LEFT ARM BP: 132 MMHG
LEFT TBI: 0.44
LEFT TOE PRESSURE: 62 MMHG
RIGHT ABI: 1.21
RIGHT ANTERIOR TIBIAL: 170 MMHG
RIGHT ARM BP: 140 MMHG
RIGHT POSTERIOR TIBIAL: 138 MMHG

## 2019-04-16 ENCOUNTER — OFFICE VISIT (OUTPATIENT)
Dept: HEMATOLOGY | Age: 69
End: 2019-04-16

## 2019-04-16 ENCOUNTER — HOSPITAL ENCOUNTER (OUTPATIENT)
Dept: LAB | Age: 69
Discharge: HOME | End: 2019-04-16
Payer: MEDICARE

## 2019-04-16 VITALS
BODY MASS INDEX: 22.82 KG/M2 | SYSTOLIC BLOOD PRESSURE: 127 MMHG | TEMPERATURE: 99 F | OXYGEN SATURATION: 98 % | RESPIRATION RATE: 16 BRPM | HEART RATE: 71 BPM | WEIGHT: 137 LBS | DIASTOLIC BLOOD PRESSURE: 75 MMHG | HEIGHT: 65 IN

## 2019-04-16 DIAGNOSIS — R74.8 ELEVATED LIVER ENZYMES: Primary | ICD-10-CM

## 2019-04-16 DIAGNOSIS — R94.5 ABNORMAL RESULTS OF LIVER FUNCTION STUDIES: ICD-10-CM

## 2019-04-16 DIAGNOSIS — R79.9 ABNORMAL FINDING OF BLOOD CHEMISTRY: ICD-10-CM

## 2019-04-16 DIAGNOSIS — R74.8 ELEVATED LIVER ENZYMES: ICD-10-CM

## 2019-04-16 DIAGNOSIS — Z11.59 ENCOUNTER FOR SCREENING FOR OTHER VIRAL DISEASES: ICD-10-CM

## 2019-04-16 PROBLEM — E11.8 DIABETIC FOOT (HCC): Status: RESOLVED | Noted: 2018-12-04 | Resolved: 2019-04-16

## 2019-04-16 PROBLEM — Z79.01 CHRONIC ANTICOAGULATION: Status: ACTIVE | Noted: 2019-04-16

## 2019-04-16 LAB
ALBUMIN SERPL-MCNC: 3.9 G/DL (ref 3.4–5)
ALBUMIN/GLOB SERPL: 1.2 {RATIO} (ref 0.8–1.7)
ALP SERPL-CCNC: 255 U/L (ref 45–117)
ALT SERPL-CCNC: 44 U/L (ref 13–56)
ANION GAP SERPL CALC-SCNC: 7 MMOL/L (ref 3–18)
AST SERPL-CCNC: 33 U/L (ref 15–37)
BASOPHILS # BLD: 0 K/UL (ref 0–0.1)
BASOPHILS NFR BLD: 1 % (ref 0–2)
BILIRUB DIRECT SERPL-MCNC: <0.1 MG/DL (ref 0–0.2)
BILIRUB SERPL-MCNC: 0.2 MG/DL (ref 0.2–1)
BUN SERPL-MCNC: 22 MG/DL (ref 7–18)
BUN/CREAT SERPL: 33 (ref 12–20)
CALCIUM SERPL-MCNC: 9.1 MG/DL (ref 8.5–10.1)
CHLORIDE SERPL-SCNC: 108 MMOL/L (ref 100–108)
CHOLEST SERPL-MCNC: 178 MG/DL
CO2 SERPL-SCNC: 26 MMOL/L (ref 21–32)
CREAT SERPL-MCNC: 0.66 MG/DL (ref 0.6–1.3)
DIFFERENTIAL METHOD BLD: ABNORMAL
EOSINOPHIL # BLD: 0.1 K/UL (ref 0–0.4)
EOSINOPHIL NFR BLD: 2 % (ref 0–5)
ERYTHROCYTE [DISTWIDTH] IN BLOOD BY AUTOMATED COUNT: 23.4 % (ref 11.6–14.5)
EST. AVERAGE GLUCOSE BLD GHB EST-MCNC: 160 MG/DL
FERRITIN SERPL-MCNC: 132 NG/ML (ref 8–388)
GLOBULIN SER CALC-MCNC: 3.2 G/DL (ref 2–4)
GLUCOSE SERPL-MCNC: 119 MG/DL (ref 74–99)
HBA1C MFR BLD: 7.2 % (ref 4.2–5.6)
HCT VFR BLD AUTO: 44.9 % (ref 35–45)
HDLC SERPL-MCNC: 88 MG/DL (ref 40–60)
HDLC SERPL: 2 {RATIO} (ref 0–5)
HGB BLD-MCNC: 14.2 G/DL (ref 12–16)
INR PPP: 1.4 (ref 0.8–1.2)
IRON SATN MFR SERPL: 21 %
IRON SERPL-MCNC: 70 UG/DL (ref 50–175)
LDLC SERPL CALC-MCNC: 71 MG/DL (ref 0–100)
LIPID PROFILE,FLP: ABNORMAL
LYMPHOCYTES # BLD: 1.5 K/UL (ref 0.9–3.6)
LYMPHOCYTES NFR BLD: 26 % (ref 21–52)
MCH RBC QN AUTO: 28.6 PG (ref 24–34)
MCHC RBC AUTO-ENTMCNC: 31.6 G/DL (ref 31–37)
MCV RBC AUTO: 90.5 FL (ref 74–97)
MONOCYTES # BLD: 0.4 K/UL (ref 0.05–1.2)
MONOCYTES NFR BLD: 7 % (ref 3–10)
NEUTS SEG # BLD: 3.7 K/UL (ref 1.8–8)
NEUTS SEG NFR BLD: 64 % (ref 40–73)
PLATELET # BLD AUTO: 307 K/UL (ref 135–420)
PMV BLD AUTO: 9.7 FL (ref 9.2–11.8)
POTASSIUM SERPL-SCNC: 4.2 MMOL/L (ref 3.5–5.5)
PROT SERPL-MCNC: 7.1 G/DL (ref 6.4–8.2)
PROTHROMBIN TIME: 16.9 SEC (ref 11.5–15.2)
RBC # BLD AUTO: 4.96 M/UL (ref 4.2–5.3)
SODIUM SERPL-SCNC: 141 MMOL/L (ref 136–145)
TIBC SERPL-MCNC: 335 UG/DL (ref 250–450)
TRIGL SERPL-MCNC: 95 MG/DL (ref ?–150)
VLDLC SERPL CALC-MCNC: 19 MG/DL
WBC # BLD AUTO: 5.7 K/UL (ref 4.6–13.2)

## 2019-04-16 PROCEDURE — 83516 IMMUNOASSAY NONANTIBODY: CPT

## 2019-04-16 PROCEDURE — 86708 HEPATITIS A ANTIBODY: CPT

## 2019-04-16 PROCEDURE — 87340 HEPATITIS B SURFACE AG IA: CPT

## 2019-04-16 PROCEDURE — 86038 ANTINUCLEAR ANTIBODIES: CPT

## 2019-04-16 PROCEDURE — 36415 COLL VENOUS BLD VENIPUNCTURE: CPT

## 2019-04-16 PROCEDURE — 83540 ASSAY OF IRON: CPT

## 2019-04-16 PROCEDURE — 86803 HEPATITIS C AB TEST: CPT

## 2019-04-16 PROCEDURE — 86704 HEP B CORE ANTIBODY TOTAL: CPT

## 2019-04-16 PROCEDURE — 82103 ALPHA-1-ANTITRYPSIN TOTAL: CPT

## 2019-04-16 PROCEDURE — 86706 HEP B SURFACE ANTIBODY: CPT

## 2019-04-16 PROCEDURE — 86256 FLUORESCENT ANTIBODY TITER: CPT

## 2019-04-16 PROCEDURE — 80048 BASIC METABOLIC PNL TOTAL CA: CPT

## 2019-04-16 PROCEDURE — 80061 LIPID PANEL: CPT

## 2019-04-16 PROCEDURE — 85610 PROTHROMBIN TIME: CPT

## 2019-04-16 PROCEDURE — 85025 COMPLETE CBC W/AUTO DIFF WBC: CPT

## 2019-04-16 PROCEDURE — 83036 HEMOGLOBIN GLYCOSYLATED A1C: CPT

## 2019-04-16 PROCEDURE — 80076 HEPATIC FUNCTION PANEL: CPT

## 2019-04-16 PROCEDURE — 82728 ASSAY OF FERRITIN: CPT

## 2019-04-16 RX ORDER — TRIAMCINOLONE ACETONIDE 1 MG/G
CREAM TOPICAL 2 TIMES DAILY
COMMUNITY
End: 2019-08-09

## 2019-04-16 RX ORDER — LIDOCAINE AND PRILOCAINE 25; 25 MG/G; MG/G
CREAM TOPICAL
COMMUNITY
End: 2021-06-15

## 2019-04-16 RX ORDER — PROMETHAZINE HYDROCHLORIDE 25 MG/1
25 TABLET ORAL
COMMUNITY
End: 2021-08-05

## 2019-04-16 RX ORDER — LEVOCETIRIZINE DIHYDROCHLORIDE 5 MG/1
TABLET, FILM COATED ORAL
COMMUNITY
End: 2019-08-09

## 2019-04-16 NOTE — Clinical Note
4/21/19 Patient: Nika Lo YOB: 1950 Date of Visit: 4/16/2019 Antonia Bartlett MD 
[address] [place] [address] 40 VIA Facsimile: [phone] Dear Antonia Bartlett MD, Thank you for referring Ms. Latricia Nagel to [address] [place] [address],[address] for evaluation. My notes for this consultation are attached. If you have questions, please do not hesitate to call me. I look forward to following your patient along with you. Sincerely, Luis Alberto Olivarez MD

## 2019-04-16 NOTE — PROGRESS NOTES
[address] 137 [facility] Kailyn Luis Alberto Olivarez MD, [facility] SALLY Amato PA-C Lianne Mano, [facility]   SALLY Perkins NP [address] [facility] 136 
  at [address], [facility] Eneida Barrera  22. 
  [phone] FAX: [address] 
  Critical access hospital 
  [address], [address] 98 [facility], [address] 
  [phone] FAX: [phone] Patient Care Team: 
Mayra Curran MD [place] PCP - [facility] [place] Marshall Chamberlain MD (Hematology and Oncology) Gisell Chanel DPM (Podiatry) Megan Rene MD (Cardiology) Problem List  Date Reviewed: 4/21/2019 Codes Class Noted Chronic anticoagulation ICD-10-CM: Z79.01 
ICD-9-CM: V58.61  4/16/2019 Elevated liver enzymes ICD-10-CM: R74.8 ICD-9-CM: 790.5  4/16/2019 Cellulitis of right foot ICD-10-CM: B08.649 ICD-9-CM: 682.7  12/5/2018 Diabetic foot ulcer ([facility] [address] ICD-10-CM: E11.621, U41.997 ICD-9-CM: 250.80, 707.15  12/4/2018 Type II diabetes mellitus with complication, uncontrolled (HCC) ICD-10-CM: E11.8, E11.65 ICD-9-CM: 250.92  4/13/2018 CAD (coronary artery disease) ICD-10-CM: I25.10 ICD-9-CM: 414.00  4/13/2018 Anxiety ICD-10-CM: F41.9 ICD-9-CM: 300.00  10/15/2017 PAD (peripheral artery disease) (HCC) (Chronic) ICD-10-CM: I73.9 ICD-9-CM: 443.9  7/22/2017 Sleepwalking disorder (Chronic) ICD-10-CM: F51.3 ICD-9-CM: 307.46  7/22/2017 Lupus anticoagulant syndrome ([facility] [address] ICD-10-CM: S54.63 
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 All medical records sent by the referring physicians were reviewed including imaging studies The patient is a 80 y.o.  female who was found to have abnormalities in liver transaminases in 2016. Serologic evaluation for markers of chronic liver disease has either not been performed or the results are not available to me. Imaging of the liver was not recently performed. An assessment of liver fibrosis with biopsy or elastography has not been performed. The patient has no symptoms which can be attributed to the liver disorder. The patient has not experienced notes fatigue. The patient completes all daily activities without any functional limitations. ASSESSMENT AND PLAN: 
Primary Biliary Cholangitis The diagnosis is based upon serology and an elevation in ALP. A liver biopsy has not been performed. Assessment of liver fibrosis with sheer wave elastogrphy is scheduled. Liver transaminases are normal.  ALP is elevated. Liver function is normal.  The platelet count is normal.   
Based upon laboratory studies and imaging the patient does not appear to have significant liver injury. Will consider starting GÉNESIS at the next apppointment. Given her age, other co-morbidities and that the liver diease appears mild it is possible that treatment of the liver disease may not be needed. Screening for Hepatocellular Carcinoma [address] screening is not necessary if the patient has no evidence of cirrhosis. The patient has cirrhrosis and should avoid the following medications: 
Benzodiazapines which could exacerbate HE. 
NSAIDs which are associated with a higher rate of developing ASPEN. Counseling for alcohol in patients with chronic liver disease The patient was counseled regarding alcohol consumption and the effect of alcohol on chronic liver disease. The patient does not consume any significant amount of alcohol. Vaccinations Vaccination for viral hepatitis A is not needed. The patient has serologic evidence of prior exposure or vaccination with immunity. Routine vaccinations against other bacterial and viral agents can be performed as indicated. Annual flu vaccination should be administered if indicated. ALLERGIES Allergies Allergen Reactions  Rifampin Hives and Swelling Swelling of mouth and tounge  Levaquin [Levofloxacin] Itching  Amoxicillin Hives  Aspirin Nausea and Vomiting  Azithromycin Angioedema  Bactrim [Sulfamethoprim Ds] Hives  Cefatrizine Hives  Erythromycin Hives  Rocephin [Ceftriaxone] Hives  Tylenol [Acetaminophen] Hives  Vancomycin Hives MEDICATIONS Current Outpatient Medications Medication Sig  
 triamcinolone acetonide (KENALOG) 0.1 % topical cream Apply  to affected area two (2) times a day. use thin layer  promethazine (PHENERGAN) 25 mg tablet Take 25 mg by mouth every six (6) hours as needed for Nausea.  B.infantis-B.ani-B.long-B.bifi (PROBIOTIC 4X) 10-15 mg TbEC Take  by mouth.  lidocaine-prilocaine (LIDOPRIL) cream Apply  to affected area now.  levocetirizine (XYZAL) 5 mg tablet Take  by mouth.  atorvastatin (LIPITOR) 20 mg tablet Take 20 mg by mouth daily.  warfarin (COUMADIN) 2 mg tablet Take 2 mg by mouth daily.  dronabinol (MARINOL) 2.5 mg capsule Take 2.5 mg by mouth three (3) times daily.  metoprolol tartrate (LOPRESSOR) 25 mg tablet Take 25 mg by mouth two (2) times a day.  pantoprazole (PROTONIX) 40 mg tablet Take 40 mg by mouth daily.  clopidogrel (PLAVIX) 75 mg tab Take 75 mg by mouth daily.   
 lactobacillus combination no.4 (PROBIOTIC) 3 billion cell cap Take 1 Cap by mouth daily.  warfarin (COUMADIN) 5 mg tablet Take 5 mg by mouth daily.  Cetirizine (ZYRTEC) 10 mg cap Take  by mouth.  glimepiride (AMARYL) 2 mg tablet Take 2 mg by mouth every morning.  ondansetron hcl (ZOFRAN, AS HYDROCHLORIDE,) 4 mg tablet Take 4 mg by mouth every eight (8) hours as needed for Nausea.  cyclobenzaprine (FLEXERIL) 10 mg tablet Take  by mouth three (3) times daily as needed for Muscle Spasm(s).  LORazepam (ATIVAN) 2 mg tablet Take 1 mg by mouth every six (6) hours as needed.  BIOTIN PO Take 5,000 mcg by mouth daily.  diphenhydrAMINE (BENADRYL) 25 mg capsule Take 50 mg by mouth every six (6) hours as needed. Indications: ALLERGIC RHINITIS  albuterol (PROVENTIL HFA, VENTOLIN HFA) 90 mcg/actuation inhaler Take 2 Puffs by inhalation every four (4) hours as needed. Indications: ACUTE ASTHMA ATTACK  cloNIDine (CATAPRES) 0.1 mg tablet Take 0.1 mg by mouth two (2) times a day. Indications: hot flashes;  
 metFORMIN (GLUCOPHAGE) 500 mg tablet Take 500 mg by mouth two (2) times daily (with meals). Indications: TYPE 2 DIABETES MELLITUS  ropinirole (REQUIP) 4 mg Tab TAB Take 4 mg by mouth nightly. Indications: RESTLESS LEGS SYNDROME  traMADol (ULTRAM) 50 mg tablet Take 50 mg by mouth every eight (8) hours as needed. Indications: PAIN  
 ertapenem 1 gram 1 g IVPB 1 g by IntraVENous route every twenty-four (24) hours.  nystatin (MYCOSTATIN) topical cream Apply  to affected area two (2) times a day.  nitroglycerin (NITROSTAT) 0.3 mg SL tablet by SubLINGual route every five (5) minutes as needed for Chest Pain (x3).  cyanocobalamin 1,000 mcg tablet Take 1,000 mcg by mouth daily.  melatonin 3 mg tablet Take 6 mg by mouth nightly. No current facility-administered medications for this visit. SYSTEM REVIEW NOT RELATED TO LIVER DISEASE OR REVIEWED ABOVE: 
Constitution systems: Negative for fever, chills, weight gain, weight loss. Eyes: Negative for visual changes. ENT: Negative for sore throat, painful swallowing. Respiratory: Negative for cough, hemoptysis, SOB. Cardiology: Negative for chest pain, palpitations. GI:  Prior gatrectomy and intestinal resection. : Negative for urinary frequency, dysuria, hematuria, nocturia. Skin: Negative for rash. Hematology: Negative for easy bruising, blood clots. Musculo-skelatal: Several toe amputations. Neurologic: Negative for headaches, dizziness, vertigo, memory problems not related to HE. Psychology: Negative for anxiety, depression. FAMILY HISTORY: 
The father  of dementia. The mother  of heart disease. There is no family history of liver disease. SOCIAL HISTORY: 
The patient is . The patient has 4 children, and 5 grandchildren. The patient stopped using tobacco products in . The patient consumes 2-3 alcoholic beverages per week. The patient did not work outside the home PHYSICAL EXAMINATION: 
Visit Vitals /75 (BP 1 Location: Left arm, BP Patient Position: Sitting) Pulse 71 Temp 99 °F (37.2 °C) (Tympanic) Resp 16 Ht 5' 4.5\" (1.638 m) Wt 137 lb (62.1 kg) SpO2 98% BMI 23.15 kg/m² General: No acute distress. Eyes: Sclera anicteric. ENT: No oral lesions. Thyroid normal. 
Nodes: No adenopathy. Skin: No spider angiomata. No jaundice. No palmar erythema. Respiratory: Lungs clear to auscultation. Cardiovascular: Regular heart rate. No murmurs. No JVD. Abdomen: Soft non-tender. Liver size normal to percussion/palpation. Spleen not palpable. No obvious ascites. Extremities: No edema. No muscle wasting. No gross arthritic changes. Neurologic: Alert and oriented. Cranial nerves grossly intact. No asterixis. LABORATORY STUDIES: 
Liver [place] [address] Units 2018 WBC 4.6 - 13.2 K/uL 5.7 6.0 ANC 1.8 - 8.0 K/UL 3.7 3.9 HGB 12.0 - 16.0 g/dL 14.2 9.8 (L)  - 420 K/uL 307 501 (H) INR 0.8 - 1.2   1.4 (H) AST 15 - 37 U/L 33 30 ALT 13 - 56 U/L 44 28 Alk Phos 45 - 117 U/L 255 (H) 202 (H) Bili, Total 0.2 - 1.0 MG/DL 0.2 0.1 (L) Bili, Direct 0.0 - 0.2 MG/DL <0.1 <0.1 Albumin 3.4 - 5.0 g/dL 3.9 3.1 (L) BUN 7.0 - 18 MG/DL 22 (H) Creat 0.6 - 1.3 MG/DL 0.66 Na 136 - 145 mmol/L 141   
K 3.5 - 5.5 mmol/L 4.2 Cl 100 - 108 mmol/L 108 CO2 21 - 32 mmol/L 26 Glucose 74 - 99 mg/dL 119 (H) SEROLOGIES: 
Serologies Latest Ref Rng & Units 4/16/2019 Hep A Ab, Total NEGATIVE   Positive (A) Hep B Surface Ag <1.00 Index <0.10 Hep B Surface Ag Interp NEG   NEGATIVE Hep B Core Ab, Total NEGATIVE   NEGATIVE Hep B Surface Ab >10.0 mIU/mL <3.10 (L) Hep B Surface Ab Interp POS   NEGATIVE (A) Hep C Ab 0.0 - 0.9 s/co ratio <0.1 Ferritin 8 - 388 NG/ Iron % Saturation % 21 PROSPER, IFA  NEGATIVE  
C-ANCA Neg:<1:20 titer <1:20  
P-ANCA Neg:<1:20 titer <1:20  
ANCA Neg:<1:20 titer <1:20  
ASMCA 0 - 19 Units 8  
M2 Ab 0.0 - 20.0 Units 31.4 (H) Alpha-1 antitrypsin level 90 - 200 mg/dL 144 LIVER HISTOLOGY: 
Not available or performed ENDOSCOPIC PROCEDURES: 
Not available or performed RADIOLOGY: 
10/2018. CT scan abdomen with IV contrast.  Normal appearing liver. No liver mass lesions. Normal spleen. No ascites. OTHER TESTING: 
Not available or performed FOLLOW-UP: 
All of the issues listed above in the Assessment and Plan were discussed with the patient. All questions were answered. The patient expressed a clear understanding of the above. [address] [facility] 87 in 4 weeks for elastography to review all data and determine the treatment plan. Aline Mendosa MD 
[address] [facility] [address] [facility], [address] [place], [address] 
[phone] [address] [place] [address]

## 2019-04-16 NOTE — PROGRESS NOTES
Renay Shetty is a 80 y.o. female 1. Have you been to the ER, urgent care clinic or hospitalized since your last visit? NO.  
 
2. Have you seen or consulted any other health care providers outside of the [address] [place] [address] since your last visit (Include any pap smears or colon screening)? NO Learning Assessment 4/16/2019 PRIMARY LEARNER Patient BARRIERS PRIMARY LEARNER NONE  
CO-LEARNER CAREGIVER No  
PRIMARY LANGUAGE ENGLISH  
LEARNER PREFERENCE PRIMARY LISTENING  
ANSWERED BY PATIENT  
RELATIONSHIP SELF

## 2019-04-17 LAB
HBV SURFACE AB SER QL IA: NEGATIVE
HBV SURFACE AB SERPL IA-ACNC: <3.1 MIU/ML
HBV SURFACE AG SER QL: <0.1 INDEX
HBV SURFACE AG SER QL: NEGATIVE
HEP BS AB COMMENT,HBSAC: ABNORMAL

## 2019-04-18 LAB
A1AT SERPL-MCNC: 144 MG/DL (ref 90–200)
ACTIN IGG SERPL-ACNC: 8 UNITS (ref 0–19)
ANA TITR SER IF: NEGATIVE {TITER}
COMMENT, 144067: NORMAL
HAV AB SER QL IA: POSITIVE
HBV CORE AB SERPL QL IA: NEGATIVE
HCV AB S/CO SERPL IA: <0.1 S/CO RATIO (ref 0–0.9)
MITOCHONDRIA M2 IGG SER-ACNC: 31.4 UNITS (ref 0–20)

## 2019-04-19 LAB
C-ANCA TITR SER IF: NORMAL TITER
P-ANCA ATYPICAL TITR SER IF: NORMAL TITER
P-ANCA TITR SER IF: NORMAL TITER

## 2019-04-21 PROBLEM — L97.512 RIGHT FOOT ULCER, WITH FAT LAYER EXPOSED (HCC): Status: RESOLVED | Noted: 2018-12-05 | Resolved: 2019-04-21

## 2019-04-25 ENCOUNTER — HOSPITAL ENCOUNTER (OUTPATIENT)
Dept: GENERAL RADIOLOGY | Age: 69
Discharge: HOME | End: 2019-04-25
Attending: PODIATRIST
Payer: MEDICARE

## 2019-04-25 DIAGNOSIS — M25.70 OSTEOPHYTE: ICD-10-CM

## 2019-04-25 PROCEDURE — 73630 X-RAY EXAM OF FOOT: CPT

## 2019-05-16 ENCOUNTER — HOSPITAL ENCOUNTER (OUTPATIENT)
Dept: LAB | Age: 69
Discharge: HOME | End: 2019-05-16
Payer: MEDICARE

## 2019-05-16 ENCOUNTER — HOSPITAL ENCOUNTER (OUTPATIENT)
Dept: ULTRASOUND IMAGING | Age: 69
Discharge: HOME | End: 2019-05-16
Attending: INTERNAL MEDICINE
Payer: MEDICARE

## 2019-05-16 DIAGNOSIS — R74.8 ELEVATED LIVER ENZYMES: ICD-10-CM

## 2019-05-16 PROCEDURE — 76981 USE PARENCHYMA: CPT

## 2019-05-16 PROCEDURE — G0480 DRUG TEST DEF 1-7 CLASSES: HCPCS

## 2019-05-16 PROCEDURE — 36415 COLL VENOUS BLD VENIPUNCTURE: CPT

## 2019-05-22 LAB — DRUGS UR: NORMAL

## 2019-05-30 ENCOUNTER — OFFICE VISIT (OUTPATIENT)
Dept: HEMATOLOGY | Age: 69
End: 2019-05-30

## 2019-05-30 VITALS
TEMPERATURE: 99.5 F | HEIGHT: 65 IN | OXYGEN SATURATION: 98 % | SYSTOLIC BLOOD PRESSURE: 101 MMHG | HEART RATE: 71 BPM | BODY MASS INDEX: 23.16 KG/M2 | WEIGHT: 139 LBS | DIASTOLIC BLOOD PRESSURE: 66 MMHG

## 2019-05-30 DIAGNOSIS — K74.3 PRIMARY BILIARY CHOLANGITIS (HCC): Primary | ICD-10-CM

## 2019-05-30 RX ORDER — URSODIOL 500 MG/1
500 TABLET, FILM COATED ORAL 2 TIMES DAILY
Qty: 60 TAB | Refills: 6 | Status: SHIPPED | OUTPATIENT
Start: 2019-05-30 | End: 2019-05-30 | Stop reason: SDUPTHER

## 2019-05-30 NOTE — PROGRESS NOTES
[facility], MD, MD [facility] PA-C Ballard Ran, [facility]     Tameka SMART Castro, [facility]   WANDY RossP-ROOSEVELT Casiano, [facility]       Lashawn Morin [facility] 136    at [address], [address] [place] [address], [facility] 22.    [phone]    FAX: [address] [place] [address], [address], [address] [phone]    FAX: [phone]         Patient Care Team:  Mihaela Byers MD as PCP - General (Family Practice)  Elvia Lopez MD (Hematology and Oncology)  David Brown DPM (Tomasz Freeze)  Basilia Simpson MD (Cardiology)      Problem List  Date Reviewed: 4/21/2019          Codes Class Noted    Chronic anticoagulation ICD-10-CM: Z79.01  ICD-9-CM: V58.61  4/16/2019        Elevated liver enzymes ICD-10-CM: R74.8  ICD-9-CM: 790.5  4/16/2019        Cellulitis of right foot ICD-10-CM: L03.115  ICD-9-CM: 682.7  12/5/2018        Diabetic foot ulcer ([facility] ICD-10-CM: E11.621, L97.509  ICD-9-CM: 250.80, 707.15  12/4/2018        Type II diabetes mellitus with complication, uncontrolled ([facility] ICD-10-CM: E11.8, E11.65  ICD-9-CM: 250.92  4/13/2018        CAD (coronary artery disease) ICD-10-CM: I25.10  ICD-9-CM: 414.00  4/13/2018        Anxiety ICD-10-CM: F41.9  ICD-9-CM: 300.00  10/15/2017        PAD (peripheral artery disease) ([facility] (Chronic) ICD-10-CM: I73.9  ICD-9-CM: 443.9  7/22/2017        Sleepwalking disorder (Chronic) ICD-10-CM: F51.3  ICD-9-CM: 307.46  7/22/2017        Lupus anticoagulant syndrome ([facility] ICD-10-CM: Y95.13  ICD-9-CM: 289.81  12/29/2016        Anemia ICD-10-CM: D64.9  ICD-9-CM: 285.9  3/1/2016        Atherosclerotic PVD with ulceration (HCC) (Chronic) ICD-10-CM: I70.209, L98.499  ICD-9-CM: 440.23, 707.9  3/13/2015        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 The active problem list, all pertinent past medical history, medications, radiologic findings and laboratory findings related to the liver disorder were reviewed with the patient. The patient is a 80 y.o.  female who was found to have an elevation in liver alkaline phosphatase in 2016. Serologic evaluation for markers of chronic liver disease was positive for AMA    The most recent imaging of the liver was Ultrasound performed in 5/2019. Results suggest chronic liver disease or fatty liver disease. No liver mass lesions noted. Assessment of liver fibrosis was performed with sheer wave elastogrphy at THE [facility] in 5/2016. The result was 6.2 kPa which correlates with stage 1 portal fibrosis    The patient has no symptoms which can be attributed to the liver disorder. The patient has not experienced fatigue. The patient completes all daily activities without any functional limitations. ASSESSMENT AND PLAN:  Primary Biliary Cholangitis  The diagnosis is based upon serology and an elevation in ALP. A liver biopsy has not been performed. Assessment of liver fibrosis with sheer wave elastogrphy demonstrated only stage 1 portal fibrosis. Liver transaminases are normal.  ALP is elevated. Liver function is normal.  The platelet count is normal.    Based upon laboratory studies, elastography and imaging the patient does not appear to have significant liver injury. The patient will be started on GÉNESIS at a dose of 1000 mg every day. The side effects of GÉNESIS including a 2% risk of itching and a 2% risk of diarrhea were discussed.   Given her age, other co-morbidities and treatment it is very unlikely that liver disease will contribute in any way to morbidity and mortality    Osteoporosis  The risk of osteoporosis is increased in patients with PBC. DEXA bone density to assess for osteoporosis should be ordered by the patients primary care physician. Treatment of osteopenia and osteoporosis is indicated if suggested by bone density. Hypercholesterolemia   This is common in patients with PBC. The cholesterol is typically HDL and does not always require treatment. Stains are not contraindicated if felt to be clinically warranted. Treatment of other medical problems in patients with chronic liver disease  There are no contraindications for the patient to take most medications that are necessary for treatment of other medical issues. Counseling for alcohol in patients with chronic liver disease  The patient was counseled regarding alcohol consumption and the effect of alcohol on chronic liver disease. The patient does not consume any significant amount of alcohol. Vaccinations   Vaccination for viral hepatitis A is not needed. The patient has serologic evidence of prior exposure or vaccination with immunity. Routine vaccinations against other bacterial and viral agents can be performed as indicated. Annual flu vaccination should be administered if indicated. ALLERGIES  Allergies   Allergen Reactions    Rifampin Hives and Swelling     Swelling of mouth and tounge    Levaquin [Levofloxacin] Itching    Amoxicillin Hives    Aspirin Nausea and Vomiting    Azithromycin Angioedema    Bactrim [Sulfamethoprim Ds] Hives    Cefatrizine Hives    Erythromycin Hives    Rocephin [Ceftriaxone] Hives    Tylenol [Acetaminophen] Hives    Vancomycin Hives       MEDICATIONS  Current Outpatient Medications   Medication Sig    triamcinolone acetonide (KENALOG) 0.1 % topical cream Apply  to affected area two (2) times a day.  use thin layer    promethazine (PHENERGAN) 25 mg tablet Take 25 mg by mouth every six (6) hours as needed for Nausea.  B.infantis-B.ani-B.long-B.bifi (PROBIOTIC 4X) 10-15 mg TbEC Take  by mouth.  lidocaine-prilocaine (LIDOPRIL) cream Apply  to affected area now.  levocetirizine (XYZAL) 5 mg tablet Take  by mouth.  ertapenem 1 gram 1 g IVPB 1 g by IntraVENous route every twenty-four (24) hours.  nystatin (MYCOSTATIN) topical cream Apply  to affected area two (2) times a day.  atorvastatin (LIPITOR) 20 mg tablet Take 20 mg by mouth daily.  warfarin (COUMADIN) 2 mg tablet Take 2 mg by mouth daily.  dronabinol (MARINOL) 2.5 mg capsule Take 2.5 mg by mouth three (3) times daily.  metoprolol tartrate (LOPRESSOR) 25 mg tablet Take 25 mg by mouth two (2) times a day.  nitroglycerin (NITROSTAT) 0.3 mg SL tablet by SubLINGual route every five (5) minutes as needed for Chest Pain (x3).  pantoprazole (PROTONIX) 40 mg tablet Take 40 mg by mouth daily.  clopidogrel (PLAVIX) 75 mg tab Take 75 mg by mouth daily.  lactobacillus combination no.4 (PROBIOTIC) 3 billion cell cap Take 1 Cap by mouth daily.  warfarin (COUMADIN) 5 mg tablet Take 5 mg by mouth daily.  Cetirizine (ZYRTEC) 10 mg cap Take  by mouth.  cyanocobalamin 1,000 mcg tablet Take 1,000 mcg by mouth daily.  glimepiride (AMARYL) 2 mg tablet Take 2 mg by mouth every morning.  melatonin 3 mg tablet Take 6 mg by mouth nightly.  ondansetron hcl (ZOFRAN, AS HYDROCHLORIDE,) 4 mg tablet Take 4 mg by mouth every eight (8) hours as needed for Nausea.  cyclobenzaprine (FLEXERIL) 10 mg tablet Take  by mouth three (3) times daily as needed for Muscle Spasm(s).  LORazepam (ATIVAN) 2 mg tablet Take 1 mg by mouth every six (6) hours as needed.  BIOTIN PO Take 5,000 mcg by mouth daily.  diphenhydrAMINE (BENADRYL) 25 mg capsule Take 50 mg by mouth every six (6) hours as needed.  Indications: ALLERGIC RHINITIS    albuterol (PROVENTIL HFA, VENTOLIN HFA) 90 mcg/actuation inhaler Take 2 Puffs by inhalation every four (4) hours as needed. Indications: ACUTE ASTHMA ATTACK    cloNIDine (CATAPRES) 0.1 mg tablet Take 0.1 mg by mouth two (2) times a day. Indications: hot flashes;    metFORMIN (GLUCOPHAGE) 500 mg tablet Take 500 mg by mouth two (2) times daily (with meals). Indications: TYPE 2 DIABETES MELLITUS    ropinirole (REQUIP) 4 mg Tab TAB Take 4 mg by mouth nightly. Indications: RESTLESS LEGS SYNDROME    traMADol (ULTRAM) 50 mg tablet Take 50 mg by mouth every eight (8) hours as needed. Indications: PAIN     No current facility-administered medications for this visit. SYSTEM REVIEW NOT RELATED TO LIVER DISEASE OR REVIEWED ABOVE:  Constitution systems: Negative for fever, chills, weight gain, weight loss. Eyes: Negative for visual changes. ENT: Negative for sore throat, painful swallowing. Respiratory: Negative for cough, hemoptysis, SOB. Cardiology: Negative for chest pain, palpitations. GI:  Prior gatrectomy and intestinal resection. : Negative for urinary frequency, dysuria, hematuria, nocturia. Skin: Negative for rash. Hematology: Negative for easy bruising, blood clots. Musculo-skelatal: Several toe amputations. Neurologic: Negative for headaches, dizziness, vertigo, memory problems not related to HE. Psychology: Negative for anxiety, depression. FAMILY HISTORY:  The father  of dementia. The mother  of heart disease. There is no family history of liver disease. SOCIAL HISTORY:  The patient is . The patient has 4 children, and 5 grandchildren. The patient stopped using tobacco products in . The patient consumes 2-3 alcoholic beverages per week. The patient did not work outside the home    PHYSICAL EXAMINATION:  Visit Vitals  /66   Pulse 71   Temp 99.5 °F (37.5 °C) (Tympanic)   Ht 5' 4.5\" (1.638 m)   Wt 139 lb (63 kg)   SpO2 98%   BMI 23.49 kg/m²     General: No acute distress. Eyes: Sclera anicteric.    ENT: No oral lesions. Thyroid normal.  Nodes: No adenopathy. Skin: No spider angiomata. No jaundice. No palmar erythema. Respiratory: Lungs clear to auscultation. Cardiovascular: Regular heart rate. No murmurs. No JVD. Abdomen: Soft non-tender. Liver size normal to percussion/palpation. Spleen not palpable. No obvious ascites. Extremities: No edema. No muscle wasting. No gross arthritic changes. Neurologic: Alert and oriented. Cranial nerves grossly intact. No asterixis. LABORATORY STUDIES:  Liver [facility] [address] Units 4/16/2019 12/11/2018   WBC 4.6 - 13.2 K/uL 5.7 6.0   ANC 1.8 - 8.0 K/UL 3.7 3.9   HGB 12.0 - 16.0 g/dL 14.2 9.8 (L)    - 420 K/uL 307 501 (H)   INR 0.8 - 1.2   1.4 (H)    AST 15 - 37 U/L 33 30   ALT 13 - 56 U/L 44 28   Alk Phos 45 - 117 U/L 255 (H) 202 (H)   Bili, Total 0.2 - 1.0 MG/DL 0.2 0.1 (L)   Bili, Direct 0.0 - 0.2 MG/DL <0.1 <0.1   Albumin 3.4 - 5.0 g/dL 3.9 3.1 (L)   BUN 7.0 - 18 MG/DL 22 (H)    Creat 0.6 - 1.3 MG/DL 0.66    Na 136 - 145 mmol/L 141    K 3.5 - 5.5 mmol/L 4.2    Cl 100 - 108 mmol/L 108    CO2 21 - 32 mmol/L 26    Glucose 74 - 99 mg/dL 119 (H)      SEROLOGIES:  Serologies Latest Ref Rng & Units 4/16/2019   Hep A Ab, Total NEGATIVE   Positive (A)   Hep B Surface Ag <1.00 Index <0.10   Hep B Surface Ag Interp NEG   NEGATIVE   Hep B Core Ab, Total NEGATIVE   NEGATIVE   Hep B Surface Ab >10.0 mIU/mL <3.10 (L)   Hep B Surface Ab Interp POS   NEGATIVE (A)   Hep C Ab 0.0 - 0.9 s/co ratio <0.1   Ferritin 8 - 388 NG/   Iron % Saturation % 21   PROSPER, IFA  NEGATIVE   C-ANCA Neg:<1:20 titer <1:20   P-ANCA Neg:<1:20 titer <1:20   ANCA Neg:<1:20 titer <1:20   ASMCA 0 - 19 Units 8   M2 Ab 0.0 - 20.0 Units 31.4 (H)   Alpha-1 antitrypsin level 90 - 200 mg/dL 144     LIVER HISTOLOGY:  5/2019. Sheer wave elastography performed at THE [facility]. E Range: 4.63- 7.86 kPa, E Mean: 6.25 kPa, E Median: 6.22 kPa, E Std: 1.1 kPa.   The results suggested a fibrosis level of F1.    ENDOSCOPIC PROCEDURES:  Not available or performed    RADIOLOGY:  10/2018. CT scan abdomen with IV contrast.  Normal appearing liver. No liver mass lesions. Normal spleen. No ascites. 5/2019. Ultrasound of liver. Echogenic consistent with chronic liver disease. No liver mass lesions. No dilated bile ducts. No ascites. OTHER TESTING:  Not available or performed    FOLLOW-UP:  All of the issues listed above in the Assessment and Plan were discussed with the patient. All questions were answered. The patient expressed a clear understanding of the above. [address] [address] in 4 weeks to assess tolerability of GÉNESIS.         Carson Mancini MD  [address], [address] [place] Landau, [address] [place] [address] - [address]  12 [facility]

## 2019-05-30 NOTE — Clinical Note
7/7/19 Patient: Laurence Montana YOB: 1950 Date of Visit: 5/30/2019 Kailey Mc MD 
[address] [place] [address] 40 VIA Facsimile: [phone] Dear Kailey Mc MD, Thank you for referring Ms. Avery Holley to [address] for evaluation. My notes for this consultation are attached. If you have questions, please do not hesitate to call me. I look forward to following your patient along with you. Sincerely, Denice Patel MD

## 2019-06-01 RX ORDER — URSODIOL 500 MG/1
TABLET, FILM COATED ORAL
Qty: 180 TAB | Refills: 6 | Status: SHIPPED | OUTPATIENT
Start: 2019-06-01 | End: 2020-06-15

## 2019-07-01 ENCOUNTER — OFFICE VISIT (OUTPATIENT)
Dept: HEMATOLOGY | Age: 69
End: 2019-07-01

## 2019-07-01 ENCOUNTER — HOSPITAL ENCOUNTER (OUTPATIENT)
Dept: LAB | Age: 69
Discharge: HOME | End: 2019-07-01
Payer: MEDICARE

## 2019-07-01 VITALS
TEMPERATURE: 98.7 F | DIASTOLIC BLOOD PRESSURE: 70 MMHG | SYSTOLIC BLOOD PRESSURE: 113 MMHG | HEIGHT: 65 IN | BODY MASS INDEX: 22.49 KG/M2 | WEIGHT: 135 LBS | OXYGEN SATURATION: 98 % | HEART RATE: 64 BPM

## 2019-07-01 DIAGNOSIS — K74.3 PRIMARY BILIARY CHOLANGITIS (HCC): Primary | ICD-10-CM

## 2019-07-01 DIAGNOSIS — K74.3 PRIMARY BILIARY CHOLANGITIS (HCC): ICD-10-CM

## 2019-07-01 LAB
ALBUMIN SERPL-MCNC: 3.8 G/DL (ref 3.4–5)
ALBUMIN/GLOB SERPL: 1.3 {RATIO} (ref 0.8–1.7)
ALP SERPL-CCNC: 149 U/L (ref 45–117)
ALT SERPL-CCNC: 32 U/L (ref 13–56)
ANION GAP SERPL CALC-SCNC: 6 MMOL/L (ref 3–18)
AST SERPL-CCNC: 34 U/L (ref 15–37)
BASOPHILS # BLD: 0 K/UL (ref 0–0.1)
BASOPHILS NFR BLD: 0 % (ref 0–2)
BILIRUB DIRECT SERPL-MCNC: 0.1 MG/DL (ref 0–0.2)
BILIRUB SERPL-MCNC: 0.3 MG/DL (ref 0.2–1)
BUN SERPL-MCNC: 19 MG/DL (ref 7–18)
BUN/CREAT SERPL: 22 (ref 12–20)
CALCIUM SERPL-MCNC: 9.1 MG/DL (ref 8.5–10.1)
CHLORIDE SERPL-SCNC: 104 MMOL/L (ref 100–108)
CO2 SERPL-SCNC: 28 MMOL/L (ref 21–32)
CREAT SERPL-MCNC: 0.88 MG/DL (ref 0.6–1.3)
DIFFERENTIAL METHOD BLD: ABNORMAL
EOSINOPHIL # BLD: 0.1 K/UL (ref 0–0.4)
EOSINOPHIL NFR BLD: 1 % (ref 0–5)
ERYTHROCYTE [DISTWIDTH] IN BLOOD BY AUTOMATED COUNT: 14.2 % (ref 11.6–14.5)
GLOBULIN SER CALC-MCNC: 2.9 G/DL (ref 2–4)
GLUCOSE SERPL-MCNC: 139 MG/DL (ref 74–99)
HCT VFR BLD AUTO: 43.4 % (ref 35–45)
HGB BLD-MCNC: 14 G/DL (ref 12–16)
LYMPHOCYTES # BLD: 0.9 K/UL (ref 0.9–3.6)
LYMPHOCYTES NFR BLD: 10 % (ref 21–52)
MCH RBC QN AUTO: 30.7 PG (ref 24–34)
MCHC RBC AUTO-ENTMCNC: 32.3 G/DL (ref 31–37)
MCV RBC AUTO: 95.2 FL (ref 74–97)
MONOCYTES # BLD: 0.7 K/UL (ref 0.05–1.2)
MONOCYTES NFR BLD: 7 % (ref 3–10)
NEUTS SEG # BLD: 7.5 K/UL (ref 1.8–8)
NEUTS SEG NFR BLD: 82 % (ref 40–73)
PLATELET # BLD AUTO: 267 K/UL (ref 135–420)
PMV BLD AUTO: 10.1 FL (ref 9.2–11.8)
POTASSIUM SERPL-SCNC: 4.4 MMOL/L (ref 3.5–5.5)
PROT SERPL-MCNC: 6.7 G/DL (ref 6.4–8.2)
RBC # BLD AUTO: 4.56 M/UL (ref 4.2–5.3)
SODIUM SERPL-SCNC: 138 MMOL/L (ref 136–145)
WBC # BLD AUTO: 9.2 K/UL (ref 4.6–13.2)

## 2019-07-01 PROCEDURE — 36415 COLL VENOUS BLD VENIPUNCTURE: CPT

## 2019-07-01 PROCEDURE — 80076 HEPATIC FUNCTION PANEL: CPT

## 2019-07-01 PROCEDURE — 85025 COMPLETE CBC W/AUTO DIFF WBC: CPT

## 2019-07-01 PROCEDURE — 80048 BASIC METABOLIC PNL TOTAL CA: CPT

## 2019-07-01 NOTE — PROGRESS NOTES
[address] [place] [address], MD, MD Deedee Egan, PA-ROOSEVELT Copeland, ACN-BC     April S Castro, [facility]   Nicole Adams FNP-C    Rachele Yuen, [facility]       Lashawn Castellanos [address]    at [address], [address] [place] [address], [facility] 22.    [phone]    FAX: [address] [place] [address], [address]    [phone]    FAX: [phone]     Patient Care Team:  Cristina Begum MD as PCP - General (Family Practice)  Taqueria Salinas MD (Hematology and Oncology)  Alesia Haynes DPM ([facility]ilo [facility]govern)  Raiza Fu MD (Cardiology)      Problem List  Date Reviewed: 7/7/2019          Codes Class Noted    Primary biliary cholangitis [facility]) ICD-10-CM: K74.3  ICD-9-CM: 571.6  7/9/2019        Chronic anticoagulation ICD-10-CM: Z79.01  ICD-9-CM: V58.61  4/16/2019        Elevated liver enzymes ICD-10-CM: R74.8  ICD-9-CM: 790.5  4/16/2019        Cellulitis of right foot ICD-10-CM: L03.115  ICD-9-CM: 682.7  12/5/2018        Diabetic foot ulcer ([facility].) ICD-10-CM: E11.621, L97.509  ICD-9-CM: 250.80, 707.15  12/4/2018        Type II diabetes mellitus with complication, uncontrolled ([facility].) ICD-10-CM: E11.8, E11.65  ICD-9-CM: 250.92  4/13/2018        CAD (coronary artery disease) ICD-10-CM: I25.10  ICD-9-CM: 414.00  4/13/2018        Anxiety ICD-10-CM: F41.9  ICD-9-CM: 300.00  10/15/2017        PAD (peripheral artery disease) ([facility] (Chronic) ICD-10-CM: I73.9  ICD-9-CM: 443.9  7/22/2017        Sleepwalking disorder (Chronic) ICD-10-CM: F51.3  ICD-9-CM: 307.46  7/22/2017        Lupus anticoagulant syndrome ([facility] 75.) ICD-10-CM: U45.01  ICD-9-CM: 289.81  12/29/2016        Anemia ICD-10-CM: D64.9  ICD-9-CM: 285.9  3/1/2016 Atherosclerotic PVD with ulceration (HCC) (Chronic) ICD-10-CM: I70.209, L98.499  ICD-9-CM: 440.23, 707.9  3/13/2015        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 The active problem list, all pertinent past medical history, medications, radiologic findings and laboratory findings related to the liver disorder were reviewed with the patient. The patient is a 80 y.o.  female who was found to have an elevation in liver alkaline phosphatase in 2016. Serologic evaluation for markers of chronic liver disease was positive for AMA    The most recent imaging of the liver was Ultrasound performed in 5/2019. Results suggest chronic liver disease or fatty liver disease. No liver mass lesions noted. Assessment of liver fibrosis was performed with sheer wave elastogrphy at [facility] in 5/2016. The result was 6.2 kPa which correlates with stage 1 portal fibrosis    The patient has no symptoms which can be attributed to the liver disorder. The patient has not experienced fatigue. The patient completes all daily activities without any functional limitations. ASSESSMENT AND PLAN:  Primary Biliary Cholangitis  The diagnosis is based upon serology and an elevation in ALP. A liver biopsy has not been performed. Assessment of liver fibrosis with shear wave elastography demonstrated only stage 1 portal fibrosis. Liver transaminases are normal.  ALP is elevated. Liver function is normal.  The platelet count is normal.    Based upon laboratory studies, elastography and imaging the patient does not appear to have significant liver injury. The patient was started on GÉNESIS at a dose of 1000 mg every day in 6/2019.    The side effects of GÉNESIS including a 2% risk of itching and a 2% risk of diarrhea were discussed. Given her age, other co-morbidities and treatment it is very unlikely that liver disease will contribute in any way to morbidity and mortality    Osteoporosis  The risk of osteoporosis is increased in patients with PBC. DEXA bone density to assess for osteoporosis should be ordered by the patients primary care physician. Treatment of osteopenia and osteoporosis is indicated if suggested by bone density. Hypercholesterolemia   This is common in patients with PBC. The cholesterol is typically HDL and does not always require treatment. Statins are not contraindicated if felt to be clinically warranted. Treatment of other medical problems in patients with chronic liver disease  There are no contraindications for the patient to take most medications that are necessary for treatment of other medical issues. Counseling for alcohol in patients with chronic liver disease  The patient was counseled regarding alcohol consumption and the effect of alcohol on chronic liver disease. The patient does not consume any significant amount of alcohol. Vaccinations   Vaccination for viral hepatitis A is not needed. The patient has serologic evidence of prior exposure or vaccination with immunity. Routine vaccinations against other bacterial and viral agents can be performed as indicated. Annual flu vaccination should be administered if indicated.       ALLERGIES  Allergies   Allergen Reactions    Rifampin Hives and Swelling     Swelling of mouth and tounge    Levaquin [Levofloxacin] Itching    Amoxicillin Hives    Aspirin Nausea and Vomiting    Azithromycin Angioedema    Bactrim [Sulfamethoprim Ds] Hives    Cefatrizine Hives    Erythromycin Hives    Rocephin [Ceftriaxone] Hives    Tylenol [Acetaminophen] Hives    Vancomycin Hives       MEDICATIONS  Current Outpatient Medications   Medication Sig    ursodiol (ACTIGALL) 500 mg tablet TAKE 1 TABLET BY MOUTH TWICE DAILY    triamcinolone acetonide (KENALOG) 0.1 % topical cream Apply  to affected area two (2) times a day. use thin layer    promethazine (PHENERGAN) 25 mg tablet Take 25 mg by mouth every six (6) hours as needed for Nausea.  B.infantis-B.ani-B.long-B.bifi (PROBIOTIC 4X) 10-15 mg TbEC Take  by mouth.  lidocaine-prilocaine (LIDOPRIL) cream Apply  to affected area now.  levocetirizine (XYZAL) 5 mg tablet Take  by mouth.  ertapenem 1 gram 1 g IVPB 1 g by IntraVENous route every twenty-four (24) hours.  nystatin (MYCOSTATIN) topical cream Apply  to affected area two (2) times a day.  atorvastatin (LIPITOR) 20 mg tablet Take 20 mg by mouth daily.  warfarin (COUMADIN) 2 mg tablet Take 2 mg by mouth daily.  dronabinol (MARINOL) 2.5 mg capsule Take 2.5 mg by mouth three (3) times daily.  metoprolol tartrate (LOPRESSOR) 25 mg tablet Take 25 mg by mouth two (2) times a day.  nitroglycerin (NITROSTAT) 0.3 mg SL tablet by SubLINGual route every five (5) minutes as needed for Chest Pain (x3).  pantoprazole (PROTONIX) 40 mg tablet Take 40 mg by mouth daily.  clopidogrel (PLAVIX) 75 mg tab Take 75 mg by mouth daily.  lactobacillus combination no.4 (PROBIOTIC) 3 billion cell cap Take 1 Cap by mouth daily.  warfarin (COUMADIN) 5 mg tablet Take 5 mg by mouth daily.  Cetirizine (ZYRTEC) 10 mg cap Take  by mouth.  cyanocobalamin 1,000 mcg tablet Take 1,000 mcg by mouth daily.  glimepiride (AMARYL) 2 mg tablet Take 2 mg by mouth every morning.  melatonin 3 mg tablet Take 6 mg by mouth nightly.  ondansetron hcl (ZOFRAN, AS HYDROCHLORIDE,) 4 mg tablet Take 4 mg by mouth every eight (8) hours as needed for Nausea.  cyclobenzaprine (FLEXERIL) 10 mg tablet Take  by mouth three (3) times daily as needed for Muscle Spasm(s).  LORazepam (ATIVAN) 2 mg tablet Take 1 mg by mouth every six (6) hours as needed.  BIOTIN PO Take 5,000 mcg by mouth daily.     diphenhydrAMINE (BENADRYL) 25 mg capsule Take 50 mg by mouth every six (6) hours as needed. Indications: ALLERGIC RHINITIS    albuterol (PROVENTIL HFA, VENTOLIN HFA) 90 mcg/actuation inhaler Take 2 Puffs by inhalation every four (4) hours as needed. Indications: ACUTE ASTHMA ATTACK    cloNIDine (CATAPRES) 0.1 mg tablet Take 0.1 mg by mouth two (2) times a day. Indications: hot flashes;    metFORMIN (GLUCOPHAGE) 500 mg tablet Take 500 mg by mouth two (2) times daily (with meals). Indications: TYPE 2 DIABETES MELLITUS    ropinirole (REQUIP) 4 mg Tab TAB Take 4 mg by mouth nightly. Indications: RESTLESS LEGS SYNDROME    traMADol (ULTRAM) 50 mg tablet Take 50 mg by mouth every eight (8) hours as needed. Indications: PAIN     No current facility-administered medications for this visit. SYSTEM REVIEW NOT RELATED TO LIVER DISEASE OR REVIEWED ABOVE:  Constitution systems: Negative for fever, chills, weight gain, weight loss. Eyes: Negative for visual changes. ENT: Negative for sore throat, painful swallowing. Respiratory: Negative for cough, hemoptysis, SOB. Cardiology: Negative for chest pain, palpitations. GI:  Prior gatrectomy and intestinal resection. : Negative for urinary frequency, dysuria, hematuria, nocturia. Skin: Negative for rash. Hematology: Negative for easy bruising, blood clots. Musculo-skelatal: Several toe amputations. Neurologic: Negative for headaches, dizziness, vertigo, memory problems not related to HE. Psychology: Negative for anxiety, depression. FAMILY HISTORY:  The father  of dementia. The mother  of heart disease. There is no family history of liver disease. SOCIAL HISTORY:  The patient is . The patient has 4 children, and 5 grandchildren. The patient stopped using tobacco products in . The patient consumes 2-3 alcoholic beverages per week.     The patient did not work outside the home    PHYSICAL EXAMINATION:  Visit Vitals  /70 (BP 1 Location: Right arm, BP Patient Position: Sitting)   Pulse 64   Temp 98.7 °F (37.1 °C)   Ht 5' 4.5\" (1.638 m)   Wt 135 lb (61.2 kg)   SpO2 98%   BMI 22.81 kg/m²     General: No acute distress. Eyes: Sclera anicteric. ENT: No oral lesions. Thyroid normal.  Nodes: No adenopathy. Skin: No spider angiomata. No jaundice. No palmar erythema. Respiratory: Lungs clear to auscultation. Cardiovascular: Regular heart rate. No murmurs. No JVD. Abdomen: Soft non-tender. Liver size normal to percussion/palpation. Spleen not palpable. No obvious ascites. Extremities: No edema. No muscle wasting. No gross arthritic changes. Neurologic: Alert and oriented. Cranial nerves grossly intact. No asterixis.     LABORATORY STUDIES:  Liver [place] [address] & Units 7/1/2019 4/16/2019   WBC 4.6 - 13.2 K/uL 9.2 5.7   ANC 1.8 - 8.0 K/UL 7.5 3.7   HGB 12.0 - 16.0 g/dL 14.0 14.2    - 420 K/uL 267 307   INR 0.8 - 1.2    1.4 (H)   AST 15 - 37 U/L 34 33   ALT 13 - 56 U/L 32 44   Alk Phos 45 - 117 U/L 149 (H) 255 (H)   Bili, Total 0.2 - 1.0 MG/DL 0.3 0.2   Bili, Direct 0.0 - 0.2 MG/DL 0.1 <0.1   Albumin 3.4 - 5.0 g/dL 3.8 3.9   BUN 7.0 - 18 MG/DL 19 (H) 22 (H)   Creat 0.6 - 1.3 MG/DL 0.88 0.66   Na 136 - 145 mmol/L 138 141   K 3.5 - 5.5 mmol/L 4.4 4.2   Cl 100 - 108 mmol/L 104 108   CO2 21 - 32 mmol/L 28 26   Glucose 74 - 99 mg/dL 139 (H) 119 (H)     SEROLOGIES:  Serologies Latest Ref Rng & Units 4/16/2019   Hep A Ab, Total NEGATIVE   Positive (A)   Hep B Surface Ag <1.00 Index <0.10   Hep B Surface Ag Interp NEG   NEGATIVE   Hep B Core Ab, Total NEGATIVE   NEGATIVE   Hep B Surface Ab >10.0 mIU/mL <3.10 (L)   Hep B Surface Ab Interp POS   NEGATIVE (A)   Hep C Ab 0.0 - 0.9 s/co ratio <0.1   Ferritin 8 - 388 NG/   Iron % Saturation % 21   PROSPER, IFA  NEGATIVE   C-ANCA Neg:<1:20 titer <1:20   P-ANCA Neg:<1:20 titer <1:20   ANCA Neg:<1:20 titer <1:20   ASMCA 0 - 19 Units 8   M2 Ab 0.0 - 20.0 Units 31.4 (H)   Alpha-1 antitrypsin level 90 - 200 mg/dL 144     LIVER HISTOLOGY:  5/2019. Shear wave elastography performed at [facility]. E Range: 4.63- 7.86 kPa, E Mean: 6.25 kPa, E Median: 6.22 kPa, E Std: 1.1 kPa. The results suggested a fibrosis level of F1.    ENDOSCOPIC PROCEDURES:  Not available or performed    RADIOLOGY:  10/2018. CT scan abdomen with IV contrast.  Normal appearing liver. No liver mass lesions. Normal spleen. No ascites. 5/2019. Ultrasound of liver. Echogenic consistent with chronic liver disease. No liver mass lesions. No dilated bile ducts. No ascites. OTHER TESTING:  Not available or performed    FOLLOW-UP:  All of the issues listed above in the Assessment and Plan were discussed with the patient. All questions were answered. The patient expressed a clear understanding of the above. [address] [facility] 87 in 3 month for monitoring.        Reyes Escobar AGPCNP-BC  [address] Liver [place] [address] [address]  [facility], [address] [place] [address] Street - [address]  [phone]

## 2019-07-01 NOTE — PROGRESS NOTES
1. Have you been to the ER, urgent care clinic since your last visit? Hospitalized since your last visit? No    2. Have you seen or consulted any other health care providers outside of the [address] [place] [address] since your last visit? Include any pap smears or colon screening. Yes .

## 2019-07-09 PROBLEM — K74.3 PRIMARY BILIARY CHOLANGITIS (HCC): Status: ACTIVE | Noted: 2019-07-09

## 2019-07-11 NOTE — PROGRESS NOTES
Please let patient know her ALP is down to 149. GÉNESIS looks to be working. Follow up as scheduled.

## 2019-08-09 ENCOUNTER — APPOINTMENT (OUTPATIENT)
Dept: GENERAL RADIOLOGY | Age: 69
End: 2019-08-09
Attending: EMERGENCY MEDICINE
Payer: MEDICARE

## 2019-08-09 ENCOUNTER — HOSPITAL ENCOUNTER (EMERGENCY)
Age: 69
Discharge: HOME | End: 2019-08-09
Attending: EMERGENCY MEDICINE
Payer: MEDICARE

## 2019-08-09 VITALS
OXYGEN SATURATION: 100 % | RESPIRATION RATE: 18 BRPM | SYSTOLIC BLOOD PRESSURE: 120 MMHG | HEART RATE: 68 BPM | DIASTOLIC BLOOD PRESSURE: 63 MMHG | HEIGHT: 64 IN | WEIGHT: 135 LBS | BODY MASS INDEX: 23.05 KG/M2 | TEMPERATURE: 98.3 F

## 2019-08-09 DIAGNOSIS — R79.1 SUBTHERAPEUTIC INTERNATIONAL NORMALIZED RATIO (INR): ICD-10-CM

## 2019-08-09 DIAGNOSIS — E11.621 DIABETIC FOOT ULCER ASSOCIATED WITH TYPE 2 DIABETES MELLITUS, UNSPECIFIED LATERALITY, UNSPECIFIED PART OF FOOT, UNSPECIFIED ULCER STAGE (HCC): Primary | ICD-10-CM

## 2019-08-09 DIAGNOSIS — L97.509 DIABETIC FOOT ULCER ASSOCIATED WITH TYPE 2 DIABETES MELLITUS, UNSPECIFIED LATERALITY, UNSPECIFIED PART OF FOOT, UNSPECIFIED ULCER STAGE (HCC): Primary | ICD-10-CM

## 2019-08-09 LAB
ALBUMIN SERPL-MCNC: 3.1 G/DL (ref 3.4–5)
ALBUMIN/GLOB SERPL: 1.1 {RATIO} (ref 0.8–1.7)
ALP SERPL-CCNC: 120 U/L (ref 45–117)
ALT SERPL-CCNC: 18 U/L (ref 13–56)
ANION GAP SERPL CALC-SCNC: 5 MMOL/L (ref 3–18)
AST SERPL-CCNC: 24 U/L (ref 10–38)
BASOPHILS # BLD: 0 K/UL (ref 0–0.1)
BASOPHILS NFR BLD: 0 % (ref 0–2)
BILIRUB SERPL-MCNC: 0.1 MG/DL (ref 0.2–1)
BUN SERPL-MCNC: 24 MG/DL (ref 7–18)
BUN/CREAT SERPL: 33 (ref 12–20)
CALCIUM SERPL-MCNC: 8.6 MG/DL (ref 8.5–10.1)
CHLORIDE SERPL-SCNC: 111 MMOL/L (ref 100–111)
CO2 SERPL-SCNC: 27 MMOL/L (ref 21–32)
CREAT SERPL-MCNC: 0.73 MG/DL (ref 0.6–1.3)
DIFFERENTIAL METHOD BLD: ABNORMAL
EOSINOPHIL # BLD: 0.1 K/UL (ref 0–0.4)
EOSINOPHIL NFR BLD: 2 % (ref 0–5)
ERYTHROCYTE [DISTWIDTH] IN BLOOD BY AUTOMATED COUNT: 15 % (ref 11.6–14.5)
GLOBULIN SER CALC-MCNC: 2.9 G/DL (ref 2–4)
GLUCOSE SERPL-MCNC: 161 MG/DL (ref 74–99)
HCT VFR BLD AUTO: 40.8 % (ref 35–45)
HGB BLD-MCNC: 13.4 G/DL (ref 12–16)
INR PPP: 1.3 (ref 0.8–1.2)
LACTATE BLD-SCNC: 1.47 MMOL/L (ref 0.4–2)
LYMPHOCYTES # BLD: 1.3 K/UL (ref 0.9–3.6)
LYMPHOCYTES NFR BLD: 25 % (ref 21–52)
MCH RBC QN AUTO: 31.2 PG (ref 24–34)
MCHC RBC AUTO-ENTMCNC: 32.8 G/DL (ref 31–37)
MCV RBC AUTO: 95.1 FL (ref 74–97)
MONOCYTES # BLD: 0.4 K/UL (ref 0.05–1.2)
MONOCYTES NFR BLD: 8 % (ref 3–10)
NEUTS SEG # BLD: 3.5 K/UL (ref 1.8–8)
NEUTS SEG NFR BLD: 65 % (ref 40–73)
PLATELET # BLD AUTO: 282 K/UL (ref 135–420)
PMV BLD AUTO: 9.5 FL (ref 9.2–11.8)
POTASSIUM SERPL-SCNC: 4 MMOL/L (ref 3.5–5.5)
PROT SERPL-MCNC: 6 G/DL (ref 6.4–8.2)
PROTHROMBIN TIME: 15.7 SEC (ref 11.5–15.2)
RBC # BLD AUTO: 4.29 M/UL (ref 4.2–5.3)
SODIUM SERPL-SCNC: 143 MMOL/L (ref 136–145)
WBC # BLD AUTO: 5.3 K/UL (ref 4.6–13.2)

## 2019-08-09 PROCEDURE — 74011000250 HC RX REV CODE- 250: Performed by: EMERGENCY MEDICINE

## 2019-08-09 PROCEDURE — 85025 COMPLETE CBC W/AUTO DIFF WBC: CPT

## 2019-08-09 PROCEDURE — 99284 EMERGENCY DEPT VISIT MOD MDM: CPT

## 2019-08-09 PROCEDURE — 73630 X-RAY EXAM OF FOOT: CPT

## 2019-08-09 PROCEDURE — 87077 CULTURE AEROBIC IDENTIFY: CPT

## 2019-08-09 PROCEDURE — 87186 SC STD MICRODIL/AGAR DIL: CPT

## 2019-08-09 PROCEDURE — 96374 THER/PROPH/DIAG INJ IV PUSH: CPT

## 2019-08-09 PROCEDURE — 74011250636 HC RX REV CODE- 250/636: Performed by: EMERGENCY MEDICINE

## 2019-08-09 PROCEDURE — 83605 ASSAY OF LACTIC ACID: CPT

## 2019-08-09 PROCEDURE — 87070 CULTURE OTHR SPECIMN AEROBIC: CPT

## 2019-08-09 PROCEDURE — 80053 COMPREHEN METABOLIC PANEL: CPT

## 2019-08-09 PROCEDURE — 87040 BLOOD CULTURE FOR BACTERIA: CPT

## 2019-08-09 PROCEDURE — 85610 PROTHROMBIN TIME: CPT

## 2019-08-09 RX ORDER — SODIUM CHLORIDE 0.9 % (FLUSH) 0.9 %
5-10 SYRINGE (ML) INJECTION AS NEEDED
Status: DISCONTINUED | OUTPATIENT
Start: 2019-08-09 | End: 2019-08-09 | Stop reason: HOSPADM

## 2019-08-09 RX ORDER — CLINDAMYCIN HYDROCHLORIDE 300 MG/1
300 CAPSULE ORAL 4 TIMES DAILY
Qty: 40 CAP | Refills: 0 | Status: SHIPPED | OUTPATIENT
Start: 2019-08-09 | End: 2019-08-19

## 2019-08-09 RX ORDER — LIDOCAINE 50 MG/G
OINTMENT TOPICAL AS NEEDED
Status: DISCONTINUED | OUTPATIENT
Start: 2019-08-09 | End: 2019-08-09 | Stop reason: HOSPADM

## 2019-08-09 RX ORDER — MORPHINE SULFATE 4 MG/ML
4 INJECTION INTRAVENOUS
Status: DISCONTINUED | OUTPATIENT
Start: 2019-08-09 | End: 2019-08-09 | Stop reason: HOSPADM

## 2019-08-09 RX ADMIN — AZTREONAM 1 G: 1 INJECTION, POWDER, LYOPHILIZED, FOR SOLUTION INTRAMUSCULAR; INTRAVENOUS at 05:21

## 2019-08-09 RX ADMIN — LIDOCAINE: 50 OINTMENT TOPICAL at 05:20

## 2019-08-09 NOTE — ED NOTES
Pt declined morphine, states that she prefers something topical. Lidocaine ointment applied to right foot, avoided wound area. Pt complains of majority of pain to lateral aspect of right foot.

## 2019-08-09 NOTE — ED PROVIDER NOTES
EMERGENCY DEPARTMENT HISTORY AND PHYSICAL EXAM    Date: 8/9/2019  Patient Name: Ely Vanegas    History of Presenting Illness     Chief Complaint   Patient presents with    Foot Pain         History Provided By: Patient    4:32 AM  Ely Vanegas is a 77 y.o. female with PMHX of diabetes, CAD, multiple abdominal surgeries, chronic right foot wound who presents to the emergency department C/O pain, redness, swelling, worsening of the wound on her right foot. Patient states that wound is chronic but over the past week it seems to be growing and has become much more painful. It is not relieved with her tramadol. She does not believe she is had fever. She denies chest pain, shortness of breath, abdominal pain, other complaints. She called her podiatrist Dr. Ash Mejia that directed her to the emergency department for further evaluation and management. PCP: Lisa Wu MD    Current Facility-Administered Medications   Medication Dose Route Frequency Provider Last Rate Last Dose    sodium chloride (NS) flush 5-10 mL  5-10 mL IntraVENous PRN Tom Garcia MD        morphine injection 4 mg  4 mg IntraVENous NOW Tom Garcia MD        lidocaine (XYLOCAINE) 5 % ointment   Topical PRN Tom Garcia MD         Current Outpatient Medications   Medication Sig Dispense Refill    warfarin sodium (COUMADIN PO) Take 7 mg by mouth every other day.  clindamycin (CLEOCIN) 300 mg capsule Take 1 Cap by mouth four (4) times daily for 10 days. 40 Cap 0    ursodiol (ACTIGALL) 500 mg tablet TAKE 1 TABLET BY MOUTH TWICE DAILY 180 Tab 6    B.infantis-B.ani-B.long-B.bifi (PROBIOTIC 4X) 10-15 mg TbEC Take  by mouth.  atorvastatin (LIPITOR) 20 mg tablet Take 20 mg by mouth daily.  dronabinol (MARINOL) 2.5 mg capsule Take 2.5 mg by mouth three (3) times daily.  metoprolol tartrate (LOPRESSOR) 25 mg tablet Take 25 mg by mouth two (2) times a day.       clopidogrel (PLAVIX) 75 mg tab Take 75 mg by mouth daily.  Cetirizine (ZYRTEC) 10 mg cap Take  by mouth.  glimepiride (AMARYL) 2 mg tablet Take 2 mg by mouth every morning.  melatonin 3 mg tablet Take 6 mg by mouth nightly.  cyclobenzaprine (FLEXERIL) 10 mg tablet Take  by mouth three (3) times daily as needed for Muscle Spasm(s).  LORazepam (ATIVAN) 2 mg tablet Take 1 mg by mouth every six (6) hours as needed.  BIOTIN PO Take 5,000 mcg by mouth daily.  cloNIDine (CATAPRES) 0.1 mg tablet Take 0.1 mg by mouth two (2) times a day. Indications: hot flashes;      metFORMIN (GLUCOPHAGE) 500 mg tablet Take 500 mg by mouth two (2) times daily (with meals). Indications: TYPE 2 DIABETES MELLITUS      ropinirole (REQUIP) 4 mg Tab TAB Take 4 mg by mouth nightly. Indications: RESTLESS LEGS SYNDROME      traMADol (ULTRAM) 50 mg tablet Take 50 mg by mouth every eight (8) hours as needed. Indications: PAIN      promethazine (PHENERGAN) 25 mg tablet Take 25 mg by mouth every six (6) hours as needed for Nausea.  lidocaine-prilocaine (LIDOPRIL) cream Apply  to affected area now.  nystatin (MYCOSTATIN) topical cream Apply  to affected area two (2) times a day. 60 g 0    nitroglycerin (NITROSTAT) 0.3 mg SL tablet by SubLINGual route every five (5) minutes as needed for Chest Pain (x3).  pantoprazole (PROTONIX) 40 mg tablet Take 40 mg by mouth daily.  lactobacillus combination no.4 (PROBIOTIC) 3 billion cell cap Take 1 Cap by mouth daily.  warfarin (COUMADIN) 5 mg tablet Take 5 mg by mouth every other day.  cyanocobalamin 1,000 mcg tablet Take 1,000 mcg by mouth daily.  ondansetron hcl (ZOFRAN, AS HYDROCHLORIDE,) 4 mg tablet Take 4 mg by mouth every eight (8) hours as needed for Nausea.  diphenhydrAMINE (BENADRYL) 25 mg capsule Take 50 mg by mouth every six (6) hours as needed.  Indications: ALLERGIC RHINITIS      albuterol (PROVENTIL HFA, VENTOLIN HFA) 90 mcg/actuation inhaler Take 2 Puffs by inhalation every four (4) hours as needed. Indications: ACUTE ASTHMA ATTACK         Past History     Past Medical History:  Past Medical History:   Diagnosis Date    Arthritis     hands and feet    Asthma     Autoimmune disease ([address]     lupus anticoagulant-causes clotting    Cancer ([address]     carcinoma in situ of cervix and uterus; inside bottom lip    Chronic pain     Left foot    Diabetes ([address] 2013    Dizzy spells     h/o    GERD (gastroesophageal reflux disease)     Liver disease     1/2 of my liver was removed for ulcers.     Lung collapse     2015    Nausea & vomiting 5/23/2012    Neuropathy     right foot    Other ill-defined conditions(799.89)     arterial clot in rt leg    Other ill-defined conditions(799.89)     restless leg syndrome    Other ill-defined conditions(799.89)     raynaud's syndrome    Other ill-defined conditions(799.89)     chronic N/V    Other ill-defined conditions(799.89)     (2 mutated genes) hyper coag. state     Pneumonia 07/22/2017    Positive cardiolipin antibodies     Psychiatric disorder     depression    PUD (peptic ulcer disease) 2009    H/O requiring partial gastrectomy and small intestine    Thromboembolus ([address]     artery right leg       Past Surgical History:  Past Surgical History:   Procedure Laterality Date    ABDOMEN SURGERY PROC UNLISTED      duodenal ulcer rupture    BREAST SURGERY PROCEDURE UNLISTED Bilateral 02/2016    hemorrhaged after breast surgery    HX APPENDECTOMY      HX BREAST AUGMENTATION      developed infection and implant removed    HX CHOLECYSTECTOMY      HX GASTRECTOMY      due to ulcers    HX GASTRIC BYPASS  1996    gastric by-pass    HX GI      total gastrectomy, partial colectomy    HX GI      colostomy and reversal    HX HEENT      cancer of lip removed    HX HYSTERECTOMY      for cervical cancer    HX ORTHOPAEDIC      rt foot surgery x10    HX ORTHOPAEDIC Left     great toe gangrenous and amputated    HX OTHER SURGICAL      part of liver removal    HX OTHER SURGICAL      ventral hernia repair x10    HX OTHER SURGICAL      Exc pilonial cystectomy    VASCULAR SURGERY PROCEDURE UNLIST      iliac aorta stents x 6       Family History:  Family History   Problem Relation Age of Onset    Malignant Hyperthermia Neg Hx     Pseudocholinesterase Deficiency Neg Hx     Delayed Awakening Neg Hx     Post-op Nausea/Vomiting Neg Hx     Emergence Delirium Neg Hx     Post-op Cognitive Dysfunction Neg Hx     Other Neg Hx        Social History:  Social History     Tobacco Use    Smoking status: Former Smoker     Years: 0.50     Last attempt to quit: 1996     Years since quittin.6    Smokeless tobacco: Never Used   Substance Use Topics    Alcohol use: Yes     Comment: 1 or 2 glasses wine or mixed drinks monthly     Drug use: Yes     Types: Marijuana     Comment: 4x daily       Allergies: Allergies   Allergen Reactions    Rifampin Hives and Swelling     Swelling of mouth and tounge    Levaquin [Levofloxacin] Itching    Amoxicillin Hives    Aspirin Nausea and Vomiting    Azithromycin Angioedema    Bactrim [Sulfamethoprim Ds] Hives    Cefatrizine Hives    Erythromycin Hives    Rocephin [Ceftriaxone] Hives    Tylenol [Acetaminophen] Hives    Vancomycin Hives         Review of Systems   Review of Systems   Constitutional: Negative for fever. Respiratory: Negative for shortness of breath. Cardiovascular: Positive for leg swelling. Negative for chest pain. Skin: Positive for wound. All other systems reviewed and are negative.         Physical Exam     Vitals:    19 0422 19 0629   BP: 94/61 120/63   Pulse: 69 68   Resp: 16 18   Temp: 98.3 °F (36.8 °C)    SpO2: 100% 100%   Weight: 61.2 kg (135 lb)    Height: 5' 4\" (1.626 m)      Physical Exam    Nursing notes and vital signs reviewed    Constitutional: Non toxic appearing, mild distress  Head: Normocephalic, Atraumatic  Eyes: EOMI  Neck: Supple  Cardiovascular: Regular rate and rhythm, no murmurs, rubs, or gallops  Chest: Normal work of breathing and chest excursion bilaterally  Lungs: Clear to ausculation bilaterally  Back: No evidence of trauma or deformity  Extremities: Prior transmetatarsal amputation on the right foot. Wound approximately the size of a quarter on the plantar surface of the foot with surrounding erythema that is spreading up the leg with swelling and exquisitely tender to palpation  Skin: Warm and dry, normal cap refill  Neuro: Alert and appropriate  Psychiatric: Normal mood and affect      Diagnostic Study Results     Labs -     Recent Results (from the past 12 hour(s))   CULTURE, BLOOD    Collection Time: 08/09/19  4:50 AM   Result Value Ref Range    Special Requests: NO SPECIAL REQUESTS      Culture result: NO GROWTH AFTER 1 HOUR     CBC WITH AUTOMATED DIFF    Collection Time: 08/09/19  4:50 AM   Result Value Ref Range    WBC 5.3 4.6 - 13.2 K/uL    RBC 4.29 4. 20 - 5.30 M/uL    HGB 13.4 12.0 - 16.0 g/dL    HCT 40.8 35.0 - 45.0 %    MCV 95.1 74.0 - 97.0 FL    MCH 31.2 24.0 - 34.0 PG    MCHC 32.8 31.0 - 37.0 g/dL    RDW 15.0 (H) 11.6 - 14.5 %    PLATELET 298 155 - 466 K/uL    MPV 9.5 9.2 - 11.8 FL    NEUTROPHILS 65 40 - 73 %    LYMPHOCYTES 25 21 - 52 %    MONOCYTES 8 3 - 10 %    EOSINOPHILS 2 0 - 5 %    BASOPHILS 0 0 - 2 %    ABS. NEUTROPHILS 3.5 1.8 - 8.0 K/UL    ABS. LYMPHOCYTES 1.3 0.9 - 3.6 K/UL    ABS. MONOCYTES 0.4 0.05 - 1.2 K/UL    ABS. EOSINOPHILS 0.1 0.0 - 0.4 K/UL    ABS.  BASOPHILS 0.0 0.0 - 0.1 K/UL    DF AUTOMATED     POC LACTIC ACID    Collection Time: 08/09/19  4:50 AM   Result Value Ref Range    Lactic Acid (POC) 1.47 0.40 - 2.00 mmol/L   PROTHROMBIN TIME + INR    Collection Time: 08/09/19  5:15 AM   Result Value Ref Range    Prothrombin time 15.7 (H) 11.5 - 15.2 sec    INR 1.3 (H) 0.8 - 1.2     METABOLIC PANEL, COMPREHENSIVE    Collection Time: 08/09/19  5:15 AM   Result Value Ref Range    Sodium 143 136 - 145 mmol/L    Potassium 4.0 3.5 - 5.5 mmol/L    Chloride 111 100 - 111 mmol/L    CO2 27 21 - 32 mmol/L    Anion gap 5 3.0 - 18 mmol/L    Glucose 161 (H) 74 - 99 mg/dL    BUN 24 (H) 7.0 - 18 MG/DL    Creatinine 0.73 0.6 - 1.3 MG/DL    BUN/Creatinine ratio 33 (H) 12 - 20      GFR est AA >60 >60 ml/min/1.73m2    GFR est non-AA >60 >60 ml/min/1.73m2    Calcium 8.6 8.5 - 10.1 MG/DL    Bilirubin, total 0.1 (L) 0.2 - 1.0 MG/DL    ALT (SGPT) 18 13 - 56 U/L    AST (SGOT) 24 10 - 38 U/L    Alk. phosphatase 120 (H) 45 - 117 U/L    Protein, total 6.0 (L) 6.4 - 8.2 g/dL    Albumin 3.1 (L) 3.4 - 5.0 g/dL    Globulin 2.9 2.0 - 4.0 g/dL    A-G Ratio 1.1 0.8 - 1.7         Radiologic Studies -   XR FOOT RT MIN 3 V   Final Result   Impression:   --------------      Prior amputation at the level of the tarsal bones. Soft tissue swelling about the foot. Apparent ulcer along the plantar aspect of the foot. No definitive bony erosive change  seen to suggest active osteomyelitis. CT Results  (Last 48 hours)    None        CXR Results  (Last 48 hours)    None          Medications given in the ED-  Medications   sodium chloride (NS) flush 5-10 mL (has no administration in time range)   morphine injection 4 mg (4 mg IntraVENous Refused 8/9/19 5027)   lidocaine (XYLOCAINE) 5 % ointment ( Topical Given 8/9/19 6399)   aztreonam (AZACTAM) 1 g in sterile water (preservative free) 10 mL IV syringe (1 g IntraVENous Given 8/9/19 4623)         Medical Decision Making   I am the first provider for this patient. I reviewed the vital signs, available nursing notes, past medical history, past surgical history, family history and social history. Vital Signs-Reviewed the patient's vital signs. Records Reviewed: Nursing Notes and Old Medical Records    Provider Notes (Medical Decision Making): Mandi Lauren is a 77 y.o. female resenting with worsening diabetic foot ulcer.   Other than subtherapeutic INR labs are benign and there is no osteo-on the x-ray. Offered and encouraged inpatient management with IV antibiotics but patient wants to be discharged. Discussed with podiatry who recommend starting on clindamycin and following up today in their office. Patient understands and agrees with this plan. Provided with strict return precautions    Procedures:  Procedures    ED Course:   CONSULT NOTE:   6:27 AM  Dr. Noemi Escalante spoke with Dr. Audelia Hadley   Specialty: Taisha Joyce  Discussed pt's hx, disposition, and available diagnostic and imaging results over the telephone. Reviewed care plans. Commenced discharge on clindamycin and follow-up either with Dr. Lauraine Harada today or in his office today for a wound check                                    . Diagnosis and Disposition     Critical Care: None    DISCHARGE NOTE:    Marleen Pineda's  results have been reviewed with her. She has been counseled regarding her diagnosis, treatment, and plan. She verbally conveys understanding and agreement of the signs, symptoms, diagnosis, treatment and prognosis and additionally agrees to follow up as discussed. She also agrees with the care-plan and conveys that all of her questions have been answered. I have also provided discharge instructions for her that include: educational information regarding their diagnosis and treatment, and list of reasons why they would want to return to the ED prior to their follow-up appointment, should her condition change. She has been provided with education for proper emergency department utilization. CLINICAL IMPRESSION:    1. Diabetic foot ulcer associated with type 2 diabetes mellitus, unspecified laterality, unspecified part of foot, unspecified ulcer stage ([facility]    2. Subtherapeutic international normalized ratio (INR)        PLAN:  1. D/C Home  2.    Current Discharge Medication List      START taking these medications    Details   clindamycin (CLEOCIN) 300 mg capsule Take 1 Cap by mouth four (4) times daily for 10 days.  Qty: 40 Cap, Refills: 0           3. Follow-up Information     Follow up With Specialties Details Why Contact Info    Ariana Rodriguez MD [facility] Schedule an appointment as soon as possible for a visit  [facility] [address]  [phone]      Chong Wolf DPM Podiatry Schedule an appointment as soon as possible for a visit  [facility] [address] [address] [facility] [address]  [phone]      [facility] EMERGENCY DEPT Emergency Medicine  If symptoms worsen [address] [address]  [phone]        _______________________________      Please note that this dictation was completed with [employer], the computer voice recognition software. Quite often unanticipated grammatical, syntax, homophones, and other interpretive errors are inadvertently transcribed by the computer software. Please disregard these errors. Please excuse any errors that have escaped final proofreading.

## 2019-08-09 NOTE — DISCHARGE INSTRUCTIONS
Patient Education        Diabetic Foot Ulcer: Care Instructions  Your Care Instructions  Diabetes can damage the nerve endings and blood vessels in your feet. That means you are less likely to notice when your feet are injured. A small skin problem like a callus, blister, or cracked skin can turn into a larger sore, called a foot ulcer. Foot ulcers form most often on the pad (ball) of the foot or the bottom of the big toe. You can also get them on the top and bottom of each toe. Foot ulcers can get infected. If the infection is severe, then tissue in the foot can die. This is called gangrene. In that case, one or more of the toes, part or all of the foot, and sometimes part of the leg may have to be removed (amputated). Your doctor may have removed the dead tissue and cleaned the ulcer. Your foot wound may be wrapped in a protective bandage. It is very important to keep your weight off your injured foot. After a foot ulcer has formed, it will not heal as long as you keep putting weight on the area. Always get early treatment for foot problems. A minor irritation can lead to a major problem if it's not taken care of soon. Follow-up care is a key part of your treatment and safety. Be sure to make and go to all appointments, and call your doctor if you are having problems. It's also a good idea to know your test results and keep a list of the medicines you take. How can you care for yourself at home? · Follow your doctor's instructions about keeping pressure off the foot ulcer. You may need to use crutches or a wheelchair. Or you may wear a cast or a walking boot. · Follow your doctor's instructions on how to clean the ulcer and change the bandage. · If your doctor prescribed antibiotics, take them as directed. Do not stop taking them just because you feel better. You need to take the full course of antibiotics.   To prevent foot ulcers  · Keep your blood sugar close to normal by watching what and how much you eat. Track your blood sugar, take medicines if prescribed, and get regular exercise. · Do not smoke. Smoking affects blood flow and can make foot problems worse. If you need help quitting, talk to your doctor about stop-smoking programs and medicines. These can increase your chances of quitting for good. · Do not go barefoot. Protect your feet by wearing shoes that fit well. Choose shoes that are made of materials that are flexible and breathable, such as leather or cloth. · Inspect your feet daily for blisters, cuts, cracks, or sores. If you can't see well, use a mirror or have someone help you. · Have your doctor check your feet during each visit. If you have a foot problem, see your doctor. Do not try to treat your foot problem on your own. Home remedies or treatments that you can buy without a prescription (such as corn removers) can be harmful. When should you call for help? Call your doctor now or seek immediate medical care if:    · You have symptoms of infection, such as:  ? Increased pain, swelling, warmth, or redness. ? Red streaks leading from the area. ? Pus draining from the area. ? A fever.    Watch closely for changes in your health, and be sure to contact your doctor if:    · You have a new problem with your feet, such as:  ? A new sore or ulcer. ? A break in the skin that is not healing after several days. ? Bleeding corns or calluses. ? An ingrown toenail.     · You do not get better as expected. Where can you learn more? Go to http://lisa-kenan.info/. Enter T131 in the search box to learn more about \"Diabetic Foot Ulcer: Care Instructions. \"  Current as of: July 25, 2018  Content Version: 12.1  © 1820-1996 [employer]. Care instructions adapted under license by [employer] (which disclaims liability or warranty for this information).  If you have questions about a medical condition or this instruction, always ask your healthcare professional. [address] any warranty or liability for your use of this information.

## 2019-08-09 NOTE — ED TRIAGE NOTES
Triage: pt complains of chronic wound to right foot. Pt states that pain has worsened over past few days and pt feels like wound may be worsening.

## 2019-08-11 NOTE — CALL BACK NOTE
8:44 AM 
08/11/2019 On clinda for diabetic wound. Sensitive per C&S. On appropriate tx.   
 
Anne-Marie Weaver PA-C

## 2019-08-12 NOTE — PROGRESS NOTES
Patient was treated with clindamycin. Only covers 1 of the 2 bacteria. However, patient followed up with podiatry. No changes.

## 2019-08-13 LAB
BACTERIA SPEC CULT: ABNORMAL
GRAM STN SPEC: ABNORMAL
SERVICE CMNT-IMP: ABNORMAL

## 2019-08-15 LAB
BACTERIA SPEC CULT: NORMAL
BACTERIA SPEC CULT: NORMAL
SERVICE CMNT-IMP: NORMAL
SERVICE CMNT-IMP: NORMAL

## 2019-10-17 ENCOUNTER — HOSPITAL ENCOUNTER (OUTPATIENT)
Dept: LAB | Age: 69
Discharge: HOME | End: 2019-10-17
Payer: MEDICARE

## 2019-10-17 PROCEDURE — 87070 CULTURE OTHR SPECIMN AEROBIC: CPT

## 2019-10-17 PROCEDURE — 87186 SC STD MICRODIL/AGAR DIL: CPT

## 2019-10-17 PROCEDURE — 87077 CULTURE AEROBIC IDENTIFY: CPT

## 2019-10-21 LAB
BACTERIA SPEC CULT: ABNORMAL
GRAM STN SPEC: ABNORMAL
SERVICE CMNT-IMP: ABNORMAL

## 2019-10-31 ENCOUNTER — HOSPITAL ENCOUNTER (OUTPATIENT)
Dept: GENERAL RADIOLOGY | Age: 69
Discharge: HOME | End: 2019-10-31
Payer: MEDICARE

## 2019-10-31 DIAGNOSIS — M86.071 ACUTE HEMATOGENOUS OSTEOMYELITIS, RIGHT ANKLE AND FOOT (HCC): ICD-10-CM

## 2019-10-31 PROCEDURE — 73630 X-RAY EXAM OF FOOT: CPT

## 2020-01-04 ENCOUNTER — HOSPITAL ENCOUNTER (OUTPATIENT)
Dept: MRI IMAGING | Age: 70
Discharge: HOME | End: 2020-01-04
Attending: PODIATRIST
Payer: MEDICARE

## 2020-01-04 DIAGNOSIS — M86.071 ACUTE HEMATOGENOUS OSTEOMYELITIS OF RIGHT FOOT (HCC): ICD-10-CM

## 2020-01-04 LAB — CREAT UR-MCNC: 0.8 MG/DL (ref 0.6–1.3)

## 2020-01-04 PROCEDURE — A9575 INJ GADOTERATE MEGLUMI 0.1ML: HCPCS | Performed by: PODIATRIST

## 2020-01-04 PROCEDURE — 82565 ASSAY OF CREATININE: CPT

## 2020-01-04 PROCEDURE — 73720 MRI LWR EXTREMITY W/O&W/DYE: CPT

## 2020-01-04 PROCEDURE — 74011636320 HC RX REV CODE- 636/320: Performed by: PODIATRIST

## 2020-01-04 RX ADMIN — GADOTERATE MEGLUMINE 10 ML: 376.9 INJECTION INTRAVENOUS at 11:59

## 2020-03-22 NOTE — ROUTINE PROCESS
TRANSFER - OUT REPORT:    Verbal report given to Ko Lugo RN (name) on Nehemiah Cruz  being transferred to medical (unit) for routine progression of care       Report consisted of patients Situation, Background, Assessment and   Recommendations(SBAR). Information from the following report(s) SBAR, ED Summary, Intake/Output and MAR was reviewed with the receiving nurse. Lines:   Peripheral IV 07/22/17 Left Antecubital (Active)   Site Assessment Clean, dry, & intact 7/22/2017  5:15 PM   Phlebitis Assessment 0 7/22/2017  5:15 PM   Dressing Status Clean, dry, & intact 7/22/2017  5:15 PM   Hub Color/Line Status Pink 7/22/2017  5:15 PM   Alcohol Cap Used Yes 7/22/2017  5:15 PM        Opportunity for questions and clarification was provided.       Patient transported with:   [employer] - - -

## 2020-06-15 RX ORDER — URSODIOL 500 MG/1
TABLET, FILM COATED ORAL
Qty: 60 TAB | Refills: 5 | Status: SHIPPED | OUTPATIENT
Start: 2020-06-15

## 2020-07-20 ENCOUNTER — HOSPITAL ENCOUNTER (OUTPATIENT)
Dept: MRI IMAGING | Age: 70
End: 2020-07-20
Attending: PODIATRIST

## 2020-07-20 ENCOUNTER — HOSPITAL ENCOUNTER (OUTPATIENT)
Dept: CT IMAGING | Age: 70
Discharge: HOME | End: 2020-07-20
Attending: PODIATRIST

## 2020-07-20 DIAGNOSIS — G89.29 CHRONIC PAIN: ICD-10-CM

## 2020-07-20 DIAGNOSIS — L97.509 FOOT ULCER (HCC): ICD-10-CM

## 2020-07-24 ENCOUNTER — HOSPITAL ENCOUNTER (OUTPATIENT)
Dept: WOUND CARE | Age: 70
Discharge: HOME | End: 2020-07-24
Attending: PODIATRIST
Payer: MEDICARE

## 2020-07-24 PROCEDURE — 11042 DBRDMT SUBQ TIS 1ST 20SQCM/<: CPT

## 2020-07-24 PROCEDURE — 77030027399 HC KT VAC VIA THER KCON -F: Performed by: PODIATRIST

## 2020-07-27 VITALS
RESPIRATION RATE: 18 BRPM | HEART RATE: 66 BPM | OXYGEN SATURATION: 97 % | TEMPERATURE: 97 F | SYSTOLIC BLOOD PRESSURE: 119 MMHG | DIASTOLIC BLOOD PRESSURE: 72 MMHG

## 2020-07-27 NOTE — WOUND CARE
07/24/20 0950   Wound Foot Right   Date First Assessed/Time First Assessed: 12/05/18 1914   Wound Type: Abcess; Open incision/surgical site  Location: Foot  Wound Description (Optional): (c)   Orientation: Right   Dressing Status  Clean, dry, and intact   Dressing Type  Compression dressing; Absorptive;Gauze wrap (martinez)   Incision Site Well Approximated No   Wound Length (cm) 0.1 cm   Wound Width (cm) 0.1 cm   Wound Depth (cm) 0.1   Wound Surface area (cm^2) 0.01 cm^2   Condition of Base White   Condition of Edges Calloused   Drainage Amount  Scant   Drainage Color Serous;Clear   Wound Odor None   Periwound Skin Condition Intact;Calloused   Cleansing and Cleansing Agents  Dermal wound cleanser   Dressing Type Applied Negative pressure wound therapy  (vac via, 1 piece black sponge, drape)   Wound Procedure Type Debridement- Surgical   Consent Obtained  Yes   Procedure Bleeding Minimal   Procedure Hemostasis  Pressure   Procedure Instrument  Curette   Procedure Pain Scale Numeric 0/10   Procedure Faces (Champagne-Baker) Scale 1 0   Debridement Procedure Performed by Dr Rosario Jimenes   Post-Procedure Length (cm) 0.4 cm   Post-Procedure Width (cm) 0.4 cm   Post-Procedure Depth (cm) 0.1 cm   Post-Procedure Volume (cm^3) 0.02 cm^3   Post-Procedure Surface Area (cm^2) 0.16 cm^2   Procedure Tolerated Well                   Negative Pressure    NAME:  Gean Mortimer  YOB: 1950  MEDICAL RECORD NUMBER:  [id number]  DATE:  7/24/2020     Applied Negative Pressure to right plantar foot wound wound(s)/ulcer(s).  [x] Applied skin barrier prep to christiana-wound.  [x] Cut strips of plastic drape to picture frame wound so that christiana-wound is     covered with the drape.  [x] If bridging dressing to less prominent site, cover any intact skin that will come in contact with the Negative Pressure Therapy sponge, gauze or channel drain with plastic drape. The sponge should never touch intact skin.     [x] Cut sponge, gauze or channel drain to size which will fit into the wound/ulcer bed without being forced.  [x] Be sure the sponge is large enough to hold the entire round plastic flange which is attached to the tubing. Never allow flange to be larger than the sponge or it will produce suction damaging intact skin.  Total number of individual pieces of foam used within the wound bed: 1 piece of black spinge   [x] If bridging the dressing away from the primary site, be sure the bridge leads to a piece of sponge large enough to hold the entire flange without allowing any of the flange to overlap onto intact skin.  [x] Covered sponge, gauze or channel drain with plastic drape.  [x] Cut a hole in this plastic drape directly over the sponge the same size as the plastic drain tubing.  [x] Removed plastic liner from flange and apply it directly over the hole you cut.  [x] Removed the plastic cover from the flange.  [x] Attached the tubing to the wound/ulcer Negative Pressure Therapy and turn it on to be sure a vacuum is created and that there are no leaks.  [x] If air leaks occur, use plastic drape to patch them.  [x] Secured Negative Pressure Therapy dressing with ace wrap loosely if located on an extremity. Maintain tubing outside of ace wrap. Tubing must not exert pressure on intact skin.     Applied per  Guidelines      Electronically signed by Nguyen Camacho RN on 7/27/2020 at 7:37 AM

## 2020-07-31 ENCOUNTER — HOSPITAL ENCOUNTER (OUTPATIENT)
Dept: WOUND CARE | Age: 70
Discharge: HOME | End: 2020-07-31
Attending: PODIATRIST
Payer: MEDICARE

## 2020-07-31 VITALS
OXYGEN SATURATION: 99 % | SYSTOLIC BLOOD PRESSURE: 122 MMHG | HEART RATE: 66 BPM | DIASTOLIC BLOOD PRESSURE: 61 MMHG | TEMPERATURE: 99.3 F | RESPIRATION RATE: 18 BRPM

## 2020-07-31 PROCEDURE — 77030027399 HC KT VAC VIA THER KCON -F: Performed by: PODIATRIST

## 2020-07-31 PROCEDURE — 11044 DBRDMT BONE 1ST 20 SQ CM/<: CPT

## 2020-07-31 NOTE — WOUND CARE
07/31/20 1454 Wound Foot Right Date First Assessed/Time First Assessed: 12/05/18 1914   Wound Type: Abcess; Open incision/surgical site  Location: Foot  Wound Description (Optional): (c)   Orientation: Right Dressing Status  Clean, dry, and intact Dressing Type  Negative pressure wound therapy Wound Length (cm) 0.1 cm Wound Width (cm) 0.1 cm Wound Depth (cm) 0.1 Wound Surface area (cm^2) 0.01 cm^2 Change in Wound Size % 0 Condition of Edges Calloused; Rolled/curled Drainage Amount  Moderate Drainage Color Serous Wound Odor None Periwound Skin Condition Macerated Cleansing and Cleansing Agents  Dermal wound cleanser Dressing Type Applied Negative pressure wound therapy Wound Procedure Type Debridement- Surgical  
Post-Procedure Length (cm) 0.6 cm Post-Procedure Width (cm) 0.6 cm Post-Procedure Depth (cm) 0.2 cm Post-Procedure Volume (cm^3) 0.07 cm^3 Post-Procedure Surface Area (cm^2) 0.36 cm^2 Procedure Tolerated Well

## 2020-07-31 NOTE — DISCHARGE INSTRUCTIONS
Keep dressing dry and intact when bathing, may change soft roll and coban as needed     Leave dressings in place until next visit     Patient to return for wound care in:7 [address] [facility] IF UNABLE TO [address] [facility]. Inspect your wounds, looking for signs of infection which may include the following:  Increase in redness  Red \"streaks\" from wound  Increase in swelling  Fever  Unusual odor  Change in the amount of wound drainage. Should you experience any significant changes in your wound(s) or have any questions regarding your home care instructions please contact the wound center or your home health company. If after regular business hours, please call your family doctor or local emergency room. Edema Control: Elevate legs as much as possible. Avoid standing in one position for more than 10 minutes. Avoid setting with legs down. Do not cross legs while sitting. Off-Loading:Frequent position changes. Do not cross legs while sitting. Shift weight every 20 minutes or more when sitting for prolonged periods of time.

## 2020-07-31 NOTE — WOUND CARE
[address] Subjective: Chief Complaint: Trish Willoughby is a 72 y.o. female who presents to [facility] today, for treatment of chronic open wound on the right foot. It started over 6 months ago and has not healed. She continues weekly wound care and presents today with her Vac-Via on her right foot. Her dressing appears to be wet as she states that she took a shower and tried to use a plastic bag to try and keep her foot dry. Her FBS today was 99. Past Medical History:  
Diagnosis Date  Arthritis   
 hands and feet  Asthma  Autoimmune disease ([address] lupus anticoagulant-causes clotting  Cancer ([address]   
 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  Lung collapse 2015  Nausea & vomiting 5/23/2012  Neuropathy   
 right foot  Other ill-defined conditions(799.89) arterial clot in rt leg  Other ill-defined conditions(799.89) restless leg syndrome  Other ill-defined conditions(799.89) raynaud's syndrome  Other ill-defined conditions(799.89) chronic N/V  
 Other ill-defined conditions(799.89)   
 (2 mutated genes) hyper coag. state  Pneumonia 07/22/2017  Positive cardiolipin antibodies  Psychiatric disorder   
 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  
Procedure Laterality Date  ABDOMEN SURGERY PROC UNLISTED    
 duodenal ulcer rupture  BREAST SURGERY PROCEDURE UNLISTED Bilateral 02/2016  
 hemorrhaged after breast surgery  HX APPENDECTOMY  HX BREAST AUGMENTATION    
 developed infection and implant removed  HX CHOLECYSTECTOMY  HX GASTRECTOMY    
 due to ulcers  HX GASTRIC BYPASS  1996  
 gastric by-pass  HX GI    
 total gastrectomy, partial colectomy  HX GI    
 colostomy and reversal  
 HX HEENT    
 cancer of lip removed  HX HYSTERECTOMY for cervical cancer  HX ORTHOPAEDIC    
 rt foot surgery x10  
 HX ORTHOPAEDIC Left   
 great toe gangrenous and amputated  HX OTHER SURGICAL    
 part of liver removal  
 HX OTHER SURGICAL    
 ventral hernia repair x10  
 HX OTHER SURGICAL Exc pilonial cystectomy  VASCULAR SURGERY PROCEDURE UNLIST    
 iliac aorta stents x 6 Current Medications: 
Prior to Admission medications Medication Sig Start Date End Date Taking? Authorizing Provider  
ursodioL (ACTIGALL) 500 mg tablet TAKE ONE TABLET BY MOUTH TWICE A DAY 6/15/20   Yulia Carter MD  
warfarin sodium (COUMADIN PO) Take 7 mg by mouth every other day. Other, MD Jay  
promethazine (PHENERGAN) 25 mg tablet Take 25 mg by mouth every six (6) hours as needed for Nausea. Provider, Historical  
B.infantis-B.ani-B.long-B.bifi (PROBIOTIC 4X) 10-15 mg TbEC Take  by mouth. Provider, Historical  
lidocaine-prilocaine (LIDOPRIL) cream Apply  to affected area now. Provider, Historical  
nystatin (MYCOSTATIN) topical cream Apply  to affected area two (2) times a day. 12/7/18   Adina Adame MD  
atorvastatin (LIPITOR) 20 mg tablet Take 20 mg by mouth daily. Provider, Historical  
dronabinol (MARINOL) 2.5 mg capsule Take 2.5 mg by mouth three (3) times daily. Provider, Historical  
metoprolol tartrate (LOPRESSOR) 25 mg tablet Take 25 mg by mouth two (2) times a day. Provider, Historical  
nitroglycerin (NITROSTAT) 0.3 mg SL tablet by SubLINGual route every five (5) minutes as needed for Chest Pain (x3). Provider, Historical  
pantoprazole (PROTONIX) 40 mg tablet Take 40 mg by mouth daily. Provider, Historical  
clopidogrel (PLAVIX) 75 mg tab Take 75 mg by mouth daily.     Provider, Historical  
lactobacillus combination no.4 (PROBIOTIC) 3 billion cell cap Take 1 Cap by mouth daily. Provider, Historical  
warfarin (COUMADIN) 5 mg tablet Take 5 mg by mouth every other day. Provider, Historical  
Cetirizine (ZYRTEC) 10 mg cap Take  by mouth. Provider, Historical  
cyanocobalamin 1,000 mcg tablet Take 1,000 mcg by mouth daily. Provider, Historical  
glimepiride (AMARYL) 2 mg tablet Take 2 mg by mouth every morning. Provider, Historical  
melatonin 3 mg tablet Take 6 mg by mouth nightly. Provider, Historical  
ondansetron hcl (ZOFRAN, AS HYDROCHLORIDE,) 4 mg tablet Take 4 mg by mouth every eight (8) hours as needed for Nausea. Provider, Historical  
cyclobenzaprine (FLEXERIL) 10 mg tablet Take  by mouth three (3) times daily as needed for Muscle Spasm(s). Provider, Historical  
LORazepam (ATIVAN) 2 mg tablet Take 1 mg by mouth every six (6) hours as needed. Provider, Historical  
BIOTIN PO Take 5,000 mcg by mouth daily. Provider, Historical  
diphenhydrAMINE (BENADRYL) 25 mg capsule Take 50 mg by mouth every six (6) hours as needed. Indications: ALLERGIC RHINITIS    Other, MD Jay  
albuterol (PROVENTIL HFA, VENTOLIN HFA) 90 mcg/actuation inhaler Take 2 Puffs by inhalation every four (4) hours as needed. Indications: ACUTE ASTHMA ATTACK    Provider, Historical  
cloNIDine (CATAPRES) 0.1 mg tablet Take 0.1 mg by mouth two (2) times a day. Indications: hot flashes;    Provider, Historical  
metFORMIN (GLUCOPHAGE) 500 mg tablet Take 500 mg by mouth two (2) times daily (with meals). Indications: TYPE 2 DIABETES MELLITUS    Provider, Historical  
ropinirole (REQUIP) 4 mg Tab TAB Take 4 mg by mouth nightly. Indications: RESTLESS LEGS SYNDROME    Provider, Historical  
traMADol (ULTRAM) 50 mg tablet Take 50 mg by mouth every eight (8) hours as needed. Indications: PAIN    Provider, Historical  
 
Allergies Allergen Reactions  Rifampin Hives and Swelling Swelling of mouth and tounge  Levaquin [Levofloxacin] Itching  Amoxicillin Hives  Aspirin Nausea and Vomiting  Azithromycin Angioedema  Bactrim [Sulfamethoprim Ds] Hives  Cefatrizine Hives  Erythromycin Hives  Rocephin [Ceftriaxone] Hives  Tylenol [Acetaminophen] Hives  Vancomycin Hives Family History Problem Relation Age of Onset  Malignant Hyperthermia Neg Hx  Pseudocholinesterase Deficiency Neg Hx  Delayed Awakening Neg Hx  Post-op Nausea/Vomiting Neg Hx  Emergence Delirium Neg Hx  Post-op Cognitive Dysfunction Neg Hx   
 Other Neg Hx Social History Socioeconomic History  Marital status:  Spouse name: Not on file  Number of children: Not on file  Years of education: Not on file  Highest education level: Not on file Occupational History  Not on file Social Needs  Financial resource strain: Not on file  Food insecurity Worry: Not on file Inability: Not on file  Transportation needs Medical: Not on file Non-medical: Not on file Tobacco Use  Smoking status: Former Smoker Years: 0.50 Last attempt to quit: 1996 Years since quittin.5  Smokeless tobacco: Never Used Substance and Sexual Activity  Alcohol use: Yes Comment: 1 or 2 glasses wine or mixed drinks monthly  Drug use: Yes Types: Marijuana Comment: 4x daily  Sexual activity: Never Lifestyle  Physical activity Days per week: Not on file Minutes per session: Not on file  Stress: Not on file Relationships  Social connections Talks on phone: Not on file Gets together: Not on file Attends Nondenominational service: Not on file Active member of club or organization: Not on file Attends meetings of clubs or organizations: Not on file Relationship status: Not on file  Intimate partner violence Fear of current or ex partner: Not on file Emotionally abused: Not on file Physically abused: Not on file Forced sexual activity: Not on file Other Topics Concern  Not on file Social History Narrative  Not on file Review of Systems: 
 
Gen: No fever, chills, malaise, weight loss/gain. Heent: No headache, rhinorrhea, epistaxis, ear pain, hearing loss, sinus pain, neck pain/stiffness, sore throat. Heart: No chest pain, palpitations, SHUKLA, pnd, or orthopnea. Resp: No cough, hemoptysis, wheezing and shortness of breath. GI: No nausea, vomiting, diarrhea, constipation, melena or hematochezia. : No urinary obstruction, dysuria or hematuria. Derm: Open ulcer right plantar midfoot Musc/skeletal: no bone or joint complains. Vasc: No edema, cyanosis or claudication. Endo: No heat/cold intolerance, no polyuria,polydipsia or polyphagia. Neuro: No unilateral weakness, numbness, tingling. No seizures. Heme: No easy bruising or bleeding. Objective:  
 
Physical Assessment: 
 
Vitals:  
 07/31/20 0948 BP: 122/61 Pulse: 66 Resp: 18 Temp: 99.3 °F (37.4 °C) SpO2: 99% Lower Extremity Exam: 
 
Vascular Exam: 
Dorsalis Pedis Pulse: 1/4  Bilateral Lower Extremities Posterior Tibial Pulse: 1/4 Bilateral Lower Extremities Capillary Refill is < 4 seconds Bilateral Lower Extremities Temperature of extremity from proximal to distal is cold and damp right foot Recent OLGA results: None Integumentary Exam: 
Skin Texture is WNL Bilateral Lower Extremities Pedal Hair is present Bilateral Lower Extremities Examination of lower extremity reveals open ulcer right plantar midfoot with hyperkeratotic covering and brown fibrotic base, deep through the subcutaneous tissue. Etiology: Started as a blister Wound Description: Wound Type: Diabetic Indication:  Chronic >30days Status:no change Measurements:in cm: Ulcer right plantar midfoot 
 
  wound Length:  0.2 Wound Width : 0.2 Wound Depth : 0.1 Tissue Type: Ulcer bed:brown fibrotic base Periwound: Surrounding erythema with intact dermal layer Exudate:  Slight serous drainage noted Epithelial: with out necrosis Granulation: with out necrosis Subcutaneous: with out necrosis Slough: yes Eschar: no 
Tendon: exposed no -with out necrosis Fascia: exposed no -with out necrosis Muscle: exposed no -with out necrosis Bone: exposed no -with out necrosis Drainage: Stable Debridement: Required today to promote wound healing Infection: no  
 
Edema: no  
 
Compression: no 
Offloading:  yes Status: Continue Means of offloading: [place],  ,  
 Patient Compliant:  yes Educated on the importance of offloading  yes Nicotine/Tobacco Use:  No  
 
Nutrition: 
Status within normal limits Diabetic Glucose Control: controlled Recent Hgb A1C: 7 Educated on importance of balanced nutrition- yes Orthopedic Exam: Musculoskeletal Exam: Muscle strength is 5/5 Bilateral Lower Extremities. Tendon, Muscle and Bone and joints and WNL Bilateral Lower Extremities. Range of motion and strength noted in both ankles is WNL. Status post right transmetatarsal amputation secondary to past osteomyelitis. Neurological Exam: Ankle deep tendon reflexes: Are WNL Bilateral Lower Extremities Epicritic and Protective Sensation: Are Decreased Bilateral Lower Extremities Evaluation of lower extremity nerves: Are Decreased as seen with 5.07 mm monofilament at 5 points per foot. Laboratory Results: 
@ Basic Metabolic Profile@ Lab Results Component Value Date  08/09/2019 CO2 27 08/09/2019 BUN 24 (H) 08/09/2019 Data Review: No results found for this or any previous visit (from the past 24 hour(s)). Assessment:  
 
72 y.o. female with Patient Active Problem List  
Diagnosis Code  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 Z90.3, Z98.0  Atherosclerotic PVD with ulceration (HCC) I70.209, L98.499  Anemia D64.9  Lupus anticoagulant syndrome ([facility]) D68.62  
 PAD (peripheral artery disease) ([facility]) I73.9  Sleepwalking disorder F51.3  Anxiety F41.9  Type II diabetes mellitus with complication, uncontrolled ([facility]) E11.8, E11.65  
 CAD (coronary artery disease) I25.10  Diabetic foot ulcer ([facility] E11.621, L97.509  Cellulitis of right foot L03.115  Chronic anticoagulation Z79.01  
 Elevated liver enzymes R74.8  Primary biliary cholangitis ([facility] K74.3 Type I Diabetes with ASO/PVD/PN. Patient is a non-smoker. Chronic open grade 1 Esposito decubitus ulcer plantar right midfoot Status post right transmetatarsal amputation secondary to her diabetes and past osteomyelitis Recommendation:  
 
Plan:  
 
Plan/Procedure: 
 
Needs : Patient requires a [employer] Wound VAC to utilize negative pressure to help close her right foot ulcer. Patient to continue partial weightbearing right foot with pressure only on her heel. Serial debridement- debrided today- see note below  Ulcer Debridement Ulcer Number 1; Right  Pressure Foot To Fat Layer Character of Ulcer: Deteriorated Indication for Debridement: Slough, Exudate Pre debridement measurements: As shown above in cm. Risks of procedure were discussed with patient. Consent has been signed. Anesthetic: Lidocaine 2% topical gel Level of Debridement: Subctaneous Tissue , Muscle Tissue and/or Material removed: Laurence Type of Tissue: Non- Viable Pre-debridement severity: Fat Layer Exposed Post debridement severity: Fat Layer exposed Instrument(s) used: Curette Bleeding controlled with: Pressure Estimated blood loss: Minimal 
 
Post debridement measurements: 0.7 cm x 0.6 cm x  0.2 cm Post procedural pain: mild Patient tolerated procedure well. Edema management Nutrition optimization Good Diabetic control In wound care clinic today: 
Cleanse wound with commercial wound cleanser Apply the following topically to wound bed: Chantel  Mepatel-1 , Apply the following dressings: Vac-Via with  Tracy  Coban  compressive dressing For Home Care/Self Care: 
Keep dressing dry and intact when bathing Leave dressings in place until next visit Patient to return for wound care in:  7 Days Follow up with Nurse visit as recommended. PLEASE CONTACT OFFICE AS SOON AS POSSIBLE IF UNABLE TO MAKE THIS APPOINTMENT. Inspect your wounds, looking for signs of infection which may include the following:  Increase in redness  Red \"streaks\" from wound  Increase in swelling Fever  Unusual odor Change in the amount of wound drainage. Should you experience any significant changes in your wound(s) or have any questions regarding your home care instructions please contact the wound center or your home health company. If after regular business hours, please call your family doctor or local emergency room. Edema Control:   Elevate legs as much as possible. Avoid standing in one position for more than 10 minutes. Avoid setting with legs down. Do not cross legs while sitting. Off-Loading:     Frequent position changes. Do not cross legs while sitting. Shift weight every 20 minutes or more when sitting for prolonged periods of time.  
 
 
Signed By: Kurtis Mondragon DPM 
  
 July 31, 2020, 1:50 PM

## 2020-08-07 ENCOUNTER — HOSPITAL ENCOUNTER (OUTPATIENT)
Dept: WOUND CARE | Age: 70
Discharge: HOME | End: 2020-08-07
Attending: PODIATRIST
Payer: MEDICARE

## 2020-08-07 PROCEDURE — 99213 OFFICE O/P EST LOW 20 MIN: CPT

## 2020-08-12 VITALS
HEART RATE: 66 BPM | DIASTOLIC BLOOD PRESSURE: 77 MMHG | SYSTOLIC BLOOD PRESSURE: 134 MMHG | OXYGEN SATURATION: 98 % | RESPIRATION RATE: 20 BRPM | TEMPERATURE: 98.9 F

## 2020-08-12 NOTE — WOUND CARE
08/07/20 1045 Wound Foot Right Date First Assessed/Time First Assessed: 12/05/18 1914   Wound Type: Abcess; Open incision/surgical site  Location: Foot  Wound Description (Optional): (c)   Orientation: Right Dressing Status  Intact Dressing Type  Negative pressure wound therapy Wound Length (cm) 0.1 cm Wound Width (cm) 0.1 cm Wound Depth (cm) 0.1 Wound Surface area (cm^2) 0.01 cm^2 Change in Wound Size % 0 Condition of Edges Calloused Drainage Amount  Scant Drainage Color Serous Wound Odor None Periwound Skin Condition Calloused Cleansing and Cleansing Agents  Dermal wound cleanser Dressing Type Applied Other (Comment); Absorptive (pt refused NPWT delivery from [facility]) Procedure Tolerated Well

## 2020-08-12 NOTE — DISCHARGE INSTRUCTIONS
Leave dressings in place until next visit    Patient to return for wound care in:7 Days    PLEASE CONTACT OFFICE AS SOON AS POSSIBLE IF UNABLE TO MAKE THIS APPOINTMENT. Inspect your wounds, looking for signs of infection which may include the following:  Increase in redness  Red \"streaks\" from wound  Increase in swelling  Fever  Unusual odor  Change in the amount of wound drainage. Should you experience any significant changes in your wound(s) or have any questions regarding your home care instructions please contact the wound center or your home health company. If after regular business hours, please call your family doctor or local emergency room. Edema Control: Elevate legs as much as possible. Avoid standing in one position for more than 10 minutes. Avoid setting with legs down. Do not cross legs while sitting. Off-Loading:Frequent position changes. Do not cross legs while sitting. Shift weight every 20 minutes or more when sitting for prolonged periods of time.

## 2020-08-14 ENCOUNTER — HOSPITAL ENCOUNTER (OUTPATIENT)
Dept: WOUND CARE | Age: 70
Discharge: HOME | End: 2020-08-14
Attending: PODIATRIST
Payer: MEDICARE

## 2020-08-14 PROCEDURE — 11042 DBRDMT SUBQ TIS 1ST 20SQCM/<: CPT

## 2020-08-17 VITALS
RESPIRATION RATE: 20 BRPM | OXYGEN SATURATION: 98 % | SYSTOLIC BLOOD PRESSURE: 135 MMHG | DIASTOLIC BLOOD PRESSURE: 76 MMHG | TEMPERATURE: 98.7 F | HEART RATE: 77 BPM

## 2020-08-17 NOTE — WOUND CARE
08/14/20 0951   Wound Foot Right   Date First Assessed/Time First Assessed: 12/05/18 1914   Wound Type: Abcess; Open incision/surgical site  Location: Foot  Wound Description (Optional): (c)   Orientation: Right   Dressing Status  Clean, dry, and intact   Dressing Type  Cast padding   Wound Length (cm) 0.1 cm   Wound Width (cm) 0.1 cm   Wound Depth (cm) 0.1   Wound Surface area (cm^2) 0.01 cm^2   Change in Wound Size % 0   Condition of Edges Calloused   Drainage Amount  Scant   Drainage Color Serous   Wound Odor None   Periwound Skin Condition Calloused   Cleansing and Cleansing Agents  Dermal wound cleanser   Dressing Type Applied Cast padding   Wound Procedure Type Debridement- Surgical   Post-Procedure Length (cm) 0.5 cm   Post-Procedure Width (cm) 0.5 cm   Post-Procedure Depth (cm) 0.2 cm   Post-Procedure Volume (cm^3) 0.05 cm^3   Post-Procedure Surface Area (cm^2) 0.25 cm^2   Procedure Tolerated Well

## 2020-08-17 NOTE — DISCHARGE INSTRUCTIONS
Leave dressings in place until next visit    Patient to return for wound care in: 7 Days    PLEASE CONTACT OFFICE AS SOON AS POSSIBLE IF UNABLE TO MAKE THIS APPOINTMENT. Inspect your wounds, looking for signs of infection which may include the following:  Increase in redness  Red \"streaks\" from wound  Increase in swelling  Fever  Unusual odor  Change in the amount of wound drainage. Should you experience any significant changes in your wound(s) or have any questions regarding your home care instructions please contact the wound center or your home health company. If after regular business hours, please call your family doctor or local emergency room. Edema Control: Elevate legs as much as possible. Avoid standing in one position for more than 10 minutes. Avoid setting with legs down. Do not cross legs while sitting. Off-Loading:Frequent position changes. Do not cross legs while sitting. Shift weight every 20 minutes or more when sitting for prolonged periods of time.

## 2020-08-21 ENCOUNTER — HOSPITAL ENCOUNTER (OUTPATIENT)
Dept: WOUND CARE | Age: 70
Discharge: HOME | End: 2020-08-21
Attending: PODIATRIST
Payer: MEDICARE

## 2020-08-21 VITALS
DIASTOLIC BLOOD PRESSURE: 75 MMHG | HEART RATE: 78 BPM | TEMPERATURE: 98.8 F | RESPIRATION RATE: 20 BRPM | OXYGEN SATURATION: 98 % | SYSTOLIC BLOOD PRESSURE: 138 MMHG

## 2020-08-21 PROCEDURE — 97605 NEG PRS WND THER DME<=50SQCM: CPT

## 2020-08-21 PROCEDURE — 11042 DBRDMT SUBQ TIS 1ST 20SQCM/<: CPT

## 2020-08-21 NOTE — WOUND CARE
08/21/20 1124 Wound Foot Right Date First Assessed/Time First Assessed: 12/05/18 1914   Wound Type: Abcess; Open incision/surgical site  Location: Foot  Wound Description (Optional): (c)   Orientation: Right Dressing Status  Clean, dry, and intact Dressing Type  Cast padding Wound Length (cm) 1 cm Wound Width (cm) 0.5 cm Wound Depth (cm) 0.1 Wound Surface area (cm^2) 0.5 cm^2 Change in Wound Size % -4900 Condition of Base Pink;Slough Condition of Edges Calloused Tissue Type Percent Pink 75 Tissue Type Percent Yellow 25 Drainage Amount  Scant Drainage Color Serous Periwound Skin Condition Calloused Cleansing and Cleansing Agents  Dermal wound cleanser (foam wash and barrier wipes) Dressing Type Applied Compression Wrap/Venous Stasis; Negative pressure wound therapy Procedure Tolerated Well Negative Pressure NAME:  Ashutosh Currie YOB: 1950 MEDICAL RECORD NUMBER:  [id number] DATE:  8/21/2020 ? Applied Negative Pressure to right foot wound(s)/ulcer(s). ? [x] Applied skin barrier prep to christiana-wound. ? [x] Cut strips of plastic drape to picture frame wound so that christiana-wound is     covered with the drape.  
? [x] If bridging dressing to less prominent site, cover any intact skin that will come in contact with the Negative Pressure Therapy sponge, gauze or channel drain with plastic drape. The sponge should never touch intact skin. ? [x] Cut sponge, gauze or channel drain to size which will fit into the wound/ulcer bed without being forced. ? [x] Be sure the sponge is large enough to hold the entire round plastic flange which is attached to the tubing. Never allow flange to be larger than the sponge or it will produce suction damaging intact skin. ? Total number of individual pieces of foam used within the wound bed: 1 
?  [x] If bridging the dressing away from the primary site, be sure the bridge leads to a piece of sponge large enough to hold the entire flange without allowing any of the flange to overlap onto intact skin. ? [x] Covered sponge, gauze or channel drain with plastic drape.  
? [x] Cut a hole in this plastic drape directly over the sponge the same size as the plastic drain tubing.  
? [x] Removed plastic liner from flange and apply it directly over the hole you cut.  
? [x] Removed the plastic cover from the flange. ? [x] Attached the tubing to the wound/ulcer Negative Pressure Therapy and turn it on to be sure a vacuum is created and that there are no leaks. ? [x] If air leaks occur, use plastic drape to patch them.  
? [x] Secured Negative Pressure Therapy dressing with ace wrap loosely if located on an extremity. Maintain tubing outside of ace wrap. Tubing must not exert pressure on intact skin. Applied per [employer] Guidelines Electronically signed by Yun Vega RN on 8/21/2020 at 11:31 AM

## 2020-08-28 ENCOUNTER — HOSPITAL ENCOUNTER (OUTPATIENT)
Dept: WOUND CARE | Age: 70
Discharge: HOME | End: 2020-08-28
Attending: PODIATRIST
Payer: MEDICARE

## 2020-08-28 PROCEDURE — 87186 SC STD MICRODIL/AGAR DIL: CPT

## 2020-08-28 PROCEDURE — 87205 SMEAR GRAM STAIN: CPT

## 2020-08-28 PROCEDURE — 87077 CULTURE AEROBIC IDENTIFY: CPT

## 2020-08-28 PROCEDURE — 87075 CULTR BACTERIA EXCEPT BLOOD: CPT

## 2020-08-28 PROCEDURE — 87147 CULTURE TYPE IMMUNOLOGIC: CPT

## 2020-08-28 PROCEDURE — 11042 DBRDMT SUBQ TIS 1ST 20SQCM/<: CPT

## 2020-08-28 NOTE — WOUND CARE
08/28/20 1404   Wound Foot Right   Date First Assessed/Time First Assessed: 12/05/18 1914   Wound Type: Abcess; Open incision/surgical site  Location: Foot  Wound Description (Optional): (c)   Orientation: Right   Dressing Status  Intact; Old drainage   Dressing Type  Negative pressure wound therapy   Wound Length (cm) 0.8 cm   Wound Width (cm) 0.5 cm   Wound Depth (cm) 0.3   Wound Surface area (cm^2) 0.4 cm^2   Change in Wound Size % -3900   Condition of Base Pink;Slough   Condition of Edges Calloused   Tissue Type Percent Pink 75   Tissue Type Percent Yellow 25   Drainage Amount  Small    Drainage Color Serous   Wound Odor None   Periwound Skin Condition Calloused   Cleansing and Cleansing Agents  Dermal wound cleanser  (felt,endoform,mepitel one, eudry, martinez, coban)   Dressing Type Applied 4 x 4;Cast padding;Gauze;Gauze wrap (martinez); Other (Comment)  (stop vac. felt, endoform, mepitelone, exudry, martinez, coban)   Wound Procedure Type Debridement- Surgical   Procedure Time Out 1250   Consent Obtained  Yes   Procedure Bleeding Moderate   Procedure Hemostasis  Pressure   Procedure Instrument  Curette   Procedure Pain Scale Numeric 0/10   Debridement Procedure Performed by  dpm   Post-Procedure Length (cm) 1 cm   Post-Procedure Width (cm) 1.2 cm   Post-Procedure Depth (cm) 0.4 cm   Post-Procedure Volume (cm^3) 0.48 cm^3   Post-Procedure Surface Area (cm^2) 1.2 cm^2   Post Procedure Pain Scale Numeric 1/10   Post Procedure Faces (Champagne-Baker) Scale 1 0   Assisted Physician in Procedure  Yes   Procedure Tolerated Well

## 2020-08-31 LAB
BACTERIA SPEC CULT: ABNORMAL
BACTERIA SPEC CULT: NORMAL
GRAM STN SPEC: ABNORMAL
GRAM STN SPEC: ABNORMAL
SERVICE CMNT-IMP: ABNORMAL
SERVICE CMNT-IMP: NORMAL

## 2020-09-11 ENCOUNTER — HOSPITAL ENCOUNTER (OUTPATIENT)
Dept: WOUND CARE | Age: 70
Discharge: HOME | End: 2020-09-11
Attending: PODIATRIST
Payer: MEDICARE

## 2020-09-11 PROCEDURE — 11042 DBRDMT SUBQ TIS 1ST 20SQCM/<: CPT

## 2020-09-18 VITALS
RESPIRATION RATE: 18 BRPM | DIASTOLIC BLOOD PRESSURE: 60 MMHG | SYSTOLIC BLOOD PRESSURE: 126 MMHG | TEMPERATURE: 98.4 F | OXYGEN SATURATION: 99 % | HEART RATE: 61 BPM

## 2020-09-18 NOTE — WOUND CARE
09/11/20 1030   Wound Foot Right   Date First Assessed/Time First Assessed: 12/05/18 1914   Wound Type: Abcess; Open incision/surgical site  Location: Foot  Wound Description (Optional): (c)   Orientation: Right   Dressing Status  Intact   Dressing Type  Absorptive; Compression dressing   Wound Length (cm) 0.1 cm   Wound Width (cm) 0.3 cm   Wound Depth (cm) 0.5   Wound Surface area (cm^2) 0.03 cm^2   Change in Wound Size % -200   Condition of Base   (calloused)   Condition of Edges Calloused   Tissue Type Percent Other (comment) 100  (white, macerated)   Drainage Amount  Small    Drainage Color Serous   Wound Odor None   Periwound Skin Condition Calloused   Cleansing and Cleansing Agents  Dermal wound cleanser  (foam wash and barrier wipes)   Dressing Type Applied Absorptive; Cast padding; Compression Wrap/Venous Stasis;Gauze wrap (martinez)  (felt offload)   Wound Procedure Type Debridement- Surgical   Debridement Procedure Performed by Dr. Tommy Higginbotham   Post-Procedure Length (cm) 0.5 cm   Post-Procedure Width (cm) 0.5 cm   Post-Procedure Depth (cm) 0.5 cm   Post-Procedure Volume (cm^3) 0.12 cm^3   Post-Procedure Surface Area (cm^2) 0.25 cm^2   Procedure Tolerated Well         Post debridement picture:

## 2020-09-25 ENCOUNTER — HOSPITAL ENCOUNTER (OUTPATIENT)
Dept: GENERAL RADIOLOGY | Age: 70
Discharge: HOME | End: 2020-09-25
Payer: MEDICARE

## 2020-09-25 ENCOUNTER — HOSPITAL ENCOUNTER (OUTPATIENT)
Dept: WOUND CARE | Age: 70
Discharge: HOME | End: 2020-09-25
Attending: PODIATRIST
Payer: MEDICARE

## 2020-09-25 VITALS
RESPIRATION RATE: 18 BRPM | SYSTOLIC BLOOD PRESSURE: 139 MMHG | TEMPERATURE: 98.8 F | DIASTOLIC BLOOD PRESSURE: 73 MMHG | HEART RATE: 66 BPM | OXYGEN SATURATION: 99 %

## 2020-09-25 DIAGNOSIS — M86.071 ACUTE HEMATOGENOUS OSTEOMYELITIS, RIGHT ANKLE AND FOOT (HCC): ICD-10-CM

## 2020-09-25 PROCEDURE — 73630 X-RAY EXAM OF FOOT: CPT

## 2020-09-25 PROCEDURE — 99213 OFFICE O/P EST LOW 20 MIN: CPT

## 2020-09-25 NOTE — WOUND CARE
09/25/20 0921   Wound Foot Right   Date First Assessed/Time First Assessed: 12/05/18 1914   Wound Type: Abcess; Open incision/surgical site  Location: Foot  Wound Description (Optional): (c)   Orientation: Right   Dressing Status  Intact   Dressing Type  Absorptive; Compression dressing  (felt offload)   Wound Length (cm) 0.1 cm  (calloused over)   Wound Width (cm) 0.1 cm   Wound Surface area (cm^2) 0.01 cm^2   Change in Wound Size % 0   Condition of Edges Calloused   Drainage Amount  Scant   Drainage Color Serous   Periwound Skin Condition Calloused   Cleansing and Cleansing Agents  Dermal wound cleanser   Dressing Type Applied Absorptive; Cast padding  (felt offload)   Procedure Tolerated Well

## 2020-09-29 ENCOUNTER — HOSPITAL ENCOUNTER (OUTPATIENT)
Dept: PREADMISSION TESTING | Age: 70
Discharge: HOME | End: 2020-09-29
Payer: MEDICARE

## 2020-09-29 PROCEDURE — 87635 SARS-COV-2 COVID-19 AMP PRB: CPT

## 2020-09-30 ENCOUNTER — HOSPITAL ENCOUNTER (OUTPATIENT)
Dept: PREADMISSION TESTING | Age: 70
Discharge: HOME | End: 2020-09-30
Payer: MEDICARE

## 2020-09-30 LAB
ANION GAP SERPL CALC-SCNC: 8 MMOL/L (ref 3–18)
ATRIAL RATE: 59 BPM
BUN SERPL-MCNC: 15 MG/DL (ref 7–18)
BUN/CREAT SERPL: 18 (ref 12–20)
CALCIUM SERPL-MCNC: 8.5 MG/DL (ref 8.5–10.1)
CALCULATED P AXIS, ECG09: 62 DEGREES
CALCULATED R AXIS, ECG10: 80 DEGREES
CALCULATED T AXIS, ECG11: 68 DEGREES
CHLORIDE SERPL-SCNC: 108 MMOL/L (ref 100–111)
CO2 SERPL-SCNC: 25 MMOL/L (ref 21–32)
CREAT SERPL-MCNC: 0.82 MG/DL (ref 0.6–1.3)
DIAGNOSIS, 93000: NORMAL
EST. AVERAGE GLUCOSE BLD GHB EST-MCNC: 169 MG/DL
GLUCOSE SERPL-MCNC: 257 MG/DL (ref 74–99)
HBA1C MFR BLD: 7.5 % (ref 4.2–5.6)
HCT VFR BLD AUTO: 39.4 % (ref 35–45)
HGB BLD-MCNC: 12.7 G/DL (ref 12–16)
P-R INTERVAL, ECG05: 176 MS
POTASSIUM SERPL-SCNC: 4.8 MMOL/L (ref 3.5–5.5)
Q-T INTERVAL, ECG07: 430 MS
QRS DURATION, ECG06: 84 MS
QTC CALCULATION (BEZET), ECG08: 425 MS
SODIUM SERPL-SCNC: 141 MMOL/L (ref 136–145)
VENTRICULAR RATE, ECG03: 59 BPM

## 2020-09-30 PROCEDURE — 80048 BASIC METABOLIC PNL TOTAL CA: CPT

## 2020-09-30 PROCEDURE — 85018 HEMOGLOBIN: CPT

## 2020-09-30 PROCEDURE — 83036 HEMOGLOBIN GLYCOSYLATED A1C: CPT

## 2020-09-30 PROCEDURE — 36415 COLL VENOUS BLD VENIPUNCTURE: CPT

## 2020-09-30 PROCEDURE — 93005 ELECTROCARDIOGRAM TRACING: CPT

## 2020-10-01 ENCOUNTER — ANESTHESIA EVENT (OUTPATIENT)
Dept: SURGERY | Age: 70
End: 2020-10-01
Payer: MEDICARE

## 2020-10-01 LAB — SARS-COV-2, COV2NT: NOT DETECTED

## 2020-10-02 ENCOUNTER — APPOINTMENT (OUTPATIENT)
Dept: GENERAL RADIOLOGY | Age: 70
End: 2020-10-02
Attending: PODIATRIST
Payer: MEDICARE

## 2020-10-02 ENCOUNTER — ANESTHESIA (OUTPATIENT)
Dept: SURGERY | Age: 70
End: 2020-10-02
Payer: MEDICARE

## 2020-10-02 ENCOUNTER — HOSPITAL ENCOUNTER (OUTPATIENT)
Age: 70
Setting detail: OUTPATIENT SURGERY
Discharge: HOME | End: 2020-10-02
Attending: PODIATRIST | Admitting: PODIATRIST
Payer: MEDICARE

## 2020-10-02 VITALS
DIASTOLIC BLOOD PRESSURE: 67 MMHG | OXYGEN SATURATION: 97 % | HEART RATE: 89 BPM | HEIGHT: 65 IN | BODY MASS INDEX: 22.2 KG/M2 | TEMPERATURE: 97.4 F | SYSTOLIC BLOOD PRESSURE: 118 MMHG | WEIGHT: 133.25 LBS | RESPIRATION RATE: 15 BRPM

## 2020-10-02 PROBLEM — M89.8X7 EXOSTOSIS OF RIGHT FOOT: Status: ACTIVE | Noted: 2020-10-02

## 2020-10-02 PROBLEM — S91.301A OPEN WOUND OF RIGHT FOOT: Status: ACTIVE | Noted: 2020-10-02

## 2020-10-02 PROBLEM — S91.301A OPEN WOUND OF RIGHT FOOT: Status: RESOLVED | Noted: 2020-10-02 | Resolved: 2020-10-02

## 2020-10-02 PROBLEM — M89.8X7 EXOSTOSIS OF RIGHT FOOT: Status: RESOLVED | Noted: 2020-10-02 | Resolved: 2020-10-02

## 2020-10-02 LAB
GLUCOSE BLD STRIP.AUTO-MCNC: 170 MG/DL (ref 70–110)
GLUCOSE BLD STRIP.AUTO-MCNC: 184 MG/DL (ref 70–110)
GLUCOSE BLD STRIP.AUTO-MCNC: 204 MG/DL (ref 70–110)
INR PPP: 1 (ref 0.8–1.2)
PROTHROMBIN TIME: 12.6 SEC (ref 11.5–15.2)

## 2020-10-02 PROCEDURE — 77030040504 HC DRN WND MDII -B: Performed by: PODIATRIST

## 2020-10-02 PROCEDURE — 77030013079 HC BLNKT BAIR HGGR 3M -A: Performed by: STUDENT IN AN ORGANIZED HEALTH CARE EDUCATION/TRAINING PROGRAM

## 2020-10-02 PROCEDURE — 87147 CULTURE TYPE IMMUNOLOGIC: CPT

## 2020-10-02 PROCEDURE — 88304 TISSUE EXAM BY PATHOLOGIST: CPT

## 2020-10-02 PROCEDURE — 77030040361 HC SLV COMPR DVT MDII -B: Performed by: PODIATRIST

## 2020-10-02 PROCEDURE — 76210000021 HC REC RM PH II 0.5 TO 1 HR: Performed by: PODIATRIST

## 2020-10-02 PROCEDURE — 74011250636 HC RX REV CODE- 250/636: Performed by: STUDENT IN AN ORGANIZED HEALTH CARE EDUCATION/TRAINING PROGRAM

## 2020-10-02 PROCEDURE — 36415 COLL VENOUS BLD VENIPUNCTURE: CPT

## 2020-10-02 PROCEDURE — 88311 DECALCIFY TISSUE: CPT

## 2020-10-02 PROCEDURE — 2709999900 HC NON-CHARGEABLE SUPPLY: Performed by: PODIATRIST

## 2020-10-02 PROCEDURE — 87077 CULTURE AEROBIC IDENTIFY: CPT

## 2020-10-02 PROCEDURE — 73630 X-RAY EXAM OF FOOT: CPT

## 2020-10-02 PROCEDURE — 87205 SMEAR GRAM STAIN: CPT

## 2020-10-02 PROCEDURE — 74011000250 HC RX REV CODE- 250: Performed by: PODIATRIST

## 2020-10-02 PROCEDURE — 77030006773 HC BLD SAW OSC BRSM -A: Performed by: PODIATRIST

## 2020-10-02 PROCEDURE — 77030020782 HC GWN BAIR PAWS FLX 3M -B: Performed by: PODIATRIST

## 2020-10-02 PROCEDURE — 74011250637 HC RX REV CODE- 250/637: Performed by: STUDENT IN AN ORGANIZED HEALTH CARE EDUCATION/TRAINING PROGRAM

## 2020-10-02 PROCEDURE — 77030002916 HC SUT ETHLN J&J -A: Performed by: PODIATRIST

## 2020-10-02 PROCEDURE — 77030031139 HC SUT VCRL2 J&J -A: Performed by: PODIATRIST

## 2020-10-02 PROCEDURE — 87075 CULTR BACTERIA EXCEPT BLOOD: CPT

## 2020-10-02 PROCEDURE — 82962 GLUCOSE BLOOD TEST: CPT

## 2020-10-02 PROCEDURE — 74011000250 HC RX REV CODE- 250: Performed by: STUDENT IN AN ORGANIZED HEALTH CARE EDUCATION/TRAINING PROGRAM

## 2020-10-02 PROCEDURE — 87186 SC STD MICRODIL/AGAR DIL: CPT

## 2020-10-02 PROCEDURE — 76210000063 HC OR PH I REC FIRST 0.5 HR: Performed by: PODIATRIST

## 2020-10-02 PROCEDURE — 74011250636 HC RX REV CODE- 250/636: Performed by: PODIATRIST

## 2020-10-02 PROCEDURE — 77030006788 HC BLD SAW OSC STRY -B: Performed by: PODIATRIST

## 2020-10-02 PROCEDURE — 76010000131 HC OR TIME 2 TO 2.5 HR: Performed by: PODIATRIST

## 2020-10-02 PROCEDURE — 88307 TISSUE EXAM BY PATHOLOGIST: CPT

## 2020-10-02 PROCEDURE — 77030012508 HC MSK AIRWY LMA AMBU -A: Performed by: STUDENT IN AN ORGANIZED HEALTH CARE EDUCATION/TRAINING PROGRAM

## 2020-10-02 PROCEDURE — 85610 PROTHROMBIN TIME: CPT

## 2020-10-02 PROCEDURE — 77030000032 HC CUF TRNQT ZIMM -B: Performed by: PODIATRIST

## 2020-10-02 PROCEDURE — 76060000035 HC ANESTHESIA 2 TO 2.5 HR: Performed by: PODIATRIST

## 2020-10-02 PROCEDURE — 87176 TISSUE HOMOGENIZATION CULTR: CPT

## 2020-10-02 RX ORDER — INSULIN LISPRO 100 [IU]/ML
2 INJECTION, SOLUTION INTRAVENOUS; SUBCUTANEOUS ONCE
Status: DISCONTINUED | OUTPATIENT
Start: 2020-10-02 | End: 2020-10-02

## 2020-10-02 RX ORDER — ENOXAPARIN SODIUM 100 MG/ML
40 INJECTION SUBCUTANEOUS
COMMUNITY
End: 2021-06-15

## 2020-10-02 RX ORDER — FENTANYL CITRATE 50 UG/ML
50 INJECTION, SOLUTION INTRAMUSCULAR; INTRAVENOUS
Status: DISCONTINUED | OUTPATIENT
Start: 2020-10-02 | End: 2020-10-03 | Stop reason: HOSPADM

## 2020-10-02 RX ORDER — NALBUPHINE HYDROCHLORIDE 10 MG/ML
5 INJECTION, SOLUTION INTRAMUSCULAR; INTRAVENOUS; SUBCUTANEOUS
Status: DISCONTINUED | OUTPATIENT
Start: 2020-10-02 | End: 2020-10-03 | Stop reason: HOSPADM

## 2020-10-02 RX ORDER — PROPOFOL 10 MG/ML
INJECTION, EMULSION INTRAVENOUS AS NEEDED
Status: DISCONTINUED | OUTPATIENT
Start: 2020-10-02 | End: 2020-10-02 | Stop reason: HOSPADM

## 2020-10-02 RX ORDER — SODIUM CHLORIDE 0.9 % (FLUSH) 0.9 %
5-40 SYRINGE (ML) INJECTION AS NEEDED
Status: DISCONTINUED | OUTPATIENT
Start: 2020-10-02 | End: 2020-10-03 | Stop reason: HOSPADM

## 2020-10-02 RX ORDER — ONDANSETRON 2 MG/ML
INJECTION INTRAMUSCULAR; INTRAVENOUS AS NEEDED
Status: DISCONTINUED | OUTPATIENT
Start: 2020-10-02 | End: 2020-10-02 | Stop reason: HOSPADM

## 2020-10-02 RX ORDER — NALOXONE HYDROCHLORIDE 0.4 MG/ML
0.04 INJECTION, SOLUTION INTRAMUSCULAR; INTRAVENOUS; SUBCUTANEOUS
Status: DISCONTINUED | OUTPATIENT
Start: 2020-10-02 | End: 2020-10-03 | Stop reason: HOSPADM

## 2020-10-02 RX ORDER — DEXAMETHASONE SODIUM PHOSPHATE 4 MG/ML
INJECTION, SOLUTION INTRA-ARTICULAR; INTRALESIONAL; INTRAMUSCULAR; INTRAVENOUS; SOFT TISSUE AS NEEDED
Status: DISCONTINUED | OUTPATIENT
Start: 2020-10-02 | End: 2020-10-02 | Stop reason: HOSPADM

## 2020-10-02 RX ORDER — EPHEDRINE SULFATE/0.9% NACL/PF 50 MG/5 ML
SYRINGE (ML) INTRAVENOUS AS NEEDED
Status: DISCONTINUED | OUTPATIENT
Start: 2020-10-02 | End: 2020-10-02 | Stop reason: HOSPADM

## 2020-10-02 RX ORDER — SCOLOPAMINE TRANSDERMAL SYSTEM 1 MG/1
1 PATCH, EXTENDED RELEASE TRANSDERMAL
Status: DISCONTINUED | OUTPATIENT
Start: 2020-10-02 | End: 2020-10-03 | Stop reason: HOSPADM

## 2020-10-02 RX ORDER — HYDROXYZINE 50 MG/1
25 TABLET, FILM COATED ORAL
COMMUNITY
End: 2021-08-05

## 2020-10-02 RX ORDER — SODIUM CHLORIDE, SODIUM LACTATE, POTASSIUM CHLORIDE, CALCIUM CHLORIDE 600; 310; 30; 20 MG/100ML; MG/100ML; MG/100ML; MG/100ML
125 INJECTION, SOLUTION INTRAVENOUS CONTINUOUS
Status: DISCONTINUED | OUTPATIENT
Start: 2020-10-02 | End: 2020-10-03 | Stop reason: HOSPADM

## 2020-10-02 RX ORDER — HYDROMORPHONE HYDROCHLORIDE 2 MG/ML
0.5 INJECTION, SOLUTION INTRAMUSCULAR; INTRAVENOUS; SUBCUTANEOUS AS NEEDED
Status: DISCONTINUED | OUTPATIENT
Start: 2020-10-02 | End: 2020-10-03 | Stop reason: HOSPADM

## 2020-10-02 RX ORDER — DIPHENHYDRAMINE HYDROCHLORIDE 50 MG/ML
12.5 INJECTION, SOLUTION INTRAMUSCULAR; INTRAVENOUS
Status: DISCONTINUED | OUTPATIENT
Start: 2020-10-02 | End: 2020-10-03 | Stop reason: HOSPADM

## 2020-10-02 RX ORDER — CLINDAMYCIN PHOSPHATE 900 MG/50ML
900 INJECTION INTRAVENOUS ONCE
Status: COMPLETED | OUTPATIENT
Start: 2020-10-02 | End: 2020-10-02

## 2020-10-02 RX ORDER — LIDOCAINE HYDROCHLORIDE 20 MG/ML
INJECTION, SOLUTION EPIDURAL; INFILTRATION; INTRACAUDAL; PERINEURAL AS NEEDED
Status: DISCONTINUED | OUTPATIENT
Start: 2020-10-02 | End: 2020-10-02 | Stop reason: HOSPADM

## 2020-10-02 RX ORDER — SODIUM CHLORIDE 0.9 % (FLUSH) 0.9 %
5-40 SYRINGE (ML) INJECTION EVERY 8 HOURS
Status: DISCONTINUED | OUTPATIENT
Start: 2020-10-02 | End: 2020-10-03 | Stop reason: HOSPADM

## 2020-10-02 RX ORDER — SODIUM CHLORIDE, SODIUM LACTATE, POTASSIUM CHLORIDE, CALCIUM CHLORIDE 600; 310; 30; 20 MG/100ML; MG/100ML; MG/100ML; MG/100ML
50 INJECTION, SOLUTION INTRAVENOUS CONTINUOUS
Status: DISCONTINUED | OUTPATIENT
Start: 2020-10-02 | End: 2020-10-03 | Stop reason: HOSPADM

## 2020-10-02 RX ADMIN — PROPOFOL 150 MG: 10 INJECTION, EMULSION INTRAVENOUS at 09:30

## 2020-10-02 RX ADMIN — DEXAMETHASONE SODIUM PHOSPHATE 8 MG: 4 INJECTION, SOLUTION INTRAMUSCULAR; INTRAVENOUS at 09:39

## 2020-10-02 RX ADMIN — SODIUM CHLORIDE, SODIUM LACTATE, POTASSIUM CHLORIDE, AND CALCIUM CHLORIDE 125 ML/HR: 600; 310; 30; 20 INJECTION, SOLUTION INTRAVENOUS at 08:08

## 2020-10-02 RX ADMIN — ONDANSETRON HYDROCHLORIDE 4 MG: 2 INJECTION INTRAMUSCULAR; INTRAVENOUS at 11:05

## 2020-10-02 RX ADMIN — PROPOFOL 50 MG: 10 INJECTION, EMULSION INTRAVENOUS at 09:32

## 2020-10-02 RX ADMIN — LIDOCAINE HYDROCHLORIDE 60 MG: 20 INJECTION, SOLUTION EPIDURAL; INFILTRATION; INTRACAUDAL; PERINEURAL at 09:30

## 2020-10-02 RX ADMIN — Medication 5 MG: at 10:11

## 2020-10-02 RX ADMIN — CLINDAMYCIN PHOSPHATE 900 MG: 900 INJECTION, SOLUTION INTRAVENOUS at 09:34

## 2020-10-02 RX ADMIN — Medication 15 MG: at 09:57

## 2020-10-02 RX ADMIN — SODIUM CHLORIDE, SODIUM LACTATE, POTASSIUM CHLORIDE, AND CALCIUM CHLORIDE: 600; 310; 30; 20 INJECTION, SOLUTION INTRAVENOUS at 10:10

## 2020-10-02 RX ADMIN — Medication 15 MG: at 10:37

## 2020-10-02 NOTE — H&P
Date of Surgery Update:  Bryan Delaney was seen and examined. History and physical has been reviewed. The patient has been examined.  There have been no significant clinical changes since the completion of the originally dated History and Physical.    Signed By: Mandy Oden DPM     October 2, 2020 8:59 AM

## 2020-10-02 NOTE — PERIOP NOTES
Called into OR 2 and spoke to [employer]. Notified Dr. Christiano Amaya that patient had 7mg of warfarin on 9/25/20, 75mg plavix 10/1/20, and that patient has been taken blood thinner injections for the past two days but is unsure of the name. The patient's last injection was yesterday afternoon. Also notified Dr. Christiano Amaya that patient has been falling asleep frequently and states she went to bed at 0200 last night as she was cleaning the house. 0805-Samantha HOLBROOK called back and states Dr. Christiano Amaya is going to come downstairs to assess patient prior to procedure. 0805-Per Dr. Amie Quiles, insulin to be held for now.

## 2020-10-02 NOTE — ANESTHESIA PREPROCEDURE EVALUATION
Relevant Problems   No relevant active problems       Anesthetic History     PONV          Review of Systems / Medical History  Patient summary reviewed, nursing notes reviewed and pertinent labs reviewed    Pulmonary            Asthma   Pertinent negatives: No COPD and sleep apnea  Comments: Current Marijuana smoker.   Last smoked last night   Neuro/Psych         Psychiatric history  Pertinent negatives: No seizures and CVA   Cardiovascular              Past MI and CAD  Pertinent negatives: No hypertension and CHF       GI/Hepatic/Renal     GERD      PUD and liver disease  Pertinent negatives: No renal disease   Endo/Other    Diabetes: poorly controlled    Arthritis     Other Findings            Physical Exam    Airway  Mallampati: II  TM Distance: 4 - 6 cm  Neck ROM: normal range of motion   Mouth opening: Normal     Cardiovascular  Regular rate and rhythm,  S1 and S2 normal,  no murmur, click, rub, or gallop  Rhythm: regular  Rate: normal         Dental    Dentition: Poor dentition, Lower partial plate and Upper partial plate     Pulmonary  Breath sounds clear to auscultation               Abdominal  GI exam deferred       Other Findings            Anesthetic Plan    ASA: 3  Anesthesia type: general          Induction: Intravenous  Anesthetic plan and risks discussed with: Patient

## 2020-10-02 NOTE — BRIEF OP NOTE
Brief Postoperative Note    Patient: Familia Shetty  YOB: 1950  MRN: [id number]    Date of Procedure: 10/2/2020     Pre-Op Diagnosis: RIGHT FOOT EXOTOSIS AND OPEN WOUND    Post-Op Diagnosis: Same as preoperative diagnosis. Procedure(s):  CUTTING OF SKIN, SOFT TISSUE AND BONE TO REMOVE PAINFUL EXOSTOSIS/BONE BUMP ON BOTTOM OF RIGHT FOOT AND REPAIR CHRONIC OPEN WOUND WITH C-ARM    Surgeon(s):  Shiv Hernandez DPM    Surgical Assistant: None    Anesthesia: General     Estimated Blood Loss (mL): Minimal    Complications: None    Specimens:   ID Type Source Tests Collected by Time Destination   1 : Bone, Right cuboid Preservative Foot, Right  Shiv Hernandez DPM 10/2/2020 1040 Pathology   1 : Right chronic foot wound Wound Foot, Right CULTURE, ANAEROBIC, CULTURE, WOUND W [place], [place] 10/2/2020 1006 Microbiology   2 : Piece of bone, right cuboid Tissue Bone CULTURE, ANAEROBIC, CULTURE, TISSUE W GRAM STAIN Shiv Hernandez [place] 10/2/2020 1040 Microbiology   3 : Tissue, bottom of right foot Tissue Foot, Right CULTURE, ANAEROBIC, CULTURE, TISSUE W [place], [place] 10/2/2020 1042 Microbiology        Implants: * No implants in log *    Drains:   Ramón-Sheppard Drain 10/02/20 Right; Lower Foot (Active)   Site Assessment Clean, dry, & intact 10/02/20 1104   Dressing Status Clean, dry, & intact 10/02/20 1104   Status Patent; Charged 10/02/20 1104   Drainage Color Serosanguinous 10/02/20 1104       Findings: exostosis plantar right cuboid bone causing chronic wound    Electronically Signed by Nicole Henry DPM on 10/2/2020 at 11:47 AM

## 2020-10-02 NOTE — DISCHARGE INSTRUCTIONS
Dr. Pam Quigley Post-op Podiatry Instructions    1. A SURGICAL PROCEDURE HAS JUST BEEN PERFORMED ON YOUR FOOT IN ORDER TO ELIMINATE YOUR PROBLEM. THE TECHNIQUES AND PROCEDURES USED ARE THE MOST MODERN AND EFFECTIVE IN THE FIELD OF FOOT SURGERY. THE AMOUNT OF DISCOMFORT AND SWELLING WILL VARY FROM ONE PATIENT TO ANOTHER. 2. THE EFFECTS OF ANESTHESIA ARE STILL PRESENT AND DROWSINESS MAY RESULT. DO NOT DRIVE OR OPERATE ANY EQUIPMENT TODAY. 3. REST TODAY, KEEPING YOUR FOOT AND LEG ELEVATED APPROXIMATELY SIX (6\") INCHES ABOVE THE LEVEL OF YOUR HIP. 4. PLACE AN ICE PACK BEHIND THE KNEE OF THE OPERATED LEG FOR FIFTEEN (20) MINUTES OUT OF EACH HALF HOUR YOU ARE AWAKE. USE ICE FOR THE NEXT SEVENTY-TWO (72) HOURS AFTER SURGERY. 5. PLEASE DO NOT REMOVE THE BANDAGE UNLESS INSTRUCTED BY THE DOCTOR TO DO SO.  IF THE BANDAGE FEELS TIGHT, YOU MAY LOOSEN THE OUTERMOST DRESSING ONLY. IF THIS DOES NOT GIVE RELIEF, CALL THE DOCTOR. 6. DO NOT GET THE BANDAGE WET!! 7. FOR YOUR DIET, YOU SHOULD START LIGHT (MOSTLY FLUIDS TODAY) AND PROGRESS TO YOUR REGULAR DIET AS TOLERATED. 8. YOU SHOULD ALREADY HAVE YOUR PRESCRIPTIONS, TAKE AS DIRECTED. NO ALCOHOLIC BEVERAGES SHOULD BE CONSUMED WHILE ON PAIN MEDICATION. 9. YOU MUST WEAR THE SURGICAL SHOE WHILE WALKING AND/OR CRUTCHES/WALKER IF THEY HAVE BEEN PROVIDED TO YOU.    10. NOTIFY DR. Roberts IF THE FOLLOWING OCCURS:  · LARGE AMOUNT OF BLEEDING  · FEVER OVER 101 DEGREES  · WOUND BECOMES EXTREMELY SWOLLEN, SHOWS RED STREAKS, OR BECOMES MORE SORE EACH DAY  · THE OPERATIVE EXTREMITY BECOMES VERY SWOLLEN, COLD TO TOUCH OR CHANGES COLOR  · BANDAGE GETS WET  · ANY REACTION TO MEDICATION  · IF UNABLE TO CONTACT DR. Roberts, PLEASE CALL OR GO TO THE NEAREST EMERGENCY ROOM. FOLLOWING THESE INSTRUCTIONS WILL HELP INSURE A SUCCESSFUL SURGICAL RESULT.   IGNORING THESE INSTRUCTIONS WILL MOST LIKELY RESULT IN A DELAY IN HEALING, INCREASED SWELLING, POSSIBLE INFECTION, OR A MORE SERIOUS COMPLICATION. WE ENCOURAGE YOU TO CALL THE OFFICE WITH ANY QUESTIONS OR CONCERNS. IF AFTER NORMAL OFFICE HOURS, DR. Roberts CAN BE REACHED at [phone] or [phone]. DISCHARGE SUMMARY from Nurse    PATIENT INSTRUCTIONS:    After general anesthesia or intravenous sedation, for 24 hours or while taking prescription Narcotics:  · Limit your activities  · Do not drive and operate hazardous machinery  · Do not make important personal or business decisions  · Do  not drink alcoholic beverages  · If you have not urinated within 8 hours after discharge, please contact your surgeon on call. Report the following to your surgeon:  · Excessive pain, swelling, redness or odor of or around the surgical area  · Temperature over 100.5  · Nausea and vomiting lasting longer than 4 hours or if unable to take medications  · Any signs of decreased circulation or nerve impairment to extremity: change in color, persistent  numbness, tingling, coldness or increase pain  · Any questions    What to do at Home:  Recommended activity: Activity as tolerated and no driving for today and Ambulate in house. If you experience any of the following symptoms as noted above, please follow up with Dr Javid Glover. *  Please give a list of your current medications to your Primary Care Provider. *  Please update this list whenever your medications are discontinued, doses are      changed, or new medications (including over-the-counter products) are added. *  Please carry medication information at all times in case of emergency situations. These are general instructions for a healthy lifestyle:    No smoking/ No tobacco products/ Avoid exposure to second hand smoke  Surgeon General's Warning:  Quitting smoking now greatly reduces serious risk to your health.     Obesity, smoking, and sedentary lifestyle greatly increases your risk for illness    A healthy diet, regular physical exercise & weight monitoring are important for maintaining a healthy lifestyle    You may be retaining fluid if you have a history of heart failure or if you experience any of the following symptoms:  Weight gain of 3 pounds or more overnight or 5 pounds in a week, increased swelling in our hands or feet or shortness of breath while lying flat in bed. Please call your doctor as soon as you notice any of these symptoms; do not wait until your next office visit. The discharge information has been reviewed with the patient and caregiver. The patient and caregiver verbalized understanding. Discharge medications reviewed with the patient and caregiver and appropriate educational materials and side effects teaching were provided. ___________________________________________________________________________________________________________________________________    Learning About Coronavirus (ONBOZ-82)  Coronavirus [phone]): Overview  What is coronavirus (PHOIB-30)? The coronavirus disease (COVID-19) is caused by a virus. It is an illness that was first found in [place], [place], in December 2019. It has since spread worldwide. The virus can cause fever, cough, and trouble breathing. In severe cases, it can cause pneumonia and make it hard to breathe without help. It can cause death. Coronaviruses are a large group of viruses. They cause the common cold. They also cause more serious illnesses like Middle East respiratory syndrome (MERS) and severe acute respiratory syndrome (SARS). COVID-19 is caused by a novel coronavirus. That means it's a new type that has not been seen in people before. This virus spreads person-to-person through droplets from coughing and sneezing. It can also spread when you are close to someone who is infected. And it can spread when you touch something that has the virus on it, such as a doorknob or a tabletop. What can you do to protect yourself from coronavirus (COVID-19)?   The best way to protect yourself from getting sick is to:  · Avoid areas where there is an outbreak. · Avoid contact with people who may be infected. · Wash your hands often with soap or alcohol-based hand sanitizers. · Avoid crowds and try to stay at least 6 feet away from other people. · Wash your hands often, especially after you cough or sneeze. Use soap and water, and scrub for at least 20 seconds. If soap and water aren't available, use an alcohol-based hand . · Avoid touching your mouth, nose, and eyes. What can you do to avoid spreading the virus to others? To help avoid spreading the virus to others:  · Cover your mouth with a tissue when you cough or sneeze. Then throw the tissue in the trash. · Use a disinfectant to clean things that you touch often. · Stay home if you are sick or have been exposed to the virus. Don't go to school, work, or public areas. And don't use public transportation. · If you are sick:  ? Leave your home only if you need to get medical care. But call the doctor's office first so they know you're coming. And wear a face mask, if you have one.  ? If you have a face mask, wear it whenever you're around other people. It can help stop the spread of the virus when you cough or sneeze. ? Clean and disinfect your home every day. Use household  and disinfectant wipes or sprays. Take special care to clean things that you grab with your hands. These include doorknobs, remote controls, phones, and handles on your refrigerator and microwave. And don't forget countertops, tabletops, bathrooms, and computer keyboards. When to call for help  Call 911 anytime you think you may need emergency care. For example, call if:  · You have severe trouble breathing. (You can't talk at all.)  · You have constant chest pain or pressure. · You are severely dizzy or lightheaded. · You are confused or can't think clearly. · Your face and lips have a blue color. · You pass out (lose consciousness) or are very hard to wake up.   Call your doctor now if you develop symptoms such as:  · Shortness of breath. · Fever. · Cough. If you need to get care, call ahead to the doctor's office for instructions before you go. Make sure you wear a face mask, if you have one, to prevent exposing other people to the virus. Where can you get the latest information? The following health organizations are tracking and studying this virus. Their websites contain the most up-to-date information. Mylene Benoit also learn what to do if you think you may have been exposed to the virus. · U.S. Centers for Disease Control and Prevention (CDC): The CDC provides updated news about the disease and travel advice. The website also tells you how to prevent the spread of infection. www.cdc.gov  · World Health Organization [facility]): WHO offers information about the virus outbreaks. WHO also has travel advice. www.who.int  Current as of: April 1, 2020               Content Version: 12.4  © 2006-2020 [employer]. Care instructions adapted under license by your healthcare professional. If you have questions about a medical condition or this instruction, always ask your healthcare professional. [address] any warranty or liability for your use of this information. Patient armband removed and shredded.

## 2020-10-02 NOTE — PERIOP NOTES
Reviewed PTA medication list with patient/caregiver and patient/caregiver denies any additional medications. Patient admits to having a responsible adult care for them at home for at least 24 hours after surgery. Patient encouraged to use gown warming system and informed that using said warming gown to regulate body temperature prior to a procedure has been shown to help reduce the risks of blood clots and infection. Patient's pharmacy of choice verified and documented in PTA medication section. Dual skin assessment & fall risk band verification completed with Kenn Vásquez RN.

## 2020-10-02 NOTE — ANESTHESIA POSTPROCEDURE EVALUATION
Procedure(s):  CUTTING OF SKIN, SOFT TISSUE AND BONE TO REMOVE PAINFUL EXOSTOSIS/BONE BUMP ON BOTTOM OF RIGHT FOOT AND REPAIR CHRONIC OPEN WOUND WITH C-ARM. general    Anesthesia Post Evaluation        Comments: Post-Anesthesia Evaluation and Assessment    Cardiovascular Function/Vital Signs  BP (!) 115/57   Pulse 87   Temp 36.3 °C (97.4 °F)   Resp 14   Ht 5' 4.5\" (1.638 m)   Wt 60.4 kg (133 lb 4 oz)   SpO2 97%   BMI 22.52 kg/m²     Patient is status post Procedure(s):  CUTTING OF SKIN, SOFT TISSUE AND BONE TO REMOVE PAINFUL EXOSTOSIS/BONE BUMP ON BOTTOM OF RIGHT FOOT AND REPAIR CHRONIC OPEN WOUND WITH C-ARM. Nausea/Vomiting: Controlled. Postoperative hydration reviewed and adequate. Pain:  Pain Scale 1: Numeric (0 - 10) (10/02/20 1145)  Pain Intensity 1: 0 (10/02/20 1145)   Managed. Neurological Status:   Neuro (WDL): Within Defined Limits (10/02/20 1145)   At baseline. Mental Status and Level of Consciousness: Arousable. Pulmonary Status:   O2 Device: Room air (10/02/20 1154)   Adequate oxygenation and airway patent. Complications related to anesthesia: None    Post-anesthesia assessment completed. No concerns. Patient has met all discharge requirements. Signed By: Ever Ervin MD    October 2, 2020                     INITIAL Post-op Vital signs:   Vitals Value Taken Time   /57 10/2/2020 12:00 PM   Temp 36.3 °C (97.4 °F) 10/2/2020 11:47 AM   Pulse 83 10/2/2020 12:05 PM   Resp 16 10/2/2020 12:05 PM   SpO2 94 % 10/2/2020 12:04 PM   Vitals shown include unvalidated device data.

## 2020-10-04 LAB
BACTERIA SPEC CULT: ABNORMAL
BACTERIA SPEC CULT: ABNORMAL
BACTERIA SPEC CULT: NORMAL
BACTERIA SPEC CULT: NORMAL
GRAM STN SPEC: ABNORMAL
GRAM STN SPEC: ABNORMAL
SERVICE CMNT-IMP: ABNORMAL
SERVICE CMNT-IMP: NORMAL
SERVICE CMNT-IMP: NORMAL

## 2020-10-05 LAB
BACTERIA SPEC CULT: ABNORMAL
BACTERIA SPEC CULT: ABNORMAL
GRAM STN SPEC: ABNORMAL
GRAM STN SPEC: ABNORMAL
SERVICE CMNT-IMP: ABNORMAL

## 2020-10-06 ENCOUNTER — HOSPITAL ENCOUNTER (EMERGENCY)
Age: 70
Discharge: HOME | End: 2020-10-06
Attending: EMERGENCY MEDICINE
Payer: MEDICARE

## 2020-10-06 VITALS
BODY MASS INDEX: 22.24 KG/M2 | DIASTOLIC BLOOD PRESSURE: 56 MMHG | RESPIRATION RATE: 17 BRPM | HEART RATE: 56 BPM | SYSTOLIC BLOOD PRESSURE: 111 MMHG | OXYGEN SATURATION: 98 % | TEMPERATURE: 98.6 F | WEIGHT: 133.5 LBS | HEIGHT: 65 IN

## 2020-10-06 DIAGNOSIS — I95.89 HYPOTENSION DUE TO HYPOVOLEMIA: Primary | ICD-10-CM

## 2020-10-06 DIAGNOSIS — E86.1 HYPOTENSION DUE TO HYPOVOLEMIA: Primary | ICD-10-CM

## 2020-10-06 DIAGNOSIS — N30.00 ACUTE CYSTITIS WITHOUT HEMATURIA: ICD-10-CM

## 2020-10-06 LAB
ALBUMIN SERPL-MCNC: 3 G/DL (ref 3.4–5)
ALBUMIN/GLOB SERPL: 1.1 {RATIO} (ref 0.8–1.7)
ALP SERPL-CCNC: 89 U/L (ref 45–117)
ALT SERPL-CCNC: 14 U/L (ref 13–56)
ANION GAP SERPL CALC-SCNC: 8 MMOL/L (ref 3–18)
APPEARANCE UR: ABNORMAL
APTT PPP: 22 SEC (ref 23–36.4)
AST SERPL-CCNC: 18 U/L (ref 10–38)
BACTERIA SPEC CULT: NORMAL
BACTERIA URNS QL MICRO: ABNORMAL /HPF
BASOPHILS # BLD: 0 K/UL (ref 0–0.1)
BASOPHILS NFR BLD: 0 % (ref 0–2)
BILIRUB SERPL-MCNC: 0.2 MG/DL (ref 0.2–1)
BILIRUB UR QL: NEGATIVE
BUN SERPL-MCNC: 17 MG/DL (ref 7–18)
BUN/CREAT SERPL: 24 (ref 12–20)
CALCIUM SERPL-MCNC: 8.3 MG/DL (ref 8.5–10.1)
CHLORIDE SERPL-SCNC: 106 MMOL/L (ref 100–111)
CK MB CFR SERPL CALC: 1.8 % (ref 0–4)
CK MB SERPL-MCNC: 2.3 NG/ML (ref 5–25)
CK SERPL-CCNC: 130 U/L (ref 26–192)
CO2 SERPL-SCNC: 26 MMOL/L (ref 21–32)
COLOR UR: ABNORMAL
CREAT SERPL-MCNC: 0.72 MG/DL (ref 0.6–1.3)
DIFFERENTIAL METHOD BLD: ABNORMAL
EOSINOPHIL # BLD: 0.1 K/UL (ref 0–0.4)
EOSINOPHIL NFR BLD: 1 % (ref 0–5)
EPITH CASTS URNS QL MICRO: ABNORMAL /LPF (ref 0–5)
ERYTHROCYTE [DISTWIDTH] IN BLOOD BY AUTOMATED COUNT: 14.7 % (ref 11.6–14.5)
GLOBULIN SER CALC-MCNC: 2.8 G/DL (ref 2–4)
GLUCOSE SERPL-MCNC: 162 MG/DL (ref 74–99)
GLUCOSE UR STRIP.AUTO-MCNC: NEGATIVE MG/DL
HCT VFR BLD AUTO: 36 % (ref 35–45)
HGB BLD-MCNC: 11.7 G/DL (ref 12–16)
HGB UR QL STRIP: NEGATIVE
INR PPP: 1.1 (ref 0.8–1.2)
KETONES UR QL STRIP.AUTO: NEGATIVE MG/DL
LEUKOCYTE ESTERASE UR QL STRIP.AUTO: ABNORMAL
LYMPHOCYTES # BLD: 1.4 K/UL (ref 0.9–3.6)
LYMPHOCYTES NFR BLD: 18 % (ref 21–52)
MAGNESIUM SERPL-MCNC: 1.8 MG/DL (ref 1.6–2.6)
MCH RBC QN AUTO: 33.1 PG (ref 24–34)
MCHC RBC AUTO-ENTMCNC: 32.5 G/DL (ref 31–37)
MCV RBC AUTO: 101.7 FL (ref 74–97)
MONOCYTES # BLD: 0.5 K/UL (ref 0.05–1.2)
MONOCYTES NFR BLD: 7 % (ref 3–10)
MUCOUS THREADS URNS QL MICRO: ABNORMAL /LPF
NEUTS SEG # BLD: 5.9 K/UL (ref 1.8–8)
NEUTS SEG NFR BLD: 74 % (ref 40–73)
NITRITE UR QL STRIP.AUTO: NEGATIVE
PH UR STRIP: 5.5 [PH] (ref 5–8)
PLATELET # BLD AUTO: 257 K/UL (ref 135–420)
PMV BLD AUTO: 9.5 FL (ref 9.2–11.8)
POTASSIUM SERPL-SCNC: 4.4 MMOL/L (ref 3.5–5.5)
PROT SERPL-MCNC: 5.8 G/DL (ref 6.4–8.2)
PROT UR STRIP-MCNC: 100 MG/DL
PROTHROMBIN TIME: 13.9 SEC (ref 11.5–15.2)
RBC # BLD AUTO: 3.54 M/UL (ref 4.2–5.3)
RBC #/AREA URNS HPF: ABNORMAL /HPF (ref 0–5)
SERVICE CMNT-IMP: NORMAL
SODIUM SERPL-SCNC: 140 MMOL/L (ref 136–145)
SP GR UR REFRACTOMETRY: 1.03 (ref 1–1.03)
TROPONIN I SERPL-MCNC: <0.02 NG/ML (ref 0–0.04)
UROBILINOGEN UR QL STRIP.AUTO: 1 EU/DL (ref 0.2–1)
WBC # BLD AUTO: 7.9 K/UL (ref 4.6–13.2)
WBC URNS QL MICRO: ABNORMAL /HPF (ref 0–5)

## 2020-10-06 PROCEDURE — 99285 EMERGENCY DEPT VISIT HI MDM: CPT

## 2020-10-06 PROCEDURE — 96360 HYDRATION IV INFUSION INIT: CPT

## 2020-10-06 PROCEDURE — 85730 THROMBOPLASTIN TIME PARTIAL: CPT

## 2020-10-06 PROCEDURE — 96361 HYDRATE IV INFUSION ADD-ON: CPT

## 2020-10-06 PROCEDURE — 80053 COMPREHEN METABOLIC PANEL: CPT

## 2020-10-06 PROCEDURE — 83735 ASSAY OF MAGNESIUM: CPT

## 2020-10-06 PROCEDURE — 85025 COMPLETE CBC W/AUTO DIFF WBC: CPT

## 2020-10-06 PROCEDURE — 87086 URINE CULTURE/COLONY COUNT: CPT

## 2020-10-06 PROCEDURE — 82550 ASSAY OF CK (CPK): CPT

## 2020-10-06 PROCEDURE — 93005 ELECTROCARDIOGRAM TRACING: CPT

## 2020-10-06 PROCEDURE — 74011250637 HC RX REV CODE- 250/637: Performed by: PHYSICIAN ASSISTANT

## 2020-10-06 PROCEDURE — 85610 PROTHROMBIN TIME: CPT

## 2020-10-06 PROCEDURE — 81001 URINALYSIS AUTO W/SCOPE: CPT

## 2020-10-06 PROCEDURE — 74011250636 HC RX REV CODE- 250/636: Performed by: PHYSICIAN ASSISTANT

## 2020-10-06 RX ORDER — NITROFURANTOIN 25; 75 MG/1; MG/1
100 CAPSULE ORAL 2 TIMES DAILY
Qty: 14 CAP | Refills: 0 | Status: SHIPPED | OUTPATIENT
Start: 2020-10-06 | End: 2020-10-13

## 2020-10-06 RX ORDER — NITROFURANTOIN 25; 75 MG/1; MG/1
100 CAPSULE ORAL
Status: COMPLETED | OUTPATIENT
Start: 2020-10-06 | End: 2020-10-06

## 2020-10-06 RX ADMIN — NITROFURANTOIN (MONOHYDRATE/MACROCRYSTALS) 100 MG: 75; 25 CAPSULE ORAL at 13:23

## 2020-10-06 RX ADMIN — SODIUM CHLORIDE 1000 ML: 900 INJECTION, SOLUTION INTRAVENOUS at 11:05

## 2020-10-06 NOTE — ED NOTES
NEGATIVE orthostatics noted. Patient denies dizziness.      Patient observed ambulatory with assistance of walker and this RN stand-by assist. Lynda BROWNLEE aware and observed

## 2020-10-06 NOTE — ED PROVIDER NOTES
EMERGENCY DEPARTMENT HISTORY AND PHYSICAL EXAM    Date: 10/6/2020  Patient Name: Marshall Enciso    History of Presenting Illness     Chief Complaint   Patient presents with    Syncope         History Provided By: Patient    Chief Complaint: syncope    HPI(Context):   10:44 AM  Marshall Enciso is a 72 y.o. female with PMHX of DMII, diabetic foot ulcer, multiple abdominal surgeries, coagulopathy, PUD, who presents to the emergency department via EMS for syncopal episode. PTA, pt was at podiatrist office (Dr. Nilda Reyes) for FU following right foot surgery on 10/02/2020 for chronic right foot pain and poorly healing diabetic foot ulcer. Pt was feeling \"hot\" and lightheaded during physical exam. Reports she sat down and had a syncopal episode. No head trauma or seizure activity. EMS was called for transport. Pt reports hx of low BP with baseline 115/70. BP at presentation is 99/52. Pt reports she was given PO dilaudid for post-op pain and took one dose the night of surgery causing hives. Pt is not currently taking anything for pain. Pt has numerous allergies. Pt denies fever, chills, cough, chest pain, SOB, vomiting, abdominal pain, and any other sxs or complaints. Pt has hx of coagulopathy and was on Lovenox post surgery and re-started coumadin last night. PCP: Sheridan Escalante MD    Current Outpatient Medications   Medication Sig Dispense Refill    nitrofurantoin, macrocrystal-monohydrate, (Macrobid) 100 mg capsule Take 1 Cap by mouth two (2) times a day for 7 days. Indications: bacterial urinary tract infection 14 Cap 0    enoxaparin (Lovenox) 40 mg/0.4 mL 40 mg by SubCUTAneous route.  hydrOXYzine HCL (ATARAX) 50 mg tablet Take 25 mg by mouth three (3) times daily as needed.  OTHER Blood thinner, patient unsure of name      ursodioL (ACTIGALL) 500 mg tablet TAKE ONE TABLET BY MOUTH TWICE A DAY 60 Tab 5    warfarin sodium (COUMADIN PO) Take 7 mg by mouth daily.       promethazine (PHENERGAN) 25 mg tablet Take 25 mg by mouth every six (6) hours as needed for Nausea.  B.infantis-B.ani-B.long-B.bifi (PROBIOTIC 4X) 10-15 mg TbEC Take  by mouth.  lidocaine-prilocaine (LIDOPRIL) cream Apply  to affected area now.  nystatin (MYCOSTATIN) topical cream Apply  to affected area two (2) times a day. 60 g 0    atorvastatin (LIPITOR) 20 mg tablet Take 20 mg by mouth daily.  dronabinol (MARINOL) 2.5 mg capsule Take 2.5 mg by mouth three (3) times daily.  metoprolol tartrate (LOPRESSOR) 25 mg tablet Take 25 mg by mouth two (2) times a day.  nitroglycerin (NITROSTAT) 0.3 mg SL tablet by SubLINGual route every five (5) minutes as needed for Chest Pain (x3).  clopidogrel (PLAVIX) 75 mg tab Take 75 mg by mouth daily.  lactobacillus combination no.4 (PROBIOTIC) 3 billion cell cap Take 1 Cap by mouth daily.  Cetirizine (ZYRTEC) 10 mg cap Take  by mouth.  cyanocobalamin 1,000 mcg tablet Take 1,000 mcg by mouth daily.  glimepiride (AMARYL) 2 mg tablet Take 2 mg by mouth every morning.  melatonin 3 mg tablet Take 6 mg by mouth nightly.  ondansetron hcl (ZOFRAN, AS HYDROCHLORIDE,) 4 mg tablet Take 4 mg by mouth every eight (8) hours as needed for Nausea.  cyclobenzaprine (FLEXERIL) 10 mg tablet Take  by mouth three (3) times daily as needed for Muscle Spasm(s).  LORazepam (ATIVAN) 2 mg tablet Take 1 mg by mouth every six (6) hours as needed.  BIOTIN PO Take 5,000 mcg by mouth daily.  albuterol (PROVENTIL HFA, VENTOLIN HFA) 90 mcg/actuation inhaler Take 2 Puffs by inhalation every four (4) hours as needed. Indications: ACUTE ASTHMA ATTACK      cloNIDine (CATAPRES) 0.1 mg tablet Take 0.1 mg by mouth two (2) times a day. Indications: hot flashes;      metFORMIN (GLUCOPHAGE) 500 mg tablet Take 500 mg by mouth two (2) times daily (with meals). Indications: TYPE 2 DIABETES MELLITUS      ropinirole (REQUIP) 4 mg Tab TAB Take 4 mg by mouth nightly. Indications: RESTLESS LEGS SYNDROME         Past History     Past Medical History:  Past Medical History:   Diagnosis Date    Arthritis     hands and feet    Asthma     Autoimmune disease ([address]     lupus anticoagulant-causes clotting    Cancer ([address]     carcinoma in situ of cervix and uterus; inside bottom lip    Chronic pain     Left foot    Diabetes ([address] 2013    Dizzy spells     h/o    GERD (gastroesophageal reflux disease)     Liver disease     1/2 of my liver was removed for ulcers.     Lung collapse     2015    Nausea & vomiting 5/23/2012    Neuropathy     right foot    Other ill-defined conditions(799.89)     arterial clot in rt leg    Other ill-defined conditions(799.89)     restless leg syndrome    Other ill-defined conditions(799.89)     raynaud's syndrome    Other ill-defined conditions(799.89)     chronic N/V    Other ill-defined conditions(799.89)     (2 mutated genes) hyper coag. state     Pneumonia 07/22/2017    Positive cardiolipin antibodies     Psychiatric disorder     depression    PUD (peptic ulcer disease) 2009    H/O requiring partial gastrectomy and small intestine    Thromboembolus ([address] 2000    artery right leg       Past Surgical History:  Past Surgical History:   Procedure Laterality Date    ABDOMEN SURGERY PROC UNLISTED      duodenal ulcer rupture    BREAST SURGERY PROCEDURE UNLISTED Bilateral 02/2016    hemorrhaged after breast surgery    CARDIAC SURG PROCEDURE UNLIST  2018    open heart (single vessel)    HX APPENDECTOMY      HX BREAST AUGMENTATION      developed infection and implant removed    HX CHOLECYSTECTOMY      HX GASTRECTOMY      due to ulcers    HX GASTRIC BYPASS  1996    gastric by-pass    HX GI      total gastrectomy, partial colectomy    HX GI      colostomy and reversal    HX HEENT      cancer of lip removed    HX HYSTERECTOMY      for cervical cancer    HX ORTHOPAEDIC      rt foot surgery x10    HX ORTHOPAEDIC Left     great toe gangrenous and amputated    HX OTHER SURGICAL      part of liver removal    HX OTHER SURGICAL      ventral hernia repair x10    HX OTHER SURGICAL      Exc pilonial cystectomy    VASCULAR SURGERY PROCEDURE UNLIST      iliac aorta stents x 6       Family History:  Family History   Problem Relation Age of Onset    Malignant Hyperthermia Neg Hx     Pseudocholinesterase Deficiency Neg Hx     Delayed Awakening Neg Hx     Post-op Nausea/Vomiting Neg Hx     Emergence Delirium Neg Hx     Post-op Cognitive Dysfunction Neg Hx     Other Neg Hx        Social History:  Social History     Tobacco Use    Smoking status: Former Smoker     Years: 0.50     Last attempt to quit: 1996     Years since quittin.7    Smokeless tobacco: Never Used   Substance Use Topics    Alcohol use: Yes     Comment: 1 or 2 glasses wine or mixed drinks monthly     Drug use: Yes     Types: Marijuana     Comment: 4x daily       Allergies: Allergies   Allergen Reactions    Rifampin Hives and Swelling     Swelling of mouth and tounge    Levaquin [Levofloxacin] Itching    Amoxicillin Hives    Aspirin Nausea and Vomiting    Azithromycin Angioedema    Bactrim [Sulfamethoprim Ds] Hives    Cefatrizine Hives    Erythromycin Hives    Rocephin [Ceftriaxone] Hives    Tylenol [Acetaminophen] Hives    Vancomycin Hives         Review of Systems   Review of Systems   Constitutional: Negative for chills and fever. Respiratory: Negative for cough and shortness of breath. Cardiovascular: Negative for chest pain. Gastrointestinal: Negative for abdominal pain and vomiting. Musculoskeletal: Positive for arthralgias (right foot). Neurological: Positive for syncope and light-headedness. Negative for headaches. All other systems reviewed and are negative.       Physical Exam     Vitals:    10/06/20 1203 10/06/20 1206 10/06/20 1207 10/06/20 1230   BP: 118/61 125/64 120/71 (!) 111/56   Pulse: 61 67 65 (!) 56   Resp:    17   Temp: SpO2:    98%   Weight:       Height:         Physical Exam  Vitals signs and nursing note reviewed. Constitutional:       General: She is not in acute distress. Appearance: She is well-developed. She is not diaphoretic. Comments:  female in NAD. Alert. Answering questions. Following directions. HENT:      Head: Normocephalic and atraumatic. Right Ear: External ear normal.      Left Ear: External ear normal.      Nose: Nose normal.      Mouth/Throat:      Mouth: Mucous membranes are dry. Eyes:      Conjunctiva/sclera: Conjunctivae normal.   Neck:      Musculoskeletal: Normal range of motion. Cardiovascular:      Rate and Rhythm: Normal rate and regular rhythm. Heart sounds: Normal heart sounds. No murmur. No friction rub. No gallop. Pulmonary:      Effort: Pulmonary effort is normal. No tachypnea, accessory muscle usage or respiratory distress. Breath sounds: Normal breath sounds. No decreased breath sounds, wheezing, rhonchi or rales. Abdominal:      General: There is no distension. Tenderness: There is no abdominal tenderness. Musculoskeletal:      Right lower leg: No edema. Left lower leg: No edema. Feet:    Skin:     General: Skin is warm and dry. Neurological:      Mental Status: She is alert and oriented to person, place, and time. GCS: GCS eye subscore is 4. GCS verbal subscore is 5. GCS motor subscore is 6. Cranial Nerves: Cranial nerves are intact. No dysarthria or facial asymmetry. Sensory: Sensation is intact. Motor: Motor function is intact. Coordination: Coordination is intact.    Psychiatric:         Judgment: Judgment normal.             Diagnostic Study Results     Labs -     Recent Results (from the past 12 hour(s))   CBC WITH AUTOMATED DIFF    Collection Time: 10/06/20 10:50 AM   Result Value Ref Range    WBC 7.9 4.6 - 13.2 K/uL    RBC 3.54 (L) 4.20 - 5.30 M/uL    HGB 11.7 (L) 12.0 - 16.0 g/dL    HCT 36.0 35.0 - 45.0 %    .7 (H) 74.0 - 97.0 FL    MCH 33.1 24.0 - 34.0 PG    MCHC 32.5 31.0 - 37.0 g/dL    RDW 14.7 (H) 11.6 - 14.5 %    PLATELET 171 115 - 835 K/uL    MPV 9.5 9.2 - 11.8 FL    NEUTROPHILS 74 (H) 40 - 73 %    LYMPHOCYTES 18 (L) 21 - 52 %    MONOCYTES 7 3 - 10 %    EOSINOPHILS 1 0 - 5 %    BASOPHILS 0 0 - 2 %    ABS. NEUTROPHILS 5.9 1.8 - 8.0 K/UL    ABS. LYMPHOCYTES 1.4 0.9 - 3.6 K/UL    ABS. MONOCYTES 0.5 0.05 - 1.2 K/UL    ABS. EOSINOPHILS 0.1 0.0 - 0.4 K/UL    ABS. BASOPHILS 0.0 0.0 - 0.1 K/UL    DF AUTOMATED     METABOLIC PANEL, COMPREHENSIVE    Collection Time: 10/06/20 10:50 AM   Result Value Ref Range    Sodium 140 136 - 145 mmol/L    Potassium 4.4 3.5 - 5.5 mmol/L    Chloride 106 100 - 111 mmol/L    CO2 26 21 - 32 mmol/L    Anion gap 8 3.0 - 18 mmol/L    Glucose 162 (H) 74 - 99 mg/dL    BUN 17 7.0 - 18 MG/DL    Creatinine 0.72 0.6 - 1.3 MG/DL    BUN/Creatinine ratio 24 (H) 12 - 20      GFR est AA >60 >60 ml/min/1.73m2    GFR est non-AA >60 >60 ml/min/1.73m2    Calcium 8.3 (L) 8.5 - 10.1 MG/DL    Bilirubin, total 0.2 0.2 - 1.0 MG/DL    ALT (SGPT) 14 13 - 56 U/L    AST (SGOT) 18 10 - 38 U/L    Alk.  phosphatase 89 45 - 117 U/L    Protein, total 5.8 (L) 6.4 - 8.2 g/dL    Albumin 3.0 (L) 3.4 - 5.0 g/dL    Globulin 2.8 2.0 - 4.0 g/dL    A-G Ratio 1.1 0.8 - 1.7     CARDIAC PANEL,(CK, CKMB & TROPONIN)    Collection Time: 10/06/20 10:50 AM   Result Value Ref Range    CK - MB 2.3 <3.6 ng/ml    CK-MB Index 1.8 0.0 - 4.0 %     26 - 192 U/L    Troponin-I, QT <0.02 0.0 - 0.045 NG/ML   MAGNESIUM    Collection Time: 10/06/20 10:50 AM   Result Value Ref Range    Magnesium 1.8 1.6 - 2.6 mg/dL   PROTHROMBIN TIME + INR    Collection Time: 10/06/20 10:50 AM   Result Value Ref Range    Prothrombin time 13.9 11.5 - 15.2 sec    INR 1.1 0.8 - 1.2     PTT    Collection Time: 10/06/20 10:50 AM   Result Value Ref Range    aPTT 22.0 (L) 23.0 - 36.4 SEC   EKG, 12 LEAD, INITIAL    Collection Time: 10/06/20 10:56 AM Result Value Ref Range    Ventricular Rate 57 BPM    Atrial Rate 57 BPM    P-R Interval 168 ms    QRS Duration 82 ms    Q-T Interval 446 ms    QTC Calculation (Bezet) 434 ms    Calculated P Axis 30 degrees    Calculated R Axis 63 degrees    Calculated T Axis 67 degrees    Diagnosis       Sinus bradycardia with sinus arrhythmia  Otherwise normal ECG  When compared with ECG of 30-SEP-2020 08:52,  No significant change was found     URINALYSIS W/ RFLX MICROSCOPIC    Collection Time: 10/06/20 12:07 PM   Result Value Ref Range    Color DARK YELLOW      Appearance CLOUDY      Specific gravity 1.029 1.005 - 1.030      pH (UA) 5.5 5.0 - 8.0      Protein 100 (A) NEG mg/dL    Glucose Negative NEG mg/dL    Ketone Negative NEG mg/dL    Bilirubin Negative NEG      Blood Negative NEG      Urobilinogen 1.0 0.2 - 1.0 EU/dL    Nitrites Negative NEG      Leukocyte Esterase SMALL (A) NEG     URINE MICROSCOPIC ONLY    Collection Time: 10/06/20 12:07 PM   Result Value Ref Range    WBC TOO NUMEROUS TO COUNT 0 - 5 /hpf    RBC 3 to 4 0 - 5 /hpf    Epithelial cells 3+ 0 - 5 /lpf    Bacteria 3+ (A) NEG /hpf    Mucus 4+ (A) NEG /lpf           No orders to display     CT Results  (Last 48 hours)    None        CXR Results  (Last 48 hours)    None          Medications given in the ED-  Medications   sodium chloride 0.9 % bolus infusion 1,000 mL (0 mL IntraVENous IV Completed 10/6/20 1249)   nitrofurantoin (macrocrystal-monohydrate) (MACROBID) capsule 100 mg (100 mg Oral Given 10/6/20 1323)         Medical Decision Making   I am the first provider for this patient. I reviewed the vital signs, available nursing notes, past medical history, past surgical history, family history and social history. Vital Signs-Reviewed the patient's vital signs. Pulse Oximetry Analysis - 99% on RA.  NORMAL     Records Reviewed: Nursing Notes, Old Medical Records, Previous electrocardiograms, Previous Radiology Studies and Previous Laboratory Studies    Provider Notes (Medical Decision Making): dehydration, metabolic derangement, DKA/HHS, hypoglycemia, arrhythmia, renal. Doubt CVA/TIA or ACS/MI    Procedures:  Procedures    ED Course:   10:44 AM Initial assessment performed. The patients presenting problems have been discussed, and they are in agreement with the care plan formulated and outlined with them. I have encouraged them to ask questions as they arise throughout their visit. Diagnosis and Disposition       Feeling much better. Ambulatory to restroom. JUAN improved to 122/71. UA c/w UTI. Will tx with macrobid as numerous allergies and await culture. Reasons to RTED discussed with pt. All questions answered. Pt feels comfortable going home at this time. Pt expressed understanding and she agrees with plan. 1:34 PM Consult: I discussed care with Kale Tse DPM (podiatry) It was a standard discussion, including history of patients chief complaint, available diagnostic results, and treatment course. Agrees with plan. Will FU in office. 1. Hypotension due to hypovolemia    2. Acute cystitis without hematuria        PLAN:  1. D/C Home  2. Current Discharge Medication List      START taking these medications    Details   nitrofurantoin, macrocrystal-monohydrate, (Macrobid) 100 mg capsule Take 1 Cap by mouth two (2) times a day for 7 days. Indications: bacterial urinary tract infection  Qty: 14 Cap, Refills: 0           3. Follow-up Information     Follow up With Specialties Details Why Contact Info    Lorenzo Andrade MD [facility]   [address] [place] [address]  [phone]      Florencio Cockayne, [facility]   [facility] [address] [address] [place] [address]  [phone]      THE [facility] EMERGENCY DEPT Emergency Medicine  As needed, If symptoms worsen [address] [id number] [phone]        _______________________________    Attestations:   This note is prepared by Jayna Agustin PA-C.  _______________________________          Please note that this dictation was completed with [employer], the computer voice recognition software. Quite often unanticipated grammatical, syntax, homophones, and other interpretive errors are inadvertently transcribed by the computer software. Please disregard these errors. Please excuse any errors that have escaped final proofreading.

## 2020-10-06 NOTE — ED NOTES
Discharge instructions reviewed with the patient with opportunity for questions given. The patient verbalized understanding. Patient armband removed and shredded. Patient in stable condition at time of discharge.     Patient wheeled to ED lobby awaiting son to p/u patient at this time

## 2020-10-06 NOTE — OP NOTES
[facility] [place]  OPERATIVE REPORT    Name:  Kimber Enciso  MR#:   [id number]  :  1950  ACCOUNT #:  [de-identified]  DATE OF SERVICE:  10/02/2020    LOCATION:  OPS    PREOPERATIVE DIAGNOSES:  1. Insulin-dependent diabetic with chronic open wound of the right foot. 2.  Exostosis, right plantar cuboid, rule out osteomyelitis. POSTOPERATIVE DIAGNOSES:  1. Insulin-dependent diabetes mellitus with chronic open wound of the right foot. 2.  Osteomyelitis of the right cuboid bone. PROCEDURES PERFORMED:  1. Incision of bone cortex of the right cuboid bone secondary to osteomyelitis. 2.  Bone biopsy of the right cuboid bone done as a separate procedure. 3.  Repair, intermediate, wound, right foot done as a separate procedure. SURGEON:  Apryl Vera DPM    ASSISTANT:  Deion Rodriguez. ANESTHESIA:  General with 10 mL of 1% lidocaine with epinephrine given to the right foot. HEMOSTASIS:  There was no tourniquet used in this procedure also sequential compression devices were used on both legs throughout the procedure to try and prevent any blood clots or thrombosis. COMPLICATIONS:  None. SPECIMENS REMOVED:  Bone biopsies from the right cuboid. IMPLANTS:  None. ESTIMATED BLOOD LOSS:  Minimal, less than 10 mL. MATERIALS:  3-0 Vicryl, 3-0 nylon, and also a Ramón-Sheppard drain. INJECTABLES:  None. INDICATIONS FOR PROCEDURE:  The patient is a 78-year-old insulin-dependent diabetes mellitus female who presents to [facility] for outpatient treatment of open wound of the right foot and also osteomyelitis of the right cuboid bone. All conservative treatments have failed to offer the patient adequate relief and the patient desires surgical correction. The planned procedures were explained to the patient in detail including all risks, benefits and possible complications, and the patient still desires surgical correction. Medical clearance was obtained prior to surgery. Consent was signed and on chart. All the patient's questions were answered and no guarantees were given. Also, the patient was asked if she had any history of bleeding disorders or blood clots, and she stated that she did have a history of factor V Leiden and also a history of blood clots; therefore, no tourniquet was used in this procedure and also SCDs were used on both legs throughout the entire procedure. It was also was explained to the patient that her surgery would be considered a salvage procedure due to the multiple previous procedures done and no guarantees were given. The patient stated that she understood this and agreed. PROCEDURE:  The patient was brought to the operating room and placed on the operating room table in supine position. The patient's right foot was then anesthetized using 10 mL of 1% lidocaine with epinephrine as a local block. The patient's right foot was then prepped and draped in usual sterile manner. Attention was then drawn to the plantar lateral right midfoot area where #15 blade was then used to make an approximately 3 cm linear skin incision over the cuboid bone. A second #15 blade was then used to deepen this incision through the subcutaneous layers being careful to avoid any neurovascular structures. Sharp dissection was then continued to remove the deeper tissues to expose the cuboid bone. Next, deep cultures, both aerobic and anaerobic cultures were taken and sent to Micro. Next, a new #15 blade was then used to incise the bone cortex of the cuboid bone. The plantar bone cortex was removed and the necrotic soft gray cancellus bone of the cuboid was removed as nonviable bone. Next, a rongeur was then used to take multiple bone biopsies to sent to Micro for accurate bone culture results to determine the best antibiotics to treat the osteomyelitis. Also bone biopsy was sent to Pathology to confirm the osteomyelitis.   All necrotic gray soft bone was removed down to healthy bleeding bone of the right cuboid bone. Next, a bone rasp was then used to remove any sharp plantar bone edges of the cuboid. The wound was then flushed with copious amounts of normal sterile saline. It was closed using 3-0 Vicryl to close the deep tissues and also 3-0 nylon to close the skin. Next, attention was then drawn to the open plantar wound on the right mid foot where #15 blade was then used to make two elliptical incisions, approximately 1.5 cm in length around the wound deep to muscle and subfascial layers to excise the wound in total.  Next, an intermediate repair was performed in layers using 3-0 Vicryl to close the deeper layers and then 3-0 nylon to close the skin. Also, a Ramón-Sheppard drain was inserted and sutured in place. Next, the right foot was then dressed using Steri-Strips, Adaptic, 4x4s, Kerlix, and an Ace wrap. The patient tolerated procedures and anesthesia well with no complications. The patient was then transported to the recovery room with vital signs stable and neurovascular status returned baseline for the patient's right foot. POSTOPERATIVE CHECK:  The patient was later seen in the recovery room with the dressings clean, dry, and intact with no bleeding noted to her right foot. The patient stated that she had no pain. The patient was later discharged to home. She was given a postoperative prescription for pain. She was given discharge instructions and also a surgical shoe. She was given a followup appointment with Dr. Javid Glover in the office in 4 days. She was also given Dr. Honora Felty cell phone number and instructed to call any time with any problems.       Judyth Rubinstein, DPM [email or web address]  D:  10/05/2020 20:08  T:  10/06/2020 3:51  JOB #:  [id number]

## 2020-10-06 NOTE — ED TRIAGE NOTES
Patient arrives via EMS from podiatrist office, Heriberto Lincoln MD, for f/u appt for RIGHT foot s/p surgery. Patient had syncopal episode.  Patient reports \"felt fuzzy and I sat down on the floor\"    PMHx DM Type 2; EMS obtained FSBS 147 mg/dL    Patient AOx4 upon arrival

## 2020-10-07 LAB
ATRIAL RATE: 57 BPM
BACTERIA SPEC CULT: ABNORMAL
BACTERIA SPEC CULT: NORMAL
CALCULATED P AXIS, ECG09: 30 DEGREES
CALCULATED R AXIS, ECG10: 63 DEGREES
CALCULATED T AXIS, ECG11: 67 DEGREES
DIAGNOSIS, 93000: NORMAL
GRAM STN SPEC: ABNORMAL
GRAM STN SPEC: ABNORMAL
P-R INTERVAL, ECG05: 168 MS
Q-T INTERVAL, ECG07: 446 MS
QRS DURATION, ECG06: 82 MS
QTC CALCULATION (BEZET), ECG08: 434 MS
SERVICE CMNT-IMP: ABNORMAL
SERVICE CMNT-IMP: NORMAL
VENTRICULAR RATE, ECG03: 57 BPM

## 2020-10-14 ENCOUNTER — HOSPITAL ENCOUNTER (OUTPATIENT)
Dept: LAB | Age: 70
Discharge: HOME | End: 2020-10-14
Payer: MEDICARE

## 2020-10-14 PROCEDURE — 87186 SC STD MICRODIL/AGAR DIL: CPT

## 2020-10-14 PROCEDURE — 87077 CULTURE AEROBIC IDENTIFY: CPT

## 2020-10-14 PROCEDURE — 87205 SMEAR GRAM STAIN: CPT

## 2020-10-14 PROCEDURE — 87147 CULTURE TYPE IMMUNOLOGIC: CPT

## 2020-10-17 ENCOUNTER — HOSPITAL ENCOUNTER (EMERGENCY)
Age: 70
Discharge: HOME | End: 2020-10-17
Attending: EMERGENCY MEDICINE
Payer: MEDICARE

## 2020-10-17 ENCOUNTER — APPOINTMENT (OUTPATIENT)
Dept: CT IMAGING | Age: 70
End: 2020-10-17
Attending: EMERGENCY MEDICINE
Payer: MEDICARE

## 2020-10-17 VITALS
WEIGHT: 130 LBS | OXYGEN SATURATION: 99 % | RESPIRATION RATE: 16 BRPM | HEART RATE: 76 BPM | DIASTOLIC BLOOD PRESSURE: 71 MMHG | BODY MASS INDEX: 22.2 KG/M2 | SYSTOLIC BLOOD PRESSURE: 121 MMHG | TEMPERATURE: 97.6 F | HEIGHT: 64 IN

## 2020-10-17 DIAGNOSIS — Z86.39 HISTORY OF DIABETES MELLITUS: ICD-10-CM

## 2020-10-17 DIAGNOSIS — L02.214 GROIN ABSCESS: Primary | ICD-10-CM

## 2020-10-17 LAB
ALBUMIN SERPL-MCNC: 3.2 G/DL (ref 3.4–5)
ALBUMIN/GLOB SERPL: 0.8 {RATIO} (ref 0.8–1.7)
ALP SERPL-CCNC: 107 U/L (ref 45–117)
ALT SERPL-CCNC: 12 U/L (ref 13–56)
ANION GAP SERPL CALC-SCNC: 7 MMOL/L (ref 3–18)
APTT PPP: 38 SEC (ref 23–36.4)
AST SERPL-CCNC: 14 U/L (ref 10–38)
BASOPHILS # BLD: 0 K/UL (ref 0–0.1)
BASOPHILS NFR BLD: 0 % (ref 0–2)
BILIRUB SERPL-MCNC: 0.2 MG/DL (ref 0.2–1)
BUN SERPL-MCNC: 20 MG/DL (ref 7–18)
BUN/CREAT SERPL: 29 (ref 12–20)
CALCIUM SERPL-MCNC: 8.9 MG/DL (ref 8.5–10.1)
CHLORIDE SERPL-SCNC: 100 MMOL/L (ref 100–111)
CO2 SERPL-SCNC: 27 MMOL/L (ref 21–32)
CREAT SERPL-MCNC: 0.7 MG/DL (ref 0.6–1.3)
DIFFERENTIAL METHOD BLD: ABNORMAL
EOSINOPHIL # BLD: 0 K/UL (ref 0–0.4)
EOSINOPHIL NFR BLD: 0 % (ref 0–5)
ERYTHROCYTE [DISTWIDTH] IN BLOOD BY AUTOMATED COUNT: 13 % (ref 11.6–14.5)
GLOBULIN SER CALC-MCNC: 3.8 G/DL (ref 2–4)
GLUCOSE SERPL-MCNC: 193 MG/DL (ref 74–99)
HCT VFR BLD AUTO: 41.8 % (ref 35–45)
HGB BLD-MCNC: 13.6 G/DL (ref 12–16)
INR PPP: 3.3 (ref 0.8–1.2)
LYMPHOCYTES # BLD: 1 K/UL (ref 0.9–3.6)
LYMPHOCYTES NFR BLD: 9 % (ref 21–52)
MCH RBC QN AUTO: 31.1 PG (ref 24–34)
MCHC RBC AUTO-ENTMCNC: 32.5 G/DL (ref 31–37)
MCV RBC AUTO: 95.4 FL (ref 74–97)
MONOCYTES # BLD: 0.7 K/UL (ref 0.05–1.2)
MONOCYTES NFR BLD: 6 % (ref 3–10)
NEUTS SEG # BLD: 9.8 K/UL (ref 1.8–8)
NEUTS SEG NFR BLD: 85 % (ref 40–73)
PLATELET # BLD AUTO: 404 K/UL (ref 135–420)
PMV BLD AUTO: 9.2 FL (ref 9.2–11.8)
POTASSIUM SERPL-SCNC: 4.2 MMOL/L (ref 3.5–5.5)
PROT SERPL-MCNC: 7 G/DL (ref 6.4–8.2)
PROTHROMBIN TIME: 32.6 SEC (ref 11.5–15.2)
RBC # BLD AUTO: 4.38 M/UL (ref 4.2–5.3)
SODIUM SERPL-SCNC: 134 MMOL/L (ref 136–145)
WBC # BLD AUTO: 11.6 K/UL (ref 4.6–13.2)

## 2020-10-17 PROCEDURE — 96374 THER/PROPH/DIAG INJ IV PUSH: CPT

## 2020-10-17 PROCEDURE — 85025 COMPLETE CBC W/AUTO DIFF WBC: CPT

## 2020-10-17 PROCEDURE — 75810000289 HC I&D ABSCESS SIMP/COMP/MULT

## 2020-10-17 PROCEDURE — 87040 BLOOD CULTURE FOR BACTERIA: CPT

## 2020-10-17 PROCEDURE — 72193 CT PELVIS W/DYE: CPT

## 2020-10-17 PROCEDURE — 85610 PROTHROMBIN TIME: CPT

## 2020-10-17 PROCEDURE — 85730 THROMBOPLASTIN TIME PARTIAL: CPT

## 2020-10-17 PROCEDURE — 96376 TX/PRO/DX INJ SAME DRUG ADON: CPT

## 2020-10-17 PROCEDURE — 80053 COMPREHEN METABOLIC PANEL: CPT

## 2020-10-17 PROCEDURE — 99283 EMERGENCY DEPT VISIT LOW MDM: CPT

## 2020-10-17 PROCEDURE — 74011250636 HC RX REV CODE- 250/636: Performed by: PHYSICIAN ASSISTANT

## 2020-10-17 PROCEDURE — 96375 TX/PRO/DX INJ NEW DRUG ADDON: CPT

## 2020-10-17 PROCEDURE — 74011000636 HC RX REV CODE- 636: Performed by: EMERGENCY MEDICINE

## 2020-10-17 RX ORDER — MORPHINE SULFATE 4 MG/ML
4 INJECTION INTRAVENOUS
Status: COMPLETED | OUTPATIENT
Start: 2020-10-17 | End: 2020-10-17

## 2020-10-17 RX ORDER — CLINDAMYCIN HYDROCHLORIDE 300 MG/1
300 CAPSULE ORAL 3 TIMES DAILY
Qty: 21 CAP | Refills: 0 | Status: SHIPPED | OUTPATIENT
Start: 2020-10-17 | End: 2020-10-24

## 2020-10-17 RX ORDER — CLINDAMYCIN PHOSPHATE 600 MG/50ML
600 INJECTION, SOLUTION INTRAVENOUS
Status: COMPLETED | OUTPATIENT
Start: 2020-10-17 | End: 2020-10-17

## 2020-10-17 RX ORDER — ONDANSETRON 2 MG/ML
4 INJECTION INTRAMUSCULAR; INTRAVENOUS
Status: COMPLETED | OUTPATIENT
Start: 2020-10-17 | End: 2020-10-17

## 2020-10-17 RX ORDER — MORPHINE SULFATE 2 MG/ML
4 INJECTION, SOLUTION INTRAMUSCULAR; INTRAVENOUS
Status: COMPLETED | OUTPATIENT
Start: 2020-10-17 | End: 2020-10-17

## 2020-10-17 RX ORDER — LIDOCAINE HYDROCHLORIDE 20 MG/ML
10 INJECTION, SOLUTION INFILTRATION; PERINEURAL
Status: DISCONTINUED | OUTPATIENT
Start: 2020-10-17 | End: 2020-10-17 | Stop reason: HOSPADM

## 2020-10-17 RX ADMIN — MORPHINE SULFATE 4 MG: 2 INJECTION, SOLUTION INTRAMUSCULAR; INTRAVENOUS at 10:34

## 2020-10-17 RX ADMIN — IOPAMIDOL 100 ML: 612 INJECTION, SOLUTION INTRAVENOUS at 13:03

## 2020-10-17 RX ADMIN — MORPHINE SULFATE 4 MG: 4 INJECTION INTRAVENOUS at 13:27

## 2020-10-17 RX ADMIN — ONDANSETRON 4 MG: 2 INJECTION INTRAMUSCULAR; INTRAVENOUS at 10:34

## 2020-10-17 RX ADMIN — CLINDAMYCIN PHOSPHATE 600 MG: 600 INJECTION, SOLUTION INTRAVENOUS at 10:36

## 2020-10-17 NOTE — DISCHARGE INSTRUCTIONS

## 2020-10-17 NOTE — ED NOTES
Pt resting on stretcher, call bell within reach, side rails up, CT arrived to room with w/c, pt placed in chair, CT staff took pt to CT.pt states she will wait until after CT to get her pain med

## 2020-10-17 NOTE — ED PROVIDER NOTES
EMERGENCY DEPARTMENT HISTORY AND PHYSICAL EXAM    Date: 10/17/2020  Patient Name: Ryan Lopez    History of Presenting Illness     Chief Complaint   Patient presents with    Groin Pain         History Provided By: Patient    Ryan Lopez is a 72 y.o. female with PMHX of diabetes, neuropathy, ray nods syndrome, assess leg syndrome, arthritis, chronic pain, GERD chronic pain, chronic liver disease, lupus who presents to the emergency department C/O swelling redness painful lump to the right groin times several days. Ports good glycemic control though cannot tell me last when she checked her blood sugar value for that. Multiple medicinal allergies. Pt denies fever, vaginal pain, and any other sxs or complaints. PCP: Michaela Fischer MD    Current Facility-Administered Medications   Medication Dose Route Frequency Provider Last Rate Last Dose    lidocaine (XYLOCAINE) 20 mg/mL (2 %) injection 200 mg  10 mL IntraDERMal NOW Greyson Wise PA         Current Outpatient Medications   Medication Sig Dispense Refill    clindamycin (CLEOCIN) 300 mg capsule Take 1 Cap by mouth three (3) times daily for 7 days. 21 Cap 0    enoxaparin (Lovenox) 40 mg/0.4 mL 40 mg by SubCUTAneous route.  hydrOXYzine HCL (ATARAX) 50 mg tablet Take 25 mg by mouth three (3) times daily as needed.  OTHER Blood thinner, patient unsure of name      ursodioL (ACTIGALL) 500 mg tablet TAKE ONE TABLET BY MOUTH TWICE A DAY 60 Tab 5    warfarin sodium (COUMADIN PO) Take 7 mg by mouth daily.  promethazine (PHENERGAN) 25 mg tablet Take 25 mg by mouth every six (6) hours as needed for Nausea.  B.infantis-B.ani-B.long-B.bifi (PROBIOTIC 4X) 10-15 mg TbEC Take  by mouth.  lidocaine-prilocaine (LIDOPRIL) cream Apply  to affected area now.  nystatin (MYCOSTATIN) topical cream Apply  to affected area two (2) times a day. 60 g 0    atorvastatin (LIPITOR) 20 mg tablet Take 20 mg by mouth daily.       dronabinol (MARINOL) 2.5 mg capsule Take 2.5 mg by mouth three (3) times daily.  metoprolol tartrate (LOPRESSOR) 25 mg tablet Take 25 mg by mouth two (2) times a day.  nitroglycerin (NITROSTAT) 0.3 mg SL tablet by SubLINGual route every five (5) minutes as needed for Chest Pain (x3).  clopidogrel (PLAVIX) 75 mg tab Take 75 mg by mouth daily.  lactobacillus combination no.4 (PROBIOTIC) 3 billion cell cap Take 1 Cap by mouth daily.  Cetirizine (ZYRTEC) 10 mg cap Take  by mouth.  cyanocobalamin 1,000 mcg tablet Take 1,000 mcg by mouth daily.  glimepiride (AMARYL) 2 mg tablet Take 2 mg by mouth every morning.  melatonin 3 mg tablet Take 6 mg by mouth nightly.  ondansetron hcl (ZOFRAN, AS HYDROCHLORIDE,) 4 mg tablet Take 4 mg by mouth every eight (8) hours as needed for Nausea.  cyclobenzaprine (FLEXERIL) 10 mg tablet Take  by mouth three (3) times daily as needed for Muscle Spasm(s).  LORazepam (ATIVAN) 2 mg tablet Take 1 mg by mouth every six (6) hours as needed.  BIOTIN PO Take 5,000 mcg by mouth daily.  albuterol (PROVENTIL HFA, VENTOLIN HFA) 90 mcg/actuation inhaler Take 2 Puffs by inhalation every four (4) hours as needed. Indications: ACUTE ASTHMA ATTACK      cloNIDine (CATAPRES) 0.1 mg tablet Take 0.1 mg by mouth two (2) times a day. Indications: hot flashes;      metFORMIN (GLUCOPHAGE) 500 mg tablet Take 500 mg by mouth two (2) times daily (with meals). Indications: TYPE 2 DIABETES MELLITUS      ropinirole (REQUIP) 4 mg Tab TAB Take 4 mg by mouth nightly.     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     Lung collapse     2015    Nausea & vomiting 5/23/2012    Neuropathy     right foot    Other ill-defined conditions(799.89)     arterial clot in rt leg    Other ill-defined conditions(799.89)     restless leg syndrome    Other ill-defined conditions(799.89)     raynaud's syndrome    Other ill-defined conditions(799.89)     chronic N/V    Other ill-defined conditions(799.89)     (2 mutated genes) hyper coag. state     Pneumonia 07/22/2017    Positive cardiolipin antibodies     Psychiatric disorder     depression    PUD (peptic ulcer disease) 2009    H/O requiring partial gastrectomy and small intestine    Thromboembolus ([address] 2000    artery right leg       Past Surgical History:  Past Surgical History:   Procedure Laterality Date    ABDOMEN SURGERY PROC UNLISTED      duodenal ulcer rupture    BREAST SURGERY PROCEDURE UNLISTED Bilateral 02/2016    hemorrhaged after breast surgery    CARDIAC SURG PROCEDURE UNLIST  2018    open heart (single vessel)    HX APPENDECTOMY      HX BREAST AUGMENTATION      developed infection and implant removed    HX CHOLECYSTECTOMY      HX GASTRECTOMY      due to ulcers    HX GASTRIC BYPASS  1996    gastric by-pass    HX GI      total gastrectomy, partial colectomy    HX GI      colostomy and reversal    HX HEENT      cancer of lip removed    HX HYSTERECTOMY      for cervical cancer    HX ORTHOPAEDIC      rt foot surgery x10    HX ORTHOPAEDIC Left     great toe gangrenous and amputated    HX OTHER SURGICAL      part of liver removal    HX OTHER SURGICAL      ventral hernia repair x10    HX OTHER SURGICAL      Exc pilonial cystectomy    VASCULAR SURGERY PROCEDURE UNLIST      iliac aorta stents x 6       Family History:  Family History   Problem Relation Age of Onset    Malignant Hyperthermia Neg Hx     Pseudocholinesterase Deficiency Neg Hx     Delayed Awakening Neg Hx     Post-op Nausea/Vomiting Neg Hx     Emergence Delirium Neg Hx     Post-op Cognitive Dysfunction Neg Hx     Other Neg Hx        Social History:  Social History     Tobacco Use    Smoking status: Former Smoker     Years: 0.50     Last attempt to quit: 1996     Years since quittin.8    Smokeless tobacco: Never Used   Substance Use Topics    Alcohol use: Yes     Comment: 1 or 2 glasses wine or mixed drinks monthly     Drug use: Yes     Types: Marijuana     Comment: 4x daily       Allergies: Allergies   Allergen Reactions    Rifampin Hives and Swelling     Swelling of mouth and tounge    Levaquin [Levofloxacin] Itching    Amoxicillin Hives    Aspirin Nausea and Vomiting    Azithromycin Angioedema    Bactrim [Sulfamethoprim Ds] Hives    Cefatrizine Hives    Dilaudid [Hydromorphone] Hives    Erythromycin Hives    Rocephin [Ceftriaxone] Hives    Tylenol [Acetaminophen] Hives    Vancomycin Hives         Review of Systems   Review of Systems   Constitutional: Negative for fever. Genitourinary: Negative for vaginal pain. Skin:        abscess   All other systems reviewed and are negative. Physical Exam     Vitals:    10/17/20 0916   BP: 132/85   Pulse: 76   Resp: 16   Temp: 97.6 °F (36.4 °C)   SpO2: 99%   Weight: 59 kg (130 lb)   Height: 5' 4\" (1.626 m)     Physical Exam  Vitals signs and nursing note reviewed. Constitutional:       General: She is not in acute distress. Appearance: Normal appearance. She is normal weight. She is not ill-appearing. HENT:      Head: Normocephalic and atraumatic. Eyes:      Extraocular Movements: Extraocular movements intact. Conjunctiva/sclera: Conjunctivae normal.      Pupils: Pupils are equal, round, and reactive to light. Neck:      Musculoskeletal: Normal range of motion and neck supple. Cardiovascular:      Rate and Rhythm: Normal rate and regular rhythm.    Pulmonary:      Effort: Pulmonary effort is normal. Breath sounds: Normal breath sounds. Genitourinary:     Labia:         Right: No rash, tenderness or lesion. Left: No rash, tenderness or lesion. Comments: Appears the patient has a very large fluctuant very tender abscess to the right inguinal area there is no perineal or vaginal involvement  Musculoskeletal: Normal range of motion. Comments: Dry dressings noted to right foot appears there are no toes   Skin:     General: Skin is warm and dry. Capillary Refill: Capillary refill takes less than 2 seconds. Findings: Abscess present. Comments: 3 large erythematous tense but fluctuant very tender abscess to right proximal thigh as shown inguinal area to central scab-like area no expressible drainage drainage   Neurological:      General: No focal deficit present. Mental Status: She is alert and oriented to person, place, and time. Psychiatric:         Mood and Affect: Mood normal.         Behavior: Behavior normal.         Diagnostic Study Results     Labs -     Recent Results (from the past 12 hour(s))   CBC WITH AUTOMATED DIFF    Collection Time: 10/17/20 10:25 AM   Result Value Ref Range    WBC 11.6 4.6 - 13.2 K/uL    RBC 4.38 4.20 - 5.30 M/uL    HGB 13.6 12.0 - 16.0 g/dL    HCT 41.8 35.0 - 45.0 %    MCV 95.4 74.0 - 97.0 FL    MCH 31.1 24.0 - 34.0 PG    MCHC 32.5 31.0 - 37.0 g/dL    RDW 13.0 11.6 - 14.5 %    PLATELET 778 060 - 712 K/uL    MPV 9.2 9.2 - 11.8 FL    NEUTROPHILS 85 (H) 40 - 73 %    LYMPHOCYTES 9 (L) 21 - 52 %    MONOCYTES 6 3 - 10 %    EOSINOPHILS 0 0 - 5 %    BASOPHILS 0 0 - 2 %    ABS. NEUTROPHILS 9.8 (H) 1.8 - 8.0 K/UL    ABS. LYMPHOCYTES 1.0 0.9 - 3.6 K/UL    ABS. MONOCYTES 0.7 0.05 - 1.2 K/UL    ABS. EOSINOPHILS 0.0 0.0 - 0.4 K/UL    ABS.  BASOPHILS 0.0 0.0 - 0.1 K/UL    DF AUTOMATED     METABOLIC PANEL, COMPREHENSIVE    Collection Time: 10/17/20 10:25 AM   Result Value Ref Range    Sodium 134 (L) 136 - 145 mmol/L    Potassium 4.2 3.5 - 5.5 mmol/L Chloride 100 100 - 111 mmol/L    CO2 27 21 - 32 mmol/L    Anion gap 7 3.0 - 18 mmol/L    Glucose 193 (H) 74 - 99 mg/dL    BUN 20 (H) 7.0 - 18 MG/DL    Creatinine 0.70 0.6 - 1.3 MG/DL    BUN/Creatinine ratio 29 (H) 12 - 20      GFR est AA >60 >60 ml/min/1.73m2    GFR est non-AA >60 >60 ml/min/1.73m2    Calcium 8.9 8.5 - 10.1 MG/DL    Bilirubin, total 0.2 0.2 - 1.0 MG/DL    ALT (SGPT) 12 (L) 13 - 56 U/L    AST (SGOT) 14 10 - 38 U/L    Alk. phosphatase 107 45 - 117 U/L    Protein, total 7.0 6.4 - 8.2 g/dL    Albumin 3.2 (L) 3.4 - 5.0 g/dL    Globulin 3.8 2.0 - 4.0 g/dL    A-G Ratio 0.8 0.8 - 1.7     PROTHROMBIN TIME + INR    Collection Time: 10/17/20 10:25 AM   Result Value Ref Range    Prothrombin time 32.6 (H) 11.5 - 15.2 sec    INR 3.3 (H) 0.8 - 1.2     PTT    Collection Time: 10/17/20 10:25 AM   Result Value Ref Range    aPTT 38.0 (H) 23.0 - 36.4 SEC       Radiologic Studies -   CT PELV W CONT    (Results Pending)     CT Results  (Last 48 hours)    None        CXR Results  (Last 48 hours)    None          Medications given in the ED-  Medications   lidocaine (XYLOCAINE) 20 mg/mL (2 %) injection 200 mg (has no administration in time range)   morphine injection 4 mg (4 mg IntraVENous Given 10/17/20 1034)   ondansetron (ZOFRAN) injection 4 mg (4 mg IntraVENous Given 10/17/20 1034)   clindamycin (CLEOCIN) 600mg NS 50 mL IVPB (premix) (600 mg IntraVENous Given 10/17/20 1036)   iopamidoL (ISOVUE 300) 61 % contrast injection  mL (100 mL IntraVENous Given 10/17/20 1303)   morphine injection 4 mg (4 mg IntraVENous Given 10/17/20 1327)         Medical Decision Making   I am the first provider for this patient. I reviewed the vital signs, available nursing notes, past medical history, past surgical history, family history and social history. Vital Signs-Reviewed the patient's vital signs.     Pulse Oximetry Analysis - 99% on RA     Records Reviewed: Nursing Notes    Procedures:  I&D Abcess Complex    Date/Time: 10/17/2020 2:22 PM  Performed by: TORRIE Conn  Authorized by: TORRIE Conn     Consent:     Consent obtained:  Verbal    Consent given by:  Patient    Risks discussed:  Bleeding, incomplete drainage, pain, damage to other organs and infection    Alternatives discussed:  No treatment, delayed treatment and alternative treatment  Location:     Type:  Abscess    Size:  10cm    Location:  Lower extremity    Lower extremity location:  Leg    Leg location:  R upper leg  Pre-procedure details:     Skin preparation:  Betadine  Anesthesia (see MAR for exact dosages): Anesthesia method:  Local infiltration    Local anesthetic:  Lidocaine 2% w/o epi  Procedure type:     Complexity:  Complex  Procedure details:     Needle aspiration: no      Incision types:  Stab incision and single straight    Incision depth:  Dermal    Scalpel blade:  11    Wound management:  Probed and deloculated    Drainage:  Bloody and purulent    Drainage amount:  Copious    Wound treatment:  Wound left open    Packing materials:  None  Post-procedure details:     Patient tolerance of procedure: Tolerated well, no immediate complications        ED Course:   9:49 AM   Initial assessment performed. The patients presenting problems have been discussed, and they are in agreement with the care plan formulated and outlined with them. I have encouraged them to ask questions as they arise throughout their visit. FACE-TO-FACE PROGRESS NOTE:  10:40 AM  The patient presents with abscess to right inner thigh. Per patient the symptoms started a few days ago. As fever, injury, other complaints. Patient does have comorbidities including diabetes. My exam shows elderly female in moderate distress with extremely large area of erythema, fluctuance, tenderness just inferior to the right inguinal fold. Does not extend into the perineum or labia.   Imp/plan: Due to patient's complex comorbidities and medical history will obtain labs and imaging of this area prior to any attempt for I&D. Will initiate IV antibiotics and pain control while obtaining these other studies. I have personally seen and examined the patient, reviewed the RIK's note and agree with findings and plan. Written by Dr. Nicolas Hamper    2:20 PM  Dr. Celia Crowley spoke with radiologist regarding CT report. There has been apparently at problem with interface we cannot see report however CT is superficial to the skin does not involve the deep underlying vascular structures. Will move for I&D. TORRIE Adams      Discussion: 72 y.o. female multiple medical issues to include diabetes chronic liver disease complaining of several days of swelling and pain to quite large abscess to the right proximal thigh. Patient is afebrile with appropriate vital signs laboratory findings remarkable for normal WBC blood sugar less than 200. CT remarkable for skin abscess that does not involve underlying structures or deep space infection. Clindamycin given I&D performed. Plan for clindamycin and close PCP follow-up for wound check. Strict return precautions discussed. Diagnosis and Disposition       DISCHARGE NOTE:  Kirby Pineda's  results have been reviewed with her. She has been counseled regarding her diagnosis, treatment, and plan. She verbally conveys understanding and agreement of the signs, symptoms, diagnosis, treatment and prognosis and additionally agrees to follow up as discussed. She also agrees with the care-plan and conveys that all of her questions have been answered. I have also provided discharge instructions for her that include: educational information regarding their diagnosis and treatment, and list of reasons why they would want to return to the ED prior to their follow-up appointment, should her condition change. She has been provided with education for proper emergency department utilization. CLINICAL IMPRESSION:    1. Groin abscess    2.  History of diabetes mellitus        PLAN:  1. D/C Home  2. Current Discharge Medication List      START taking these medications    Details   clindamycin (CLEOCIN) 300 mg capsule Take 1 Cap by mouth three (3) times daily for 7 days. Qty: 21 Cap, Refills: 0           3. Follow-up Information     Follow up With Specialties Details Why Contact Info    Mehnaz Mendoza MD Family Medicine Schedule an appointment as soon as possible for a visit For wound re-check [place] [id number]  [phone]      [facility] EMERGENCY DEPT Emergency Medicine  As needed, If symptoms worsen [address] [id number]  [phone]                  Please note that this dictation was completed with [facility], the computer voice recognition software. Quite often unanticipated grammatical, syntax, homophones, and other interpretive errors are inadvertently transcribed by the computer software. Please disregard these errors. Please excuse any errors that have escaped final proofreading.

## 2021-03-18 ENCOUNTER — APPOINTMENT (OUTPATIENT)
Dept: VASCULAR SURGERY | Age: 71
DRG: 300 | End: 2021-03-18
Attending: EMERGENCY MEDICINE
Payer: MEDICARE

## 2021-03-18 ENCOUNTER — HOSPITAL ENCOUNTER (INPATIENT)
Age: 71
LOS: 1 days | Discharge: ANOTHER HOSPITAL | DRG: 300 | End: 2021-03-19
Attending: EMERGENCY MEDICINE | Admitting: INTERNAL MEDICINE
Payer: MEDICARE

## 2021-03-18 ENCOUNTER — APPOINTMENT (OUTPATIENT)
Dept: VASCULAR SURGERY | Age: 71
DRG: 300 | End: 2021-03-18
Attending: PHYSICIAN ASSISTANT
Payer: MEDICARE

## 2021-03-18 ENCOUNTER — APPOINTMENT (OUTPATIENT)
Dept: CT IMAGING | Age: 71
DRG: 300 | End: 2021-03-18
Attending: PHYSICIAN ASSISTANT
Payer: MEDICARE

## 2021-03-18 DIAGNOSIS — M79.672 LEFT FOOT PAIN: ICD-10-CM

## 2021-03-18 DIAGNOSIS — I74.9 ARTERIAL THROMBOEMBOLISM (HCC): Primary | ICD-10-CM

## 2021-03-18 LAB
ALBUMIN SERPL-MCNC: 3.5 G/DL (ref 3.4–5)
ALBUMIN/GLOB SERPL: 1 {RATIO} (ref 0.8–1.7)
ALP SERPL-CCNC: 121 U/L (ref 45–117)
ALT SERPL-CCNC: 23 U/L (ref 13–56)
ANION GAP SERPL CALC-SCNC: 4 MMOL/L (ref 3–18)
APTT PPP: 23.5 SEC (ref 23–36.4)
AST SERPL-CCNC: 14 U/L (ref 10–38)
BASOPHILS # BLD: 0 K/UL (ref 0–0.1)
BASOPHILS NFR BLD: 0 % (ref 0–2)
BILIRUB SERPL-MCNC: 0.2 MG/DL (ref 0.2–1)
BUN SERPL-MCNC: 18 MG/DL (ref 7–18)
BUN/CREAT SERPL: 24 (ref 12–20)
CALCIUM SERPL-MCNC: 9.2 MG/DL (ref 8.5–10.1)
CHLORIDE SERPL-SCNC: 100 MMOL/L (ref 100–111)
CO2 SERPL-SCNC: 31 MMOL/L (ref 21–32)
CREAT SERPL-MCNC: 0.75 MG/DL (ref 0.6–1.3)
DIFFERENTIAL METHOD BLD: ABNORMAL
EOSINOPHIL # BLD: 0.2 K/UL (ref 0–0.4)
EOSINOPHIL NFR BLD: 2 % (ref 0–5)
ERYTHROCYTE [DISTWIDTH] IN BLOOD BY AUTOMATED COUNT: 14.6 % (ref 11.6–14.5)
GLOBULIN SER CALC-MCNC: 3.4 G/DL (ref 2–4)
GLUCOSE SERPL-MCNC: 265 MG/DL (ref 74–99)
HCT VFR BLD AUTO: 38 % (ref 35–45)
HGB BLD-MCNC: 12.4 G/DL (ref 12–16)
INR PPP: 1 (ref 0.8–1.2)
LYMPHOCYTES # BLD: 1.8 K/UL (ref 0.9–3.6)
LYMPHOCYTES NFR BLD: 21 % (ref 21–52)
MCH RBC QN AUTO: 29.8 PG (ref 24–34)
MCHC RBC AUTO-ENTMCNC: 32.6 G/DL (ref 31–37)
MCV RBC AUTO: 91.3 FL (ref 74–97)
MONOCYTES # BLD: 0.5 K/UL (ref 0.05–1.2)
MONOCYTES NFR BLD: 6 % (ref 3–10)
NEUTS SEG # BLD: 6.1 K/UL (ref 1.8–8)
NEUTS SEG NFR BLD: 71 % (ref 40–73)
PLATELET # BLD AUTO: 276 K/UL (ref 135–420)
PMV BLD AUTO: 9.4 FL (ref 9.2–11.8)
POTASSIUM SERPL-SCNC: 4.3 MMOL/L (ref 3.5–5.5)
PROT SERPL-MCNC: 6.9 G/DL (ref 6.4–8.2)
PROTHROMBIN TIME: 13 SEC (ref 11.5–15.2)
RBC # BLD AUTO: 4.16 M/UL (ref 4.2–5.3)
SODIUM SERPL-SCNC: 135 MMOL/L (ref 136–145)
WBC # BLD AUTO: 8.6 K/UL (ref 4.6–13.2)

## 2021-03-18 PROCEDURE — 74011250636 HC RX REV CODE- 250/636: Performed by: INTERNAL MEDICINE

## 2021-03-18 PROCEDURE — 93926 LOWER EXTREMITY STUDY: CPT

## 2021-03-18 PROCEDURE — 74011250636 HC RX REV CODE- 250/636: Performed by: STUDENT IN AN ORGANIZED HEALTH CARE EDUCATION/TRAINING PROGRAM

## 2021-03-18 PROCEDURE — 85025 COMPLETE CBC W/AUTO DIFF WBC: CPT

## 2021-03-18 PROCEDURE — 80053 COMPREHEN METABOLIC PANEL: CPT

## 2021-03-18 PROCEDURE — 96374 THER/PROPH/DIAG INJ IV PUSH: CPT

## 2021-03-18 PROCEDURE — 74011250636 HC RX REV CODE- 250/636: Performed by: PHYSICIAN ASSISTANT

## 2021-03-18 PROCEDURE — 73706 CT ANGIO LWR EXTR W/O&W/DYE: CPT

## 2021-03-18 PROCEDURE — 99284 EMERGENCY DEPT VISIT MOD MDM: CPT

## 2021-03-18 PROCEDURE — 93971 EXTREMITY STUDY: CPT

## 2021-03-18 PROCEDURE — 65270000029 HC RM PRIVATE

## 2021-03-18 PROCEDURE — 93922 UPR/L XTREMITY ART 2 LEVELS: CPT

## 2021-03-18 PROCEDURE — 85610 PROTHROMBIN TIME: CPT

## 2021-03-18 PROCEDURE — 74011000636 HC RX REV CODE- 636: Performed by: EMERGENCY MEDICINE

## 2021-03-18 PROCEDURE — 96375 TX/PRO/DX INJ NEW DRUG ADDON: CPT

## 2021-03-18 PROCEDURE — 85730 THROMBOPLASTIN TIME PARTIAL: CPT

## 2021-03-18 RX ORDER — HEPARIN SODIUM 10000 [USP'U]/100ML
18-36 INJECTION, SOLUTION INTRAVENOUS
Status: DISCONTINUED | OUTPATIENT
Start: 2021-03-18 | End: 2021-03-19 | Stop reason: HOSPADM

## 2021-03-18 RX ORDER — OXYCODONE HYDROCHLORIDE 5 MG/1
10 TABLET ORAL
Status: DISCONTINUED | OUTPATIENT
Start: 2021-03-18 | End: 2021-03-19 | Stop reason: HOSPADM

## 2021-03-18 RX ORDER — HEPARIN SODIUM 1000 [USP'U]/ML
80 INJECTION, SOLUTION INTRAVENOUS; SUBCUTANEOUS ONCE
Status: COMPLETED | OUTPATIENT
Start: 2021-03-18 | End: 2021-03-18

## 2021-03-18 RX ORDER — MORPHINE SULFATE 4 MG/ML
8 INJECTION INTRAVENOUS
Status: COMPLETED | OUTPATIENT
Start: 2021-03-18 | End: 2021-03-18

## 2021-03-18 RX ORDER — ONDANSETRON 2 MG/ML
4 INJECTION INTRAMUSCULAR; INTRAVENOUS
Status: COMPLETED | OUTPATIENT
Start: 2021-03-18 | End: 2021-03-18

## 2021-03-18 RX ORDER — MORPHINE SULFATE 4 MG/ML
4 INJECTION INTRAVENOUS
Status: COMPLETED | OUTPATIENT
Start: 2021-03-18 | End: 2021-03-18

## 2021-03-18 RX ORDER — GABAPENTIN 300 MG/1
300 CAPSULE ORAL 3 TIMES DAILY
Status: DISCONTINUED | OUTPATIENT
Start: 2021-03-18 | End: 2021-03-19 | Stop reason: HOSPADM

## 2021-03-18 RX ORDER — DIPHENHYDRAMINE HYDROCHLORIDE 50 MG/ML
12.5 INJECTION, SOLUTION INTRAMUSCULAR; INTRAVENOUS
Status: DISCONTINUED | OUTPATIENT
Start: 2021-03-18 | End: 2021-03-19

## 2021-03-18 RX ORDER — HYDROMORPHONE HYDROCHLORIDE 1 MG/ML
1 INJECTION, SOLUTION INTRAMUSCULAR; INTRAVENOUS; SUBCUTANEOUS
Status: DISCONTINUED | OUTPATIENT
Start: 2021-03-18 | End: 2021-03-18

## 2021-03-18 RX ADMIN — HEPARIN SODIUM 4900 UNITS: 1000 INJECTION INTRAVENOUS; SUBCUTANEOUS at 21:49

## 2021-03-18 RX ADMIN — HEPARIN SODIUM 18 UNITS/KG/HR: 10000 INJECTION, SOLUTION INTRAVENOUS at 21:49

## 2021-03-18 RX ADMIN — DIPHENHYDRAMINE HYDROCHLORIDE 12.5 MG: 50 INJECTION, SOLUTION INTRAMUSCULAR; INTRAVENOUS at 22:29

## 2021-03-18 RX ADMIN — MORPHINE SULFATE 4 MG: 4 INJECTION INTRAVENOUS at 16:05

## 2021-03-18 RX ADMIN — ONDANSETRON 4 MG: 2 INJECTION INTRAMUSCULAR; INTRAVENOUS at 16:05

## 2021-03-18 RX ADMIN — HYDROMORPHONE HYDROCHLORIDE 1 MG: 1 INJECTION, SOLUTION INTRAMUSCULAR; INTRAVENOUS; SUBCUTANEOUS at 21:48

## 2021-03-18 RX ADMIN — IOPAMIDOL 100 ML: 755 INJECTION, SOLUTION INTRAVENOUS at 16:46

## 2021-03-18 RX ADMIN — MORPHINE SULFATE 8 MG: 4 INJECTION INTRAVENOUS at 20:41

## 2021-03-18 NOTE — ED PROVIDER NOTES
EMERGENCY DEPARTMENT HISTORY AND PHYSICAL EXAM    Date: 3/18/2021  Patient Name: Marvin Sheffield    History of Presenting Illness     Chief Complaint   Patient presents with    Foot Pain     left         History Provided By: Patient    Marvin Sheffield is a 72 y.o. female with PMHX of GERD, chronic left foot pain, arthritis PAD, PUD, depression neuropathy who presents to the emergency department C/O left foot pain and redness onset 1 week ago but worsening over the last 2 days. States that she is missing her left great toe for similar complaints of pain some years ago where she had a blood clot in her foot. She is followed by Dr. Lizz Carreno podiatry. Dr. Jd Cuadra did her bypass surgery at this facility some years ago. She does not smoke tobacco.  She is compliant with her medications. She does not check her sugars regularly so she is unsure what her last her normal sugars are. Pt denies fall, injury, open wounds, recent illness, and any other sxs or complaints. PCP: Demetrio Escobar MD    Current Facility-Administered Medications   Medication Dose Route Frequency Provider Last Rate Last Admin    iopamidoL (ISOVUE-370) 76 % injection 100 mL  100 mL IntraVENous RAD ONCE Nichole Vargas MD        heparin (porcine) 1,000 unit/mL injection 4,900 Units  80 Units/kg IntraVENous ONCE Kolby Wise PA        heparin (porcine) 25,000 units in 0.45% saline 250 ml infusion  18-36 Units/kg/hr IntraVENous TITRATE Albertina Wise PA        HYDROmorphone (PF) (DILAUDID) injection 1 mg  1 mg IntraVENous Q2H PRN Max Milner MD         Current Outpatient Medications   Medication Sig Dispense Refill    enoxaparin (Lovenox) 40 mg/0.4 mL 40 mg by SubCUTAneous route.  hydrOXYzine HCL (ATARAX) 50 mg tablet Take 25 mg by mouth three (3) times daily as needed.       OTHER Blood thinner, patient unsure of name      ursodioL (ACTIGALL) 500 mg tablet TAKE ONE TABLET BY MOUTH TWICE A DAY 60 Tab 5    warfarin sodium (COUMADIN PO) Take 7 mg by mouth daily.  promethazine (PHENERGAN) 25 mg tablet Take 25 mg by mouth every six (6) hours as needed for Nausea.  B.infantis-B.ani-B.long-B.bifi (PROBIOTIC 4X) 10-15 mg TbEC Take  by mouth.  lidocaine-prilocaine (LIDOPRIL) cream Apply  to affected area now.  nystatin (MYCOSTATIN) topical cream Apply  to affected area two (2) times a day. 60 g 0    atorvastatin (LIPITOR) 20 mg tablet Take 20 mg by mouth daily.  dronabinol (MARINOL) 2.5 mg capsule Take 2.5 mg by mouth three (3) times daily.  metoprolol tartrate (LOPRESSOR) 25 mg tablet Take 25 mg by mouth two (2) times a day.  nitroglycerin (NITROSTAT) 0.3 mg SL tablet by SubLINGual route every five (5) minutes as needed for Chest Pain (x3).  clopidogrel (PLAVIX) 75 mg tab Take 75 mg by mouth daily.  lactobacillus combination no.4 (PROBIOTIC) 3 billion cell cap Take 1 Cap by mouth daily.  Cetirizine (ZYRTEC) 10 mg cap Take  by mouth.  cyanocobalamin 1,000 mcg tablet Take 1,000 mcg by mouth daily.  glimepiride (AMARYL) 2 mg tablet Take 2 mg by mouth every morning.  melatonin 3 mg tablet Take 6 mg by mouth nightly.  ondansetron hcl (ZOFRAN, AS HYDROCHLORIDE,) 4 mg tablet Take 4 mg by mouth every eight (8) hours as needed for Nausea.  cyclobenzaprine (FLEXERIL) 10 mg tablet Take  by mouth three (3) times daily as needed for Muscle Spasm(s).  LORazepam (ATIVAN) 2 mg tablet Take 1 mg by mouth every six (6) hours as needed.  BIOTIN PO Take 5,000 mcg by mouth daily.  albuterol (PROVENTIL HFA, VENTOLIN HFA) 90 mcg/actuation inhaler Take 2 Puffs by inhalation every four (4) hours as needed. Indications: ACUTE ASTHMA ATTACK      cloNIDine (CATAPRES) 0.1 mg tablet Take 0.1 mg by mouth two (2) times a day. Indications: hot flashes;      metFORMIN (GLUCOPHAGE) 500 mg tablet Take 500 mg by mouth two (2) times daily (with meals).  Indications: TYPE 2 DIABETES MELLITUS      ropinirole (REQUIP) 4 mg Tab TAB Take 4 mg by mouth nightly. Indications: RESTLESS LEGS SYNDROME         Past History     Past Medical History:  Past Medical History:   Diagnosis Date    Arthritis     hands and feet    Asthma     Autoimmune disease ([address]     lupus anticoagulant-causes clotting    Cancer ([address]     carcinoma in situ of cervix and uterus; inside bottom lip    Chronic pain     Left foot    Diabetes ([address] 2013    Dizzy spells     h/o    GERD (gastroesophageal reflux disease)     Liver disease     1/2 of my liver was removed for ulcers.     Lung collapse     2015    Nausea & vomiting 5/23/2012    Neuropathy     right foot    Other ill-defined conditions(799.89)     arterial clot in rt leg    Other ill-defined conditions(799.89)     restless leg syndrome    Other ill-defined conditions(799.89)     raynaud's syndrome    Other ill-defined conditions(799.89)     chronic N/V    Other ill-defined conditions(799.89)     (2 mutated genes) hyper coag. state     Pneumonia 07/22/2017    Positive cardiolipin antibodies     Psychiatric disorder     depression    PUD (peptic ulcer disease) 2009    H/O requiring partial gastrectomy and small intestine    Thromboembolus ([address] 2000    artery right leg       Past Surgical History:  Past Surgical History:   Procedure Laterality Date    HX APPENDECTOMY      HX BREAST AUGMENTATION      developed infection and implant removed    HX CHOLECYSTECTOMY      HX GASTRECTOMY      due to ulcers    HX GASTRIC BYPASS  1996    gastric by-pass    HX GI      total gastrectomy, partial colectomy    HX GI      colostomy and reversal    HX HEENT      cancer of lip removed    HX HYSTERECTOMY      for cervical cancer    HX ORTHOPAEDIC      rt foot surgery x10    HX ORTHOPAEDIC Left     great toe gangrenous and amputated    HX OTHER SURGICAL      part of liver removal    HX OTHER SURGICAL      ventral hernia repair x10    HX OTHER SURGICAL      Exc pilonial cystectomy    [place] ABDOM[place] SUR[place]ED      duodenal ulcer rupture    [place] BREAST SUR[place] Bilateral 2016    hemorrhaged after breast surgery    [place] CARDIAC SUR[place] PROCEDURE [place]  2018    open heart (single vessel)    VASCULAR SURGERY PROCEDURE UNLIST      iliac aorta stents x 6       Family History:  Family History   Problem Relation Age of Onset    Malignant Hyperthermia Neg Hx     Pseudocholinesterase Deficiency Neg Hx     Delayed Awakening Neg Hx     Post-op Nausea/Vomiting Neg Hx     Emergence Delirium Neg Hx     Post-op Cognitive Dysfunction Neg Hx     Other Neg Hx        Social History:  Social History     Tobacco Use    Smoking status: Former Smoker     Years: 0.50     Quit date: 1996     Years since quittin.2    Smokeless tobacco: Never Used   Substance Use Topics    Alcohol use: Yes     Comment: 1 or 2 glasses wine or mixed drinks monthly     Drug use: Yes     Types: Marijuana     Comment: 4x daily       Allergies: Allergies   Allergen Reactions    Rifampin Hives and Swelling     Swelling of mouth and tounge    Levaquin [Levofloxacin] Itching    Amoxicillin Hives    Aspirin Nausea and Vomiting    Azithromycin Angioedema    Bactrim [Sulfamethoprim Ds] Hives    Cefatrizine Hives    Dilaudid [Hydromorphone] Hives    Erythromycin Hives    Rocephin [Ceftriaxone] Hives    Tylenol [Acetaminophen] Hives    Vancomycin Hives         Review of Systems   Review of Systems   Constitutional: Negative for fever. Respiratory: Negative for shortness of breath. Cardiovascular: Negative for chest pain. Musculoskeletal: Positive for arthralgias. Skin: Negative for wound. All other systems reviewed and are negative.       Physical Exam     Vitals:    21 1351 21 2106 21 2132   BP: 134/72 105/86    Pulse: (!) 104     Resp: 20     Temp: 98 °F (36.7 °C)     SpO2: 98% 100% 96%   Weight: 61.2 kg (135 lb)     Height: 5' 5\" (1.651 m)       Physical Exam  Vitals signs and nursing note reviewed. Constitutional:       General: She is not in acute distress. Appearance: Normal appearance. She is normal weight. She is not ill-appearing. HENT:      Head: Normocephalic and atraumatic. Eyes:      Extraocular Movements: Extraocular movements intact. Conjunctiva/sclera: Conjunctivae normal.      Pupils: Pupils are equal, round, and reactive to light. Neck:      Musculoskeletal: Normal range of motion and neck supple. Cardiovascular:      Rate and Rhythm: Normal rate. Pulmonary:      Effort: Pulmonary effort is normal.   Musculoskeletal: Normal range of motion. General: Tenderness present. Comments: Left lower extremity: Status post well-healed right toe removal toes 2 and 3 slightly purple, toes 4 and 5 erythematous there is some redness to the distal foot, mild swelling, very very tender with even the slightest touch, no palpable pedal pulses noted; faint abrasion but no open wounds   Skin:     General: Skin is warm and dry. Capillary Refill: Capillary refill takes less than 2 seconds. Neurological:      General: No focal deficit present. Mental Status: She is alert and oriented to person, place, and time. Psychiatric:         Mood and Affect: Mood normal.         Behavior: Behavior normal.         Diagnostic Study Results     Labs -     Recent Results (from the past 12 hour(s))   CBC WITH AUTOMATED DIFF    Collection Time: 03/18/21  3:54 PM   Result Value Ref Range    WBC 8.6 4.6 - 13.2 K/uL    RBC 4.16 (L) 4.20 - 5.30 M/uL    HGB 12.4 12.0 - 16.0 g/dL    HCT 38.0 35.0 - 45.0 %    MCV 91.3 74.0 - 97.0 FL    MCH 29.8 24.0 - 34.0 PG    MCHC 32.6 31.0 - 37.0 g/dL    RDW 14.6 (H) 11.6 - 14.5 %    PLATELET 137 782 - 388 K/uL    MPV 9.4 9.2 - 11.8 FL    NEUTROPHILS 71 40 - 73 %    LYMPHOCYTES 21 21 - 52 %    MONOCYTES 6 3 - 10 %    EOSINOPHILS 2 0 - 5 %    BASOPHILS 0 0 - 2 %    ABS.  NEUTROPHILS 6.1 1.8 - 8.0 K/UL    ABS. LYMPHOCYTES 1.8 0.9 - 3.6 K/UL    ABS. MONOCYTES 0.5 0.05 - 1.2 K/UL    ABS. EOSINOPHILS 0.2 0.0 - 0.4 K/UL    ABS. BASOPHILS 0.0 0.0 - 0.1 K/UL    DF AUTOMATED     METABOLIC PANEL, COMPREHENSIVE    Collection Time: 03/18/21  3:54 PM   Result Value Ref Range    Sodium 135 (L) 136 - 145 mmol/L    Potassium 4.3 3.5 - 5.5 mmol/L    Chloride 100 100 - 111 mmol/L    CO2 31 21 - 32 mmol/L    Anion gap 4 3.0 - 18 mmol/L    Glucose 265 (H) 74 - 99 mg/dL    BUN 18 7.0 - 18 MG/DL    Creatinine 0.75 0.6 - 1.3 MG/DL    BUN/Creatinine ratio 24 (H) 12 - 20      GFR est AA >60 >60 ml/min/1.73m2    GFR est non-AA >60 >60 ml/min/1.73m2    Calcium 9.2 8.5 - 10.1 MG/DL    Bilirubin, total 0.2 0.2 - 1.0 MG/DL    ALT (SGPT) 23 13 - 56 U/L    AST (SGOT) 14 10 - 38 U/L    Alk.  phosphatase 121 (H) 45 - 117 U/L    Protein, total 6.9 6.4 - 8.2 g/dL    Albumin 3.5 3.4 - 5.0 g/dL    Globulin 3.4 2.0 - 4.0 g/dL    A-G Ratio 1.0 0.8 - 1.7     DUPLEX LOWER EXT ARTERY LEFT    Collection Time: 03/18/21  5:45 PM   Result Value Ref Range    Left CFA dist sys .5 cm/s    Left Mid CFA Velocity 25.1 cm/s    Left super femoral dist sys PSV 19.8 cm/s    Left super femoral mid sys PSV 0.0 cm/s    Left super femoral prox sys PSV 6.2 cm/s    Left Prox PFA A PSV 17.9 cm/s    Left popliteal dist sys PSV 20.2 cm/s    Left popliteal prox sys PSV 18.8 cm/s    Left Dist PTA PSV 12.7 cm/s    Left Mid PADDY Velocity 9.9 cm/s    Left SFA Prox Cristo Ratio 0.02     Left SFA Mid Cristo Ratio 0.00     Left Pop A Prox Cristo Ratio 0.95    ANKLE BRACHIAL INDEX    Collection Time: 03/18/21  6:11 PM   Result Value Ref Range    Left arm  mmHg    Right arm  mmHg    Right posterior tibial 113 mmHg    Right anterior tibial 117 mmHg    Right OLGA 0.75    PROTHROMBIN TIME + INR    Collection Time: 03/18/21  9:06 PM   Result Value Ref Range    Prothrombin time 13.0 11.5 - 15.2 sec    INR 1.0 0.8 - 1.2     PTT    Collection Time: 03/18/21  9:06 PM Result Value Ref Range    aPTT 23.5 23.0 - 36.4 SEC       Radiologic Studies -   CTA LOW EXT BI W CONT    (Results Pending)     CT Results  (Last 48 hours)    None        CXR Results  (Last 48 hours)    None          Medications given in the ED-  Medications   iopamidoL (ISOVUE-370) 76 % injection 100 mL (has no administration in time range)   heparin (porcine) 1,000 unit/mL injection 4,900 Units (has no administration in time range)   heparin (porcine) 25,000 units in 0.45% saline 250 ml infusion (has no administration in time range)   HYDROmorphone (PF) (DILAUDID) injection 1 mg (has no administration in time range)   morphine injection 4 mg (4 mg IntraVENous Given 3/18/21 1605)   ondansetron (ZOFRAN) injection 4 mg (4 mg IntraVENous Given 3/18/21 1605)   iopamidoL (ISOVUE-370) 76 % injection 100 mL (100 mL IntraVENous Given 3/18/21 1646)   morphine injection 8 mg (8 mg IntraVENous Given 3/18/21 2041)         Medical Decision Making   I am the first provider for this patient. I reviewed the vital signs, available nursing notes, past medical history, past surgical history, family history and social history. Vital Signs-Reviewed the patient's vital signs. Pulse Oximetry Analysis - 98% on RA     Records Reviewed: Nursing Notes    Procedures:  Procedures    ED Course:   3:35 PM   Initial assessment performed. The patients presenting problems have been discussed, and they are in agreement with the care plan formulated and outlined with them. I have encouraged them to ask questions as they arise throughout their visit. 3:43 PM   Reviewed case with Dr. Ragena Goodpasture her ED attending. Patient with history of PAD clot in that foot with surgery in the past already missing her great toe. She has a very painful pulseless foot. Suggest CTA of the extremity. 7:42 PM  Reviewed case with Dr. Nilda Nickerson vascular surgery.   Wants to review images and will call me back    8:00 PM  Spoke again with Dr. Nilda Nickerson who will come in and see patient. 9:23 PM  Dr Pankaj Springer that heparin drip be initiated. Hospitalist to admit to and podiatry on board. 9:51 PM  This case with Dr. Kale Murry hospitalist agrees to admit to medical.    Critical Care Time: 43    Core Measures:  For Hospitalized Patients:    1. Hospitalization Decision Time:  The decision to hospitalize the patient was made by [facility]  at 9:52 PM  on 3/18/2021    2. Aspirin: Aspirin was not given because the patient did not present with a stroke at the time of their Emergency Department evaluation    9:24 PM  Patient is being admitted to the hospital by Dr Kale Murry. The results of their tests and reasons for their admission have been discussed with them and/or available family. They convey agreement and understanding for the need to be admitted and for their admission diagnosis. CLINICAL IMPRESSION:    1. Arterial thromboembolism ([address]    2. Left foot pain            9:24 PM  I have spent 43 minutes of critical care time involved in lab review, consultations with specialist, family decision-making, and documentation. During this entire length of time I was immediately available to the patient. Critical Care: The reason for providing this level of medical care for this critically ill patient was due a critical illness that impaired one or more vital organ systems such that there was a high probability of imminent or life threatening deterioration in the patients condition. This care involved high complexity decision making to assess, manipulate, and support vital system functions, to treat this degreee vital organ system failure and to prevent further life threatening deterioration of the patients condition. Please note that this dictation was completed with [employer], the [employer] voice recognition software.   Quite often unanticipated grammatical, syntax, homophones, and other interpretive errors are inadvertently transcribed by the computer software. Please disregard these errors. Please excuse any errors that have escaped final proofreading.

## 2021-03-18 NOTE — ED TRIAGE NOTES
Pt in for c/o left leg/foot pain that started about 1 wk ago. Pt noted left foot numb and changed color 2 days ago.

## 2021-03-19 ENCOUNTER — APPOINTMENT (OUTPATIENT)
Dept: NON INVASIVE DIAGNOSTICS | Age: 71
DRG: 300 | End: 2021-03-19
Attending: INTERNAL MEDICINE
Payer: MEDICARE

## 2021-03-19 VITALS
SYSTOLIC BLOOD PRESSURE: 149 MMHG | RESPIRATION RATE: 16 BRPM | BODY MASS INDEX: 22.49 KG/M2 | OXYGEN SATURATION: 100 % | DIASTOLIC BLOOD PRESSURE: 74 MMHG | HEART RATE: 83 BPM | TEMPERATURE: 98.3 F | HEIGHT: 65 IN | WEIGHT: 135 LBS

## 2021-03-19 LAB
CK MB CFR SERPL CALC: 2.4 % (ref 0–4)
CK MB CFR SERPL CALC: 2.6 % (ref 0–4)
CK MB SERPL-MCNC: 2.4 NG/ML (ref 5–25)
CK MB SERPL-MCNC: 2.6 NG/ML (ref 5–25)
CK SERPL-CCNC: 108 U/L (ref 26–192)
CK SERPL-CCNC: 94 U/L (ref 26–192)
ECHO AO ROOT DIAM: 2.86 CM
ECHO AV AREA PEAK VELOCITY: 198.88 CM2
ECHO AV AREA PEAK VELOCITY: 2.93 CM2
ECHO AV AREA VTI: 3.54 CM2
ECHO AV AREA/BSA VTI: 2.1 CM2/M2
ECHO AV CUSP MM: 1.33 CM
ECHO AV MEAN GRADIENT: 2.6 MMHG
ECHO AV PEAK GRADIENT: 0 MMHG
ECHO AV PEAK GRADIENT: 5.86 MMHG
ECHO AV PEAK VELOCITY: 1.78 CM/S
ECHO AV VTI: 20.64 CM
ECHO IVC PROX: 1.79 CM
ECHO LA MAJOR AXIS: 3.42 CM
ECHO LA MINOR AXIS: 2.04 CM
ECHO LA VOL 2C: 25.67 ML (ref 22–52)
ECHO LA VOL 4C: 29.29 ML (ref 22–52)
ECHO LA VOL BP: 30.52 ML (ref 22–52)
ECHO LA VOL/BSA BIPLANE: 18.23 ML/M2 (ref 16–28)
ECHO LA VOLUME INDEX A2C: 15.34 ML/M2 (ref 16–28)
ECHO LA VOLUME INDEX A4C: 17.5 ML/M2 (ref 16–28)
ECHO LV E' LATERAL VELOCITY: 10 CM/S
ECHO LV E' SEPTAL VELOCITY: 8 CM/S
ECHO LV EDV A2C: 65.72 ML
ECHO LV EDV A4C: 52.17 ML
ECHO LV EDV BP: 59.27 ML (ref 56–104)
ECHO LV EDV INDEX A4C: 31.2 ML/M2
ECHO LV EDV INDEX BP: 35.4 ML/M2
ECHO LV EDV NDEX A2C: 39.3 ML/M2
ECHO LV EJECTION FRACTION A2C: 75 PERCENT
ECHO LV EJECTION FRACTION A4C: 73 PERCENT
ECHO LV EJECTION FRACTION BIPLANE: 74.3 PERCENT (ref 55–100)
ECHO LV ESV A2C: 16.43 ML
ECHO LV ESV A4C: 14.02 ML
ECHO LV ESV BP: 15.23 ML (ref 19–49)
ECHO LV ESV INDEX A2C: 9.8 ML/M2
ECHO LV ESV INDEX A4C: 8.4 ML/M2
ECHO LV ESV INDEX BP: 9.1 ML/M2
ECHO LV INTERNAL DIMENSION DIASTOLIC: 4.01 CM (ref 3.9–5.3)
ECHO LV INTERNAL DIMENSION SYSTOLIC: 2.73 CM
ECHO LV IVSD: 1.13 CM (ref 0.6–0.9)
ECHO LV MASS 2D: 137.7 G (ref 67–162)
ECHO LV MASS INDEX 2D: 82.2 G/M2 (ref 43–95)
ECHO LV POSTERIOR WALL DIASTOLIC: 0.98 CM (ref 0.6–0.9)
ECHO LVOT DIAM: 2.04 CM
ECHO LVOT PEAK GRADIENT: 4.74 MMHG
ECHO LVOT PEAK VELOCITY: 108.82 CM/S
ECHO LVOT SV: 49.3 ML
ECHO LVOT SV: 73 ML
ECHO LVOT VTI: 22.43 CM
ECHO MV A VELOCITY: 96.09 CM/S
ECHO MV AREA PHT: 3.2 CM2
ECHO MV E DECELERATION TIME (DT): 237 MS
ECHO MV E VELOCITY: 89.08 CM/S
ECHO MV E/A RATIO: 0.93
ECHO MV E/E' LATERAL: 8.91
ECHO MV E/E' RATIO (AVERAGED): 10.02
ECHO MV E/E' SEPTAL: 11.14
ECHO MV PRESSURE HALF TIME (PHT): 68.73 MS
ECHO PV REGURGITANT END DIASTOLIC MAX VELOCITY: 147.09 CM/S
ECHO RV TAPSE: 2.1 CM (ref 1.5–2)
ECHO RVOT DIAMETER: 2.96 CM
EST. AVERAGE GLUCOSE BLD GHB EST-MCNC: 206 MG/DL
HBA1C MFR BLD: 8.8 % (ref 4.2–5.6)
LEFT ARM BP: 155 MMHG
LEFT CFA DIST SYS PSV: 315.5 CM/S
LEFT CFA MID SYS PSV: 25.1 CM/S
LEFT DIST PTA PSV: 12.7 CM/S
LEFT MID ATA VELOCITY: 9.9 CM/S
LEFT POP A PROX VEL RATIO: 0.95
LEFT POPLITEAL DIST SYS PSV: 20.2 CM/S
LEFT POPLITEAL PROX SYS PSV: 18.8 CM/S
LEFT PROX PFA A PSV: 17.9 CM/S
LEFT SFA MID VEL RATIO: 0
LEFT SFA PROX VEL RATIO: 0.02
LEFT SUPER FEMORAL DIST SYS PSV: 19.8 CM/S
LEFT SUPER FEMORAL MID SYS PSV: 0 CM/S
LEFT SUPER FEMORAL PROX SYS PSV: 6.2 CM/S
LVOT MG: 1.83 MMHG
RIGHT ABI: 0.75
RIGHT ANTERIOR TIBIAL: 117 MMHG
RIGHT ARM BP: 153 MMHG
RIGHT POSTERIOR TIBIAL: 113 MMHG
TROPONIN I SERPL-MCNC: <0.02 NG/ML (ref 0–0.04)
TROPONIN I SERPL-MCNC: <0.02 NG/ML (ref 0–0.04)

## 2021-03-19 PROCEDURE — 74011250637 HC RX REV CODE- 250/637: Performed by: INTERNAL MEDICINE

## 2021-03-19 PROCEDURE — C9113 INJ PANTOPRAZOLE SODIUM, VIA: HCPCS | Performed by: INTERNAL MEDICINE

## 2021-03-19 PROCEDURE — 74011636637 HC RX REV CODE- 636/637: Performed by: INTERNAL MEDICINE

## 2021-03-19 PROCEDURE — 36415 COLL VENOUS BLD VENIPUNCTURE: CPT

## 2021-03-19 PROCEDURE — 83036 HEMOGLOBIN GLYCOSYLATED A1C: CPT

## 2021-03-19 PROCEDURE — 82553 CREATINE MB FRACTION: CPT

## 2021-03-19 PROCEDURE — 74011250636 HC RX REV CODE- 250/636: Performed by: INTERNAL MEDICINE

## 2021-03-19 PROCEDURE — 93306 TTE W/DOPPLER COMPLETE: CPT

## 2021-03-19 RX ORDER — ATORVASTATIN CALCIUM 20 MG/1
20 TABLET, FILM COATED ORAL DAILY
Status: DISCONTINUED | OUTPATIENT
Start: 2021-03-19 | End: 2021-03-19 | Stop reason: HOSPADM

## 2021-03-19 RX ORDER — DIPHENHYDRAMINE HYDROCHLORIDE 50 MG/ML
25 INJECTION, SOLUTION INTRAMUSCULAR; INTRAVENOUS
Status: DISCONTINUED | OUTPATIENT
Start: 2021-03-19 | End: 2021-03-19 | Stop reason: HOSPADM

## 2021-03-19 RX ORDER — MAGNESIUM SULFATE 100 %
4 CRYSTALS MISCELLANEOUS AS NEEDED
Status: DISCONTINUED | OUTPATIENT
Start: 2021-03-19 | End: 2021-03-19 | Stop reason: HOSPADM

## 2021-03-19 RX ORDER — DEXTROSE MONOHYDRATE 100 MG/ML
125-250 INJECTION, SOLUTION INTRAVENOUS AS NEEDED
Status: DISCONTINUED | OUTPATIENT
Start: 2021-03-19 | End: 2021-03-19 | Stop reason: HOSPADM

## 2021-03-19 RX ORDER — URSODIOL 500 MG/1
500 TABLET, FILM COATED ORAL DAILY
Status: DISCONTINUED | OUTPATIENT
Start: 2021-03-19 | End: 2021-03-19 | Stop reason: HOSPADM

## 2021-03-19 RX ORDER — ONDANSETRON 4 MG/1
4 TABLET, FILM COATED ORAL
Status: DISCONTINUED | OUTPATIENT
Start: 2021-03-19 | End: 2021-03-19 | Stop reason: HOSPADM

## 2021-03-19 RX ORDER — METOPROLOL TARTRATE 25 MG/1
25 TABLET, FILM COATED ORAL 2 TIMES DAILY
Status: DISCONTINUED | OUTPATIENT
Start: 2021-03-19 | End: 2021-03-19 | Stop reason: HOSPADM

## 2021-03-19 RX ORDER — PANTOPRAZOLE SODIUM 40 MG/10ML
40 INJECTION, POWDER, LYOPHILIZED, FOR SOLUTION INTRAVENOUS DAILY
Status: DISCONTINUED | OUTPATIENT
Start: 2021-03-19 | End: 2021-03-19 | Stop reason: HOSPADM

## 2021-03-19 RX ORDER — ONDANSETRON 2 MG/ML
4 INJECTION INTRAMUSCULAR; INTRAVENOUS
Status: DISCONTINUED | OUTPATIENT
Start: 2021-03-19 | End: 2021-03-19 | Stop reason: HOSPADM

## 2021-03-19 RX ORDER — ACETAMINOPHEN 650 MG/1
650 SUPPOSITORY RECTAL
Status: DISCONTINUED | OUTPATIENT
Start: 2021-03-19 | End: 2021-03-19 | Stop reason: HOSPADM

## 2021-03-19 RX ORDER — ACETAMINOPHEN 325 MG/1
650 TABLET ORAL
Status: DISCONTINUED | OUTPATIENT
Start: 2021-03-19 | End: 2021-03-19 | Stop reason: HOSPADM

## 2021-03-19 RX ORDER — SODIUM CHLORIDE 0.9 % (FLUSH) 0.9 %
5-40 SYRINGE (ML) INJECTION AS NEEDED
Status: DISCONTINUED | OUTPATIENT
Start: 2021-03-19 | End: 2021-03-19 | Stop reason: HOSPADM

## 2021-03-19 RX ORDER — INSULIN LISPRO 100 [IU]/ML
INJECTION, SOLUTION INTRAVENOUS; SUBCUTANEOUS
Status: DISCONTINUED | OUTPATIENT
Start: 2021-03-19 | End: 2021-03-19 | Stop reason: HOSPADM

## 2021-03-19 RX ORDER — ROPINIROLE 1 MG/1
4 TABLET, FILM COATED ORAL
Status: DISCONTINUED | OUTPATIENT
Start: 2021-03-19 | End: 2021-03-19 | Stop reason: HOSPADM

## 2021-03-19 RX ORDER — HYDROXYZINE 25 MG/1
25 TABLET, FILM COATED ORAL
Status: DISCONTINUED | OUTPATIENT
Start: 2021-03-19 | End: 2021-03-19 | Stop reason: HOSPADM

## 2021-03-19 RX ORDER — POLYETHYLENE GLYCOL 3350 17 G/17G
17 POWDER, FOR SOLUTION ORAL DAILY PRN
Status: DISCONTINUED | OUTPATIENT
Start: 2021-03-19 | End: 2021-03-19 | Stop reason: HOSPADM

## 2021-03-19 RX ORDER — SODIUM CHLORIDE 0.9 % (FLUSH) 0.9 %
5-40 SYRINGE (ML) INJECTION EVERY 8 HOURS
Status: DISCONTINUED | OUTPATIENT
Start: 2021-03-19 | End: 2021-03-19 | Stop reason: HOSPADM

## 2021-03-19 RX ADMIN — OXYCODONE 10 MG: 5 TABLET ORAL at 01:04

## 2021-03-19 RX ADMIN — GABAPENTIN 300 MG: 300 CAPSULE ORAL at 00:59

## 2021-03-19 RX ADMIN — HYDROXYZINE HYDROCHLORIDE 25 MG: 25 TABLET, FILM COATED ORAL at 00:59

## 2021-03-19 RX ADMIN — OXYCODONE 10 MG: 5 TABLET ORAL at 09:57

## 2021-03-19 RX ADMIN — INSULIN LISPRO 4 UNITS: 100 INJECTION, SOLUTION INTRAVENOUS; SUBCUTANEOUS at 09:47

## 2021-03-19 RX ADMIN — PANTOPRAZOLE SODIUM 40 MG: 40 INJECTION, POWDER, FOR SOLUTION INTRAVENOUS at 09:47

## 2021-03-19 RX ADMIN — ROPINIROLE HYDROCHLORIDE 4 MG: 1 TABLET, FILM COATED ORAL at 00:59

## 2021-03-19 RX ADMIN — OXYCODONE 10 MG: 5 TABLET ORAL at 06:48

## 2021-03-19 RX ADMIN — Medication 10 ML: at 01:00

## 2021-03-19 RX ADMIN — ONDANSETRON 4 MG: 2 INJECTION INTRAMUSCULAR; INTRAVENOUS at 06:43

## 2021-03-19 NOTE — PROGRESS NOTES
Vascular  S: pt states foot is no better. Still in severe pain. \"can't we just chop the foot off? \"  O:   Visit Vitals  BP (!) 166/98 (BP 1 Location: Left upper arm, BP Patient Position: Sitting) Comment: BP high RN notified   Pulse 69   Temp 97.9 °F (36.6 °C)   Resp 16   Ht 5' 5\" (1.651 m)   Wt 61.2 kg (135 lb)   SpO2 100%   BMI 22.47 kg/m²     Awake, alert, NAD, resp nonlabored, rrr, LLE dependent rubor over dorsum and forefoot. 2nd toe ischemic. Neuromotor intact. Hyperesthesia of dorsum. A/P: Discussed with pt again need for revascularization. I have again stated that she is at very high risk of limb loss without revascularization. \"can't you just chop off the end, make a flap and sew it back really well? I heal really well. \" I have stated this will not be successful without better blood flow. Offered arteriogram, left leg runoff, possible endovascular procedures, again as per discussion last night. Benefits, alternatives, and risks including bleeding, infection, risk of damage to adjacent neurovascular structures, non-improvement of the condition, worsening of condition, and very high risk of limb loss. Pt states understanding, however states: \"I don't think it's going to make it any better,\" but she does agrees to intervetion. I spoke with Cath lab at [facility]. They will not be able to do procedure. Spoke with pt about need to transfer to [facility] vs. [place] due to staffing.     Tiffanie Marie MD, [address] [place] [address] [facility]  [phone] office  [phone] mobile

## 2021-03-19 NOTE — H&P
History & Physical    Patient: Evelyn Mandujano MRN: [id number]  CSN: [id number]    YOB: 1950  Age: 72 y.o. Sex: female      DOA: 3/18/2021    Chief Complaint:   Chief Complaint   Patient presents with    Foot Pain     left          HPI:     Evelyn Mandujano is a 72 y.o.  female who has history of lupus anticoagulant, diabetes, neuropathy presents to the emergency room with acute left foot pain that is pretty progressive over the last week but excruciating pain over the last 2 days. 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  Patient in the past has cut off one of her toes and stated that the pain was so bad she felt like cutting this 1 off but it was not necrotic enough. In the emergency room she was given multiple pain medications with itching and worsening agitation. She was found to have acute occlusion of the left femoral artery vascular was consulted and patient is admitted for further treatment    Past Medical History:   Diagnosis Date    Arthritis     hands and feet    Asthma     Autoimmune disease ([address]     lupus anticoagulant-causes clotting    Cancer ([address]     carcinoma in situ of cervix and uterus; inside bottom lip    Chronic pain     Left foot    Diabetes ([address] 2013    Dizzy spells     h/o    GERD (gastroesophageal reflux disease)     Liver disease     1/2 of my liver was removed for ulcers.     Lung collapse     2015    Nausea & vomiting 5/23/2012    Neuropathy     right foot    Other ill-defined conditions(799.89)     arterial clot in rt leg    Other ill-defined conditions(799.89)     restless leg syndrome    Other ill-defined conditions(799.89)     raynaud's syndrome    Other ill-defined conditions(799.89)     chronic N/V    Other ill-defined conditions(799.89)     (2 mutated genes) hyper coag. state     Pneumonia 07/22/2017    Positive cardiolipin antibodies    [facility]     depression    PUD (peptic ulcer disease)     H/O requiring partial gastrectomy and small intestine    [address]romboembol[address] ([facility] [facility] 2000    artery right leg       Past Surgical History:   Procedure Laterality Date    HX APPENDECTOMY      HX BREAST AUGMENTATION      developed infection and implant removed    HX CHOLECYSTECTOMY      HX GASTRECTOMY      due to ulcers    HX GASTRIC BYPASS      gastric by-pass    HX GI      total gastrectomy, partial colectomy    HX GI      colostomy and reversal    HX HEENT      cancer of lip removed    HX HYSTERECTOMY      for cervical cancer    HX ORTHOPAEDIC      rt foot surgery x10    HX ORTHOPAEDIC Left     great toe gangrenous and amputated    HX OTHER SURGICAL      part of liver removal    HX OTHER SURGICAL      ventral hernia repair x10    HX OTHER SURGICAL      Exc pilonial cystectomy    [place] ABDOMEN SURGERY PROC UNLISTED      duodenal ulcer rupture    [place] BREAST SURGERY PROCEDURE UNLISTED Bilateral 2016    hemorrhaged after breast surgery    [place] CARDIAC SURG PROCEDURE UNLIST  2018    open heart (single vessel)    VASCULAR SURGERY PROCEDURE UNLIST      iliac aorta stents x 6       Family History   Problem Relation Age of Onset    Malignant Hyperthermia Neg Hx     Pseudocholinesterase Deficiency Neg Hx     Delayed Awakening Neg Hx     Post-op Nausea/Vomiting Neg Hx     Emergence Delirium Neg Hx     Post-op Cognitive Dysfunction Neg Hx     Other Neg Hx        Social History     Socioeconomic History    Marital status:      Spouse name: Not on file    Number of children: Not on file    Years of education: Not on file    Highest education level: Not on file   Tobacco Use    Smoking status: Former Smoker     Years: 0.50     Quit date: 1996     Years since quittin.2    Smokeless tobacco: Never Used   Substance and Sexual Activity    Alcohol use: Yes     Comment: 1 or 2 glasses wine or mixed drinks monthly     Drug use: Yes     Types: Marijuana     Comment: 4x daily    Sexual activity: Never       Prior to Admission medications    Medication Sig Start Date End Date Taking? Authorizing Provider   enoxaparin (Lovenox) 40 mg/0.4 mL 40 mg by SubCUTAneous route. Provider, Historical   hydrOXYzine HCL (ATARAX) 50 mg tablet Take 25 mg by mouth three (3) times daily as needed. Provider, Historical   OTHER Blood thinner, patient unsure of name    Provider, Historical   ursodioL (ACTIGALL) 500 mg tablet TAKE ONE TABLET BY MOUTH TWICE A DAY 6/15/20   Hany Dumont MD   warfarin sodium (COUMADIN PO) Take 7 mg by mouth daily. Other, MD Jay   promethazine (PHENERGAN) 25 mg tablet Take 25 mg by mouth every six (6) hours as needed for Nausea. Provider, Historical   B.infantis-B.ani-B.long-B.bifi (PROBIOTIC 4X) 10-15 mg TbEC Take  by mouth. Provider, Historical   lidocaine-prilocaine (LIDOPRIL) cream Apply  to affected area now. Provider, Historical   nystatin (MYCOSTATIN) topical cream Apply  to affected area two (2) times a day. 12/7/18   Parish Pendleton MD   atorvastatin (LIPITOR) 20 mg tablet Take 20 mg by mouth daily. Provider, Historical   dronabinol (MARINOL) 2.5 mg capsule Take 2.5 mg by mouth three (3) times daily. Provider, Historical   metoprolol tartrate (LOPRESSOR) 25 mg tablet Take 25 mg by mouth two (2) times a day. Provider, Historical   nitroglycerin (NITROSTAT) 0.3 mg SL tablet by SubLINGual route every five (5) minutes as needed for Chest Pain (x3). Provider, Historical   clopidogrel (PLAVIX) 75 mg tab Take 75 mg by mouth daily. Provider, Historical   lactobacillus combination no.4 (PROBIOTIC) 3 billion cell cap Take 1 Cap by mouth daily. Provider, Historical   Cetirizine (ZYRTEC) 10 mg cap Take  by mouth. Provider, Historical   cyanocobalamin 1,000 mcg tablet Take 1,000 mcg by mouth daily.     Provider, Historical   glimepiride (AMARYL) 2 mg tablet Take 2 mg by mouth every morning. Provider, Historical   melatonin 3 mg tablet Take 6 mg by mouth nightly. Provider, Historical   ondansetron hcl (ZOFRAN, AS HYDROCHLORIDE,) 4 mg tablet Take 4 mg by mouth every eight (8) hours as needed for Nausea. Provider, Historical   cyclobenzaprine (FLEXERIL) 10 mg tablet Take  by mouth three (3) times daily as needed for Muscle Spasm(s). Provider, Historical   LORazepam (ATIVAN) 2 mg tablet Take 1 mg by mouth every six (6) hours as needed. Provider, Historical   BIOTIN PO Take 5,000 mcg by mouth daily. Provider, Historical   albuterol (PROVENTIL HFA, VENTOLIN HFA) 90 mcg/actuation inhaler Take 2 Puffs by inhalation every four (4) hours as needed. Indications: ACUTE ASTHMA ATTACK    Provider, Historical   cloNIDine (CATAPRES) 0.1 mg tablet Take 0.1 mg by mouth two (2) times a day. Indications: hot flashes;    Provider, Historical   metFORMIN (GLUCOPHAGE) 500 mg tablet Take 500 mg by mouth two (2) times daily (with meals). Indications: TYPE 2 DIABETES MELLITUS    Provider, Historical   ropinirole (REQUIP) 4 mg Tab TAB Take 4 mg by mouth nightly. Indications: RESTLESS LEGS SYNDROME    Provider, Historical       Allergies   Allergen Reactions    Dilaudid [Hydromorphone] Hives    Rifampin Hives and Swelling     Swelling of mouth and tounge    Levaquin [Levofloxacin] Itching    Amoxicillin Hives    Aspirin Nausea and Vomiting    Azithromycin Angioedema    Bactrim [Sulfamethoprim Ds] Hives    Cefatrizine Hives    Erythromycin Hives    Rocephin [Ceftriaxone] Hives    Tylenol [Acetaminophen] Hives    Vancomycin Hives         Review of Systems  GENERAL: Patient alert, awake and oriented times 3, able to communicate full sentences and not in distress. HEENT: No change in vision, no earache, tinnitus, sore throat or sinus congestion. NECK: No pain or stiffness. PULMONARY: No shortness of breath, cough or wheeze.    Cardiovascular: no pnd or orthopnea, no CP  GASTROINTESTINAL: No abdominal pain, nausea, vomiting or diarrhea, melena or bright red blood per rectum. Has larger abdominal hernia   GENITOURINARY: No urinary frequency, urgency, hesitancy or dysuria. MUSCULOSKELETAL: No joint or muscle pain, no back pain, no recent trauma. DERMATOLOGIC: No rash, no itching, no lesions. ENDOCRINE: No polyuria, polydipsia, no heat or cold intolerance. No recent change in weight. HEMATOLOGICAL: No anemia or easy bruising or bleeding. NEUROLOGIC: No headache, seizures, numbness, tingling or weakness. Physical Exam:     Physical Exam:  Visit Vitals  BP (!) 159/59   Pulse 98   Temp 98.4 °F (36.9 °C)   Resp 18   Ht 5' 5\" (1.651 m)   Wt 61.2 kg (135 lb)   SpO2 95%   BMI 22.47 kg/m²      O2 Device: Room air    Temp (24hrs), Av.2 °F (36.8 °C), Min:98 °F (36.7 °C), Max:98.4 °F (36.9 °C)    No intake/output data recorded. No intake/output data recorded. General:  Alert, cooperative, no distress, appears stated age. Head: Normocephalic, without obvious abnormality, atraumatic. Eyes:  Conjunctivae/corneas clear. PERRL, EOMs intact. Nose: Nares normal. No drainage or sinus tenderness. Neck: Supple, symmetrical, trachea midline, no adenopathy, thyroid: no enlargement, no carotid bruit and no JVD. Lungs:   Clear to auscultation bilaterally. Heart:  Regular rate and rhythm, S1, S2 normal.     Abdomen: Soft, non-tender. Bowel sounds normal. Hernia of abdomen  With large scar  Breast reconstructive surgery deformity noted    Extremities: Extremities normal, atraumatic, no cyanosis or edema. trans metatarsal amputaion right foot    Pulses: 2+ and symmetric all extremities. Skin:  cold left foot distal erythema noted  Missing big toe left 4 toes with discolaration coolness and ehymotic changes right foot mid plantar quarter sized lesion    Neurologic: AAOx3, No focal motor+sensory deficit. Labs Reviewed:     All lab results for the last 24 hours reviewed. and EKG    Procedures/imaging: see electronic medical records for all procedures/Xrays and details which were not copied into this note but were reviewed prior to creation of Plan      Assessment/Plan     Principal Problem:  1. Arterial thrombosis ([address] (3/18/2021)    Patient is started on heparin drip  Vascular is consulted for possible angiogram and intervention  It appears that patient would like her toes amputated due to severe pain  I have started her on gabapentin and oxycodone she has had an adverse reaction to Dilaudid and morphine in the emergency room    2. Diabetes   we will hold all oral agents and place on sliding scale insulin and Lantus if patient is to have surgery tomorrow should be n.p.o. after midnight as of now she has not agreed    3. Right midfoot ulceration we will get a wound care consult being treated conservatively for now patient has multiple allergies over 12 we will hold off on antibiotics until necessary  Podiatry is consulted from ER  Dr. Jennifer Lee     4. HTN  Worsened by pain   Hydralazine prn   Pain control asm mentioned   Check cardiac enzymes and echo  Resume Metoprolol     5. GERD   With hx of ulcers and reversal of ostomy   Start /cont protonix     6.  THC use  uds   DVT/GI Prophylaxis: heparin drip /protonix         Tony Bolanos MD  3/19/2021 9:46 PM

## 2021-03-19 NOTE — PROGRESS NOTES
Care Management/Patient Conversation: Chart reviewed. CM spoke with the patient and informed her of the CM's role. The patient confirmed demographics and PCP. CM and the patient discussed DME, family support, and transportation to and from appointments. The patient states that she currently lives in her own home and that her son, Alexandria Scott, and one of her grandson's currently live with her. The patient states that prior to this admission she was independent, not requiring any DME for ambulation until very recently due to her currently medical condition. In the days preceding this admission the patient states that she was ambulating with a walker PRN. The patient denies any additional questions or concerns at this time. CM to follow the patient's progress and be available to assist with discharge planning as needed. CMS referral placed. Reason for Admission: Arterial thrombosis    Chart reviewed. Per H&P: Billy Carlson is a 72 y.o.  female who has history of lupus anticoagulant, diabetes, neuropathy presents to the emergency room with acute left foot pain that is pretty progressive over the last week but excruciating pain over the last 2 days. 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  Patient in the past has cut off one of her toes and stated that the pain was so bad she felt like cutting this 1 off but it was not necrotic enough. In the emergency room she was given multiple pain medications with itching and worsening agitation. She was found to have acute occlusion of the left femoral artery vascular was consulted and patient is admitted for further treatment. \"    Prior to admission patient was living: Son and grandson live with her    Prior to admission patient was using the following DME: [facility]                   RUR Score: 16%                     Plan for utilizing home health: TBD         PCP: First and Last name: Dorothea Duffy PA-C   Name of Practice: [facility]   Are you a current patient: Yes/No: Yes   Approximate date of last visit: 1/12/2021   Can you participate in a virtual visit with your PCP:                     Current Advanced Directive/Advance Care Plan: Full Code    Healthcare Decision Maker: cory Stack                         Transition of Care Plan: In progress, CM notes potential transfer to [facility]. CM to standby and be available to assist as needed.        Care Management Interventions  PCP Verified by CM: Yes  Transition of Care Consult (CM Consult): Discharge Planning  Current Support Network: Own Home(son and grandson live with her)  Confirm Follow Up Transport: Family  The Plan for Transition of Care is Related to the Following Treatment Goals : Arterial thrombosis

## 2021-03-19 NOTE — DISCHARGE SUMMARY
Discharge Summary    Patient: Alvin Simmons MRN: [id number]  CSN: [id number]    YOB: 1950  Age: 72 y.o.   Sex: female    DOA: 3/18/2021 LOS:  LOS: 1 day   Discharge Date:      Primary Care Provider:  TORRIE Still    Admission Diagnoses: Arterial thrombosis [facility]) [I74.9]    Discharge Diagnoses:    Problem List as of 3/19/2021 Date Reviewed: 3/19/2021          Codes Class Noted - Resolved    * (Principal) Arterial thrombosis ([facility] ICD-10-CM: I74.9  ICD-9-CM: 444.9  3/18/2021 - Present        Primary biliary cholangitis ([facility] ICD-10-CM: K74.3  ICD-9-CM: 571.6  7/9/2019 - Present        Chronic anticoagulation ICD-10-CM: Z79.01  ICD-9-CM: V58.61  4/16/2019 - Present        Elevated liver enzymes ICD-10-CM: R74.8  ICD-9-CM: 790.5  4/16/2019 - Present        Cellulitis of right foot ICD-10-CM: L03.115  ICD-9-CM: 682.7  12/5/2018 - Present        Diabetic foot ulcer ([facility] ICD-10-CM: E11.621, L97.509  ICD-9-CM: 250.80, 707.15  12/4/2018 - Present        Type II diabetes mellitus with complication, uncontrolled ([facility] ICD-10-CM: E11.8, E11.65  ICD-9-CM: 250.92  4/13/2018 - Present        CAD (coronary artery disease) ICD-10-CM: I25.10  ICD-9-CM: 414.00  4/13/2018 - Present        Anxiety ICD-10-CM: F41.9  ICD-9-CM: 300.00  10/15/2017 - Present        PAD (peripheral artery disease) ([facility] (Chronic) ICD-10-CM: I73.9  ICD-9-CM: 443.9  7/22/2017 - Present        Sleepwalking disorder (Chronic) ICD-10-CM: F51.3  ICD-9-CM: 307.46  7/22/2017 - Present        Lupus anticoagulant syndrome ([facility] ICD-10-CM: A40.46  ICD-9-CM: 289.81  12/29/2016 - Present        Anemia ICD-10-CM: D64.9  ICD-9-CM: 285.9  3/1/2016 - Present        Atherosclerotic PVD with ulceration ([facility] (Chronic) ICD-10-CM: I70.209, L98.499  ICD-9-CM: 440.23, 707.9  3/13/2015 - Present        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 ICD-10-CM: Z90.3, Z98.0  ICD-9-CM: V45.89  5/23/2012 - Present        RESOLVED: Exostosis of right foot ICD-10-CM: M89.8X7  ICD-9-CM: 726.91  10/2/2020 - 10/2/2020        RESOLVED: Open wound of right foot ICD-10-CM: S91.301A  ICD-9-CM: 892.0  10/2/2020 - 10/2/2020        RESOLVED: Diabetic foot ulcer with osteomyelitis (HCC) ICD-10-CM: E11.621, E11.69, L97.509, M86.9  ICD-9-CM: 250.80, 707.15, 730.27, 731.8  12/5/2018 - 12/7/2018        RESOLVED: Right foot ulcer, with fat layer exposed (HCC) ICD-10-CM: L97.512  ICD-9-CM: 707.15  12/5/2018 - 4/21/2019        RESOLVED: Diabetic foot (HCC) ICD-10-CM: E11.8  ICD-9-CM: 250.80  12/4/2018 - 4/16/2019        RESOLVED: Cellulitis ICD-10-CM: L03.90  ICD-9-CM: 682.9  12/4/2018 - 12/7/2018        RESOLVED: Osteomyelitis (HCC) ICD-10-CM: M86.9  ICD-9-CM: 730.20  10/12/2017 - 10/14/2017        RESOLVED: Chronic multifocal osteomyelitis, right ankle and foot (HCC) ICD-10-CM: M86.371  ICD-9-CM: 730.17  9/15/2017 - 10/14/2017        RESOLVED: Foot osteomyelitis, right (HCC) ICD-10-CM: M86.9  ICD-9-CM: 730.27  12/27/2016 - 10/14/2017        RESOLVED: Acute hematogenous osteomyelitis, right ankle and foot (HCC) ICD-10-CM: M86.071  ICD-9-CM: 730.07  9/9/2016 - 9/9/2016        RESOLVED: Diabetes mellitus with skin ulcer (HCC) ICD-10-CM: E11.622, L98.499  ICD-9-CM: 250.80, 707.9  9/9/2016 - 9/9/2016        RESOLVED: Asymmetric diabetic proximal motor neuropathy (HCC) ICD-10-CM: E11.44  ICD-9-CM: 250.60, 357.2  9/9/2016 - 9/9/2016        RESOLVED: Type II diabetes mellitus with peripheral circulatory disorder (HCC) ICD-10-CM: E11.51  ICD-9-CM: 250.70, 443.81  9/9/2016 - 9/9/2016        RESOLVED: Syncope ICD-10-CM: R55  ICD-9-CM: 780.2  2/29/2016 - 12/5/2018        RESOLVED: Gangrene (HCC) ICD-10-CM: I96  ICD-9-CM: 785.4  4/2/2015 - 4/2/2015        RESOLVED: Right foot ulcer (HCC) ICD-10-CM: L97.519  ICD-9-CM: 707.15  3/9/2015 - 10/14/2017        RESOLVED: Weight loss ICD-10-CM: R63.4  ICD-9-CM: 783.21  5/23/2012 - 2/29/2016        RESOLVED: Pancreatic cyst ICD-10-CM:  K86.2  ICD-9-CM: 577.2  5/23/2012 - 12/7/2018              Discharge Medications:     Current Discharge Medication List          Discharge Condition: stable    Procedures : see results    Consults:[facility] surgery      PHYSICAL EXAM   Visit Vitals  BP (!) 166/98   Pulse 83   Temp 98 °F (36.7 °C)   Resp 16   Ht 5' 5\" (1.651 m)   Wt 61.2 kg (135 lb)   SpO2 99%   BMI 22.47 kg/m²     General: Awake, cooperative, no acute distress    HEENT: NC, Atraumatic. PERRLA, EOMI. Anicteric sclerae. Lungs:  CTA Bilaterally. No Wheezing/Rhonchi/Rales. Heart:  Regular  rhythm,  No murmur, No Rubs, No Gallops  Abdomen: Soft, Non distended, Non tender. +Bowel sounds,   Extremities: Right foot with TM amp, left foot red, black second toe, missing great toe, redness and tenderness to ankle/lower calf, appears ischemic  Psych:   Not anxious or agitated. Neurologic:  No acute neurological deficits. Admission HPI : Raghu Baum is a 72 y.o.  female who has history of lupus anticoagulant, diabetes, neuropathy presents to the emergency room with acute left foot pain that is pretty progressive over the last week but excruciating pain over the last 2 days. 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  Patient in the past has cut off one of her toes and stated that the pain was so bad she felt like cutting this 1 off but it was not necrotic enough. In the emergency room she was given multiple pain medications with itching and worsening agitation.   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    INR only 1, she missed coumadin dosing last few days at home    Currently on heparin gtt    Patient seen and examined this am    Discussed with Dr Moncho London who recommends transfer to [place] for intervention  Patient agrees    Vascular surgery    Disposition: to acute facility    Minutes spent on discharge: 35 minutes      Labs: Results:       Chemistry Recent Labs     03/18/21  1554   *   *   K 4.3      CO2 31   BUN 18   CREA 0.75   CA 9.2   AGAP 4   BUCR 24*   *   TP 6.9   ALB 3.5   GLOB 3.4   AGRAT 1.0      CBC w/Diff Recent Labs     03/18/21  1554   WBC 8.6   RBC 4.16*   HGB 12.4   HCT 38.0      GRANS 71   LYMPH 21   EOS 2      Cardiac Enzymes Recent Labs     03/19/21  0730 03/19/21  0105    94   CKND1 2.4 2.6      Coagulation Recent Labs     03/19/21  0600 03/18/21  2106   PTP  --  13.0   INR  --  1.0   APTT 111.2* 23.5       Lipid Panel Lab Results   Component Value Date/Time    Cholesterol, total 178 04/16/2019 04:44 PM    HDL Cholesterol 88 (H) 04/16/2019 04:44 PM    LDL, calculated 71 04/16/2019 04:44 PM    VLDL, calculated 19 04/16/2019 04:44 PM    Triglyceride 95 04/16/2019 04:44 PM    CHOL/HDL Ratio 2.0 04/16/2019 04:44 PM      BNP No results for input(s): BNPP in the last 72 hours. Liver Enzymes Recent Labs     03/18/21  1554   TP 6.9   ALB 3.5   *      Thyroid Studies Lab Results   Component Value Date/Time    TSH 0.74 04/11/2018 12:30 AM            Significant Diagnostic Studies: Cta Low Ext Bi W Cont    Result Date: 3/19/2021  PROCEDURE: CT angiogram of the bilateral lower extremities. CLINICAL HISTORY: Left foot pain. COMPARISON: None TECHNIQUE: CTA of the bilateral lower extremities with intravenous contrast. Maximum intensity images obtained. Noncontrast CT scan of the abdomen and pelvis was additionally submitted.  One or more dose reduction techniques were used on this CT: automated exposure control, adjustment of the mAs and/or kVp according to patient size, and iterative reconstruction techniques. The specific techniques used on this CT exam have been documented in the patient's electronic medical record. Digital Imaging and Communications in Medicine (DICOM) format image data are available to nonaffiliated external healthcare facilities or entities on a secure, media free, reciprocally searchable basis with patient authorization for at least a 12-month period after this study. --- CTA FINDINGS --- ABDOMINAL AORTA: Normal caliber infrarenal aorta with extensive vascular calcifications. RIGHT: COMMON ILIAC: Severe stenosis in the proximal stent. EXTERNAL ILIAC: Patent FEMORAL: Patent SUPERFICIAL FEMORAL: Eccentric plaque in the proximal aspect causing mild stenosis. POPLITEAL: Patent ANTERIOR TIBIAL: Patent TIBIOPERONEAL TRUNK: Patent POSTERIOR TIBIAL: Occluded proximally PERONEAL: Patent LEFT: COMMON ILIAC: Patent stent with moderate to severe stenosis at the proximal stent. EXTERNAL ILIAC: Patent stent with severe stenosis at the proximal stent orifice. FEMORAL: Occluded SUPERFICIAL FEMORAL: Patent proximally via collaterals with long segment occlusion from the mid to distal portion and reconstitution at the adductor hiatus. POPLITEAL: Patent ANTERIOR TIBIAL: Patent TIBIOPERONEAL TRUNK: Patent POSTERIOR TIBIAL: Patent PERONEAL: Occluded --- CT FINDINGS --- Biliary dilatation, sequela of cholecystectomy. Liver unremarkable. Right infrarenal inferior pole 5 mm nonobstructing calculus. Prior MARIPOSA/BSO. Small hiatal hernia. Large amount stool in the visualized colon. Postsurgical changes in the anterior pelvic wall with surgical clips. Bilateral breast prosthesis. Middle and right lower lobe pleural parenchymal scarring/atelectasis. LEFT: -Moderate to severe stenosis at the proximal left common iliac stent. -Severe stenosis at the proximal left external iliac stent. -Occluded left common femoral artery.  -Reconstituted proximal left SFA with long segment occlusion/nonopacification and reconstitution distally. -Occluded/nonopacified left peroneal artery. RIGHT: -Severe stenosis at the proximal right common iliac stent. -Occluded/unopacified right posterior tibial artery. Note: Preliminary report sent to the Emergency Department by the radiology at the time of the study. Duplex Lower Ext Venous Left    Result Date: 3/18/2021  · No evidence of deep vein thrombosis in the left lower extremity. · The left posterior tibial artery has monophasic waveforms. Ankle Brachial Index    Result Date: 3/19/2021  · Left ABIs are unreliable due to medial calcinosis. · The left anterior tibial artery and posterior tibial artery has monophasic waveforms. · Left toe PPG is flat line in all digits. · The right ankle brachial index is 0.71      Duplex Lower Ext Artery Left    Result Date: 3/19/2021  Inflow arterial disease noted in the left lower extremity by the waveform analysis. High grade stenosis noted in the distal common femoral artery. No color flow or doppler signal in the mid superficial femoral artery. Superficial femoral artery is reconstituted distally. No results found for this or any previous visit.            CC: Glo Sanderson

## 2021-03-19 NOTE — PROGRESS NOTES
Vascular  I have spoken to the transfer center. [facility] do not have ICU beds. She will likely need thrombolysis and could likely need ICU bed postoperatively. Awaiting call back.   Michell Garcia MD, [address] [place] [address] [facility]  [phone] office  [phone] mobile

## 2021-03-19 NOTE — PROGRESS NOTES
Problem: Falls - Risk of  Goal: *Absence of Falls  Description: Document Clydene Bone Fall Risk and appropriate interventions in the flowsheet. Outcome: Progressing Towards Goal  Note: Fall Risk Interventions:                                Problem: Patient Education: Go to Patient Education Activity  Goal: Patient/Family Education  Outcome: Progressing Towards Goal     Problem: Diabetes Maintenance:Admission  Goal: Activity/Safety  Outcome: Progressing Towards Goal  Goal: Diagnostic Tests/Procedures  Outcome: Progressing Towards Goal  Goal: Nutrition  Outcome: Progressing Towards Goal  Goal: Medications  Outcome: Progressing Towards Goal  Goal: Treatments/Interventions/Procedures  Outcome: Progressing Towards Goal     Problem: Diabetes Maintenance:Ongoing  Goal: Activity/Safety  Outcome: Progressing Towards Goal  Goal: Nutrition  Outcome: Progressing Towards Goal  Goal: Medications  Outcome: Progressing Towards Goal  Goal: Treatments/Interventsions/Procedures  Outcome: Progressing Towards Goal  Goal: *Blood Glucose 80 to 180 md/dl  Outcome: Progressing Towards Goal     Problem: Diabetes Maintenance:Discharge Outcomes  Goal: *Describes follow-up/return visits to physicians  Outcome: Progressing Towards Goal  Goal: *Blood glucose at patient's target range or approaching  Outcome: Progressing Towards Goal  Goal: *Aware of nutrition guidelines  Outcome: Progressing Towards Goal  Goal: *Verbalizes information about medication  Description: Verbalizes name, dosage, time, side effects, and number of days to  continue medications.   Outcome: Progressing Towards Goal  Goal: *Describes goals, rules, symptoms, and treatments  Description: Describes blood glucose goals, monitoring, sick day rules,  hypo/hyperglycemia prevention, symptoms, and treatment  Outcome: Progressing Towards Goal  Goal: *Describes available outpatient diabetes resources and support systems  Outcome: Progressing Towards Goal     Problem: Amputation  Goal: *Progressive mobility and function  Outcome: Progressing Towards Goal  Goal: *Optimal pain control at patient's stated goal  Outcome: Progressing Towards Goal  Goal: *Absence of infection signs and symptoms  Outcome: Progressing Towards Goal  Goal: *Optimal residual limb healing  Outcome: Progressing Towards Goal     Problem: Patient Education: Go to Patient Education Activity  Goal: Patient/Family Education  Outcome: Progressing Towards Goal

## 2021-03-19 NOTE — PROGRESS NOTES
Problem: Falls - Risk of  Goal: *Absence of Falls  Description: Document Becka Blood Fall Risk and appropriate interventions in the flowsheet.   Outcome: Progressing Towards Goal  Note: Fall Risk Interventions:            Medication Interventions: Teach patient to arise slowly

## 2021-03-19 NOTE — ROUTINE PROCESS
shift change report given to Jaden Blanchard RN (oncoming nurse) by Jami Martinez RN (offgoing nurse).  Report included the following information SBAR, Kardex and [facility]

## 2021-03-19 NOTE — CONSULTS
VASCULAR CONSULTATION NOTE    A vascular consultation has been requested on Wilfredo Murray, who is a 72 y.o. female who presented to the ER due to left foot pain on 3/18/2021. She is being seen for evaluation and possible treatment of decreased pulses. 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 Usually takes coumadin, missed a few doses lately as has been ill. INR/coags not available. Pt states worsening foot pain for last week. 2nd toe became acutely painful and discolored 2 days ago. Redness over dorsum of foot and discoloration of 3rd and 4th toes today. She is asking for an amputation. On exam, palpable pulses bilateral fem, +DS left ankle - per, PT, and AT, although poorly monophasic. On right, multiphasic PT and PADDY. Dorsum of left foot with blanchable erythema. 2nd toe is white/grey and purple mottled and not viable. 3rd and 4th toes are purple mottled. PVL reviewed and personally interpreted: Right OLGA is 0.71, left OLGA FE due to medial calcinosis.       HISTORY:    Patient Active Problem List   Diagnosis Code    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 Z90.3, Z98.0    Atherosclerotic PVD with ulceration ([facility]) I70.209, L98.499    Anemia D64.9    Lupus anticoagulant syndrome ([facility] D68.62    PAD (peripheral artery disease) ([facility]) I73.9    Sleepwalking disorder F51.3    Anxiety F41.9    Type II diabetes mellitus with complication, uncontrolled ([address] E11.8, E11.65    CAD (coronary artery disease) I25.10    Diabetic foot ulcer (HCC) E11.621, L97.509    Cellulitis of right foot L03.115    Chronic anticoagulation Z79.01    Elevated liver enzymes R74.8    Primary biliary cholangitis (HCC) K74.3 Past Medical History:   Diagnosis Date    Arthritis     hands and feet    Asthma     Autoimmune disease ([address]     lupus anticoagulant-causes clotting    Cancer ([address]     carcinoma in situ of cervix and uterus; inside bottom lip    Chronic pain     Left foot    Diabetes ([address] 2013    Dizzy spells     h/o    GERD (gastroesophageal reflux disease)     Liver disease     1/2 of my liver was removed for ulcers.     Lung collapse     2015    Nausea & vomiting 5/23/2012    Neuropathy     right foot    Other ill-defined conditions(799.89)     arterial clot in rt leg    Other ill-defined conditions(799.89)     restless leg syndrome    Other ill-defined conditions(799.89)     raynaud's syndrome    Other ill-defined conditions(799.89)     chronic N/V    Other ill-defined conditions(799.89)     (2 mutated genes) hyper coag. state     Pneumonia 07/22/2017    Positive cardiolipin antibodies     Psychiatric disorder     depression    PUD (peptic ulcer disease) 2009    H/O requiring partial gastrectomy and small intestine    Thromboembolus ([address] 2000    artery right leg     Past Surgical History:   Procedure Laterality Date    HX APPENDECTOMY      HX BREAST AUGMENTATION      developed infection and implant removed    HX CHOLECYSTECTOMY      HX GASTRECTOMY      due to ulcers    HX GASTRIC BYPASS  1996    gastric by-pass    HX GI      total gastrectomy, partial colectomy    HX GI      colostomy and reversal    HX HEENT      cancer of lip removed    HX HYSTERECTOMY      for cervical cancer    HX ORTHOPAEDIC      rt foot surgery x10    HX ORTHOPAEDIC Left     great toe gangrenous and amputated    HX OTHER SURGICAL      part of liver removal    HX OTHER SURGICAL      ventral hernia repair x10    HX OTHER SURGICAL      Exc pilonial cystectomy    MI ABDOMEN SURGERY PROC UNLISTED      duodenal ulcer rupture    MI BREAST SURGERY PROCEDURE UNLISTED Bilateral 02/2016    hemorrhaged after breast surgery    [place] CARDIAC SURG PROCEDURE UNLIST  2018    open heart (single vessel)    VASCULAR SURGERY PROCEDURE UNLIST      iliac aorta stents x 6     [unfilled]  Family History   Problem Relation Age of Onset    Malignant Hyperthermia Neg Hx     Pseudocholinesterase Deficiency Neg Hx     Delayed Awakening Neg Hx     Post-op Nausea/Vomiting Neg Hx     Emergence Delirium Neg Hx     Post-op Cognitive Dysfunction Neg Hx     Other Neg Hx        Allergies   Allergen Reactions    Rifampin Hives and Swelling     Swelling of mouth and tounge    Levaquin [Levofloxacin] Itching    Amoxicillin Hives    Aspirin Nausea and Vomiting    Azithromycin Angioedema    Bactrim [Sulfamethoprim Ds] Hives    Cefatrizine Hives    Dilaudid [Hydromorphone] Hives    Erythromycin Hives    Rocephin [Ceftriaxone] Hives    Tylenol [Acetaminophen] Hives    Vancomycin Hives       Home Medications:     No current facility-administered medications on file prior to encounter. Current Outpatient Medications on File Prior to Encounter   Medication Sig Dispense Refill    enoxaparin (Lovenox) 40 mg/0.4 mL 40 mg by SubCUTAneous route.  hydrOXYzine HCL (ATARAX) 50 mg tablet Take 25 mg by mouth three (3) times daily as needed.  OTHER Blood thinner, patient unsure of name      ursodioL (ACTIGALL) 500 mg tablet TAKE ONE TABLET BY MOUTH TWICE A DAY 60 Tab 5    warfarin sodium (COUMADIN PO) Take 7 mg by mouth daily.  promethazine (PHENERGAN) 25 mg tablet Take 25 mg by mouth every six (6) hours as needed for Nausea.  B.infantis-B.ani-B.long-B.bifi (PROBIOTIC 4X) 10-15 mg TbEC Take  by mouth.  lidocaine-prilocaine (LIDOPRIL) cream Apply  to affected area now.  nystatin (MYCOSTATIN) topical cream Apply  to affected area two (2) times a day. 60 g 0    atorvastatin (LIPITOR) 20 mg tablet Take 20 mg by mouth daily.  dronabinol (MARINOL) 2.5 mg capsule Take 2.5 mg by mouth three (3) times daily.       metoprolol tartrate (LOPRESSOR) 25 mg tablet Take 25 mg by mouth two (2) times a day.  nitroglycerin (NITROSTAT) 0.3 mg SL tablet by SubLINGual route every five (5) minutes as needed for Chest Pain (x3).  clopidogrel (PLAVIX) 75 mg tab Take 75 mg by mouth daily.  lactobacillus combination no.4 (PROBIOTIC) 3 billion cell cap Take 1 Cap by mouth daily.  Cetirizine (ZYRTEC) 10 mg cap Take  by mouth.  cyanocobalamin 1,000 mcg tablet Take 1,000 mcg by mouth daily.  glimepiride (AMARYL) 2 mg tablet Take 2 mg by mouth every morning.  melatonin 3 mg tablet Take 6 mg by mouth nightly.  ondansetron hcl (ZOFRAN, AS HYDROCHLORIDE,) 4 mg tablet Take 4 mg by mouth every eight (8) hours as needed for Nausea.  cyclobenzaprine (FLEXERIL) 10 mg tablet Take  by mouth three (3) times daily as needed for Muscle Spasm(s).  LORazepam (ATIVAN) 2 mg tablet Take 1 mg by mouth every six (6) hours as needed.  BIOTIN PO Take 5,000 mcg by mouth daily.  albuterol (PROVENTIL HFA, VENTOLIN HFA) 90 mcg/actuation inhaler Take 2 Puffs by inhalation every four (4) hours as needed. Indications: ACUTE ASTHMA ATTACK      cloNIDine (CATAPRES) 0.1 mg tablet Take 0.1 mg by mouth two (2) times a day. Indications: hot flashes;      metFORMIN (GLUCOPHAGE) 500 mg tablet Take 500 mg by mouth two (2) times daily (with meals). Indications: TYPE 2 DIABETES MELLITUS      ropinirole (REQUIP) 4 mg Tab TAB Take 4 mg by mouth nightly. Indications: RESTLESS LEGS SYNDROME         Review of Systems:   Review of Systems   Constitutional: Negative for fever. Respiratory: Negative for shortness of breath. Cardiovascular: Negative for chest pain. Musculoskeletal: Positive for arthralgia, right TMA. Skin: Negative for wound. Abdomen: chronic nausea.     Physical Assessment:     Visit Vitals  /86   Pulse (!) 104   Temp 98 °F (36.7 °C)   Resp 20   Ht 5' 5\" (1.651 m)   Wt 61.2 kg (135 lb)   SpO2 100%   BMI 22.47 kg/m²     General: awake, alert, uncomfortable  Resp: nonlabored  Eyes: no scleral icterus  Heart: RRR  Abdomen: multiple surgical scars  Ext: right TMA, left foot dorsum with blanchable erythema. 2nd toe is white/grey and purple mottled and not viable. 3rd and 4th toes are purple mottled. Lab         CBC WITH AUTOMATED DIFF    Collection Time: 03/18/21  3:54 PM   Result Value Ref Range    WBC 8.6 4.6 - 13.2 K/uL    RBC 4.16 (L) 4.20 - 5.30 M/uL    HGB 12.4 12.0 - 16.0 g/dL    HCT 38.0 35.0 - 45.0 %    MCV 91.3 74.0 - 97.0 FL    MCH 29.8 24.0 - 34.0 PG    MCHC 32.6 31.0 - 37.0 g/dL    RDW 14.6 (H) 11.6 - 14.5 %    PLATELET 416 892 - 503 K/uL    MPV 9.4 9.2 - 11.8 FL    NEUTROPHILS 71 40 - 73 %    LYMPHOCYTES 21 21 - 52 %    MONOCYTES 6 3 - 10 %    EOSINOPHILS 2 0 - 5 %    BASOPHILS 0 0 - 2 %    ABS. NEUTROPHILS 6.1 1.8 - 8.0 K/UL    ABS. LYMPHOCYTES 1.8 0.9 - 3.6 K/UL    ABS. MONOCYTES 0.5 0.05 - 1.2 K/UL    ABS. EOSINOPHILS 0.2 0.0 - 0.4 K/UL    ABS. BASOPHILS 0.0 0.0 - 0.1 K/UL    DF AUTOMATED       BMP:   Lab Results   Component Value Date/Time     (L) 03/18/2021 03:54 PM    K 4.3 03/18/2021 03:54 PM     03/18/2021 03:54 PM    CO2 31 03/18/2021 03:54 PM    AGAP 4 03/18/2021 03:54 PM     (H) 03/18/2021 03:54 PM    BUN 18 03/18/2021 03:54 PM    CREA 0.75 03/18/2021 03:54 PM    GFRAA >60 03/18/2021 03:54 PM    GFRNA >60 03/18/2021 03:54 PM      2106: INR 1.0, PT 13, aPTT 23.5    Radiology   CTA with multiple aortoiliac stents, extensive calcifications     PVL   Monophasic Doppler waveforms in the left proximal common femoral, profunda femoris, proximal superficial femoral, distal superficial femoral, proximal popliteal, distal popliteal, anterior tibial and posterior tibial artery. Doppler waveforms are absent in the mid superficial femoral artery. High grade stenosis noted in the distal common femoral artery. No color flow or doppler signal in the mid superficial femoral artery. Superficial femoral artery is reconstituted distally. PSV Cristo Ratio   CFA Mid 25.1 cm/s          CFA Dist 315.5 cm/s          PFA Prox 17.9 cm/s          SFA Prox 6.2 cm/s       0.02          SFA Mid 0 cm/s       0          SFA Dist 19.8 cm/s             Pop Prox 18.8 cm/s       0.95          Pop Dist 20.2 cm/s             PTA Dist 12.7 cm/s          PADDY Mid 9.9 cm/s                ImpressionPlan:     1. Subacute on chronic critical limb ischemia with rest pain. Pt asking for primary amputation. Pt does not have septic foot requiring emergent amputation. Recommend admission for pain control. 2. Subtherapeutic INR. Heparin gtt started now. 3. Podiatry consult  4. Discussed amputation would likely not heal without revascularization. Pt states she understands, but doesn't think the foot will heal anyway and amp would fix the pain problem. At this time, she is not agreeable to revascularization. 5. Will plan to revascularize tomorrow as long as patient is agreeable. Pt has dopplerable signals at the ankle.       Sonam Winters MD, [address] [place] [address] [facility]  [phone] office  [phone] mobile

## 2021-03-19 NOTE — PROGRESS NOTES
Vascular Surgery    Spoke with Dr. Kelley Lazaro, he states that Dr. Shanta Stern at [facility] will accept pt. I have called transfer center.      Denton Martínez MD, [address] [place] [address] [facility]  [phone] office  [phone] mobile

## 2021-03-19 NOTE — PROGRESS NOTES
5377  Bedside and verbal shift change report given to Estee Diaz RN (on coming nurse) by RONNI Beyer RN (off going nurse). Report included the following information SBAR, Kardex, OR Summary, Intake/Output and MAR.    2716  Scanned wrong pt's armband. The glucose 233 charted in [facility] is for pt, Norma Chong in room 311. EMTALA filled out. 1021  Report given to Cem Delacruz at [facility].  Pt off unit w/ [facility]. D/c IV clean and dry except one on the right forearm 20 G for heparin drip. Properly discarded armbands. 1201  Pt's lab result faxed to [facility] according to RN's request.  Attn to [id number].

## 2021-03-19 NOTE — PROGRESS NOTES
TRANSFER - OUT REPORT:    Verbal report given to NATI Ibrahim RN(name) on Valarie Campuzano  being transferred to [facility]) for urgent transfer       Report consisted of patients Situation, Background, Assessment and   Recommendations(SBAR). Information from the following report(s) SBAR, Intake/Output, MAR and Recent Results was reviewed with the receiving nurse. Lines:   Heparin drip 9.8 mL/hr    Opportunity for questions and clarification was provided.       Patient transported with:   Pt's belongings

## 2021-03-19 NOTE — ED NOTES
Patient c/o \"feeling hot\" following dilaudid administration. Prior to administering dilaudid, patient reported she had reaction of \"saying crazy things. \" Denies no other reactions. Medication administered as ordered. Patient continues to c/o \"feeling hot. \" Dr. April Mehta paged and awaiting callback. Spoke with Dr. April Mehta regarding condition of patient. This RN also reviewed allergies with patient for a 2nd time. Educated patient that she does have allergy to Dilaudid. Patient verbalized understanding. 9458 - Patient reports relief in symptoms at this time. Provided with wipes to clean self as requested. Attempted to call report. RN to callback.

## 2021-04-28 RX ORDER — FENTANYL CITRATE 50 UG/ML
25-100 INJECTION, SOLUTION INTRAMUSCULAR; INTRAVENOUS
Status: CANCELLED | OUTPATIENT
Start: 2021-04-28

## 2021-04-28 RX ORDER — MIDAZOLAM HYDROCHLORIDE 1 MG/ML
.5-2 INJECTION, SOLUTION INTRAMUSCULAR; INTRAVENOUS
Status: CANCELLED | OUTPATIENT
Start: 2021-04-28

## 2021-04-28 NOTE — PROGRESS NOTES
Attempted to reach pt to perform [facility] radiology pre-call, unable to leave message. Ordering office did confirm information with pt today when posting case with IR. Ordering provider stated that pt will hold Coumadin and metformin today and tomorrow. NPO status and arrival confirmed with office. 1409- Able to reach pt at mobile number. Pt confirmed instructions she was given by ordering office including medications and NPO status. Pt asked if she could be NPO after 2AM, writing RN stated that would be acceptable. Pt's brother will be driving pt to and from procedure.

## 2021-04-29 ENCOUNTER — HOSPITAL ENCOUNTER (OUTPATIENT)
Dept: INTERVENTIONAL RADIOLOGY/VASCULAR | Age: 71
Discharge: HOME | End: 2021-04-29
Attending: SURGERY | Admitting: NURSE PRACTITIONER
Payer: MEDICARE

## 2021-04-29 VITALS
TEMPERATURE: 98.1 F | WEIGHT: 123.8 LBS | BODY MASS INDEX: 20.62 KG/M2 | SYSTOLIC BLOOD PRESSURE: 118 MMHG | HEIGHT: 65 IN | HEART RATE: 80 BPM | OXYGEN SATURATION: 95 % | DIASTOLIC BLOOD PRESSURE: 68 MMHG | RESPIRATION RATE: 20 BRPM

## 2021-04-29 DIAGNOSIS — I70.90 ARTERIAL OCCLUSIVE DISEASE: ICD-10-CM

## 2021-04-29 LAB
ANION GAP SERPL CALC-SCNC: 4 MMOL/L (ref 3–18)
APTT PPP: 27.8 SEC (ref 23–36.4)
BUN SERPL-MCNC: 26 MG/DL (ref 7–18)
BUN/CREAT SERPL: 35 (ref 12–20)
CALCIUM SERPL-MCNC: 8.5 MG/DL (ref 8.5–10.1)
CHLORIDE SERPL-SCNC: 106 MMOL/L (ref 100–111)
CO2 SERPL-SCNC: 29 MMOL/L (ref 21–32)
CREAT SERPL-MCNC: 0.75 MG/DL (ref 0.6–1.3)
ERYTHROCYTE [DISTWIDTH] IN BLOOD BY AUTOMATED COUNT: 25.2 % (ref 11.6–14.5)
GLUCOSE SERPL-MCNC: 120 MG/DL (ref 74–99)
HCT VFR BLD AUTO: 33.7 % (ref 35–45)
HGB BLD-MCNC: 10.6 G/DL (ref 12–16)
INR PPP: 1.4 (ref 0.8–1.2)
MCH RBC QN AUTO: 33.1 PG (ref 24–34)
MCHC RBC AUTO-ENTMCNC: 31.5 G/DL (ref 31–37)
MCV RBC AUTO: 105.3 FL (ref 74–97)
PLATELET # BLD AUTO: 260 K/UL (ref 135–420)
PMV BLD AUTO: 9.7 FL (ref 9.2–11.8)
POTASSIUM SERPL-SCNC: 4.2 MMOL/L (ref 3.5–5.5)
PROTHROMBIN TIME: 17.1 SEC (ref 11.5–15.2)
RBC # BLD AUTO: 3.2 M/UL (ref 4.2–5.3)
SODIUM SERPL-SCNC: 139 MMOL/L (ref 136–145)
WBC # BLD AUTO: 7.3 K/UL (ref 4.6–13.2)

## 2021-04-29 PROCEDURE — 85730 THROMBOPLASTIN TIME PARTIAL: CPT

## 2021-04-29 PROCEDURE — 80048 BASIC METABOLIC PNL TOTAL CA: CPT

## 2021-04-29 PROCEDURE — 74011250636 HC RX REV CODE- 250/636: Performed by: STUDENT IN AN ORGANIZED HEALTH CARE EDUCATION/TRAINING PROGRAM

## 2021-04-29 PROCEDURE — 99152 MOD SED SAME PHYS/QHP 5/>YRS: CPT

## 2021-04-29 PROCEDURE — 99153 MOD SED SAME PHYS/QHP EA: CPT

## 2021-04-29 PROCEDURE — 77030013687 IR ANGIO EXT LOWER LT

## 2021-04-29 PROCEDURE — 74011000636 HC RX REV CODE- 636: Performed by: STUDENT IN AN ORGANIZED HEALTH CARE EDUCATION/TRAINING PROGRAM

## 2021-04-29 PROCEDURE — 85610 PROTHROMBIN TIME: CPT

## 2021-04-29 PROCEDURE — 85027 COMPLETE CBC AUTOMATED: CPT

## 2021-04-29 RX ORDER — MIDAZOLAM HYDROCHLORIDE 1 MG/ML
.5-2 INJECTION, SOLUTION INTRAMUSCULAR; INTRAVENOUS
Status: DISCONTINUED | OUTPATIENT
Start: 2021-04-29 | End: 2021-04-29 | Stop reason: HOSPADM

## 2021-04-29 RX ORDER — SODIUM CHLORIDE 9 MG/ML
20 INJECTION, SOLUTION INTRAVENOUS CONTINUOUS
Status: DISCONTINUED | OUTPATIENT
Start: 2021-04-29 | End: 2021-04-29 | Stop reason: HOSPADM

## 2021-04-29 RX ORDER — HEPARIN SODIUM 1000 [USP'U]/ML
10000 INJECTION, SOLUTION INTRAVENOUS; SUBCUTANEOUS
Status: DISCONTINUED | OUTPATIENT
Start: 2021-04-29 | End: 2021-04-29 | Stop reason: HOSPADM

## 2021-04-29 RX ORDER — HEPARIN SODIUM 200 [USP'U]/100ML
1000 INJECTION, SOLUTION INTRAVENOUS
Status: COMPLETED | OUTPATIENT
Start: 2021-04-29 | End: 2021-04-29

## 2021-04-29 RX ORDER — FENTANYL CITRATE 50 UG/ML
25-100 INJECTION, SOLUTION INTRAMUSCULAR; INTRAVENOUS
Status: DISCONTINUED | OUTPATIENT
Start: 2021-04-29 | End: 2021-04-29 | Stop reason: HOSPADM

## 2021-04-29 RX ORDER — LIDOCAINE HYDROCHLORIDE 10 MG/ML
1-10 INJECTION, SOLUTION EPIDURAL; INFILTRATION; INTRACAUDAL; PERINEURAL
Status: COMPLETED | OUTPATIENT
Start: 2021-04-29 | End: 2021-04-29

## 2021-04-29 RX ORDER — CLINDAMYCIN PHOSPHATE 900 MG/50ML
900 INJECTION, SOLUTION INTRAVENOUS
Status: COMPLETED | OUTPATIENT
Start: 2021-04-29 | End: 2021-04-29

## 2021-04-29 RX ORDER — OXYCODONE HYDROCHLORIDE 5 MG/1
5 TABLET ORAL
Qty: 28 TAB | Refills: 0 | Status: SHIPPED | OUTPATIENT
Start: 2021-04-29 | End: 2021-05-06

## 2021-04-29 RX ADMIN — SODIUM CHLORIDE 20 ML/HR: 900 INJECTION, SOLUTION INTRAVENOUS at 12:47

## 2021-04-29 RX ADMIN — FENTANYL CITRATE 25 MCG: 50 INJECTION INTRAMUSCULAR; INTRAVENOUS at 15:39

## 2021-04-29 RX ADMIN — FENTANYL CITRATE 50 MCG: 50 INJECTION INTRAMUSCULAR; INTRAVENOUS at 16:14

## 2021-04-29 RX ADMIN — FENTANYL CITRATE 50 MCG: 50 INJECTION INTRAMUSCULAR; INTRAVENOUS at 16:03

## 2021-04-29 RX ADMIN — CLINDAMYCIN PHOSPHATE 900 MG: 900 INJECTION, SOLUTION INTRAVENOUS at 15:45

## 2021-04-29 RX ADMIN — HEPARIN SODIUM IN SODIUM CHLORIDE 2000 UNITS: 200 INJECTION INTRAVENOUS at 15:58

## 2021-04-29 RX ADMIN — FENTANYL CITRATE 25 MCG: 50 INJECTION INTRAMUSCULAR; INTRAVENOUS at 15:47

## 2021-04-29 RX ADMIN — MIDAZOLAM 0.5 MG: 1 INJECTION INTRAMUSCULAR; INTRAVENOUS at 15:47

## 2021-04-29 RX ADMIN — MIDAZOLAM 0.5 MG: 1 INJECTION INTRAMUSCULAR; INTRAVENOUS at 15:58

## 2021-04-29 RX ADMIN — FENTANYL CITRATE 25 MCG: 50 INJECTION INTRAMUSCULAR; INTRAVENOUS at 16:45

## 2021-04-29 RX ADMIN — LIDOCAINE HYDROCHLORIDE 8 ML: 10 INJECTION, SOLUTION EPIDURAL; INFILTRATION; INTRACAUDAL; PERINEURAL at 16:00

## 2021-04-29 RX ADMIN — MIDAZOLAM 0.5 MG: 1 INJECTION INTRAMUSCULAR; INTRAVENOUS at 16:03

## 2021-04-29 RX ADMIN — MIDAZOLAM 0.5 MG: 1 INJECTION INTRAMUSCULAR; INTRAVENOUS at 16:11

## 2021-04-29 RX ADMIN — IOPAMIDOL 20 ML: 510 INJECTION, SOLUTION INTRAVASCULAR at 16:25

## 2021-04-29 NOTE — PROGRESS NOTES
Pt educated about procedure and sedation, verbalizes understanding. Pt denies complications from sedation/anesthesia in the past. States her last dose of Coumadin was 2 days ago, INR noted. Labs noted. Pt c/o pain in LLE, achy, 6 out of ten, pain level \"increases to a 12\" when someone touches her toe on her left foot. Will continue to monitor.

## 2021-04-29 NOTE — PROGRESS NOTES
[id number] is all prepped and ready for procedure. Open area to lt groin noted and shown to ir staff to show to doctor before procedure. 1435 Pt picked up for procedure to Angio    1700 Pt back to care unit S/P angiogram to LLE. Pt awake and alert. Procedure tolerated procedure fairly. Was anxious and restless during procedure as per report. Rt femoral accessed with Tegaderm dressing dry and intact from manual pressure. Bedrest for 4 hours reinforced. Pt laying flat with HOB flat. .    [id number] Discharge instructions reviewed with pt and she verbalized all understandings. 2016 HOB increased by increment and tolerated well. 2030 Pt assisted to bathroom and tolerated well. 2040 Pt escorted to car and left with son in stable condition.

## 2021-04-29 NOTE — PROGRESS NOTES
TRANSFER - OUT REPORT:    Verbal report given to Jasmin HOLBROOK(name) on Melquiades Anne  being transferred to Care Unit(unit) for routine progression of care       Report consisted of patients Situation, Background, Assessment and   Recommendations(SBAR). Information from the following report(s) SBAR, Procedure Summary, MAR and Cardiac Rhythm SR was reviewed with the receiving nurse. Lines:   Peripheral IV 04/29/21 Left Antecubital (Active)   Site Assessment Clean, dry, & intact 04/29/21 1237   Phlebitis Assessment 0 04/29/21 1237   Infiltration Assessment 0 04/29/21 1237   Dressing Status Clean, dry, & intact 04/29/21 1237   Dressing Type Tape;Transparent 04/29/21 1237   Hub Color/Line Status Blue;Flushed; Infusing 04/29/21 1237   Action Taken Open ports on tubing capped 04/29/21 1237   Alcohol Cap Used Yes 04/29/21 1237        Opportunity for questions and clarification was provided.       Patient transported with:   Registered Nurse  Tech

## 2021-04-29 NOTE — H&P
VASCULAR H & P    Karin Diego is a 72 y.o. female who is admitted for evaluation and possible treatment of LLE rest pain          HPI:  73 yo F. Previous vascular interventions, bilateral TONIA/s years ago. Last month re-do bilateral TONIA/s, L. Fem endart, LLE runoff, L. SFA stent. Several days after procedure, she had return of rest pain. Multiple toes amputations and right TMA. On coumadin. H/o gastric bypass, multiple hernia repairs with mesh, possible components separation in 2011, BBA implants, multiple vascular interventions. HISTORY:  Problem List:   Patient Active Problem List   Diagnosis Code    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 Z90.3, Z98.0    Atherosclerotic PVD with ulceration (HCC) I70.209, L98.499    Anemia D64.9    Lupus anticoagulant syndrome ([address] D68.62    PAD (peripheral artery disease) (HCC) I73.9    Sleepwalking disorder F51.3    Anxiety F41.9    Type II diabetes mellitus with complication, uncontrolled ([address] E11.8, E11.65    CAD (coronary artery disease) I25.10    Diabetic foot ulcer ([address] E11.621, L97.509    Cellulitis of right foot L03.115    Chronic anticoagulation Z79.01    Elevated liver enzymes R74.8    Primary biliary cholangitis (HCC) K74.3    Arterial thrombosis (HCC) I74.9     Past Medical History:   Diagnosis Date    Arthritis     hands and feet    Asthma     Autoimmune disease ([address]     lupus anticoagulant-causes clotting    Cancer ([address]     carcinoma in situ of cervix and uterus; inside bottom lip    Chronic pain     Left foot    Diabetes ([address] 2013    Dizzy spells     h/o    GERD (gastroesophageal reflux disease)     Liver disease     1/2 of my liver was removed for ulcers.     Lung collapse     2015    Nausea & vomiting 5/23/2012    Neuropathy     right foot    Other ill-defined conditions(799.89)     arterial clot in rt leg    Other ill-defined conditions(799.89)     restless leg syndrome    Other ill-defined conditions(799.89)     raynaud's syndrome    Other ill-defined conditions(799.89)     chronic N/V    Other ill-defined conditions(799.89)     (2 mutated genes) hyper coag. state     Pneumonia 07/22/2017    Positive cardiolipin antibodies     Psychiatric disorder     depression    PUD (peptic ulcer disease) 2009    H/O requiring partial gastrectomy and small intestine    Thromboembolus ([address] 2000    artery right leg     Past Surgical History:   Procedure Laterality Date    HX APPENDECTOMY      HX BREAST AUGMENTATION      developed infection and implant removed    HX CHOLECYSTECTOMY      HX GASTRECTOMY      due to ulcers    HX GASTRIC BYPASS  1996    gastric by-pass    HX GI      total gastrectomy, partial colectomy    HX GI      colostomy and reversal    HX HEENT      cancer of lip removed    HX HYSTERECTOMY      for cervical cancer    HX ORTHOPAEDIC      rt foot surgery x10    HX ORTHOPAEDIC Left     great toe gangrenous and amputated    HX OTHER SURGICAL      part of liver removal    HX OTHER SURGICAL      ventral hernia repair x10    HX OTHER SURGICAL      Exc pilonial cystectomy    GA ABDOMEN SURGERY PROC UNLISTED      duodenal ulcer rupture    GA BREAST SURGERY PROCEDURE UNLISTED Bilateral 02/2016    hemorrhaged after breast surgery    [place] CARDIAC SURG PROCEDURE UNLIST  2018    open heart (single vessel)    VASCULAR SURGERY PROCEDURE UNLIST      iliac aorta stents x 6    VASCULAR SURGERY PROCEDURE UNLIST      seven more stents iliac aorta and femoral      Family History   Problem Relation Age of Onset    Malignant Hyperthermia Neg Hx     Pseudocholinesterase Deficiency Neg Hx     Delayed Awakening Neg Hx     Post-op Nausea/Vomiting Neg Hx     Emergence Delirium Neg Hx     Post-op Cognitive Dysfunction Neg Hx     Other Neg Hx            Home Medications:      Allergies   Allergen Reactions    Dilaudid [Hydromorphone] Hives    Rifampin Hives and Swelling     Swelling of mouth and tounge    Levaquin [Levofloxacin] Itching    Amoxicillin Hives    Aspirin Nausea and Vomiting    Azithromycin Angioedema    Bactrim [Sulfamethoprim Ds] Hives    Cefatrizine Hives    Erythromycin Hives    Keflex [Cephalexin] Itching     Swollen throat and tongue    Rocephin [Ceftriaxone] Hives    Tylenol [Acetaminophen] Hives    Vancomycin Hives         Current Facility-Administered Medications:     0.9% sodium chloride infusion, 20 mL/hr, IntraVENous, CONTINUOUS, Selin Manley MD, Last Rate: 20 mL/hr at 04/29/21 1247, 20 mL/hr at 04/29/21 1247    heparinized saline 2 units/mL infusion 2,000 Units, 1,000 mL, Irrigation, RAD ONCEVineet Duaine Cobb, MD    lidocaine (PF) (XYLOCAINE) 10 mg/mL (1 %) injection 1-10 mL, 1-10 mL, SubCUTAneous, RAD ONCEVineet Duaine Cobb, MD    iopamidoL (ISOVUE 250) 51 % contrast injection 1-100 mL, 1-100 mL, IntraarTERial, RAD CONTINUOUS, Selin Manley MD    heparin (porcine) 1,000 unit/mL injection 10,000 Units, 10,000 Units, Injection, Rad MultipleVineet Duaine Cobb, MD    fentaNYL citrate (PF) injection  mcg,  mcg, IntraVENous, Vineet Llanes Duaine Cobb, MD, 25 mcg at 04/29/21 1539    midazolam (VERSED) injection 0.5-2 mg, 0.5-2 mg, IntraVENous, Vineet Llanes Duaine Cobb, MD    clindamycin (CLEOCIN) 900mg NS 50mL IVPB (premix), 900 mg, IntraVENous, RAD ONCE, Selin Manley MD    Review of Systems:     No chest pain. Physical Assessment:     General appearance: no distress  Eyes: sclera anicteric  ENT: no oral lesions, thyroid normal  Nodes: no adenopathy  Skin: no spider angiomata, jaundice, palmar erythema or xanthalasma  Respiratory: clear to auscultation bilaterally  Cardiovascular: regular heart rate, no murmurs, no JVD  Abdomen: soft, non-tender  Extremities: no muscle wasting, no gross arthritic changes, L. Groin healing. L. 2nd toe ischemic.   : not examined  Neurologic: alert and oriented, cranial nerves grossly intact, no asterixis        Lab     Recent Results (from the past 120 hour(s))   METABOLIC PANEL, BASIC    Collection Time: 04/29/21 12:21 PM   Result Value Ref Range    Sodium 139 136 - 145 mmol/L    Potassium 4.2 3.5 - 5.5 mmol/L    Chloride 106 100 - 111 mmol/L    CO2 29 21 - 32 mmol/L    Anion gap 4 3.0 - 18 mmol/L    Glucose 120 (H) 74 - 99 mg/dL    BUN 26 (H) 7.0 - 18 MG/DL    Creatinine 0.75 0.6 - 1.3 MG/DL    BUN/Creatinine ratio 35 (H) 12 - 20      GFR est AA >60 >60 ml/min/1.73m2    GFR est non-AA >60 >60 ml/min/1.73m2    Calcium 8.5 8.5 - 10.1 MG/DL   CBC W/O DIFF    Collection Time: 04/29/21 12:21 PM   Result Value Ref Range    WBC 7.3 4.6 - 13.2 K/uL    RBC 3.20 (L) 4.20 - 5.30 M/uL    HGB 10.6 (L) 12.0 - 16.0 g/dL    HCT 33.7 (L) 35.0 - 45.0 %    .3 (H) 74.0 - 97.0 FL    MCH 33.1 24.0 - 34.0 PG    MCHC 31.5 31.0 - 37.0 g/dL    RDW 25.2 (H) 11.6 - 14.5 %    PLATELET 086 477 - 386 K/uL    MPV 9.7 9.2 - 11.8 FL   PROTHROMBIN TIME + INR    Collection Time: 04/29/21 12:21 PM   Result Value Ref Range    Prothrombin time 17.1 (H) 11.5 - 15.2 sec    INR 1.4 (H) 0.8 - 1.2     PTT    Collection Time: 04/29/21 12:21 PM   Result Value Ref Range    aPTT 27.8 23.0 - 36.4 SEC         Impression:   1. Dmitri 4 chronic limb ischemia with rest pain. PVL from office demonstrates L. TONIA/s occlusion. Plan:   Mamadou Enriquez will be scheduled to undergo an aortogram, left leg angiogram with possible intervention. Yuriy Valladares of procedure, options and all potential risks and benefits, including the risks of bleeding, contrast induced nephrotoxicity, and vessel damage with need for open surgery were discussed in detail with patient. All questions answered. Patient agrees and wishes to proceed with the procedure.           Yarely Benitez MD, M.JEROME.  4/29/2021

## 2021-04-29 NOTE — DISCHARGE INSTRUCTIONS
Angiogram  Patient Education        Angiogram: What to Expect at Home  Your Recovery  An angiogram is an X-ray test that uses dye and a camera to take pictures of the blood flow in an artery or a vein. The doctor inserted a thin, flexible tube (catheter) into a blood vessel in your groin. In some cases, the catheter is placed in a blood vessel in the arm. An angiogram is done for many reasons. For example, you may have an angiogram to find the source of bleeding, such as an ulcer. Or it may be done to look for blocked blood vessels in your lungs. After an angiogram, your groin or arm may have a bruise and feel sore for a day or two. You can do light activities around the house but nothing strenuous for several days. Your doctor may give you specific instructions on when you can do your normal activities again, such as driving and going back to work. This care sheet gives you a general idea about how long it will take for you to recover. But each person recovers at a different pace. Follow the steps below to feel better as quickly as possible. How can you care for yourself at home? Activity    · Do not do strenuous exercise and do not lift, pull, or push anything heavy until your doctor says it is okay. This may be for a day or two. You can walk around the house and do light activity, such as cooking.     · If the catheter was placed in your groin, try not to walk up stairs for the first couple of days.     · If the catheter was placed in your arm near your wrist, do not bend your wrist deeply for the first couple of days. Be careful using your hand to get into and out of a chair or bed.     · If your doctor recommends it, get more exercise. Walking is a good choice. Bit by bit, increase the amount you walk every day. Try for at least 30 minutes on most days of the week. Diet    · Drink plenty of fluids to help your body flush out the dye.  If you have kidney, heart, or liver disease and have to limit fluids, talk with your doctor before you increase the amount of fluids you drink.     · You can eat your normal diet. If your stomach is upset, try bland, low-fat foods like plain rice, broiled chicken, toast, and yogurt. Medicines    · Be safe with medicines. Read and follow all instructions on the label. ? If the doctor gave you a prescription medicine for pain, take it as prescribed. ? If you are not taking a prescription pain medicine, ask your doctor if you can take an over-the-counter medicine.     · If you take aspirin or some other blood thinner, ask your doctor if and when to start taking it again. Make sure that you understand exactly what your doctor wants you to do.     · Your doctor will tell you if and when you can restart your medicines. He or she will also give you instructions about taking any new medicines. Care of the catheter site    · You will have a dressing over the cut (incision). A dressing helps the incision heal and protects it. Your doctor will tell you how to take care of this.     · Put ice or a cold pack on the area for 10 to 20 minutes at a time to help with soreness or swelling. Put a thin cloth between the ice and your skin.     · You may shower 24 to 48 hours after the procedure, if your doctor okays it. Pat the incision dry.     · Do not soak the catheter site until it is healed. Don't take a bath for 1 week, or until your doctor tells you it is okay.     · Watch for bleeding from the site. A small amount of blood (up to the size of a quarter) on the bandage can be normal.     · If you are bleeding, lie down and press on the area for 15 minutes to try to make it stop. If the bleeding does not stop, call your doctor or seek immediate medical care. Follow-up care is a key part of your treatment and safety. Be sure to make and go to all appointments, and call your doctor if you are having problems.  It's also a good idea to know your test results and keep a list of the medicines you take.  When should you call for help? Call 911 anytime you think you may need emergency care. For example, call if:    · You passed out (lost consciousness).     · You have severe trouble breathing.     · You have sudden chest pain and shortness of breath, or you cough up blood. Call your doctor now or seek immediate medical care if:    · You are bleeding from the area where the catheter was put in your artery.     · You have a fast-growing, painful lump at the catheter site.     · You have signs of infection, such as:  ? Increased pain, swelling, warmth, or redness. ? Red streaks leading from the incision. ? Pus draining from the incision. ? A fever. Watch closely for any changes in your health, and be sure to contact your doctor if:    · You don't get better as expected. Where can you learn more? Go to http://www.gray.com/  Enter [id number] in the search box to learn more about \"Angiogram: What to Expect at Home. \"  Current as of: September 23, 2020               Content Version: 12.8  © 2006-2021 [employer]. Care instructions adapted under license by [employer] (which disclaims liability or warranty for this information). If you have questions about a medical condition or this instruction, always ask your healthcare professional. [address] any warranty or liability for your use of this information. DISCHARGE SUMMARY from Nurse    PATIENT INSTRUCTIONS:    After general anesthesia or intravenous sedation, for 24 hours or while taking prescription Narcotics:  · Limit your activities  · Do not drive and operate hazardous machinery  · Do not make important personal or business decisions  · Do  not drink alcoholic beverages  · If you have not urinated within 8 hours after discharge, please contact your surgeon on call.     Report the following to your surgeon:  · Excessive pain, swelling, redness or odor of or around the surgical area  · Temperature over 100.5  · Nausea and vomiting lasting longer than 4 hours or if unable to take medications  · Any signs of decreased circulation or nerve impairment to extremity: change in color, persistent  numbness, tingling, coldness or increase pain  · Any questions    What to do at Home:  Recommended activity: No lifting, Driving, or Strenuous exercise for 48 hours. *  Please give a list of your current medications to your Primary Care Provider. *  Please update this list whenever your medications are discontinued, doses are      changed, or new medications (including over-the-counter products) are added. *  Please carry medication information at all times in case of emergency situations. These are general instructions for a healthy lifestyle:    No smoking/ No tobacco products/ Avoid exposure to second hand smoke  Surgeon General's Warning:  Quitting smoking now greatly reduces serious risk to your health. Obesity, smoking, and sedentary lifestyle greatly increases your risk for illness    A healthy diet, regular physical exercise & weight monitoring are important for maintaining a healthy lifestyle    You may be retaining fluid if you have a history of heart failure or if you experience any of the following symptoms:  Weight gain of 3 pounds or more overnight or 5 pounds in a week, increased swelling in our hands or feet or shortness of breath while lying flat in bed. Please call your doctor as soon as you notice any of these symptoms; do not wait until your next office visit. The discharge information has been reviewed with the patient and caregiver. The patient and caregiver verbalized understanding. Discharge medications reviewed with the patient and caregiver and appropriate educational materials and side effects teaching were provided.       Patient armband removed and shredded        FOLLOW UP VISIT Appointment in: Other (Specify) Will call to schedule bypass surgery Konrad Atkinson (Order [id number])  Follow Up            Madeleine Narvaez [facility] IR RM 1

## 2021-04-29 NOTE — OP NOTES
VASCULAR SURGERY OPERATIVE REPORT    PATIENT NAME:  Marek Zamora  [id number]  1950    DATE OF PROCEDURE:  4/29/2021    PREOP DIAGNOSIS:  Dmitri for chronic limb ischemia on the left  Hypercoagulable disorder    POSTOP DIAGNOSIS:   Same    PROCEDURE:  1. Ultrasound-guided access of the right common femoral artery  2. Aortogram  3. Left iliofemoral arteriogram  4. Radiographic supervision interpretation    SURGEON:  Joaquín Ruvalcaba MD    ANESTHESIA:  Local anesthetic with 1% lidocaine. Patient was administered moderate sedation by SHERON Lyon from [phone] until [phone]. She received 2 mg of Versed and 150 mcg of fentanyl under my direct supervision. CONTRAST: 20 mL    FLUOROSCOPY TIME: 2.9 minutes, 36 mGy    INDICATIONS:  Ms. Isai Foy is a 72 y.o. female who presented with rest pain of her left foot and tibial evidence for an occluded left common iliac stent approximately 1 month after left femoral endarterectomy and bilateral common iliac kissing stents. In the past, she previously had bilateral common iliac stents. Benefits, alternatives, and risks of the procedure were discussed. All questions were answered. The patient understands and is willing to proceed. DESCRIPTION OF PROCEDURE:  The patient was properly identified, and after ensuring the appropriately signed documents were secured, the patient was brought to the Cath Lab and placed supine on the table. She was administered moderate sedation. Under ultrasound, the right common femoral artery was found to be patent although heavily calcified. Local anesthetic was instilled in the skin and subcutaneous tissues overlying the artery. Artery was then accessed with a micropuncture needle and wire was passed through the needle carefully under fluoroscopic visualization into the right common iliac stent.   Needle was exchanged for micropuncture sheath through which a Bentson wire would not traverse the right common iliac stent so this was exchanged for a Glidewire which was then advanced carefully into the visceral aorta over which micropuncture sheath was exchanged for a 5 [place] sheath through which a UF catheter was passed over the wire into the visceral aorta. Aortogram was obtained demonstrating bilateral patent renal arteries without evidence for stenosis. The visceral aorta is patent without evidence for stenosis, aneurysm, or dissection. The infra renal aorta terminal portion is calcific with a bulky calcific plaque towards the right side. The right common iliac artery stent is patent. The left common iliac artery stent is occluded. The external iliac artery on the left reconstitutes through the hypogastric artery. On the right the external iliac artery and common femoral arteries are patent. Next a oblique angiogram of the left iliofemoral system was obtained demonstrating patent left external iliac artery and common femoral endarterectomy site and patent superficial femoral arteries and profundofemoral arteries. Given the heavily calcified arterial walls of the iliac arteries and now having 2 stents inside of each other, she will best be served with a surgical revascularization. Catheter was gently removed over a Glidewire. Glidewire was then removed as was the sheath. Direct pressure was held. Hemostasis was achieved. This ended the procedure. Patient tolerated it well without intraprocedural complications. She was taken to the post procedure care unit in good condition. Sedation was stopped, the patient was relieved of anesthesia, and taken to the postanesthesia care unit in good condition after having tolerated the procedure well.     Bertha Styles MD, [address] [place] [address] [facility]  [phone] office  [phone] mobile

## 2021-06-15 ENCOUNTER — APPOINTMENT (OUTPATIENT)
Dept: CT IMAGING | Age: 71
End: 2021-06-15
Attending: EMERGENCY MEDICINE
Payer: MEDICARE

## 2021-06-15 ENCOUNTER — HOSPITAL ENCOUNTER (EMERGENCY)
Age: 71
Discharge: HOME | End: 2021-06-15
Attending: EMERGENCY MEDICINE
Payer: MEDICARE

## 2021-06-15 ENCOUNTER — APPOINTMENT (OUTPATIENT)
Dept: GENERAL RADIOLOGY | Age: 71
End: 2021-06-15
Attending: EMERGENCY MEDICINE
Payer: MEDICARE

## 2021-06-15 VITALS
TEMPERATURE: 98.3 F | OXYGEN SATURATION: 94 % | HEART RATE: 74 BPM | HEIGHT: 65 IN | DIASTOLIC BLOOD PRESSURE: 80 MMHG | RESPIRATION RATE: 16 BRPM | SYSTOLIC BLOOD PRESSURE: 108 MMHG | WEIGHT: 119 LBS | BODY MASS INDEX: 19.83 KG/M2

## 2021-06-15 DIAGNOSIS — S20.212A CONTUSION OF RIB ON LEFT SIDE, INITIAL ENCOUNTER: ICD-10-CM
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
## 2021-06-15 LAB
ALBUMIN SERPL-MCNC: 3.7 G/DL (ref 3.4–5)
ALBUMIN/GLOB SERPL: 1.4 {RATIO} (ref 0.8–1.7)
ALP SERPL-CCNC: 94 U/L (ref 45–117)
ALT SERPL-CCNC: 26 U/L (ref 13–56)
ANION GAP SERPL CALC-SCNC: 8 MMOL/L (ref 3–18)
AST SERPL-CCNC: 45 U/L (ref 10–38)
ATRIAL RATE: 68 BPM
BASOPHILS # BLD: 0 K/UL (ref 0–0.1)
BASOPHILS NFR BLD: 0 % (ref 0–2)
BILIRUB SERPL-MCNC: 0.8 MG/DL (ref 0.2–1)
BUN SERPL-MCNC: 25 MG/DL (ref 7–18)
BUN/CREAT SERPL: 35 (ref 12–20)
CALCIUM SERPL-MCNC: 8.8 MG/DL (ref 8.5–10.1)
CALCULATED P AXIS, ECG09: 46 DEGREES
CALCULATED R AXIS, ECG10: 69 DEGREES
CALCULATED T AXIS, ECG11: 78 DEGREES
CHLORIDE SERPL-SCNC: 108 MMOL/L (ref 100–111)
CK MB CFR SERPL CALC: 1.8 % (ref 0–4)
CK MB SERPL-MCNC: 7 NG/ML (ref 5–25)
CK SERPL-CCNC: 388 U/L (ref 26–192)
CO2 SERPL-SCNC: 23 MMOL/L (ref 21–32)
CREAT SERPL-MCNC: 0.71 MG/DL (ref 0.6–1.3)
DIAGNOSIS, 93000: NORMAL
DIFFERENTIAL METHOD BLD: ABNORMAL
EOSINOPHIL # BLD: 0 K/UL (ref 0–0.4)
EOSINOPHIL NFR BLD: 0 % (ref 0–5)
ERYTHROCYTE [DISTWIDTH] IN BLOOD BY AUTOMATED COUNT: 34 % (ref 11.6–14.5)
GLOBULIN SER CALC-MCNC: 2.7 G/DL (ref 2–4)
GLUCOSE SERPL-MCNC: 233 MG/DL (ref 74–99)
HCT VFR BLD AUTO: 25.6 % (ref 35–45)
HGB BLD-MCNC: 8.9 G/DL (ref 12–16)
INR PPP: 2.1 (ref 0.8–1.2)
LYMPHOCYTES # BLD: 0.9 K/UL (ref 0.9–3.6)
LYMPHOCYTES NFR BLD: 12 % (ref 21–52)
MCH RBC QN AUTO: 36.8 PG (ref 24–34)
MCHC RBC AUTO-ENTMCNC: 34.8 G/DL (ref 31–37)
MCV RBC AUTO: 105.8 FL (ref 74–97)
MONOCYTES # BLD: 0.6 K/UL (ref 0.05–1.2)
MONOCYTES NFR BLD: 8 % (ref 3–10)
NEUTS SEG # BLD: 5.7 K/UL (ref 1.8–8)
NEUTS SEG NFR BLD: 80 % (ref 40–73)
P-R INTERVAL, ECG05: 156 MS
PLATELET # BLD AUTO: 240 K/UL (ref 135–420)
PLATELET COMMENTS,PCOM: ABNORMAL
PMV BLD AUTO: 9.3 FL (ref 9.2–11.8)
POTASSIUM SERPL-SCNC: 4.1 MMOL/L (ref 3.5–5.5)
PROT SERPL-MCNC: 6.4 G/DL (ref 6.4–8.2)
PROTHROMBIN TIME: 23.4 SEC (ref 11.5–15.2)
Q-T INTERVAL, ECG07: 412 MS
QRS DURATION, ECG06: 74 MS
QTC CALCULATION (BEZET), ECG08: 438 MS
RBC # BLD AUTO: 2.42 M/UL (ref 4.2–5.3)
RBC MORPH BLD: ABNORMAL
SODIUM SERPL-SCNC: 139 MMOL/L (ref 136–145)
TROPONIN I SERPL-MCNC: <0.02 NG/ML (ref 0–0.04)
VENTRICULAR RATE, ECG03: 68 BPM
WBC # BLD AUTO: 7.2 K/UL (ref 4.6–13.2)

## 2021-06-15 PROCEDURE — 85610 PROTHROMBIN TIME: CPT

## 2021-06-15 PROCEDURE — 71275 CT ANGIOGRAPHY CHEST: CPT

## 2021-06-15 PROCEDURE — 77030027138 HC INCENT SPIROMETER -A

## 2021-06-15 PROCEDURE — 82553 CREATINE MB FRACTION: CPT

## 2021-06-15 PROCEDURE — 80053 COMPREHEN METABOLIC PANEL: CPT

## 2021-06-15 PROCEDURE — 74011000250 HC RX REV CODE- 250: Performed by: EMERGENCY MEDICINE

## 2021-06-15 PROCEDURE — 71046 X-RAY EXAM CHEST 2 VIEWS: CPT

## 2021-06-15 PROCEDURE — 99284 EMERGENCY DEPT VISIT MOD MDM: CPT

## 2021-06-15 PROCEDURE — 74011250636 HC RX REV CODE- 250/636: Performed by: EMERGENCY MEDICINE

## 2021-06-15 PROCEDURE — 85025 COMPLETE CBC W/AUTO DIFF WBC: CPT

## 2021-06-15 PROCEDURE — 96374 THER/PROPH/DIAG INJ IV PUSH: CPT

## 2021-06-15 PROCEDURE — 74011000636 HC RX REV CODE- 636: Performed by: EMERGENCY MEDICINE

## 2021-06-15 PROCEDURE — 93005 ELECTROCARDIOGRAM TRACING: CPT

## 2021-06-15 RX ORDER — OXYCODONE HYDROCHLORIDE 5 MG/1
5 TABLET ORAL
Qty: 12 TABLET | Refills: 0 | Status: SHIPPED | OUTPATIENT
Start: 2021-06-15 | End: 2021-06-18

## 2021-06-15 RX ORDER — LIDOCAINE 4 G/100G
1 PATCH TOPICAL
Status: DISCONTINUED | OUTPATIENT
Start: 2021-06-15 | End: 2021-06-15 | Stop reason: HOSPADM

## 2021-06-15 RX ORDER — MORPHINE SULFATE 4 MG/ML
4 INJECTION INTRAVENOUS
Status: COMPLETED | OUTPATIENT
Start: 2021-06-15 | End: 2021-06-15

## 2021-06-15 RX ORDER — LIDOCAINE 50 MG/G
PATCH TOPICAL
Qty: 15 EACH | Refills: 0 | Status: SHIPPED | OUTPATIENT
Start: 2021-06-15

## 2021-06-15 RX ADMIN — IOPAMIDOL 100 ML: 755 INJECTION, SOLUTION INTRAVENOUS at 15:39

## 2021-06-15 RX ADMIN — MORPHINE SULFATE 4 MG: 4 INJECTION INTRAVENOUS at 14:43

## 2021-06-15 NOTE — ED PROVIDER NOTES
EMERGENCY DEPARTMENT HISTORY AND PHYSICAL EXAM    Date: 6/15/2021  Patient Name: Karin Diego    History of Presenting Illness     Chief Complaint   Patient presents with   Chhaya Radha Fall         History Provided By: Patient    2:09 PM  Karin Diego is a 72 y.o. female with PMHX of clotting disorder on Coumadin, CAD, diabetes who presents to the emergency department C/O left-sided rib pain after a fall. Patient reports she was hanging up her screen door on a stepladder when she slipped and landed on her left ribs on an arm chair. She denies head strike or loss of consciousness. She reports the pain was tolerable until today when it started to feel like something was moving and the pain became more severe. Denies any shortness of breath, fever, cough, vomiting, bleeding. Reports she has a few scrapes on her hands from the fall but states her tetanus is up-to-date. Pain is worse with movement or touching the area. No relieving factors identified. PCP: TORRIE Cobian    Current Facility-Administered Medications   Medication Dose Route Frequency Provider Last Rate Last Admin    lidocaine 4 % patch 1 Patch  1 Patch TransDERmal NOW Maribell Garcia MD   1 Patch at 06/15/21 1635     Current Outpatient Medications   Medication Sig Dispense Refill    lidocaine (Lidoderm) 5 % Apply patch to the affected area for 12 hours a day and remove for 12 hours a day. 15 Each 0    oxyCODONE IR (ROXICODONE) 5 mg immediate release tablet Take 1 Tablet by mouth every six (6) hours as needed for Pain for up to 3 days. Max Daily Amount: 20 mg. 12 Tablet 0    hydrOXYzine HCL (ATARAX) 50 mg tablet Take 25 mg by mouth three (3) times daily as needed.  OTHER Blood thinner, patient unsure of name   Indications: Warfarin      ursodioL (ACTIGALL) 500 mg tablet TAKE ONE TABLET BY MOUTH TWICE A DAY 60 Tab 5    warfarin sodium (COUMADIN PO) Take 7 mg by mouth daily.       promethazine (PHENERGAN) 25 mg tablet Take 25 mg by mouth every six (6) hours as needed for Nausea.  B.infantis-B.ani-B.long-B.bifi (PROBIOTIC 4X) 10-15 mg TbEC Take  by mouth.  nystatin (MYCOSTATIN) topical cream Apply  to affected area two (2) times a day. 60 g 0    atorvastatin (LIPITOR) 20 mg tablet Take 20 mg by mouth daily.  dronabinol (MARINOL) 2.5 mg capsule Take 2.5 mg by mouth three (3) times daily.  metoprolol tartrate (LOPRESSOR) 25 mg tablet Take 25 mg by mouth two (2) times a day.  nitroglycerin (NITROSTAT) 0.3 mg SL tablet by SubLINGual route every five (5) minutes as needed for Chest Pain (x3).  clopidogrel (PLAVIX) 75 mg tab Take 75 mg by mouth daily.  lactobacillus combination no.4 (PROBIOTIC) 3 billion cell cap Take 1 Cap by mouth daily.  Cetirizine (ZYRTEC) 10 mg cap Take  by mouth.  cyanocobalamin 1,000 mcg tablet Take 1,000 mcg by mouth daily.  glimepiride (AMARYL) 2 mg tablet Take 2 mg by mouth every morning.  melatonin 3 mg tablet Take 6 mg by mouth nightly.  ondansetron hcl (ZOFRAN, AS HYDROCHLORIDE,) 4 mg tablet Take 4 mg by mouth every eight (8) hours as needed for Nausea.  cyclobenzaprine (FLEXERIL) 10 mg tablet Take  by mouth three (3) times daily as needed for Muscle Spasm(s).  BIOTIN PO Take 5,000 mcg by mouth daily.  albuterol (PROVENTIL HFA, VENTOLIN HFA) 90 mcg/actuation inhaler Take 2 Puffs by inhalation every four (4) hours as needed. Indications: ACUTE ASTHMA ATTACK      metFORMIN (GLUCOPHAGE) 500 mg tablet Take 500 mg by mouth two (2) times daily (with meals). Indications: TYPE 2 DIABETES MELLITUS      ropinirole (REQUIP) 4 mg Tab TAB Take 4 mg by mouth nightly.     Indications: RESTLESS LEGS SYNDROME         Past History     Past Medical History:  Past Medical History:   Diagnosis Date    Arthritis     hands and feet    Asthma     Autoimmune disease ([facility] [address] [facility]     lupus anticoagulant-causes clotting    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     Lung collapse     2015    Nausea & vomiting 5/23/2012    Neuropathy     right foot    Other ill-defined conditions(799.89)     arterial clot in rt leg    Other ill-defined conditions(799.89)     restless leg syndrome    Other ill-defined conditions(799.89)     raynaud's syndrome    Other ill-defined conditions(799.89)     chronic N/V    Other ill-defined conditions(799.89)     (2 mutated genes) hyper coag. state     Pneumonia 07/22/2017    Positive cardiolipin antibodies     Psychiatric disorder     depression    PUD (peptic ulcer disease) 2009    H/O requiring partial gastrectomy and small intestine    Thromboembolus ([facility] [address] 2000    artery right leg       Past Surgical History:  Past Surgical History:   Procedure Laterality Date    HX APPENDECTOMY      HX BREAST AUGMENTATION      developed infection and implant removed    HX CHOLECYSTECTOMY      HX GASTRECTOMY      due to ulcers    HX GASTRIC BYPASS  1996    gastric by-pass    HX GI      total gastrectomy, partial colectomy    HX GI      colostomy and reversal    HX HEENT      cancer of lip removed    HX HYSTERECTOMY      for cervical cancer    HX ORTHOPAEDIC      rt foot surgery x10    HX ORTHOPAEDIC Left     great toe gangrenous and amputated    HX OTHER SURGICAL      part of liver removal    HX OTHER SURGICAL      ventral hernia repair x10    HX OTHER SURGICAL      Exc pilonial cystectomy    OK ABDOMEN SURGERY PROC UNLISTED      duodenal ulcer rupture    OK BREAST SURGERY PROCEDURE UNLISTED Bilateral 02/2016    hemorrhaged after breast surgery    OK CARDIAC SURG PROCEDURE UNLIST  2018    open heart (single vessel)    VASCULAR SURGERY PROCEDURE UNLIST      iliac aorta stents x 6    VASCULAR SURGERY PROCEDURE UNLIST seven more stents iliac aorta and femoral        Family History:  Family History   Problem Relation Age of Onset    Malignant Hyperthermia Neg Hx     Pseudocholinesterase Deficiency Neg Hx     Delayed Awakening Neg Hx     Post-op Nausea/Vomiting Neg Hx     Emergence Delirium Neg Hx     Post-op Cognitive Dysfunction Neg Hx     Other Neg Hx        Social History:  Social History     Tobacco Use    Smoking status: Former Smoker     Years: 0.50     Quit date: 1996     Years since quittin.4    Smokeless tobacco: Never Used   Vaping Use    Vaping Use: Never used   Substance Use Topics    Alcohol use: Yes     Comment: 1 or 2 glasses wine or mixed drinks monthly     Drug use: Yes     Types: Marijuana     Comment: 4x daily       Allergies: Allergies   Allergen Reactions    Dilaudid [Hydromorphone] Hives    Rifampin Hives and Swelling     Swelling of mouth and tounge    Levaquin [Levofloxacin] Itching    Amoxicillin Hives    Aspirin Nausea and Vomiting    Azithromycin Angioedema    Bactrim [Sulfamethoprim Ds] Hives    Cefatrizine Hives    Erythromycin Hives    Keflex [Cephalexin] Itching     Swollen throat and tongue    Rocephin [Ceftriaxone] Hives    Tylenol [Acetaminophen] Hives    Vancomycin Hives         Review of Systems   Review of Systems   Constitutional: Negative for fever. Respiratory: Negative for shortness of breath. Cardiovascular: Positive for chest pain. Gastrointestinal: Negative for abdominal pain. Neurological: Negative for headaches. All other systems reviewed and are negative.         Physical Exam     Vitals:    06/15/21 1402 06/15/21 1500 06/15/21 1501 06/15/21 1530   BP: (!) 156/74 (!) 118/52  (!) 125/58   Pulse: 83      Resp: 18      Temp: 98.3 °F (36.8 °C)      SpO2: 95% (!) 89% 92% 92%   Weight: 54 kg (119 lb)      Height: 5' 4.5\" (1.638 m)        Physical Exam    Nursing notes and vital signs reviewed    Constitutional: Non toxic appearing, moderate distress  Head: Normocephalic, Atraumatic  Eyes: EOMI, PERRL  Neck: Supple, no spinal tenderness to palpation  Cardiovascular: Regular rate and rhythm, no murmurs, rubs, or gallops  Chest: Normal work of breathing and chest excursion bilaterally, tender over left lateral ribs without overlying skin changes  Lungs: Clear to ausculation bilaterally  Abdomen: Soft, non tender, non distended  Back: No evidence of trauma or deformity  Extremities: No evidence of trauma or deformity, no LE edema  Skin: Warm and dry, normal cap refill  Neuro: Alert and appropriate, CN intact, normal speech, strength and sensation full and symmetric bilaterally, normal gait, normal coordination  Psychiatric: Anxious mood and affect      Diagnostic Study Results     Labs -     Recent Results (from the past 12 hour(s))   CBC WITH AUTOMATED DIFF    Collection Time: 06/15/21  2:25 PM   Result Value Ref Range    WBC 7.2 4.6 - 13.2 K/uL    RBC 2.42 (L) 4.20 - 5.30 M/uL    HGB 8.9 (L) 12.0 - 16.0 g/dL    HCT 25.6 (L) 35.0 - 45.0 %    .8 (H) 74.0 - 97.0 FL    MCH 36.8 (H) 24.0 - 34.0 PG    MCHC 34.8 31.0 - 37.0 g/dL    RDW 34.0 (H) 11.6 - 14.5 %    PLATELET 150 092 - 911 K/uL    MPV 9.3 9.2 - 11.8 FL    NEUTROPHILS 80 (H) 40 - 73 %    LYMPHOCYTES 12 (L) 21 - 52 %    MONOCYTES 8 3 - 10 %    EOSINOPHILS 0 0 - 5 %    BASOPHILS 0 0 - 2 %    ABS. NEUTROPHILS 5.7 1.8 - 8.0 K/UL    ABS. LYMPHOCYTES 0.9 0.9 - 3.6 K/UL    ABS. MONOCYTES 0.6 0.05 - 1.2 K/UL    ABS. EOSINOPHILS 0.0 0.0 - 0.4 K/UL    ABS.  BASOPHILS 0.0 0.0 - 0.1 K/UL    DF AUTOMATED      PLATELET COMMENTS ADEQUATE PLATELETS      RBC COMMENTS ANISOCYTOSIS  4+        RBC COMMENTS HYPOCHROMIA  1+        RBC COMMENTS POLYCHROMASIA  1+        RBC COMMENTS POIKILOCYTOSIS  2+        RBC COMMENTS SPHEROCYTES  FEW  SCHISTOCYTES  FEW       PROTHROMBIN TIME + INR    Collection Time: 06/15/21  2:25 PM   Result Value Ref Range    Prothrombin time 23.4 (H) 11.5 - 15.2 sec    INR 2.1 (H) 0.8 - 1.2 METABOLIC PANEL, COMPREHENSIVE    Collection Time: 06/15/21  2:25 PM   Result Value Ref Range    Sodium 139 136 - 145 mmol/L    Potassium 4.1 3.5 - 5.5 mmol/L    Chloride 108 100 - 111 mmol/L    CO2 23 21 - 32 mmol/L    Anion gap 8 3.0 - 18 mmol/L    Glucose 233 (H) 74 - 99 mg/dL    BUN 25 (H) 7.0 - 18 MG/DL    Creatinine 0.71 0.6 - 1.3 MG/DL    BUN/Creatinine ratio 35 (H) 12 - 20      GFR est AA >60 >60 ml/min/1.73m2    GFR est non-AA >60 >60 ml/min/1.73m2    Calcium 8.8 8.5 - 10.1 MG/DL    Bilirubin, total 0.8 0.2 - 1.0 MG/DL    ALT (SGPT) 26 13 - 56 U/L    AST (SGOT) 45 (H) 10 - 38 U/L    Alk. phosphatase 94 45 - 117 U/L    Protein, total 6.4 6.4 - 8.2 g/dL    Albumin 3.7 3.4 - 5.0 g/dL    Globulin 2.7 2.0 - 4.0 g/dL    A-G Ratio 1.4 0.8 - 1.7     CARDIAC PANEL,(CK, CKMB & TROPONIN)    Collection Time: 06/15/21  2:25 PM   Result Value Ref Range    CK - MB 7.0 (H) <3.6 ng/ml    CK-MB Index 1.8 0.0 - 4.0 %     (H) 26 - 192 U/L    Troponin-I, QT <0.02 0.0 - 0.045 NG/ML       Radiologic Studies -   CTA CHEST W OR W WO CONT   Final Result      1. No evidence of pulmonary embolism or acute cardiopulmonary disease. XR CHEST PA LAT   Final Result      1. No acute cardiopulmonary disease. Right middle lobe subsegmental atelectasis   or scarring. CT Results  (Last 48 hours)               06/15/21 1548  CTA CHEST W OR W WO CONT Final result    Impression:      1. No evidence of pulmonary embolism or acute cardiopulmonary disease. Narrative:  EXAM: CTA chest       CLINICAL INDICATION/HISTORY: PE     > Additional: Left chest pain. COMPARISON: None. > Reference Exam: None. TECHNIQUE: Axial CT imaging from the thoracic inlet through the diaphragm with   intravenous contrast. Coronal and sagittal MIP reformats were generated.  One or   more dose reduction techniques were used on this CT: automated exposure control,   adjustment of the mAs and/or kVp according to patient size, and iterative   reconstruction techniques. The specific techniques used on this CT exam have   been documented in the patient's electronic medical record. Digital Imaging and   Communications in Medicine (DICOM) format image data are available to   nonaffiliated external healthcare facilities or entities on a secure, media   free, reciprocally searchable basis with patient authorization for at least a   12-month period after this study. _______________       FINDINGS:       EXAM QUALITY: Adequate. PULMONARY ARTERIES: No evidence of pulmonary embolism. MEDIASTINUM: Prior CABG. Normal heart size. No pericardial effusion. Aorta is   atherosclerotic without dissection. LUNGS: Scattered atelectasis in the anterior aspect of the right hemithorax. No   suspicious pulmonary nodule. PLEURA: Normal.       AIRWAY: Central airways are patent. LYMPH NODES: No enlarged nodes. UPPER ABDOMEN: Unremarkable. OTHER: No acute or aggressive osseous abnormalities identified. No acute   displaced left rib fracture. Bilateral breast augmentation. Sternotomy. _______________               CXR Results  (Last 48 hours)               06/15/21 1438  XR CHEST PA LAT Final result    Impression:      1. No acute cardiopulmonary disease. Right middle lobe subsegmental atelectasis   or scarring. Narrative:  EXAM:  PA AND LATERAL CHEST       INDICATION:  Left rib pain status post fall. COMPARISON:  AP chest 07/22/2017       TECHNIQUE:  PA and lateral views.       ________________________________       FINDINGS:       HEART AND MEDIASTINUM: Prior CABG. Cardiac silhouette is within normal limits. Tortuous thoracic aorta. LUNGS AND PLEURAL SPACES: Lungs are adequately expanded. Curvilinear opacities   at the right lung base, thought to be within the right middle lobe, likely   subsegmental atelectasis or scarring. No pleural effusion or pneumothorax.        BONY THORAX AND SOFT TISSUES: No acute osseous abnormality. Multilevel thoracic   spondylosis. Sternotomy. Postoperative changes in the upper abdomen likely from   prior hernia repair.       ________________________________                 Medications given in the ED-  Medications   lidocaine 4 % patch 1 Patch (1 Patch TransDERmal Apply Patch 6/15/21 1635)   morphine injection 4 mg (4 mg IntraVENous Given 6/15/21 1443)   iopamidoL (ISOVUE-370) 76 % injection 100 mL (100 mL IntraVENous Given 6/15/21 1539)         Medical Decision Making   I am the first provider for this patient. I reviewed the vital signs, available nursing notes, past medical history, past surgical history, family history and social history. Vital Signs-Reviewed the patient's vital signs. Pulse Oximetry Analysis - 92% on room air, not hypoxic     Cardiac Monitor:  EKG interpretation: (Preliminary)  EKG read by Dr. Audelia Burton at 4:48 PM  Normal sinus rhythm at a rate of 60 bpm, OR interval 156 ms, QRS duration 74 ms, similar when compared to prior from October 2020    Records Reviewed: Nursing Notes, Old Medical Records and Previous electrocardiograms    Provider Notes (Medical Decision Making): Madonna Gr is a 72 y.o. female presenting after a fall onto her left ribs yesterday on Coumadin. Patient is hemodynamically stable but does have significant tenderness over the left lateral chest wall. X-ray without acute process. Due to patient's continued pain CTA obtained without acute pathology identified. Suspect rib contusion. Coumadin level is not supratherapeutic. Cardiac enzymes are negative. EKG similar to prior. Patient is anemic without any evidence of active bleeding. Plan for discharge with pain control, incentive spirometer, referral to primary care and cardiology for follow-up, and return precautions. Patient understands and agrees with this plan. Procedures:  Procedures    ED Course:   4:53 PM  Updated patient on all results and plan. All questions answered. Diagnosis and Disposition     Critical Care: None    DISCHARGE NOTE:    Pedro Pineda's  results have been reviewed with her. She has been counseled regarding her diagnosis, treatment, and plan. She verbally conveys understanding and agreement of the signs, symptoms, diagnosis, treatment and prognosis and additionally agrees to follow up as discussed. She also agrees with the care-plan and conveys that all of her questions have been answered. I have also provided discharge instructions for her that include: educational information regarding their diagnosis and treatment, and list of reasons why they would want to return to the ED prior to their follow-up appointment, should her condition change. She has been provided with education for proper emergency department utilization. CLINICAL IMPRESSION:    1. Fall, initial encounter    2. Contusion of rib on left side, initial encounter        PLAN:  1. D/C Home  2. Current Discharge Medication List      START taking these medications    Details   lidocaine (Lidoderm) 5 % Apply patch to the affected area for 12 hours a day and remove for 12 hours a day. Qty: 15 Each, Refills: 0  Start date: 6/15/2021      oxyCODONE IR (ROXICODONE) 5 mg immediate release tablet Take 1 Tablet by mouth every six (6) hours as needed for Pain for up to 3 days. Max Daily Amount: 20 mg.  Qty: 12 Tablet, Refills: 0  Start date: 6/15/2021, End date: 6/18/2021    Associated Diagnoses: Contusion of rib on left side, initial encounter           3.    Follow-up Information     Follow up With Specialties Details Why Contact Info    [place], [place] Physician Assistant Schedule an appointment as soon as possible for a visit   [address] [facility] [address] [id number] [phone]      Coretta Workman MD Cardiology Schedule an appointment as soon as possible for a visit   [facility] [phone]      [facility] EMERGENCY DEPT Emergency Medicine  If symptoms worsen [address] [id number]  [phone]        _______________________________      Please note that this dictation was completed with [employer], the computer voice recognition software. Quite often unanticipated grammatical, syntax, homophones, and other interpretive errors are inadvertently transcribed by the computer software. Please disregard these errors. Please excuse any errors that have escaped final proofreading.

## 2021-06-15 NOTE — ED NOTES
Pt observed up ambulating to xray with steady gait and no assist. Pt appeared to tolerate ambulation well

## 2021-06-15 NOTE — ED NOTES
I have reviewed discharge instructions with the patient. The patient verbalized understanding.  Iv discontinued, Pt ambulated out of ED to ED lobby in stable condition in no distress to await son whom pt reports will be transporting her home

## 2021-06-15 NOTE — ED TRIAGE NOTES
Patient reports fell off three step ladder and landed on arm of sofa to LEFT rib side.  Patient with c/o LEFT side rib cage  Patient reports on rx Coumadin  Denies hitting head, LOC    Patient able to speak in clear sentences with no distress

## 2021-06-28 NOTE — WOUND CARE
Debridement Wound Care        Problem List Items Addressed This Visit     None          Procedure Note  Indications:  Based on my examination of this patient's wound(s)/ulcer(s) today, debridement is required to promote healing and evaluate the wound base. Follow up chronic ulcer on the plantar right midfoot. Performed by:  Masha Mckoy DPM    Consent obtained: Yes    Time out taken: Yes    Debridement: Excisional    Using curette the wound(s)/ulcer(s) was/were sharply debrided down through and including the removal of    subcutaneous tissue    Devitalized Tissue Debrided: slough    Pre Debridement Measurements:  Are located in the Wound/Ulcer Documentation Flow Sheet    Wound/Ulcer #: 1    Post Debridement Measurements:  Wound/Ulcer Descriptions are Pre Debridement except measurements:Diabetic ulcer, fat layer exposed          Percent of Wound(s)/Ulcer(s) Debrided: 100%    Total Surface Area Debrided:  1  sq cm     Diabetic/Pressure/Non Pressure Ulcers only:  Ulcer:     Estimated Blood Loss:  Minimal     Hemostasis Achieved: Pressure    Procedural Pain: 2 / 10     Post Procedural Pain: 3 / 10     Response to treatment: Well tolerated by patient

## 2021-07-31 ENCOUNTER — HOSPITAL ENCOUNTER (INPATIENT)
Age: 71
LOS: 5 days | Discharge: ANOTHER HOSPITAL | DRG: 300 | End: 2021-08-05
Attending: STUDENT IN AN ORGANIZED HEALTH CARE EDUCATION/TRAINING PROGRAM | Admitting: HOSPITALIST
Payer: MEDICARE

## 2021-07-31 ENCOUNTER — APPOINTMENT (OUTPATIENT)
Dept: GENERAL RADIOLOGY | Age: 71
DRG: 300 | End: 2021-07-31
Attending: PHYSICIAN ASSISTANT
Payer: MEDICARE

## 2021-07-31 DIAGNOSIS — I73.9 PVD (PERIPHERAL VASCULAR DISEASE) (HCC): ICD-10-CM

## 2021-07-31 DIAGNOSIS — I99.8 ISCHEMIA OF TOE: Primary | ICD-10-CM

## 2021-07-31 DIAGNOSIS — R79.1 SUBTHERAPEUTIC INTERNATIONAL NORMALIZED RATIO (INR): ICD-10-CM

## 2021-07-31 DIAGNOSIS — M79.675 PAIN OF TOE OF LEFT FOOT: ICD-10-CM

## 2021-07-31 DIAGNOSIS — S91.309A WOUND OF FOOT: ICD-10-CM

## 2021-07-31 PROBLEM — F12.20 DELTA-9-TETRAHYDROCANNABINOL (THC) DEPENDENCE (HCC): Status: ACTIVE | Noted: 2021-07-31

## 2021-07-31 LAB
ANION GAP SERPL CALC-SCNC: 4 MMOL/L (ref 3–18)
APTT PPP: 25.1 SEC (ref 23–36.4)
BASOPHILS # BLD: 0.1 K/UL (ref 0–0.1)
BASOPHILS NFR BLD: 1 % (ref 0–2)
BUN SERPL-MCNC: 24 MG/DL (ref 7–18)
BUN/CREAT SERPL: 40 (ref 12–20)
CALCIUM SERPL-MCNC: 9.1 MG/DL (ref 8.5–10.1)
CHLORIDE SERPL-SCNC: 106 MMOL/L (ref 100–111)
CO2 SERPL-SCNC: 28 MMOL/L (ref 21–32)
CREAT SERPL-MCNC: 0.6 MG/DL (ref 0.6–1.3)
DIFFERENTIAL METHOD BLD: ABNORMAL
EOSINOPHIL # BLD: 0 K/UL (ref 0–0.4)
EOSINOPHIL NFR BLD: 0 % (ref 0–5)
ERYTHROCYTE [DISTWIDTH] IN BLOOD BY AUTOMATED COUNT: 29.2 % (ref 11.6–14.5)
GLUCOSE BLD STRIP.AUTO-MCNC: 236 MG/DL (ref 70–110)
GLUCOSE SERPL-MCNC: 215 MG/DL (ref 74–99)
HCT VFR BLD AUTO: 30.2 % (ref 35–45)
HGB BLD-MCNC: 10 G/DL (ref 12–16)
INR PPP: 0.9 (ref 0.8–1.2)
LYMPHOCYTES # BLD: 0.8 K/UL (ref 0.9–3.6)
LYMPHOCYTES NFR BLD: 11 % (ref 21–52)
MCH RBC QN AUTO: 34 PG (ref 24–34)
MCHC RBC AUTO-ENTMCNC: 33.1 G/DL (ref 31–37)
MCV RBC AUTO: 102.7 FL (ref 74–97)
MONOCYTES # BLD: 0.3 K/UL (ref 0.05–1.2)
MONOCYTES NFR BLD: 4 % (ref 3–10)
NEUTS SEG # BLD: 6.5 K/UL (ref 1.8–8)
NEUTS SEG NFR BLD: 84 % (ref 40–73)
PLATELET # BLD AUTO: 333 K/UL (ref 135–420)
PLATELET COMMENTS,PCOM: ABNORMAL
PMV BLD AUTO: 9.7 FL (ref 9.2–11.8)
POTASSIUM SERPL-SCNC: 4.6 MMOL/L (ref 3.5–5.5)
PROTHROMBIN TIME: 11.9 SEC (ref 11.5–15.2)
RBC # BLD AUTO: 2.94 M/UL (ref 4.2–5.3)
RBC MORPH BLD: ABNORMAL
SODIUM SERPL-SCNC: 138 MMOL/L (ref 136–145)
WBC # BLD AUTO: 7.7 K/UL (ref 4.6–13.2)

## 2021-07-31 PROCEDURE — 74011636637 HC RX REV CODE- 636/637: Performed by: HOSPITALIST

## 2021-07-31 PROCEDURE — 85025 COMPLETE CBC W/AUTO DIFF WBC: CPT

## 2021-07-31 PROCEDURE — 82962 GLUCOSE BLOOD TEST: CPT

## 2021-07-31 PROCEDURE — 36415 COLL VENOUS BLD VENIPUNCTURE: CPT

## 2021-07-31 PROCEDURE — 74011250637 HC RX REV CODE- 250/637: Performed by: HOSPITALIST

## 2021-07-31 PROCEDURE — 85730 THROMBOPLASTIN TIME PARTIAL: CPT

## 2021-07-31 PROCEDURE — 74011250637 HC RX REV CODE- 250/637: Performed by: PHYSICIAN ASSISTANT

## 2021-07-31 PROCEDURE — 74011250636 HC RX REV CODE- 250/636: Performed by: FAMILY MEDICINE

## 2021-07-31 PROCEDURE — 65270000029 HC RM PRIVATE

## 2021-07-31 PROCEDURE — 74011250636 HC RX REV CODE- 250/636: Performed by: HOSPITALIST

## 2021-07-31 PROCEDURE — 80048 BASIC METABOLIC PNL TOTAL CA: CPT

## 2021-07-31 PROCEDURE — 99285 EMERGENCY DEPT VISIT HI MDM: CPT

## 2021-07-31 PROCEDURE — 85610 PROTHROMBIN TIME: CPT

## 2021-07-31 PROCEDURE — 73620 X-RAY EXAM OF FOOT: CPT

## 2021-07-31 PROCEDURE — 74011250637 HC RX REV CODE- 250/637: Performed by: FAMILY MEDICINE

## 2021-07-31 PROCEDURE — 74011000258 HC RX REV CODE- 258: Performed by: HOSPITALIST

## 2021-07-31 RX ORDER — ONDANSETRON 2 MG/ML
4 INJECTION INTRAMUSCULAR; INTRAVENOUS
Status: DISCONTINUED | OUTPATIENT
Start: 2021-07-31 | End: 2021-08-05 | Stop reason: HOSPADM

## 2021-07-31 RX ORDER — MAGNESIUM SULFATE 100 %
16 CRYSTALS MISCELLANEOUS AS NEEDED
Status: DISCONTINUED | OUTPATIENT
Start: 2021-07-31 | End: 2021-08-05 | Stop reason: HOSPADM

## 2021-07-31 RX ORDER — OXYCODONE HYDROCHLORIDE 5 MG/1
5 TABLET ORAL
Status: COMPLETED | OUTPATIENT
Start: 2021-07-31 | End: 2021-07-31

## 2021-07-31 RX ORDER — ATORVASTATIN CALCIUM 20 MG/1
20 TABLET, FILM COATED ORAL DAILY
Status: DISCONTINUED | OUTPATIENT
Start: 2021-08-01 | End: 2021-08-05 | Stop reason: HOSPADM

## 2021-07-31 RX ORDER — METOPROLOL TARTRATE 25 MG/1
25 TABLET, FILM COATED ORAL 2 TIMES DAILY
Status: DISCONTINUED | OUTPATIENT
Start: 2021-07-31 | End: 2021-08-05 | Stop reason: HOSPADM

## 2021-07-31 RX ORDER — TRAMADOL HYDROCHLORIDE 50 MG/1
50 TABLET ORAL
Status: DISCONTINUED | OUTPATIENT
Start: 2021-07-31 | End: 2021-08-02

## 2021-07-31 RX ORDER — URSODIOL 500 MG/1
500 TABLET, FILM COATED ORAL 2 TIMES DAILY
Status: DISCONTINUED | OUTPATIENT
Start: 2021-08-01 | End: 2021-08-05 | Stop reason: HOSPADM

## 2021-07-31 RX ORDER — ROPINIROLE 1 MG/1
4 TABLET, FILM COATED ORAL
Status: DISCONTINUED | OUTPATIENT
Start: 2021-07-31 | End: 2021-08-05 | Stop reason: HOSPADM

## 2021-07-31 RX ORDER — LANOLIN ALCOHOL/MO/W.PET/CERES
1000 CREAM (GRAM) TOPICAL DAILY
Status: DISCONTINUED | OUTPATIENT
Start: 2021-08-01 | End: 2021-08-05 | Stop reason: HOSPADM

## 2021-07-31 RX ORDER — DEXTROSE 50 % IN WATER (D50W) INTRAVENOUS SYRINGE
50 AS NEEDED
Status: DISCONTINUED | OUTPATIENT
Start: 2021-07-31 | End: 2021-08-05 | Stop reason: HOSPADM

## 2021-07-31 RX ORDER — HEPARIN SODIUM 10000 [USP'U]/100ML
18-36 INJECTION, SOLUTION INTRAVENOUS
Status: DISCONTINUED | OUTPATIENT
Start: 2021-07-31 | End: 2021-08-05 | Stop reason: HOSPADM

## 2021-07-31 RX ORDER — SAME BUTANEDISULFONATE/BETAINE 400-600 MG
250 POWDER IN PACKET (EA) ORAL DAILY
Status: DISCONTINUED | OUTPATIENT
Start: 2021-08-01 | End: 2021-08-05 | Stop reason: HOSPADM

## 2021-07-31 RX ORDER — INSULIN LISPRO 100 [IU]/ML
INJECTION, SOLUTION INTRAVENOUS; SUBCUTANEOUS
Status: DISCONTINUED | OUTPATIENT
Start: 2021-07-31 | End: 2021-08-05 | Stop reason: HOSPADM

## 2021-07-31 RX ORDER — CLOPIDOGREL BISULFATE 75 MG/1
75 TABLET ORAL DAILY
Status: DISCONTINUED | OUTPATIENT
Start: 2021-08-01 | End: 2021-08-05 | Stop reason: HOSPADM

## 2021-07-31 RX ORDER — LIDOCAINE 4 G/100G
1 PATCH TOPICAL DAILY
Status: DISCONTINUED | OUTPATIENT
Start: 2021-08-01 | End: 2021-08-05 | Stop reason: HOSPADM

## 2021-07-31 RX ORDER — HYDROXYZINE 25 MG/1
25 TABLET, FILM COATED ORAL
Status: DISCONTINUED | OUTPATIENT
Start: 2021-07-31 | End: 2021-08-05 | Stop reason: HOSPADM

## 2021-07-31 RX ADMIN — ONDANSETRON 4 MG: 2 INJECTION INTRAMUSCULAR; INTRAVENOUS at 20:43

## 2021-07-31 RX ADMIN — ROPINIROLE HYDROCHLORIDE 4 MG: 1 TABLET, FILM COATED ORAL at 21:32

## 2021-07-31 RX ADMIN — TRAMADOL HYDROCHLORIDE 50 MG: 50 TABLET, FILM COATED ORAL at 20:43

## 2021-07-31 RX ADMIN — DOXYCYCLINE 100 MG: 100 INJECTION, POWDER, LYOPHILIZED, FOR SOLUTION INTRAVENOUS at 21:32

## 2021-07-31 RX ADMIN — METOPROLOL TARTRATE 25 MG: 25 TABLET, FILM COATED ORAL at 21:32

## 2021-07-31 RX ADMIN — OXYCODONE 5 MG: 5 TABLET ORAL at 16:47

## 2021-07-31 RX ADMIN — INSULIN LISPRO 4 UNITS: 100 INJECTION, SOLUTION INTRAVENOUS; SUBCUTANEOUS at 21:32

## 2021-07-31 RX ADMIN — HEPARIN SODIUM 18 UNITS/KG/HR: 10000 INJECTION, SOLUTION INTRAVENOUS at 22:50

## 2021-07-31 NOTE — ED NOTES
TRANSFER - OUT REPORT:    Verbal report given to Cristel Pollock RN(name) on Eliu Regalado  being transferred to Medical(unit) for routine progression of care       Report consisted of patients Situation, Background, Assessment and   Recommendations(SBAR). Information from the following report(s) ED Summary and MAR was reviewed with the receiving nurse. Lines:       Opportunity for questions and clarification was provided.       Patient transported with:   Tech     Will transport patient once IV is obtained

## 2021-07-31 NOTE — PROGRESS NOTES
1710-Bedside and Verbal shift change report given to Adrián Murcia (oncoming nurse) by Delfino Vera (offgoing nurse). Report included the following information SBAR, Kardex, Intake/Output, and MAR.     1800-Patient arrived on the unit. Patient A/OX4. Patient in bed resting comfortably, oriented to room and call light system. Bedside and Verbal shift change report given to Arvid Olszewski (oncoming nurse) by Adrián Murcia (offgoing nurse). Report included the following information SBAR, Kardex, Intake/Output, and MAR.

## 2021-07-31 NOTE — ED PROVIDER NOTES
EMERGENCY DEPARTMENT HISTORY AND PHYSICAL EXAM    Date: 7/31/2021  Patient Name: David Henderson    History of Presenting Illness     Chief Complaint   Patient presents with    Wound Check         History Provided By: Patient and son    2:13 PM  David Henderson is a 80 y.o. female with PMHX of asthma, lupus, diabetes, Raynaud's, PVD, chronic N/V related to prior gastric bypass, who presents to the emergency department C/O of bilateral foot pain and nonhealing wound to right foot, which has been going on for a few months but patient reports is worse over the past week. She states that her left foot coloration looks about baseline and has most pain to the left second toe. PMHx of peripheral vascular disease with aortoiliac occlusive disease secondary to atherosclerosis obliterans s/p L fem endarterectomy and b/l TONIA stents; left TONIA stent occluded requiring shockwave balloon angioplasty of distal aorta and common iliac arteries on the left for heavily calcified plaque. Review of notes show that she is not a candidate for aortobifemoral bypass due to history of complex ventral hernia repair. Patient also reports difficulty with nausea vomiting and taking medications due to prior gastric bypass and has not been able to take her Coumadin. Pt denies fever, injury or trauma, leg swelling and any other sxs or complaints. PCP: TORRIE eDvi    Current Outpatient Medications   Medication Sig Dispense Refill    lidocaine (Lidoderm) 5 % Apply patch to the affected area for 12 hours a day and remove for 12 hours a day. 15 Each 0    hydrOXYzine HCL (ATARAX) 50 mg tablet Take 25 mg by mouth three (3) times daily as needed.  OTHER Blood thinner, patient unsure of name   Indications: Warfarin      ursodioL (ACTIGALL) 500 mg tablet TAKE ONE TABLET BY MOUTH TWICE A DAY 60 Tab 5    warfarin sodium (COUMADIN PO) Take 7 mg by mouth daily.       promethazine (PHENERGAN) 25 mg tablet Take 25 mg by mouth every six (6) hours as needed for Nausea.  B.infantis-B.ani-B.long-B.bifi (PROBIOTIC 4X) 10-15 mg TbEC Take  by mouth.  nystatin (MYCOSTATIN) topical cream Apply  to affected area two (2) times a day. 60 g 0    atorvastatin (LIPITOR) 20 mg tablet Take 20 mg by mouth daily.  dronabinol (MARINOL) 2.5 mg capsule Take 2.5 mg by mouth three (3) times daily.  metoprolol tartrate (LOPRESSOR) 25 mg tablet Take 25 mg by mouth two (2) times a day.  nitroglycerin (NITROSTAT) 0.3 mg SL tablet by SubLINGual route every five (5) minutes as needed for Chest Pain (x3).  clopidogrel (PLAVIX) 75 mg tab Take 75 mg by mouth daily.  lactobacillus combination no.4 (PROBIOTIC) 3 billion cell cap Take 1 Cap by mouth daily.  Cetirizine (ZYRTEC) 10 mg cap Take  by mouth.  cyanocobalamin 1,000 mcg tablet Take 1,000 mcg by mouth daily.  glimepiride (AMARYL) 2 mg tablet Take 2 mg by mouth every morning.  melatonin 3 mg tablet Take 6 mg by mouth nightly.  ondansetron hcl (ZOFRAN, AS HYDROCHLORIDE,) 4 mg tablet Take 4 mg by mouth every eight (8) hours as needed for Nausea.  cyclobenzaprine (FLEXERIL) 10 mg tablet Take  by mouth three (3) times daily as needed for Muscle Spasm(s).  BIOTIN PO Take 5,000 mcg by mouth daily.  albuterol (PROVENTIL HFA, VENTOLIN HFA) 90 mcg/actuation inhaler Take 2 Puffs by inhalation every four (4) hours as needed. Indications: ACUTE ASTHMA ATTACK      metFORMIN (GLUCOPHAGE) 500 mg tablet Take 500 mg by mouth two (2) times daily (with meals). Indications: TYPE 2 DIABETES MELLITUS      ropinirole (REQUIP) 4 mg Tab TAB Take 4 mg by mouth nightly.     Indications: RESTLESS LEGS SYNDROME         Past History     Past Medical History:  Past Medical History:   Diagnosis Date    Arthritis     hands and feet    Asthma     Autoimmune disease ([facility] [address]     lupus anticoagulant-causes clotting    Cancer ([facility] [address]     carcinoma in situ of cervix and uterus; inside bottom lip    Chronic pain     Left foot    Diabetes ([facility] [address] 2013    Dizzy spells     h/o    GERD (gastroesophageal reflux disease)     Liver disease     1/2 of my liver was removed for ulcers.     Lung collapse     2015    Nausea & vomiting 5/23/2012    Neuropathy     right foot    Other ill-defined conditions(799.89)     arterial clot in rt leg    Other ill-defined conditions(799.89)     restless leg syndrome    Other ill-defined conditions(799.89)     raynaud's syndrome    Other ill-defined conditions(799.89)     chronic N/V    Other ill-defined conditions(799.89)     (2 mutated genes) hyper coag. state     Pneumonia 07/22/2017    Positive cardiolipin antibodies     Psychiatric disorder     depression    PUD (peptic ulcer disease) 2009    H/O requiring partial gastrectomy and small intestine    Thromboembolus ([facility] [address] 2000    artery right leg       Past Surgical History:  Past Surgical History:   Procedure Laterality Date    HX APPENDECTOMY      HX BREAST AUGMENTATION      developed infection and implant removed    HX CHOLECYSTECTOMY      HX GASTRECTOMY      due to ulcers    HX GASTRIC BYPASS  1996    gastric by-pass    HX GI      total gastrectomy, partial colectomy    HX GI      colostomy and reversal    HX HEENT      cancer of lip removed    HX HYSTERECTOMY      for cervical cancer    HX ORTHOPAEDIC      rt foot surgery x10    HX ORTHOPAEDIC Left     great toe gangrenous and amputated    HX OTHER SURGICAL      part of liver removal    HX OTHER SURGICAL      ventral hernia repair x10    HX OTHER SURGICAL      Exc pilonial cystectomy    LA ABDOMEN SURGERY PROC UNLISTED      duodenal ulcer rupture    LA BREAST SURGERY PROCEDURE UNLISTED Bilateral 02/2016    hemorrhaged after breast surgery    LA CARDIAC SURG PROCEDURE UNLIST  2018    open heart (single vessel)    VASCULAR SURGERY PROCEDURE UNLIST      iliac aorta stents x 6    VASCULAR SURGERY PROCEDURE UNLIST      seven more stents iliac aorta and femoral        Family History:  Family History   Problem Relation Age of Onset    Malignant Hyperthermia Neg Hx     Pseudocholinesterase Deficiency Neg Hx     Delayed Awakening Neg Hx     Post-op Nausea/Vomiting Neg Hx     Emergence Delirium Neg Hx     Post-op Cognitive Dysfunction Neg Hx     Other Neg Hx        Social History:  Social History     Tobacco Use    Smoking status: Former Smoker     Years: 0.50     Quit date: 1996     Years since quittin.5    Smokeless tobacco: Never Used   Vaping Use    Vaping Use: Never used   Substance Use Topics    Alcohol use: Yes     Comment: 1 or 2 glasses wine or mixed drinks monthly     Drug use: Yes     Types: Marijuana     Comment: 4x daily       Allergies: Allergies   Allergen Reactions    Dilaudid [Hydromorphone] Hives    Rifampin Hives and Swelling     Swelling of mouth and tounge    Levaquin [Levofloxacin] Itching    Amoxicillin Hives    Aspirin Nausea and Vomiting    Azithromycin Angioedema    Bactrim [Sulfamethoprim Ds] Hives    Cefatrizine Hives    Erythromycin Hives    Keflex [Cephalexin] Itching     Swollen throat and tongue    Rocephin [Ceftriaxone] Hives    Tylenol [Acetaminophen] Hives    Vancomycin Hives         Review of Systems   Review of Systems   Constitutional: Negative for fever. Musculoskeletal: Positive for arthralgias and myalgias. Skin: Positive for color change and wound. All other systems reviewed and are negative. Physical Exam     Vitals:    21 1404 21 1603 21 1630 21 1700   BP: 124/62  (!) 177/94 (!) 140/77   Pulse: 82  84 84   Resp: 18  16 13   Temp: 98.9 °F (37.2 °C)      SpO2: 100% 100%     Weight: 54 kg (119 lb)        Physical Exam  Vital signs and nursing notes reviewed. CONSTITUTIONAL: Alert. Well-appearing; well-nourished; in no apparent distress. HEAD: Normocephalic; atraumatic. CV: Normal S1, S2; no murmurs, rubs, or gallops.  No chest wall tenderness. RESPIRATORY: Normal chest excursion with respiration; breath sounds clear and equal bilaterally; no wheezes, rhonchi, or rales. EXT: RLE: s/p right foot toes 1 through 5, well healed; 2cm scabbed ulceration with central pinpoint darkened eschar, no erythema drainage or moist skin. Foot is warm to touch with weak but palpable DP pulse. LLE: Status post first toe amputation, well-healed. Toes 2 through 5 dusky and cool, second toe without swelling, redness or drainage, pinpoint distal superficial callus. + DP and PT pulse on bedside Doppler. SKIN: Normal for age and race; warm; dry; good turgor; no apparent lesions or exudate. NEURO: A & O x3. PSYCH:  Mood and affect appropriate. Diagnostic Study Results     Labs -     Recent Results (from the past 12 hour(s))   CBC WITH AUTOMATED DIFF    Collection Time: 07/31/21  2:25 PM   Result Value Ref Range    WBC 7.7 4.6 - 13.2 K/uL    RBC 2.94 (L) 4.20 - 5.30 M/uL    HGB 10.0 (L) 12.0 - 16.0 g/dL    HCT 30.2 (L) 35.0 - 45.0 %    .7 (H) 74.0 - 97.0 FL    MCH 34.0 24.0 - 34.0 PG    MCHC 33.1 31.0 - 37.0 g/dL    RDW 29.2 (H) 11.6 - 14.5 %    PLATELET 610 722 - 300 K/uL    MPV 9.7 9.2 - 11.8 FL    NEUTROPHILS 84 (H) 40 - 73 %    LYMPHOCYTES 11 (L) 21 - 52 %    MONOCYTES 4 3 - 10 %    EOSINOPHILS 0 0 - 5 %    BASOPHILS 1 0 - 2 %    ABS. NEUTROPHILS 6.5 1.8 - 8.0 K/UL    ABS. LYMPHOCYTES 0.8 (L) 0.9 - 3.6 K/UL    ABS. MONOCYTES 0.3 0.05 - 1.2 K/UL    ABS. EOSINOPHILS 0.0 0.0 - 0.4 K/UL    ABS.  BASOPHILS 0.1 0.0 - 0.1 K/UL    DF AUTOMATED      PLATELET COMMENTS ADEQUATE PLATELETS      RBC COMMENTS ANISOCYTOSIS  2+        RBC COMMENTS HYPOCHROMIA  1+        RBC COMMENTS POLYCHROMASIA  1+        RBC COMMENTS POIKILOCYTOSIS  1+        RBC COMMENTS MACROCYTOSIS  1+       METABOLIC PANEL, BASIC    Collection Time: 07/31/21  2:25 PM   Result Value Ref Range    Sodium 138 136 - 145 mmol/L    Potassium 4.6 3.5 - 5.5 mmol/L    Chloride 106 100 - 111 mmol/L CO2 28 21 - 32 mmol/L    Anion gap 4 3.0 - 18 mmol/L    Glucose 215 (H) 74 - 99 mg/dL    BUN 24 (H) 7.0 - 18 MG/DL    Creatinine 0.60 0.6 - 1.3 MG/DL    BUN/Creatinine ratio 40 (H) 12 - 20      GFR est AA >60 >60 ml/min/1.73m2    GFR est non-AA >60 >60 ml/min/1.73m2    Calcium 9.1 8.5 - 10.1 MG/DL   PROTHROMBIN TIME + INR    Collection Time: 07/31/21  2:25 PM   Result Value Ref Range    Prothrombin time 11.9 11.5 - 15.2 sec    INR 0.9 0.8 - 1.2         Radiologic Studies -   XR FOOT LT AP/LAT   Final Result      1. No significant interval change. No obvious radiographic evidence of   osteomyelitis. No acute osseous abnormality. This report has been generated using voice recognition software. XR FOOT RT AP/LAT   Final Result      1. Again demonstrated is extensive postsurgical changes related to a   transmetatarsal amputation. Significant ostial lysis involving the midfoot bones   and underlying osteomyelitis cannot be entirely excluded. The overall appearance   however appears to be relatively stable as compared to the prior study. This report has been generated using voice recognition software. CT Results  (Last 48 hours)    None        CXR Results  (Last 48 hours)    None          Medications given in the ED-  Medications   oxyCODONE IR (ROXICODONE) tablet 5 mg (5 mg Oral Given 7/31/21 1647)         Medical Decision Making   I am the first provider for this patient. I reviewed the vital signs, available nursing notes, past medical history, past surgical history, family history and social history. Vital Signs-Reviewed the patient's vital signs. Records Reviewed: Nursing Notes, Old Medical Records, Previous Radiology Studies and Previous Laboratory Studies    [facility] 6/7/21 Note  \"HPI: Darwin Wilder is a 72 y.o. female evaluated in the office for peripheral arterial disease.  Complains of left toes pain especially of the second toe and discoloration of the second toe. Complains of muscle spasm in the left groin area. Reviewed ultrasound studies with patient, left leg waveforms are improved status post intervention. Absolute toe pressure on the left is 54 which is predictive of successful wound healing  podiatrist Phil Padron    ROS: no chest pain  EXAM: /58 (Site: Arm R)  Ht 5' 5\" (1.651 m)  Wt 56.7 kg (125 lb)  BMI 20.80 kg/m² awake, alert, NAD, resp nonlabored, BLE warm, well perfused, well-healed right TMA, left 2nd toe congested appearing with small ulcer at tip. I am surprised that the toe is still viable given her acute limb ischemia. +DS at ankle. DATA REVIEWED: I independently reviewed the following studies. My interpretation is: Improved blood flow in the left leg status post intervention with toe pressures suggestive of adequate wound healing although reduced toe brachial index consistent with known microvascular disease. Within the right common iliac artery stent there is stenosis 70 to 90%  6/7/21    Bilateral: A bilateral lower extremity ankle brachial index examination was performed.  The patient is status post intervention dated May 7, 2021.  Continuous wave Doppler tracings were recorded from the posterior tibial and dorsalis pedis arteries bilaterally, and resting ankle and brachial pressures were measured. Ankle pressures appear artificially elevated secondary to poorly compressible vessels. Right: The right lower extremity waveforms were monophasic in the posterior tibial and dorsalis pedis arteries.   Status post transmetatarsal amputation - digit photoplethysmography pressure and waveform study was deferred. Left: The left lower extremity waveforms were multiphasic in the posterior tibial and dorsalis pedis arteries.   Photoplethysmography pressure and waveform study was performed on the left second digit.  The waveform in the left second digit was dampened with a decreased digit to brachial index of 0.45. Conclusions: Ankle pressures appear artificially elevated secondary to poorly compressible vessels.     Waveform analysis indicates moderate right lower extremity arterial insufficiency. Waveform analysis indicated no evidence of left lower arterial insufficiency in the large vessels. Left digit waveforms and pressures are consistent with arterial insufficiency in the small vessels. Compared to examination dated 4/19/2021, there is improved circulation in the left lower extremity status post intervention.  Stable right lower extremity arterial examination. 6/4/21  General: Technically difficult and sub-optimal study secondary to dense calcific shadowing. The terminal aorta with stent, bilateral common iliac stents, external iliac (left with stent), common femoral, proximal superficial femoral and profunda arteries were imaged with duplex ultrasound. B-mode demonstrated diffuse and dense calcific plaque throughout the arteries assessed. The right mid common iliac artery stent Doppler spectral analysis demonstrates a systolic ratio greater than 4.0 and a post-stenotic turbulent blood flow pattern. These findings are consistent with a 75-99% stenosis. Multiphasic waveforms were noted throughout. The left hypogastric artery was not visualized. Conclusions: Technically difficult study as described in the findings. Hemodynamically significant stenosis (75-99%) in the right mid common iliac artery stent. Patent terminal aorta with stent, left common and external iliac artery stent without evidence of a hemodynamically significant stenosis. When compared to the previous exam performed on 4/19/2021, stenosis in the right common iliac artery stent is a new finding. The occlusion of the left common iliac artery stent has resolved status post intervention. DX:   Encounter Diagnoses   Code Name Primary?     I70.90 Arterial occlusive disease Yes    I70.222 Atherosclerosis of native artery of left lower extremity with rest pain ([address]     PLAN: Continue atorvastatin, Plavix, and Coumadin  Follow-up in 6 months with OLGA and duplex  Discussed evaluation, treatment and usual course. Patient verbalizes understanding and agrees with plan of care. All questions were answered. \"    5/6/21 [facility] Vascular Surgery OP Note  \"Procedure(s) Performed:   -Ultrasound guided access of Left SFA 8Fr  -Ultrasound guided access of right CFA 5Fr  -Aorta iliac angiogram  -IVUS of aorta and L TONIA & EIA  -Shockwave balloon treatment of terminal aorta  -Placement of 8x39mm VBX stent to terminal aorta, post dilate with [id number] balloon  -Placement of 7x79mm VBX stent to Left TONIA with concurrent PTA of R TONIA with 7x40mm balloon  -Proglide closure of right CFA and Left SFA\"      Procedures:  Procedures    ED Course:  2:13 PM   Initial assessment performed. The patients presenting problems have been discussed, and they are in agreement with the care plan formulated and outlined with them. I have encouraged them to ask questions as they arise throughout their visit. 3:30 PM consult note  Case discussed with patient's podiatrist Dr. Pedro Valenzuela, who states that if patient's pain is uncontrolled and requires admission, he could see her in the morning and evaluate for amputation of her left second toe; if not, he can see her in the office this coming week. 410 consult note  Case discussed with hospitalist Dr. Crispin Ritter who will admit patient. He does request that I consult patient's vascular surgeon. 4:20 PM consult none  Case discussed with [facility] vascular surgery Dr. Melanie Su. He states that podiatry should be able to manage this, but will consult and see patient tomorrow. 4:30 PM progress note  Still attempting to recontact Dr. Pedro Valenzuela to notify of admission. Diagnosis and Disposition       ADMISSION NOTE:  4:24 PM  Patient is being admitted to the hospital by Dr. Crispin Ritter.  The results of their tests and reasons for their admission have been discussed with them and/or available family. They convey agreement and understanding for the need to be admitted and for their admission diagnosis. CONDITIONS ON ADMISSION:  Deep Vein Thrombosis is not present at the time of admission. Thrombosis is not present at the time of admission. Urinary Tract Infection is not present at the time of admission. Pneumonia is not present at the time of admission. MRSA is not present at the time of admission. Wound infection is not present at the time of admission. Pressure Ulcer is not present at the time of admission. CLINICAL IMPRESSION:    1. Ischemia of toe    2. Pain of toe of left foot    3. PVD (peripheral vascular disease) ([address]    4. Wound of foot    5. Subtherapeutic international normalized ratio (INR)        PLAN:  1. Admit      Please note that this dictation was completed with [employer], the computer voice recognition software. Quite often unanticipated grammatical, syntax, homophones, and other interpretive errors are inadvertently transcribed by the computer software. Please disregard these errors. Please excuse any errors that have escaped final proofreading.

## 2021-07-31 NOTE — ROUTINE PROCESS
TRANSFER - IN REPORT:    Verbal report received from BING Douglas RN(name) on [facility]  being received from ED(unit) for routine progression of care      Report consisted of patients Situation, Background, Assessment and   Recommendations(SBAR). Information from the following report(s) SBAR, Kardex, Intake/Output and MAR was reviewed with the receiving nurse. Opportunity for questions and clarification was provided. Assessment completed upon patients arrival to unit and care assumed.

## 2021-07-31 NOTE — H&P
History & Physical    Patient: Noni Wilson MRN: [id number]  CSN: [id number]    YOB: 1950  Age: 80 y.o. Sex: female      DOA: 7/31/2021  Primary Care Provider:  TORRIE Saleh      Assessment/Plan     Patient Active Problem List   Diagnosis Code    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 Z90.3, Z98.0    Atherosclerotic PVD with ulceration ([facility] I70.209, L98.499    Anemia D64.9    Lupus anticoagulant syndrome ([facility] D68.62    PAD (peripheral artery disease) ([facility]) I73.9    Sleepwalking disorder F51.3    Anxiety F41.9    Type II diabetes mellitus with complication, uncontrolled ([facility] E11.8, E11.65    CAD (coronary artery disease) I25.10    Diabetic foot ulcer (HCC) E11.621, L97.509    Cellulitis of right foot L03.115    Chronic anticoagulation Z79.01    Elevated liver enzymes R74.8    Primary biliary cholangitis (HCC) K74.3    Arterial thrombosis (HCC) I74.9    Ischemic toe I99.8    Delta-9-tetrahydrocannabinol (THC) dependence (HCC) F12.20       Admit to medical floor    Left second toe ischemia and gangrene/severe peripheral vascular disease-  Followed by vascular  Continue with Plavix  Started on heparin drip  Further plan per vascular and podiatry. DM-  Started on sliding scale insulin, diabetic diet. CAD-  Continue with home medications along with Plavix. Right diabetic foot ulcer-  Started on doxycycline. Daily THC use    Estimated length of stay : 2 to 3 days    CC: Left second toe pain       HPI:     Noni Wilson is a 80 y.o. female with diabetes, hypertension, CAD, CABG, PAD, right TMA, left great toe amputation presents to ER with concerns of left foot pain especially left second toe. Right foot plantar aspect diabetic foot ulcer. She has severe peripheral vascular disease. She had vascular procedures done in March and April of this year. She is status post left femoral endarterectomy and bilateral stent placement.   She reports that has left great toe has been gangrenous since the vascular procedure. He had hoped that it would heal but it has continued to get worse and it has now started to get progressively painful. She denies any fever, chills, nausea, vomiting, chest pain, shortness of breath. In ER her vital signs relatively normal range, x-ray left foot with no evidence of osteomyelitis, x-ray right foot with the midfoot bones osteomyelitis cannot be excluded. Past Medical History:   Diagnosis Date    Arthritis     hands and feet    Asthma     Autoimmune disease ([address]     lupus anticoagulant-causes clotting    Cancer ([address]     carcinoma in situ of cervix and uterus; inside bottom lip    Chronic pain     Left foot    Diabetes ([address] 2013    Dizzy spells     h/o    GERD (gastroesophageal reflux disease)     Liver disease     1/2 of my liver was removed for ulcers.     Lung collapse     2015    Nausea & vomiting 5/23/2012    Neuropathy     right foot    Other ill-defined conditions(799.89)     arterial clot in rt leg    Other ill-defined conditions(799.89)     restless leg syndrome    Other ill-defined conditions(799.89)     raynaud's syndrome    Other ill-defined conditions(799.89)     chronic N/V    Other ill-defined conditions(799.89)     (2 mutated genes) hyper coag. state     Pneumonia 07/22/2017    Positive cardiolipin antibodies     Psychiatric disorder     depression    PUD (peptic ulcer disease) 2009    H/O requiring partial gastrectomy and small intestine    Thromboembolus ([address] 2000    artery right leg       Past Surgical History:   Procedure Laterality Date    HX APPENDECTOMY      HX BREAST AUGMENTATION      developed infection and implant removed    HX CHOLECYSTECTOMY      HX GASTRECTOMY      due to ulcers    HX GASTRIC BYPASS  1996    gastric by-pass    HX GI      total gastrectomy, partial colectomy    HX GI      colostomy and reversal    HX HEENT      cancer of lip removed    HX HYSTERECTOMY      for cervical cancer    HX ORTHOPAEDIC      rt foot surgery x10    HX ORTHOPAEDIC Left     great toe gangrenous and amputated    HX OTHER SURGICAL      part of liver removal    HX OTHER SURGICAL      ventral hernia repair x10    HX OTHER SURGICAL      Exc pilonial cystectomy    [place] ABDOMEN SURGERY PROC UNLISTED      duodenal ulcer rupture    WI BREAST SURGERY PROCEDURE UNLISTED Bilateral 2016    hemorrhaged after breast surgery    [place] CARDIAC SURG PROCEDURE UNLIST  2018    open heart (single vessel)    VASCULAR SURGERY PROCEDURE UNLIST      iliac aorta stents x 6    VASCULAR SURGERY PROCEDURE UNLIST      seven more stents iliac aorta and femoral        Family History   Problem Relation Age of Onset    Malignant Hyperthermia Neg Hx     Pseudocholinesterase Deficiency Neg Hx     Delayed Awakening Neg Hx     Post-op Nausea/Vomiting Neg Hx     Emergence Delirium Neg Hx     Post-op Cognitive Dysfunction Neg Hx     Other Neg Hx        Social History     Socioeconomic History    Marital status:      Spouse name: Not on file    Number of children: Not on file    Years of education: Not on file    Highest education level: Not on file   Tobacco Use    Smoking status: Former Smoker     Years: 0.50     Quit date: 1996     Years since quittin.5    Smokeless tobacco: Never Used   Vaping Use    Vaping Use: Never used   Substance and Sexual Activity    Alcohol use: Yes     Comment: 1 or 2 glasses wine or mixed drinks monthly     Drug use: Yes     Types: Marijuana     Comment: 4x daily    Sexual activity: Never     Social Determinants of Health     Financial Resource Strain:     Difficulty of Paying Living Expenses:    Food Insecurity:     Worried About Running Out of Food in the Last Year:     Ran Out of Food in the Last Year:    Transportation Needs:     Lack of Transportation (Medical):      Lack of Transportation (Non-Medical):    Physical Activity:     Days of Exercise per Week:     Minutes of Exercise per Session:    Stress:     Feeling of Stress :    Social Connections:     Frequency of Communication with Friends and Family:     Frequency of Social Gatherings with Friends and Family:     Attends Jewish Services:     Active Member of Clubs or Organizations:     Attends Club or Organization Meetings:     Marital Status:        Prior to Admission medications    Medication Sig Start Date End Date Taking? Authorizing Provider   lidocaine (Lidoderm) 5 % Apply patch to the affected area for 12 hours a day and remove for 12 hours a day. 6/15/21   Kiah Garcia MD   hydrOXYzine HCL (ATARAX) 50 mg tablet Take 25 mg by mouth three (3) times daily as needed. Provider, Historical   OTHER Blood thinner, patient unsure of name   Indications: Warfarin    Provider, Historical   ursodioL (ACTIGALL) 500 mg tablet TAKE ONE TABLET BY MOUTH TWICE A DAY 6/15/20   Edna Mon MD   warfarin sodium (COUMADIN PO) Take 7 mg by mouth daily. Other, MD Jay   promethazine (PHENERGAN) 25 mg tablet Take 25 mg by mouth every six (6) hours as needed for Nausea. Provider, Historical   B.infantis-B.ani-B.long-B.bifi (PROBIOTIC 4X) 10-15 mg TbEC Take  by mouth. Provider, Historical   nystatin (MYCOSTATIN) topical cream Apply  to affected area two (2) times a day. 12/7/18   Arnol Oneal MD   atorvastatin (LIPITOR) 20 mg tablet Take 20 mg by mouth daily. Provider, Historical   dronabinol (MARINOL) 2.5 mg capsule Take 2.5 mg by mouth three (3) times daily. Provider, Historical   metoprolol tartrate (LOPRESSOR) 25 mg tablet Take 25 mg by mouth two (2) times a day. Provider, Historical   nitroglycerin (NITROSTAT) 0.3 mg SL tablet by SubLINGual route every five (5) minutes as needed for Chest Pain (x3). Provider, Historical   clopidogrel (PLAVIX) 75 mg tab Take 75 mg by mouth daily.     Provider, Historical   lactobacillus combination no.4 (PROBIOTIC) 3 billion cell cap Take 1 Cap by mouth daily. Provider, Historical   Cetirizine (ZYRTEC) 10 mg cap Take  by mouth. Provider, Historical   cyanocobalamin 1,000 mcg tablet Take 1,000 mcg by mouth daily. Provider, Historical   glimepiride (AMARYL) 2 mg tablet Take 2 mg by mouth every morning. Provider, Historical   melatonin 3 mg tablet Take 6 mg by mouth nightly. Provider, Historical   ondansetron hcl (ZOFRAN, AS HYDROCHLORIDE,) 4 mg tablet Take 4 mg by mouth every eight (8) hours as needed for Nausea. Provider, Historical   cyclobenzaprine (FLEXERIL) 10 mg tablet Take  by mouth three (3) times daily as needed for Muscle Spasm(s). Provider, Historical   BIOTIN PO Take 5,000 mcg by mouth daily. Provider, Historical   albuterol (PROVENTIL HFA, VENTOLIN HFA) 90 mcg/actuation inhaler Take 2 Puffs by inhalation every four (4) hours as needed. Indications: ACUTE ASTHMA ATTACK    Provider, Historical   metFORMIN (GLUCOPHAGE) 500 mg tablet Take 500 mg by mouth two (2) times daily (with meals). Indications: TYPE 2 DIABETES MELLITUS    Provider, Historical   ropinirole (REQUIP) 4 mg Tab TAB Take 4 mg by mouth nightly. Indications: RESTLESS LEGS SYNDROME    Provider, Historical       Allergies   Allergen Reactions    Dilaudid [Hydromorphone] Hives    Rifampin Hives and Swelling     Swelling of mouth and tounge    Levaquin [Levofloxacin] Itching    Amoxicillin Hives    Aspirin Nausea and Vomiting    Azithromycin Angioedema    Bactrim [Sulfamethoprim Ds] Hives    Cefatrizine Hives    Erythromycin Hives    Keflex [Cephalexin] Itching     Swollen throat and tongue    Rocephin [Ceftriaxone] Hives    Tylenol [Acetaminophen] Hives    Vancomycin Hives       Review of Systems  Gen: No fever, chills, malaise, weight loss/gain. Heent: No headache, rhinorrhea, epistaxis, ear pain, hearing loss, sinus pain, neck pain/stiffness, sore throat. Heart: No chest pain, palpitations, SHUKLA, pnd, or orthopnea.    Resp: No cough, hemoptysis, wheezing and shortness of breath. GI: No nausea, vomiting, diarrhea, constipation, melena or hematochezia. : No urinary obstruction, dysuria or hematuria. Derm: see above. Musc/skeletal: no bone or joint complains. Vasc: see aboveEndo: No heat/cold intolerance, no polyuria,polydipsia or polyphagia. Neuro: No unilateral weakness, numbness, tingling. No seizures. Heme: No easy bruising or bleeding. Physical Exam:     Physical Exam:  Visit Vitals  BP (!) 140/77   Pulse 84   Temp 98.9 °F (37.2 °C)   Resp 13   Wt 54 kg (119 lb)   SpO2 100%   BMI 20.11 kg/m²      O2 Device: None (Room air)    Temp (24hrs), Av.9 °F (37.2 °C), Min:98.9 °F (37.2 °C), Max:98.9 °F (37.2 °C)    No intake/output data recorded. No intake/output data recorded. General:  Awake, cooperative, no distress. Head:  Normocephalic, without obvious abnormality, atraumatic. Eyes:  Conjunctivae/corneas clear, sclera anicteric, PERRL, EOMs intact. Nose: Nares normal. No drainage or sinus tenderness. Throat: Lips, mucosa, and tongue normal.    Neck: Supple, symmetrical, trachea midline, no adenopathy. Lungs:   Clear to auscultation bilaterally. Heart:   S1, S2, no murmur, click, rub or gallop. Abdomen: Soft, non-tender. Bowel sounds normal. No masses,  No organomegaly. Extremities: Left great toe amputation. Left 2nd toe gangrene. Pulses: Weak distal pulses. Skin: Skin color pink, turgor normal. Right foot plantar ulcer on mid foot. Neurologic: CNII-XII intact. No focal motor or sensory deficit.        Labs Reviewed:    CMP:   Lab Results   Component Value Date/Time     2021 02:25 PM    K 4.6 2021 02:25 PM     2021 02:25 PM    CO2 28 2021 02:25 PM    AGAP 4 2021 02:25 PM     (H) 2021 02:25 PM    BUN 24 (H) 2021 02:25 PM    CREA 0.60 2021 02:25 PM    GFRAA >60 2021 02:25 PM    GFRNA >60 2021 02:25 PM    CA 9.1 07/31/2021 02:25 PM     CBC:   Lab Results   Component Value Date/Time    WBC 7.7 07/31/2021 02:25 PM    HGB 10.0 (L) 07/31/2021 02:25 PM    HCT 30.2 (L) 07/31/2021 02:25 PM     07/31/2021 02:25 PM         Procedures/imaging: see electronic medical records for all procedures/Xrays and details which were not copied into this note but were reviewed prior to creation of Plan    Please note that this dictation was completed with [employer], the [employer] voice recognition software. Quite often unanticipated grammatical, syntax, homophones, and other interpretive errors are inadvertently transcribed by the computer software. Please disregard these errors. Please excuse any errors that have escaped final proofreading.         CC: Glo Abarca

## 2021-08-01 PROBLEM — L97.412 DIABETIC ULCER OF RIGHT MIDFOOT WITH FAT LAYER EXPOSED (HCC): Status: ACTIVE | Noted: 2018-12-04

## 2021-08-01 LAB
ANION GAP SERPL CALC-SCNC: 9 MMOL/L (ref 3–18)
APTT PPP: 140.8 SEC (ref 23–36.4)
APTT PPP: 49.2 SEC (ref 23–36.4)
BASOPHILS # BLD: 0.1 K/UL (ref 0–0.1)
BASOPHILS NFR BLD: 1 % (ref 0–2)
BUN SERPL-MCNC: 23 MG/DL (ref 7–18)
BUN/CREAT SERPL: 31 (ref 12–20)
CALCIUM SERPL-MCNC: 8.5 MG/DL (ref 8.5–10.1)
CHLORIDE SERPL-SCNC: 101 MMOL/L (ref 100–111)
CO2 SERPL-SCNC: 25 MMOL/L (ref 21–32)
CREAT SERPL-MCNC: 0.75 MG/DL (ref 0.6–1.3)
DIFFERENTIAL METHOD BLD: ABNORMAL
EOSINOPHIL # BLD: 0.1 K/UL (ref 0–0.4)
EOSINOPHIL NFR BLD: 2 % (ref 0–5)
ERYTHROCYTE [DISTWIDTH] IN BLOOD BY AUTOMATED COUNT: 27.3 % (ref 11.6–14.5)
GLUCOSE BLD STRIP.AUTO-MCNC: 111 MG/DL (ref 70–110)
GLUCOSE BLD STRIP.AUTO-MCNC: 116 MG/DL (ref 70–110)
GLUCOSE BLD STRIP.AUTO-MCNC: 210 MG/DL (ref 70–110)
GLUCOSE BLD STRIP.AUTO-MCNC: 269 MG/DL (ref 70–110)
GLUCOSE SERPL-MCNC: 368 MG/DL (ref 74–99)
HCT VFR BLD AUTO: 30.6 % (ref 35–45)
HGB BLD-MCNC: 9.6 G/DL (ref 12–16)
LYMPHOCYTES # BLD: 1.7 K/UL (ref 0.9–3.6)
LYMPHOCYTES NFR BLD: 24 % (ref 21–52)
MCH RBC QN AUTO: 32.1 PG (ref 24–34)
MCHC RBC AUTO-ENTMCNC: 31.4 G/DL (ref 31–37)
MCV RBC AUTO: 102.3 FL (ref 74–97)
MONOCYTES # BLD: 0.6 K/UL (ref 0.05–1.2)
MONOCYTES NFR BLD: 8 % (ref 3–10)
NEUTS SEG # BLD: 4.5 K/UL (ref 1.8–8)
NEUTS SEG NFR BLD: 64 % (ref 40–73)
PLATELET # BLD AUTO: 335 K/UL (ref 135–420)
PMV BLD AUTO: 9.3 FL (ref 9.2–11.8)
POTASSIUM SERPL-SCNC: 4.6 MMOL/L (ref 3.5–5.5)
RBC # BLD AUTO: 2.99 M/UL (ref 4.2–5.3)
SODIUM SERPL-SCNC: 135 MMOL/L (ref 136–145)
WBC # BLD AUTO: 7 K/UL (ref 4.6–13.2)

## 2021-08-01 PROCEDURE — 85025 COMPLETE CBC W/AUTO DIFF WBC: CPT

## 2021-08-01 PROCEDURE — 36415 COLL VENOUS BLD VENIPUNCTURE: CPT

## 2021-08-01 PROCEDURE — 87186 SC STD MICRODIL/AGAR DIL: CPT

## 2021-08-01 PROCEDURE — 74011250637 HC RX REV CODE- 250/637: Performed by: FAMILY MEDICINE

## 2021-08-01 PROCEDURE — 74011250636 HC RX REV CODE- 250/636: Performed by: HOSPITALIST

## 2021-08-01 PROCEDURE — 65270000029 HC RM PRIVATE

## 2021-08-01 PROCEDURE — 85730 THROMBOPLASTIN TIME PARTIAL: CPT

## 2021-08-01 PROCEDURE — 74011000250 HC RX REV CODE- 250: Performed by: HOSPITALIST

## 2021-08-01 PROCEDURE — 2709999900 HC NON-CHARGEABLE SUPPLY

## 2021-08-01 PROCEDURE — 74011000258 HC RX REV CODE- 258: Performed by: HOSPITALIST

## 2021-08-01 PROCEDURE — 87077 CULTURE AEROBIC IDENTIFY: CPT

## 2021-08-01 PROCEDURE — 74011636637 HC RX REV CODE- 636/637: Performed by: HOSPITALIST

## 2021-08-01 PROCEDURE — 82962 GLUCOSE BLOOD TEST: CPT

## 2021-08-01 PROCEDURE — 74011250637 HC RX REV CODE- 250/637: Performed by: HOSPITALIST

## 2021-08-01 PROCEDURE — 80048 BASIC METABOLIC PNL TOTAL CA: CPT

## 2021-08-01 PROCEDURE — 87205 SMEAR GRAM STAIN: CPT

## 2021-08-01 PROCEDURE — 74011250636 HC RX REV CODE- 250/636: Performed by: SURGERY

## 2021-08-01 RX ORDER — HEPARIN SODIUM 1000 [USP'U]/ML
80 INJECTION, SOLUTION INTRAVENOUS; SUBCUTANEOUS ONCE
Status: COMPLETED | OUTPATIENT
Start: 2021-08-01 | End: 2021-08-01

## 2021-08-01 RX ORDER — SODIUM CHLORIDE 0.9 % (FLUSH) 0.9 %
5-40 SYRINGE (ML) INJECTION AS NEEDED
Status: DISCONTINUED | OUTPATIENT
Start: 2021-08-01 | End: 2021-08-05 | Stop reason: HOSPADM

## 2021-08-01 RX ORDER — HYDROMORPHONE HCL IN WATER/PF 1 MG/ML
SYRINGE (ML) INJECTION
Status: DISCONTINUED | OUTPATIENT
Start: 2021-08-01 | End: 2021-08-02

## 2021-08-01 RX ORDER — SODIUM CHLORIDE 0.9 % (FLUSH) 0.9 %
5-40 SYRINGE (ML) INJECTION EVERY 8 HOURS
Status: DISCONTINUED | OUTPATIENT
Start: 2021-08-01 | End: 2021-08-05 | Stop reason: HOSPADM

## 2021-08-01 RX ADMIN — CLOPIDOGREL BISULFATE 75 MG: 75 TABLET ORAL at 08:44

## 2021-08-01 RX ADMIN — METOPROLOL TARTRATE 25 MG: 25 TABLET, FILM COATED ORAL at 08:44

## 2021-08-01 RX ADMIN — Medication 10 ML: at 16:11

## 2021-08-01 RX ADMIN — Medication 10 ML: at 21:09

## 2021-08-01 RX ADMIN — HEPARIN SODIUM 4320 UNITS: 1000 INJECTION INTRAVENOUS; SUBCUTANEOUS at 06:18

## 2021-08-01 RX ADMIN — DOXYCYCLINE 100 MG: 100 INJECTION, POWDER, LYOPHILIZED, FOR SOLUTION INTRAVENOUS at 08:43

## 2021-08-01 RX ADMIN — TRAMADOL HYDROCHLORIDE 50 MG: 50 TABLET, FILM COATED ORAL at 03:47

## 2021-08-01 RX ADMIN — ROPINIROLE HYDROCHLORIDE 4 MG: 1 TABLET, FILM COATED ORAL at 21:11

## 2021-08-01 RX ADMIN — Medication 250 MG: at 08:44

## 2021-08-01 RX ADMIN — TRAMADOL HYDROCHLORIDE 50 MG: 50 TABLET, FILM COATED ORAL at 11:29

## 2021-08-01 RX ADMIN — HEPARIN SODIUM 20 UNITS/KG/HR: 10000 INJECTION, SOLUTION INTRAVENOUS at 20:02

## 2021-08-01 RX ADMIN — DOXYCYCLINE 100 MG: 100 INJECTION, POWDER, LYOPHILIZED, FOR SOLUTION INTRAVENOUS at 20:55

## 2021-08-01 RX ADMIN — METOPROLOL TARTRATE 25 MG: 25 TABLET, FILM COATED ORAL at 20:55

## 2021-08-01 RX ADMIN — CYANOCOBALAMIN TAB 1000 MCG 1000 MCG: 1000 TAB at 08:44

## 2021-08-01 RX ADMIN — ATORVASTATIN CALCIUM 20 MG: 20 TABLET, FILM COATED ORAL at 08:44

## 2021-08-01 RX ADMIN — Medication: at 15:48

## 2021-08-01 RX ADMIN — INSULIN LISPRO 4 UNITS: 100 INJECTION, SOLUTION INTRAVENOUS; SUBCUTANEOUS at 18:56

## 2021-08-01 RX ADMIN — INSULIN LISPRO 6 UNITS: 100 INJECTION, SOLUTION INTRAVENOUS; SUBCUTANEOUS at 08:44

## 2021-08-01 NOTE — PROGRESS NOTES
Hospitalist Progress Note    Patient: Noni Wilson MRN: [id number]  CSN: [id number]    YOB: 1950  Age: 80 y.o. Sex: female    DOA: 7/31/2021 LOS:  LOS: 1 day                Assessment/Plan     Patient Active Problem List   Diagnosis Code    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 Z90.3, Z98.0    Atherosclerotic PVD with ulceration ([facility] I70.209, L98.499    Anemia D64.9    Lupus anticoagulant syndrome ([facility] D68.62    PAD (peripheral artery disease) ([facility]) I73.9    Sleepwalking disorder F51.3    Anxiety F41.9    Type II diabetes mellitus with complication, uncontrolled ([facility] E11.8, E11.65    CAD (coronary artery disease) I25.10    Diabetic ulcer of right midfoot with fat layer exposed ([facility] E11.621, L97.412    Cellulitis of right foot L03.115    Chronic anticoagulation Z79.01    Elevated liver enzymes R74.8    Primary biliary cholangitis (HCC) K74.3    Arterial thrombosis (HCC) I74.9    Ischemic toe I99.8    Delta-9-tetrahydrocannabinol (THC) dependence (HCC) F12.20            Left second toe ischemia and gangrene/severe peripheral vascular disease-  Followed by vascular  Continue with Plavix  Started on heparin drip  Further plan per vascular and podiatry.     DM-  continue on sliding scale insulin, diabetic diet.     CAD-  Continue with home medications along with Plavix.     Right diabetic foot ulcer-  Started on doxycycline. Multiple antibiotic allergy  Wound care consulted     Daily THC use    Disposition : TBD    Review of systems  General: No fevers or chills. Cardiovascular: No chest pain or pressure. No palpitations. Pulmonary: No shortness of breath. Gastrointestinal: No nausea, vomiting. Physical Exam:  General: Awake, cooperative, no acute distress    HEENT: NC, Atraumatic. PERRLA, anicteric sclerae. Lungs: CTA Bilaterally. No Wheezing/Rhonchi/Rales.   Heart:  S1 S2,  No murmur, No Rubs, No Gallops  Abdomen: Soft, Non distended, Non tender.  +Bowel sounds,   Extremities: Left great toe amputation. Left 2nd toe gangrene, Right foot plantar ulcer on mid foot. Weak distal pulses. Psych:   Not anxious or agitated. Neurologic:  No acute neurological deficit. Vital signs/Intake and Output:  Visit Vitals  BP (!) 145/66   Pulse 82   Temp 97.9 °F (36.6 °C)   Resp 16   Wt 54 kg (119 lb)   SpO2 100%   BMI 20.11 kg/m²     Current Shift:  No intake/output data recorded. Last three shifts:  07/31 0701 - 08/01 1900  In: 460 [P.O.:360; I.V.:100]  Out: -             Labs: Results:       Chemistry Recent Labs     08/01/21  0430 07/31/21  1425   * 215*   * 138   K 4.6 4.6    106   CO2 25 28   BUN 23* 24*   CREA 0.75 0.60   CA 8.5 9.1   AGAP 9 4   BUCR 31* 40*      CBC w/Diff Recent Labs     08/01/21  0430 07/31/21  1425   WBC 7.0 7.7   RBC 2.99* 2.94*   HGB 9.6* 10.0*   HCT 30.6* 30.2*    333   GRANS 64 84*   LYMPH 24 11*   EOS 2 0      Cardiac Enzymes No results for input(s): CPK, CKND1, RAYNA in the last 72 hours. No lab exists for component: CKRMB, TROIP   Coagulation Recent Labs     08/01/21  1158 08/01/21  0430 07/31/21  1955 07/31/21  1425   PTP  --   --   --  11.9   INR  --   --   --  0.9   APTT 140.8* 49.2*   < >  --     < > = values in this interval not displayed. Lipid Panel Lab Results   Component Value Date/Time    Cholesterol, total 178 04/16/2019 04:44 PM    HDL Cholesterol 88 (H) 04/16/2019 04:44 PM    LDL, calculated 71 04/16/2019 04:44 PM    VLDL, calculated 19 04/16/2019 04:44 PM    Triglyceride 95 04/16/2019 04:44 PM    CHOL/HDL Ratio 2.0 04/16/2019 04:44 PM      BNP No results for input(s): BNPP in the last 72 hours. Liver Enzymes No results for input(s): TP, ALB, TBIL, AP in the last 72 hours.     No lab exists for component: SGOT, GPT, DBIL   Thyroid Studies Lab Results   Component Value Date/Time    TSH 0.74 04/11/2018 12:30 AM        Procedures/imaging: see electronic medical records for all procedures/Xrays and details which were not copied into this note but were reviewed prior to creation of Plan

## 2021-08-01 NOTE — PROGRESS NOTES
Problem: Falls - Risk of  Goal: *Absence of Falls  Description: Document Lukas Patino Fall Risk and appropriate interventions in the flowsheet. Outcome: Progressing Towards Goal  Note: Fall Risk Interventions:  Mobility Interventions: Assess mobility with egress test, Patient to call before getting OOB, Utilize walker, cane, or other assistive device         Medication Interventions: Teach patient to arise slowly, Patient to call before getting OOB         History of Falls Interventions: Consult care management for discharge planning, Door open when patient unattended, Investigate reason for fall, Room close to nurse's station         Problem: Diabetes Self-Management  Goal: *Disease process and treatment process  Description: Define diabetes and identify own type of diabetes; list 3 options for treating diabetes. Outcome: Progressing Towards Goal  Goal: *Using medications safely  Description: State effect of diabetes medications on diabetes; name diabetes medication taking, action and side effects. Outcome: Progressing Towards Goal  Goal: *Monitoring blood glucose, interpreting and using results  Description: Identify recommended blood glucose targets  and personal targets.   Outcome: Progressing Towards Goal  Goal: *Sick day guidelines  Outcome: Progressing Towards Goal

## 2021-08-01 NOTE — CONSULTS
Podiatry Consult    Subjective:         Date of Consultation: August 1, 2021     Referring Physician: Dr. Fabien Danielson    Patient is a 80 y.o.  female diabetic who is being seen for painful ischemic left 2nd toe. She states it started a few weeks ago with purple discoloration and pain. She has been seeing her Vascular surgeon, Dr. Javan Nicholas who did 2 procedures on her lower extremities in March and April 2021, but she states it wasn't very successful. She also presents with a chronic open ulcer on the bottom of her right foot. It started over a year ago, and has been treating it herself since November 2020. She states it was almost closed a few weeks ago, then last week got worse.     Patient Active Problem List    Diagnosis Date Noted    Ischemic toe 07/31/2021    Delta-9-tetrahydrocannabinol (THC) dependence ([address] 07/31/2021    Arterial thrombosis ([address] 03/18/2021    Primary biliary cholangitis ([address] 07/09/2019    Chronic anticoagulation 04/16/2019    Elevated liver enzymes 04/16/2019    Cellulitis of right foot 12/05/2018    Diabetic foot ulcer ([address] 12/04/2018    Type II diabetes mellitus with complication, uncontrolled ([address] 04/13/2018    CAD (coronary artery disease) 04/13/2018    Anxiety 10/15/2017    PAD (peripheral artery disease) ([address] 07/22/2017    Sleepwalking disorder 07/22/2017    Lupus anticoagulant syndrome ([address] 12/29/2016    Anemia 03/01/2016    Atherosclerotic PVD with ulceration ([address] 03/13/2015    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 05/23/2012     Past Medical History:   Diagnosis Date    Arthritis     hands and feet    Asthma     Autoimmune disease ([address]     lupus anticoagulant-causes clotting    Cancer ([address]     carcinoma in situ of cervix and uterus; inside bottom lip    Chronic pain     Left foot    Diabetes ([address] 2013    Dizzy spells     h/o    GERD (gastroesophageal reflux disease)     Liver disease     1/2 of my liver was removed for ulcers.     Lung collapse     2015    Nausea & vomiting 5/23/2012    Neuropathy     right foot    Other ill-defined conditions(799.89)     arterial clot in rt leg    Other ill-defined conditions(799.89)     restless leg syndrome    Other ill-defined conditions(799.89)     raynaud's syndrome    Other ill-defined conditions(799.89)     chronic N/V    Other ill-defined conditions(799.89)     (2 mutated genes) hyper coag. state     Pneumonia 07/22/2017    Positive cardiolipin antibodies     Psychiatric disorder     depression    PUD (peptic ulcer disease) 2009    H/O requiring partial gastrectomy and small intestine    Thromboembolus ([facility] 2000    artery right leg      Past Surgical History:   Procedure Laterality Date    HX APPENDECTOMY      HX BREAST AUGMENTATION      developed infection and implant removed    HX CHOLECYSTECTOMY      HX GASTRECTOMY      due to ulcers    HX GASTRIC BYPASS  1996    gastric by-pass    HX GI      total gastrectomy, partial colectomy    HX GI      colostomy and reversal    HX HEENT      cancer of lip removed    HX HYSTERECTOMY      for cervical cancer    HX ORTHOPAEDIC      rt foot surgery x10    HX ORTHOPAEDIC Left     great toe gangrenous and amputated    HX OTHER SURGICAL      part of liver removal    HX OTHER SURGICAL      ventral hernia repair x10    HX OTHER SURGICAL      Exc pilonial cystectomy    NH ABDOMEN SURGERY PROC UNLISTED      duodenal ulcer rupture    NH BREAST SURGERY PROCEDURE UNLISTED Bilateral 02/2016    hemorrhaged after breast surgery    NH CARDIAC SURG PROCEDURE UNLIST  2018    open heart (single vessel)    VASCULAR SURGERY PROCEDURE UNLIST      iliac aorta stents x 6    VASCULAR SURGERY PROCEDURE UNLIST      seven more stents iliac aorta and femoral       Family History   Problem Relation Age of Onset    Malignant Hyperthermia Neg Hx     Pseudocholinesterase Deficiency Neg Hx     Delayed Awakening Neg Hx     Post-op Nausea/Vomiting Neg Hx     Emergence Delirium Neg Hx     Post-op Cognitive Dysfunction Neg Hx     Other Neg Hx       Social History     Tobacco Use    Smoking status: Former Smoker     Years: 0.50     Quit date: 1996     Years since quittin.5    Smokeless tobacco: Never Used   Substance Use Topics    Alcohol use: Yes     Comment: 1 or 2 glasses wine or mixed drinks monthly      Current Facility-Administered Medications   Medication Dose Route Frequency Provider Last Rate Last Admin    sodium chloride (NS) flush 5-40 mL  5-40 mL IntraVENous Q8H Eli Neri MD   10 mL at 21 1611    sodium chloride (NS) flush 5-40 mL  5-40 mL IntraVENous PRN Eli Neri MD        HYDROmorphone (DILAUDID) 30 mg / 30 mL PCA in water   IntraVENous TITRATE Eli Neri MD   New Bag at 21 1548    atorvastatin (LIPITOR) tablet 20 mg  20 mg Oral DAILY Helena WELDON MD   20 mg at 21 0844    Saccharomyces boulardii (FLORASTOR) capsule 250 mg  250 mg Oral DAILY Helena WELDON MD   250 mg at 21 0844    clopidogreL (PLAVIX) tablet 75 mg  75 mg Oral DAILY Alexa Hammonds MD   75 mg at 21 0844    cyanocobalamin tablet 1,000 mcg  1,000 mcg Oral DAILY Helena WELDON MD   1,000 mcg at 21 0844    hydrOXYzine HCL (ATARAX) tablet 25 mg  25 mg Oral TID PRN Alexa Hammonds MD        lidocaine 4 % patch 1 Patch  1 Patch TransDERmal DAILY Alexa Hammonds MD   1 Patch at 21 0844    metoprolol tartrate (LOPRESSOR) tablet 25 mg  25 mg Oral BID Helena WELDON MD   25 mg at 21 0844    rOPINIRole (REQUIP) tablet 4 mg  4 mg Oral QHS Helena WELDON MD   4 mg at 21 2132    ursodioL (ACTIGALL) tablet 500 mg (Patient Supplied)  500 mg Oral BID Alexa Hammonds MD        heparin 25,000 units in D5W 250 ml infusion  18-36 Units/kg/hr IntraVENous TITRATE Helena WELDON MD 10.8 mL/hr at 21 1502 20 Units/kg/hr at 21 1502    glucose chewable tablet 16 g  16 g Oral PRN Bryant Fulton MD        glucagon (GLUCAGEN) injection 1 mg  1 mg IntraMUSCular PRN Bryant Fulton MD        dextrose (D50W) injection syrg 25 g  50 mL IntraVENous PRN Bryant Fulton MD        insulin lispro (HUMALOG) injection   SubCUTAneous AC&HS Bryant Fulton MD   6 Units at 21 0844    doxycycline (VIBRAMYCIN) 100 mg in 0.9% sodium chloride (MBP/ADV) 100 mL MBP  100 mg IntraVENous Q12H Gil WELDON  mL/hr at 21 0843 100 mg at 21 0843    ondansetron (ZOFRAN) injection 4 mg  4 mg IntraVENous Q6H PRN Nola Bae MD   4 mg at 21 2043    traMADoL (ULTRAM) tablet 50 mg  50 mg Oral Q6H PRN Nola Bae MD   50 mg at 21 1129      Allergies   Allergen Reactions    Dilaudid [Hydromorphone] Hives    Rifampin Hives and Swelling     Swelling of mouth and tounge    Levaquin [Levofloxacin] Itching    Amoxicillin Hives    Aspirin Nausea and Vomiting    Azithromycin Angioedema    Bactrim [Sulfamethoprim Ds] Hives    Cefatrizine Hives    Erythromycin Hives    Keflex [Cephalexin] Itching     Swollen throat and tongue    Rocephin [Ceftriaxone] Hives    Tylenol [Acetaminophen] Hives    Vancomycin Hives        Review of Systems:  A comprehensive review of systems was negative except for that written in the History of Present Illness. Objective:     Patient Vitals for the past 8 hrs:   BP Temp Pulse Resp SpO2   21 0915 (!) 145/66 97.9 °F (36.6 °C) 82 16 100 %     Temp (24hrs), Av.2 °F (36.8 °C), Min:97.2 °F (36.2 °C), Max:99.1 °F (37.3 °C)      Physical Exam:        Vascular:  Dorsalis pedis pulses are 0/4 bilateral.  Posterior tibial pulses are 0/4 bilateral.  Capillary fill time is more than 3 seconds, for lesser toes left foot. Edema is not observed bilateral lower extremities. Varicosities are not observed bilateral lower extremities.    Derm:  Skin temperature of lower extremities warm to cool proximal to distal with toes left foot cold. Inspection of skin shows ischemic changes to the left 2nd and 5th toes with darker purple discoloration and severe pain on palpation. Open ulcer plantar right midfoot, 2.3 x 2.2 x 0.4 cm, with yellow fibrotic base. Positive purulent drainage with surrounding maceration and pain on palpation. Ortho:  Muscle strength 5/5 for all groups tested. Muscle tone normal. Inspection/palpation of bones - joints- muscle shows status post TMA to the right forefoot. Also status post left hallux amputation. Neuro:  Light touch, sharp/dull, vibratory, and proprioception sensations all decreased bilateral lower extremities. Deep tendon reflexes decreased bilaterally.       Lab Review:   Recent Results (from the past 24 hour(s))   PTT    Collection Time: 07/31/21  7:55 PM   Result Value Ref Range    aPTT 25.1 23.0 - 36.4 SEC   GLUCOSE, POC    Collection Time: 07/31/21  9:20 PM   Result Value Ref Range    Glucose (POC) 236 (H) 70 - 472 mg/dL   METABOLIC PANEL, BASIC    Collection Time: 08/01/21  4:30 AM   Result Value Ref Range    Sodium 135 (L) 136 - 145 mmol/L    Potassium 4.6 3.5 - 5.5 mmol/L    Chloride 101 100 - 111 mmol/L    CO2 25 21 - 32 mmol/L    Anion gap 9 3.0 - 18 mmol/L    Glucose 368 (H) 74 - 99 mg/dL    BUN 23 (H) 7.0 - 18 MG/DL    Creatinine 0.75 0.6 - 1.3 MG/DL    BUN/Creatinine ratio 31 (H) 12 - 20      GFR est AA >60 >60 ml/min/1.73m2    GFR est non-AA >60 >60 ml/min/1.73m2    Calcium 8.5 8.5 - 10.1 MG/DL   CBC WITH AUTOMATED DIFF    Collection Time: 08/01/21  4:30 AM   Result Value Ref Range    WBC 7.0 4.6 - 13.2 K/uL    RBC 2.99 (L) 4.20 - 5.30 M/uL    HGB 9.6 (L) 12.0 - 16.0 g/dL    HCT 30.6 (L) 35.0 - 45.0 %    .3 (H) 74.0 - 97.0 FL    MCH 32.1 24.0 - 34.0 PG    MCHC 31.4 31.0 - 37.0 g/dL    RDW 27.3 (H) 11.6 - 14.5 %    PLATELET 605 402 - 053 K/uL    MPV 9.3 9.2 - 11.8 FL    NEUTROPHILS 64 40 - 73 %    LYMPHOCYTES 24 21 - 52 %    MONOCYTES 8 3 - 10 % EOSINOPHILS 2 0 - 5 %    BASOPHILS 1 0 - 2 %    ABS. NEUTROPHILS 4.5 1.8 - 8.0 K/UL    ABS. LYMPHOCYTES 1.7 0.9 - 3.6 K/UL    ABS. MONOCYTES 0.6 0.05 - 1.2 K/UL    ABS. EOSINOPHILS 0.1 0.0 - 0.4 K/UL    ABS. BASOPHILS 0.1 0.0 - 0.1 K/UL    DF AUTOMATED     PTT    Collection Time: 08/01/21  4:30 AM   Result Value Ref Range    aPTT 49.2 (H) 23.0 - 36.4 SEC   GLUCOSE, POC    Collection Time: 08/01/21  7:30 AM   Result Value Ref Range    Glucose (POC) 269 (H) 70 - 110 mg/dL   GLUCOSE, POC    Collection Time: 08/01/21 11:29 AM   Result Value Ref Range    Glucose (POC) 116 (H) 70 - 110 mg/dL   PTT    Collection Time: 08/01/21 11:58 AM   Result Value Ref Range    aPTT 140.8 (H) 23.0 - 36.4 SEC       Impression:   Severe peripheral vascular disease bilateral lower extrermities with ischemic toes 2,5 left foot  Diabetes with peripheral neuropathy bilateral lower extremities   Grade 1 Esposito chronic decubitus ulcer right foot (rule out OM)  Cellulitis right foot    Recommendation:   Vascular consult is pending, with testing for tomorrow. Will consider left lesser toe/toes amputation depending on vascular input/results. Recommend MRI right midfoot to rule out Osteomyelitis plantar midfoot from chronic open ulcer. Continue IV antibiotics as per medicine. Might consider ID consult due long list of antibiotic allergies. Right foot wound culture taken for antibiotic reference. Recommend Wound Care Consult for right foot ulcer care.   I will follow as needed

## 2021-08-01 NOTE — PROGRESS NOTES
Problem: Falls - Risk of  Goal: *Absence of Falls  Description: Document Shay Lobe Fall Risk and appropriate interventions in the flowsheet.   Outcome: Progressing Towards Goal  Note: Fall Risk Interventions:  Mobility Interventions: Patient to call before getting OOB         Medication Interventions: Patient to call before getting OOB         History of Falls Interventions: Door open when patient unattended         Problem: Pain  Goal: *Control of Pain  Outcome: Progressing Towards Goal

## 2021-08-01 NOTE — PROGRESS NOTES
Weekend coverage-    DC Plan: HH? Pt admitted to [facility] for ischemic toe. CM will be following for transition of care needs and will be available via hospital  on weekends    Update:  Pt with vascular and podiatry consult. CM me with pt at bedside earlier today to discuss dispo. Pt states she is mostly independent and her son lives with her. Pt has 4 sons total. Home address confirmed per face sheet - 3 steps to enter home. Pt states she drives when able - only recently has she has trouble getting around due to toe pain. Pt has cane, rollator. Pt is not active with , but states she has used [facility] in past, [address] offered and she would like to use this agency if needed. Discussed possibility of SNF and she declined - prefers home plan. She does request getting some bandages at discharge if possible. PCP confirmed per face sheet. No other dc concerns identified. Reason for Admission:   Per H&P pt is \"is a 80 y.o. female with diabetes, hypertension, CAD, CABG, PAD, right TMA, left great toe amputation presents to ER with concerns of left foot pain especially left second toe. Right foot plantar aspect diabetic foot ulcer. She has severe peripheral vascular disease. She had vascular procedures done in March and April of this year. She is status post left femoral endarterectomy and bilateral stent placement. She reports that has left great toe has been gangrenous since the vascular procedure. He had hoped that it would heal but it has continued to get worse and it has now started to get progressively painful. She denies any fever, chills, nausea, vomiting, chest pain, shortness of breath. In ER her vital signs relatively normal range, x-ray left foot with no evidence of osteomyelitis, x-ray right foot with the midfoot bones osteomyelitis cannot be excluded. \"               RUR Score:    26%       PCP: First and Last name:  Eduar Dubois, [address]     Name of Practice:   Are you a current patient: Yes/No:   Approximate date of last visit:    Can you do a virtual visit with your PCP:              Resources/supports as identified by patient/family:                   Top Challenges facing patient (as identified by patient/family and CM): Finances/Medication cost?                    Transportation? Self or family              Support system or lack thereof? Has 4 sons                     Living arrangements? Son lives with her           Self-care/ADLs/Cognition? Alert and oriented          Current Advanced Directive/Advance Care Plan:  DNR      Healthcare Decision Maker:   Click here to complete HealthCare Decision Makers including selection of the Healthcare Decision Maker Relationship (ie \"Primary\")      Primary Decision Maker: Irma Milneremmanuel - [phone]    Secondary Decision Maker: Bryan Lechuga - [phone]    Payor Source Payor: [place] / Plan: Erika Levine / Product Type: Medicare /                             Plan for utilizing home health:    TBD                 Transition of Care Plan:     TBD            Care Management Interventions  PCP Verified by CM: Yes  Mode of Transport at Discharge: Other (see comment) (family)  Transition of Care Consult (CM Consult): Discharge Planning  Current Support Network:  Other (son lives with pt)  Confirm Follow Up Transport: Self (or family)  The Patient and/or Patient Representative was Provided with a Choice of Provider and Agrees with the Discharge Plan?: Yes  Name of the Patient Representative Who was Provided with a Choice of Provider and Agrees with the Discharge Plan: patient  Freedom of Choice List was Provided with Basic Dialogue that Supports the Patient's Individualized Plan of Care/Goals, Treatment Preferences and Shares the Quality Data Associated with the Providers?: Yes  Discharge Location  Discharge Placement: Home with home health

## 2021-08-01 NOTE — PROGRESS NOTES
Podiatry:  Patient seen today for severe ischemic pain of her left 2nd toe. She states that she has been treated by Dr. Bhavna Mariee (Vascular surgery) for 2 lower extremity procedures in March and April with limited success. Her left 2nd toe turned purple a few weeks ago and is cold and painful. She also has been treating her right foot ulcer at home since last year, and states it was doing well until a week ago. I would recommend a MRI to rule out OM right foot. Will wait for Vascular input before any surgery left foot. See consult for details. ...

## 2021-08-01 NOTE — CONSULTS
Vascular Surgery Consult      Reason for consultation: PVD with worsening ischemic rest pain, LLE    Patient Active Problem List   Diagnosis Code    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 Z90.3, Z98.0    Atherosclerotic PVD with ulceration (HCC) I70.209, L98.499    Anemia D64.9    Lupus anticoagulant syndrome ([facility] D68.62    PAD (peripheral artery disease) (HCC) I73.9    Sleepwalking disorder F51.3    Anxiety F41.9    Type II diabetes mellitus with complication, uncontrolled ([facility] E11.8, E11.65    CAD (coronary artery disease) I25.10    Diabetic foot ulcer ([facility] E11.621, L97.509    Cellulitis of right foot L03.115    Chronic anticoagulation Z79.01    Elevated liver enzymes R74.8    Primary biliary cholangitis (HCC) K74.3    Arterial thrombosis (HCC) I74.9    Ischemic toe I99.8    Delta-9-tetrahydrocannabinol (THC) dependence (HCC) F12.20       HPI:  Asked to evaluate this very pleasant 79 yo WF admitted yesterday with c/o worsening pain in her left second toe. Pt has a h/o severe multilevel PVD, and is known to our service. She describes a two week h/o worsening pain in the foot, that became severe enough to prompt her visit to the ED. PMH is notable for previous percutaneous and open revascularization of her aorta, iliacs and LE's. In March she underwent PTA/stenting of both TONIA's with left femoral endarterectomy by Dr Joanna Villalba. The left TONIA stent subsequently thrombosed. In May she underwent shockwave angioplasty of the infrarenal aorta, with PTA of the right TONIA stent, and placement of a new stent in the left TONIA in [place] by Dr Kevin Cornelius. Ischemic rest pain complaints improved, but did not completely resolve. She was seen in June by Dr Joanna Villalba. OLGA's were not obtainable due to severe calcific disease, however doppler waveforms showed significant improvement in both legs. She has a h/o prior right TMA, and amputation of the left hallux.   Over the past month she has developed worsening pain in her left second toe. She is followed by podiatry. Patient states she has a chronic ulcer on the plantar aspect of her right foot that had been healing, however recently this wound has progressed and is painful. She has a h/o DM and prior heavy tobacco use. She has not smoked cigarettes since 1996, though admits to smoking marijuana regularly, which eases her pain, and helps her appetite. She has a previous partial gastrectomy, small and large bowel resection. She additionally has a known clotting d/o (lupus anticoagulant), and was taking coumadin and clopidogrel prior to admission. INR yesterday was 1. Past Medical History:   Diagnosis Date    Arthritis     hands and feet    Asthma     Autoimmune disease ([address]     lupus anticoagulant-causes clotting    Cancer ([address]     carcinoma in situ of cervix and uterus; inside bottom lip    Chronic pain     Left foot    Diabetes ([address] 2013    Dizzy spells     h/o    GERD (gastroesophageal reflux disease)     Liver disease     1/2 of my liver was removed for ulcers.     Lung collapse     2015    Nausea & vomiting 5/23/2012    Neuropathy     right foot    Other ill-defined conditions(799.89)     arterial clot in rt leg    Other ill-defined conditions(799.89)     restless leg syndrome    Other ill-defined conditions(799.89)     raynaud's syndrome    Other ill-defined conditions(799.89)     chronic N/V    Other ill-defined conditions(799.89)     (2 mutated genes) hyper coag. state     Pneumonia 07/22/2017    Positive cardiolipin antibodies     Psychiatric disorder     depression    PUD (peptic ulcer disease) 2009    H/O requiring partial gastrectomy and small intestine    Thromboembolus ([address] 2000    artery right leg     Past Surgical History:   Procedure Laterality Date    HX APPENDECTOMY      HX BREAST AUGMENTATION      developed infection and implant removed    HX CHOLECYSTECTOMY      HX GASTRECTOMY due to ulcers    HX GASTRIC BYPASS  1996    gastric by-pass    HX GI      total gastrectomy, partial colectomy    HX GI      colostomy and reversal    HX HEENT      cancer of lip removed    HX HYSTERECTOMY      for cervical cancer    HX ORTHOPAEDIC      rt foot surgery x10    HX ORTHOPAEDIC Left     great toe gangrenous and amputated    HX OTHER SURGICAL      part of liver removal    HX OTHER SURGICAL      ventral hernia repair x10    HX OTHER SURGICAL      Exc pilonial cystectomy    MO ABDOMEN SURGERY PROC UNLISTED      duodenal ulcer rupture    MO BREAST SURGERY PROCEDURE UNLISTED Bilateral 02/2016    hemorrhaged after breast surgery    MO CARDIAC SURG PROCEDURE UNLIST  2018    open heart (single vessel)    VASCULAR SURGERY PROCEDURE UNLIST      iliac aorta stents x 6    VASCULAR SURGERY PROCEDURE UNLIST      seven more stents iliac aorta and femoral      [unfilled]  Family History   Problem Relation Age of Onset    Malignant Hyperthermia Neg Hx     Pseudocholinesterase Deficiency Neg Hx     Delayed Awakening Neg Hx     Post-op Nausea/Vomiting Neg Hx     Emergence Delirium Neg Hx     Post-op Cognitive Dysfunction Neg Hx     Other Neg Hx          Allergies   Allergen Reactions    Dilaudid [Hydromorphone] Hives    Rifampin Hives and Swelling     Swelling of mouth and tounge    Levaquin [Levofloxacin] Itching    Amoxicillin Hives    Aspirin Nausea and Vomiting    Azithromycin Angioedema    Bactrim [Sulfamethoprim Ds] Hives    Cefatrizine Hives    Erythromycin Hives    Keflex [Cephalexin] Itching     Swollen throat and tongue    Rocephin [Ceftriaxone] Hives    Tylenol [Acetaminophen] Hives    Vancomycin Hives       [unfilled]    Home Medications:     Prior to Admission medications    Medication Sig Start Date End Date Taking? Authorizing Provider   lidocaine (Lidoderm) 5 % Apply patch to the affected area for 12 hours a day and remove for 12 hours a day.  6/15/21   Anest, Lynder Soulier, MD hydrOXYzine HCL (ATARAX) 50 mg tablet Take 25 mg by mouth three (3) times daily as needed. Provider, Historical   OTHER Blood thinner, patient unsure of name   Indications: Warfarin    Provider, Historical   ursodioL (ACTIGALL) 500 mg tablet TAKE ONE TABLET BY MOUTH TWICE A DAY 6/15/20   Hortencia Grimm MD   warfarin sodium (COUMADIN PO) Take 7 mg by mouth daily. Other, MD Jay   promethazine (PHENERGAN) 25 mg tablet Take 25 mg by mouth every six (6) hours as needed for Nausea. Provider, Historical   B.infantis-B.ani-B.long-B.bifi (PROBIOTIC 4X) 10-15 mg TbEC Take  by mouth. Provider, Historical   nystatin (MYCOSTATIN) topical cream Apply  to affected area two (2) times a day. 12/7/18   Cain Verde MD   atorvastatin (LIPITOR) 20 mg tablet Take 20 mg by mouth daily. Provider, Historical   dronabinol (MARINOL) 2.5 mg capsule Take 2.5 mg by mouth three (3) times daily. Provider, Historical   metoprolol tartrate (LOPRESSOR) 25 mg tablet Take 25 mg by mouth two (2) times a day. Provider, Historical   nitroglycerin (NITROSTAT) 0.3 mg SL tablet by SubLINGual route every five (5) minutes as needed for Chest Pain (x3). Provider, Historical   clopidogrel (PLAVIX) 75 mg tab Take 75 mg by mouth daily. Provider, Historical   lactobacillus combination no.4 (PROBIOTIC) 3 billion cell cap Take 1 Cap by mouth daily. Provider, Historical   Cetirizine (ZYRTEC) 10 mg cap Take  by mouth. Provider, Historical   cyanocobalamin 1,000 mcg tablet Take 1,000 mcg by mouth daily. Provider, Historical   glimepiride (AMARYL) 2 mg tablet Take 2 mg by mouth every morning. Provider, Historical   melatonin 3 mg tablet Take 6 mg by mouth nightly. Provider, Historical   ondansetron hcl (ZOFRAN, AS HYDROCHLORIDE,) 4 mg tablet Take 4 mg by mouth every eight (8) hours as needed for Nausea.     Provider, Historical   cyclobenzaprine (FLEXERIL) 10 mg tablet Take  by mouth three (3) times daily as needed for Muscle Spasm(s). Provider, Historical   BIOTIN PO Take 5,000 mcg by mouth daily. Provider, Historical   albuterol (PROVENTIL HFA, VENTOLIN HFA) 90 mcg/actuation inhaler Take 2 Puffs by inhalation every four (4) hours as needed. Indications: ACUTE ASTHMA ATTACK    Provider, Historical   metFORMIN (GLUCOPHAGE) 500 mg tablet Take 500 mg by mouth two (2) times daily (with meals). Indications: TYPE 2 DIABETES MELLITUS    Provider, Historical   ropinirole (REQUIP) 4 mg Tab TAB Take 4 mg by mouth nightly. Indications: RESTLESS LEGS SYNDROME    Provider, Historical       Review of Systems:     Denies recent illness, c/o F/C, N/V, SOB or CP. Describes claudication symptoms in both calves after walking less than 10 yds. Physical Assessment:     Pleasant, thin elderly  female who is resting comfortably. Neck supple, trachea midline. Chest with nl excursion. Abdomen soft and NT. Well healed surgical scars. Well healed left groin incision. Palpable right femoral pulse. Left femoral and bilateral popliteal and pedal pulses NP. Monophasic signal in left DP/PT. Well healed right TMA and left hallux incision. The left second toe is violaceous. Skin intact. Tender to palpation. Skin of both feet is cool, with a livedo reticularis pattern. .    Calves are soft and NT.  2 cm full thickness plantar ulcer right midfoot. Well demarcated wound edges with no evidence for infection. Limited neuro exam NF. Basic Metabolic Profile  Lab Results   Component Value Date     (L) 08/01/2021    CO2 25 08/01/2021    BUN 23 (H) 08/01/2021       PVL: pending    Impression:     81 yo vasculopath with recurrent LE ischemia and worsening rest pain in the left second toe. Neither foot appears immediately threatened. I am concerned that she has re-occluded the left TONIA stent. Plan:     An aortoiliac duplex and LE doppler study has been ordered.   Further recommendations will be predicated on the results of those studies. Pain control is an issue. Have ordered a Dilaudid PCA. Needs appropriate wound care to the right pedal diabetic ulcer; Xeroform and Gauze/Kerlex dressing for now pending podiatry evaluation.     Lázaro Chao MD [address] [place] [address] [facility]

## 2021-08-01 NOTE — ROUTINE PROCESS
1912 Bedside and Verbal shift change report given to Adalid Patel RN (oncoming nurse) by HUMBERTO Mcgee RN (offgoing nurse). Report included the following information SBAR, Kardex, Intake/Output, MAR and Recent Results. 1958 Pt C/O 8/10 bilateral leg pain and nausea, Dr Karon Talbert notified, N.O for Zofran and Tramadol. 2146 Pain re-assessment pt states pain is 7/10, rest and relaxation encouraged. 2253 Pain re-accessed pt observed resting in bed with chest rise and fall noted and RR greater than 10. APTT returned at 25.1, heparin drip started at 18 units/kg/hr verified at bedside with Ruby North RN. Next aPTT scheduled for 0500.     0347 Pt C/O 8/10 bilateral feet pain unrelieved by repositioning, PRN Tramadol administered will follow up on effectiveness. 0448 Pain re-accessed pt observed resting in bed with chest rise and fall noted and RR greater than 10.    0604 aPTT returned at 49.2, bolus administered and rate increased by 4 per protocol. Next aPTT 1200.     0704 Bedside and Verbal shift change report given to TAIWO Jean RN (oncoming nurse) by Adalid Patel RN (offgoing nurse). Report included the following information SBAR, Kardex, Intake/Output, MAR and Recent Results.

## 2021-08-02 ENCOUNTER — APPOINTMENT (OUTPATIENT)
Dept: VASCULAR SURGERY | Age: 71
DRG: 300 | End: 2021-08-02
Attending: NURSE PRACTITIONER
Payer: MEDICARE

## 2021-08-02 ENCOUNTER — APPOINTMENT (OUTPATIENT)
Dept: VASCULAR SURGERY | Age: 71
DRG: 300 | End: 2021-08-02
Attending: SURGERY
Payer: MEDICARE

## 2021-08-02 LAB
ANION GAP SERPL CALC-SCNC: 2 MMOL/L (ref 3–18)
APTT PPP: 117.9 SEC (ref 23–36.4)
APTT PPP: 74.4 SEC (ref 23–36.4)
BASOPHILS # BLD: 0.1 K/UL (ref 0–0.1)
BASOPHILS NFR BLD: 1 % (ref 0–2)
BUN SERPL-MCNC: 20 MG/DL (ref 7–18)
BUN/CREAT SERPL: 33 (ref 12–20)
CALCIUM SERPL-MCNC: 9.1 MG/DL (ref 8.5–10.1)
CHLORIDE SERPL-SCNC: 102 MMOL/L (ref 100–111)
CO2 SERPL-SCNC: 29 MMOL/L (ref 21–32)
CREAT SERPL-MCNC: 0.61 MG/DL (ref 0.6–1.3)
DIFFERENTIAL METHOD BLD: ABNORMAL
EOSINOPHIL # BLD: 0.2 K/UL (ref 0–0.4)
EOSINOPHIL NFR BLD: 2 % (ref 0–5)
ERYTHROCYTE [DISTWIDTH] IN BLOOD BY AUTOMATED COUNT: 23.8 % (ref 11.6–14.5)
EST. AVERAGE GLUCOSE BLD GHB EST-MCNC: 151 MG/DL
GLUCOSE BLD STRIP.AUTO-MCNC: 109 MG/DL (ref 70–110)
GLUCOSE BLD STRIP.AUTO-MCNC: 143 MG/DL (ref 70–110)
GLUCOSE BLD STRIP.AUTO-MCNC: 157 MG/DL (ref 70–110)
GLUCOSE BLD STRIP.AUTO-MCNC: 264 MG/DL (ref 70–110)
GLUCOSE SERPL-MCNC: 141 MG/DL (ref 74–99)
HBA1C MFR BLD: 6.9 % (ref 4.2–5.6)
HCT VFR BLD AUTO: 33.6 % (ref 35–45)
HGB BLD-MCNC: 10.6 G/DL (ref 12–16)
LYMPHOCYTES # BLD: 2.2 K/UL (ref 0.9–3.6)
LYMPHOCYTES NFR BLD: 26 % (ref 21–52)
MCH RBC QN AUTO: 29.9 PG (ref 24–34)
MCHC RBC AUTO-ENTMCNC: 31.5 G/DL (ref 31–37)
MCV RBC AUTO: 94.6 FL (ref 74–97)
MONOCYTES # BLD: 0.6 K/UL (ref 0.05–1.2)
MONOCYTES NFR BLD: 8 % (ref 3–10)
NEUTS SEG # BLD: 5.2 K/UL (ref 1.8–8)
NEUTS SEG NFR BLD: 63 % (ref 40–73)
PLATELET # BLD AUTO: 375 K/UL (ref 135–420)
PMV BLD AUTO: 9 FL (ref 9.2–11.8)
POTASSIUM SERPL-SCNC: 4.8 MMOL/L (ref 3.5–5.5)
RBC # BLD AUTO: 3.55 M/UL (ref 4.2–5.3)
SODIUM SERPL-SCNC: 133 MMOL/L (ref 136–145)
WBC # BLD AUTO: 8.3 K/UL (ref 4.6–13.2)

## 2021-08-02 PROCEDURE — 93978 VASCULAR STUDY: CPT

## 2021-08-02 PROCEDURE — 74011250636 HC RX REV CODE- 250/636: Performed by: HOSPITALIST

## 2021-08-02 PROCEDURE — 85025 COMPLETE CBC W/AUTO DIFF WBC: CPT

## 2021-08-02 PROCEDURE — 83036 HEMOGLOBIN GLYCOSYLATED A1C: CPT

## 2021-08-02 PROCEDURE — 74011636637 HC RX REV CODE- 636/637: Performed by: HOSPITALIST

## 2021-08-02 PROCEDURE — 74011000258 HC RX REV CODE- 258: Performed by: HOSPITALIST

## 2021-08-02 PROCEDURE — 74011250637 HC RX REV CODE- 250/637: Performed by: NURSE PRACTITIONER

## 2021-08-02 PROCEDURE — 85730 THROMBOPLASTIN TIME PARTIAL: CPT

## 2021-08-02 PROCEDURE — 93923 UPR/LXTR ART STDY 3+ LVLS: CPT

## 2021-08-02 PROCEDURE — 76450000000

## 2021-08-02 PROCEDURE — 36415 COLL VENOUS BLD VENIPUNCTURE: CPT

## 2021-08-02 PROCEDURE — 65270000029 HC RM PRIVATE

## 2021-08-02 PROCEDURE — 74011000250 HC RX REV CODE- 250: Performed by: HOSPITALIST

## 2021-08-02 PROCEDURE — 99223 1ST HOSP IP/OBS HIGH 75: CPT | Performed by: NURSE PRACTITIONER

## 2021-08-02 PROCEDURE — 82962 GLUCOSE BLOOD TEST: CPT

## 2021-08-02 PROCEDURE — 74011250636 HC RX REV CODE- 250/636: Performed by: FAMILY MEDICINE

## 2021-08-02 PROCEDURE — 74011250637 HC RX REV CODE- 250/637: Performed by: HOSPITALIST

## 2021-08-02 PROCEDURE — 80048 BASIC METABOLIC PNL TOTAL CA: CPT

## 2021-08-02 RX ORDER — OXYCODONE HYDROCHLORIDE 5 MG/1
10 TABLET ORAL
Status: DISCONTINUED | OUTPATIENT
Start: 2021-08-02 | End: 2021-08-05 | Stop reason: HOSPADM

## 2021-08-02 RX ORDER — GABAPENTIN 300 MG/1
300 CAPSULE ORAL
Status: DISCONTINUED | OUTPATIENT
Start: 2021-08-02 | End: 2021-08-05 | Stop reason: HOSPADM

## 2021-08-02 RX ORDER — OXYCODONE HYDROCHLORIDE 5 MG/1
5 TABLET ORAL
Status: DISCONTINUED | OUTPATIENT
Start: 2021-08-02 | End: 2021-08-05 | Stop reason: HOSPADM

## 2021-08-02 RX ORDER — FENTANYL CITRATE 50 UG/ML
50 INJECTION, SOLUTION INTRAMUSCULAR; INTRAVENOUS
Status: DISCONTINUED | OUTPATIENT
Start: 2021-08-02 | End: 2021-08-05 | Stop reason: HOSPADM

## 2021-08-02 RX ADMIN — HYDROXYZINE HYDROCHLORIDE 25 MG: 25 TABLET, FILM COATED ORAL at 10:56

## 2021-08-02 RX ADMIN — DOXYCYCLINE 100 MG: 100 INJECTION, POWDER, LYOPHILIZED, FOR SOLUTION INTRAVENOUS at 09:25

## 2021-08-02 RX ADMIN — DOXYCYCLINE 100 MG: 100 INJECTION, POWDER, LYOPHILIZED, FOR SOLUTION INTRAVENOUS at 21:00

## 2021-08-02 RX ADMIN — INSULIN LISPRO 6 UNITS: 100 INJECTION, SOLUTION INTRAVENOUS; SUBCUTANEOUS at 11:51

## 2021-08-02 RX ADMIN — HEPARIN SODIUM 18 UNITS/KG/HR: 10000 INJECTION, SOLUTION INTRAVENOUS at 01:54

## 2021-08-02 RX ADMIN — ONDANSETRON 4 MG: 2 INJECTION INTRAMUSCULAR; INTRAVENOUS at 08:13

## 2021-08-02 RX ADMIN — ATORVASTATIN CALCIUM 20 MG: 20 TABLET, FILM COATED ORAL at 09:26

## 2021-08-02 RX ADMIN — HYDROXYZINE HYDROCHLORIDE 25 MG: 25 TABLET, FILM COATED ORAL at 23:28

## 2021-08-02 RX ADMIN — Medication 10 ML: at 07:07

## 2021-08-02 RX ADMIN — OXYCODONE 10 MG: 5 TABLET ORAL at 18:35

## 2021-08-02 RX ADMIN — CLOPIDOGREL BISULFATE 75 MG: 75 TABLET ORAL at 09:26

## 2021-08-02 RX ADMIN — Medication 250 MG: at 09:26

## 2021-08-02 RX ADMIN — ROPINIROLE HYDROCHLORIDE 4 MG: 1 TABLET, FILM COATED ORAL at 23:27

## 2021-08-02 RX ADMIN — METOPROLOL TARTRATE 25 MG: 25 TABLET, FILM COATED ORAL at 23:28

## 2021-08-02 RX ADMIN — Medication 10 ML: at 23:30

## 2021-08-02 RX ADMIN — OXYCODONE 10 MG: 5 TABLET ORAL at 23:28

## 2021-08-02 RX ADMIN — CYANOCOBALAMIN TAB 1000 MCG 1000 MCG: 1000 TAB at 09:26

## 2021-08-02 RX ADMIN — GABAPENTIN 300 MG: 300 CAPSULE ORAL at 23:28

## 2021-08-02 RX ADMIN — Medication 10 ML: at 13:29

## 2021-08-02 RX ADMIN — METOPROLOL TARTRATE 25 MG: 25 TABLET, FILM COATED ORAL at 09:26

## 2021-08-02 NOTE — CONSULTS
[facility]: [phone] ([phone])  [facility]: [phone]     Patient Name: Mark Mccarty  YOB: 1950    Date of Initial Consult : 8/2/2021  Reason for Consult: Care decisions  Requesting Provider: Dr. Sharad Barajas  Primary Care Physician: TORRIE Persaud      SUMMARY:   Mark Mccarty is a 80 y.o. female with a past history of diabetes, hypertension, CAD, CABG, PAD, right TMA and left great toe amputation, who was admitted on 7/31/2021 from home with a diagnosis of left second toe ischemia and gangrene, severe PVD. Current medical issues leading to Palliative Medicine involvement include: 69-year-old female with multiple comorbid conditions including severe peripheral vascular disease admitted with complaints of left foot pain. Palliative medicine is consulted for care decisions. CHIEF COMPLAINT: Pain left second toe    HPI/SUBJECTIVE:    Past history as above. Ms. Javed Gray is a 69-year-old pleasant female with multiple comorbid conditions including severe peripheral vascular disease and PAD who presented to the ER with complaints of left foot pain, especially the left second toe. Reports having vascular procedures done in March and April 2021 and is status post left femoral endarterectomy and bilateral stent placement. Patient reports that her left foot has become increasingly painful. Endorses moderate pain in both feet. Denies shortness of breath. The patient is:   [x] Verbal and participatory  [] Non-participatory due to:     GOALS OF CARE:  DNR/DNI, limited interventions, no feeding tube  Patient/Health Care Proxy Stated Goals: Prolong life      TREATMENT PREFERENCES:   Code Status: DNR/DNI         PALLIATIVE DIAGNOSES:   1. Encounter for palliative care/goals of care  2. Left second toe ischemia  3. PAD  4. Debility          PLAN:   1. Encounter for palliative care/goals of care - Seen at bedside along with Ms. Armin Ngo RN.   Ms. Yumi Bynum is sitting up on the side of the bed, awake, alert and oriented x4. Pleasant and engaged in conversation with our team.  Reports having pain in her left foot and that nothing is really helping. Noted Lidocaine patch in place to left foot and Dilaudid PCA in use. There is no AMD on file. Patient reports that she has four sons, Raphael Thompson who lives with her, Rayford Bernheim and Argelia Couch (who lives in [place]). Inquired if patient had ever completed any paperwork to name a surrogate medical decision maker and patient was not sure and asked us to contact her PCP to see if there was anything on file. Explained to patient that in the absence of any paperwork, according to the [address], default decision making would fall to a majority of her four sons and patient was agreeable to this. Introduced and discussed goals of care including benefits and burdens of resuscitation, intubation and ventilation. Ms. Yumi Bynum quickly stated that she is \"DNR\". We explored this decision and learned that patient had made herself a DNR several years ago and she re-affirmed this decision with us today. She does not wish for aggressive resuscitation including chest compressions, shock, medications or intubation/ventilation in the event of cardiac arrest or respiratory decline. Palliative medicine contacted patient's PCP, Jonh BROWNLEE, and received a fax copy of a DDNR signed by patient in 2016. Introduced and explained the physician order for scope of treatment (POST) form to patient and offered to complete with her today and she was agreeable. POST form completed today with the following measures:  DNR/DNI, limited medical interventions, no feeding tube. Copy placed on chart for scanning. Original and 4 copies provided to patient. Goals of care care DNR/DNI, limited interventions, no feeding tube.       2. Left second toe ischemia - primary team managing, vascular and podiatry consult, pending aortoiliac duplex and lower extremity doppler studies, Dilaudid PCA    3. PAD - primary team managing, vascular and podiatry consult, pending aortoiliac duplex and lower extremity doppler studies    4. Debility - PPS 50, lives at home along with one of her sons, mostly independent with ADLs, has a cane and rollator at home. Reports that she does still drive when able. 5. Initial consult note routed to primary continuity provider    6. Communicated plan of care with: Palliative IDT, patient      Advance Care Planning:  [] The [facility] Interdisciplinary Team has updated the ACP Navigator with [address] and Patient Capacity    Primary Decision [facility] ([address]):   Primary Decision Maker: Eduar Schmidt - [phone]    Secondary Decision Maker: Isaura Milligan - Son - [phone]              As far as possible, the palliative care team has discussed with patient / health care proxy about goals of care / treatment preferences for patient.          HISTORY:     History obtained from: Chart and patient    Principal Problem:    Ischemic toe (7/31/2021)    Active Problems:    Atherosclerotic PVD with ulceration ([address] (3/13/2015)      Anemia (3/1/2016)      PAD (peripheral artery disease) ([address] (7/22/2017)      Anxiety (10/15/2017)      Type II diabetes mellitus with complication, uncontrolled ([address] (4/13/2018)      CAD (coronary artery disease) (4/13/2018)      Diabetic ulcer of right midfoot with fat layer exposed ([address] (12/4/2018)      Delta-9-tetrahydrocannabinol (THC) dependence ([address] (7/31/2021)      Past Medical History:   Diagnosis Date    Arthritis     hands and feet    Asthma     Autoimmune disease ([address]     lupus anticoagulant-causes clotting    Cancer ([address]     carcinoma in situ of cervix and uterus; inside bottom lip    Chronic pain     Left foot    Diabetes ([address] 2013    Dizzy spells     h/o    GERD (gastroesophageal reflux disease)     Liver disease     1/2 of my liver was removed for ulcers.     Lung collapse     2015    Nausea & vomiting 5/23/2012    Neuropathy     right foot    Other ill-defined conditions(799.89)     arterial clot in rt leg    Other ill-defined conditions(799.89)     restless leg syndrome    Other ill-defined conditions(799.89)     raynaud's syndrome    Other ill-defined conditions(799.89)     chronic N/V    Other ill-defined conditions(799.89)     (2 mutated genes) hyper coag. state     Pneumonia 07/22/2017    Positive cardiolipin antibodies     Psychiatric disorder     depression    PUD (peptic ulcer disease) 2009    H/O requiring partial gastrectomy and small intestine    Th[address]emb[address] [facility] 2000    artery right leg      Past Surgical History:   Procedure Laterality Date    HX APPENDECTOMY      HX BREAST AUGMENTATION      developed infection and implant removed    HX CHOLECYSTECTOMY      HX GASTRECTOMY      due to ulcers    HX GASTRIC BYPASS  1996    gastric by-pass    HX GI      total gastrectomy, partial colectomy    HX GI      colostomy and reversal    HX HEENT      cancer of lip removed    HX HYSTERECTOMY      for cervical cancer    HX ORTHOPAEDIC      rt foot surgery x10    HX ORTHOPAEDIC Left     great toe gangrenous and amputated    HX OTHER SURGICAL      part of liver removal    HX OTHER SURGICAL      ventral hernia repair x10    HX OTHER SURGICAL      Exc pilonial cystectomy    [place] ABDOMEN SURGERY PROC UNLISTED      duodenal ulcer rupture    [place] BREAST SURGERY PROCEDURE [place]LISTED Bilateral 02/2016    hemorrhaged after breast surgery    [place] CARDIAC SURG PROCEDURE UNLIST  2018    open heart (single vessel)    VASCULAR SURGERY PROCEDURE UNLIST      iliac aorta stents x 6    VASCULAR SURGERY PROCEDURE UNLIST      seven more stents iliac aorta and femoral       Family History   Problem Relation Age of Onset    Malignant Hyperthermia Neg Hx     Pseudocholinesterase Deficiency Neg Hx     Delayed Awakening Neg Hx     Post-op Nausea/Vomiting Neg Hx     Emergence Delirium Neg Hx     Post-op Cognitive Dysfunction Neg Hx     Other Neg Hx      History reviewed, no pertinent family history.   Social History     Tobacco Use    Smoking status: Former Smoker     Years: 0.50     Quit date: 1996     Years since quittin.6    Smokeless tobacco: Never Used   Substance Use Topics    Alcohol use: Yes     Comment: 1 or 2 glasses wine or mixed drinks monthly      Allergies   Allergen Reactions    Dilaudid [Hydromorphone] Hives    Rifampin Hives and Swelling     Swelling of mouth and tounge    Levaquin [Levofloxacin] Itching    Amoxicillin Hives    Aspirin Nausea and Vomiting    Azithromycin Angioedema    Bactrim [Sulfamethoprim Ds] Hives    Cefatrizine Hives    Erythromycin Hives    Keflex [Cephalexin] Itching     Swollen throat and tongue    Rocephin [Ceftriaxone] Hives    Tylenol [Acetaminophen] Hives    Vancomycin Hives      Current Facility-Administered Medications   Medication Dose Route Frequency    sodium chloride (NS) flush 5-40 mL  5-40 mL IntraVENous Q8H    sodium chloride (NS) flush 5-40 mL  5-40 mL IntraVENous PRN    HYDROmorphone (DILAUDID) 30 mg / 30 mL PCA in water   IntraVENous TITRATE    atorvastatin (LIPITOR) tablet 20 mg  20 mg Oral DAILY    Saccharomyces boulardii (FLORASTOR) capsule 250 mg  250 mg Oral DAILY    clopidogreL (PLAVIX) tablet 75 mg  75 mg Oral DAILY    cyanocobalamin tablet 1,000 mcg  1,000 mcg Oral DAILY    hydrOXYzine HCL (ATARAX) tablet 25 mg  25 mg Oral TID PRN    lidocaine 4 % patch 1 Patch  1 Patch TransDERmal DAILY    metoprolol tartrate (LOPRESSOR) tablet 25 mg  25 mg Oral BID    rOPINIRole (REQUIP) tablet 4 mg  4 mg Oral QHS    ursodioL (ACTIGALL) tablet 500 mg (Patient Supplied)  500 mg Oral BID    heparin 25,000 units in D5W 250 ml infusion  18-36 Units/kg/hr IntraVENous TITRATE    glucose chewable tablet 16 g  16 g Oral PRN    glucagon (GLUCAGEN) injection 1 mg  1 mg IntraMUSCular PRN    dextrose (D50W) injection syrg 25 g  50 mL IntraVENous PRN    insulin lispro (HUMALOG) injection   SubCUTAneous AC&HS    doxycycline (VIBRAMYCIN) 100 mg in 0.9% sodium chloride (MBP/ADV) 100 mL MBP  100 mg IntraVENous Q12H    ondansetron (ZOFRAN) injection 4 mg  4 mg IntraVENous Q6H PRN    traMADoL (ULTRAM) tablet 50 mg  50 mg Oral Q6H PRN          Clinical Pain Assessment (nonverbal scale for nonverbal patients): Clinical Pain Assessment  Severity: 5          Duration: for how long has pt been experiencing pain (e.g., 2 days, 1 month, years)  Frequency: how often pain is an issue (e.g., several times per day, once every few days, constant)     FUNCTIONAL ASSESSMENT:     Palliative Performance Scale (PPS):  PPS: 50    ECOG  ECOG Status : Limited self-care     PSYCHOSOCIAL/SPIRITUAL SCREENING:      Any spiritual / Zoroastrianism concerns:  [] Yes /  [x] No    Caregiver Burnout:  [] Yes /  [] No /  [x] No Caregiver Present      Anticipatory grief assessment:   [x] Normal  / [] Maladaptive        REVIEW OF SYSTEMS:     Systems: constitutional, ears/nose/mouth/throat, respiratory, gastrointestinal, genitourinary, musculoskeletal, integumentary, neurologic, psychiatric, endocrine. Positive findings noted below. Modified ESAS Completed by: provider           Pain: 5           Dyspnea: 0               Positive and pertinent negative findings in ROS are noted above in HPI. The following systems were [x] reviewed / [] unable to be reviewed as noted in HPI  Other findings are noted below. PHYSICAL EXAM:     Constitutional: Sitting on the side of the bed, awake, alert, oriented x4.   Fair appetite  Eyes: pupils equal, anicteric  ENMT: no nasal discharge, moist mucous membranes  Cardiovascular: regular rhythm  Respiratory: breathing not labored, symmetric  Gastrointestinal: soft   Last bowel movement: none recorded  Musculoskeletal: R foot with dressing intact, L foot purple discoloration  Skin: warm, dry  Neurologic: following commands, moving all extremities  Psychiatric: full affect, no hallucinations    Other: Wt Readings from Last 3 Encounters:   07/31/21 54 kg (119 lb)   06/15/21 54 kg (119 lb)   04/29/21 56.2 kg (123 lb 12.8 oz)     Blood pressure (!) 162/76, pulse 66, temperature 98.1 °F (36.7 °C), resp. rate 14, weight 54 kg (119 lb), SpO2 99 %. Pain:  Pain Scale 1: Numeric (0 - 10)  Pain Intensity 1: 6     Pain Location 1: Foot  Pain Orientation 1: Right  Pain Description 1: Aching  Pain Intervention(s) 1: Medication (see MAR)       LAB AND IMAGING FINDINGS:     Lab Results   Component Value Date/Time    WBC 8.3 08/02/2021 12:33 AM    HGB 10.6 (L) 08/02/2021 12:33 AM    PLATELET 970 09/72/6507 12:33 AM     Lab Results   Component Value Date/Time    Sodium 133 (L) 08/02/2021 12:33 AM    Potassium 4.8 08/02/2021 12:33 AM    Chloride 102 08/02/2021 12:33 AM    CO2 29 08/02/2021 12:33 AM    BUN 20 (H) 08/02/2021 12:33 AM    Creatinine 0.61 08/02/2021 12:33 AM    Calcium 9.1 08/02/2021 12:33 AM    Magnesium 1.8 10/06/2020 10:50 AM    Phosphorus 2.3 (L) 03/01/2016 01:05 AM      Lab Results   Component Value Date/Time    Alk.  phosphatase 94 06/15/2021 02:25 PM    Protein, total 6.4 06/15/2021 02:25 PM    Albumin 3.7 06/15/2021 02:25 PM    Globulin 2.7 06/15/2021 02:25 PM     Lab Results   Component Value Date/Time    INR 0.9 07/31/2021 02:25 PM    Prothrombin time 11.9 07/31/2021 02:25 PM    aPTT 74.4 (H) 08/02/2021 07:55 AM      Lab Results   Component Value Date/Time    Iron 70 04/16/2019 04:44 PM    TIBC 335 04/16/2019 04:44 PM    Iron % saturation 21 04/16/2019 04:44 PM    Ferritin 132 04/16/2019 04:44 PM      No results found for: PH, PCO2, PO2  No components found for: [place]   Lab Results   Component Value Date/Time     (H) 06/15/2021 02:25 PM    CK - MB 7.0 (H) 06/15/2021 02:25 PM              Total time: 70 minutes    > 50% counseling / coordination:  Time spent in direct consultation with the patient, medical team, and family     Prolonged service was provided for  []30 min   []75 min in face to face time in the presence of the patient, spent as noted above. Time Start:   Time End:     Disclaimer: Sections of this note are dictated using utilizing voice recognition software, which may have resulted in some phonetic based errors in grammar and contents. Even though attempts were made to correct all the mistakes, some may have been missed, and remained in the body of the document. If questions arise, please contact our department.

## 2021-08-02 NOTE — ACP (ADVANCE CARE PLANNING)
[facility] [address] POST (Physician Orders for Scope of Treatment)       Date of conversation: 8/2/2021  [place]   Length (minutes): 35    CODE STATUS - DNR/DNI     Participants:   [x] Patient    [] Healthcare agent is a consensus of her adult children     Name: Mendy George     Relationship to Patient:son     Phone number: [phone]  []  Next of Heather Brown    Name: Rachel Resendez     Relationship to Patient:son     Phone Number: [phone]  []  Next of Kin    Name: Kapil Valentin     Relationship to Patient:son     Phone Number: [phone]  []  Next of Kin    Name: Chapin Jordan     Relationship to Patient:son     Phone Number: [phone]     [facility]® ACP Facilitator: Basim Newberry RN, Ann Marie Burnett NP       Conversation Topics     Adela Sierra is a 80year old female with DM, HTN, CAD, CABG, PAD, right TMA, left great toe amputation. Ms Mindy Nzaario was admitted via the ED with concerns of left foot pain especially left second toe. Right foot plantar aspect diabetic foot ulcer. She has severe peripheral vascular disease. She had vascular procedures done in March and April of this year. She is status post left femoral endarterectomy and bilateral stent placement. She shared that she wished it was on the road to healing but now fears it is getting worse and progressively painful. Understanding of Medical Condition/s AND Potential Complications: ms. Midny Nazario was able to explained the reason for her current hospital stay \"I came because of the pain, the doctor said I have no circulation to my left leg\". Lesson [place] from Experiences Related to Serious Illness: Patient shared that she lost her 1st  when he was just 33. Identifies the following as important for living well: Hopes:that they can relieve the pain      Worries/Fears about Medical Condition: That her sons will not honor her wishes.      Cultural, Scientology, spiritual, or personal beliefs described as: Ms. Mathew Robles shared that she is Yarsanism and follows her Islam teachings that disapproves of suicide and euthanasia. Needs to discuss with spiritual/Scientology advisor: [] Yes  [x] No    Needs more information about illness and complications:  [x] Yes  [] No      Cardiopulmonary Resuscitation      \"What do you understand about CPR? \" Response: \"I don't want that when my time comes I'm ready to go\". Palliative Team members met with Ms. Pineda at bedside. Patient is alert and aware x 4. Shared she has 4 adult children. Ms Mathew Robles has no AMD on file and declined to complete one \" The boys will all talk with each other no matter\". Patient clearly stated that she has completed a DNR and her PCP has a copy. She added that her sons are aware of her wishes but not happy. Palliative team introduced the Physician Order for Scope of Treatment (POST) form. Discussed the benefits and burdens of intubation and CPR in the event of respiratory decline or cardiopulmonary arrest. Ms. Mathew Robles clearly stated that she did not want attempts at CPR made that the time of her death. She agrees to NO intubation for respiratory decline. Discussed the benefits and burdens of artificial nutrition via PEG tube in the event of inability to take food/fluids in a safe manner orally. Ms Mathew Robles was very clear that she does not want any form of artifical nutrition (NGT or PEG). POST form completed and signed by patient and Prudencio Grider NP for DDNR, Limited Medical Interventions, NO Feeding tubes. DNR/DNI. Call placed to PCP office Rishabh BROWNLEE. Spoke with staff member who stated there is no AMD on file only a DDNR. Copy faxed to Palliative office for review.         Order Elected for CPR:  []  Attempt Resuscitation [x]  Do Not Attempt Resuscitation    When NOT in Cardiopulmonary Arrest, Order Elected:      [] Comfort Measures  [x] Limited Additional Interventions  [] Full Interventions    Artificially Administered Nutrition, Order Elected:    [x] No Feeding Tube   [] Feeding Tube for a defined trial period  [] Feeding Tube long-term if indicated    The following was provided (check all that apply):      Healthcare Decision Information:   [x] Help with Breathing Facts   [x] Tube Feeding Facts   [x] CPR Facts      [] Review of existing Advance Directive  [] Assistance with Completion of New Advance Directive   [x] Review of [place] POST Form       Meeting Outcomes:   [] ACP discussion completed   [x] [facility]  [x] [place] prepared for Provider review and signature   [x] Original placed on Chart, if in facility (form to be sent with patient at discharge)  [x] Copy given to healthcare agent    [x] Copy scanned to electronic medical record    If ACP discussion not completed, last interview topic discussed: Ms Isis Carias needs additional information on her treatment options.  Encouraged further conversation with Medical team.      Follow-up plan:     [] Schedule follow-up conversation to continue planning   [] Referred individual to Provider for additional questions/concerns   [x] Advised patient/agent/surrogate to review completed POST form and update if needed with changes in condition, patient preferences or care setting     [] This note routed to one or more involved healthcare providers    Lesa TRIANAN, RN, [facility]  Palliative Medicine Inpatient RN  Palliative COPE Line: [phone]

## 2021-08-02 NOTE — PROGRESS NOTES
Pt was seen however her toe wounds were not evaluated as she was being taken downstairs for her PVL studies on rounds. She will be having a bilateral aorta iliac duplex and arterial doppler LE today to re-evaluate her iliac stents and LE circulation. Pt reports that her pain is not well controlled with her Dilaudid PCA. Will DC this and start Oxycodone PRN, schedule gabapentin QHS, and IV fentanyl for breakthrough. Further recommendations to follow pending the results of her PVL studies.     Jaquelin Lomax NP, pager [phone]  [facility]

## 2021-08-02 NOTE — PROGRESS NOTES
D/C Plan: [facility] and physician follow up    CM met with pt at bedside to discuss transition of care. Pt's adult son lives with pt and will assist as needed. Pt has a rollator and a cane that she uses as needed. Please consider ordering therapy services when appropriate to assist with identifying a transition of care. Pt's son will transport pt home when medically stable. Anticipate pt will transition home with physician follow up vs [facility] and physician follow up. CM to continue to follow and assist.    Care Management Interventions  PCP Verified by CM: Yes  Mode of Transport at Discharge: Other (see comment) (family)  Transition of Care Consult (CM Consult): Discharge Planning  Current Support Network:  Other (son lives with pt)  Confirm Follow Up Transport: Self (or family)  The Patient and/or Patient Representative was Provided with a Choice of Provider and Agrees with the Discharge Plan?: Yes  Name of the Patient Representative Who was Provided with a Choice of Provider and Agrees with the Discharge Plan: patient  Freedom of Choice List was Provided with Basic Dialogue that Supports the Patient's Individualized Plan of Care/Goals, Treatment Preferences and Shares the Quality Data Associated with the Providers?: Yes  Discharge Location  Discharge Placement: Home with home health

## 2021-08-02 NOTE — ROUTINE PROCESS
Bedside shift change report given to Minal RN (oncoming nurse) by Lucio Mendez RN (offgoing nurse). Report included the following information SBAR, Kardex, Intake/Output, MAR and Recent Results.

## 2021-08-02 NOTE — PROGRESS NOTES
Problem: Falls - Risk of  Goal: *Absence of Falls  Description: Document [place] Fall Risk and appropriate interventions in the flowsheet. Outcome: Progressing Towards Goal  Note: Fall Risk Interventions:  Mobility Interventions: Patient to call before getting OOB         Medication Interventions: Patient to call before getting OOB         History of Falls Interventions: Door open when patient unattended         Problem: Pain  Goal: *Control of Pain  Outcome: Progressing Towards Goal     Problem: General Wound Care  Goal: *Infected Wound: Prevention of further infection and promotion of healing  Description: Infection control procedures (eg: clean dressings, clean gloves, hand washing, precautions to isolate wound from contamination, sterile instruments used for wound debridement) should be implemented.   Outcome: Progressing Towards Goal  Goal: Interventions  Outcome: Progressing Towards Goal     Problem: Patient Education: Go to Patient Education Activity  Goal: Patient/Family Education  Outcome: Progressing Towards Goal

## 2021-08-02 NOTE — DIABETES MGMT
GLYCEMIC CONTROL PLAN OF CARE        Diabetes Management:      Assessment: known h/o T2DM, HbA1C ordered per protocol, oral home regimen  - Left second toe ischemia and gangrene/severe peripheral vascular disease-    Recommend: monitor BG trends pt might benefit from scheduled dose of insulin    BG in target range (non-ICU\" < 180 ; -180):  [] Yes  [x] No      Steroids: no    Current Insulin regimen: - Humalog Normal Insulin Sensitivity Corrective Coverage    TDD previous day = 10 units - Humalog Normal Insulin Sensitivity Corrective Coverage    Most recent blood glucose results:   Lab Results   Component Value Date/Time     (H) 08/02/2021 12:33 AM    GLUCPOC 143 (H) 08/02/2021 07:41 AM    GLUCPOC 111 (H) 08/01/2021 09:06 PM    GLUCPOC 210 (H) 08/01/2021 06:53 PM         Hypo: no    HbA1C: equivalent  to ave BGlucose of  mg/dl for 2-3 months prior to admission (pending)  Lab Results   Component Value Date/Time    Hemoglobin A1c 8.8 (H) 03/19/2021 01:05 AM       Adequate glycemic control PTA:  [] Yes  [] No      Home diabetes medications:  Key Antihyperglycemic Medications             glimepiride (AMARYL) 2 mg tablet Take 2 mg by mouth every morning. metFORMIN (GLUCOPHAGE) 500 mg tablet Take 500 mg by mouth two (2) times daily (with meals).  Indications: TYPE 2 DIABETES MELLITUS        Goals:  Blood glucose will be within target range of 70 - 180 mg/dL by: 8/15    Diet:   Active Orders   Diet    ADULT DIET Regular; 3 carb choices (45 gm/meal)       Patient Vitals for the past 100 hrs:   % Diet Eaten   08/01/21 1041 51 - 75%        Education:  __X___ Refer to Diabetes Education Record                       _____ Education not indicated at this time    Conception Hollis Kaye [address], RN, CDE  Glycemic Control Team  [phone]  Pager [phone] (M-TH 8:00-4:30P)  *After Hours pager [phone]

## 2021-08-02 NOTE — PROGRESS NOTES
Hospitalist Progress Note    Patient: Eri Smith MRN: [id number]  CSN: [id number]    YOB: 1950  Age: 80 y.o. 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 Z90.3, Z98.0    Atherosclerotic PVD with ulceration (HCC) I70.209, L98.499    Anemia D64.9    Lupus anticoagulant syndrome ([facility] [address] D68.62    PAD (peripheral artery disease) (HCC) I73.9    Sleepwalking disorder F51.3    Anxiety F41.9    Type II diabetes mellitus with complication, uncontrolled ([facility] [address] E11.8, E11.65    CAD (coronary artery disease) I25.10    Diabetic ulcer of right midfoot with fat layer exposed ([facility] [address] E11.621, L97.412    Cellulitis of right foot L03.115    Chronic anticoagulation Z79.01    Elevated liver enzymes R74.8    Primary biliary cholangitis (HCC) K74.3    Arterial thrombosis (HCC) I74.9    Ischemic toe I99.8    Delta-9-tetrahydrocannabinol (THC) dependence ([facility] [address] F12.20    Encounter for palliative care Z51.5    Debility R53.81            Left second toe ischemia and gangrene/severe peripheral vascular disease-  Followed by vascular  Continue with Plavix  Started on heparin drip  Further plan per vascular and podiatry. Follow vascular studies. Amputation per podiatry     DM-  continue on sliding scale insulin, diabetic diet.     CAD-  Continue with home medications along with Plavix.     Right diabetic foot ulcer-  Started on doxycycline. Multiple antibiotic allergy  Wound care consulted     Daily THC use    Disposition : TBD    Review of systems  General: No fevers or chills. Cardiovascular: No chest pain or pressure. No palpitations. Pulmonary: No shortness of breath. Gastrointestinal: No nausea, vomiting. Physical Exam:  General: Awake, cooperative, no acute distress    HEENT: NC, Atraumatic. PERRLA, anicteric sclerae. Lungs: CTA Bilaterally. No Wheezing/Rhonchi/Rales. Heart:  S1 S2,  No murmur, No Rubs, No Gallops  Abdomen: Soft, Non distended, Non tender.  +Bowel sounds,   Extremities: Left great toe amputation. Left 2nd toe gangrene, Right foot plantar ulcer on mid foot. Weak distal pulses. Psych:   Not anxious or agitated. Neurologic:  No acute neurological deficit. Vital signs/Intake and Output:  Visit Vitals  BP (!) 158/74   Pulse 72   Temp 98 °F (36.7 °C)   Resp 14   Wt 54 kg (119 lb)   SpO2 98%   BMI 20.11 kg/m²     Current Shift:  No intake/output data recorded. Last three shifts:  07/31 1901 - 08/02 0700  In: 460 [P.O.:360; I.V.:100]  Out: -             Labs: Results:       Chemistry Recent Labs     08/02/21 0033 08/01/21 0430 07/31/21  1425   * 368* 215*   * 135* 138   K 4.8 4.6 4.6    101 106   CO2 29 25 28   BUN 20* 23* 24*   CREA 0.61 0.75 0.60   CA 9.1 8.5 9.1   AGAP 2* 9 4   BUCR 33* 31* 40*      CBC w/Diff Recent Labs     08/02/21 0033 08/01/21 0430 07/31/21  1425   WBC 8.3 7.0 7.7   RBC 3.55* 2.99* 2.94*   HGB 10.6* 9.6* 10.0*   HCT 33.6* 30.6* 30.2*    335 333   GRANS 63 64 84*   LYMPH 26 24 11*   EOS 2 2 0      Cardiac Enzymes No results for input(s): CPK, CKND1, RAYNA in the last 72 hours. No lab exists for component: CKRMB, TROIP   Coagulation Recent Labs     08/02/21  0755 08/02/21 0033 07/31/21 1955 07/31/21  1425   PTP  --   --   --  11.9   INR  --   --   --  0.9   APTT 74.4* 117.9*   < >  --     < > = values in this interval not displayed. Lipid Panel Lab Results   Component Value Date/Time    Cholesterol, total 178 04/16/2019 04:44 PM    HDL Cholesterol 88 (H) 04/16/2019 04:44 PM    LDL, calculated 71 04/16/2019 04:44 PM    VLDL, calculated 19 04/16/2019 04:44 PM    Triglyceride 95 04/16/2019 04:44 PM    CHOL/HDL Ratio 2.0 04/16/2019 04:44 PM      BNP No results for input(s): BNPP in the last 72 hours.    Liver Enzymes No results for input(s): TP, ALB, TBIL, AP in the last 72 hours.    No lab exists for component: SGOT, GPT, DBIL   Thyroid Studies Lab Results   Component Value Date/Time    TSH 0.74 04/11/2018 12:30 AM        Procedures/imaging: see electronic medical records for all procedures/Xrays and details which were not copied into this note but were reviewed prior to creation of Plan

## 2021-08-02 NOTE — WOUND CARE
Wound care in to see this patient per consult. Patient out of room for testing. Returned a second time and patient still out of room.  Wound care to try at later time

## 2021-08-02 NOTE — PROGRESS NOTES
Received SBAR report from [facility] AT [facility] B.RN. Patient is alert and oriented x3, in no distress, currently on PCA and Heparin drip,. Both drip rate changed verified by incoming nurse Clementina Hickey and outgoing nurse [facility] RN. Patient said pain feels better. Will continue to monitor and care for the patient.

## 2021-08-03 ENCOUNTER — APPOINTMENT (OUTPATIENT)
Dept: CT IMAGING | Age: 71
DRG: 300 | End: 2021-08-03
Attending: SURGERY
Payer: MEDICARE

## 2021-08-03 LAB
ANION GAP SERPL CALC-SCNC: 6 MMOL/L (ref 3–18)
APTT PPP: 88.2 SEC (ref 23–36.4)
BASOPHILS # BLD: 0.1 K/UL (ref 0–0.1)
BASOPHILS NFR BLD: 1 % (ref 0–2)
BUN SERPL-MCNC: 18 MG/DL (ref 7–18)
BUN/CREAT SERPL: 28 (ref 12–20)
CALCIUM SERPL-MCNC: 8.6 MG/DL (ref 8.5–10.1)
CHLORIDE SERPL-SCNC: 102 MMOL/L (ref 100–111)
CO2 SERPL-SCNC: 29 MMOL/L (ref 21–32)
CREAT SERPL-MCNC: 0.65 MG/DL (ref 0.6–1.3)
DIFFERENTIAL METHOD BLD: ABNORMAL
EOSINOPHIL # BLD: 0.2 K/UL (ref 0–0.4)
EOSINOPHIL NFR BLD: 3 % (ref 0–5)
ERYTHROCYTE [DISTWIDTH] IN BLOOD BY AUTOMATED COUNT: ABNORMAL % (ref 11.6–14.5)
GLUCOSE BLD STRIP.AUTO-MCNC: 152 MG/DL (ref 70–110)
GLUCOSE BLD STRIP.AUTO-MCNC: 191 MG/DL (ref 70–110)
GLUCOSE BLD STRIP.AUTO-MCNC: 206 MG/DL (ref 70–110)
GLUCOSE BLD STRIP.AUTO-MCNC: 239 MG/DL (ref 70–110)
GLUCOSE SERPL-MCNC: 177 MG/DL (ref 74–99)
HCT VFR BLD AUTO: 28.1 % (ref 35–45)
HGB BLD-MCNC: 9.4 G/DL (ref 12–16)
IRON SATN MFR SERPL: 4 % (ref 20–50)
IRON SERPL-MCNC: 18 UG/DL (ref 50–175)
LEFT ARM BP: 151 MMHG
LEFT TBI: 0.28
LEFT TOE PRESSURE: 43 MMHG
LYMPHOCYTES # BLD: 2 K/UL (ref 0.9–3.6)
LYMPHOCYTES NFR BLD: 40 % (ref 21–52)
MCH RBC QN AUTO: 34.8 PG (ref 24–34)
MCHC RBC AUTO-ENTMCNC: 33.5 G/DL (ref 31–37)
MCV RBC AUTO: 104.1 FL (ref 74–97)
MONOCYTES # BLD: 0.5 K/UL (ref 0.05–1.2)
MONOCYTES NFR BLD: 10 % (ref 3–10)
NEUTS SEG # BLD: 2.2 K/UL (ref 1.8–8)
NEUTS SEG NFR BLD: 46 % (ref 40–73)
PLATELET # BLD AUTO: 343 K/UL (ref 135–420)
PMV BLD AUTO: 9.3 FL (ref 9.2–11.8)
POTASSIUM SERPL-SCNC: 4.2 MMOL/L (ref 3.5–5.5)
RBC # BLD AUTO: 2.7 M/UL (ref 4.2–5.3)
RBC MORPH BLD: ABNORMAL
RIGHT ARM BP: 148 MMHG
SODIUM SERPL-SCNC: 137 MMOL/L (ref 136–145)
TIBC SERPL-MCNC: 429 UG/DL (ref 250–450)
WBC # BLD AUTO: 5 K/UL (ref 4.6–13.2)

## 2021-08-03 PROCEDURE — 75635 CT ANGIO ABDOMINAL ARTERIES: CPT

## 2021-08-03 PROCEDURE — 99231 SBSQ HOSP IP/OBS SF/LOW 25: CPT | Performed by: NURSE PRACTITIONER

## 2021-08-03 PROCEDURE — 74011250637 HC RX REV CODE- 250/637: Performed by: NURSE PRACTITIONER

## 2021-08-03 PROCEDURE — 65270000029 HC RM PRIVATE

## 2021-08-03 PROCEDURE — 74011000250 HC RX REV CODE- 250: Performed by: HOSPITALIST

## 2021-08-03 PROCEDURE — 74011250636 HC RX REV CODE- 250/636: Performed by: HOSPITALIST

## 2021-08-03 PROCEDURE — 80048 BASIC METABOLIC PNL TOTAL CA: CPT

## 2021-08-03 PROCEDURE — 82962 GLUCOSE BLOOD TEST: CPT

## 2021-08-03 PROCEDURE — 36415 COLL VENOUS BLD VENIPUNCTURE: CPT

## 2021-08-03 PROCEDURE — 74011000258 HC RX REV CODE- 258: Performed by: HOSPITALIST

## 2021-08-03 PROCEDURE — 85730 THROMBOPLASTIN TIME PARTIAL: CPT

## 2021-08-03 PROCEDURE — 74011250637 HC RX REV CODE- 250/637: Performed by: HOSPITALIST

## 2021-08-03 PROCEDURE — 83540 ASSAY OF IRON: CPT

## 2021-08-03 PROCEDURE — 74011636637 HC RX REV CODE- 636/637: Performed by: HOSPITALIST

## 2021-08-03 PROCEDURE — 74011250636 HC RX REV CODE- 250/636: Performed by: FAMILY MEDICINE

## 2021-08-03 PROCEDURE — 74011000636 HC RX REV CODE- 636: Performed by: HOSPITALIST

## 2021-08-03 PROCEDURE — 85025 COMPLETE CBC W/AUTO DIFF WBC: CPT

## 2021-08-03 RX ORDER — DOXYCYCLINE 100 MG/1
100 CAPSULE ORAL EVERY 12 HOURS
Status: DISCONTINUED | OUTPATIENT
Start: 2021-08-03 | End: 2021-08-05 | Stop reason: HOSPADM

## 2021-08-03 RX ADMIN — CLOPIDOGREL BISULFATE 75 MG: 75 TABLET ORAL at 08:32

## 2021-08-03 RX ADMIN — METOPROLOL TARTRATE 25 MG: 25 TABLET, FILM COATED ORAL at 08:32

## 2021-08-03 RX ADMIN — HYDROXYZINE HYDROCHLORIDE 25 MG: 25 TABLET, FILM COATED ORAL at 20:11

## 2021-08-03 RX ADMIN — ONDANSETRON 4 MG: 2 INJECTION INTRAMUSCULAR; INTRAVENOUS at 20:10

## 2021-08-03 RX ADMIN — DOXYCYCLINE 100 MG: 100 INJECTION, POWDER, LYOPHILIZED, FOR SOLUTION INTRAVENOUS at 08:31

## 2021-08-03 RX ADMIN — INSULIN LISPRO 2 UNITS: 100 INJECTION, SOLUTION INTRAVENOUS; SUBCUTANEOUS at 08:33

## 2021-08-03 RX ADMIN — HEPARIN SODIUM 18 UNITS/KG/HR: 10000 INJECTION, SOLUTION INTRAVENOUS at 00:01

## 2021-08-03 RX ADMIN — Medication 10 ML: at 06:41

## 2021-08-03 RX ADMIN — Medication 250 MG: at 08:41

## 2021-08-03 RX ADMIN — DOXYCYCLINE 100 MG: 100 CAPSULE ORAL at 20:10

## 2021-08-03 RX ADMIN — Medication 10 ML: at 14:27

## 2021-08-03 RX ADMIN — METOPROLOL TARTRATE 25 MG: 25 TABLET, FILM COATED ORAL at 20:10

## 2021-08-03 RX ADMIN — CYANOCOBALAMIN TAB 1000 MCG 1000 MCG: 1000 TAB at 08:32

## 2021-08-03 RX ADMIN — IOPAMIDOL 100 ML: 755 INJECTION, SOLUTION INTRAVENOUS at 08:01

## 2021-08-03 RX ADMIN — ATORVASTATIN CALCIUM 20 MG: 20 TABLET, FILM COATED ORAL at 08:32

## 2021-08-03 RX ADMIN — OXYCODONE 10 MG: 5 TABLET ORAL at 07:40

## 2021-08-03 RX ADMIN — OXYCODONE 10 MG: 5 TABLET ORAL at 20:10

## 2021-08-03 RX ADMIN — INSULIN LISPRO 4 UNITS: 100 INJECTION, SOLUTION INTRAVENOUS; SUBCUTANEOUS at 11:47

## 2021-08-03 NOTE — PROGRESS NOTES
Hospitalist Progress Note    Patient: Sony Hardy MRN: [id number]  CSN: [id number]    YOB: 1950  Age: 80 y.o. 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 Z90.3, Z98.0    Atherosclerotic PVD with ulceration (HCC) I70.209, L98.499    Anemia D64.9    Lupus anticoagulant syndrome ([facility] [address] D68.62    PAD (peripheral artery disease) (HCC) I73.9    Sleepwalking disorder F51.3    Anxiety F41.9    Type II diabetes mellitus with complication, uncontrolled ([facility] [address] E11.8, E11.65    CAD (coronary artery disease) I25.10    Diabetic ulcer of right midfoot with fat layer exposed ([facility] [address] E11.621, L97.412    Cellulitis of right foot L03.115    Chronic anticoagulation Z79.01    Elevated liver enzymes R74.8    Primary biliary cholangitis (HCC) K74.3    Arterial thrombosis (HCC) I74.9    Ischemic toe I99.8    Delta-9-tetrahydrocannabinol (THC) dependence (HCC) F12.20    Encounter for palliative care Z51.5    Debility R53.81        80 y.o. female with diabetes, hypertension, CAD, CABG, PAD, right TMA, left great toe amputation presents to ER with concerns of left foot pain especially left second toe. Left second toe ischemia and gangrene/severe peripheral vascular disease-  Continue with Plavix  on heparin drip  Further plan per vascular and podiatry. Follow vascular studies. Amputation per podiatry     DM-  continue on sliding scale insulin, diabetic diet.     CAD-  Continue with home medications along with Plavix.     Right diabetic foot ulcer-  Started on doxycycline. Multiple antibiotic allergy  Wound care consulted     Daily THC use    Disposition : TBD    Review of systems  General: No fevers or chills. Cardiovascular: No chest pain or pressure. No palpitations. Pulmonary: No shortness of breath. Gastrointestinal: No nausea, vomiting. Physical Exam:  General: Awake, cooperative, no acute distress    HEENT: NC, Atraumatic. PERRLA, anicteric sclerae. Lungs: CTA Bilaterally. No Wheezing/Rhonchi/Rales. Heart:  S1 S2,  No murmur, No Rubs, No Gallops  Abdomen: Soft, Non distended, Non tender.  +Bowel sounds,   Extremities: Left great toe amputation. Left 2nd toe gangrene, Right foot plantar ulcer on mid foot. Weak distal pulses. Psych:   Not anxious or agitated. Neurologic:  No acute neurological deficit. Vital signs/Intake and Output:  Visit Vitals  BP (!) 118/57   Pulse 73   Temp 98.2 °F (36.8 °C)   Resp 16   Wt 58.5 kg (129 lb)   SpO2 97%   BMI 21.80 kg/m²     Current Shift:  No intake/output data recorded. Last three shifts:  08/01 1901 - 08/03 0700  In: 304 [P.O.:240; I.V.:64]  Out: 0             Labs: Results:       Chemistry Recent Labs     08/03/21 0445 08/02/21  0033 08/01/21  0430   * 141* 368*    133* 135*   K 4.2 4.8 4.6    102 101   CO2 29 29 25   BUN 18 20* 23*   CREA 0.65 0.61 0.75   CA 8.6 9.1 8.5   AGAP 6 2* 9   BUCR 28* 33* 31*      CBC w/Diff Recent Labs     08/03/21 0445 08/02/21  0033 08/01/21  0430   WBC 5.0 8.3 7.0   RBC 2.70* 3.55* 2.99*   HGB 9.4* 10.6* 9.6*   HCT 28.1* 33.6* 30.6*    375 335   GRANS 46 63 64   LYMPH 40 26 24   EOS 3 2 2      Cardiac Enzymes No results for input(s): CPK, CKND1, RAYNA in the last 72 hours. No lab exists for component: CKRMB, TROIP   Coagulation Recent Labs     08/03/21 0445 08/02/21  0755   APTT 88.2* 74.4*       Lipid Panel Lab Results   Component Value Date/Time    Cholesterol, total 178 04/16/2019 04:44 PM    HDL Cholesterol 88 (H) 04/16/2019 04:44 PM    LDL, calculated 71 04/16/2019 04:44 PM    VLDL, calculated 19 04/16/2019 04:44 PM    Triglyceride 95 04/16/2019 04:44 PM    CHOL/HDL Ratio 2.0 04/16/2019 04:44 PM      BNP No results for input(s): BNPP in the last 72 hours.    Liver Enzymes No results for input(s): TP, ALB, TBIL, AP in the last 72 hours.     No lab exists for component: SGOT, GPT, DBIL   Thyroid Studies Lab Results   Component Value Date/Time    TSH 0.74 04/11/2018 12:30 AM        Procedures/imaging: see electronic medical records for all procedures/Xrays and details which were not copied into this note but were reviewed prior to creation of Plan

## 2021-08-03 NOTE — PROGRESS NOTES
Noted pt with podiatry and vascular following. CM to await treatment plan to further assist with identifying a transition of care. Please consider ordering therapy services when appropriate to assist with transition of care. CM to continue to follow and assist.    Care Management Interventions  PCP Verified by CM: Yes  Mode of Transport at Discharge: Other (see comment) (family)  Transition of Care Consult (CM Consult): Discharge Planning  Current Support Network:  Other (son lives with pt)  Confirm Follow Up Transport: Self (or family)  The Patient and/or Patient Representative was Provided with a Choice of Provider and Agrees with the Discharge Plan?: Yes  Name of the Patient Representative Who was Provided with a Choice of Provider and Agrees with the Discharge Plan: patient  Freedom of Choice List was Provided with Basic Dialogue that Supports the Patient's Individualized Plan of Care/Goals, Treatment Preferences and Shares the Quality Data Associated with the Providers?: Yes  Discharge Location  Discharge Placement: Home with home health

## 2021-08-03 NOTE — DIABETES MGMT
Diabetes Patient/Family Education Record    Factors That May Influence Patients Ability to Learn or Comply with Recommendations   []   Language barrier    []   Cultural needs   []   Motivation    []   Cognitive limitation    []   Physical   []   Education    []   Physiological factors   []   Hearing/vision/speaking impairment   []   Mormon beliefs    []   Financial factors   []  Other:   [x]  No factors identified at this time. Person Instructed:   [x]   Patient   []   Family   []  Other     Preference for Learning:   [x]   Verbal   []   Written   []  Demonstration     Level of Comprehension & Competence:    [x]  Good                                      [] Fair                                     []  Poor                             []  Needs Reinforcement   [x]  Teach back completed    Education Component:   []  Medication management, including how to administer insulin (if appropriate) and potential medication interactions    []  Nutritional management - [] Obtained usual meal pattern   []   Basic carbohydrate counting  []  Plate method  []  Limit concentrated sweets and avoid sweetened beverages  []  Portion control  []    Avoid skipping meals   []  Exercise   []  Signs, symptoms, and treatment of hyperglycemia and hypoglycemia   [] Prevention, recognition and treatment of hyperglycemia and hypoglycemia   []  Importance of blood glucose monitoring  [] Blood Glucose targets   []   Provided patient with blood glucose meter  []  Has glucometer and supplies at home   []  Instruction on use of the blood glucose meter and recommended monitoring schedule   [x]  Discuss the importance of HbA1C monitoring. Patients A1c is _6.9__ %.  This is equivalent to average glucose of 151 mg/dl for the past 2-3 months.   []  Sick day guidelines   []  Proper use and disposal of lancets, needles, syringes or insulin pens (if appropriate)   []  Potential long-term complications (retinopathy, kidney disease, neuropathy, foot care)   [] Information about whom to contact in case of emergency or for more information    [x]  Goal:  Patient/family will demonstrate understanding of Diabetes Self- Management Skills by: (date) __9/30_____  Plan for post-discharge education or self-management support:    [] Outpatient class schedule provided            [] Patient Declined    [] Scheduled for outpatient classes (date) _______    [] Written information provided  Verify: [x] Prior to admission Diabetes medications    Does patient understand how diabetes medications work? __________yes_________________  Does patient have difficulty obtaining diabetes medications or testing supplies? ____no_____________     Chad Velasquez MS, RN, CDE  Glycemic Control Team  [phone]  Pager [phone] (M-TH 8:00-4:30P)  *After Hours pager [phone]

## 2021-08-03 NOTE — ADVANCED PRACTICE NURSE
Rounded on pt. Patient c/o increased sleepiness. She states that when she gets more tired, she needs an iron infusion. Her Hx includes DM, total gastrectomy, colostomy reversal, lupus, DVT and arterial clots. She also reports hx of sleepwalking. She states that she's lost some 15 lb in the past 3 months and occasionally has nausea. She eats candies and sweets in order to maintain her weight. In Care Everywhere chart review, there is no recent Ferritin draws. Objectively she is alert and making conversation. Heart rate is 78 now and BP is 107/80. Ambulating in room despite having a wrapped right foot. Neither the left or right foot are bleeding. Heparin gtt is infusing. Glucose POC is 206 at 1105. Recommendation to provider: Add Ferritin blood draw for tomorrow AM to determine if pt needs iron infusion. Iron PO may not absorb well due to gastrectomy. Continue to monitor Hgb daily. In light of her eating habits, consider a nutritionist consult. Nursing: Continue monitoring for bleeding of foot and BM, and report to provider if it cannot be controlled. Fall precautions with consideration of bed alarm while sleeping.

## 2021-08-03 NOTE — PROGRESS NOTES
Shift Summary:  Patient comfortable through the night. Denied pain and discomfort. Dressings to feet CDI. PTT 88.2. Vital signs stable, afebrile. Patient Vitals for the past 8 hrs:   Temp Pulse Resp BP SpO2   08/03/21 0410 98.6 °F (37 °C) 71 16 134/75 100 %   08/03/21 0012 98.7 °F (37.1 °C) 64 16 121/61 97 %         0715 - Bedside and Verbal shift change report given to TAIWO Perkins RN (oncoming nurse) by Destiny Christianson (offgoing nurse). Report included the following information SBAR, ED Summary, Intake/Output and MAR.

## 2021-08-03 NOTE — DIABETES MGMT
GLYCEMIC CONTROL PLAN OF CARE        Diabetes Management:      Assessment: known h/o T2DM, HbA1C within recommended range for age + comorbids, oral home regimen  - Left second toe ischemia and gangrene/severe peripheral vascular disease-    Recommend: monitor BG trends     BG in target range (non-ICU\" < 180 ; -180):  [] Yes  [x] No      Steroids: no    Current Insulin regimen: Humalog Normal Insulin Sensitivity Corrective Coverage    TDD previous day = 6 units Humalog Normal Insulin Sensitivity Corrective Coverage    Most recent blood glucose results:   Lab Results   Component Value Date/Time     (H) 08/03/2021 04:45 AM    GLUCPOC 191 (H) 08/03/2021 08:26 AM    GLUCPOC 157 (H) 08/02/2021 09:13 PM    GLUCPOC 109 08/02/2021 04:44 PM         Hypo: no    HbA1C: equivalent  to ave BGlucose of 151 mg/dl for 2-3 months prior to admission   Lab Results   Component Value Date/Time    Hemoglobin A1c 6.9 (H) 08/02/2021 12:33 AM       Adequate glycemic control PTA:  [x] Yes  [] No      Home diabetes medications:  Key Antihyperglycemic Medications             glimepiride (AMARYL) 2 mg tablet Take 2 mg by mouth every morning. metFORMIN (GLUCOPHAGE) 500 mg tablet Take 500 mg by mouth two (2) times daily (with meals).  Indications: TYPE 2 DIABETES MELLITUS        Goals:  Blood glucose will be within target range of 70 - 180 mg/dL by: 8/15    Diet:   Active Orders   Diet    ADULT DIET Regular; 3 carb choices (45 gm/meal)       Patient Vitals for the past 100 hrs:   % Diet Eaten   08/03/21 0548 0%   08/01/21 1041 51 - 75%        Education:  __X___ Refer to Diabetes Education Record                       _____ Education not indicated at this time    Cesar Elias, RN, CDE  Glycemic Control Team  [phone]  Pager [phone] (M-TH 8:00-4:30P)  *After Hours pager [phone]

## 2021-08-03 NOTE — PROGRESS NOTES
IV to PO Conversion Recommendation     Patient: Raghu Eye   MRN#: [id number]   Admission Date: [id number]     IV TO PO  Medication(s) Doxycycline   Oral Meds  Yes   Diet:     Active Orders   Diet    ADULT DIET Regular; 3 carb choices (45 gm/meal)      TEMP 98.2 °F (36.8 °C)   WBC Lab Results   Component Value Date/Time    WBC 5.0 08/03/2021 04:45 AM           Pharmacy Dosing Services:  Summary:   Does the patient meet all criteria for IV to PO switch as listed below? Yes     Is the patient excluded from IV to PO automatic switch based on criteria listed below? No       Criteria for IV to PO switch - Antibiotics   Received IV therapy for at least 24 hours   2. Functioning GI tract   Taking scheduled oral medications  Tolerating diet more advanced than clear liquids   3. No signs/symptoms of shock  No vasopressor support    Criteria for IV to PO switch - Nonantibiotics   Functioning GI tract   Taking scheduled oral medications  Toleratind duet more advanced than clear liquids  2. No signs/symptoms of shock  No vasopressor support        3. No seizures for >72 hours (antiepileptic medications only)    Exclusion Criteria   Patient is being treated for an infection that requires IV therapy such as: endocarditis, osteomyelitis, meningitis, pancreatitis (antibiotics only)   NG tube with continous suction   Nausea and/or vomiting or severe diarrhea within past 24 hours  Malabsorption syndromes, partial or total gastrectomy, ileus, gastric outlet or bowel obstruction  Active GI bleeding   Significant painful oral ulceration  Unable to swallow  On total parenteral nutrition with a NPO order  NPO  Febrile in last 24 hours (antibiotics only)  Clinically deteriorating or unstable (antibiotics only)    This IV to PO conversion is based on the [facility] P&[employer] approved automatic conversion policy for eligible patients. Please call with questions.     Corbin Maxwell 46 Pharmacist  [phone]

## 2021-08-03 NOTE — PROGRESS NOTES
[facility]: [phone]  [facility]: [phone]     Patient Name: Breanne Mcghee  YOB: 1950    Date of Follow-up Visit : 8/3/2021  Reason for Consult: Care decisions  Requesting Provider: Dr. Roddy Goldberg  Primary Care Physician: TORRIE Michelle      SUMMARY:   Breanne Mcghee is a 80 y.o. female with a past history of diabetes, hypertension, CAD, CABG, PAD, right TMA and left great toe amputation, who was admitted on 7/31/2021 from home with a diagnosis of left second toe ischemia and gangrene, severe PVD. Current medical issues leading to Palliative Medicine involvement include: 52-year-old female with multiple comorbid conditions including severe peripheral vascular disease admitted with complaints of left foot pain. Palliative medicine is consulted for care decisions. CHIEF COMPLAINT: Pain left second toe    HPI/SUBJECTIVE:    Past history as above. Ms. Mathew Robles is a 52-year-old pleasant female with multiple comorbid conditions including severe peripheral vascular disease and PAD who presented to the ER with complaints of left foot pain, especially the left second toe. Reports having vascular procedures done in March and April 2021 and is status post left femoral endarterectomy and bilateral stent placement. Patient reports that her left foot has become increasingly painful. Endorses moderate pain in both feet. Denies shortness of breath. 8/3/2021:  Sitting up on the edge of the bed, awake, alert and oriented x4. Reports that she still have nerve pain in her left foot and left second toe. Smiling and engaged in conversation. Shared that she is waiting for test results to determine next steps in her care.       The patient is:   [x] Verbal and participatory  [] Non-participatory due to:     GOALS OF CARE:  DNR/DNI, limited interventions, no feeding tube  Patient/Health Care Proxy Stated Goals: Prolong life      TREATMENT PREFERENCES:   Code Status: DNR/DNI         PALLIATIVE DIAGNOSES:   1. Encounter for palliative care/goals of care  2. Left second toe ischemia  3. PAD  4. Debility          PLAN:   8/3/2021:  Seen at bedside along with Ms. Philly Lloyd, RN. Patient is sitting up on the edge of her bed, awake, alert and oriented x4, drinking coffee. States she did not eat breakfast this morning as she was downstairs for a test.  Remembers working with our team yesterday and completing the POST form. Lidocaine patch in place to left foot which remains purplish in color. Patient reports continued nerve pain in her left second toe. Vascular and Podiatry are following. Goals of care are DNR/DNI, limited interventions, no feeding tube. POST on file. Please see below for previous conversations with the palliative medicine team:    1. Encounter for palliative care/goals of care - Seen at bedside along with Ms. Mitch Lacey RN. Ms. Benito Naqvi is sitting up on the side of the bed, awake, alert and oriented x4. Pleasant and engaged in conversation with our team.  Reports having pain in her left foot and that nothing is really helping. Noted Lidocaine patch in place to left foot and Dilaudid PCA in use. There is no AMD on file. Patient reports that she has four sons, Nola Carlos who lives with her, Felipe Johnson and Juliana Mcdonald (who lives in [place]). Inquired if patient had ever completed any paperwork to name a surrogate medical decision maker and patient was not sure and asked us to contact her PCP to see if there was anything on file. Explained to patient that in the absence of any paperwork, according to the [address], default decision making would fall to a majority of her four sons and patient was agreeable to this. Introduced and discussed goals of care including benefits and burdens of resuscitation, intubation and ventilation. Ms. Benito Naqvi quickly stated that she is \"DNR\".   We explored this decision and learned that patient had made herself a DNR several years ago and she re-affirmed this decision with us today. She does not wish for aggressive resuscitation including chest compressions, shock, medications or intubation/ventilation in the event of cardiac arrest or respiratory decline. Palliative medicine contacted patient's PCP, Pedrito BROWNLEE, and received a fax copy of a DDNR signed by patient in 2016. Introduced and explained the physician order for scope of treatment (POST) form to patient and offered to complete with her today and she was agreeable. POST form completed today with the following measures:  DNR/DNI, limited medical interventions, no feeding tube. Copy placed on chart for scanning. Original and 4 copies provided to patient. Goals of care care DNR/DNI, limited interventions, no feeding tube. 2. Left second toe ischemia - primary team managing, vascular and podiatry consult, pending aortoiliac duplex and lower extremity doppler studies, Dilaudid PCA    3. PAD - primary team managing, vascular and podiatry consult, pending aortoiliac duplex and lower extremity doppler studies    4. Debility - PPS 50, lives at home along with one of her sons, mostly independent with ADLs, has a cane and rollator at home. Reports that she does still drive when able. 5. Initial consult note routed to primary continuity provider    6.  Communicated plan of care with: Palliative IDT, patient      Advance Care Planning:  [] [facility] Interdisciplinary Team has updated the ACP Navigator with [address] and Patient Capacity    Primary Decision [facility] ([address]):   Primary Decision Maker: Adelso Lackey - [phone]    Secondary Decision Maker: Gerald Navarrete - [phone]    Medical Interventions: Limited additional interventions   Artificially Administered Nutrition: No feeding tube     As far as possible, the palliative care team has discussed with patient / health care proxy about goals of care / treatment preferences for patient. HISTORY:     History obtained from: Chart and patient    Principal Problem:    Ischemic toe (7/31/2021)    Active Problems:    Atherosclerotic PVD with ulceration ([address] (3/13/2015)      Anemia (3/1/2016)      PAD (peripheral artery disease) ([address] (7/22/2017)      Anxiety (10/15/2017)      Type II diabetes mellitus with complication, uncontrolled ([address] (4/13/2018)      CAD (coronary artery disease) (4/13/2018)      Diabetic ulcer of right midfoot with fat layer exposed ([address] (12/4/2018)      Delta-9-tetrahydrocannabinol (THC) dependence ([address] (7/31/2021)      Encounter for palliative care ()      Debility ()      Past Medical History:   Diagnosis Date    Arthritis     hands and feet    Asthma     Autoimmune disease ([address]     lupus anticoagulant-causes clotting    Cancer ([address]     carcinoma in situ of cervix and uterus; inside bottom lip    Chronic pain     Left foot    Diabetes ([address] 2013    Dizzy spells     h/o    GERD (gastroesophageal reflux disease)     Liver disease     1/2 of my liver was removed for ulcers.     Lung collapse     2015    Nausea & vomiting 5/23/2012    Neuropathy     right foot    Other ill-defined conditions(799.89)     arterial clot in rt leg    Other ill-defined conditions(799.89)     restless leg syndrome    Other ill-defined conditions(799.89)     raynaud's syndrome    Other ill-defined conditions(799.89)     chronic N/V    Other ill-defined conditions(799.89)     (2 mutated genes) hyper coag. state     Pneumonia 07/22/2017    Positive cardiolipin antibodies     Psychiatric disorder     depression    PUD (peptic ulcer disease) 2009    H/O requiring partial gastrectomy and small intestine    Thromboembolus ([address] 2000    artery right leg      Past Surgical History:   Procedure Laterality Date    HX APPENDECTOMY      HX BREAST AUGMENTATION      developed infection and implant removed    HX CHOLECYSTECTOMY      HX GASTRECTOMY      due to ulcers    HX GASTRIC BYPASS      gastric by-pass    HX GI      total gastrectomy, partial colectomy    HX GI      colostomy and reversal    HX HEENT      cancer of lip removed    HX HYSTERECTOMY      for cervical cancer    HX ORTHOPAEDIC      rt foot surgery x10    HX ORTHOPAEDIC Left     great toe gangrenous and amputated    HX OTHER SURGICAL      part of liver removal    HX OTHER SURGICAL      ventral hernia repair x10    HX OTHER SURGICAL      Exc pilonial cystectomy    CO ABDOMEN SURGERY PROC UNLISTED      duodenal ulcer rupture    CO BREAST SURGERY PROCEDURE UNLISTED Bilateral 2016    hemorrhaged after breast surgery    CO CARDIAC SURG PROCEDURE UNLIST  2018    open heart (single vessel)    VASCULAR SURGERY PROCEDURE UNLIST      iliac aorta stents x 6    VASCULAR SURGERY PROCEDURE UNLIST      seven more stents iliac aorta and femoral       Family History   Problem Relation Age of Onset    Malignant Hyperthermia Neg Hx     Pseudocholinesterase Deficiency Neg Hx     Delayed Awakening Neg Hx     Post-op Nausea/Vomiting Neg Hx     Emergence Delirium Neg Hx     Post-op Cognitive Dysfunction Neg Hx     Other Neg Hx      History reviewed, no pertinent family history.   Social History     Tobacco Use    Smoking status: Former Smoker     Years: 0.50     Quit date: 1996     Years since quittin.6    Smokeless tobacco: Never Used   Substance Use Topics    Alcohol use: Yes     Comment: 1 or 2 glasses wine or mixed drinks monthly      Allergies   Allergen Reactions    Dilaudid [Hydromorphone] Hives    Rifampin Hives and Swelling     Swelling of mouth and tounge    Levaquin [Levofloxacin] Itching    Amoxicillin Hives    Aspirin Nausea and Vomiting    Azithromycin Angioedema    Bactrim [Sulfamethoprim Ds] Hives    Cefatrizine Hives    Erythromycin Hives    Keflex [Cephalexin] Itching     Swollen throat and tongue    Rocephin [Ceftriaxone] Hives    Tylenol [Acetaminophen] Hives    Vancomycin Hives      Current Facility-Administered Medications   Medication Dose Route Frequency    oxyCODONE IR (ROXICODONE) tablet 10 mg  10 mg Oral Q4H PRN    oxyCODONE IR (ROXICODONE) tablet 5 mg  5 mg Oral Q4H PRN    gabapentin (NEURONTIN) capsule 300 mg  300 mg Oral QHS    fentaNYL citrate (PF) injection 50 mcg  50 mcg IntraVENous Q4H PRN    sodium chloride (NS) flush 5-40 mL  5-40 mL IntraVENous Q8H    sodium chloride (NS) flush 5-40 mL  5-40 mL IntraVENous PRN    atorvastatin (LIPITOR) tablet 20 mg  20 mg Oral DAILY    Saccharomyces boulardii (FLORASTOR) capsule 250 mg  250 mg Oral DAILY    clopidogreL (PLAVIX) tablet 75 mg  75 mg Oral DAILY    cyanocobalamin tablet 1,000 mcg  1,000 mcg Oral DAILY    hydrOXYzine HCL (ATARAX) tablet 25 mg  25 mg Oral TID PRN    lidocaine 4 % patch 1 Patch  1 Patch TransDERmal DAILY    metoprolol tartrate (LOPRESSOR) tablet 25 mg  25 mg Oral BID    rOPINIRole (REQUIP) tablet 4 mg  4 mg Oral QHS    ursodioL (ACTIGALL) tablet 500 mg (Patient Supplied)  500 mg Oral BID    heparin 25,000 units in D5W 250 ml infusion  18-36 Units/kg/hr IntraVENous TITRATE    glucose chewable tablet 16 g  16 g Oral PRN    glucagon (GLUCAGEN) injection 1 mg  1 mg IntraMUSCular PRN    dextrose (D50W) injection syrg 25 g  50 mL IntraVENous PRN    insulin lispro (HUMALOG) injection   SubCUTAneous AC&HS    doxycycline (VIBRAMYCIN) 100 mg in 0.9% sodium chloride (MBP/ADV) 100 mL MBP  100 mg IntraVENous Q12H    ondansetron (ZOFRAN) injection 4 mg  4 mg IntraVENous Q6H PRN          Clinical Pain Assessment (nonverbal scale for nonverbal patients): Clinical Pain Assessment  Severity: 4          Duration: for how long has pt been experiencing pain (e.g., 2 days, 1 month, years)  Frequency: how often pain is an issue (e.g., several times per day, once every few days, constant)     FUNCTIONAL ASSESSMENT:     Palliative Performance Scale (PPS):  PPS: 50    ECOG  ECOG Status : Limited self-care     PSYCHOSOCIAL/SPIRITUAL SCREENING:      Any spiritual / Caodaism concerns:  [] Yes /  [x] No    Caregiver Burnout:  [] Yes /  [] No /  [x] No Caregiver Present      Anticipatory grief assessment:   [x] Normal  / [] Maladaptive        REVIEW OF SYSTEMS:     Systems: constitutional, ears/nose/mouth/throat, respiratory, gastrointestinal, genitourinary, musculoskeletal, integumentary, neurologic, psychiatric, endocrine. Positive findings noted below. Modified ESAS Completed by: provider           Pain: 4           Dyspnea: 0           Stool Occurrence(s): 0   Positive and pertinent negative findings in ROS are noted above in HPI. The following systems were [x] reviewed / [] unable to be reviewed as noted in HPI  Other findings are noted below. PHYSICAL EXAM:     Constitutional: Sitting on the side of the bed, awake, alert, oriented x4. Fair appetite  Eyes: pupils equal, anicteric  ENMT: no nasal discharge, moist mucous membranes  Cardiovascular: regular rhythm  Respiratory: breathing not labored, symmetric  Gastrointestinal: soft   Last bowel movement: none recorded  Musculoskeletal: R foot with dressing intact, L foot purple discoloration  Skin: warm, dry  Neurologic: following commands, moving all extremities  Psychiatric: full affect, no hallucinations    Other: Wt Readings from Last 3 Encounters:   08/03/21 58.5 kg (129 lb)   06/15/21 54 kg (119 lb)   04/29/21 56.2 kg (123 lb 12.8 oz)     Blood pressure 107/80, pulse 78, temperature 99.1 °F (37.3 °C), resp. rate 16, weight 58.5 kg (129 lb), SpO2 99 %.   Pain:  Pain Scale 1: Numeric (0 - 10)  Pain Intensity 1: 8     Pain Location 1: Foot  Pain Orientation 1: Right  Pain Description 1: Sharp, Shooting  Pain Intervention(s) 1: Medication (see MAR)       LAB AND IMAGING FINDINGS:     Lab Results   Component Value Date/Time    WBC 5.0 08/03/2021 04:45 AM    HGB 9.4 (L) 08/03/2021 04:45 AM PLATELET 336 36/79/4857 04:45 AM     Lab Results   Component Value Date/Time    Sodium 137 08/03/2021 04:45 AM    Potassium 4.2 08/03/2021 04:45 AM    Chloride 102 08/03/2021 04:45 AM    CO2 29 08/03/2021 04:45 AM    BUN 18 08/03/2021 04:45 AM    Creatinine 0.65 08/03/2021 04:45 AM    Calcium 8.6 08/03/2021 04:45 AM    Magnesium 1.8 10/06/2020 10:50 AM    Phosphorus 2.3 (L) 03/01/2016 01:05 AM      Lab Results   Component Value Date/Time    Alk. phosphatase 94 06/15/2021 02:25 PM    Protein, total 6.4 06/15/2021 02:25 PM    Albumin 3.7 06/15/2021 02:25 PM    Globulin 2.7 06/15/2021 02:25 PM     Lab Results   Component Value Date/Time    INR 0.9 07/31/2021 02:25 PM    Prothrombin time 11.9 07/31/2021 02:25 PM    aPTT 88.2 (H) 08/03/2021 04:45 AM      Lab Results   Component Value Date/Time    Iron 70 04/16/2019 04:44 PM    TIBC 335 04/16/2019 04:44 PM    Iron % saturation 21 04/16/2019 04:44 PM    Ferritin 132 04/16/2019 04:44 PM      No results found for: PH, PCO2, PO2  No components found for: [place]   Lab Results   Component Value Date/Time     (H) 06/15/2021 02:25 PM    CK - MB 7.0 (H) 06/15/2021 02:25 PM              Total time: 15 minutes    > 50% counseling / coordination:  Time spent in direct consultation with the patient, medical team, and family     Prolonged service was provided for  []30 min   []75 min in face to face time in the presence of the patient, spent as noted above. Time Start:   Time End:     Disclaimer: Sections of this note are dictated using utilizing voice recognition software, which may have resulted in some phonetic based errors in grammar and contents. Even though attempts were made to correct all the mistakes, some may have been missed, and remained in the body of the document. If questions arise, please contact our department.

## 2021-08-03 NOTE — PROGRESS NOTES
Problem: Falls - Risk of  Goal: *Absence of Falls  Description: Document Lukas Patino Fall Risk and appropriate interventions in the flowsheet. Outcome: Progressing Towards Goal  Note: Fall Risk Interventions:  Mobility Interventions: Patient to call before getting OOB         Medication Interventions: Patient to call before getting OOB         History of Falls Interventions: Door open when patient unattended         Problem: Pain  Goal: *Control of Pain  Outcome: Progressing Towards Goal     Problem: Diabetes Self-Management  Goal: *Disease process and treatment process  Description: Define diabetes and identify own type of diabetes; list 3 options for treating diabetes.   Outcome: Progressing Towards Goal

## 2021-08-03 NOTE — PROGRESS NOTES
Palliative Medicine    CODE STATUS: DNR/DNI    AMD Status: No AMD on file. She is comfortable with her sons being her decision makers and did not want to complete an AMD.     8/3/2021 1035 Seen today in room 316 along with Carlos Duran NP. Awake, alert. Oriented x 4 Respirations unlabored on room air. Sitting on side of bed. Very engaged in conversation. Dressings on feet. Talked about how she does not find the level of pain she is in to be acceptable and is interested in amputation. She is comfortable having that discussion with the vascular and podiatric teams. Disposition plan: to be determined after treatment plan completed    After meeting with patient , the goals of care have been defined. Code status remains: DNR/DNI  AMD status: no AMD on file. She is comfortable with her sons working together to make medical decisions. Will sign off but remain available for reconsult as needed/if appropriate.      Thank you for the Palliative Medicine consult and allowing us to participate in the care of Elvira Pratt RN, MSN  Palliative Medicine     [phone]

## 2021-08-04 VITALS
HEART RATE: 66 BPM | TEMPERATURE: 98.6 F | WEIGHT: 129 LBS | DIASTOLIC BLOOD PRESSURE: 65 MMHG | SYSTOLIC BLOOD PRESSURE: 156 MMHG | OXYGEN SATURATION: 100 % | BODY MASS INDEX: 21.8 KG/M2 | RESPIRATION RATE: 1 BRPM

## 2021-08-04 PROBLEM — K74.3 PRIMARY BILIARY CHOLANGITIS (HCC): Status: RESOLVED | Noted: 2019-07-09 | Resolved: 2021-08-04

## 2021-08-04 PROBLEM — R74.8 ELEVATED LIVER ENZYMES: Status: RESOLVED | Noted: 2019-04-16 | Resolved: 2021-08-04

## 2021-08-04 LAB
APTT PPP: 110 SEC (ref 23–36.4)
APTT PPP: 68.5 SEC (ref 23–36.4)
GLUCOSE BLD STRIP.AUTO-MCNC: 151 MG/DL (ref 70–110)
GLUCOSE BLD STRIP.AUTO-MCNC: 204 MG/DL (ref 70–110)
GLUCOSE BLD STRIP.AUTO-MCNC: 224 MG/DL (ref 70–110)
GLUCOSE BLD STRIP.AUTO-MCNC: 270 MG/DL (ref 70–110)

## 2021-08-04 PROCEDURE — 74011000258 HC RX REV CODE- 258: Performed by: HOSPITALIST

## 2021-08-04 PROCEDURE — 65270000029 HC RM PRIVATE

## 2021-08-04 PROCEDURE — 74011250636 HC RX REV CODE- 250/636: Performed by: INTERNAL MEDICINE

## 2021-08-04 PROCEDURE — 36415 COLL VENOUS BLD VENIPUNCTURE: CPT

## 2021-08-04 PROCEDURE — 85730 THROMBOPLASTIN TIME PARTIAL: CPT

## 2021-08-04 PROCEDURE — 74011250636 HC RX REV CODE- 250/636: Performed by: HOSPITALIST

## 2021-08-04 PROCEDURE — 82962 GLUCOSE BLOOD TEST: CPT

## 2021-08-04 PROCEDURE — 74011250637 HC RX REV CODE- 250/637: Performed by: NURSE PRACTITIONER

## 2021-08-04 PROCEDURE — 74011250637 HC RX REV CODE- 250/637: Performed by: HOSPITALIST

## 2021-08-04 PROCEDURE — 74011636637 HC RX REV CODE- 636/637: Performed by: HOSPITALIST

## 2021-08-04 RX ORDER — CLONIDINE HYDROCHLORIDE 0.1 MG/1
0.1 TABLET ORAL
COMMUNITY

## 2021-08-04 RX ORDER — HEPARIN SODIUM 1000 [USP'U]/ML
40 INJECTION, SOLUTION INTRAVENOUS; SUBCUTANEOUS ONCE
Status: COMPLETED | OUTPATIENT
Start: 2021-08-04 | End: 2021-08-04

## 2021-08-04 RX ORDER — DOXYCYCLINE 100 MG/1
100 CAPSULE ORAL EVERY 12 HOURS
Qty: 10 CAPSULE | Refills: 0 | Status: SHIPPED
Start: 2021-08-04

## 2021-08-04 RX ORDER — HEPARIN SODIUM 10000 [USP'U]/100ML
18-36 INJECTION, SOLUTION INTRAVENOUS
Qty: 250 ML | Refills: 0 | Status: SHIPPED
Start: 2021-08-04

## 2021-08-04 RX ADMIN — HEPARIN SODIUM 2340 UNITS: 1000 INJECTION INTRAVENOUS; SUBCUTANEOUS at 06:37

## 2021-08-04 RX ADMIN — GABAPENTIN 300 MG: 300 CAPSULE ORAL at 00:00

## 2021-08-04 RX ADMIN — OXYCODONE 5 MG: 5 TABLET ORAL at 13:29

## 2021-08-04 RX ADMIN — Medication 10 ML: at 00:01

## 2021-08-04 RX ADMIN — Medication 10 ML: at 14:00

## 2021-08-04 RX ADMIN — METOPROLOL TARTRATE 25 MG: 25 TABLET, FILM COATED ORAL at 20:40

## 2021-08-04 RX ADMIN — CLOPIDOGREL BISULFATE 75 MG: 75 TABLET ORAL at 09:53

## 2021-08-04 RX ADMIN — OXYCODONE 10 MG: 5 TABLET ORAL at 20:38

## 2021-08-04 RX ADMIN — Medication 250 MG: at 09:53

## 2021-08-04 RX ADMIN — INSULIN LISPRO 2 UNITS: 100 INJECTION, SOLUTION INTRAVENOUS; SUBCUTANEOUS at 09:59

## 2021-08-04 RX ADMIN — ATORVASTATIN CALCIUM 20 MG: 20 TABLET, FILM COATED ORAL at 09:52

## 2021-08-04 RX ADMIN — Medication 10 ML: at 07:26

## 2021-08-04 RX ADMIN — DOXYCYCLINE 100 MG: 100 CAPSULE ORAL at 09:53

## 2021-08-04 RX ADMIN — IRON SUCROSE 200 MG: 20 INJECTION, SOLUTION INTRAVENOUS at 12:52

## 2021-08-04 RX ADMIN — DOXYCYCLINE 100 MG: 100 CAPSULE ORAL at 20:39

## 2021-08-04 RX ADMIN — INSULIN LISPRO 4 UNITS: 100 INJECTION, SOLUTION INTRAVENOUS; SUBCUTANEOUS at 17:06

## 2021-08-04 RX ADMIN — METOPROLOL TARTRATE 25 MG: 25 TABLET, FILM COATED ORAL at 09:53

## 2021-08-04 RX ADMIN — CYANOCOBALAMIN TAB 1000 MCG 1000 MCG: 1000 TAB at 09:52

## 2021-08-04 RX ADMIN — INSULIN LISPRO 6 UNITS: 100 INJECTION, SOLUTION INTRAVENOUS; SUBCUTANEOUS at 22:53

## 2021-08-04 RX ADMIN — ROPINIROLE HYDROCHLORIDE 4 MG: 1 TABLET, FILM COATED ORAL at 20:39

## 2021-08-04 RX ADMIN — INSULIN LISPRO 4 UNITS: 100 INJECTION, SOLUTION INTRAVENOUS; SUBCUTANEOUS at 12:52

## 2021-08-04 RX ADMIN — ROPINIROLE HYDROCHLORIDE 4 MG: 1 TABLET, FILM COATED ORAL at 00:00

## 2021-08-04 NOTE — PROGRESS NOTES
Bedside and verbal shift change report recieved from SHERON Fontaine (offgoing nurse) given to Sarah Collins RN (oncoming nurse). Report included the following information SBAR, Kardex, Intake/Output, MAR and Recent Results     1813- Dr. Judie Mcardle made this nurse aware that patient is getting transferred to [facility], awaiting transfer info. EMTALA summary started. Bedside and verbal shift change report given to Josue7 E Leatha Alessio [place], RN (oncoming nurse) by Audrey Cuellar RN (offgoing nurse).  Report included the following information SBAR, Kardex, Intake/Output, MAR and Recent Results

## 2021-08-04 NOTE — WOUND CARE
IP WOUND Neto Mejia RECORD NUMBER:  [id number]  AGE: 80 y.o. GENDER: female  : 1950  TODAY'S DATE:  2021    GENERAL     [] Follow-up   [x] New Consult    Leyla Grayson is a 80 y.o. female referred by:    [] Physician  [x] Nursing  [] Other:         PAST MEDICAL HISTORY    Past Medical History:   Diagnosis Date    Arthritis     hands and feet    Asthma     Autoimmune disease ([address]     lupus anticoagulant-causes clotting    Cancer ([address]     carcinoma in situ of cervix and uterus; inside bottom lip    Chronic pain     Left foot    Diabetes ([address]     Dizzy spells     h/o    GERD (gastroesophageal reflux disease)     Liver disease     1/2 of my liver was removed for ulcers.     Lung collapse         Nausea & vomiting 2012    Neuropathy     right foot    Other ill-defined conditions(799.89)     arterial clot in rt leg    Other ill-defined conditions(799.89)     restless leg syndrome    Other ill-defined conditions(799.89)     raynaud's syndrome    Other ill-defined conditions(799.89)     chronic N/V    Other ill-defined conditions(799.89)     (2 mutated genes) hyper coag. state     Pneumonia 2017    Positive cardiolipin antibodies     Psychiatric disorder     depression    PUD (peptic ulcer disease)     H/O requiring partial gastrectomy and small intestine    Thromboembolus ([address] 2000    artery right leg        PAST SURGICAL HISTORY    Past Surgical History:   Procedure Laterality Date    HX APPENDECTOMY      HX BREAST AUGMENTATION      developed infection and implant removed    HX CHOLECYSTECTOMY      HX GASTRECTOMY      due to ulcers    HX GASTRIC BYPASS      gastric by-pass    HX GI      total gastrectomy, partial colectomy    HX GI      colostomy and reversal    HX HEENT      cancer of lip removed    HX HYSTERECTOMY      for cervical cancer    HX ORTHOPAEDIC      rt foot surgery x10    HX ORTHOPAEDIC Left     great toe gangrenous and amputated    HX OTHER SURGICAL      part of liver removal    HX OTHER SURGICAL      ventral hernia repair x10    HX OTHER SURGICAL      Exc pilonial cystectomy    [place] ABDOMEN SURGERY PROC UNLISTED      duodenal ulcer rupture    [place] BREAST SURGERY PROCEDURE UNLISTED Bilateral 2016    hemorrhaged after breast surgery    [place] CARDIAC SURG PROCEDURE UNLIST  2018    open heart (single vessel)    VASCULAR SURGERY PROCEDURE UNLIST      iliac aorta stents x 6    VASCULAR SURGERY PROCEDURE UNLIST      seven more stents iliac aorta and femoral        FAMILY HISTORY    Family History   Problem Relation Age of Onset    Malignant Hyperthermia Neg Hx     Pseudocholinesterase Deficiency Neg Hx     Delayed Awakening Neg Hx     Post-op Nausea/Vomiting Neg Hx     Emergence Delirium Neg Hx     Post-op Cognitive Dysfunction Neg Hx     Other Neg Hx        SOCIAL HISTORY    Social History     Tobacco Use    Smoking status: Former Smoker     Years: 0.50     Quit date: 1996     Years since quittin.6    Smokeless tobacco: Never Used   Vaping Use    Vaping Use: Never used   Substance Use Topics    Alcohol use: Yes     Comment: 1 or 2 glasses wine or mixed drinks monthly     Drug use: Yes     Types: Marijuana     Comment: 4x daily       ALLERGIES    Allergies   Allergen Reactions    Dilaudid [Hydromorphone] Hives    Rifampin Hives and Swelling     Swelling of mouth and tounge    Levaquin [Levofloxacin] Itching    Amoxicillin Hives    Aspirin Nausea and Vomiting    Azithromycin Angioedema    Bactrim [Sulfamethoprim Ds] Hives    Cefatrizine Hives    Erythromycin Hives    Keflex [Cephalexin] Itching     Swollen throat and tongue    Rocephin [Ceftriaxone] Hives    Tylenol [Acetaminophen] Hives    Vancomycin Hives       MEDICATIONS    No current facility-administered medications on file prior to encounter.      Current Outpatient Medications on File Prior to Encounter   Medication Sig Dispense Refill    lidocaine (Lidoderm) 5 % Apply patch to the affected area for 12 hours a day and remove for 12 hours a day. 15 Each 0    hydrOXYzine HCL (ATARAX) 50 mg tablet Take 25 mg by mouth three (3) times daily as needed.  OTHER Blood thinner, patient unsure of name   Indications: Warfarin      ursodioL (ACTIGALL) 500 mg tablet TAKE ONE TABLET BY MOUTH TWICE A DAY 60 Tab 5    warfarin sodium (COUMADIN PO) Take 7 mg by mouth daily.  promethazine (PHENERGAN) 25 mg tablet Take 25 mg by mouth every six (6) hours as needed for Nausea.  B.infantis-B.ani-B.long-B.bifi (PROBIOTIC 4X) 10-15 mg TbEC Take  by mouth.  nystatin (MYCOSTATIN) topical cream Apply  to affected area two (2) times a day. 60 g 0    atorvastatin (LIPITOR) 20 mg tablet Take 20 mg by mouth daily.  dronabinol (MARINOL) 2.5 mg capsule Take 2.5 mg by mouth three (3) times daily.  metoprolol tartrate (LOPRESSOR) 25 mg tablet Take 25 mg by mouth two (2) times a day.  nitroglycerin (NITROSTAT) 0.3 mg SL tablet by SubLINGual route every five (5) minutes as needed for Chest Pain (x3).  clopidogrel (PLAVIX) 75 mg tab Take 75 mg by mouth daily.  lactobacillus combination no.4 (PROBIOTIC) 3 billion cell cap Take 1 Cap by mouth daily.  Cetirizine (ZYRTEC) 10 mg cap Take  by mouth.  cyanocobalamin 1,000 mcg tablet Take 1,000 mcg by mouth daily.  glimepiride (AMARYL) 2 mg tablet Take 2 mg by mouth every morning.  melatonin 3 mg tablet Take 6 mg by mouth nightly.  ondansetron hcl (ZOFRAN, AS HYDROCHLORIDE,) 4 mg tablet Take 4 mg by mouth every eight (8) hours as needed for Nausea.  cyclobenzaprine (FLEXERIL) 10 mg tablet Take  by mouth three (3) times daily as needed for Muscle Spasm(s).  BIOTIN PO Take 5,000 mcg by mouth daily.  albuterol (PROVENTIL HFA, VENTOLIN HFA) 90 mcg/actuation inhaler Take 2 Puffs by inhalation every four (4) hours as needed.  Indications: ACUTE ASTHMA ATTACK      metFORMIN (GLUCOPHAGE) 500 mg tablet Take 500 mg by mouth two (2) times daily (with meals). Indications: TYPE 2 DIABETES MELLITUS      ropinirole (REQUIP) 4 mg Tab TAB Take 4 mg by mouth nightly. Indications: RESTLESS LEGS SYNDROME         Wt Readings from Last 3 Encounters:   08/03/21 58.5 kg (129 lb)   06/15/21 54 kg (119 lb)   04/29/21 56.2 kg (123 lb 12.8 oz)       [unfilled]  Visit Vitals  BP (!) 124/59   Pulse 72   Temp 98.3 °F (36.8 °C)   Resp 17   Wt 58.5 kg (129 lb)   SpO2 100%   BMI 21.80 kg/m²       ASSESSMENT     Skin impairment Identification:  Type: diabetic    Contributing Factors: diabetes    Wound Foot Right;Plantar (Active)   Wound Image   08/04/21 1129   Wound Etiology Diabetic 08/04/21 1129   Dressing Status Clean;Dry; Intact 08/04/21 1129   Cleansed Wound cleanser 08/04/21 1129   Dressing/Treatment Gauze dressing/dressing sponge;Roll gauze; Ace wrap 08/04/21 1129   Wound Length (cm) 2.4 cm 08/04/21 1129   Wound Width (cm) 2.1 cm 08/04/21 1129   Wound Depth (cm) 0.1 cm 08/04/21 1129   Wound Surface Area (cm^2) 5.04 cm^2 08/04/21 1129   Wound Volume (cm^3) 0.504 cm^3 08/04/21 1129   Wound Assessment [facility] 08/04/21 1129   Drainage Amount Scant 08/04/21 1129   Drainage Description Serous 08/04/21 1129   Wound Odor None 08/04/21 1129   Alysa-Wound/Incision Assessment Hyperkeratosis (Callous) 08/04/21 1129   Edges Attached edges; Defined edges 08/04/21 1129   Wound Thickness Description Full thickness 08/04/21 1129   Number of days: 3          PLAN     Skin Care & Pressure Relief Recommendations  N/A    Physician/Provider notified: Yes  Recommendations: podiatry     Teaching completed with:   [x] Patient           [] Family member       [] Caregiver          [] Nursing  [] Other    Patient/Caregiver Teaching:  Level of patient/caregiver understanding able to:   [x] Indicates understanding       [] Needs reinforcement  [] Unsuccessful      [] Verbal Understanding  [] Demonstrated understanding       [] No evidence of learning  [] Refused teaching         [] N/A       Electronically signed by Akua Bergeron RN on 8/4/2021 at 11:33 AM

## 2021-08-04 NOTE — PROGRESS NOTES
Podiatry:  Patient will need surgery to amputate left 2nd toe and possible debridement of right foot ulcer. Just waiting for Vascular input to proceed to be sure she can heal from the surgery.

## 2021-08-04 NOTE — DISCHARGE SUMMARY
Discharge Summary    Patient: Breanne Mcghee MRN: [id number]  CSN: [id number]    YOB: 1950  Age: 80 y.o.   Sex: female    DOA: 7/31/2021 LOS:  LOS: 4 days   Discharge Date:      Primary Care Provider:  TORRIE Michelle    Admission Diagnoses: Ischemic toe [I99.8]    Discharge Diagnoses:    Problem List as of 8/4/2021 Date Reviewed: 3/19/2021        Codes Class Noted - Resolved    Debility ICD-10-CM: R53.81  ICD-9-CM: 799.3  Unknown - Present        * (Principal) Ischemic toe ICD-10-CM: I99.8  ICD-9-CM: 459.9  7/31/2021 - Present        Delta-9-tetrahydrocannabinol (THC) dependence ([facility] ICD-10-CM: F12.20  ICD-9-CM: 304.30  7/31/2021 - Present        Arterial thrombosis ([facility] ICD-10-CM: I74.9  ICD-9-CM: 444.9  3/18/2021 - Present        Chronic anticoagulation ICD-10-CM: Z79.01  ICD-9-CM: V58.61  4/16/2019 - Present        Cellulitis of right foot ICD-10-CM: L03.115  ICD-9-CM: 682.7  12/5/2018 - Present        Diabetic ulcer of right midfoot with fat layer exposed ([facility] ICD-10-CM: E11.621, O06.580  ICD-9-CM: 250.80, 707.14  12/4/2018 - Present        Type II diabetes mellitus with complication, uncontrolled ([facility] ICD-10-CM: E11.8, E11.65  ICD-9-CM: 250.92  4/13/2018 - Present        CAD (coronary artery disease) ICD-10-CM: I25.10  ICD-9-CM: 414.00  4/13/2018 - Present        Anxiety ICD-10-CM: F41.9  ICD-9-CM: 300.00  10/15/2017 - Present        PAD (peripheral artery disease) ([facility] (Chronic) ICD-10-CM: I73.9  ICD-9-CM: 443.9  7/22/2017 - Present        Sleepwalking disorder (Chronic) ICD-10-CM: F51.3  ICD-9-CM: 307.46  7/22/2017 - Present        Lupus anticoagulant syndrome ([facility] ICD-10-CM: P21.11  ICD-9-CM: 289.81  12/29/2016 - Present        Anemia ICD-10-CM: D64.9  ICD-9-CM: 285.9  3/1/2016 - Present        Atherosclerotic PVD with ulceration ([facility] (Chronic) ICD-10-CM: E86.757, L98.499  ICD-9-CM: 440.23, 707.9  3/13/2015 - Present        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 ICD-10-CM: Z90.3, Z98.0  ICD-9-CM: V45.89  5/23/2012 - Present        RESOLVED: Encounter for palliative care ICD-10-CM: Z51.5  ICD-9-CM: V66.7  Unknown - 8/4/2021        RESOLVED: Exostosis of right foot ICD-10-CM: M89.8X7  ICD-9-CM: 726.91  10/2/2020 - 10/2/2020        RESOLVED: Open wound of right foot ICD-10-CM: S91.301A  ICD-9-CM: 892.0  10/2/2020 - 10/2/2020        RESOLVED: Primary biliary cholangitis ([facility] ICD-10-CM: K74.3  ICD-9-CM: 571.6  7/9/2019 - 8/4/2021        RESOLVED: Elevated liver enzymes ICD-10-CM: R74.8  ICD-9-CM: 790.5  4/16/2019 - 8/4/2021        RESOLVED: Diabetic foot ulcer with osteomyelitis ([facility] ICD-10-CM: E11.621, E11.69, L97.509, M86.9  ICD-9-CM: 250.80, 707.15, 730.27, 731.8  12/5/2018 - 12/7/2018        RESOLVED: Right foot ulcer, with fat layer exposed ([facility] ICD-10-CM: L97.512  ICD-9-CM: 707.15  12/5/2018 - 4/21/2019        RESOLVED: Diabetic foot ([facility] ICD-10-CM: E11.8  ICD-9-CM: 250.80  12/4/2018 - 4/16/2019        RESOLVED: Cellulitis ICD-10-CM: L03.90  ICD-9-CM: 682.9  12/4/2018 - 12/7/2018        RESOLVED: Osteomyelitis ([facility] ICD-10-CM: M86.9  ICD-9-CM: 730.20  10/12/2017 - 10/14/2017        RESOLVED: Chronic multifocal osteomyelitis, right ankle and foot ([facility] ICD-10-CM: M86.371  ICD-9-CM: 730.17  9/15/2017 - 10/14/2017        RESOLVED: Foot osteomyelitis, right ([facility] ICD-10-CM: M86.9  ICD-9-CM: 730.27  12/27/2016 - 10/14/2017        RESOLVED: Acute hematogenous osteomyelitis, right ankle and foot ([facility] ICD-10-CM: M86.071  ICD-9-CM: 730.07  9/9/2016 - 9/9/2016        RESOLVED: Diabetes mellitus with skin ulcer ([facility] ICD-10-CM: K27.369, L98.499  ICD-9-CM: 250.80, 707.9  9/9/2016 - 9/9/2016        RESOLVED: Asymmetric diabetic proximal motor neuropathy ([facility] ICD-10-CM: E11.44  ICD-9-CM: 250.60, 357.2  9/9/2016 - 9/9/2016        RESOLVED: Type II diabetes mellitus with peripheral circulatory disorder ([facility] ICD-10-CM: E11.51  ICD-9-CM: 250.70, 443.81  9/9/2016 - 9/9/2016        RESOLVED: Syncope ICD-10-CM: R55  ICD-9-CM: 780.2  2/29/2016 - 12/5/2018        RESOLVED: Gangrene ([facility] ICD-10-CM: Q62  ICD-9-CM: 785.4  4/2/2015 - 4/2/2015        RESOLVED: Right foot ulcer ([facility] ICD-10-CM: L97.519  ICD-9-CM: 707.15  3/9/2015 - 10/14/2017        RESOLVED: Weight loss ICD-10-CM: R63.4  ICD-9-CM: 783.21  5/23/2012 - 2/29/2016        RESOLVED: Pancreatic cyst ICD-10-CM: G70.9  ICD-9-CM: 577.2  5/23/2012 - 12/7/2018              Discharge Medications:     Current Discharge Medication List      START taking these medications    Details   doxycycline (MONODOX) 100 mg capsule Take 1 Capsule by mouth every twelve (12) hours. Qty: 10 Capsule, Refills: 0  Start date: 8/4/2021      heparin sodium,porcine/D5W (heparin 25,000 units in D5W 250 ml) 25,000 unit/250 mL(100 unit/mL) infusion [phone] Units/hr by IntraVENous route TITRATE. Qty: 250 mL, Refills: 0  Start date: 8/4/2021         CONTINUE these medications which have NOT CHANGED    Details   lidocaine (Lidoderm) 5 % Apply patch to the affected area for 12 hours a day and remove for 12 hours a day. Qty: 15 Each, Refills: 0      ursodioL (ACTIGALL) 500 mg tablet TAKE ONE TABLET BY MOUTH TWICE A DAY  Qty: 60 Tab, Refills: 5      atorvastatin (LIPITOR) 20 mg tablet Take 20 mg by mouth daily. dronabinol (MARINOL) 2.5 mg capsule Take 2.5 mg by mouth three (3) times daily. metoprolol tartrate (LOPRESSOR) 25 mg tablet Take 25 mg by mouth two (2) times a day. nitroglycerin (NITROSTAT) 0.3 mg SL tablet by SubLINGual route every five (5) minutes as needed for Chest Pain (x3). clopidogrel (PLAVIX) 75 mg tab Take 75 mg by mouth daily. lactobacillus combination no.4 (PROBIOTIC) 3 billion cell cap Take 1 Cap by mouth daily. Cetirizine (ZYRTEC) 10 mg cap Take  by mouth.      cyanocobalamin 1,000 mcg tablet Take 1,000 mcg by mouth daily. glimepiride (AMARYL) 2 mg tablet Take 2 mg by mouth every morning. cyclobenzaprine (FLEXERIL) 10 mg tablet Take  by mouth three (3) times daily as needed for Muscle Spasm(s). BIOTIN PO Take 5,000 mcg by mouth daily. albuterol (PROVENTIL HFA, VENTOLIN HFA) 90 mcg/actuation inhaler Take 2 Puffs by inhalation every four (4) hours as needed. Indications: ACUTE ASTHMA ATTACK      metFORMIN (GLUCOPHAGE) 500 mg tablet Take 500 mg by mouth two (2) times daily (with meals). Indications: TYPE 2 DIABETES MELLITUS      ropinirole (REQUIP) 4 mg Tab TAB Take 4 mg by mouth nightly. Indications: RESTLESS LEGS SYNDROME         STOP taking these medications       hydrOXYzine HCL (ATARAX) 50 mg tablet Comments:   Reason for Stopping:         OTHER Comments:   Reason for Stopping:         warfarin sodium (COUMADIN PO) Comments:   Reason for Stopping:         promethazine (PHENERGAN) 25 mg tablet Comments:   Reason for Stopping:         B.infantis-B.ani-B.long-B.bifi (PROBIOTIC 4X) 10-15 mg TbEC Comments:   Reason for Stopping:         nystatin (MYCOSTATIN) topical cream Comments:   Reason for Stopping:         melatonin 3 mg tablet Comments:   Reason for Stopping:         ondansetron hcl (ZOFRAN, AS HYDROCHLORIDE,) 4 mg tablet Comments:   Reason for Stopping:               Discharge Condition: Good    Procedures : None    Consults: Vascular Surgery and Podiatry      PHYSICAL EXAM   Visit Vitals  /60   Pulse 74   Temp 98.1 °F (36.7 °C)   Resp 17   Wt 58.5 kg (129 lb)   SpO2 100%   BMI 21.80 kg/m²     General: Awake, cooperative, no acute distress    HEENT: NC, Atraumatic. PERRLA, EOMI. Anicteric sclerae. Lungs:  CTA Bilaterally. No Wheezing/Rhonchi/Rales. Heart:  Regular  rhythm,  No murmur, No Rubs, No Gallops  Abdomen: Soft, Non distended, Non tender. +Bowel sounds,   Extremities: Left leg with no distal pulses, bluish discoloration left toes, espcially 2nd toe. Right TMA, left great to amputation. Left foot TTP. Psych:   Not anxious or agitated.   Neurologic:  No acute neurological deficits. Admission HPI :   Eula Primrose is a 80 y.o. female with diabetes, hypertension, CAD, CABG, PAD, right TMA, left great toe amputation presents to ER with concerns of left foot pain especially left second toe. Right foot plantar aspect diabetic foot ulcer. She has severe peripheral vascular disease. She had vascular procedures done in March and April of this year. She is status post left femoral endarterectomy and bilateral stent placement. She reports that has left great toe has been gangrenous since the vascular procedure. He had hoped that it would heal but it has continued to get worse and it has now started to get progressively painful. She denies any fever, chills, nausea, vomiting, chest pain, shortness of breath. She received COVID 19 vaccine. In ER her vital signs relatively normal range, x-ray left foot with no evidence of osteomyelitis, x-ray right foot with the midfoot bones osteomyelitis cannot be excluded. Hospital Course :   Ms. Yumi Bynum was admitted to monitored floor, she was seen and followed by podiatry and vascular surgery. She did not had any acute events during hospitalization. Severe PAD/left second toe ischemia and gangrene-  She was started on heparin drip, continued with Plavix. She was on Coumadin at home however her INR was in normal range. She had vascular studies done, she had CT angiogram of the abdominal aorta and bilateral lower extremities. This showed long segment occlusion involving the stent in left common iliac artery. For her pain she was initially started on PCA pump, this did not help and she was switched to oral pain medications with gabapentin. Vascular surgery after reviewing the CT angiogram of the abdominal aorta and bilateral lower extremities thought that patient will be best served if she get axillary to femoral bypass. Patient agreed for the above procedure.   This procedure cannot be done at this hospital hence she will be transferred to [facility]. She was also seen by podiatry who recommended left second toe amputation however waited for vascular evaluation to fix her circulation for better healing post amputation. Diabetes mellitus-  Held her home medications, started on sliding scale insulin and diabetic diet. Coronary artery disease with history of CABG-  Continued with her home medications along with Plavix. No chest pain or anginal symptoms during hospitalization. Right diabetic foot ulcer-  She was started on doxycycline as she is allergic to multiple antibiotics. She received local wound care. Called and discussed with the Dr. Gracie Mason at [facility] who has graciously accepted the patient. Activity: Activity as tolerated    Diet: Diabetic Diet    Follow-up: Admitting attending at [facility]    Disposition: transfer to [facility]    Minutes spent on discharge: 55       Labs: Results:       Chemistry Recent Labs     08/03/21  0445 08/02/21  0033   * 141*    133*   K 4.2 4.8    102   CO2 29 29   BUN 18 20*   CREA 0.65 0.61   CA 8.6 9.1   AGAP 6 2*   BUCR 28* 33*      CBC w/Diff Recent Labs     08/03/21  0445 08/02/21  0033   WBC 5.0 8.3   RBC 2.70* 3.55*   HGB 9.4* 10.6*   HCT 28.1* 33.6*    375   GRANS 46 63   LYMPH 40 26   EOS 3 2      Cardiac Enzymes No results for input(s): CPK, CKND1, RAYNA in the last 72 hours. No lab exists for component: CKRMB, TROIP   Coagulation Recent Labs     08/04/21  1245 08/04/21  0408   APTT 110.0* 68.5*       Lipid Panel Lab Results   Component Value Date/Time    Cholesterol, total 178 04/16/2019 04:44 PM    HDL Cholesterol 88 (H) 04/16/2019 04:44 PM    LDL, calculated 71 04/16/2019 04:44 PM    VLDL, calculated 19 04/16/2019 04:44 PM    Triglyceride 95 04/16/2019 04:44 PM    CHOL/HDL Ratio 2.0 04/16/2019 04:44 PM      BNP No results for input(s): BNPP in the last 72 hours.    Liver Enzymes No results for input(s): TP, ALB, TBIL, AP in the last 72 hours. No lab exists for component: SGOT, GPT, DBIL   Thyroid Studies Lab Results   Component Value Date/Time    TSH 0.74 04/11/2018 12:30 AM            Significant Diagnostic Studies: XR FOOT LT AP/LAT    Result Date: 7/31/2021  MEDICAL RECORDS NUMBER: [id number] EXAM: XR FOOT LT AP/LAT HISTORY: pain to 4 toes and forefoot DATE: 7/31/2021 3:07 PM COMPARISON: 4/2/2015 FINDINGS: Again demonstrated are postoperative changes related to amputation of the left first toe. There is no obvious complicating feature of the remaining osseous structures. No dislocation. Patient is status post internal fixation of the fifth metatarsal.     1. No significant interval change. No obvious radiographic evidence of osteomyelitis. No acute osseous abnormality. This report has been generated using voice recognition software. XR FOOT RT AP/LAT    Result Date: 7/31/2021  MEDICAL RECORDS NUMBER: [id number] EXAM: XR FOOT RT AP/LAT HISTORY: foot pain, s/p amputation of all toes; nonhealing wound to plantar foot DATE: 7/31/2021 3:07 PM COMPARISON: 10/2/2020 FINDINGS: Again demonstrated is extensive postsurgical changes related to a transmetatarsal amputation. Significant ostial lysis involving the midfoot bones and underlying osteomyelitis cannot be entirely excluded. The overall appearance however appears to be relatively stable as compared to the prior study. 1. Again demonstrated is extensive postsurgical changes related to a transmetatarsal amputation. Significant ostial lysis involving the midfoot bones and underlying osteomyelitis cannot be entirely excluded. The overall appearance however appears to be relatively stable as compared to the prior study. This report has been generated using voice recognition software.      CTA ABD ART W RUNOFF W WO CONT    Result Date: 8/3/2021  EXAM:  CT ANGIOGRAM OF THE ABDOMINAL AORTA AND BILATERAL LOWER EXTREMITIES WITH SAGITTAL AND CORONAL RECONSTRUCTIONS CLINICAL INDICATION/HISTORY: pad   > Additional: Left second toe pain and ischemia. COMPARISON: 03/18/2021   > Reference Exam: None. TECHNIQUE:  CT arteriogram of the abdominal aorta and both lower extremities was performed prior to and after intravenous contrast administration without complication. Extensive separate workstation post processing was performed by [employer] to include MIPs, color surface images, 3-D reconstructions, and curve planar reformat imaging. One or more dose reduction techniques were used on this CT: automated exposure control, adjustment of the mAs and/or kVp according to patient size, and iterative reconstruction techniques. The specific techniques used on this CT exam have been documented in the patient's electronic medical record. Digital Imaging and Communications in Medicine (DICOM) format image data are available to nonaffiliated external healthcare facilities or entities on a secure, media free, reciprocally searchable basis with patient authorization for at least a 12-month period after this study. ________________________________________________ FINDINGS: --- CT ARTERIOGRAM ---  ABDOMINAL AORTA:  Marked diffuse atherosclerosis. No significant infrarenal abdominal aortic stenosis. No aneurysm. - Celiac axis:  Mild to moderate origin stenosis. - Superior mesenteric artery:  No significant proximal stenosis. - Left renal:  No significant stenosis. - Right renal:  No significant stenosis. RIGHT LOWER EXTREMITY: - Iliac system: Moderate to marked focal intrastent stenosis involving the proximal common iliac artery. No other significant stenosis. The internal iliac artery is patent proximally. No significant external iliac artery stenosis. - Femoral/Popliteal system: Common femoral artery is patent without significant stenosis. Mild diffuse disease involving the SFA with mild origin stenosis.  2 separate mild stenosis involving the below-knee popliteal artery. - Tibial/peroneal system:  Anterior tibial artery patent to the level of the foot. Posterior tibial artery occludes proximally. LEFT LOWER EXTREMITY: - Iliac system: Interval long segment occlusion involving the common iliac artery stent. Reconstitution of the iliac artery bifurcation. Internal iliac artery is patent at the origin. Patent external iliac artery stent. - Femoral/popliteal system:  Common femoral artery is patent without significant stenosis. Short segment moderate stenosis involving the origin of the SFA. Interval recanalization of the SFA with multiple tandem stenosis within the mid to distal aspect. Popliteal artery appears patent without significant stenosis. - Tibioperoneal system:  Anterior tibial and posterior tibial arteries are patent to the level of the foot. --- CT OF ABDOMEN/PELVIS  --- LOWER CHEST: Hiatal hernia. LIVER, BILIARY: Imaged liver is unremarkable. No biliary dilation. Cholecystectomy. PANCREAS: Normal. SPLEEN: Imaged portions are unremarkable. ADRENALS: Normal. KIDNEYS: Lower pole 5 mm right intrarenal calculus without hydronephrosis. Upper pole left renal cyst. No follow-up imaging is recommended as incidental lesions are likely benign. LYMPH NODES: No enlarged lymph nodes. GASTROINTESTINAL TRACT: No bowel dilation or wall thickening. PELVIC ORGANS: Unremarkable. BONES: No acute or aggressive osseous abnormalities identified. OTHER: No ascites. Mild soft tissue edema involving both lower legs. ___________________________________________________     1. ABDOMINAL AORTA:  No significant stenosis. 2. RIGHT LOWER EXTREMITY: -TONIA:  Moderate to marked focal intrastent stenosis involving the proximal aspect. -EIA:  No significant stenosis. -CFA:  No significant stenosis. -SFA:  Mild origin stenosis. -Popliteal artery:  2 separate mild stenosis involving below-knee aspect. -Tibioperoneal arteries:  Anterior tibial artery patent to the foot.  3. LEFT LOWER EXTREMITY: -TONIA:  Interval long segment occlusion involving the stent. -EIA:  Patent stent without significant stenosis. -CFA:  No significant stenosis. -SFA:  Interval recanalization. Short segment moderate stenosis involving the origin with multiple tandem stenosis within the mid to distal aspect. -Popliteal artery:  No significant stenosis. -Tibioperoneal arteries:  Anterior and posterior tibial arteries are patent to the level of the foot. 4. Other vascular findings:  -Mild to moderate celiac origin stenosis. 5. Non-vascular findings: -Hiatal hernia. Nonobstructing lower pole right intrarenal calculus (5 mm). *   *     LOWER EXT ART PVR MULT LEVEL SEG PRESSURES    Result Date: 8/3/2021  Segmental pressures in both legs are not measurable due to extensive arterial calcification. The ankle/brachial indexes are therefore unreliable predictors of distal arterial perfusion. Right toes are amputated. Left 3rd digit toe pressure is 43mmHg. Second digit PPG is flat line. No results found for this or any previous visit. Please note that this dictation was completed with [employer], the computer voice recognition software. Quite often unanticipated grammatical, syntax, homophones, and other interpretive errors are inadvertently transcribed by the computer software. Please disregard these errors. Please excuse any errors that have escaped final proofreading.      CC: Glo Devi

## 2021-08-04 NOTE — PROGRESS NOTES
Hospitalist Progress Note    Patient: Khloe Mitchell MRN: [id number]  CSN: [id number]    YOB: 1950  Age: 80 y.o. Sex: female    DOA: 7/31/2021 LOS:  LOS: 4 days                Assessment/Plan     Patient Active Problem List   Diagnosis Code    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 Z90.3, Z98.0    Atherosclerotic PVD with ulceration (HCC) I70.209, L98.499    Anemia D64.9    Lupus anticoagulant syndrome ([facility] D68.62    PAD (peripheral artery disease) (HCC) I73.9    Sleepwalking disorder F51.3    Anxiety F41.9    Type II diabetes mellitus with complication, uncontrolled ([facility] E11.8, E11.65    CAD (coronary artery disease) I25.10    Diabetic ulcer of right midfoot with fat layer exposed ([facility] E11.621, L97.412    Cellulitis of right foot L03.115    Chronic anticoagulation Z79.01    Elevated liver enzymes R74.8    Primary biliary cholangitis (HCC) K74.3    Arterial thrombosis (HCC) I74.9    Ischemic toe I99.8    Delta-9-tetrahydrocannabinol (THC) dependence (HCC) F12.20    Encounter for palliative care Z51.5    Debility R53.81        80 y.o. female with diabetes, hypertension, CAD, CABG, PAD, right TMA, left great toe amputation presents to ER with concerns of left foot pain especially left second toe. Left second toe ischemia and gangrene/severe peripheral vascular disease-  Continue with Plavix  on heparin drip  Further plan per vascular and podiatry.       DM-  continue on sliding scale insulin, diabetic diet.     CAD-  Continue with home medications along with Plavix.     Right diabetic foot ulcer-  Started on doxycycline. Multiple antibiotic allergy  Wound care consulted    Iron def anemia -  venofer ordered.     Daily THC use    Disposition : TBD    Review of systems  General: No fevers or chills. Cardiovascular: No chest pain or pressure. No palpitations. Pulmonary: No shortness of breath. Gastrointestinal: No nausea, vomiting.        Physical Exam:  General: Awake, cooperative, no acute distress    HEENT: NC, Atraumatic. PERRLA, anicteric sclerae. Lungs: CTA Bilaterally. No Wheezing/Rhonchi/Rales. Heart:  S1 S2,  No murmur, No Rubs, No Gallops  Abdomen: Soft, Non distended, Non tender.  +Bowel sounds,   Extremities: Left great toe amputation. Left 2nd toe gangrene, Right foot plantar ulcer on mid foot. Weak distal pulses. Psych:   Not anxious or agitated. Neurologic:  No acute neurological deficit. Vital signs/Intake and Output:  Visit Vitals  BP (!) 132/43   Pulse 75   Temp 98.3 °F (36.8 °C)   Resp 17   Wt 58.5 kg (129 lb)   SpO2 99%   BMI 21.80 kg/m²     Current Shift:  No intake/output data recorded. Last three shifts:  08/02 1901 - 08/04 0700  In: 304 [P.O.:240; I.V.:64]  Out: 0             Labs: Results:       Chemistry Recent Labs     08/03/21  0445 08/02/21  0033   * 141*    133*   K 4.2 4.8    102   CO2 29 29   BUN 18 20*   CREA 0.65 0.61   CA 8.6 9.1   AGAP 6 2*   BUCR 28* 33*      CBC w/Diff Recent Labs     08/03/21  0445 08/02/21  0033   WBC 5.0 8.3   RBC 2.70* 3.55*   HGB 9.4* 10.6*   HCT 28.1* 33.6*    375   GRANS 46 63   LYMPH 40 26   EOS 3 2      Cardiac Enzymes No results for input(s): CPK, CKND1, RYANA in the last 72 hours. No lab exists for component: CKRMB, TROIP   Coagulation Recent Labs     08/04/21  1245 08/04/21  0408   APTT 110.0* 68.5*       Lipid Panel Lab Results   Component Value Date/Time    Cholesterol, total 178 04/16/2019 04:44 PM    HDL Cholesterol 88 (H) 04/16/2019 04:44 PM    LDL, calculated 71 04/16/2019 04:44 PM    VLDL, calculated 19 04/16/2019 04:44 PM    Triglyceride 95 04/16/2019 04:44 PM    CHOL/HDL Ratio 2.0 04/16/2019 04:44 PM      BNP No results for input(s): BNPP in the last 72 hours. Liver Enzymes No results for input(s): TP, ALB, TBIL, AP in the last 72 hours.     No lab exists for component: SGOT, GPT, DBIL   Thyroid Studies Lab Results   Component Value Date/Time    TSH 0.74 04/11/2018 12:30 AM        Procedures/imaging: see electronic medical records for all procedures/Xrays and details which were not copied into this note but were reviewed prior to creation of Plan

## 2021-08-05 LAB
ABDOMINAL MID AORTA VEL: 53 CM/S
ABDOMINAL PROX AORTA VEL: 25.9 CM/S
LEFT CFA DIST SYS PSV: 97.9 CM/S
LEFT CFA MID SYS PSV: 65.2 CM/S
LEFT CFA VEL RATIO: 1
LEFT EXT ILIAC DIST VEL RATIO: 0.61
LEFT EXT ILIAC DIST VEL: 97.9 CM/S
LEFT EXT ILIAC MID VEL RATIO: 5.02
LEFT EXT ILIAC MID VEL: 160 CM/S
LEFT EXT ILIAC PROX VEL: 31.9 CM/S
RIGHT COM ILIAC MID VEL: 288.2 CM/S
RIGHT COM ILIAC PROX VEL: 202.4 CM/S
RIGHT EXT ILIAC DIST VEL RATIO: 0.6
RIGHT EXT ILIAC DIST VEL: 97.9 CM/S
RIGHT EXT ILIAC MID VEL: 160 CM/S

## 2021-08-05 PROCEDURE — 74011250637 HC RX REV CODE- 250/637: Performed by: NURSE PRACTITIONER

## 2021-08-05 RX ADMIN — OXYCODONE 5 MG: 5 TABLET ORAL at 00:07

## 2021-08-05 NOTE — PROGRESS NOTES
I discussed with her the risk and complications and benefits of left Axillary to femoral extra anatomical bypass including but not limited to limb loss for upper or lower extremity. Low patency rate. Infection . Heart attack with Hx of CABG and LIMA graft. failure of operation and the need of reoperation and all other complications were discussed with her and verbal consent was obtained. The issue is that she had multiple endo procedure. And multiple failures with multiple stents and aortic stent and frozen abdomen. Even RP approach will be hard due to the aortic stent to clamp. Even thoracic approach with her Hx of right collapsed lungs and chronic marijuana smoking. I believe the best option is extra anatomical bypass I told her the patency is low she said \" even if its 1 week I cant live this way\". I told her her chances to heal AKA is lower since her iliac inflow is affected we need to restore the inflow before any amputation takes place. I drew her pictures and explained it in details and her questions were answered. Due to the complexity of the case. We recommend transferring the pt to [facility]. I discussed the case with Dr. Miguelito Villalba he kindly said he will start the transfer process I appreciate a lot his kind help. Thank you very much for allowing us to participate in her care.      Nasir Barron MD

## 2021-08-05 NOTE — PROGRESS NOTES
Report called to Ángela Morin RN, at [facility] for EMTALA transfer, pt on Heparin drip. 0013 VS taken and EMTALA form completed prior to Medical transport.  Pt given Roxicodone 5 mg for pain    0025 Transported to [facility] via ambulance in stable condition

## 2021-08-09 NOTE — WOUND CARE
Debridement Wound Care        Problem List Items Addressed This Visit     None          Procedure Note  Indications:  Based on my examination of this patient's wound(s)/ulcer(s) today, debridement is required to promote healing and evaluate the wound base. IDDM patient returning for follow up treatment on Chronic ulcer to the right foot, measuring (2cm x 0.2cm x 0.1) with a Hyperkeratotic covering. Will consider surgery correction on the right foot and send her for an MRI prior to the surgery and put her on an antibiotic. Plan today: Sharp Debridement - dep vladimir- Apply new KCI wound VAC. Performed by:  Fidel Romeo DPM    Consent obtained: Yes    Time out taken: Yes    Debridement: Excisional    Using curette the wound(s)/ulcer(s) was/were sharply debrided down through and including the removal of    subcutaneous tissue    Devitalized Tissue Debrided: slough    Pre Debridement Measurements:  Are located in the Wound/Ulcer Documentation Flow Sheet    Wound/Ulcer #: 1    Post Debridement Measurements:  Wound/Ulcer Descriptions are Pre Debridement except measurements:Diabetic ulcer, fat layer exposed    Wound Foot Right;Plantar (Active)   Number of days: 8        Percent of Wound(s)/Ulcer(s) Debrided: 100%    Total Surface Area Debrided:  .04 sq cm     Diabetic/Pressure/Non Pressure Ulcers only:  Ulcer:     Estimated Blood Loss:  Minimal     Hemostasis Achieved: Pressure    Procedural Pain: 2 / 10     Post Procedural Pain: 3 / 10     Response to treatment: Well tolerated by patient

## 2021-08-24 NOTE — WOUND CARE
Debridement Wound Care        Problem List Items Addressed This Visit     None          Procedure Note  Indications:  Based on my examination of this patient's wound(s)/ulcer(s) today, debridement is required to promote healing and evaluate the wound base. Patient returns for follow up treatment on the Right foot ulcer. She returns with wound VAC and ulcer with increased maceration. Sharp Debridement - Culture wound. - wound VAC - Chantel - Felt Offload. Performed by:  Jolanta Arvizu DPM    Consent obtained: Yes    Time out taken: Yes    Debridement: Excisional    Using curette the wound(s)/ulcer(s) was/were sharply debrided down through and including the removal of    subcutaneous tissue    Devitalized Tissue Debrided: slough    Pre Debridement Measurements:  Are located in the Wound/Ulcer Documentation Flow Sheet    Wound/Ulcer #: 1    Post Debridement Measurements:  Wound/Ulcer Descriptions are Pre Debridement except measurements:Diabetic ulcer, fat layer exposed    Wound Foot Right;Plantar (Active)   Number of days: 22        Percent of Wound(s)/Ulcer(s) Debrided: 100%    Total Surface Area Debrided:  0.4 sq cm     Diabetic/Pressure/Non Pressure Ulcers only:  Ulcer:     Estimated Blood Loss:  Minimal     Hemostasis Achieved: Pressure    Procedural Pain: 2 / 10     Post Procedural Pain: 3 / 10     Response to treatment: Well tolerated by patient

## 2021-08-24 NOTE — WOUND CARE
Debridement Wound Care        Problem List Items Addressed This Visit     None          Procedure Note  Indications:  Based on my examination of this patient's wound(s)/ulcer(s) today, debridement is required to promote healing and evaluate the wound base. IDDM female returns for continued treatment on her ulcer to the Right Midfoot measuring (0.1cm x 0.3cm x 0.5cm). Sharp debridement measuring (0.5cm x 0.5cm x 0.5cm), apply Ascencion Alginate with Promogram on the Right Midfoot. Performed by:  Lavella Seip, DPM    Consent obtained: Yes    Time out taken: Yes    Debridement: Excisional    Using curette the wound(s)/ulcer(s) was/were sharply debrided down through and including the removal of    subcutaneous tissue    Devitalized Tissue Debrided: slough    Pre Debridement Measurements:  Are located in the Wound/Ulcer Documentation Flow Sheet    Wound/Ulcer #: 1    Post Debridement Measurements:  Wound/Ulcer Descriptions are Pre Debridement except measurements:Diabetic ulcer, fat layer exposed    Wound Foot Right;Plantar (Active)   Number of days: 22        Percent of Wound(s)/Ulcer(s) Debrided: 100%    Total Surface Area Debrided:   .03 sq cm     Diabetic/Pressure/Non Pressure Ulcers only:  Ulcer:     Estimated Blood Loss:  Minimal     Hemostasis Achieved: Pressure    Procedural Pain: 1 / 10     Post Procedural Pain: 2 / 10     Response to treatment: Well tolerated by patient

## 2021-08-25 NOTE — WOUND CARE
[address]   Progress Note and Procedure Note   NO Procedure      Jaime Donaldson  MEDICAL RECORD NUMBER:  [id number]  AGE: 80 y.o. RACE WHITE/NON-  GENDER: female  : 1950  EPISODE DATE:  2020    Subjective:     Chief Complaint   Patient presents with    Follow-up     right foot wound         HISTORY of PRESENT ILLNESS HPI    [email or web address] Kearns is a 80 y.o. female who presents today for wound/ulcer evaluation. History of Wound Context:   Patient is a IDDM female, returning to the [address] [place] [address] for continued follow up treatment on her Chronic Right Midfoot ulcer with Exostosis. Surgery is scheduled for . Her FBS this morning was 154 and her HAIC=7.6. Wound/Ulcer Pain Timing/Severity: moderate  Quality of pain: burning and throbbing  Severity:  2 / 10   Modifying Factors: None  Associated Signs/Symptoms: diabetic    Ulcer Identification:  Ulcer Type: diabetic    Contributing Factors: venous stasis and diabetes    Wound:  Chronic open ulcer on the Right Midfoot with Exostosis. PAST MEDICAL HISTORY    Past Medical History:   Diagnosis Date    Arthritis     hands and feet    Asthma     Autoimmune disease ([address]     lupus anticoagulant-causes clotting    Cancer ([address]     carcinoma in situ of cervix and uterus; inside bottom lip    Chronic pain     Left foot    Diabetes ([address]     Dizzy spells     h/o    GERD (gastroesophageal reflux disease)     Liver disease     1/2 of my liver was removed for ulcers.  Lung collapse         Nausea & vomiting 2012    Neuropathy     right foot    Other ill-defined conditions(799.89)     arterial clot in rt leg    Other ill-defined conditions(799.89)     restless leg syndrome    Other ill-defined conditions(799.89)     raynaud's syndrome    Other ill-defined conditions(799.89)     chronic N/V    Other ill-defined conditions(799.89)     (2 mutated genes) hyper coag. state     Pneumonia 2017    Positive cardiolipin antibodies     Psychiatric disorder     depression    PUD (peptic ulcer disease)     H/O requiring partial gastrectomy and small intestine    Thromboembolus ([address] 2000    artery right leg        PAST SURGICAL HISTORY    Past Surgical History:   Procedure Laterality Date    HX APPENDECTOMY      HX BREAST AUGMENTATION      developed infection and implant removed    HX CHOLECYSTECTOMY      HX GASTRECTOMY      due to ulcers    HX GASTRIC BYPASS      gastric by-pass    HX GI      total gastrectomy, partial colectomy    HX GI      colostomy and reversal    HX HEENT      cancer of lip removed    HX HYSTERECTOMY      for cervical cancer    HX ORTHOPAEDIC      rt foot surgery x10    HX ORTHOPAEDIC Left     great toe gangrenous and amputated    HX OTHER SURGICAL      part of liver removal    HX OTHER SURGICAL      ventral hernia repair x10    HX OTHER SURGICAL      Exc pilonial cystectomy    NV ABDOMEN SURGERY PROC UNLISTED      duodenal ulcer rupture    NV BREAST SURGERY PROCEDURE UNLISTED Bilateral 2016    hemorrhaged after breast surgery    [place] CARDIAC SURG PROCEDURE UNLIST  2018    open heart (single vessel)    VASCULAR SURGERY PROCEDURE UNLIST      iliac aorta stents x 6    VASCULAR SURGERY PROCEDURE UNLIST      seven more stents iliac aorta and femoral        FAMILY HISTORY    Family History   Problem Relation Age of Onset    Malignant Hyperthermia Neg Hx     Pseudocholinesterase Deficiency Neg Hx     Delayed Awakening Neg Hx     Post-op Nausea/Vomiting Neg Hx     Emergence Delirium Neg Hx     Post-op Cognitive Dysfunction Neg Hx     Other Neg Hx        SOCIAL HISTORY    Social History     Tobacco Use    Smoking status: Former Smoker     Years: 0.50     Quit date: 1996     Years since quittin.6    Smokeless tobacco: Never Used   Vaping Use    Vaping Use: Never used   Substance Use Topics    Alcohol use:  Yes Comment: 1 or 2 glasses wine or mixed drinks monthly     Drug use: Yes     Types: Marijuana     Comment: 4x daily       ALLERGIES    Allergies   Allergen Reactions    Dilaudid [Hydromorphone] Hives    Rifampin Hives and Swelling     Swelling of mouth and tounge    Levaquin [Levofloxacin] Itching    Amoxicillin Hives    Aspirin Nausea and Vomiting    Azithromycin Angioedema    Bactrim [Sulfamethoprim Ds] Hives    Cefatrizine Hives    Erythromycin Hives    Keflex [Cephalexin] Itching     Swollen throat and tongue    Rocephin [Ceftriaxone] Hives    Tylenol [Acetaminophen] Hives    Vancomycin Hives       MEDICATIONS    Current Outpatient Medications on File Prior to Encounter   Medication Sig Dispense Refill    ursodioL (ACTIGALL) 500 mg tablet TAKE ONE TABLET BY MOUTH TWICE A DAY 60 Tab 5    atorvastatin (LIPITOR) 20 mg tablet Take 20 mg by mouth daily.  dronabinol (MARINOL) 2.5 mg capsule Take 2.5 mg by mouth three (3) times daily.  metoprolol tartrate (LOPRESSOR) 25 mg tablet Take 25 mg by mouth two (2) times a day.  nitroglycerin (NITROSTAT) 0.3 mg SL tablet by SubLINGual route every five (5) minutes as needed for Chest Pain (x3).  clopidogrel (PLAVIX) 75 mg tab Take 75 mg by mouth daily.  lactobacillus combination no.4 (PROBIOTIC) 3 billion cell cap Take 1 Cap by mouth daily.  Cetirizine (ZYRTEC) 10 mg cap Take  by mouth.  cyanocobalamin 1,000 mcg tablet Take 1,000 mcg by mouth daily.  glimepiride (AMARYL) 2 mg tablet Take 2 mg by mouth every morning.  cyclobenzaprine (FLEXERIL) 10 mg tablet Take  by mouth three (3) times daily as needed for Muscle Spasm(s).  BIOTIN PO Take 5,000 mcg by mouth daily.  albuterol (PROVENTIL HFA, VENTOLIN HFA) 90 mcg/actuation inhaler Take 2 Puffs by inhalation every four (4) hours as needed.  Indications: ACUTE ASTHMA ATTACK      metFORMIN (GLUCOPHAGE) 500 mg tablet Take 500 mg by mouth two (2) times daily (with meals). Indications: TYPE 2 DIABETES MELLITUS      ropinirole (REQUIP) 4 mg Tab TAB Take 4 mg by mouth nightly. Indications: RESTLESS LEGS SYNDROME       No current facility-administered medications on file prior to encounter. REVIEW OF SYSTEMS    A comprehensive review of systems was negative except for that written in the HPI. Objective:     Visit Vitals  /73 (BP 1 Location: Left arm, BP Patient Position: Sitting)   Pulse 66   Temp 98.8 °F (37.1 °C)   Resp 18   SpO2 99%       Wt Readings from Last 3 Encounters:   08/03/21 58.5 kg (129 lb)   06/15/21 54 kg (119 lb)   04/29/21 56.2 kg (123 lb 12.8 oz)       PHYSICAL EXAM    Pertinent items are noted in HPI. Vascular:  Dorsalis pedis pulses are 2/4 bilateral.  Posterior tibial pulses are 2/4 bilateral.  Capillary fill time is less than 3 seconds, bilateral.  Edema is observed bilateral lower extremities. Varicosities are observed bilateral lower extremities. Derm:  Skin temperature of lower extremities warm to cool proximal to distal bilateral.  Inspection of skin shows - Chronic Right Ulcer on the Midfoot with Exostosis measuring (0.2cm x 0.1cm x 0.1cm). Ortho:  Muscle strength 5/5 for all groups tested. Muscle tone normal. Inspection/palpation of bones - joints- muscle shows - within normal limits bilateral lower extremities. Neuro:  Light touch, sharp/dull, vibratory, and proprioception sensations all decreased bilateral lower extremities. Deep tendon reflexes decreased bilaterally. Assessment:      1. Chronic Right Ulcer to the Midfoot with Exostosis. Problem List Items Addressed This Visit     None          Procedure Note  Indications:  Based on my examination of this patient's wound(s)/ulcer(s) today, debridement is not required to promote healing and evaluate the wound base. Wound Foot Right;Plantar (Active)   Number of days: 24        Plan:     1.   Local wound care treatment consist of cleaning the wound/ulcer with a wound cleanser, apply Felt offload compression dressing. 2,  Dispense prescription for X-Ray on the right midfoot to rule out Osteomyelitis. 3.  Dispense prescription for pre-op, history, physical and blood work. 4.  Patient is to return to the [address] [place] [address] for surgery, scheduled October 2, 2020. Treatment Note please see attached Discharge Instructions    Written patient dismissal instructions given to patient or POA.          Electronically signed by Haley Torres DPM on 8/25/2021 at 4:16 PM

## 2021-09-07 PROBLEM — D50.9 IRON DEFICIENCY ANEMIA, UNSPECIFIED: Status: ACTIVE | Noted: 2021-09-07

## 2021-09-07 RX ORDER — ALBUTEROL SULFATE 0.83 MG/ML
2.5 SOLUTION RESPIRATORY (INHALATION) AS NEEDED
Status: CANCELLED
Start: 2021-09-10

## 2021-09-07 RX ORDER — ONDANSETRON 2 MG/ML
8 INJECTION INTRAMUSCULAR; INTRAVENOUS AS NEEDED
Status: CANCELLED | OUTPATIENT
Start: 2021-09-10

## 2021-09-07 RX ORDER — DIPHENHYDRAMINE HYDROCHLORIDE 50 MG/ML
50 INJECTION, SOLUTION INTRAMUSCULAR; INTRAVENOUS AS NEEDED
Status: CANCELLED
Start: 2021-09-10

## 2021-09-07 RX ORDER — SODIUM CHLORIDE 9 MG/ML
10 INJECTION INTRAMUSCULAR; INTRAVENOUS; SUBCUTANEOUS AS NEEDED
Status: CANCELLED | OUTPATIENT
Start: 2021-09-10

## 2021-09-07 RX ORDER — DIPHENHYDRAMINE HYDROCHLORIDE 50 MG/ML
25 INJECTION, SOLUTION INTRAMUSCULAR; INTRAVENOUS AS NEEDED
Status: CANCELLED
Start: 2021-09-10

## 2021-09-07 RX ORDER — SODIUM CHLORIDE 9 MG/ML
25 INJECTION, SOLUTION INTRAVENOUS CONTINUOUS
Status: CANCELLED | OUTPATIENT
Start: 2021-09-10

## 2021-09-07 RX ORDER — HEPARIN 100 UNIT/ML
300-500 SYRINGE INTRAVENOUS AS NEEDED
Status: CANCELLED
Start: 2021-09-10

## 2021-09-07 RX ORDER — EPINEPHRINE 1 MG/ML
0.3 INJECTION, SOLUTION, CONCENTRATE INTRAVENOUS AS NEEDED
Status: CANCELLED | OUTPATIENT
Start: 2021-09-10

## 2021-09-07 RX ORDER — ACETAMINOPHEN 325 MG/1
650 TABLET ORAL AS NEEDED
Status: CANCELLED
Start: 2021-09-10

## 2021-09-07 RX ORDER — HYDROCORTISONE SODIUM SUCCINATE 100 MG/2ML
100 INJECTION, POWDER, FOR SOLUTION INTRAMUSCULAR; INTRAVENOUS AS NEEDED
Status: CANCELLED | OUTPATIENT
Start: 2021-09-10

## 2021-09-10 ENCOUNTER — HOSPITAL ENCOUNTER (OUTPATIENT)
Dept: INFUSION THERAPY | Age: 71
Discharge: HOME | End: 2021-09-10
Payer: MEDICARE

## 2021-09-10 VITALS
HEART RATE: 100 BPM | RESPIRATION RATE: 16 BRPM | OXYGEN SATURATION: 100 % | TEMPERATURE: 98.5 F | SYSTOLIC BLOOD PRESSURE: 130 MMHG | DIASTOLIC BLOOD PRESSURE: 50 MMHG

## 2021-09-10 DIAGNOSIS — D50.9 IRON DEFICIENCY ANEMIA, UNSPECIFIED IRON DEFICIENCY ANEMIA TYPE: Primary | ICD-10-CM

## 2021-09-10 PROCEDURE — 96365 THER/PROPH/DIAG IV INF INIT: CPT

## 2021-09-10 PROCEDURE — 74011000258 HC RX REV CODE- 258: Performed by: PHYSICIAN ASSISTANT

## 2021-09-10 PROCEDURE — 74011250636 HC RX REV CODE- 250/636: Performed by: PHYSICIAN ASSISTANT

## 2021-09-10 RX ORDER — DIPHENHYDRAMINE HYDROCHLORIDE 50 MG/ML
50 INJECTION, SOLUTION INTRAMUSCULAR; INTRAVENOUS AS NEEDED
Status: CANCELLED
Start: 2021-09-10

## 2021-09-10 RX ORDER — ALBUTEROL SULFATE 0.83 MG/ML
2.5 SOLUTION RESPIRATORY (INHALATION) AS NEEDED
Status: CANCELLED
Start: 2021-09-10

## 2021-09-10 RX ORDER — ACETAMINOPHEN 325 MG/1
650 TABLET ORAL AS NEEDED
Status: CANCELLED
Start: 2021-09-10

## 2021-09-10 RX ORDER — SODIUM CHLORIDE 0.9 % (FLUSH) 0.9 %
10 SYRINGE (ML) INJECTION AS NEEDED
Status: CANCELLED | OUTPATIENT
Start: 2021-09-10

## 2021-09-10 RX ORDER — HEPARIN 100 UNIT/ML
300-500 SYRINGE INTRAVENOUS AS NEEDED
Status: CANCELLED
Start: 2021-09-10

## 2021-09-10 RX ORDER — SODIUM CHLORIDE 0.9 % (FLUSH) 0.9 %
10 SYRINGE (ML) INJECTION AS NEEDED
Status: DISPENSED | OUTPATIENT
Start: 2021-09-10 | End: 2021-09-10

## 2021-09-10 RX ORDER — EPINEPHRINE 1 MG/ML
0.3 INJECTION, SOLUTION, CONCENTRATE INTRAVENOUS AS NEEDED
Status: CANCELLED | OUTPATIENT
Start: 2021-09-10

## 2021-09-10 RX ORDER — SODIUM CHLORIDE 9 MG/ML
25 INJECTION, SOLUTION INTRAVENOUS CONTINUOUS
Status: CANCELLED | OUTPATIENT
Start: 2021-09-10

## 2021-09-10 RX ORDER — DIPHENHYDRAMINE HYDROCHLORIDE 50 MG/ML
25 INJECTION, SOLUTION INTRAMUSCULAR; INTRAVENOUS AS NEEDED
Status: CANCELLED
Start: 2021-09-10

## 2021-09-10 RX ORDER — SODIUM CHLORIDE 9 MG/ML
10 INJECTION INTRAMUSCULAR; INTRAVENOUS; SUBCUTANEOUS AS NEEDED
Status: CANCELLED | OUTPATIENT
Start: 2021-09-10

## 2021-09-10 RX ORDER — ONDANSETRON 2 MG/ML
8 INJECTION INTRAMUSCULAR; INTRAVENOUS AS NEEDED
Status: CANCELLED | OUTPATIENT
Start: 2021-09-10

## 2021-09-10 RX ORDER — HYDROCORTISONE SODIUM SUCCINATE 100 MG/2ML
100 INJECTION, POWDER, FOR SOLUTION INTRAMUSCULAR; INTRAVENOUS AS NEEDED
Status: CANCELLED | OUTPATIENT
Start: 2021-09-10

## 2021-09-10 RX ADMIN — Medication 10 ML: at 10:02

## 2021-09-10 RX ADMIN — IRON SUCROSE 200 MG: 20 INJECTION, SOLUTION INTRAVENOUS at 09:15

## 2021-09-10 NOTE — PROGRESS NOTES
[facility] Progress Note    Date: September 10, 2021    Name: Nanette Hines    MRN: [id number]         : 1950    Venofer    Ms. Sharyn Miller was assessed and education was provided. Venofer education completed with patient, all questions answered and patient verbalizes understanding of possible risks, benefits, side effects, and s/s of an allergic reaction. Ms. Jazmine Bartholomew vitals were reviewed and patient was observed for 5 minutes prior to treatment. Visit Vitals  BP (!) 130/50   Pulse 100   Temp 98.5 °F (36.9 °C)   Resp 16   SpO2 100%       PIV inserted x1 attempt in   LAC          , flushes easily with brisk blood return. Venofer  200   mg administered per order over 16 minutes. Patient tolerates well. Ms. Sharyn Miller tolerated the infusion, and had no complaints. Patient observed for 30 minutes post infusion without any complaints or s/s of an allergic reaction. VS remain stable. PIV flushed with NS and removed per protocol, gauze and coban applied. Patient armband removed and shredded. Ms. Sharyn Miller was discharged from [place] [address] in stable condition at 1005. She is to return as advised by MD; no further \[facility]\"" appointments at this time.     Familia Tan RN  September 10, 2021  10:30 AM

## 2022-01-26 ENCOUNTER — APPOINTMENT (OUTPATIENT)
Dept: GENERAL RADIOLOGY | Age: 72
End: 2022-01-26
Attending: PHYSICIAN ASSISTANT
Payer: MEDICARE

## 2022-01-26 ENCOUNTER — APPOINTMENT (OUTPATIENT)
Dept: CT IMAGING | Age: 72
End: 2022-01-26
Attending: PHYSICIAN ASSISTANT
Payer: MEDICARE

## 2022-01-26 ENCOUNTER — HOSPITAL ENCOUNTER (EMERGENCY)
Age: 72
Discharge: HOME | End: 2022-01-26
Attending: STUDENT IN AN ORGANIZED HEALTH CARE EDUCATION/TRAINING PROGRAM
Payer: MEDICARE

## 2022-01-26 VITALS
OXYGEN SATURATION: 100 % | RESPIRATION RATE: 16 BRPM | TEMPERATURE: 98.2 F | SYSTOLIC BLOOD PRESSURE: 96 MMHG | HEART RATE: 88 BPM | DIASTOLIC BLOOD PRESSURE: 45 MMHG

## 2022-01-26 DIAGNOSIS — S80.01XA CONTUSION OF RIGHT KNEE, INITIAL ENCOUNTER: ICD-10-CM

## 2022-01-26 DIAGNOSIS — S09.90XA INJURY OF HEAD, INITIAL ENCOUNTER: ICD-10-CM

## 2022-01-26 DIAGNOSIS — S92.505A CLOSED NONDISPLACED FRACTURE OF PHALANX OF LESSER TOE OF LEFT FOOT, UNSPECIFIED PHALANX, INITIAL ENCOUNTER: ICD-10-CM
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
## 2022-01-26 PROCEDURE — 70450 CT HEAD/BRAIN W/O DYE: CPT

## 2022-01-26 PROCEDURE — 99282 EMERGENCY DEPT VISIT SF MDM: CPT

## 2022-01-26 PROCEDURE — 73562 X-RAY EXAM OF KNEE 3: CPT

## 2022-01-26 PROCEDURE — 73660 X-RAY EXAM OF TOE(S): CPT

## 2022-01-26 RX ORDER — TRAMADOL HYDROCHLORIDE 50 MG/1
50 TABLET ORAL
Qty: 10 TABLET | Refills: 0 | Status: SHIPPED | OUTPATIENT
Start: 2022-01-26 | End: 2022-01-29

## 2022-01-26 NOTE — ED TRIAGE NOTES
Pt arrives ambulatory to ED with c\o head pain, left knee pain and right pinky toe pain s\p fall, pt sts she was sleep walking

## 2022-03-18 PROBLEM — F12.20 DELTA-9-TETRAHYDROCANNABINOL (THC) DEPENDENCE (HCC): Status: ACTIVE | Noted: 2021-07-31

## 2022-03-18 PROBLEM — Z79.01 CHRONIC ANTICOAGULATION: Status: ACTIVE | Noted: 2019-04-16

## 2022-03-18 PROBLEM — F41.9 ANXIETY: Status: ACTIVE | Noted: 2017-10-15

## 2022-03-19 PROBLEM — I73.9 PAD (PERIPHERAL ARTERY DISEASE) (HCC): Status: ACTIVE | Noted: 2017-07-22

## 2022-03-19 PROBLEM — L03.115 CELLULITIS OF RIGHT FOOT: Status: ACTIVE | Noted: 2018-12-05

## 2022-03-19 PROBLEM — I74.9 ARTERIAL THROMBOSIS (HCC): Status: ACTIVE | Noted: 2021-03-18

## 2022-03-19 PROBLEM — F51.3 SLEEPWALKING DISORDER: Status: ACTIVE | Noted: 2017-07-22

## 2022-03-19 PROBLEM — D50.9 IRON DEFICIENCY ANEMIA, UNSPECIFIED: Status: ACTIVE | Noted: 2021-09-07

## 2022-03-20 PROBLEM — I25.10 CAD (CORONARY ARTERY DISEASE): Status: ACTIVE | Noted: 2018-04-13

## 2022-03-20 PROBLEM — E11.621 DIABETIC ULCER OF RIGHT MIDFOOT WITH FAT LAYER EXPOSED (HCC): Status: ACTIVE | Noted: 2018-12-04

## 2022-03-20 PROBLEM — I99.8 ISCHEMIC TOE: Status: ACTIVE | Noted: 2021-07-31

## 2022-03-20 PROBLEM — L97.412 DIABETIC ULCER OF RIGHT MIDFOOT WITH FAT LAYER EXPOSED (HCC): Status: ACTIVE | Noted: 2018-12-04

## 2022-08-02 NOTE — PROGRESS NOTES
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  He needs to be taken to the emergency room immediately and then we will need to take him to the operating room and do extensive debridement of this area.  He ultimately may need a colostomy depending on how much of the anus I can save.    I discussed extensively with his son.  The patient is very elderly and a high surgical risk but this is a life-threatening emergency.   INITIAL NUTRITION ASSESSMENT  
RECOMMENDATIONS/PLAN:  
Continue w/ POC Refer to glycemic control notes for diabetes care Monitor labs/lytes, PO intake, BM, skin integrity, nutritional status REASON FOR ASSESSMENT:  
 
[x]  RN Referral:  
 [x] Enteral/Parenteral Nutrition PTA NUTRITION ASSESSMENT:  
Client History: 80 yrs old Female admitted with diabetes wound infection in foot, CABG in April, s/p I&D with podiatry, PMHx: arthritis, asthma, lupus, cancer, diabetes, GERD, liver disease (half of liver removed), lung collapse, n/v, neuropathy, arterial clot in rt leg, restless leg syndrome, raynauds syndrome, PNA, depression, peptic ulcer disease requiring partial gastrectomy and small intestine,  Thromboembolus Cultural/Mandaeism Food Preferences: None Identified FOOD/NUTRITION HISTORY Diet History: Pt reported good appetite now  and PTA. She reported that her UBW is constantly up and down d/t gastric bypass, but that lately it has been 130-135. Food Allergies:  [x] NKFA Pertinent PTA Medications: lipitor, coumadin, marinol, lopressor, protonix, probiotic,  Plavix, vit b12, amaryl, melatonin, zofran, phenergan, biotin, albuterol, metformin NUTRITION INTAKE Diet Order:  Diabetic Average PO Intake:      
Patient Vitals for the past 100 hrs: 
 % Diet Eaten 12/05/18 0848 20 % Pertinent Medications:  [x] Reviewed; protonix, zofran, lopressor, metformin, melatonin, imodium, amaryl, vit b12, plavix, lipitor Electrolyte Replacement Protocol: []K  []Mg  []PO4 Insulin:  [] SSI  [x] Pre-meal   []  Basal   [] Drip  [] None Pt expected to meet estimated nutrient needs through next review:          [x]  Yes     [] No;  ANTHROPOMETRICS Height: 5' 4.5\" (163.8 cm)       Weight: 67.8 kg (149 lb 7.6 oz) BMI: 25.3 kg/m^2  -  overweight (25.0%-29.9% BMI) Weight change: no significant wt loss noted per chart hx; 138 lb- 4/10/18 Comparison to Reference Standards: IBW: 125 lbs      %IBW:  119%      AdjBW: n/a NUTRITION-FOCUSED PHYSICAL ASSESSMENT Skin: No PU; diabetes foot infection GI: +BM: 12/6/18 BIOCHEMICAL DATA & MEDICAL TESTS Pertinent Labs:  [x] Reviewed; Na: 135, BUN: 19, Creatinine: .52, HgA1C: 10.7 NUTRITION PRESCRIPTION Calories: 7165-6198 kcal/day based on 25-30 kcal/kg Protein: 54-68 g/day based on .8-1 g/kg Fluid: 7211-9240 ml/day based on 1 kcal/ml NUTRITION DIAGNOSES:  
1. Altered nutrition related laboratory value (hga1c) related to foot infection as evidenced by HgA1C of 10.7 NUTRITION INTERVENTIONS:  
INTERVENTIONS:        GOALS: 
1. Continue w/ POC 1. Continue w/ POC by next review 5 days LEARNING NEEDS (Diet, Supplementation, Food/Nutrient-Drug Interaction):  
[x] None Identified 
[] Inpatient education provided/documented   
[] Identified and patient:  [] Declined     [] Was not appropriate/indicated NUTRITION MONITORING /EVALUATION:  
Follow PO intake Monitor wt Monitor renal labs, electrolytes, fluid status Monitor for additional supplement needs 
 
[] Participated in Interdisciplinary Rounds 
[x] [address] [place] [address] Reviewed/Documented DISCHARGE NUTRITION RECOMMENDATIONS ADDRESSED:  
  [x] To be determined closer to discharge NUTRITION RISK:     [x]  At risk                     []  Not currently at risk Will follow-up per policy. Pastor Mcdonald, Dietetic Intern [phone]

## 2023-07-13 RX ORDER — DIPHENHYDRAMINE HYDROCHLORIDE 50 MG/ML
50 INJECTION INTRAMUSCULAR; INTRAVENOUS
Status: CANCELLED | OUTPATIENT
Start: 2023-07-19

## 2023-07-13 RX ORDER — HEPARIN 100 UNIT/ML
500 SYRINGE INTRAVENOUS PRN
Status: CANCELLED | OUTPATIENT
Start: 2023-07-19

## 2023-07-13 RX ORDER — ONDANSETRON 2 MG/ML
8 INJECTION INTRAMUSCULAR; INTRAVENOUS
Status: CANCELLED | OUTPATIENT
Start: 2023-07-19

## 2023-07-13 RX ORDER — ALBUTEROL SULFATE 90 UG/1
4 AEROSOL, METERED RESPIRATORY (INHALATION) PRN
Status: CANCELLED | OUTPATIENT
Start: 2023-07-19

## 2023-07-13 RX ORDER — ACETAMINOPHEN 325 MG/1
650 TABLET ORAL
Status: CANCELLED | OUTPATIENT
Start: 2023-07-19

## 2023-07-13 RX ORDER — SODIUM CHLORIDE 9 MG/ML
INJECTION, SOLUTION INTRAVENOUS CONTINUOUS
Status: CANCELLED | OUTPATIENT
Start: 2023-07-19

## 2023-07-13 RX ORDER — SODIUM CHLORIDE 9 MG/ML
5-250 INJECTION, SOLUTION INTRAVENOUS PRN
Status: CANCELLED | OUTPATIENT
Start: 2023-07-19

## 2023-07-13 RX ORDER — EPINEPHRINE 1 MG/ML
0.3 INJECTION, SOLUTION, CONCENTRATE INTRAVENOUS PRN
Status: CANCELLED | OUTPATIENT
Start: 2023-07-19

## 2023-07-19 ENCOUNTER — HOSPITAL ENCOUNTER (OUTPATIENT)
Facility: HOSPITAL | Age: 73
Setting detail: INFUSION SERIES
End: 2023-07-19
Payer: MEDICARE

## 2023-07-19 VITALS
DIASTOLIC BLOOD PRESSURE: 36 MMHG | OXYGEN SATURATION: 100 % | RESPIRATION RATE: 20 BRPM | RESPIRATION RATE: 20 BRPM | HEART RATE: 74 BPM | DIASTOLIC BLOOD PRESSURE: 36 MMHG | OXYGEN SATURATION: 100 % | SYSTOLIC BLOOD PRESSURE: 124 MMHG | TEMPERATURE: 98.2 F | TEMPERATURE: 98.2 F | SYSTOLIC BLOOD PRESSURE: 124 MMHG | HEART RATE: 74 BPM

## 2023-07-19 DIAGNOSIS — D50.9 IRON DEFICIENCY ANEMIA, UNSPECIFIED IRON DEFICIENCY ANEMIA TYPE: Primary | ICD-10-CM

## 2023-07-19 PROCEDURE — 96365 THER/PROPH/DIAG IV INF INIT: CPT

## 2023-07-19 PROCEDURE — 2580000003 HC RX 258: Performed by: PHYSICIAN ASSISTANT

## 2023-07-19 PROCEDURE — 6360000002 HC RX W HCPCS: Performed by: PHYSICIAN ASSISTANT

## 2023-07-19 RX ORDER — SODIUM CHLORIDE 0.9 % (FLUSH) 0.9 %
5-40 SYRINGE (ML) INJECTION PRN
Status: DISCONTINUED | OUTPATIENT
Start: 2023-07-19 | End: 2023-07-20 | Stop reason: HOSPADM

## 2023-07-19 RX ORDER — SODIUM CHLORIDE 9 MG/ML
5-250 INJECTION, SOLUTION INTRAVENOUS PRN
Status: DISCONTINUED | OUTPATIENT
Start: 2023-07-19 | End: 2023-07-20 | Stop reason: HOSPADM

## 2023-07-19 RX ORDER — HEPARIN 100 UNIT/ML
500 SYRINGE INTRAVENOUS PRN
Status: CANCELLED | OUTPATIENT
Start: 2023-07-26

## 2023-07-19 RX ORDER — ACETAMINOPHEN 325 MG/1
650 TABLET ORAL
Status: CANCELLED | OUTPATIENT
Start: 2023-07-26

## 2023-07-19 RX ORDER — SODIUM CHLORIDE 9 MG/ML
5-250 INJECTION, SOLUTION INTRAVENOUS PRN
Status: CANCELLED | OUTPATIENT
Start: 2023-07-26

## 2023-07-19 RX ORDER — EPINEPHRINE 1 MG/ML
0.3 INJECTION, SOLUTION, CONCENTRATE INTRAVENOUS PRN
Status: CANCELLED | OUTPATIENT
Start: 2023-07-26

## 2023-07-19 RX ORDER — ONDANSETRON 2 MG/ML
8 INJECTION INTRAMUSCULAR; INTRAVENOUS
Status: CANCELLED | OUTPATIENT
Start: 2023-07-26

## 2023-07-19 RX ORDER — ALBUTEROL SULFATE 90 UG/1
4 AEROSOL, METERED RESPIRATORY (INHALATION) PRN
Status: CANCELLED | OUTPATIENT
Start: 2023-07-26

## 2023-07-19 RX ORDER — SODIUM CHLORIDE 0.9 % (FLUSH) 0.9 %
5-40 SYRINGE (ML) INJECTION PRN
Status: CANCELLED | OUTPATIENT
Start: 2023-07-26

## 2023-07-19 RX ORDER — SODIUM CHLORIDE 9 MG/ML
INJECTION, SOLUTION INTRAVENOUS CONTINUOUS
Status: CANCELLED | OUTPATIENT
Start: 2023-07-26

## 2023-07-19 RX ORDER — DIPHENHYDRAMINE HYDROCHLORIDE 50 MG/ML
50 INJECTION INTRAMUSCULAR; INTRAVENOUS
Status: CANCELLED | OUTPATIENT
Start: 2023-07-26

## 2023-07-19 RX ADMIN — SODIUM CHLORIDE 50 ML/HR: 0.9 INJECTION, SOLUTION INTRAVENOUS at 10:23

## 2023-07-19 RX ADMIN — Medication 10 ML: at 10:22

## 2023-07-19 RX ADMIN — FERRIC CARBOXYMALTOSE INJECTION 750 MG: 50 INJECTION, SOLUTION INTRAVENOUS at 10:24

## 2023-07-26 ENCOUNTER — HOSPITAL ENCOUNTER (OUTPATIENT)
Facility: HOSPITAL | Age: 73
Setting detail: INFUSION SERIES
End: 2023-07-26
Payer: MEDICARE

## 2023-07-26 VITALS
HEART RATE: 77 BPM | OXYGEN SATURATION: 98 % | SYSTOLIC BLOOD PRESSURE: 158 MMHG | DIASTOLIC BLOOD PRESSURE: 73 MMHG | HEART RATE: 77 BPM | DIASTOLIC BLOOD PRESSURE: 73 MMHG | TEMPERATURE: 98 F | RESPIRATION RATE: 18 BRPM | TEMPERATURE: 98 F | SYSTOLIC BLOOD PRESSURE: 158 MMHG | RESPIRATION RATE: 18 BRPM | OXYGEN SATURATION: 98 %

## 2023-07-26 DIAGNOSIS — D50.9 IRON DEFICIENCY ANEMIA, UNSPECIFIED IRON DEFICIENCY ANEMIA TYPE: Primary | ICD-10-CM

## 2023-07-26 PROCEDURE — 2580000003 HC RX 258: Performed by: PHYSICIAN ASSISTANT

## 2023-07-26 PROCEDURE — 96365 THER/PROPH/DIAG IV INF INIT: CPT

## 2023-07-26 PROCEDURE — 6360000002 HC RX W HCPCS: Performed by: PHYSICIAN ASSISTANT

## 2023-07-26 RX ORDER — ACETAMINOPHEN 325 MG/1
650 TABLET ORAL
OUTPATIENT
Start: 2023-07-26

## 2023-07-26 RX ORDER — HEPARIN 100 UNIT/ML
500 SYRINGE INTRAVENOUS PRN
OUTPATIENT
Start: 2023-07-26

## 2023-07-26 RX ORDER — SODIUM CHLORIDE 0.9 % (FLUSH) 0.9 %
5-40 SYRINGE (ML) INJECTION PRN
OUTPATIENT
Start: 2023-07-26

## 2023-07-26 RX ORDER — SODIUM CHLORIDE 9 MG/ML
INJECTION, SOLUTION INTRAVENOUS CONTINUOUS
OUTPATIENT
Start: 2023-07-26

## 2023-07-26 RX ORDER — SODIUM CHLORIDE 9 MG/ML
5-250 INJECTION, SOLUTION INTRAVENOUS PRN
Status: CANCELLED | OUTPATIENT
Start: 2023-07-26

## 2023-07-26 RX ORDER — SODIUM CHLORIDE 9 MG/ML
5-250 INJECTION, SOLUTION INTRAVENOUS PRN
Status: DISCONTINUED | OUTPATIENT
Start: 2023-07-26 | End: 2023-07-27 | Stop reason: HOSPADM

## 2023-07-26 RX ORDER — SODIUM CHLORIDE 0.9 % (FLUSH) 0.9 %
5-40 SYRINGE (ML) INJECTION PRN
Status: DISCONTINUED | OUTPATIENT
Start: 2023-07-26 | End: 2023-07-27 | Stop reason: HOSPADM

## 2023-07-26 RX ORDER — EPINEPHRINE 1 MG/ML
0.3 INJECTION, SOLUTION, CONCENTRATE INTRAVENOUS PRN
OUTPATIENT
Start: 2023-07-26

## 2023-07-26 RX ORDER — DIPHENHYDRAMINE HYDROCHLORIDE 50 MG/ML
50 INJECTION INTRAMUSCULAR; INTRAVENOUS
OUTPATIENT
Start: 2023-07-26

## 2023-07-26 RX ORDER — SODIUM CHLORIDE 9 MG/ML
5-250 INJECTION, SOLUTION INTRAVENOUS PRN
OUTPATIENT
Start: 2023-07-26

## 2023-07-26 RX ORDER — ALBUTEROL SULFATE 90 UG/1
4 AEROSOL, METERED RESPIRATORY (INHALATION) PRN
OUTPATIENT
Start: 2023-07-26

## 2023-07-26 RX ORDER — ONDANSETRON 2 MG/ML
8 INJECTION INTRAMUSCULAR; INTRAVENOUS
OUTPATIENT
Start: 2023-07-26

## 2023-07-26 RX ADMIN — FERRIC CARBOXYMALTOSE INJECTION 750 MG: 50 INJECTION, SOLUTION INTRAVENOUS at 13:15

## 2023-07-26 RX ADMIN — SODIUM CHLORIDE, PRESERVATIVE FREE 10 ML: 5 INJECTION INTRAVENOUS at 13:40

## 2023-07-26 RX ADMIN — SODIUM CHLORIDE, PRESERVATIVE FREE 10 ML: 5 INJECTION INTRAVENOUS at 13:15

## 2023-10-24 ENCOUNTER — HOSPITAL ENCOUNTER (OUTPATIENT)
Facility: HOSPITAL | Age: 73
Discharge: HOME | End: 2023-10-27
Payer: MEDICARE

## 2023-10-24 ENCOUNTER — TRANSCRIBE ORDERS (OUTPATIENT)
Facility: HOSPITAL | Age: 73
End: 2023-10-24

## 2023-10-24 DIAGNOSIS — M86.071 ACUTE HEMATOGENOUS OSTEOMYELITIS, RIGHT ANKLE AND FOOT (HCC): ICD-10-CM

## 2023-10-24 DIAGNOSIS — M86.071 ACUTE HEMATOGENOUS OSTEOMYELITIS, RIGHT ANKLE AND FOOT (HCC): Primary | ICD-10-CM

## 2023-10-24 PROCEDURE — 73630 X-RAY EXAM OF FOOT: CPT

## 2023-11-14 ENCOUNTER — HOSPITAL ENCOUNTER (OUTPATIENT)
Facility: HOSPITAL | Age: 73
Discharge: HOME | End: 2023-11-17
Attending: PODIATRIST
Payer: MEDICARE

## 2023-11-14 DIAGNOSIS — M86.071 ACUTE HEMATOGENOUS OSTEOMYELITIS, RIGHT ANKLE AND FOOT (HCC): ICD-10-CM

## 2023-11-14 LAB — CREAT UR-MCNC: 0.5 MG/DL (ref 0.6–1.3)

## 2023-11-14 PROCEDURE — 82565 ASSAY OF CREATININE: CPT

## 2023-11-14 PROCEDURE — A9579 GAD-BASE MR CONTRAST NOS,1ML: HCPCS | Performed by: PODIATRIST

## 2023-11-14 PROCEDURE — 6360000004 HC RX CONTRAST MEDICATION: Performed by: PODIATRIST

## 2023-11-14 PROCEDURE — 73720 MRI LWR EXTREMITY W/O&W/DYE: CPT

## 2023-11-14 RX ADMIN — GADOTERIDOL 10 ML: 279.3 INJECTION, SOLUTION INTRAVENOUS at 16:19

## 2023-11-22 NOTE — ROUTINE PROCESS
Bedside and Verbal shift change report given to [address] (oncoming nurse) by Francis Carter RN   (offgoing nurse). Report included the following information SBAR, Kardex, MAR and Recent Results. Patient: Melody Martin  : 1935    Encounter Date: 2023    PROGRESS NOTE    Subjective  Chief complaint: Melody Martin is a 88 y.o. female who is a acute skilled care patient being seen and evaluated for weakness.    HPI:  HPI  Nurse called on  to report patient's nephrologist recommended ultrasound for right upper extremity swelling.  Ultrasound was negative.  In addition, patient is skilled for therapy due to weakness.    Working on therapy exercises, balance activities, bed mobility and toileting.  Patient is total assist for toileting.  Attempted to stand and was unable due to weakness.  Patient has no complaints today.  Denies n/v/f/c.      Objective  Vital signs:   127/68, 98.0, 78, 18, 97%,   Physical Exam  Constitutional:       General: She is not in acute distress.  Eyes:      Extraocular Movements: Extraocular movements intact.   Pulmonary:      Effort: Pulmonary effort is normal.   Musculoskeletal:      Cervical back: Neck supple.      Right lower leg: Edema present.      Left lower leg: Edema present.      Comments: Generalized weakness   Neurological:      Mental Status: She is alert.   Psychiatric:         Mood and Affect: Mood normal.         Behavior: Behavior is cooperative.         Assessment/Plan  Problem List Items Addressed This Visit       Type 2 diabetes mellitus with diabetic chronic kidney disease (CMS/HCC)     Monitor Glucoscan  Lantus  Humalog         Cerebrovascular accident (CVA) due to embolism (CMS/HCC)     Therapy  Statin  Monitor         Hypertensive chronic kidney disease with stage 5 chronic kidney disease or end stage renal disease (CMS/HCC)     On HD per nephrology  Antihypertensives  Renal diet  Monitor BP         Weakness - Primary     Continue therapy          Medications, treatments, and labs reviewed  Continue medications and treatments as listed in EMR    Scribe Attestation  I, Phoebe Gunn   attest that this documentation has been prepared  under the direction and in the presence of MAR Johnson    Provider Attestation - Scribe documentation  All medical record entries made by the Scribe were at my direction and personally dictated by me. I have reviewed the chart and agree that the record accurately reflects my personal performance of the history, physical exam, discussion and plan.   MAR Johnson        Electronically Signed By: MAR Johnson   11/22/23  6:34 PM

## 2024-05-07 ENCOUNTER — HOSPITAL ENCOUNTER (OUTPATIENT)
Facility: HOSPITAL | Age: 74
Discharge: HOME | End: 2024-05-10
Payer: MEDICARE

## 2024-05-07 ENCOUNTER — TRANSCRIBE ORDERS (OUTPATIENT)
Facility: HOSPITAL | Age: 74
End: 2024-05-07

## 2024-05-07 DIAGNOSIS — Z01.812 PRE-OPERATIVE LABORATORY EXAMINATION: ICD-10-CM

## 2024-05-07 DIAGNOSIS — Z01.812 PRE-OPERATIVE LABORATORY EXAMINATION: Primary | ICD-10-CM

## 2024-05-07 DIAGNOSIS — Z01.818 PRE-OP TESTING: Primary | ICD-10-CM

## 2024-05-07 LAB
ANION GAP SERPL CALC-SCNC: 5 MMOL/L (ref 3–18)
BUN SERPL-MCNC: 22 MG/DL (ref 7–18)
BUN/CREAT SERPL: 31 (ref 12–20)
CALCIUM SERPL-MCNC: 9.5 MG/DL (ref 8.5–10.1)
CHLORIDE SERPL-SCNC: 105 MMOL/L (ref 100–111)
CO2 SERPL-SCNC: 27 MMOL/L (ref 21–32)
CREAT SERPL-MCNC: 0.7 MG/DL (ref 0.6–1.3)
EST. AVERAGE GLUCOSE BLD GHB EST-MCNC: 223 MG/DL
GLUCOSE SERPL-MCNC: 195 MG/DL (ref 74–99)
HBA1C MFR BLD: 9.4 % (ref 4.2–5.6)
HCT VFR BLD AUTO: 39.2 % (ref 35–45)
HGB BLD-MCNC: 13.8 G/DL (ref 12–16)
POTASSIUM SERPL-SCNC: 4.4 MMOL/L (ref 3.5–5.5)
SODIUM SERPL-SCNC: 137 MMOL/L (ref 136–145)

## 2024-05-07 PROCEDURE — 83036 HEMOGLOBIN GLYCOSYLATED A1C: CPT

## 2024-05-07 PROCEDURE — 80048 BASIC METABOLIC PNL TOTAL CA: CPT

## 2024-05-07 PROCEDURE — 85014 HEMATOCRIT: CPT

## 2024-05-07 PROCEDURE — 93005 ELECTROCARDIOGRAM TRACING: CPT | Performed by: PODIATRIST

## 2024-05-07 PROCEDURE — 85018 HEMOGLOBIN: CPT

## 2024-05-07 PROCEDURE — 71046 X-RAY EXAM CHEST 2 VIEWS: CPT

## 2024-05-08 ENCOUNTER — ANESTHESIA EVENT (OUTPATIENT)
Facility: HOSPITAL | Age: 74
End: 2024-05-08
Payer: MEDICARE

## 2024-05-09 LAB
EKG ATRIAL RATE: 63 BPM
EKG DIAGNOSIS: NORMAL
EKG P AXIS: 56 DEGREES
EKG P-R INTERVAL: 150 MS
EKG Q-T INTERVAL: 410 MS
EKG QRS DURATION: 72 MS
EKG QTC CALCULATION (BAZETT): 419 MS
EKG R AXIS: 73 DEGREES
EKG T AXIS: 80 DEGREES
EKG VENTRICULAR RATE: 63 BPM

## 2024-05-13 NOTE — PERIOP NOTE
Per Dr. Lance on 5-8-24. HgA1C of 9.6 is high. He is considering postponing surgery.    5-13-24: Sent a follow-up fax inquiring is he plans on proceeding with the surgery as scheduled on 5-17-24.

## 2024-05-17 ENCOUNTER — ANESTHESIA (OUTPATIENT)
Facility: HOSPITAL | Age: 74
End: 2024-05-17
Payer: MEDICARE

## 2024-05-17 ENCOUNTER — APPOINTMENT (OUTPATIENT)
Facility: HOSPITAL | Age: 74
End: 2024-05-17
Attending: PODIATRIST
Payer: MEDICARE

## 2024-05-17 ENCOUNTER — HOSPITAL ENCOUNTER (OUTPATIENT)
Facility: HOSPITAL | Age: 74
Setting detail: OUTPATIENT SURGERY
Discharge: HOME | End: 2024-05-17
Attending: PODIATRIST | Admitting: PODIATRIST
Payer: MEDICARE

## 2024-05-17 VITALS
HEIGHT: 64 IN | WEIGHT: 138.2 LBS | BODY MASS INDEX: 23.6 KG/M2 | SYSTOLIC BLOOD PRESSURE: 106 MMHG | TEMPERATURE: 98.8 F | RESPIRATION RATE: 16 BRPM | DIASTOLIC BLOOD PRESSURE: 81 MMHG | HEART RATE: 94 BPM | OXYGEN SATURATION: 95 %

## 2024-05-17 DIAGNOSIS — L97.412 ULCER OF RIGHT HEEL, WITH FAT LAYER EXPOSED (HCC): ICD-10-CM

## 2024-05-17 DIAGNOSIS — M89.771 MAJOR OSSEOUS DEFECT, RIGHT ANKLE AND FOOT: ICD-10-CM

## 2024-05-17 LAB
GLUCOSE BLD STRIP.AUTO-MCNC: 157 MG/DL (ref 70–110)
GLUCOSE BLD STRIP.AUTO-MCNC: 183 MG/DL (ref 70–110)
INR PPP: 1 (ref 0.9–1.1)
PROTHROMBIN TIME: 13 SEC (ref 11.9–14.7)

## 2024-05-17 PROCEDURE — 7100000001 HC PACU RECOVERY - ADDTL 15 MIN: Performed by: PODIATRIST

## 2024-05-17 PROCEDURE — 87070 CULTURE OTHR SPECIMN AEROBIC: CPT

## 2024-05-17 PROCEDURE — 73630 X-RAY EXAM OF FOOT: CPT

## 2024-05-17 PROCEDURE — 3700000001 HC ADD 15 MINUTES (ANESTHESIA): Performed by: PODIATRIST

## 2024-05-17 PROCEDURE — 7100000000 HC PACU RECOVERY - FIRST 15 MIN: Performed by: PODIATRIST

## 2024-05-17 PROCEDURE — 87186 SC STD MICRODIL/AGAR DIL: CPT

## 2024-05-17 PROCEDURE — 87075 CULTR BACTERIA EXCEPT BLOOD: CPT

## 2024-05-17 PROCEDURE — 6360000002 HC RX W HCPCS: Performed by: PODIATRIST

## 2024-05-17 PROCEDURE — 85610 PROTHROMBIN TIME: CPT

## 2024-05-17 PROCEDURE — 2709999900 HC NON-CHARGEABLE SUPPLY: Performed by: PODIATRIST

## 2024-05-17 PROCEDURE — 6360000002 HC RX W HCPCS: Performed by: NURSE ANESTHETIST, CERTIFIED REGISTERED

## 2024-05-17 PROCEDURE — 87176 TISSUE HOMOGENIZATION CULTR: CPT

## 2024-05-17 PROCEDURE — 2580000003 HC RX 258: Performed by: PODIATRIST

## 2024-05-17 PROCEDURE — 3700000000 HC ANESTHESIA ATTENDED CARE: Performed by: PODIATRIST

## 2024-05-17 PROCEDURE — 2500000003 HC RX 250 WO HCPCS: Performed by: NURSE ANESTHETIST, CERTIFIED REGISTERED

## 2024-05-17 PROCEDURE — A4217 STERILE WATER/SALINE, 500 ML: HCPCS | Performed by: PODIATRIST

## 2024-05-17 PROCEDURE — 87077 CULTURE AEROBIC IDENTIFY: CPT

## 2024-05-17 PROCEDURE — 87205 SMEAR GRAM STAIN: CPT

## 2024-05-17 PROCEDURE — 88311 DECALCIFY TISSUE: CPT

## 2024-05-17 PROCEDURE — 6370000000 HC RX 637 (ALT 250 FOR IP): Performed by: ANESTHESIOLOGY

## 2024-05-17 PROCEDURE — 82962 GLUCOSE BLOOD TEST: CPT

## 2024-05-17 PROCEDURE — 3600000005 HC SURGERY LEVEL 5 BASE: Performed by: PODIATRIST

## 2024-05-17 PROCEDURE — 88307 TISSUE EXAM BY PATHOLOGIST: CPT

## 2024-05-17 PROCEDURE — 7100000011 HC PHASE II RECOVERY - ADDTL 15 MIN: Performed by: PODIATRIST

## 2024-05-17 PROCEDURE — 7100000010 HC PHASE II RECOVERY - FIRST 15 MIN: Performed by: PODIATRIST

## 2024-05-17 PROCEDURE — 3600000015 HC SURGERY LEVEL 5 ADDTL 15MIN: Performed by: PODIATRIST

## 2024-05-17 DEVICE — GRAFT HUM TISS W2XL4CM CRYOPRESERVED PLCNTA TISS UMB AMNION: Type: IMPLANTABLE DEVICE | Site: FOOT | Status: FUNCTIONAL

## 2024-05-17 RX ORDER — DEXAMETHASONE SODIUM PHOSPHATE 4 MG/ML
INJECTION, SOLUTION INTRA-ARTICULAR; INTRALESIONAL; INTRAMUSCULAR; INTRAVENOUS; SOFT TISSUE PRN
Status: DISCONTINUED | OUTPATIENT
Start: 2024-05-17 | End: 2024-05-17 | Stop reason: SDUPTHER

## 2024-05-17 RX ORDER — METOCLOPRAMIDE HYDROCHLORIDE 5 MG/ML
INJECTION INTRAMUSCULAR; INTRAVENOUS PRN
Status: DISCONTINUED | OUTPATIENT
Start: 2024-05-17 | End: 2024-05-17 | Stop reason: SDUPTHER

## 2024-05-17 RX ORDER — ROCURONIUM BROMIDE 10 MG/ML
INJECTION, SOLUTION INTRAVENOUS PRN
Status: DISCONTINUED | OUTPATIENT
Start: 2024-05-17 | End: 2024-05-17 | Stop reason: SDUPTHER

## 2024-05-17 RX ORDER — SODIUM CHLORIDE 0.9 % (FLUSH) 0.9 %
5-40 SYRINGE (ML) INJECTION PRN
Status: DISCONTINUED | OUTPATIENT
Start: 2024-05-17 | End: 2024-05-17 | Stop reason: HOSPADM

## 2024-05-17 RX ORDER — ONDANSETRON 2 MG/ML
INJECTION INTRAMUSCULAR; INTRAVENOUS PRN
Status: DISCONTINUED | OUTPATIENT
Start: 2024-05-17 | End: 2024-05-17 | Stop reason: SDUPTHER

## 2024-05-17 RX ORDER — LIDOCAINE HYDROCHLORIDE 20 MG/ML
INJECTION, SOLUTION EPIDURAL; INFILTRATION; INTRACAUDAL; PERINEURAL PRN
Status: DISCONTINUED | OUTPATIENT
Start: 2024-05-17 | End: 2024-05-17 | Stop reason: SDUPTHER

## 2024-05-17 RX ORDER — PROPOFOL 10 MG/ML
INJECTION, EMULSION INTRAVENOUS PRN
Status: DISCONTINUED | OUTPATIENT
Start: 2024-05-17 | End: 2024-05-17 | Stop reason: SDUPTHER

## 2024-05-17 RX ORDER — SODIUM CHLORIDE 9 MG/ML
INJECTION, SOLUTION INTRAVENOUS PRN
Status: DISCONTINUED | OUTPATIENT
Start: 2024-05-17 | End: 2024-05-17 | Stop reason: HOSPADM

## 2024-05-17 RX ORDER — SODIUM CHLORIDE 0.9 % (FLUSH) 0.9 %
5-40 SYRINGE (ML) INJECTION EVERY 12 HOURS SCHEDULED
Status: DISCONTINUED | OUTPATIENT
Start: 2024-05-17 | End: 2024-05-17 | Stop reason: HOSPADM

## 2024-05-17 RX ORDER — NALOXONE HYDROCHLORIDE 0.4 MG/ML
INJECTION, SOLUTION INTRAMUSCULAR; INTRAVENOUS; SUBCUTANEOUS PRN
Status: DISCONTINUED | OUTPATIENT
Start: 2024-05-17 | End: 2024-05-17 | Stop reason: HOSPADM

## 2024-05-17 RX ORDER — EPHEDRINE SULFATE 5 MG/ML
INJECTION INTRAVENOUS PRN
Status: DISCONTINUED | OUTPATIENT
Start: 2024-05-17 | End: 2024-05-17 | Stop reason: SDUPTHER

## 2024-05-17 RX ORDER — FENTANYL CITRATE 50 UG/ML
50 INJECTION, SOLUTION INTRAMUSCULAR; INTRAVENOUS EVERY 5 MIN PRN
Status: DISCONTINUED | OUTPATIENT
Start: 2024-05-17 | End: 2024-05-17 | Stop reason: HOSPADM

## 2024-05-17 RX ORDER — MIDAZOLAM HYDROCHLORIDE 1 MG/ML
INJECTION INTRAMUSCULAR; INTRAVENOUS PRN
Status: DISCONTINUED | OUTPATIENT
Start: 2024-05-17 | End: 2024-05-17 | Stop reason: SDUPTHER

## 2024-05-17 RX ORDER — FENTANYL CITRATE 50 UG/ML
INJECTION, SOLUTION INTRAMUSCULAR; INTRAVENOUS PRN
Status: DISCONTINUED | OUTPATIENT
Start: 2024-05-17 | End: 2024-05-17 | Stop reason: SDUPTHER

## 2024-05-17 RX ORDER — CLINDAMYCIN PHOSPHATE 900 MG/50ML
900 INJECTION, SOLUTION INTRAVENOUS
Status: COMPLETED | OUTPATIENT
Start: 2024-05-17 | End: 2024-05-17

## 2024-05-17 RX ORDER — GLYCOPYRROLATE 0.2 MG/ML
INJECTION INTRAMUSCULAR; INTRAVENOUS PRN
Status: DISCONTINUED | OUTPATIENT
Start: 2024-05-17 | End: 2024-05-17 | Stop reason: SDUPTHER

## 2024-05-17 RX ORDER — SODIUM CHLORIDE, SODIUM LACTATE, POTASSIUM CHLORIDE, CALCIUM CHLORIDE 600; 310; 30; 20 MG/100ML; MG/100ML; MG/100ML; MG/100ML
INJECTION, SOLUTION INTRAVENOUS CONTINUOUS
Status: DISCONTINUED | OUTPATIENT
Start: 2024-05-17 | End: 2024-05-17 | Stop reason: HOSPADM

## 2024-05-17 RX ORDER — SUCCINYLCHOLINE/SOD CL,ISO/PF 100 MG/5ML
SYRINGE (ML) INTRAVENOUS PRN
Status: DISCONTINUED | OUTPATIENT
Start: 2024-05-17 | End: 2024-05-17 | Stop reason: SDUPTHER

## 2024-05-17 RX ORDER — SCOLOPAMINE TRANSDERMAL SYSTEM 1 MG/1
1 PATCH, EXTENDED RELEASE TRANSDERMAL ONCE
Status: COMPLETED | OUTPATIENT
Start: 2024-05-17 | End: 2024-05-17

## 2024-05-17 RX ORDER — FENTANYL CITRATE 50 UG/ML
25 INJECTION, SOLUTION INTRAMUSCULAR; INTRAVENOUS EVERY 5 MIN PRN
Status: DISCONTINUED | OUTPATIENT
Start: 2024-05-17 | End: 2024-05-17 | Stop reason: HOSPADM

## 2024-05-17 RX ORDER — PHENYLEPHRINE HCL IN 0.9% NACL 1 MG/10 ML
SYRINGE (ML) INTRAVENOUS PRN
Status: DISCONTINUED | OUTPATIENT
Start: 2024-05-17 | End: 2024-05-17 | Stop reason: SDUPTHER

## 2024-05-17 RX ADMIN — MIDAZOLAM 2 MG: 1 INJECTION INTRAMUSCULAR; INTRAVENOUS at 07:28

## 2024-05-17 RX ADMIN — GLYCOPYRROLATE 0.2 MG: 0.2 INJECTION INTRAMUSCULAR; INTRAVENOUS at 07:47

## 2024-05-17 RX ADMIN — Medication 100 MCG: at 08:21

## 2024-05-17 RX ADMIN — Medication 100 MCG: at 08:14

## 2024-05-17 RX ADMIN — EPHEDRINE SULFATE 10 MG: 5 INJECTION INTRAVENOUS at 07:51

## 2024-05-17 RX ADMIN — Medication 100 MCG: at 07:49

## 2024-05-17 RX ADMIN — Medication 100 MCG: at 08:56

## 2024-05-17 RX ADMIN — LIDOCAINE HYDROCHLORIDE 60 MG: 20 INJECTION, SOLUTION EPIDURAL; INFILTRATION; INTRACAUDAL at 07:35

## 2024-05-17 RX ADMIN — ONDANSETRON 4 MG: 2 INJECTION INTRAMUSCULAR; INTRAVENOUS at 09:08

## 2024-05-17 RX ADMIN — CLINDAMYCIN PHOSPHATE 900 MG: 900 INJECTION, SOLUTION INTRAVENOUS at 07:40

## 2024-05-17 RX ADMIN — SUGAMMADEX 130 MG: 100 INJECTION, SOLUTION INTRAVENOUS at 09:25

## 2024-05-17 RX ADMIN — EPHEDRINE SULFATE 10 MG: 5 INJECTION INTRAVENOUS at 08:54

## 2024-05-17 RX ADMIN — Medication 100 MCG: at 08:31

## 2024-05-17 RX ADMIN — Medication 100 MCG: at 08:40

## 2024-05-17 RX ADMIN — FENTANYL CITRATE 75 MCG: 50 INJECTION, SOLUTION INTRAMUSCULAR; INTRAVENOUS at 07:33

## 2024-05-17 RX ADMIN — Medication 100 MCG: at 07:43

## 2024-05-17 RX ADMIN — Medication 100 MCG: at 08:06

## 2024-05-17 RX ADMIN — SODIUM CHLORIDE, POTASSIUM CHLORIDE, SODIUM LACTATE AND CALCIUM CHLORIDE: 600; 310; 30; 20 INJECTION, SOLUTION INTRAVENOUS at 06:26

## 2024-05-17 RX ADMIN — ROCURONIUM BROMIDE 10 MG: 10 INJECTION INTRAVENOUS at 08:24

## 2024-05-17 RX ADMIN — ROCURONIUM BROMIDE 5 MG: 10 INJECTION INTRAVENOUS at 07:34

## 2024-05-17 RX ADMIN — Medication 100 MCG: at 07:44

## 2024-05-17 RX ADMIN — ROCURONIUM BROMIDE 35 MG: 10 INJECTION INTRAVENOUS at 07:42

## 2024-05-17 RX ADMIN — METOCLOPRAMIDE 10 MG: 5 INJECTION, SOLUTION INTRAMUSCULAR; INTRAVENOUS at 07:49

## 2024-05-17 RX ADMIN — SCOLOPAMINE TRANSDERMAL SYSTEM 1 PATCH: 1 PATCH, EXTENDED RELEASE TRANSDERMAL at 07:58

## 2024-05-17 RX ADMIN — Medication 100 MCG: at 08:30

## 2024-05-17 RX ADMIN — FENTANYL CITRATE 25 MCG: 50 INJECTION, SOLUTION INTRAMUSCULAR; INTRAVENOUS at 09:25

## 2024-05-17 RX ADMIN — Medication 100 MCG: at 08:00

## 2024-05-17 RX ADMIN — EPHEDRINE SULFATE 10 MG: 5 INJECTION INTRAVENOUS at 08:45

## 2024-05-17 RX ADMIN — DEXAMETHASONE SODIUM PHOSPHATE 4 MG: 4 INJECTION INTRA-ARTICULAR; INTRALESIONAL; INTRAMUSCULAR; INTRAVENOUS; SOFT TISSUE at 07:48

## 2024-05-17 RX ADMIN — Medication 90 MG: at 07:35

## 2024-05-17 RX ADMIN — PROPOFOL 140 MG: 10 INJECTION, EMULSION INTRAVENOUS at 07:35

## 2024-05-17 RX ADMIN — EPHEDRINE SULFATE 10 MG: 5 INJECTION INTRAVENOUS at 09:16

## 2024-05-17 ASSESSMENT — PAIN - FUNCTIONAL ASSESSMENT
PAIN_FUNCTIONAL_ASSESSMENT: NONE - DENIES PAIN
PAIN_FUNCTIONAL_ASSESSMENT: 0-10
PAIN_FUNCTIONAL_ASSESSMENT: NONE - DENIES PAIN

## 2024-05-17 ASSESSMENT — PAIN DESCRIPTION - DESCRIPTORS: DESCRIPTORS: SHARP

## 2024-05-17 ASSESSMENT — LIFESTYLE VARIABLES: SMOKING_STATUS: 1

## 2024-05-17 NOTE — PERIOP NOTE
Reviewed PTA medication list with patient/caregiver and patient/caregiver denies any additional medications.     Patient admits to having a responsible adult care for them at home for at least 24 hours after surgery.    Patient encouraged to use gown warming system and informed that using said warming gown to regulate body temperature prior to a procedure has been shown to help reduce the risks of blood clots and infection.    Patient's pharmacy of choice verified and documented in PTA medication section.    Dual skin assessment & fall risk band verification completed with DAVID negro RN.

## 2024-05-17 NOTE — PERIOP NOTE
TRANSFER - IN REPORT:    Verbal report received from Lesley march on Angelica Duncan  being received from pacu for routine post-op      Report consisted of patient's Situation, Background, Assessment and   Recommendations(SBAR).     Information from the following report(s) Nurse Handoff Report was reviewed with the receiving nurse.    Opportunity for questions and clarification was provided.      Assessment completed upon patient's arrival to unit and care assumed.

## 2024-05-17 NOTE — INTERVAL H&P NOTE
Update History & Physical    The patient's History and Physical of May 15, planned procedure was reviewed with the patient and I examined the patient. There was no change. The surgical site was confirmed by the patient and me.     Plan: The risks, benefits, expected outcome, and alternative to the recommended procedure have been discussed with the patient. Patient understands and wants to proceed with the procedure.     Electronically signed by Willie Lance DPM on 5/17/2024 at 7:00 AM

## 2024-05-17 NOTE — PERIOP NOTE
Notified Dr. De La Torre that pt has not taken metoprolol for a week as she reports that she throws up pills. Dr. De La Torre states she will examine pt and decide whether pt to receive beta blocker or not.

## 2024-05-17 NOTE — PERIOP NOTE
TRANSFER - OUT REPORT:    Verbal report given to Paola TY RN on Angelica Duncan  being transferred to phase 2 for routine post-op       Report consisted of patient's Situation, Background, Assessment and   Recommendations(SBAR).     Information from the following report(s) Nurse Handoff Report, Adult Overview, Surgery Report, Intake/Output, MAR, Recent Results, and Med Rec Status was reviewed with the receiving nurse.           Lines:   Peripheral IV 05/17/24 Left Forearm (Active)   Site Assessment Clean, dry & intact 05/17/24 0935   Line Status Infusing 05/17/24 0935   Phlebitis Assessment No symptoms 05/17/24 0935   Infiltration Assessment 0 05/17/24 0935   Alcohol Cap Used No 05/17/24 0625   Dressing Status Clean, dry & intact 05/17/24 0935   Dressing Type Transparent 05/17/24 0935   Dressing Intervention New 05/17/24 0625        Opportunity for questions and clarification was provided.      Patient transported with:  Registered Nurse

## 2024-05-17 NOTE — OP NOTE
Operative Note      Patient: Angelica Duncan  YOB: 1950  MRN: [id number]    Date of Procedure: 5/17/2024    Pre-Op Diagnosis Codes:     * Major osseous defect, right ankle and foot [M89.771]     * Ulcer of right heel, with fat layer exposed (HCC) [L97.412]    Post-Op Diagnosis: Same       Procedure(s):  INCISION BONE CORTEX RIGHT FOOT AND SURGICAL WOUND PREP, STRAVIX GRAFT, RIGHT FOOT WITH C-ARM(SPEC POP)    Surgeon(s):  Willie Lance DPM    Assistant:   Surgical Assistant: Yosvany Veronica    Anesthesia: General    Estimated Blood Loss (mL): Minimal    Complications: None    Specimens:   ID Type Source Tests Collected by Time Destination   1 : RIGHT FOOT CULTURE Swab Foot CULTURE, ANAEROBIC, CULTURE, WOUND, CULTURE WITH SMEAR, ACID FAST BACILLIUS (Canceled) Willie Lance DPM 5/17/2024 0751    2 : RIGHT CUBOID BONE Tissue Foot CULTURE, ANAEROBIC, CULTURE, WOUND (Canceled) Willie Lance DPM 5/17/2024 0910    A : RIGHT CUBOID BONE Bone Foot SURGICAL PATHOLOGY Willie Lance DPM 5/17/2024 0910        Implants:  Implant Name Type Inv. Item Serial No.  Lot No. LRB No. Used Action   GRAFT HUM TISS [id number] CRYOPRESERVED PLCNTA TISS UMB AMNION - [id number]  GRAFT HUM TISS [id number] CRYOPRESERVED PLCNTA TISS UMB AMNION [id number] [employer]-  Right 1 Implanted         Drains: * No LDAs found *    Findings:  Infection Present At Time Of Surgery (PATOS) (choose all levels that have infection present):  No infection present  Other Findings: Major osseous defect plantar right cuboid bone causing chronic open ulcer plantar right midfoot.    Detailed Description of Procedure:   Patient was brought to the operating room and placed on the operating table in the supine position.  Patient's right foot was then anesthetized using 10 mL of 0.5% Marcaine mixed with epinephrine.  Patient's right foot was then prepped and draped in usual sterile manner.  No tourniquet was used.  Attention was drawn

## 2024-05-17 NOTE — ANESTHESIA PRE PROCEDURE
05/17/24 62.7 kg (138 lb 3.2 oz)   05/07/24 61.7 kg (136 lb)   11/14/23 59.9 kg (132 lb)     Body mass index is 23.36 kg/m².    CBC:   Lab Results   Component Value Date/Time    WBC 5.0 08/03/2021 04:45 AM    RBC 2.70 08/03/2021 04:45 AM    HGB 13.8 05/07/2024 01:40 PM    HCT 39.2 05/07/2024 01:40 PM    .1 08/03/2021 04:45 AM    RDW ABNORMAL 08/03/2021 04:45 AM     08/03/2021 04:45 AM       CMP:   Lab Results   Component Value Date/Time     05/07/2024 01:40 PM    K 4.4 05/07/2024 01:40 PM     05/07/2024 01:40 PM    CO2 27 05/07/2024 01:40 PM    BUN 22 05/07/2024 01:40 PM    CREATININE 0.70 05/07/2024 01:40 PM    GFRAA >60 08/03/2021 04:45 AM    AGRATIO 1.4 06/15/2021 02:25 PM    LABGLOM >90 05/07/2024 01:40 PM    GLUCOSE 195 05/07/2024 01:40 PM    CALCIUM 9.5 05/07/2024 01:40 PM    BILITOT 0.8 06/15/2021 02:25 PM    ALKPHOS 94 06/15/2021 02:25 PM    AST 45 06/15/2021 02:25 PM    ALT 26 06/15/2021 02:25 PM       POC Tests:   Recent Labs     05/17/24  0604   POCGLU 183*       Coags:   Lab Results   Component Value Date/Time    PROTIME 13.0 05/17/2024 06:25 AM    INR 1.0 05/17/2024 06:25 AM    APTT 110.0 08/04/2021 12:45 PM       HCG (If Applicable): No results found for: \"PREGTESTUR\", \"PREGSERUM\", \"HCG\", \"HCGQUANT\"     ABGs: No results found for: \"PHART\", \"PO2ART\", \"NOT1ZLP\", \"PUQ1CPD\", \"BEART\", \"D7HWWCFU\"     Type & Screen (If Applicable):  No results found for: \"LABABO\"    Drug/Infectious Status (If Applicable):  No results found for: \"HIV\", \"HEPCAB\"    COVID-19 Screening (If Applicable):   Lab Results   Component Value Date/Time    COVID19 Not Detected 09/29/2020 11:02 AM           Anesthesia Evaluation  Patient summary reviewed and Nursing notes reviewed   history of anesthetic complications: PONV.  Airway: Mallampati: II  TM distance: >3 FB   Neck ROM: full  Mouth opening: > = 3 FB   Dental:    (+) edentulous      Pulmonary: breath sounds clear to auscultation  (+) pneumonia: resolved,

## 2024-05-17 NOTE — BRIEF OP NOTE
Brief Postoperative Note      Patient: Angelica Duncan  YOB: 1950  MRN: [id number]    Date of Procedure: 5/17/2024    Pre-Op Diagnosis Codes:     * Major osseous defect, right ankle and foot [M89.771]     * Ulcer of right heel, with fat layer exposed (HCC) [L97.412]    Post-Op Diagnosis: Same       Procedure(s):  INCISION BONE CORTEX RIGHT FOOT AND SURGICAL WOUND PREP, STRAVIX GRAFT, RIGHT FOOT WITH C-ARM(SPEC POP)    Surgeon(s):  Willie Lance DPM    Assistant:  Surgical Assistant: Yosvany Veronica    Anesthesia: General    Estimated Blood Loss (mL): Minimal    Complications: None    Specimens:   ID Type Source Tests Collected by Time Destination   1 : RIGHT FOOT CULTURE Swab Foot CULTURE, ANAEROBIC, CULTURE, WOUND, CULTURE WITH SMEAR, ACID FAST BACILLIUS Willie Lance DPM 5/17/2024 0751    2 : RIGHT CUBOID BONE Bone Foot CULTURE, ANAEROBIC, CULTURE, WOUND Willie Lance DPM 5/17/2024 0910    A : RIGHT CUBOID BONE Bone Foot SURGICAL PATHOLOGY Willie Lance DPM 5/17/2024 0910        Implants:  Implant Name Type Inv. Item Serial No.  Lot No. LRB No. Used Action   GRAFT HUM TISS [id number] CRYOPRESERVED PLCNTA TISS [employer]MB AMNION - [id number]  GRAFT HUM TISS [id number] CRYOPRESERVED PLCNTA TISS UMB AMNION [id number] [employer]WD  Right 1 Implanted         Drains: * No LDAs found *    Findings:  Infection Present At Time Of Surgery (PATOS) (choose all levels that have infection present):  No infection present  Other Findings: major osseous defect plantar right midfoot causing chronic open ulcer    Electronically signed by Willie Lance DPM on 5/17/2024 at 9:51 AM

## 2024-05-17 NOTE — PERIOP NOTE
TRANSFER - IN REPORT:    Verbal report received from OR nurse and CRNA on Angelica Duncan  being received from OR for routine post-op      Report consisted of patient's Situation, Background, Assessment and   Recommendations(SBAR).     Information from the following report(s) Nurse Handoff Report, Adult Overview, Surgery Report, Intake/Output, MAR, Recent Results, and Med Rec Status was reviewed with the receiving nurse.    Opportunity for questions and clarification was provided.      Assessment completed upon patient's arrival to unit and care assumed.

## 2024-05-17 NOTE — DISCHARGE INSTRUCTIONS
[facility]   Willie Wise D.P.M.    [address]  [place], [place] [address]  Telephone:  [phone]  Fax:  [phone]     Patient Discharge Instructions    1.) Wear surgical shoe for protection of right foot.    2.) Use cane, crutches, or walker as needed     3.) Keep walking and standing to a minimum    4.) Keep bandage dry and do not remove or change bandage.     5.) Take precsribed medication as directed.     6.) Keep scheduled post-operative office appointment.     7.) Follow up with Dr. Wise in Office in 3 days    8.)Call our office with any questions or problems at [phone].  If after hours please call Dr. Wies at [phone].       DISCHARGE SUMMARY from Nurse    PATIENT INSTRUCTIONS:    After general anesthesia or intravenous sedation, for 24 hours or while taking prescription Narcotics:  Limit your activities  Do not drive and operate hazardous machinery  Do not make important personal or business decisions  Do  not drink alcoholic beverages  If you have not urinated within 8 hours after discharge, please contact your surgeon on call.    Report the following to your surgeon:  Excessive pain, swelling, redness or odor of or around the surgical area  Temperature over 100.5  Nausea and vomiting lasting longer than 4 hours or if unable to take medications  Any signs of decreased circulation or nerve impairment to extremity: change in color, persistent  numbness, tingling, coldness or increase pain  Any questions    What to do at Home:  Recommended activity: ,   REGULAR DIET  RETURN TO OFFICE AS SCHEDULED    If you experience any of the following symptoms heavy bleeding, fevers, severe pain, circulation changes, please follow up with dr wise.    *  Please give a list of your current medications to your Primary Care Provider.    *  Please update this list whenever your medications are discontinued, doses are      changed, or new medications (including

## 2024-05-17 NOTE — ANESTHESIA POSTPROCEDURE EVALUATION
Department of Anesthesiology  Postprocedure Note    Patient: Angelica Duncan  MRN: [id number]  YOB: 1950  Date of evaluation: 5/17/2024    Procedure Summary       Date: 05/17/24 Room / Location: [facility] MAIN 02 / [facility] MAIN OR    Anesthesia Start: 0728 Anesthesia Stop: 0934    Procedure: INCISION BONE CORTEX RIGHT FOOT AND SURGICAL WOUND PREP, STRAVIX GRAFT, RIGHT FOOT WITH C-ARM(SPEC POP) (Right: Foot) Diagnosis:       Major osseous defect, right ankle and foot      Ulcer of right heel, with fat layer exposed (HCC)      (Major osseous defect, right ankle and foot [M89.771])      (Ulcer of right heel, with fat layer exposed (HCC) [L97.412])    Surgeons: Willie Lance DPM Responsible Provider: Rajwinder De La Torre MD    Anesthesia Type: General ASA Status: 3            Anesthesia Type: General    Jolie Phase I: Jolie Score: 10    Jolie Phase II:      Anesthesia Post Evaluation    Patient location during evaluation: PACU  Patient participation: complete - patient participated  Level of consciousness: awake  Pain score: 0  Airway patency: patent  Nausea & Vomiting: no nausea and no vomiting  Cardiovascular status: blood pressure returned to baseline  Respiratory status: acceptable  Hydration status: stable  Multimodal analgesia pain management approach  Pain management: satisfactory to patient    No notable events documented.

## 2024-05-19 LAB
BACTERIA SPEC CULT: ABNORMAL
BACTERIA SPEC CULT: NORMAL
GRAM STN SPEC: ABNORMAL
SERVICE CMNT-IMP: ABNORMAL
SERVICE CMNT-IMP: ABNORMAL
SERVICE CMNT-IMP: NORMAL

## 2024-05-23 ENCOUNTER — HOSPITAL ENCOUNTER (INPATIENT)
Facility: HOSPITAL | Age: 74
LOS: 7 days | Discharge: HOME | DRG: 857 | End: 2024-05-30
Attending: HOSPITALIST | Admitting: HOSPITALIST
Payer: MEDICARE

## 2024-05-23 ENCOUNTER — ANESTHESIA (OUTPATIENT)
Facility: HOSPITAL | Age: 74
End: 2024-05-23
Payer: MEDICARE

## 2024-05-23 ENCOUNTER — ANESTHESIA EVENT (OUTPATIENT)
Facility: HOSPITAL | Age: 74
End: 2024-05-23
Payer: MEDICARE

## 2024-05-23 DIAGNOSIS — I25.10 CORONARY ARTERY DISEASE INVOLVING NATIVE CORONARY ARTERY OF NATIVE HEART WITHOUT ANGINA PECTORIS: ICD-10-CM

## 2024-05-23 DIAGNOSIS — L08.9 RIGHT FOOT INFECTION: ICD-10-CM

## 2024-05-23 DIAGNOSIS — T81.40XA POSTOPERATIVE INFECTION, UNSPECIFIED TYPE, INITIAL ENCOUNTER: Primary | ICD-10-CM

## 2024-05-23 DIAGNOSIS — R79.89 ELEVATED TROPONIN: ICD-10-CM

## 2024-05-23 PROBLEM — Z79.4 TYPE 2 DIABETES MELLITUS WITHOUT COMPLICATION, WITH LONG-TERM CURRENT USE OF INSULIN (HCC): Status: ACTIVE | Noted: 2024-05-23

## 2024-05-23 PROBLEM — E11.9 TYPE 2 DIABETES MELLITUS WITHOUT COMPLICATION, WITH LONG-TERM CURRENT USE OF INSULIN (HCC): Status: ACTIVE | Noted: 2024-05-23

## 2024-05-23 PROBLEM — Z88.9 MULTIPLE DRUG ALLERGIES: Status: ACTIVE | Noted: 2024-05-23

## 2024-05-23 LAB
ALBUMIN SERPL-MCNC: 3.7 G/DL (ref 3.4–5)
ALBUMIN/GLOB SERPL: 0.9 (ref 0.8–1.7)
ALP SERPL-CCNC: 174 U/L (ref 45–117)
ALT SERPL-CCNC: 36 U/L (ref 13–56)
ANION GAP SERPL CALC-SCNC: 12 MMOL/L (ref 3–18)
AST SERPL-CCNC: 28 U/L (ref 10–38)
BASOPHILS # BLD: 0.1 K/UL (ref 0–0.1)
BASOPHILS NFR BLD: 0 % (ref 0–2)
BILIRUB SERPL-MCNC: 0.4 MG/DL (ref 0.2–1)
BUN SERPL-MCNC: 16 MG/DL (ref 7–18)
BUN/CREAT SERPL: 15 (ref 12–20)
CALCIUM SERPL-MCNC: 9.4 MG/DL (ref 8.5–10.1)
CHLORIDE SERPL-SCNC: 95 MMOL/L (ref 100–111)
CO2 SERPL-SCNC: 27 MMOL/L (ref 21–32)
CREAT SERPL-MCNC: 1.04 MG/DL (ref 0.6–1.3)
DIFFERENTIAL METHOD BLD: ABNORMAL
EOSINOPHIL # BLD: 0.1 K/UL (ref 0–0.4)
EOSINOPHIL NFR BLD: 1 % (ref 0–5)
ERYTHROCYTE [DISTWIDTH] IN BLOOD BY AUTOMATED COUNT: 13.6 % (ref 11.6–14.5)
EST. AVERAGE GLUCOSE BLD GHB EST-MCNC: 209 MG/DL
GLOBULIN SER CALC-MCNC: 4.1 G/DL (ref 2–4)
GLUCOSE BLD STRIP.AUTO-MCNC: 272 MG/DL (ref 70–110)
GLUCOSE BLD STRIP.AUTO-MCNC: 89 MG/DL (ref 70–110)
GLUCOSE SERPL-MCNC: 284 MG/DL (ref 74–99)
HBA1C MFR BLD: 8.9 % (ref 4.2–5.6)
HCT VFR BLD AUTO: 41.8 % (ref 35–45)
HGB BLD-MCNC: 14.2 G/DL (ref 12–16)
IMM GRANULOCYTES # BLD AUTO: 0.1 K/UL (ref 0–0.04)
IMM GRANULOCYTES NFR BLD AUTO: 1 % (ref 0–0.5)
INR PPP: 1.1 (ref 0.9–1.1)
LYMPHOCYTES # BLD: 1.9 K/UL (ref 0.9–3.6)
LYMPHOCYTES NFR BLD: 16 % (ref 21–52)
MCH RBC QN AUTO: 33.1 PG (ref 24–34)
MCHC RBC AUTO-ENTMCNC: 34 G/DL (ref 31–37)
MCV RBC AUTO: 97.4 FL (ref 78–100)
MONOCYTES # BLD: 1 K/UL (ref 0.05–1.2)
MONOCYTES NFR BLD: 9 % (ref 3–10)
NEUTS SEG # BLD: 8.2 K/UL (ref 1.8–8)
NEUTS SEG NFR BLD: 72 % (ref 40–73)
NRBC # BLD: 0 K/UL (ref 0–0.01)
NRBC BLD-RTO: 0 PER 100 WBC
PLATELET # BLD AUTO: 338 K/UL (ref 135–420)
PMV BLD AUTO: 9.5 FL (ref 9.2–11.8)
POTASSIUM SERPL-SCNC: 3.9 MMOL/L (ref 3.5–5.5)
PROT SERPL-MCNC: 7.8 G/DL (ref 6.4–8.2)
PROTHROMBIN TIME: 13.8 SEC (ref 11.9–14.7)
RBC # BLD AUTO: 4.29 M/UL (ref 4.2–5.3)
SODIUM SERPL-SCNC: 134 MMOL/L (ref 136–145)
TROPONIN I SERPL HS-MCNC: 126 NG/L (ref 0–54)
TROPONIN I SERPL HS-MCNC: 133 NG/L (ref 0–54)
WBC # BLD AUTO: 11.3 K/UL (ref 4.6–13.2)

## 2024-05-23 PROCEDURE — 3600000012 HC SURGERY LEVEL 2 ADDTL 15MIN: Performed by: PODIATRIST

## 2024-05-23 PROCEDURE — 87154 CUL TYP ID BLD PTHGN 6+ TRGT: CPT

## 2024-05-23 PROCEDURE — 87205 SMEAR GRAM STAIN: CPT

## 2024-05-23 PROCEDURE — 36415 COLL VENOUS BLD VENIPUNCTURE: CPT

## 2024-05-23 PROCEDURE — 3700000001 HC ADD 15 MINUTES (ANESTHESIA): Performed by: PODIATRIST

## 2024-05-23 PROCEDURE — 2709999900 HC NON-CHARGEABLE SUPPLY: Performed by: PODIATRIST

## 2024-05-23 PROCEDURE — 3600000002 HC SURGERY LEVEL 2 BASE: Performed by: PODIATRIST

## 2024-05-23 PROCEDURE — 6370000000 HC RX 637 (ALT 250 FOR IP): Performed by: HOSPITALIST

## 2024-05-23 PROCEDURE — 2580000003 HC RX 258: Performed by: PODIATRIST

## 2024-05-23 PROCEDURE — 83036 HEMOGLOBIN GLYCOSYLATED A1C: CPT

## 2024-05-23 PROCEDURE — 82962 GLUCOSE BLOOD TEST: CPT

## 2024-05-23 PROCEDURE — 3700000000 HC ANESTHESIA ATTENDED CARE: Performed by: PODIATRIST

## 2024-05-23 PROCEDURE — 1100000003 HC PRIVATE W/ TELEMETRY

## 2024-05-23 PROCEDURE — 85025 COMPLETE CBC W/AUTO DIFF WBC: CPT

## 2024-05-23 PROCEDURE — XHRPXF7 REPLACEMENT OF SKIN WITH BIOENGINEERED ALLOGENEIC CONSTRUCT, EXTERNAL APPROACH, NEW TECHNOLOGY GROUP 7: ICD-10-PCS | Performed by: PODIATRIST

## 2024-05-23 PROCEDURE — 85610 PROTHROMBIN TIME: CPT

## 2024-05-23 PROCEDURE — 6360000002 HC RX W HCPCS: Performed by: NURSE PRACTITIONER

## 2024-05-23 PROCEDURE — 87040 BLOOD CULTURE FOR BACTERIA: CPT

## 2024-05-23 PROCEDURE — 2500000003 HC RX 250 WO HCPCS

## 2024-05-23 PROCEDURE — 87070 CULTURE OTHR SPECIMN AEROBIC: CPT

## 2024-05-23 PROCEDURE — 7100000001 HC PACU RECOVERY - ADDTL 15 MIN: Performed by: PODIATRIST

## 2024-05-23 PROCEDURE — 87075 CULTR BACTERIA EXCEPT BLOOD: CPT

## 2024-05-23 PROCEDURE — 93005 ELECTROCARDIOGRAM TRACING: CPT | Performed by: HOSPITALIST

## 2024-05-23 PROCEDURE — 99285 EMERGENCY DEPT VISIT HI MDM: CPT

## 2024-05-23 PROCEDURE — 87206 SMEAR FLUORESCENT/ACID STAI: CPT

## 2024-05-23 PROCEDURE — 84484 ASSAY OF TROPONIN QUANT: CPT

## 2024-05-23 PROCEDURE — 87102 FUNGUS ISOLATION CULTURE: CPT

## 2024-05-23 PROCEDURE — 6370000000 HC RX 637 (ALT 250 FOR IP): Performed by: ANESTHESIOLOGY

## 2024-05-23 PROCEDURE — 7100000000 HC PACU RECOVERY - FIRST 15 MIN: Performed by: PODIATRIST

## 2024-05-23 PROCEDURE — 6360000002 HC RX W HCPCS

## 2024-05-23 PROCEDURE — 2580000003 HC RX 258: Performed by: HOSPITALIST

## 2024-05-23 PROCEDURE — 2580000003 HC RX 258: Performed by: INTERNAL MEDICINE

## 2024-05-23 PROCEDURE — 0KBV0ZZ EXCISION OF RIGHT FOOT MUSCLE, OPEN APPROACH: ICD-10-PCS | Performed by: PODIATRIST

## 2024-05-23 PROCEDURE — 2500000003 HC RX 250 WO HCPCS: Performed by: PODIATRIST

## 2024-05-23 PROCEDURE — 87077 CULTURE AEROBIC IDENTIFY: CPT

## 2024-05-23 PROCEDURE — 87116 MYCOBACTERIA CULTURE: CPT

## 2024-05-23 PROCEDURE — 6360000002 HC RX W HCPCS: Performed by: INTERNAL MEDICINE

## 2024-05-23 PROCEDURE — 80053 COMPREHEN METABOLIC PANEL: CPT

## 2024-05-23 PROCEDURE — 87186 SC STD MICRODIL/AGAR DIL: CPT

## 2024-05-23 RX ORDER — BUPIVACAINE HYDROCHLORIDE AND EPINEPHRINE 5; 5 MG/ML; UG/ML
INJECTION, SOLUTION EPIDURAL; INTRACAUDAL; PERINEURAL PRN
Status: DISCONTINUED | OUTPATIENT
Start: 2024-05-23 | End: 2024-05-23 | Stop reason: ALTCHOICE

## 2024-05-23 RX ORDER — GLUCAGON 1 MG/ML
1 KIT INJECTION PRN
Status: DISCONTINUED | OUTPATIENT
Start: 2024-05-23 | End: 2024-05-30 | Stop reason: HOSPADM

## 2024-05-23 RX ORDER — CLONIDINE HYDROCHLORIDE 0.1 MG/1
0.1 TABLET ORAL DAILY
Status: DISCONTINUED | OUTPATIENT
Start: 2024-05-24 | End: 2024-05-30 | Stop reason: HOSPADM

## 2024-05-23 RX ORDER — ROPINIROLE 1 MG/1
4 TABLET, FILM COATED ORAL 2 TIMES DAILY
Status: DISCONTINUED | OUTPATIENT
Start: 2024-05-23 | End: 2024-05-30 | Stop reason: HOSPADM

## 2024-05-23 RX ORDER — CLOPIDOGREL BISULFATE 75 MG/1
75 TABLET ORAL DAILY
Status: DISCONTINUED | OUTPATIENT
Start: 2024-05-23 | End: 2024-05-23

## 2024-05-23 RX ORDER — PROPOFOL 10 MG/ML
INJECTION, EMULSION INTRAVENOUS PRN
Status: DISCONTINUED | OUTPATIENT
Start: 2024-05-23 | End: 2024-05-23 | Stop reason: SDUPTHER

## 2024-05-23 RX ORDER — OXYCODONE HYDROCHLORIDE 5 MG/1
5 TABLET ORAL PRN
Status: DISCONTINUED | OUTPATIENT
Start: 2024-05-23 | End: 2024-05-23 | Stop reason: HOSPADM

## 2024-05-23 RX ORDER — FENTANYL CITRATE 50 UG/ML
50 INJECTION, SOLUTION INTRAMUSCULAR; INTRAVENOUS
Status: COMPLETED | OUTPATIENT
Start: 2024-05-23 | End: 2024-05-23

## 2024-05-23 RX ORDER — ENOXAPARIN SODIUM 100 MG/ML
40 INJECTION SUBCUTANEOUS DAILY
Status: DISCONTINUED | OUTPATIENT
Start: 2024-05-23 | End: 2024-05-23

## 2024-05-23 RX ORDER — SCOLOPAMINE TRANSDERMAL SYSTEM 1 MG/1
1 PATCH, EXTENDED RELEASE TRANSDERMAL
Status: COMPLETED | OUTPATIENT
Start: 2024-05-23 | End: 2024-05-26

## 2024-05-23 RX ORDER — DEXTROSE MONOHYDRATE 100 MG/ML
INJECTION, SOLUTION INTRAVENOUS CONTINUOUS PRN
Status: CANCELLED | OUTPATIENT
Start: 2024-05-23

## 2024-05-23 RX ORDER — CLINDAMYCIN PHOSPHATE 600 MG/50ML
600 INJECTION, SOLUTION INTRAVENOUS EVERY 6 HOURS
Status: DISCONTINUED | OUTPATIENT
Start: 2024-05-23 | End: 2024-05-24

## 2024-05-23 RX ORDER — INSULIN LISPRO 100 [IU]/ML
0-8 INJECTION, SOLUTION INTRAVENOUS; SUBCUTANEOUS EVERY 4 HOURS
Status: DISCONTINUED | OUTPATIENT
Start: 2024-05-23 | End: 2024-05-24

## 2024-05-23 RX ORDER — POLYETHYLENE GLYCOL 3350 17 G/17G
17 POWDER, FOR SOLUTION ORAL DAILY PRN
Status: DISCONTINUED | OUTPATIENT
Start: 2024-05-23 | End: 2024-05-27

## 2024-05-23 RX ORDER — OXYCODONE HYDROCHLORIDE 5 MG/1
10 TABLET ORAL PRN
Status: DISCONTINUED | OUTPATIENT
Start: 2024-05-23 | End: 2024-05-23 | Stop reason: HOSPADM

## 2024-05-23 RX ORDER — SODIUM CHLORIDE, SODIUM LACTATE, POTASSIUM CHLORIDE, CALCIUM CHLORIDE 600; 310; 30; 20 MG/100ML; MG/100ML; MG/100ML; MG/100ML
INJECTION, SOLUTION INTRAVENOUS CONTINUOUS
Status: DISCONTINUED | OUTPATIENT
Start: 2024-05-23 | End: 2024-05-24

## 2024-05-23 RX ORDER — AZTREONAM 1 G/1
INJECTION, POWDER, LYOPHILIZED, FOR SOLUTION INTRAMUSCULAR; INTRAVENOUS PRN
Status: DISCONTINUED | OUTPATIENT
Start: 2024-05-23 | End: 2024-05-23 | Stop reason: SDUPTHER

## 2024-05-23 RX ORDER — ONDANSETRON 4 MG/1
4 TABLET, ORALLY DISINTEGRATING ORAL EVERY 8 HOURS PRN
Status: DISCONTINUED | OUTPATIENT
Start: 2024-05-23 | End: 2024-05-29

## 2024-05-23 RX ORDER — ONDANSETRON 2 MG/ML
4 INJECTION INTRAMUSCULAR; INTRAVENOUS
Status: DISCONTINUED | OUTPATIENT
Start: 2024-05-23 | End: 2024-05-23 | Stop reason: HOSPADM

## 2024-05-23 RX ORDER — SODIUM CHLORIDE 0.9 % (FLUSH) 0.9 %
5-40 SYRINGE (ML) INJECTION PRN
Status: DISCONTINUED | OUTPATIENT
Start: 2024-05-23 | End: 2024-05-30 | Stop reason: HOSPADM

## 2024-05-23 RX ORDER — SODIUM CHLORIDE 0.9 % (FLUSH) 0.9 %
5-40 SYRINGE (ML) INJECTION EVERY 12 HOURS SCHEDULED
Status: DISCONTINUED | OUTPATIENT
Start: 2024-05-23 | End: 2024-05-30 | Stop reason: HOSPADM

## 2024-05-23 RX ORDER — NALOXONE HYDROCHLORIDE 0.4 MG/ML
INJECTION, SOLUTION INTRAMUSCULAR; INTRAVENOUS; SUBCUTANEOUS PRN
Status: DISCONTINUED | OUTPATIENT
Start: 2024-05-23 | End: 2024-05-23 | Stop reason: HOSPADM

## 2024-05-23 RX ORDER — HYDRALAZINE HYDROCHLORIDE 20 MG/ML
10 INJECTION INTRAMUSCULAR; INTRAVENOUS EVERY 6 HOURS PRN
Status: DISCONTINUED | OUTPATIENT
Start: 2024-05-23 | End: 2024-05-30 | Stop reason: HOSPADM

## 2024-05-23 RX ORDER — ONDANSETRON 2 MG/ML
4 INJECTION INTRAMUSCULAR; INTRAVENOUS
Status: COMPLETED | OUTPATIENT
Start: 2024-05-23 | End: 2024-05-23

## 2024-05-23 RX ORDER — SODIUM CHLORIDE 9 MG/ML
INJECTION, SOLUTION INTRAVENOUS PRN
Status: DISCONTINUED | OUTPATIENT
Start: 2024-05-23 | End: 2024-05-30 | Stop reason: HOSPADM

## 2024-05-23 RX ORDER — INSULIN LISPRO 100 [IU]/ML
0-8 INJECTION, SOLUTION INTRAVENOUS; SUBCUTANEOUS ONCE
Status: COMPLETED | OUTPATIENT
Start: 2024-05-23 | End: 2024-05-23

## 2024-05-23 RX ORDER — ENOXAPARIN SODIUM 100 MG/ML
40 INJECTION SUBCUTANEOUS DAILY
Status: DISCONTINUED | OUTPATIENT
Start: 2024-05-24 | End: 2024-05-24

## 2024-05-23 RX ORDER — CLONIDINE HYDROCHLORIDE 0.1 MG/1
0.1 TABLET ORAL DAILY
Status: DISCONTINUED | OUTPATIENT
Start: 2024-05-23 | End: 2024-05-23

## 2024-05-23 RX ORDER — DEXTROSE MONOHYDRATE 100 MG/ML
INJECTION, SOLUTION INTRAVENOUS CONTINUOUS PRN
Status: DISCONTINUED | OUTPATIENT
Start: 2024-05-23 | End: 2024-05-30 | Stop reason: HOSPADM

## 2024-05-23 RX ORDER — INSULIN GLARGINE 100 [IU]/ML
15 INJECTION, SOLUTION SUBCUTANEOUS DAILY
Status: DISCONTINUED | OUTPATIENT
Start: 2024-05-23 | End: 2024-05-23

## 2024-05-23 RX ORDER — DROPERIDOL 2.5 MG/ML
0.62 INJECTION, SOLUTION INTRAMUSCULAR; INTRAVENOUS
Status: DISCONTINUED | OUTPATIENT
Start: 2024-05-23 | End: 2024-05-23 | Stop reason: HOSPADM

## 2024-05-23 RX ORDER — ONDANSETRON 2 MG/ML
4 INJECTION INTRAMUSCULAR; INTRAVENOUS EVERY 6 HOURS PRN
Status: DISCONTINUED | OUTPATIENT
Start: 2024-05-23 | End: 2024-05-29

## 2024-05-23 RX ORDER — DIPHENHYDRAMINE HYDROCHLORIDE 50 MG/ML
12.5 INJECTION INTRAMUSCULAR; INTRAVENOUS
Status: DISCONTINUED | OUTPATIENT
Start: 2024-05-23 | End: 2024-05-23 | Stop reason: HOSPADM

## 2024-05-23 RX ORDER — LIDOCAINE HYDROCHLORIDE 20 MG/ML
INJECTION, SOLUTION EPIDURAL; INFILTRATION; INTRACAUDAL; PERINEURAL PRN
Status: DISCONTINUED | OUTPATIENT
Start: 2024-05-23 | End: 2024-05-23 | Stop reason: SDUPTHER

## 2024-05-23 RX ORDER — CLOPIDOGREL BISULFATE 75 MG/1
75 TABLET ORAL DAILY
Status: DISCONTINUED | OUTPATIENT
Start: 2024-05-24 | End: 2024-05-30 | Stop reason: HOSPADM

## 2024-05-23 RX ORDER — INSULIN GLARGINE 100 [IU]/ML
15 INJECTION, SOLUTION SUBCUTANEOUS NIGHTLY
Status: DISCONTINUED | OUTPATIENT
Start: 2024-05-23 | End: 2024-05-25

## 2024-05-23 RX ADMIN — CLINDAMYCIN PHOSPHATE 600 MG: 600 INJECTION, SOLUTION INTRAVENOUS at 23:09

## 2024-05-23 RX ADMIN — INSULIN LISPRO 4 UNITS: 100 INJECTION, SOLUTION INTRAVENOUS; SUBCUTANEOUS at 17:59

## 2024-05-23 RX ADMIN — ROPINIROLE HYDROCHLORIDE 4 MG: 1 TABLET, FILM COATED ORAL at 21:19

## 2024-05-23 RX ADMIN — LIDOCAINE HYDROCHLORIDE 100 MG: 20 INJECTION, SOLUTION EPIDURAL; INFILTRATION; INTRACAUDAL; PERINEURAL at 18:54

## 2024-05-23 RX ADMIN — FENTANYL CITRATE 50 MCG: 50 INJECTION INTRAMUSCULAR; INTRAVENOUS at 16:51

## 2024-05-23 RX ADMIN — PROPOFOL 10 MG: 10 INJECTION, EMULSION INTRAVENOUS at 19:13

## 2024-05-23 RX ADMIN — CLINDAMYCIN PHOSPHATE 600 MG: 600 INJECTION, SOLUTION INTRAVENOUS at 19:15

## 2024-05-23 RX ADMIN — INSULIN GLARGINE 15 UNITS: 100 INJECTION, SOLUTION SUBCUTANEOUS at 22:01

## 2024-05-23 RX ADMIN — PROPOFOL 20 MG: 10 INJECTION, EMULSION INTRAVENOUS at 19:00

## 2024-05-23 RX ADMIN — SODIUM CHLORIDE, PRESERVATIVE FREE 10 ML: 5 INJECTION INTRAVENOUS at 21:19

## 2024-05-23 RX ADMIN — AZTREONAM 1000 MG: 1 INJECTION, POWDER, LYOPHILIZED, FOR SOLUTION INTRAMUSCULAR; INTRAVENOUS at 22:03

## 2024-05-23 RX ADMIN — ONDANSETRON 4 MG: 2 INJECTION INTRAMUSCULAR; INTRAVENOUS at 16:51

## 2024-05-23 RX ADMIN — PROPOFOL 30 MG: 10 INJECTION, EMULSION INTRAVENOUS at 18:54

## 2024-05-23 RX ADMIN — AZTREONAM 1000 MG: 1 INJECTION, POWDER, LYOPHILIZED, FOR SOLUTION INTRAMUSCULAR; INTRAVENOUS at 19:15

## 2024-05-23 RX ADMIN — SODIUM CHLORIDE, SODIUM LACTATE, POTASSIUM CHLORIDE, AND CALCIUM CHLORIDE: 600; 310; 30; 20 INJECTION, SOLUTION INTRAVENOUS at 18:24

## 2024-05-23 RX ADMIN — METOPROLOL TARTRATE 25 MG: 25 TABLET, FILM COATED ORAL at 21:20

## 2024-05-23 ASSESSMENT — LIFESTYLE VARIABLES: SMOKING_STATUS: 1

## 2024-05-23 ASSESSMENT — PAIN - FUNCTIONAL ASSESSMENT
PAIN_FUNCTIONAL_ASSESSMENT: 0-10
PAIN_FUNCTIONAL_ASSESSMENT: ACTIVITIES ARE NOT PREVENTED

## 2024-05-23 ASSESSMENT — PAIN SCALES - GENERAL
PAINLEVEL_OUTOF10: 8
PAINLEVEL_OUTOF10: 0

## 2024-05-23 ASSESSMENT — PAIN DESCRIPTION - DESCRIPTORS: DESCRIPTORS: ACHING

## 2024-05-23 NOTE — PERIOP NOTE
Stat troponin taken to lab. Dr. Lance and Dr. Ray vocalized okay to proceed with procedure before stat lab results.

## 2024-05-23 NOTE — H&P
History & Physical    Patient: Angelica Duncan MRN: [id number]  CSN: [id number]    YOB: 1950  Age: 73 y.o.  Sex: female      DOA: 5/23/2024  Primary Care Provider:  Ora Ramirez PA      Assessment/Plan     Active Hospital Problems    Diagnosis Date Noted    Right foot infection [L08.9] 05/23/2024    Type 2 diabetes mellitus without complication, with long-term current use of insulin (HCC) [E11.9, Z79.4] 05/23/2024    Multiple drug allergies [Z88.9] 05/23/2024    Arterial thrombosis (HCC) [I74.9] 03/18/2021    CAD (coronary artery disease) [I25.10] 04/13/2018    Anxiety [F41.9] 10/15/2017    Lupus anticoagulant syndrome (HCC) [D68.62] 12/29/2016       Admit to remote tele     Rt foot infection with abscess   Need urgent surgery otherwise will be potential sepsis   ID consulted for iv abx with hx multiple abx allergic to   EKG done pac -new and trop pending -curbside consult Dr Harris-he will have procedure done and unable to see pt -he recommends to have surgery if trop wnl due to it is urgent condition -benefit > risk. Pt denies any chest pain and no sob.       DM type II , with complication,  -long term insulin , Complication with foot infectin   -on lantus,  ssi, npo for surgery  , hypoglycemia protocol       HTN, accelerated  Continue home medication.    Cad and pad , hx of arterial thrombosis   Continue plavix, inr daily and start warfarin after surgery if podiatry clearance  Inr 1.1 -like not taking warfarin at home     Anxiety   Will continue home meds after verified with pt     Lupus anticoagulant syndrome     Restless leg   Continue home meds     Multiple medication allergy  Reported tolerated hydrcodone before     Full code     Please note that this dictation was completed with [employer] voice recognition software.  Quite often unanticipated grammatical, syntax, homophones, and other interpretive errors are inadvertently transcribed by the computer software.  Please disregard  disorder     anxiety   no meds    PUD (peptic ulcer disease) 2009    H/O requiring partial gastrectomy and small intestine    Raynaud's syndrome     Restless leg syndrome     Retention of urine     Skin cancer 2000    mouth    Thromboembolus (HCC) 2000    RLE- arterial    Wears glasses     readers       Past Surgical History:   Procedure Laterality Date    APPENDECTOMY      BREAST SURGERY Bilateral 02/2016    hemorrhaged after breast surgery    BREAST SURGERY      developed infection and implant removed    CHOLECYSTECTOMY      FOOT DEBRIDEMENT Right 5/17/2024    INCISION BONE CORTEX RIGHT FOOT AND SURGICAL WOUND PREP, STRAVIX GRAFT, RIGHT FOOT WITH C-ARM(SPEC POP) performed by Willie Lance DPM at [facility] MAIN OR    GASTRECTOMY      due to ulcers    GASTRIC BYPASS SURGERY  1996    gastric by-pass    GI      total gastrectomy, partial colectomy    GI      colostomy and reversal    HEENT      cancer of lip removed    HYSTERECTOMY (CERVIX STATUS UNKNOWN)      for cervical cancer    ORTHOPEDIC SURGERY      rt foot surgery x10    ORTHOPEDIC SURGERY Left     great toe gangrenous and amputated    OTHER SURGICAL HISTORY      part of liver removal    OTHER SURGICAL HISTORY      ventral hernia repair x10    OTHER SURGICAL HISTORY      Exc pilonial cystectomy    IN UNLISTED PROCEDURE ABDOMEN PERITONEUM & OMENTUM      duodenal ulcer rupture    IN UNLISTED PROCEDURE CARDIAC SURGERY  2018    open heart (single vessel) at [facility]    VASCULAR SURGERY  2022    iliac aorta stents x 6    VASCULAR SURGERY      seven more stents iliac aorta and femoral        Family History   Problem Relation Age of Onset    Post-op Cognitive Dysfunction Neg Hx     Other Neg Hx     Emergence Delirium Neg Hx     Post-op Nausea/Vomiting Neg Hx     Delayed Awakening Neg Hx     Malig Hypertherm Neg Hx     Pseudochol. Deficiency Neg Hx        Social History     Socioeconomic History    Marital status:      Spouse name: None    Number of children: None

## 2024-05-23 NOTE — ED TRIAGE NOTES
Pt was sent from her Doctor's office for a R foot infection s/p surgery on Friday, where she states \"they cut it open, scraped out the bone, and put a skin graft on it\". Pt reports purulent drainage to surgical site and her Surgeon states she would need to go back to surgery.

## 2024-05-23 NOTE — INTERVAL H&P NOTE
Update History & Physical    The patient's History and Physical of May 23, planned procedure was reviewed with the patient and I examined the patient. There was no change. The surgical site was confirmed by the patient and me.     Plan: The risks, benefits, expected outcome, and alternative to the recommended procedure have been discussed with the patient. Patient understands and wants to proceed with the procedure.     Electronically signed by Willie Lance DPM on 5/23/2024 at 6:32 PM

## 2024-05-23 NOTE — ED NOTES
OR called and stated that the patient will be picked up at 1700.     Report called with no further questions.

## 2024-05-23 NOTE — CONSULTS
Medications:  Current Facility-Administered Medications   Medication Dose Route Frequency Provider Last Rate Last Admin    sodium chloride flush 0.9 % injection 5-40 mL  5-40 mL IntraVENous 2 times per day Jennifer Marrero MD        sodium chloride flush 0.9 % injection 5-40 mL  5-40 mL IntraVENous PRN Jennifer Marrero MD        0.9 % sodium chloride infusion   IntraVENous PRN Jennifer Marrero MD        ondansetron (ZOFRAN-ODT) disintegrating tablet 4 mg  4 mg Oral Q8H PRN Jennifer Marrero MD        Or    ondansetron (ZOFRAN) injection 4 mg  4 mg IntraVENous Q6H PRN Jennifer Marrero MD        polyethylene glycol (GLYCOLAX) packet 17 g  17 g Oral Daily PRN Jennifer Marrero MD        [START ON 5/24/2024] enoxaparin (LOVENOX) injection 40 mg  40 mg SubCUTAneous Daily Jennifer Marrero MD        glucose chewable tablet 16 g  4 tablet Oral PRN Jennifer Marrero MD        dextrose bolus 10% 125 mL  125 mL IntraVENous PRN Jennifer Marrero MD        Or    dextrose bolus 10% 250 mL  250 mL IntraVENous PRN Jennifer Marrero MD        glucagon injection 1 mg  1 mg SubCUTAneous PRN Jennifer Marrero MD        dextrose 10 % infusion   IntraVENous Continuous PRN Jennifer Marrero MD        insulin lispro (HUMALOG,ADMELOG) injection vial 0-8 Units  0-8 Units SubCUTAneous Q4H Jennifer Marrero MD        metoprolol tartrate (LOPRESSOR) tablet 25 mg  25 mg Oral BID Jennifer Marrero MD        rOPINIRole (REQUIP) tablet 4 mg  4 mg Oral BID Jennifer Marrero MD        [START ON 5/24/2024] clopidogrel (PLAVIX) tablet 75 mg  75 mg Oral Daily Jennifer Marrero MD        [START ON 5/24/2024] cloNIDine (CATAPRES) tablet 0.1 mg  0.1 mg Oral Daily Jennifer Marrero MD        insulin glargine (LANTUS) injection vial 15 Units  15 Units SubCUTAneous Nightly Jennifer Marrero MD        hydrALAZINE (APRESOLINE) injection 10 mg  10 mg IntraVENous Q6H PRN Jennifer Marrero MD        insulin lispro (HUMALOG,ADMELOG) injection vial 0-8 Units  0-8 Units SubCUTAneous Once David Haines MD            Review of Systems     Negative Unless BOLDED     General: fevers, chills, myalgias, arthralgias,  Recent Labs     05/23/24  1550   WBC 11.3   RBC 4.29   HGB 14.2   HCT 41.8               No results found for: \"SDES\" No components found for: \"CULT\"         Imaging:   All imaging reviewed from Admission to present as per radiology interpretation in [facility]    Radiology report last 24 hours:    Echo (TTE) complete (PRN contrast/bubble/strain/3D)    Result Date: 5/24/2024    Contrast used: Definity.    Technically difficult study due to patient's body habitus and breast implants.   Left Ventricle: Normal left ventricular systolic function with a visually estimated EF of 55 - 60%. Left ventricle size is normal. Normal wall thickness. Left Ventricle: Normal wall motion. Left Ventricle: Grade I diastolic dysfunction present with normal LV EF.   Aortic Valve: Mild stenosis of the aortic valve. AV mean gradient is 14 mmHg. AV peak gradient is 30 mmHg. AV peak velocity is 2.7 m/s. AV area by continuity VTI is 1.4 cm2.   Mitral Valve: Moderately calcified leaflet. Mild annular calcification at the posterior leaflet of the mitral valve. No stenosis noted.   IVC/SVC : IVC diameter is greater than 21 mm and decreases greater than 50% during inspiration; therefore the estimated right atrial pressure is intermediate (~8 mmHg). IVC is dilated.      Ijeoma Krishna MD  [facility]  Office #:     [phone]  [phone] Option #[phone]   Office Fax: [phone]

## 2024-05-23 NOTE — ANESTHESIA PRE PROCEDURE
Department of Anesthesiology  Preprocedure Note       Name:  Angelica Duncan   Age:  73 y.o.  :  1950                                          MRN:  [id number]         Date:  2024      Surgeon: Surgeon(s):  Willie Lance DPM    Procedure: Procedure(s):  RIGHT FOOT DEBRIDEMENT INCISION AND DRAINAGE WITH CULTURES    Medications prior to admission:   Prior to Admission medications    Medication Sig Start Date End Date Taking? Authorizing Provider   warfarin (COUMADIN) 2 MG tablet Take 1 tablet by mouth daily    Mina Pierre MD   warfarin (COUMADIN) 5 MG tablet Take 1 tablet by mouth daily    Mina Pierre MD   LORazepam (ATIVAN) 1 MG tablet Take 1 tablet by mouth in the morning and at bedtime. Takes 1 in am and 2 in pm    Mina Pierre MD   ondansetron (ZOFRAN) 8 MG tablet Take 1 tablet by mouth every 8 hours as needed for Nausea or Vomiting    Mina Pierre MD   insulin glargine (LANTUS) 100 UNIT/ML injection vial Inject 15 Units into the skin daily    Mina Pierre MD   BIOTIN PO Take 5,000 mcg by mouth daily    Automatic Reconciliation, Ar   albuterol sulfate HFA (PROVENTIL;VENTOLIN;PROAIR) 108 (90 Base) MCG/ACT inhaler Inhale 2 puffs into the lungs every 4 hours as needed for Wheezing or Shortness of Breath    Automatic Reconciliation, Ar   Cetirizine HCl 10 MG CAPS Take 10 mg by mouth daily    Automatic Reconciliation, Ar   cloNIDine (CATAPRES) 0.1 MG tablet Take 1 tablet by mouth daily    Automatic Reconciliation, Ar   clopidogrel (PLAVIX) 75 MG tablet Take 1 tablet by mouth daily    Automatic Reconciliation, Ar   cyanocobalamin 1000 MCG tablet Take 1 tablet by mouth daily    Automatic Reconciliation, Ar   doxycycline monohydrate (MONODOX) 100 MG capsule Take 1 capsule by mouth in the morning and 1 capsule in the evening. 21   Automatic Reconciliation, Ar   dronabinol (MARINOL) 2.5 MG capsule Take 1 capsule by mouth 3 times daily.  Patient not taking:

## 2024-05-23 NOTE — ED PROVIDER NOTES
[facility] EMERGENCY DEPT  EMERGENCY DEPARTMENT ENCOUNTER       Pt Name: Angelica Duncan  MRN: [id number]  Birthdate 1950  Date of evaluation: 5/23/2024  PCP: Ora Ramirez PA  Note Started: 5:15 PM 5/23/24     CHIEF COMPLAINT       Chief Complaint   Patient presents with    Wound Infection    Post-op Problem     Pt was sent from her Doctor's office for a R foot infection s/p surgery on Friday, where she states \"they cut it open, scraped out the bone, and put a skin graft on it\". Pt reports purulent drainage to surgical site and her Surgeon states she would need to go back to surgery.        HISTORY OF PRESENT ILLNESS: 1 or more elements      History From: Patient  HPI Limitations: None  Chronic Conditions: Anxiety, anemia, poorly controlled type 2 diabetes, PAD, lupus, CAD, HTN  Social Determinants affecting Dx or Tx: None       Angelica Duncan is a 73 y.o. female with medical history significant for anemia, poorly controlled type 2 diabetes, PAD, CAD, HTN, lupus, diabetic ulcer who presents to ED c/o postop infection.  Patient has partially amputated right foot, chronic ulcer, surgery 6 days ago with Dr. Lance, \"they shaved off part of my bone into the skin graft.\"  During clinic follow-up today wound was found to be infected.  Patient does note increase in pain over the last few days, significant increased this morning.  Patient denies measured fever or chills, does report mild headache and bodyaches, states her nausea and vomiting are at baseline.     Nursing Notes were all reviewed and agreed with or any disagreements were addressed in the HPI.    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

## 2024-05-23 NOTE — PROGRESS NOTES
Trop mild elevated will see the trend send pt to tele  echo, discussed with Dr. Lance-maybe bedside I&D

## 2024-05-23 NOTE — PROGRESS NOTES
Podiatry:    Patient is status post right foot surgery with I&D and debridement of surgical wound dehiscence.  Patient had surgery 6 days ago for her right foot and presented to my office today for postoperative visit.  Unfortunately her foot was infected and she required admittance and surgical correction.  Intraoperative deep cultures were taken for infectious disease reference today.  Her wound was packed open with quarter inch iodoform gauze.  Patient should start IV antibiotics today as per infectious disease Dr. Krishna.  Patient is partial weightbearing right foot with bathroom privileges only.  Wound care nurse to start treatments right foot wound tomorrow and apply wound VAC when possible right foot.  I will consider taking this patient back to the OR in 4 to 6 days if the infection has resolved, for secondary wound closure complicated and debridement and possible application of STRAVIX graft.    It should be noted that during her original surgery 6 days ago, intraoperative cultures were taking of her chronic ulcer and all bone that was removed was sent to pathology for analysis and micro for cultures to rule out osteomyelitis of the cuboid bone.  Infectious disease may want to refer to these recent right foot cultures (5-17-24) for preliminary bacterial growth until today's I&D cultures are permanent.    See consult for details...

## 2024-05-23 NOTE — CONSULTS
Patient is gone to OR-- for I and D-- infected foot/purulent drainage    Post op 5/17/24-- OR culture with PanSS Proteus mirablis. Anaerobic culture negative    Uncontrolled diabetes mellitus    Plan:  Start Aztreonam and IV Clindamycin  Full exam in am    Will FU OR cultures and review allergy with patient as well    If underlying PAD, consider adding arterial PVL    Thanks    Ijeoma Krishna MD  [facility])  Office:  -[address]  Office fax:  [phone]

## 2024-05-23 NOTE — OP NOTE
Operative Note      Patient: Angelica Duncan  YOB: 1950  MRN: [id number]    Date of Procedure: 5/23/2024    Pre-Op Diagnosis Codes:     * Right foot infection [L08.9]    Post-Op Diagnosis:  Right foot postoperative infection with surgical wound dehiscence and deep abscess.       Procedure(s):  RIGHT FOOT DEBRIDEMENT INCISION AND DRAINAGE WITH CULTURES    Surgeon(s):  Willie Lance DPM    Assistant:   * No surgical staff found *    Anesthesia: Monitor Anesthesia Care    Estimated Blood Loss (mL): Minimal    Complications: None    Specimens:   ID Type Source Tests Collected by Time Destination   1 : RIGHT FOOT CULTURE Swab Foot CULTURE, ANAEROBIC, CULTURE, FUNGUS, CULTURE, WOUND, CULTURE WITH SMEAR, ACID FAST BACILLIUS Willie Lance DPM 5/23/2024 1905        Implants:  * No implants in log *      Drains: * No LDAs found *    Findings:  Infection Present At Time Of Surgery (PATOS) (choose all levels that have infection present):  - Deep Infection (muscle/fascia) present as evidenced by abscess, pus, purulent fluid, and fluid consistent with infection  Other Findings: Right foot postoperative wound infection with dehiscence and deep abscess and purulent pus discharge with severe pain.    Detailed Description of Procedure:   Patient is a 73-year-old female diabetic who had right foot surgery as outpatient done 6 days ago that presented to my office today with severe right foot postoperative wound dehiscence deep abscess and pain.  Patient was admitted through the ED today for IV antibiotic and medical treatment and required surgical I&D with debridement and cultures.  Patient was brought to the operating room placed on the operating table in supine position.  Patient's right foot was then anesthetized using 7 mL of 0.5% Marcaine with epinephrine.  The patient's right foot was then prepped and draped in usual sterile manner.  Attention was then drawn to the plantar aspect of the patient's right midfoot

## 2024-05-23 NOTE — PERIOP NOTE
TRANSFER - IN REPORT:    Verbal report received from ALEXIS Braswell on Angelica Duncan  being received from ED for ordered procedure      Report consisted of patient's Situation, Background, Assessment and   Recommendations(SBAR).     Information from the following report(s) Nurse Handoff Report, Intake/Output, and MAR was reviewed with the receiving nurse.    Opportunity for questions and clarification was provided.      Assessment completed upon patient's arrival to unit and care assumed.

## 2024-05-23 NOTE — ED NOTES
Patient taken to inpatient floor 315.     Report called with no further questions.     Outstanding orders reviewed and discussed with the receiving nurse (if applicable).     Will remove from ED trackboard.

## 2024-05-23 NOTE — BRIEF OP NOTE
Brief Postoperative Note      Patient: Angelica Duncan  YOB: 1950  MRN: [id number]    Date of Procedure: 5/23/2024    Pre-Op Diagnosis Codes:     * Right foot infection [L08.9]    Post-Op Diagnosis:  Right foot postoperative infection with dehiscence and deep abscess.       Procedure(s):  RIGHT FOOT DEBRIDEMENT INCISION AND DRAINAGE WITH CULTURES    Surgeon(s):  Willie Lance DPM    Assistant:  * No surgical staff found *    Anesthesia: Monitor Anesthesia Care    Estimated Blood Loss (mL): Minimal    Complications: None    Specimens:   ID Type Source Tests Collected by Time Destination   1 : RIGHT FOOT CULTURE Swab Foot CULTURE, ANAEROBIC, CULTURE, FUNGUS, CULTURE, WOUND, CULTURE WITH SMEAR, ACID FAST BACILLIUS Willie Lance DPM 5/23/2024 1905        Implants:  * No implants in log *      Drains: * No LDAs found *    Findings:  Infection Present At Time Of Surgery (PATOS) (choose all levels that have infection present):  - Deep Infection (muscle/fascia) present as evidenced by abscess, pus, purulent fluid, and fluid consistent with infection  Other Findings: Right foot postoperative wound infection with dehiscence and deep abscess and purulent pus discharge with severe pain.    Electronically signed by Willie Lance DPM on 5/23/2024 at 7:49 PM

## 2024-05-23 NOTE — ED NOTES
Patient going to the OR at this time.     Attempted to call report to the floor regarding this patient. No one answered the phone. Will attempt to call again in 10 minutes.

## 2024-05-23 NOTE — PERIOP NOTE
Dr. Haines and Dr. Lance aware of pts critical troponin 133. Nurse to redraw another stat Troponin.

## 2024-05-24 ENCOUNTER — APPOINTMENT (OUTPATIENT)
Facility: HOSPITAL | Age: 74
DRG: 857 | End: 2024-05-24
Attending: INTERNAL MEDICINE
Payer: MEDICARE

## 2024-05-24 ENCOUNTER — APPOINTMENT (OUTPATIENT)
Facility: HOSPITAL | Age: 74
DRG: 857 | End: 2024-05-24
Attending: HOSPITALIST
Payer: MEDICARE

## 2024-05-24 LAB
ALBUMIN SERPL-MCNC: 3 G/DL (ref 3.4–5)
ALBUMIN/GLOB SERPL: 0.8 (ref 0.8–1.7)
ALP SERPL-CCNC: 147 U/L (ref 45–117)
ALT SERPL-CCNC: 29 U/L (ref 13–56)
ANION GAP SERPL CALC-SCNC: 9 MMOL/L (ref 3–18)
AST SERPL-CCNC: 26 U/L (ref 10–38)
BASOPHILS # BLD: 0.1 K/UL (ref 0–0.1)
BASOPHILS NFR BLD: 1 % (ref 0–2)
BILIRUB SERPL-MCNC: 0.3 MG/DL (ref 0.2–1)
BUN SERPL-MCNC: 15 MG/DL (ref 7–18)
BUN/CREAT SERPL: 19 (ref 12–20)
CALCIUM SERPL-MCNC: 8.8 MG/DL (ref 8.5–10.1)
CHLORIDE SERPL-SCNC: 100 MMOL/L (ref 100–111)
CO2 SERPL-SCNC: 27 MMOL/L (ref 21–32)
CREAT SERPL-MCNC: 0.77 MG/DL (ref 0.6–1.3)
DIFFERENTIAL METHOD BLD: ABNORMAL
ECHO AV AREA PEAK VELOCITY: 1 CM2
ECHO AV AREA VTI: 1.4 CM2
ECHO AV AREA/BSA PEAK VELOCITY: 0.6 CM2/M2
ECHO AV AREA/BSA VTI: 0.8 CM2/M2
ECHO AV MEAN GRADIENT: 14 MMHG
ECHO AV MEAN VELOCITY: 1.7 M/S
ECHO AV PEAK GRADIENT: 30 MMHG
ECHO AV PEAK VELOCITY: 2.7 M/S
ECHO AV VELOCITY RATIO: 0.33
ECHO AV VTI: 56.2 CM
ECHO BSA: 1.68 M2
ECHO BSA: 1.68 M2
ECHO IVC PROX: 2.6 CM
ECHO LV E' LATERAL VELOCITY: 7 CM/S
ECHO LV E' SEPTAL VELOCITY: 6 CM/S
ECHO LV EDV A4C: 42 ML
ECHO LV EDV INDEX A4C: 25 ML/M2
ECHO LV EJECTION FRACTION A4C: 68 %
ECHO LV ESV A4C: 13 ML
ECHO LV ESV INDEX A4C: 8 ML/M2
ECHO LV FRACTIONAL SHORTENING: 27 % (ref 28–44)
ECHO LV INTERNAL DIMENSION DIASTOLE INDEX: 2.63 CM/M2
ECHO LV INTERNAL DIMENSION DIASTOLIC: 4.4 CM (ref 3.9–5.3)
ECHO LV INTERNAL DIMENSION SYSTOLIC INDEX: 1.92 CM/M2
ECHO LV INTERNAL DIMENSION SYSTOLIC: 3.2 CM
ECHO LV IVSD: 0.9 CM (ref 0.6–0.9)
ECHO LV MASS 2D: 128 G (ref 67–162)
ECHO LV MASS INDEX 2D: 76.7 G/M2 (ref 43–95)
ECHO LV POSTERIOR WALL DIASTOLIC: 0.9 CM (ref 0.6–0.9)
ECHO LV RELATIVE WALL THICKNESS RATIO: 0.41
ECHO LVOT AREA: 3.1 CM2
ECHO LVOT AV VTI INDEX: 0.42
ECHO LVOT DIAM: 2 CM
ECHO LVOT MEAN GRADIENT: 1 MMHG
ECHO LVOT PEAK GRADIENT: 3 MMHG
ECHO LVOT PEAK VELOCITY: 0.9 M/S
ECHO LVOT STROKE VOLUME INDEX: 44.2 ML/M2
ECHO LVOT SV: 73.8 ML
ECHO LVOT VTI: 23.5 CM
ECHO MV A VELOCITY: 1.07 M/S
ECHO MV AREA VTI: 2.1 CM2
ECHO MV E DECELERATION TIME (DT): 305 MS
ECHO MV E VELOCITY: 0.77 M/S
ECHO MV E/A RATIO: 0.72
ECHO MV E/E' LATERAL: 11
ECHO MV E/E' RATIO (AVERAGED): 11.92
ECHO MV E/E' SEPTAL: 12.83
ECHO MV LVOT VTI INDEX: 1.52
ECHO MV MAX VELOCITY: 1.1 M/S
ECHO MV MEAN GRADIENT: 2 MMHG
ECHO MV MEAN VELOCITY: 0.6 M/S
ECHO MV PEAK GRADIENT: 5 MMHG
ECHO MV VTI: 35.8 CM
ECHO PULMONARY ARTERY END DIASTOLIC PRESSURE: 5 MMHG
ECHO PV REGURGITANT MAX VELOCITY: 1.1 M/S
ECHO RV TAPSE: 1.9 CM (ref 1.7–?)
EKG ATRIAL RATE: 92 BPM
EKG DIAGNOSIS: NORMAL
EKG P AXIS: 15 DEGREES
EKG P-R INTERVAL: 140 MS
EKG Q-T INTERVAL: 350 MS
EKG QRS DURATION: 74 MS
EKG QTC CALCULATION (BAZETT): 432 MS
EKG R AXIS: 34 DEGREES
EKG T AXIS: 55 DEGREES
EKG VENTRICULAR RATE: 92 BPM
EOSINOPHIL # BLD: 0.1 K/UL (ref 0–0.4)
EOSINOPHIL NFR BLD: 1 % (ref 0–5)
ERYTHROCYTE [DISTWIDTH] IN BLOOD BY AUTOMATED COUNT: 14.4 % (ref 11.6–14.5)
GLOBULIN SER CALC-MCNC: 3.7 G/DL (ref 2–4)
GLUCOSE BLD STRIP.AUTO-MCNC: 123 MG/DL (ref 70–110)
GLUCOSE BLD STRIP.AUTO-MCNC: 172 MG/DL (ref 70–110)
GLUCOSE BLD STRIP.AUTO-MCNC: 172 MG/DL (ref 70–110)
GLUCOSE BLD STRIP.AUTO-MCNC: 199 MG/DL (ref 70–110)
GLUCOSE BLD STRIP.AUTO-MCNC: 283 MG/DL (ref 70–110)
GLUCOSE SERPL-MCNC: 172 MG/DL (ref 74–99)
HCT VFR BLD AUTO: 33.8 % (ref 35–45)
HCT VFR BLD AUTO: 38.5 % (ref 35–45)
HGB BLD-MCNC: 11.8 G/DL (ref 12–16)
HGB BLD-MCNC: 12.9 G/DL (ref 12–16)
IMM GRANULOCYTES # BLD AUTO: 0.1 K/UL (ref 0–0.04)
IMM GRANULOCYTES NFR BLD AUTO: 1 % (ref 0–0.5)
INR PPP: 1.4 (ref 0.9–1.1)
LYMPHOCYTES # BLD: 1.8 K/UL (ref 0.9–3.6)
LYMPHOCYTES NFR BLD: 14 % (ref 21–52)
MAGNESIUM SERPL-MCNC: 1.9 MG/DL (ref 1.6–2.6)
MCH RBC QN AUTO: 33.8 PG (ref 24–34)
MCHC RBC AUTO-ENTMCNC: 33.5 G/DL (ref 31–37)
MCV RBC AUTO: 100.8 FL (ref 78–100)
MONOCYTES # BLD: 1.2 K/UL (ref 0.05–1.2)
MONOCYTES NFR BLD: 9 % (ref 3–10)
NEUTS SEG # BLD: 9.9 K/UL (ref 1.8–8)
NEUTS SEG NFR BLD: 75 % (ref 40–73)
NRBC # BLD: 0 K/UL (ref 0–0.01)
NRBC BLD-RTO: 0 PER 100 WBC
PLATELET # BLD AUTO: 317 K/UL (ref 135–420)
PMV BLD AUTO: 9.3 FL (ref 9.2–11.8)
POTASSIUM SERPL-SCNC: 3.9 MMOL/L (ref 3.5–5.5)
PROT SERPL-MCNC: 6.7 G/DL (ref 6.4–8.2)
PROTHROMBIN TIME: 17.6 SEC (ref 11.9–14.7)
RBC # BLD AUTO: 3.82 M/UL (ref 4.2–5.3)
SODIUM SERPL-SCNC: 136 MMOL/L (ref 136–145)
TROPONIN I SERPL HS-MCNC: 181 NG/L (ref 0–54)
TROPONIN I SERPL HS-MCNC: 195 NG/L (ref 0–54)
TROPONIN I SERPL HS-MCNC: 266 NG/L (ref 0–54)
VAS RIGHT ATA MID PSV: 109.5 CM/S
VAS RIGHT CFA DIST PSV: 100.9 CM/S
VAS RIGHT PFA PROX PSV: 185 CM/S
VAS RIGHT POP A DIST PSV: 95.4 CM/S
VAS RIGHT POP A PROX PSV: 73.5 CM/S
VAS RIGHT POP A PROX VEL RATIO: 0.74
VAS RIGHT PTA PROX PSV: 64.4 CM/S
VAS RIGHT SFA DIST PSV: 99.1 CM/S
VAS RIGHT SFA DIST VEL RATIO: 1.41
VAS RIGHT SFA MID PSV: 70.3 CM/S
VAS RIGHT SFA MID VEL RATIO: 0.7
VAS RIGHT SFA PROX PSV: 99.3 CM/S
VAS RIGHT SFA PROX VEL RATIO: 1
WBC # BLD AUTO: 13.3 K/UL (ref 4.6–13.2)

## 2024-05-24 PROCEDURE — 97605 NEG PRS WND THER DME<=50SQCM: CPT

## 2024-05-24 PROCEDURE — 2580000003 HC RX 258: Performed by: PODIATRIST

## 2024-05-24 PROCEDURE — 2580000003 HC RX 258: Performed by: INTERNAL MEDICINE

## 2024-05-24 PROCEDURE — 97116 GAIT TRAINING THERAPY: CPT

## 2024-05-24 PROCEDURE — 6360000002 HC RX W HCPCS: Performed by: INTERNAL MEDICINE

## 2024-05-24 PROCEDURE — 85018 HEMOGLOBIN: CPT

## 2024-05-24 PROCEDURE — 97165 OT EVAL LOW COMPLEX 30 MIN: CPT

## 2024-05-24 PROCEDURE — 84484 ASSAY OF TROPONIN QUANT: CPT

## 2024-05-24 PROCEDURE — 93926 LOWER EXTREMITY STUDY: CPT

## 2024-05-24 PROCEDURE — 6360000004 HC RX CONTRAST MEDICATION: Performed by: HOSPITALIST

## 2024-05-24 PROCEDURE — 2580000003 HC RX 258: Performed by: HOSPITALIST

## 2024-05-24 PROCEDURE — 1100000003 HC PRIVATE W/ TELEMETRY

## 2024-05-24 PROCEDURE — 36415 COLL VENOUS BLD VENIPUNCTURE: CPT

## 2024-05-24 PROCEDURE — 82962 GLUCOSE BLOOD TEST: CPT

## 2024-05-24 PROCEDURE — 97162 PT EVAL MOD COMPLEX 30 MIN: CPT

## 2024-05-24 PROCEDURE — 97535 SELF CARE MNGMENT TRAINING: CPT

## 2024-05-24 PROCEDURE — 85014 HEMATOCRIT: CPT

## 2024-05-24 PROCEDURE — 6370000000 HC RX 637 (ALT 250 FOR IP): Performed by: HOSPITALIST

## 2024-05-24 PROCEDURE — 83735 ASSAY OF MAGNESIUM: CPT

## 2024-05-24 PROCEDURE — 85610 PROTHROMBIN TIME: CPT

## 2024-05-24 PROCEDURE — 6360000002 HC RX W HCPCS: Performed by: HOSPITALIST

## 2024-05-24 PROCEDURE — C8929 TTE W OR WO FOL WCON,DOPPLER: HCPCS

## 2024-05-24 PROCEDURE — 85025 COMPLETE CBC W/AUTO DIFF WBC: CPT

## 2024-05-24 PROCEDURE — 80053 COMPREHEN METABOLIC PANEL: CPT

## 2024-05-24 PROCEDURE — 6370000000 HC RX 637 (ALT 250 FOR IP): Performed by: FAMILY MEDICINE

## 2024-05-24 RX ORDER — TRAMADOL HYDROCHLORIDE 50 MG/1
50 TABLET ORAL EVERY 6 HOURS PRN
Status: DISCONTINUED | OUTPATIENT
Start: 2024-05-24 | End: 2024-05-27

## 2024-05-24 RX ORDER — INSULIN LISPRO 100 [IU]/ML
0-4 INJECTION, SOLUTION INTRAVENOUS; SUBCUTANEOUS NIGHTLY
Status: DISCONTINUED | OUTPATIENT
Start: 2024-05-24 | End: 2024-05-30 | Stop reason: HOSPADM

## 2024-05-24 RX ORDER — METRONIDAZOLE 500 MG/100ML
500 INJECTION, SOLUTION INTRAVENOUS EVERY 8 HOURS
Status: DISCONTINUED | OUTPATIENT
Start: 2024-05-24 | End: 2024-05-27

## 2024-05-24 RX ORDER — INSULIN LISPRO 100 [IU]/ML
0-8 INJECTION, SOLUTION INTRAVENOUS; SUBCUTANEOUS
Status: DISCONTINUED | OUTPATIENT
Start: 2024-05-24 | End: 2024-05-30 | Stop reason: HOSPADM

## 2024-05-24 RX ORDER — ENOXAPARIN SODIUM 100 MG/ML
1 INJECTION SUBCUTANEOUS 2 TIMES DAILY
Status: DISCONTINUED | OUTPATIENT
Start: 2024-05-24 | End: 2024-05-30 | Stop reason: HOSPADM

## 2024-05-24 RX ADMIN — CLONIDINE HYDROCHLORIDE 0.1 MG: 0.1 TABLET ORAL at 08:18

## 2024-05-24 RX ADMIN — CLINDAMYCIN PHOSPHATE 600 MG: 600 INJECTION, SOLUTION INTRAVENOUS at 05:45

## 2024-05-24 RX ADMIN — CLINDAMYCIN PHOSPHATE 600 MG: 600 INJECTION, SOLUTION INTRAVENOUS at 12:48

## 2024-05-24 RX ADMIN — AZTREONAM 1000 MG: 1 INJECTION, POWDER, LYOPHILIZED, FOR SOLUTION INTRAMUSCULAR; INTRAVENOUS at 16:11

## 2024-05-24 RX ADMIN — TRAMADOL HYDROCHLORIDE 50 MG: 50 TABLET ORAL at 08:18

## 2024-05-24 RX ADMIN — TRAMADOL HYDROCHLORIDE 50 MG: 50 TABLET ORAL at 00:40

## 2024-05-24 RX ADMIN — TRAMADOL HYDROCHLORIDE 50 MG: 50 TABLET ORAL at 23:30

## 2024-05-24 RX ADMIN — ROPINIROLE HYDROCHLORIDE 4 MG: 1 TABLET, FILM COATED ORAL at 22:07

## 2024-05-24 RX ADMIN — SODIUM CHLORIDE, PRESERVATIVE FREE 10 ML: 5 INJECTION INTRAVENOUS at 22:08

## 2024-05-24 RX ADMIN — AZTREONAM 1000 MG: 1 INJECTION, POWDER, LYOPHILIZED, FOR SOLUTION INTRAMUSCULAR; INTRAVENOUS at 23:05

## 2024-05-24 RX ADMIN — CLOPIDOGREL BISULFATE 75 MG: 75 TABLET ORAL at 08:18

## 2024-05-24 RX ADMIN — CLINDAMYCIN PHOSPHATE 600 MG: 600 INJECTION, SOLUTION INTRAVENOUS at 18:30

## 2024-05-24 RX ADMIN — METOPROLOL TARTRATE 25 MG: 25 TABLET, FILM COATED ORAL at 21:59

## 2024-05-24 RX ADMIN — SODIUM CHLORIDE, SODIUM LACTATE, POTASSIUM CHLORIDE, AND CALCIUM CHLORIDE: 600; 310; 30; 20 INJECTION, SOLUTION INTRAVENOUS at 02:17

## 2024-05-24 RX ADMIN — SODIUM CHLORIDE, PRESERVATIVE FREE 10 ML: 5 INJECTION INTRAVENOUS at 08:11

## 2024-05-24 RX ADMIN — ENOXAPARIN SODIUM 40 MG: 100 INJECTION SUBCUTANEOUS at 08:17

## 2024-05-24 RX ADMIN — METOPROLOL TARTRATE 25 MG: 25 TABLET, FILM COATED ORAL at 12:48

## 2024-05-24 RX ADMIN — INSULIN GLARGINE 15 UNITS: 100 INJECTION, SOLUTION SUBCUTANEOUS at 22:07

## 2024-05-24 RX ADMIN — ONDANSETRON 4 MG: 2 INJECTION INTRAMUSCULAR; INTRAVENOUS at 07:56

## 2024-05-24 RX ADMIN — ONDANSETRON 4 MG: 2 INJECTION INTRAMUSCULAR; INTRAVENOUS at 02:24

## 2024-05-24 RX ADMIN — AZTREONAM 1000 MG: 1 INJECTION, POWDER, LYOPHILIZED, FOR SOLUTION INTRAMUSCULAR; INTRAVENOUS at 05:46

## 2024-05-24 RX ADMIN — DAPTOMYCIN 345 MG: 500 INJECTION, POWDER, LYOPHILIZED, FOR SOLUTION INTRAVENOUS at 23:05

## 2024-05-24 RX ADMIN — ENOXAPARIN SODIUM 60 MG: 100 INJECTION SUBCUTANEOUS at 22:08

## 2024-05-24 RX ADMIN — ROPINIROLE HYDROCHLORIDE 4 MG: 1 TABLET, FILM COATED ORAL at 08:19

## 2024-05-24 RX ADMIN — TRAMADOL HYDROCHLORIDE 50 MG: 50 TABLET ORAL at 18:29

## 2024-05-24 RX ADMIN — PERFLUTREN 1.5 ML: 6.52 INJECTION, SUSPENSION INTRAVENOUS at 09:54

## 2024-05-24 RX ADMIN — METRONIDAZOLE 500 MG: 500 INJECTION, SOLUTION INTRAVENOUS at 22:08

## 2024-05-24 ASSESSMENT — PAIN - FUNCTIONAL ASSESSMENT
PAIN_FUNCTIONAL_ASSESSMENT: ACTIVITIES ARE NOT PREVENTED
PAIN_FUNCTIONAL_ASSESSMENT: PREVENTS OR INTERFERES SOME ACTIVE ACTIVITIES AND ADLS
PAIN_FUNCTIONAL_ASSESSMENT: PREVENTS OR INTERFERES SOME ACTIVE ACTIVITIES AND ADLS

## 2024-05-24 ASSESSMENT — PAIN DESCRIPTION - ORIENTATION
ORIENTATION: RIGHT

## 2024-05-24 ASSESSMENT — PAIN SCALES - GENERAL
PAINLEVEL_OUTOF10: 7
PAINLEVEL_OUTOF10: 8
PAINLEVEL_OUTOF10: 7
PAINLEVEL_OUTOF10: 0
PAINLEVEL_OUTOF10: 0
PAINLEVEL_OUTOF10: 6
PAINLEVEL_OUTOF10: 8
PAINLEVEL_OUTOF10: 9
PAINLEVEL_OUTOF10: 5

## 2024-05-24 ASSESSMENT — PAIN DESCRIPTION - PAIN TYPE
TYPE: SURGICAL PAIN
TYPE: SURGICAL PAIN

## 2024-05-24 ASSESSMENT — PAIN DESCRIPTION - LOCATION
LOCATION: FOOT

## 2024-05-24 ASSESSMENT — PAIN DESCRIPTION - DESCRIPTORS
DESCRIPTORS: ACHING
DESCRIPTORS: SHARP
DESCRIPTORS: ACHING;BURNING;PRESSURE
DESCRIPTORS: ACHING
DESCRIPTORS: BURNING;PRESSURE;ACHING

## 2024-05-24 ASSESSMENT — PAIN DESCRIPTION - ONSET: ONSET: PROGRESSIVE

## 2024-05-24 ASSESSMENT — PAIN DESCRIPTION - FREQUENCY
FREQUENCY: INTERMITTENT
FREQUENCY: CONTINUOUS

## 2024-05-24 ASSESSMENT — PAIN SCALES - WONG BAKER
WONGBAKER_NUMERICALRESPONSE: NO HURT
WONGBAKER_NUMERICALRESPONSE: NO HURT

## 2024-05-24 NOTE — PERIOP NOTE
TRANSFER - OUT REPORT:    Verbal report given to ALEXIS Hartley on Angelica Duncan  being transferred to  for routine post-op       Report consisted of patient's Situation, Background, Assessment and   Recommendations(SBAR).     Information from the following report(s) Nurse Handoff Report, Adult Overview, Surgery Report, Intake/Output, MAR, Recent Results, and Med Rec Status was reviewed with the receiving nurse.           Lines:   Peripheral IV 05/23/24 Right Antecubital (Active)   Site Assessment Clean, dry & intact 05/23/24 1925   Line Status Infusing 05/23/24 1925   Line Care Connections checked and tightened 05/23/24 1846   Phlebitis Assessment No symptoms 05/23/24 1925   Infiltration Assessment 0 05/23/24 1925   Alcohol Cap Used Yes 05/23/24 1734   Dressing Status Clean, dry & intact 05/23/24 1925   Dressing Type Transparent 05/23/24 1925        Opportunity for questions and clarification was provided.      Patient transported with:  Registered Nurse

## 2024-05-24 NOTE — CARE COORDINATION
05/24/24 0834   Service Assessment   Patient Orientation Alert and Oriented   Cognition Alert   History Provided By Patient   Primary Caregiver Self   Accompanied By/Relationship N/A   Support Systems Friends/Neighbors;Children   Patient's Healthcare Decision Maker is: Legal Next of Kin   PCP Verified by CM Yes   Last Visit to PCP Within last 6 months   Prior Functional Level Independent in ADLs/IADLs   Current Functional Level Independent in ADLs/IADLs   Can patient return to prior living arrangement Yes   Ability to make needs known: Good   Family able to assist with home care needs: Yes   Would you like for me to discuss the discharge plan with any other family members/significant others, and if so, who? No   Financial Resources Medicare   Community Resources None   Social/Functional History   Lives With Alone   Type of Home House   Bathroom Accessibility Accessible   Home Equipment Walker - Rolling  (Knee walker)   Receives Help From Family;Neighbor   ADL Assistance Independent   Homemaking Assistance Independent   Ambulation Assistance Independent   Transfer Assistance Independent   Active  Yes   Mode of Transportation Car   Occupation Retired   Discharge Planning   Type of Residence House   Living Arrangements Alone   Current Services Prior To Admission None   Potential Assistance Needed Home Care   DME Ordered? No   Potential Assistance Purchasing Medications No   Type of Home Care Services None   Patient expects to be discharged to: House   Services At/After Discharge   Transition of Care Consult (CM Consult) Discharge Planning   Services At/After Discharge None   [place] Resource Information Provided? No   Mode of Transport at Discharge Other (see comment)  (Family)   Confirm Follow Up Transport Self   Condition of Participation: Discharge Planning   The Plan for Transition of Care is related to the following treatment goals: The patient states \"I just really want to go home\"   The Patient and/or

## 2024-05-24 NOTE — PROGRESS NOTES
Physical Therapy Goals:  Initiated 5/24/2024 to be met within 7-10 days.  Short Term Goals  Short Term Goal 1: Patient will perform supine to/from sit with independence  Short Term Goal 2: Patient will perform sit to/from stand with supervision in prep for gait progression  Short Term Goal 3: Patient will ambulate 150 ft with LRAD and supervision to progress OOB mobility  Short Term Goal 4: Patient will negotiate 4 stairs with handrails and CGA  PHYSICAL THERAPY EVALUATION    Patient: Angelica Duncan (73 y.o. female)  Date: 5/24/2024  Diagnosis: Right foot infection [L08.9]  Postoperative infection, unspecified type, initial encounter [T81.40XA] Right foot infection  Procedure(s) (LRB):  RIGHT FOOT DEBRIDEMENT INCISION AND DRAINAGE WITH CULTURES (Right) 1 Day Post-Op  Precautions: Fall Risk, Weight Bearing, Right Lower Extremity Weight Bearing: Partial Weight Bearing (with bathroom priviledges)  PLOF: Ambulation with scooter    ASSESSMENT :  Patient received supine in bed. Pt presents with decreased LE strength, decreased endurance, decreased gait quailty, decreased bed mobility and transfers , and impaired balance. Patient performed supine to sit with supervision. Patient perform sit to/from stand with stand by assist. Patient ambulated 15 ft using wheeled device as knee scooter and stand by assist. Pt ambulated 10 ft with RW and CG-Ramo. Although pt provided education regarding PWB status she continues to maintain NWB at all times. Reviewed elevation, HEP, and weight bearing restrictions. Patient appears below baseline level and would benefit from continued skilled PT to progress functional mobility.     DEFICITS/IMPAIRMENTS:   Body Structures, Functions, Activity Limitations Requiring Skilled Therapeutic Intervention: Decreased functional mobility ;Decreased strength;Decreased endurance;Decreased balance;Increased pain    Patient will benefit from skilled intervention to address the above impairments.  Patient's

## 2024-05-24 NOTE — ANESTHESIA POSTPROCEDURE EVALUATION
Department of Anesthesiology  Postprocedure Note    Patient: Angelica Duncan  MRN: [id number]  YOB: 1950  Date of evaluation: 5/23/2024    Procedure Summary       Date: 05/23/24 Room / Location: [facility] 01 / [facility] MAIN OR    Anesthesia Start: 1846 Anesthesia Stop: 1923    Procedure: RIGHT FOOT DEBRIDEMENT INCISION AND DRAINAGE WITH CULTURES (Right: Foot) Diagnosis:       Right foot infection      (Right foot infection [L08.9])    Surgeons: Willie Lance DPM Responsible Provider: Arturo Ray MD    Anesthesia Type: MAC ASA Status: 3            Anesthesia Type: MAC    Jolie Phase I: Jolie Score: 10    Jolie Phase II:      Anesthesia Post Evaluation    Patient location during evaluation: bedside  Patient participation: complete - patient participated  Level of consciousness: awake and alert  Pain score: 0  Airway patency: patent  Nausea & Vomiting: no nausea and no vomiting  Cardiovascular status: hemodynamically stable  Respiratory status: acceptable  Hydration status: stable  Pain management: satisfactory to patient    No notable events documented.

## 2024-05-24 NOTE — PLAN OF CARE
POC reviewed with pt and continued     Problem: Chronic Conditions and Co-morbidities  Goal: Patient's chronic conditions and co-morbidity symptoms are monitored and maintained or improved  Outcome: Progressing  Flowsheets (Taken 5/24/2024 0800)  Care Plan - Patient's Chronic Conditions and Co-Morbidity Symptoms are Monitored and Maintained or Improved:   Monitor and assess patient's chronic conditions and comorbid symptoms for stability, deterioration, or improvement   Collaborate with multidisciplinary team to address chronic and comorbid conditions and prevent exacerbation or deterioration   Update acute care plan with appropriate goals if chronic or comorbid symptoms are exacerbated and prevent overall improvement and discharge     Problem: Pain  Goal: Verbalizes/displays adequate comfort level or baseline comfort level  Outcome: Progressing  Flowsheets (Taken 5/24/2024 0818)  Verbalizes/displays adequate comfort level or baseline comfort level:   Encourage patient to monitor pain and request assistance   Assess pain using appropriate pain scale   Administer analgesics based on type and severity of pain and evaluate response   Implement non-pharmacological measures as appropriate and evaluate response   Notify Licensed Independent Practitioner if interventions unsuccessful or patient reports new pain     Problem: Safety - Adult  Goal: Free from fall injury  Outcome: Progressing  Flowsheets (Taken 5/24/2024 0800)  Free From Fall Injury: Instruct family/caregiver on patient safety

## 2024-05-24 NOTE — CONSULTS
IP WOUND CONSULT    Angelica Duncan  MEDICAL RECORD NUMBER:  [id number]  AGE: 73 y.o.   GENDER: female  : 1950  TODAY'S DATE:  2024    GENERAL     [] Follow-up   [x] New Consult      Angelica Duncan is a 73 y.o. 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    Lung collapse         Nausea & vomiting 2012    chronic    Neuropathy     right foot    Other ill-defined conditions(799.89)     (2 mutated genes) hyper coag. state     Pneumonia 2017    PONV (postoperative nausea and vomiting)     Positive cardiolipin antibodies     Psychiatric disorder     anxiety   no meds    PUD (peptic ulcer disease)     H/O requiring partial gastrectomy and small intestine    Raynaud's syndrome     Restless leg syndrome     Retention of urine     Skin cancer 2000    mouth    Thromboembolus (HCC)     RLE- arterial    Wears glasses     readers        PAST SURGICAL HISTORY    Past Surgical History:   Procedure Laterality Date    APPENDECTOMY      BREAST SURGERY Bilateral 2016    hemorrhaged after breast surgery    BREAST SURGERY      developed infection and implant removed    CHOLECYSTECTOMY      FOOT DEBRIDEMENT Right 2024    INCISION BONE CORTEX RIGHT FOOT AND SURGICAL WOUND PREP, STRAVIX GRAFT, RIGHT FOOT WITH C-ARM(SPEC POP) performed by Willie Lance DPM at [facility] MAIN OR

## 2024-05-24 NOTE — PROGRESS NOTES
Occupational Therapy Goals:  Initiated 5/24/2024 to be met within 7-10 days.  Short Term Goals  Time Frame for Short Term Goals: 7 days  Short Term Goal 1: Patient will complete toilet transfer with mod I  Short Term Goal 2: Patient will complete bathing with mod I  Short Term Goal 3: Patient will complete grooming  mod I  Short Term Goal 4: Patient will complete dressing with mod I  Short Term Goal 5: Patient will complete toileting with mod I    OCCUPATIONAL THERAPY EVALUATION    Patient: Angelica Duncan (73 y.o. female)  Date: 5/24/2024  Primary Diagnosis: Right foot infection [L08.9]  Postoperative infection, unspecified type, initial encounter [T81.40XA]  Procedure(s) (LRB):  RIGHT FOOT DEBRIDEMENT INCISION AND DRAINAGE WITH CULTURES (Right) 1 Day Post-Op   Precautions:  ,  ,  ,  ,  ,  ,  ,    PLOF: Patient completed ADLs at the modified independent level and functional mobility at the modified independent level with knee scooter. Patient mostly washes up at sink and stays on main level of the house, but scoots up the stairs on her bottom to the second story about 1x/week to use the walkin in shower with a cover for her foot.     ASSESSMENT :  Patient presented seated EOB with cardiac monitor, wearing bedroom shoe on L foot. Knee scooter present. Patient with no visitors present for entire session. Patient completed assessment of ADLs and BUE strength, ROM (see details below). Patient completed toileting contact guard assist and toilet transfer contact guard assist with use of knee scooter to bathroom doorway and then use of RW to hop into bathroom. Patient returned to bed, provided pillow to elevated leg. Due to deficits (listed below) patient has decreased independence with ADLs and functional mobility and would benefit from home health occupational therapy  ,      DEFICITS/IMPAIRMENTS:  Performance deficits / Impairments: Decreased functional mobility ;Decreased high-level IADLs;Decreased ADL status;Decreased     BREAST SURGERY      developed infection and implant removed    CHOLECYSTECTOMY      FOOT DEBRIDEMENT Right 5/17/2024    INCISION BONE CORTEX RIGHT FOOT AND SURGICAL WOUND PREP, STRAVIX GRAFT, RIGHT FOOT WITH C-ARM(SPEC POP) performed by Willie Lance DPM at [facility] MAIN OR    FOOT DEBRIDEMENT Right 5/23/2024    RIGHT FOOT DEBRIDEMENT INCISION AND DRAINAGE WITH CULTURES performed by Willie Lance DPM at [facility] MAIN OR    GASTRECTOMY      due to ulcers    GASTRIC BYPASS SURGERY  1996    gastric by-pass    GI      total gastrectomy, partial colectomy    GI      colostomy and reversal    HEENT      cancer of lip removed    HYSTERECTOMY (CERVIX STATUS UNKNOWN)      for cervical cancer    ORTHOPEDIC SURGERY      rt foot surgery x10    ORTHOPEDIC SURGERY Left     great toe gangrenous and amputated    OTHER SURGICAL HISTORY      part of liver removal    OTHER SURGICAL HISTORY      ventral hernia repair x10    OTHER SURGICAL HISTORY      Exc pilonial cystectomy    LA UNLISTED PROCEDURE ABDOMEN PERITONEUM & OMENTUM      duodenal ulcer rupture    LA UNLISTED PROCEDURE CARDIAC SURGERY  2018    open heart (single vessel) at [facility]    VASCULAR SURGERY  2022    iliac aorta stents x 6    VASCULAR SURGERY      seven more stents iliac aorta and femoral      Barriers to Learning/Limitations: physical  Compensate with: visual, verbal, tactile, kinesthetic cues/model    Home Situation:   Social/Functional History  Lives With: Alone  Type of Home: House  Home Layout: Two level, 1/2 bath on main level (able to sleep on main level,, goes to second floor 1x/week for shower)  Home Access: Stairs to enter with rails  Entrance Stairs - Number of Steps: 3  Bathroom Shower/Tub: Walk-in shower  Bathroom Toilet: Standard  Bathroom Equipment: Shower chair  Bathroom Accessibility: Accessible  Home Equipment: Walker - Rolling (knee scooter)  Receives Help From: Family, Neighbor  ADL Assistance: Independent  Homemaking Assistance: Independent  Ambulation

## 2024-05-24 NOTE — PLAN OF CARE
Problem: Chronic Conditions and Co-morbidities  Goal: Patient's chronic conditions and co-morbidity symptoms are monitored and maintained or improved  Outcome: Progressing     Problem: Pain  Goal: Verbalizes/displays adequate comfort level or baseline comfort level  Outcome: Progressing     Problem: Safety - Adult  Goal: Free from fall injury  Outcome: Progressing     Problem: Discharge Planning  Goal: Discharge to home or other facility with appropriate resources  Outcome: Progressing

## 2024-05-24 NOTE — CONSULTS
--   24 (!) 154/59 -- -- 89 22 99 % -- --   24 1930 (!) 145/62 -- -- 90 16 100 % -- --   24 1925 (!) 142/66 99 °F (37.2 °C) Axillary 88 23 100 % -- --   24 1728 132/69 99 °F (37.2 °C) Oral 93 16 99 % -- --   24 1645 (!) 153/72 -- -- 84 18 96 % -- --   24 1615 -- -- -- -- -- 95 % -- --   24 1600 (!) 135/55 -- -- -- -- 96 % -- --   24 1557 (!) 135/55 -- -- -- -- 97 % -- --   24 1554 (!) 144/60 -- -- 94 18 97 % -- --   24 1445 (!) 144/66 99 °F (37.2 °C) Oral (!) 112 18 95 % 1.626 m (5' 4\") 62.6 kg (138 lb)     Temp (24hrs), Av.2 °F (37.3 °C), Min:99 °F (37.2 °C), Max:99.6 °F (37.6 °C)      Physical Exam:      Vascular:  Dorsalis pedis pulses are 1/4 bilateral.  Posterior tibial pulses are 1/4 bilateral.  Capillary fill time is less greater than 3 seconds, left foot.  Edema is not observed bilateral lower extremities.  Varicosities are not observed bilateral lower extremities.   Derm:  Skin temperature of lower extremities warm to cool proximal to distal bilateral.  Inspection of skin shows positive negative digital hair with healthy skin bilateral.  Surgical incision plantar right midfoot 5 cm long with sutures intact and red pus exudate.  Deep abscess noted around the surgical site and malodor.  Positive erythema, positive pain on palpation.  Ortho:  Muscle strength 5/5 for all groups tested.  Muscle tone normal. Inspection/palpation of bones - joints- muscle shows - within normal limits on the bilateral lower extremities.  Patient is status post right foot TMA and left foot hallux amputation.  Neuro:  Light touch, sharp/dull, vibratory, and proprioception sensations all intact bilateral lower extremities.  Deep tendon reflexes intact bilaterally.    Lab Review:   Recent Results (from the past 24 hour(s))   CMP    Collection Time: 24  3:50 PM   Result Value Ref Range    Sodium 134 (L) 136 - 145 mmol/L    Potassium 3.9 3.5 - 5.5 mmol/L     Interval 350 ms    QTc Calculation (Bazett) 432 ms    P Axis 15 degrees    R Axis 34 degrees    T Axis 55 degrees    Diagnosis       Sinus rhythm with premature atrial complexes  Otherwise normal ECG  When compared with ECG of 07-MAY-2024 13:36,  premature atrial complexes are now present  T wave amplitude has decreased in Inferior leads     POCT Glucose    Collection Time: 05/23/24  5:31 PM   Result Value Ref Range    POC Glucose 272 (H) 70 - 110 mg/dL   Troponin    Collection Time: 05/23/24  6:20 PM   Result Value Ref Range    Troponin, High Sensitivity 126 (HH) 0 - 54 ng/L       Impression:   1.  Right foot surgical wound dehiscence with infection/deep abscess from surgery 6 days ago.  2.  Diabetic with ASO PVD, peripheral neuropathy and factor V [place].  3.    Recommendation:   1.  IV antibiotics as per infectious disease Dr. Krishna  2.  Patient is status post right foot surgery today with incision and drainage deep abscess, deep debridement and intraoperative cultures taken for infectious disease reference.  3.  Recommend wound care nurse continue right foot treatments and apply wound VAC when possible.  4.  I will plan on returning the patient to the OR in 4 to 6 days if possible after infection has resolved for secondary wound closure right foot and possible STRAVIX graft.

## 2024-05-24 NOTE — PROGRESS NOTES
Hospitalist Progress Note-critical care note     Patient: Angelica Duncan MRN: [id number]  CSN: [id number]    YOB: 1950  Age: 73 y.o.  Sex: female    DOA: 5/23/2024 LOS:  LOS: 1 day            Chief complaint: rt foot infection dm cad lupus anticoagulant syndrome     Assessment/Plan         Active Hospital Problems    Diagnosis Date Noted    Right foot infection [L08.9] 05/23/2024    Type 2 diabetes mellitus without complication, with long-term current use of insulin (HCC) [E11.9, Z79.4] 05/23/2024    Multiple drug allergies [Z88.9] 05/23/2024    Arterial thrombosis (HCC) [I74.9] 03/18/2021    CAD (coronary artery disease) [I25.10] 04/13/2018    Anxiety [F41.9] 10/15/2017    Lupus anticoagulant syndrome (HCC) [D68.62] 12/29/2016        Rt foot infection with abscess   Need urgent surgery otherwise will be potential sepsis   ID consulted for iv abx with hx multiple abx allergic to  On clinda, I & D yesterday and waiting for cx   Will have second surgery next week per Dr. Lance   Tolerate ultram well         DM type II , with complication,  -long term insulin , Complication with foot infectin   -on lantus,  ssi, npo for surgery  , hypoglycemia protocol        HTN, accelerated  Continue home medication.     Cad and pad , hx of arterial thrombosis   Continue plavix, inr daily and start warfarin after surgery-will put lovenox full dose   Inr 1.1 -like not taking warfarin at home      Anxiety   Will continue home meds after verified with pt      Lupus anticoagulant syndrome      Restless leg   Continue home meds      Multiple medication allergy    Elevated trop cardiologist consulted   Echo odered , no chest pain     Subjective I feel better ,     Rn trop trending down     TIME: E/M Time spent with patient and patient care issues: [] 31-40 mins  [x] 41-49 mins  [] 50 mins or more.      Disposition :tbd,   Review of systems:    General: No fevers or chills.  Cardiovascular: No chest pain or pressure. No  scarring laterally at the right lung base. There is no pneumonia. No adenopathy or pleural effusions. Osseous structures reveal old right rib fractures. There are degenerative changes of the thoracic spine.     1. No acute cardiopulmonary process. 2. The second from the top sternal wire is displaced to the left as described above.      HUSSEIN RAMÍREZ MD

## 2024-05-24 NOTE — CONSULTS
[facility] Consult Note      Patient: Angelica Duncan MRN: [id number]  SSN: [id number]    YOB: 1950  Age: 73 y.o.  Sex: female    Date of Consultation: 05/23/2024  Referring Physician: Elida Rodriguez MD  Reason for Consultation: Abnormal troponin    Chief complain: Foot infection    HPI: 73 year old female came to ER from wound clinic due to purulent discharge from right foot. She had recent right foot surgery done on May 17 2024. C/o nausea. Denies any chest pain or shortness of breath. She is minimally ambulatory. She has known CAD and PAD. Cardiology consult called fr evaluation for abnormal troponin     Past Medical History:   Diagnosis Date    Arthritis     hands and feet    Asthma     Autoimmune disease (HCC)     lupus anticoagulant-causes clotting    Cancer (HCC)     carcinoma in situ of cervix and uterus; inside bottom lip    Chronic pain     right foot    Diabetes (HCC) 2013    Dizzy spells     h/o    Full dentures     upper and lower    GERD (gastroesophageal reflux disease)     Hearing loss     bilateral wears hearing aids    History of blood transfusion     multiple    Hyperlipidemia     PCP [facility] AGUERO    Liver disease     1/2 of my liver was removed for ulcers.    Lung collapse     2015    Nausea & vomiting 5/23/2012    chronic    Neuropathy     right foot    Other ill-defined conditions(799.89)     (2 mutated genes) hyper coag. state     Pneumonia 07/22/2017    PONV (postoperative nausea and vomiting)     Positive cardiolipin antibodies     Psychiatric disorder     anxiety   no meds    PUD (peptic ulcer disease) 2009    H/O requiring partial gastrectomy and small intestine    Raynaud's syndrome     Restless leg syndrome     Retention of urine     Skin cancer 2000    mouth    Thromboembolus (HCC) 2000    RLE- arterial    Wears glasses     readers     Past Surgical History:   Procedure Laterality Date    APPENDECTOMY      BREAST SURGERY Bilateral 02/2016    hemorrhaged after breast surgery     loss, sinus pain, neck pain/stiffness, sore throat.   Heart: No chest pain, palpitations, shortness of breath, pnd, or orthopnea.   Resp: No cough, hemoptysis, wheezing and dyspnea.   GI: No nausea, vomiting, diarrhea, constipation, melena or hematochezia.   : No urinary obstruction, dysuria or hematuria.   Derm: No rash, new skin lesion or pruritis.   Musc/skeletal: positive bone or joint complains.  Vasc: No edema, cyanosis or claudication.   Endo: No heat/cold intolerance, no polyuria,polydipsia or polyphagia.   Neuro: No unilateral weakness, numbness, tingling. No seizures.   Heme: No easy bruising or bleeding.      Physical:   Patient Vitals for the past 6 hrs:   Pulse Resp BP SpO2   05/24/24 0954 80 -- 115/85 --   05/24/24 0848 -- 20 -- --   05/24/24 0818 -- 18 -- --   05/24/24 0724 80 18 115/85 100 %         Exam:   General Appearance: Comfortable, not using accessory muscles of respiration.  HEENT: LIU.   HEAD: Atraumatic  NECK: No JVD, no thyroidomeglay. CAROTIDS:  LUNGS: Clear bilaterally.   HEART: S1+S2 audible, 2/6  systolic murmur in aortic area, no pericardial rub.     ABD: Non-tender, BS Audible    EXT: No edema, and no cysnosis.  VASCULAR EXAM: Pulses are intact.    PSYCHIATRIC EXAM: Mood is appropriate.  MUSCULOSKELETAL: Grossly no joint deformity.  NEUROLOGICAL:  AAO times 3,Motor and sensory sytem intact     Review of Data:   LABS:   Lab Results   Component Value Date/Time    WBC 13.3 05/24/2024 06:11 AM    HGB 12.9 05/24/2024 06:11 AM    HCT 38.5 05/24/2024 06:11 AM     05/24/2024 06:11 AM     Lab Results   Component Value Date/Time     05/24/2024 06:11 AM    K 3.9 05/24/2024 06:11 AM     05/24/2024 06:11 AM    CO2 27 05/24/2024 06:11 AM    BUN 15 05/24/2024 06:11 AM     No results found for: \"CHOL\", \"CHLST\", \"CHOLV\", \"HDL\", \"HDLC\", \"LDL\"  No components found for: \"GPT\"  Hemoglobin A1C   Date Value Ref Range Status   05/23/2024 8.9 (H) 4.2 - 5.6 % Final     Comment:

## 2024-05-25 PROBLEM — E87.6 HYPOKALEMIA: Status: ACTIVE | Noted: 2024-05-25

## 2024-05-25 LAB
ACCESSION NUMBER, LLC1M: ABNORMAL
ACINETOBACTER CALCOAC BAUMANNII COMPLEX BY PCR: NOT DETECTED
ALBUMIN SERPL-MCNC: 2.9 G/DL (ref 3.4–5)
ALBUMIN/GLOB SERPL: 0.7 (ref 0.8–1.7)
ALP SERPL-CCNC: 137 U/L (ref 45–117)
ALT SERPL-CCNC: 25 U/L (ref 13–56)
ANION GAP SERPL CALC-SCNC: 10 MMOL/L (ref 3–18)
AST SERPL-CCNC: 26 U/L (ref 10–38)
B FRAGILIS DNA BLD POS QL NAA+NON-PROBE: NOT DETECTED
BASOPHILS # BLD: 0.1 K/UL (ref 0–0.1)
BASOPHILS NFR BLD: 0 % (ref 0–2)
BILIRUB SERPL-MCNC: 0.3 MG/DL (ref 0.2–1)
BIOFIRE TEST COMMENT: ABNORMAL
BUN SERPL-MCNC: 15 MG/DL (ref 7–18)
BUN/CREAT SERPL: 21 (ref 12–20)
C ALBICANS DNA BLD POS QL NAA+NON-PROBE: NOT DETECTED
C AURIS DNA BLD POS QL NAA+NON-PROBE: NOT DETECTED
C GATTII+NEOFOR DNA BLD POS QL NAA+N-PRB: NOT DETECTED
C GLABRATA DNA BLD POS QL NAA+NON-PROBE: NOT DETECTED
C KRUSEI DNA BLD POS QL NAA+NON-PROBE: NOT DETECTED
C PARAP DNA BLD POS QL NAA+NON-PROBE: NOT DETECTED
C TROPICLS DNA BLD POS QL NAA+NON-PROBE: NOT DETECTED
CALCIUM SERPL-MCNC: 8.7 MG/DL (ref 8.5–10.1)
CHLORIDE SERPL-SCNC: 101 MMOL/L (ref 100–111)
CK SERPL-CCNC: 127 U/L (ref 26–192)
CO2 SERPL-SCNC: 25 MMOL/L (ref 21–32)
CREAT SERPL-MCNC: 0.73 MG/DL (ref 0.6–1.3)
DIFFERENTIAL METHOD BLD: ABNORMAL
E CLOAC COMP DNA BLD POS NAA+NON-PROBE: NOT DETECTED
E COLI DNA BLD POS QL NAA+NON-PROBE: NOT DETECTED
E FAECALIS DNA BLD POS QL NAA+NON-PROBE: NOT DETECTED
E FAECIUM DNA BLD POS QL NAA+NON-PROBE: NOT DETECTED
ENTEROBACTERALES DNA BLD POS NAA+N-PRB: NOT DETECTED
EOSINOPHIL # BLD: 0.2 K/UL (ref 0–0.4)
EOSINOPHIL NFR BLD: 2 % (ref 0–5)
ERYTHROCYTE [DISTWIDTH] IN BLOOD BY AUTOMATED COUNT: 15 % (ref 11.6–14.5)
GLOBULIN SER CALC-MCNC: 4.3 G/DL (ref 2–4)
GLUCOSE BLD STRIP.AUTO-MCNC: 78 MG/DL (ref 70–110)
GLUCOSE BLD STRIP.AUTO-MCNC: 83 MG/DL (ref 70–110)
GLUCOSE BLD STRIP.AUTO-MCNC: 85 MG/DL (ref 70–110)
GLUCOSE BLD STRIP.AUTO-MCNC: 86 MG/DL (ref 70–110)
GLUCOSE SERPL-MCNC: 53 MG/DL (ref 74–99)
GP B STREP DNA BLD POS QL NAA+NON-PROBE: NOT DETECTED
HAEM INFLU DNA BLD POS QL NAA+NON-PROBE: NOT DETECTED
HCT VFR BLD AUTO: 35.5 % (ref 35–45)
HGB BLD-MCNC: 12.7 G/DL (ref 12–16)
IMM GRANULOCYTES # BLD AUTO: 0.2 K/UL (ref 0–0.04)
IMM GRANULOCYTES NFR BLD AUTO: 2 % (ref 0–0.5)
INR PPP: 1.4 (ref 0.9–1.1)
K OXYTOCA DNA BLD POS QL NAA+NON-PROBE: NOT DETECTED
KLEBSIELLA SP DNA BLD POS QL NAA+NON-PRB: NOT DETECTED
KLEBSIELLA SP DNA BLD POS QL NAA+NON-PRB: NOT DETECTED
L MONOCYTOG DNA BLD POS QL NAA+NON-PROBE: NOT DETECTED
LYMPHOCYTES # BLD: 2.8 K/UL (ref 0.9–3.6)
LYMPHOCYTES NFR BLD: 21 % (ref 21–52)
MAGNESIUM SERPL-MCNC: 1.8 MG/DL (ref 1.6–2.6)
MCH RBC QN AUTO: 36.3 PG (ref 24–34)
MCHC RBC AUTO-ENTMCNC: 35.8 G/DL (ref 31–37)
MCV RBC AUTO: 101.4 FL (ref 78–100)
MONOCYTES # BLD: 1.3 K/UL (ref 0.05–1.2)
MONOCYTES NFR BLD: 9 % (ref 3–10)
N MEN DNA BLD POS QL NAA+NON-PROBE: NOT DETECTED
NEUTS SEG # BLD: 9 K/UL (ref 1.8–8)
NEUTS SEG NFR BLD: 66 % (ref 40–73)
NRBC # BLD: 0 K/UL (ref 0–0.01)
NRBC BLD-RTO: 0 PER 100 WBC
P AERUGINOSA DNA BLD POS NAA+NON-PROBE: NOT DETECTED
PLATELET # BLD AUTO: 380 K/UL (ref 135–420)
PMV BLD AUTO: 9.6 FL (ref 9.2–11.8)
POTASSIUM SERPL-SCNC: 3 MMOL/L (ref 3.5–5.5)
PROT SERPL-MCNC: 7.2 G/DL (ref 6.4–8.2)
PROTEUS SP DNA BLD POS QL NAA+NON-PROBE: NOT DETECTED
PROTHROMBIN TIME: 17.6 SEC (ref 11.9–14.7)
RBC # BLD AUTO: 3.5 M/UL (ref 4.2–5.3)
RESISTANT GENE TARGETS: ABNORMAL
S AUREUS DNA BLD POS QL NAA+NON-PROBE: NOT DETECTED
S AUREUS+CONS DNA BLD POS NAA+NON-PROBE: NOT DETECTED
S EPIDERMIDIS DNA BLD POS QL NAA+NON-PRB: NOT DETECTED
S LUGDUNENSIS DNA BLD POS QL NAA+NON-PRB: NOT DETECTED
S MALTOPHILIA DNA BLD POS QL NAA+NON-PRB: NOT DETECTED
S MARCESCENS DNA BLD POS NAA+NON-PROBE: NOT DETECTED
S PNEUM DNA BLD POS QL NAA+NON-PROBE: NOT DETECTED
S PYO DNA BLD POS QL NAA+NON-PROBE: NOT DETECTED
SALMONELLA DNA BLD POS QL NAA+NON-PROBE: NOT DETECTED
SODIUM SERPL-SCNC: 136 MMOL/L (ref 136–145)
STREPTOCOCCUS DNA BLD POS NAA+NON-PROBE: DETECTED
WBC # BLD AUTO: 13.6 K/UL (ref 4.6–13.2)

## 2024-05-25 PROCEDURE — 2580000003 HC RX 258: Performed by: INTERNAL MEDICINE

## 2024-05-25 PROCEDURE — 6360000002 HC RX W HCPCS: Performed by: INTERNAL MEDICINE

## 2024-05-25 PROCEDURE — 1100000003 HC PRIVATE W/ TELEMETRY

## 2024-05-25 PROCEDURE — 83735 ASSAY OF MAGNESIUM: CPT

## 2024-05-25 PROCEDURE — 2500000003 HC RX 250 WO HCPCS: Performed by: HOSPITALIST

## 2024-05-25 PROCEDURE — 6370000000 HC RX 637 (ALT 250 FOR IP): Performed by: FAMILY MEDICINE

## 2024-05-25 PROCEDURE — 82550 ASSAY OF CK (CPK): CPT

## 2024-05-25 PROCEDURE — 36415 COLL VENOUS BLD VENIPUNCTURE: CPT

## 2024-05-25 PROCEDURE — 85610 PROTHROMBIN TIME: CPT

## 2024-05-25 PROCEDURE — 6370000000 HC RX 637 (ALT 250 FOR IP): Performed by: HOSPITALIST

## 2024-05-25 PROCEDURE — 2580000003 HC RX 258: Performed by: HOSPITALIST

## 2024-05-25 PROCEDURE — 85025 COMPLETE CBC W/AUTO DIFF WBC: CPT

## 2024-05-25 PROCEDURE — 99232 SBSQ HOSP IP/OBS MODERATE 35: CPT | Performed by: INTERNAL MEDICINE

## 2024-05-25 PROCEDURE — 80053 COMPREHEN METABOLIC PANEL: CPT

## 2024-05-25 PROCEDURE — 6360000002 HC RX W HCPCS: Performed by: HOSPITALIST

## 2024-05-25 PROCEDURE — 82962 GLUCOSE BLOOD TEST: CPT

## 2024-05-25 RX ORDER — POTASSIUM CHLORIDE 7.45 MG/ML
10 INJECTION INTRAVENOUS
Status: COMPLETED | OUTPATIENT
Start: 2024-05-25 | End: 2024-05-26

## 2024-05-25 RX ORDER — POTASSIUM CHLORIDE 7.45 MG/ML
10 INJECTION INTRAVENOUS
Status: DISPENSED | OUTPATIENT
Start: 2024-05-25 | End: 2024-05-25

## 2024-05-25 RX ORDER — MAGNESIUM SULFATE HEPTAHYDRATE 40 MG/ML
2000 INJECTION, SOLUTION INTRAVENOUS ONCE
Status: COMPLETED | OUTPATIENT
Start: 2024-05-25 | End: 2024-05-25

## 2024-05-25 RX ORDER — INSULIN GLARGINE 100 [IU]/ML
10 INJECTION, SOLUTION SUBCUTANEOUS NIGHTLY
Status: DISCONTINUED | OUTPATIENT
Start: 2024-05-25 | End: 2024-05-26

## 2024-05-25 RX ORDER — POTASSIUM CHLORIDE 20 MEQ/1
40 TABLET, EXTENDED RELEASE ORAL ONCE
Status: COMPLETED | OUTPATIENT
Start: 2024-05-25 | End: 2024-05-25

## 2024-05-25 RX ADMIN — ENOXAPARIN SODIUM 60 MG: 100 INJECTION SUBCUTANEOUS at 21:29

## 2024-05-25 RX ADMIN — ROPINIROLE HYDROCHLORIDE 4 MG: 1 TABLET, FILM COATED ORAL at 21:28

## 2024-05-25 RX ADMIN — METOPROLOL TARTRATE 25 MG: 25 TABLET, FILM COATED ORAL at 09:57

## 2024-05-25 RX ADMIN — METRONIDAZOLE 500 MG: 500 INJECTION, SOLUTION INTRAVENOUS at 14:10

## 2024-05-25 RX ADMIN — ENOXAPARIN SODIUM 60 MG: 100 INJECTION SUBCUTANEOUS at 09:57

## 2024-05-25 RX ADMIN — POTASSIUM CHLORIDE 40 MEQ: 1500 TABLET, EXTENDED RELEASE ORAL at 14:09

## 2024-05-25 RX ADMIN — POTASSIUM CHLORIDE 10 MEQ: 7.46 INJECTION, SOLUTION INTRAVENOUS at 16:04

## 2024-05-25 RX ADMIN — AZTREONAM 1000 MG: 1 INJECTION, POWDER, LYOPHILIZED, FOR SOLUTION INTRAMUSCULAR; INTRAVENOUS at 22:38

## 2024-05-25 RX ADMIN — ROPINIROLE HYDROCHLORIDE 4 MG: 1 TABLET, FILM COATED ORAL at 10:33

## 2024-05-25 RX ADMIN — METOPROLOL TARTRATE 25 MG: 25 TABLET, FILM COATED ORAL at 21:28

## 2024-05-25 RX ADMIN — AZTREONAM 1000 MG: 1 INJECTION, POWDER, LYOPHILIZED, FOR SOLUTION INTRAMUSCULAR; INTRAVENOUS at 06:32

## 2024-05-25 RX ADMIN — CLONIDINE HYDROCHLORIDE 0.1 MG: 0.1 TABLET ORAL at 09:57

## 2024-05-25 RX ADMIN — SODIUM CHLORIDE, PRESERVATIVE FREE 10 ML: 5 INJECTION INTRAVENOUS at 09:58

## 2024-05-25 RX ADMIN — METRONIDAZOLE 500 MG: 500 INJECTION, SOLUTION INTRAVENOUS at 06:32

## 2024-05-25 RX ADMIN — TRAMADOL HYDROCHLORIDE 50 MG: 50 TABLET ORAL at 23:45

## 2024-05-25 RX ADMIN — METRONIDAZOLE 500 MG: 500 INJECTION, SOLUTION INTRAVENOUS at 21:27

## 2024-05-25 RX ADMIN — POTASSIUM CHLORIDE 10 MEQ: 7.46 INJECTION, SOLUTION INTRAVENOUS at 23:39

## 2024-05-25 RX ADMIN — AZTREONAM 1000 MG: 1 INJECTION, POWDER, LYOPHILIZED, FOR SOLUTION INTRAMUSCULAR; INTRAVENOUS at 14:10

## 2024-05-25 RX ADMIN — DAPTOMYCIN 345 MG: 500 INJECTION, POWDER, LYOPHILIZED, FOR SOLUTION INTRAVENOUS at 22:38

## 2024-05-25 RX ADMIN — POTASSIUM CHLORIDE 10 MEQ: 7.46 INJECTION, SOLUTION INTRAVENOUS at 18:57

## 2024-05-25 RX ADMIN — MAGNESIUM SULFATE HEPTAHYDRATE 2000 MG: 40 INJECTION, SOLUTION INTRAVENOUS at 15:23

## 2024-05-25 RX ADMIN — TRAMADOL HYDROCHLORIDE 50 MG: 50 TABLET ORAL at 10:05

## 2024-05-25 RX ADMIN — CLOPIDOGREL BISULFATE 75 MG: 75 TABLET ORAL at 09:57

## 2024-05-25 RX ADMIN — ONDANSETRON 4 MG: 2 INJECTION INTRAMUSCULAR; INTRAVENOUS at 18:49

## 2024-05-25 ASSESSMENT — PAIN DESCRIPTION - LOCATION
LOCATION: FOOT
LOCATION: FOOT

## 2024-05-25 ASSESSMENT — PAIN DESCRIPTION - DESCRIPTORS
DESCRIPTORS: ACHING
DESCRIPTORS: ACHING

## 2024-05-25 ASSESSMENT — PAIN SCALES - GENERAL
PAINLEVEL_OUTOF10: 0
PAINLEVEL_OUTOF10: 6
PAINLEVEL_OUTOF10: 5
PAINLEVEL_OUTOF10: 5
PAINLEVEL_OUTOF10: 6
PAINLEVEL_OUTOF10: 4
PAINLEVEL_OUTOF10: 7

## 2024-05-25 ASSESSMENT — PAIN DESCRIPTION - ORIENTATION
ORIENTATION: RIGHT
ORIENTATION: RIGHT

## 2024-05-25 ASSESSMENT — PAIN SCALES - WONG BAKER
WONGBAKER_NUMERICALRESPONSE: HURTS LITTLE MORE
WONGBAKER_NUMERICALRESPONSE: HURTS EVEN MORE
WONGBAKER_NUMERICALRESPONSE: NO HURT

## 2024-05-25 ASSESSMENT — PAIN - FUNCTIONAL ASSESSMENT: PAIN_FUNCTIONAL_ASSESSMENT: ACTIVITIES ARE NOT PREVENTED

## 2024-05-25 NOTE — PROGRESS NOTES
Hospitalist Progress Note-critical care note     Patient: Angelica Duncan MRN: [id number]  CSN: [id number]    YOB: 1950  Age: 73 y.o.  Sex: female    DOA: 5/23/2024 LOS:  LOS: 2 days            Chief complaint: rt foot infection dm cad lupus anticoagulant syndrome     Assessment/Plan         Active Hospital Problems    Diagnosis Date Noted    Right foot infection [L08.9] 05/23/2024    Type 2 diabetes mellitus without complication, with long-term current use of insulin (HCC) [E11.9, Z79.4] 05/23/2024    Multiple drug allergies [Z88.9] 05/23/2024    Arterial thrombosis (HCC) [I74.9] 03/18/2021    CAD (coronary artery disease) [I25.10] 04/13/2018    Anxiety [F41.9] 10/15/2017    Lupus anticoagulant syndrome (HCC) [D68.62] 12/29/2016        Rt foot infection with abscess   Need urgent surgery otherwise will be potential sepsis   ID consulted for iv abx with hx multiple abx allergic to  May 23:RIGHT FOOT DEBRIDEMENT INCISION AND DRAINAGE WITH CULTURES   Will have second surgery next Tuesday  per Dr. Lance   Tolerate ultram well   Continue IV antibiotics per infection disease recommendation : Azactam, daptomycin, Flagyl  Wound culture pending results,  Monitor CK       DM type II , with complication,  Low this morning, hold night sliding scale  -long term insulin , Complication with foot infectin   - ssi, decrease Lantus to 10     HTN, accelerated  Continue home medication.     Cad and pad , hx of arterial thrombosis   Continue plavix, inr daily and start warfarin after surgery   lovenox full dose      Anxiety   Will continue home meds after verified with pt      Lupus anticoagulant syndrome      Restless leg   Continue home meds      Multiple medication allergy    Hypokalemia, potassium replacement replace magnesium oxygen    Elevated trop cardiologist consulted no ACS due to infection  Echo odered , no chest pain     Subjective first today no pain, I want to go home I miss my dog    Question has been

## 2024-05-25 NOTE — PROGRESS NOTES
Hospital day several    Subjective:     Ms. Duncan denies chest pain, chest pressure/discomfort, dyspnea, exertional chest pressure/discomfort, irregular heart beat, orthopnea, palpitations, and paroxysmal nocturnal dyspnea    Medication side effects: none    Scheduled Meds:   insulin glargine  10 Units SubCUTAneous Nightly    potassium chloride  10 mEq IntraVENous Q1H    magnesium sulfate  2,000 mg IntraVENous Once    insulin lispro  0-8 Units SubCUTAneous TID WC    [Held by provider] insulin lispro  0-4 Units SubCUTAneous Nightly    enoxaparin  1 mg/kg SubCUTAneous BID    DAPTOmycin (CUBICIN) 345 mg in sodium chloride 0.9 % 50 mL IVPB  6 mg/kg (Adjusted) IntraVENous Q24H    metroNIDAZOLE  500 mg IntraVENous Q8H    sodium chloride flush  5-40 mL IntraVENous 2 times per day    metoprolol tartrate  25 mg Oral BID    rOPINIRole  4 mg Oral BID    clopidogrel  75 mg Oral Daily    cloNIDine  0.1 mg Oral Daily    scopolamine  1 patch TransDERmal NOW    aztreonam  1,000 mg IntraVENous Q8H     Continuous Infusions:   sodium chloride      dextrose       PRN Meds:traMADol, sodium chloride flush, sodium chloride, ondansetron **OR** ondansetron, polyethylene glycol, glucose, dextrose bolus **OR** dextrose bolus, glucagon (rDNA), dextrose, hydrALAZINE      Objective:      Physical Exam:     General Appearance: Comfortable, not using accessory muscles of respiration.  HEENT: LIU.   HEAD: Atraumatic  NECK: No JVD, no thyroidomeglay. CAROTIDS:  LUNGS: Clear bilaterally.         HEART: S1+S2 audible, 2/6  systolic murmur in aortic area, no pericardial rub.                    ABD: Non-tender, BS Audible                          EXT: No edema, and no cysnosis.  VASCULAR EXAM: Pulses are intact.                         PSYCHIATRIC EXAM: Mood is appropriate.  MUSCULOSKELETAL: Grossly no joint deformity.  NEUROLOGICAL:  AAO times 3,Motor and sensory sytem intact    Cardiographics  ECG: No acute change.  Echocardiogram: See

## 2024-05-25 NOTE — PROGRESS NOTES
0503 Critical Blood Glucose result, 53 received from lab. Primary RN notified and 4 oz of juice provided to alert and oriented patient.

## 2024-05-25 NOTE — PROGRESS NOTES
Podiatry:    Patient seen today at bedside, states no pain from her right foot with the wound VAC intact.  Will continue IV antibiotics as per infectious disease Dr. Krishna, to clear her right foot infection.  Then plan to return to the operating room for STRAVIX grafting possibly this Tuesday.  I will continue to follow her as needed.

## 2024-05-25 NOTE — PROGRESS NOTES
Physical Therapy    Upon walking by pt room, witnessed pt getting herself to the bathroom with knee scoot independently.    1400: Pt eating lunch and with a visitor.  Will follow up as schedule permits.

## 2024-05-25 NOTE — PLAN OF CARE
Problem: Chronic Conditions and Co-morbidities  Goal: Patient's chronic conditions and co-morbidity symptoms are monitored and maintained or improved  Outcome: Progressing  Flowsheets (Taken 5/24/2024 2234 by Alli Plata, RN)  Care Plan - Patient's Chronic Conditions and Co-Morbidity Symptoms are Monitored and Maintained or Improved: Monitor and assess patient's chronic conditions and comorbid symptoms for stability, deterioration, or improvement     Problem: Pain  Goal: Verbalizes/displays adequate comfort level or baseline comfort level  Outcome: Progressing  Flowsheets (Taken 5/24/2024 0818)  Verbalizes/displays adequate comfort level or baseline comfort level:   Encourage patient to monitor pain and request assistance   Assess pain using appropriate pain scale   Administer analgesics based on type and severity of pain and evaluate response   Implement non-pharmacological measures as appropriate and evaluate response   Notify Licensed Independent Practitioner if interventions unsuccessful or patient reports new pain     Problem: Safety - Adult  Goal: Free from fall injury  Outcome: Progressing  Flowsheets (Taken 5/24/2024 0800)  Free From Fall Injury: Instruct family/caregiver on patient safety     Problem: Discharge Planning  Goal: Discharge to home or other facility with appropriate resources  Outcome: Progressing  Flowsheets (Taken 5/24/2024 2234 by Alli Plata, RN)  Discharge to home or other facility with appropriate resources:   Identify barriers to discharge with patient and caregiver   Arrange for needed discharge resources and transportation as appropriate   Identify discharge learning needs (meds, wound care, etc)   Arrange for interpreters to assist at discharge as needed

## 2024-05-26 ENCOUNTER — APPOINTMENT (OUTPATIENT)
Facility: HOSPITAL | Age: 74
DRG: 857 | End: 2024-05-26
Payer: MEDICARE

## 2024-05-26 PROBLEM — R78.81 BACTEREMIA: Status: ACTIVE | Noted: 2024-05-26

## 2024-05-26 LAB
ALBUMIN SERPL-MCNC: 2.6 G/DL (ref 3.4–5)
ALBUMIN/GLOB SERPL: 0.8 (ref 0.8–1.7)
ALP SERPL-CCNC: 122 U/L (ref 45–117)
ALT SERPL-CCNC: 24 U/L (ref 13–56)
ANION GAP SERPL CALC-SCNC: 7 MMOL/L (ref 3–18)
AST SERPL-CCNC: 34 U/L (ref 10–38)
BACTERIA SPEC CULT: NORMAL
BASOPHILS # BLD: 0 K/UL (ref 0–0.1)
BASOPHILS NFR BLD: 0 % (ref 0–2)
BILIRUB SERPL-MCNC: 0.3 MG/DL (ref 0.2–1)
BUN SERPL-MCNC: 18 MG/DL (ref 7–18)
BUN/CREAT SERPL: 39 (ref 12–20)
CALCIUM SERPL-MCNC: 8.5 MG/DL (ref 8.5–10.1)
CHLORIDE SERPL-SCNC: 106 MMOL/L (ref 100–111)
CO2 SERPL-SCNC: 21 MMOL/L (ref 21–32)
CREAT SERPL-MCNC: 0.46 MG/DL (ref 0.6–1.3)
DIFFERENTIAL METHOD BLD: ABNORMAL
EOSINOPHIL # BLD: 0.1 K/UL (ref 0–0.4)
EOSINOPHIL NFR BLD: 1 % (ref 0–5)
ERYTHROCYTE [DISTWIDTH] IN BLOOD BY AUTOMATED COUNT: 15.3 % (ref 11.6–14.5)
GLOBULIN SER CALC-MCNC: 3.2 G/DL (ref 2–4)
GLUCOSE BLD STRIP.AUTO-MCNC: 126 MG/DL (ref 70–110)
GLUCOSE BLD STRIP.AUTO-MCNC: 156 MG/DL (ref 70–110)
GLUCOSE BLD STRIP.AUTO-MCNC: 165 MG/DL (ref 70–110)
GLUCOSE BLD STRIP.AUTO-MCNC: 199 MG/DL (ref 70–110)
GLUCOSE SERPL-MCNC: 64 MG/DL (ref 74–99)
HCT VFR BLD AUTO: 33.1 % (ref 35–45)
HGB BLD-MCNC: 11.4 G/DL (ref 12–16)
IMM GRANULOCYTES # BLD AUTO: 0.1 K/UL (ref 0–0.04)
IMM GRANULOCYTES NFR BLD AUTO: 1 % (ref 0–0.5)
INR PPP: 1.5 (ref 0.9–1.1)
LYMPHOCYTES # BLD: 1.1 K/UL (ref 0.9–3.6)
LYMPHOCYTES NFR BLD: 11 % (ref 21–52)
MAGNESIUM SERPL-MCNC: 2.2 MG/DL (ref 1.6–2.6)
MCH RBC QN AUTO: 36.4 PG (ref 24–34)
MCHC RBC AUTO-ENTMCNC: 34.4 G/DL (ref 31–37)
MCV RBC AUTO: 105.8 FL (ref 78–100)
MONOCYTES # BLD: 0.6 K/UL (ref 0.05–1.2)
MONOCYTES NFR BLD: 6 % (ref 3–10)
NEUTS SEG # BLD: 7.8 K/UL (ref 1.8–8)
NEUTS SEG NFR BLD: 81 % (ref 40–73)
NRBC # BLD: 0 K/UL (ref 0–0.01)
NRBC BLD-RTO: 0 PER 100 WBC
PLATELET # BLD AUTO: 268 K/UL (ref 135–420)
PMV BLD AUTO: 9.3 FL (ref 9.2–11.8)
POTASSIUM SERPL-SCNC: 4.7 MMOL/L (ref 3.5–5.5)
PROT SERPL-MCNC: 5.8 G/DL (ref 6.4–8.2)
PROTHROMBIN TIME: 17.8 SEC (ref 11.9–14.7)
RBC # BLD AUTO: 3.13 M/UL (ref 4.2–5.3)
SERVICE CMNT-IMP: NORMAL
SODIUM SERPL-SCNC: 134 MMOL/L (ref 136–145)
WBC # BLD AUTO: 9.6 K/UL (ref 4.6–13.2)

## 2024-05-26 PROCEDURE — 6360000004 HC RX CONTRAST MEDICATION: Performed by: HOSPITALIST

## 2024-05-26 PROCEDURE — 1100000003 HC PRIVATE W/ TELEMETRY

## 2024-05-26 PROCEDURE — 6360000002 HC RX W HCPCS: Performed by: HOSPITALIST

## 2024-05-26 PROCEDURE — 6370000000 HC RX 637 (ALT 250 FOR IP): Performed by: HOSPITALIST

## 2024-05-26 PROCEDURE — 2580000003 HC RX 258: Performed by: HOSPITALIST

## 2024-05-26 PROCEDURE — 83735 ASSAY OF MAGNESIUM: CPT

## 2024-05-26 PROCEDURE — 82962 GLUCOSE BLOOD TEST: CPT

## 2024-05-26 PROCEDURE — 36415 COLL VENOUS BLD VENIPUNCTURE: CPT

## 2024-05-26 PROCEDURE — 6370000000 HC RX 637 (ALT 250 FOR IP): Performed by: FAMILY MEDICINE

## 2024-05-26 PROCEDURE — 6360000002 HC RX W HCPCS: Performed by: INTERNAL MEDICINE

## 2024-05-26 PROCEDURE — 2580000003 HC RX 258: Performed by: INTERNAL MEDICINE

## 2024-05-26 PROCEDURE — 74178 CT ABD&PLV WO CNTR FLWD CNTR: CPT

## 2024-05-26 PROCEDURE — 80053 COMPREHEN METABOLIC PANEL: CPT

## 2024-05-26 PROCEDURE — 85610 PROTHROMBIN TIME: CPT

## 2024-05-26 PROCEDURE — 85025 COMPLETE CBC W/AUTO DIFF WBC: CPT

## 2024-05-26 PROCEDURE — 99232 SBSQ HOSP IP/OBS MODERATE 35: CPT | Performed by: INTERNAL MEDICINE

## 2024-05-26 RX ORDER — LORAZEPAM 1 MG/1
1 TABLET ORAL EVERY 6 HOURS PRN
Status: DISCONTINUED | OUTPATIENT
Start: 2024-05-26 | End: 2024-05-30 | Stop reason: HOSPADM

## 2024-05-26 RX ORDER — INSULIN GLARGINE 100 [IU]/ML
5 INJECTION, SOLUTION SUBCUTANEOUS NIGHTLY
Status: DISCONTINUED | OUTPATIENT
Start: 2024-05-26 | End: 2024-05-30 | Stop reason: HOSPADM

## 2024-05-26 RX ADMIN — CLOPIDOGREL BISULFATE 75 MG: 75 TABLET ORAL at 09:00

## 2024-05-26 RX ADMIN — AZTREONAM 1000 MG: 1 INJECTION, POWDER, LYOPHILIZED, FOR SOLUTION INTRAMUSCULAR; INTRAVENOUS at 23:49

## 2024-05-26 RX ADMIN — AZTREONAM 1000 MG: 1 INJECTION, POWDER, LYOPHILIZED, FOR SOLUTION INTRAMUSCULAR; INTRAVENOUS at 14:23

## 2024-05-26 RX ADMIN — METRONIDAZOLE 500 MG: 500 INJECTION, SOLUTION INTRAVENOUS at 21:27

## 2024-05-26 RX ADMIN — AZTREONAM 1000 MG: 1 INJECTION, POWDER, LYOPHILIZED, FOR SOLUTION INTRAMUSCULAR; INTRAVENOUS at 05:21

## 2024-05-26 RX ADMIN — POTASSIUM CHLORIDE 10 MEQ: 7.46 INJECTION, SOLUTION INTRAVENOUS at 00:45

## 2024-05-26 RX ADMIN — LORAZEPAM 1 MG: 1 TABLET ORAL at 21:27

## 2024-05-26 RX ADMIN — TRAMADOL HYDROCHLORIDE 50 MG: 50 TABLET ORAL at 14:29

## 2024-05-26 RX ADMIN — ROPINIROLE HYDROCHLORIDE 4 MG: 1 TABLET, FILM COATED ORAL at 21:16

## 2024-05-26 RX ADMIN — ENOXAPARIN SODIUM 60 MG: 100 INJECTION SUBCUTANEOUS at 09:00

## 2024-05-26 RX ADMIN — ROPINIROLE HYDROCHLORIDE 4 MG: 1 TABLET, FILM COATED ORAL at 09:00

## 2024-05-26 RX ADMIN — INSULIN GLARGINE 5 UNITS: 100 INJECTION, SOLUTION SUBCUTANEOUS at 21:16

## 2024-05-26 RX ADMIN — METOPROLOL TARTRATE 25 MG: 25 TABLET, FILM COATED ORAL at 09:00

## 2024-05-26 RX ADMIN — CLONIDINE HYDROCHLORIDE 0.1 MG: 0.1 TABLET ORAL at 09:00

## 2024-05-26 RX ADMIN — ENOXAPARIN SODIUM 60 MG: 100 INJECTION SUBCUTANEOUS at 21:16

## 2024-05-26 RX ADMIN — SODIUM CHLORIDE, PRESERVATIVE FREE 10 ML: 5 INJECTION INTRAVENOUS at 09:01

## 2024-05-26 RX ADMIN — IOPAMIDOL 100 ML: 612 INJECTION, SOLUTION INTRAVENOUS at 16:03

## 2024-05-26 RX ADMIN — TRAMADOL HYDROCHLORIDE 50 MG: 50 TABLET ORAL at 21:27

## 2024-05-26 RX ADMIN — DAPTOMYCIN 345 MG: 500 INJECTION, POWDER, LYOPHILIZED, FOR SOLUTION INTRAVENOUS at 23:49

## 2024-05-26 RX ADMIN — METRONIDAZOLE 500 MG: 500 INJECTION, SOLUTION INTRAVENOUS at 12:28

## 2024-05-26 RX ADMIN — METOPROLOL TARTRATE 25 MG: 25 TABLET, FILM COATED ORAL at 21:16

## 2024-05-26 RX ADMIN — METRONIDAZOLE 500 MG: 500 INJECTION, SOLUTION INTRAVENOUS at 05:21

## 2024-05-26 ASSESSMENT — PAIN DESCRIPTION - ORIENTATION
ORIENTATION: RIGHT
ORIENTATION: RIGHT

## 2024-05-26 ASSESSMENT — PAIN DESCRIPTION - DESCRIPTORS
DESCRIPTORS: ACHING;DISCOMFORT;NAGGING
DESCRIPTORS: ACHING

## 2024-05-26 ASSESSMENT — PAIN DESCRIPTION - LOCATION
LOCATION: FOOT
LOCATION: FOOT

## 2024-05-26 ASSESSMENT — PAIN SCALES - GENERAL
PAINLEVEL_OUTOF10: 6
PAINLEVEL_OUTOF10: 8

## 2024-05-26 ASSESSMENT — PAIN SCALES - WONG BAKER: WONGBAKER_NUMERICALRESPONSE: NO HURT

## 2024-05-26 ASSESSMENT — PAIN - FUNCTIONAL ASSESSMENT: PAIN_FUNCTIONAL_ASSESSMENT: PREVENTS OR INTERFERES SOME ACTIVE ACTIVITIES AND ADLS

## 2024-05-26 NOTE — PLAN OF CARE
Problem: Chronic Conditions and Co-morbidities  Goal: Patient's chronic conditions and co-morbidity symptoms are monitored and maintained or improved  5/25/2024 2215 by Alli Plata RN  Outcome: Progressing  Flowsheets (Taken 5/24/2024 2234)  Care Plan - Patient's Chronic Conditions and Co-Morbidity Symptoms are Monitored and Maintained or Improved: Monitor and assess patient's chronic conditions and comorbid symptoms for stability, deterioration, or improvement  5/25/2024 1413 by Felicity Smith RN  Outcome: Progressing  Flowsheets (Taken 5/24/2024 2234 by Alli Plata RN)  Care Plan - Patient's Chronic Conditions and Co-Morbidity Symptoms are Monitored and Maintained or Improved: Monitor and assess patient's chronic conditions and comorbid symptoms for stability, deterioration, or improvement     Problem: Pain  Goal: Verbalizes/displays adequate comfort level or baseline comfort level  5/25/2024 2215 by Alli Plata RN  Outcome: Progressing  Flowsheets (Taken 5/24/2024 0818 by Felicity Smith RN)  Verbalizes/displays adequate comfort level or baseline comfort level:   Encourage patient to monitor pain and request assistance   Assess pain using appropriate pain scale   Administer analgesics based on type and severity of pain and evaluate response   Implement non-pharmacological measures as appropriate and evaluate response   Notify Licensed Independent Practitioner if interventions unsuccessful or patient reports new pain  5/25/2024 1413 by Felicity Smith RN  Outcome: Progressing  Flowsheets (Taken 5/24/2024 0818)  Verbalizes/displays adequate comfort level or baseline comfort level:   Encourage patient to monitor pain and request assistance   Assess pain using appropriate pain scale   Administer analgesics based on type and severity of pain and evaluate response   Implement non-pharmacological measures as appropriate and evaluate response   Notify Licensed Independent Practitioner if  interventions unsuccessful or patient reports new pain     Problem: Safety - Adult  Goal: Free from fall injury  5/25/2024 2215 by Alli Plata RN  Outcome: Progressing  Flowsheets (Taken 5/24/2024 0800 by Felicity Smith RN)  Free From Fall Injury: Instruct family/caregiver on patient safety  5/25/2024 1413 by Felicity Smith RN  Outcome: Progressing  Flowsheets (Taken 5/24/2024 0800)  Free From Fall Injury: Instruct family/caregiver on patient safety     Problem: Discharge Planning  Goal: Discharge to home or other facility with appropriate resources  5/25/2024 2215 by Alli Palta RN  Outcome: Progressing  Flowsheets (Taken 5/24/2024 2234)  Discharge to home or other facility with appropriate resources:   Identify barriers to discharge with patient and caregiver   Arrange for needed discharge resources and transportation as appropriate   Identify discharge learning needs (meds, wound care, etc)   Arrange for interpreters to assist at discharge as needed  5/25/2024 1413 by Felicity Smith RN  Outcome: Progressing  Flowsheets (Taken 5/24/2024 2234 by Alli Plata RN)  Discharge to home or other facility with appropriate resources:   Identify barriers to discharge with patient and caregiver   Arrange for needed discharge resources and transportation as appropriate   Identify discharge learning needs (meds, wound care, etc)   Arrange for interpreters to assist at discharge as needed

## 2024-05-26 NOTE — PLAN OF CARE
Problem: Pain  Goal: Verbalizes/displays adequate comfort level or baseline comfort level  5/26/2024 0941 by Everton Mcintosh RN  Outcome: Progressing  Flowsheets (Taken 5/24/2024 0818 by Felicity Smith RN)  Verbalizes/displays adequate comfort level or baseline comfort level:   Encourage patient to monitor pain and request assistance   Assess pain using appropriate pain scale   Administer analgesics based on type and severity of pain and evaluate response   Implement non-pharmacological measures as appropriate and evaluate response   Notify Licensed Independent Practitioner if interventions unsuccessful or patient reports new pain  5/25/2024 2215 by Alli Plata RN  Outcome: Progressing  Flowsheets (Taken 5/24/2024 0818 by Felicity Smith RN)  Verbalizes/displays adequate comfort level or baseline comfort level:   Encourage patient to monitor pain and request assistance   Assess pain using appropriate pain scale   Administer analgesics based on type and severity of pain and evaluate response   Implement non-pharmacological measures as appropriate and evaluate response   Notify Licensed Independent Practitioner if interventions unsuccessful or patient reports new pain     Problem: Safety - Adult  Goal: Free from fall injury  5/26/2024 0941 by Everton Mcintosh RN  Outcome: Progressing  Flowsheets (Taken 5/24/2024 0800 by Felicity Smith RN)  Free From Fall Injury: Instruct family/caregiver on patient safety  5/25/2024 2215 by Alli Plata RN  Outcome: Progressing  Flowsheets (Taken 5/24/2024 0800 by Felicity Smith RN)  Free From Fall Injury: Instruct family/caregiver on patient safety     Problem: Metabolic/Fluid and Electrolytes - Adult  Goal: Electrolytes maintained within normal limits  Outcome: Progressing  Flowsheets (Taken 5/26/2024 0941)  Electrolytes maintained within normal limits:   Monitor labs and assess patient for signs and symptoms of electrolyte imbalances   Administer  electrolyte replacement as ordered     Problem: Metabolic/Fluid and Electrolytes - Adult  Goal: Glucose maintained within prescribed range  Outcome: Progressing  Flowsheets (Taken 5/26/2024 7258)  Glucose maintained within prescribed range:   Monitor blood glucose as ordered   Assess for signs and symptoms of hyperglycemia and hypoglycemia   Administer ordered medications to maintain glucose within target range   Assess barriers to adequate nutritional intake and initiate nutrition consult as needed

## 2024-05-26 NOTE — PROGRESS NOTES
Hospital day several    Subjective:     Ms. Duncan denies chest pain, chest pressure/discomfort, dyspnea, exertional chest pressure/discomfort, irregular heart beat, orthopnea, palpitations, and paroxysmal nocturnal dyspnea    Medication side effects: none    Scheduled Meds:   insulin glargine  5 Units SubCUTAneous Nightly    insulin lispro  0-8 Units SubCUTAneous TID WC    [Held by provider] insulin lispro  0-4 Units SubCUTAneous Nightly    enoxaparin  1 mg/kg SubCUTAneous BID    DAPTOmycin (CUBICIN) 345 mg in sodium chloride 0.9 % 50 mL IVPB  6 mg/kg (Adjusted) IntraVENous Q24H    metroNIDAZOLE  500 mg IntraVENous Q8H    sodium chloride flush  5-40 mL IntraVENous 2 times per day    metoprolol tartrate  25 mg Oral BID    rOPINIRole  4 mg Oral BID    clopidogrel  75 mg Oral Daily    cloNIDine  0.1 mg Oral Daily    scopolamine  1 patch TransDERmal NOW    aztreonam  1,000 mg IntraVENous Q8H     Continuous Infusions:   sodium chloride      dextrose       PRN Meds:traMADol, sodium chloride flush, sodium chloride, ondansetron **OR** ondansetron, polyethylene glycol, glucose, dextrose bolus **OR** dextrose bolus, glucagon (rDNA), dextrose, hydrALAZINE      Objective:      Physical Exam:     General Appearance: Comfortable, not using accessory muscles of respiration.  HEENT: LIU.   HEAD: Atraumatic  NECK: No JVD, no thyroidomeglay. CAROTIDS:  LUNGS: Clear bilaterally.         HEART: S1+S2 audible, 2/6  systolic murmur in aortic area, no pericardial rub.                    ABD: Non-tender, BS Audible                          EXT: No edema, and no cysnosis.  VASCULAR EXAM: Pulses are intact.                         PSYCHIATRIC EXAM: Mood is appropriate.  MUSCULOSKELETAL: Grossly no joint deformity.  NEUROLOGICAL:  AAO times 3,Motor and sensory sytem intact    Cardiographics  ECG: No acute change.  Echocardiogram: See report    Imaging  Chest X-Ray: Images and report reviewed    Lab Review   Lab Results   Component Value  long-term current use of insulin ([facility]) 05/23/2024    Multiple drug allergies 05/23/2024    Major osseous defect, right ankle and foot 05/17/2024    Midfoot ulceration, right, with fat layer exposed ([facility]) 05/17/2024    Iron deficiency anemia, unspecified 09/07/2021    Debility     Delta-9-tetrahydrocannabinol (THC) dependence ([facility]) 07/31/2021    Ischemic toe 07/31/2021    Arterial thrombosis ([facility]) 03/18/2021    Chronic anticoagulation 04/16/2019    Cellulitis of right foot 12/05/2018    Diabetic ulcer of right midfoot with fat layer exposed ([facility]) 12/04/2018    Type II diabetes mellitus with complication, uncontrolled 04/13/2018    CAD (coronary artery disease) 04/13/2018    Anxiety 10/15/2017    Sleepwalking disorder 07/22/2017    PAD (peripheral artery disease) ([facility]) 07/22/2017    Lupus anticoagulant syndrome ([facility]) 12/29/2016    Anemia 03/01/2016    Atherosclerotic PVD with ulceration ([facility]) 03/13/2015    S/P total gastrectomy and Bety-en-Y esophagojejunal anastomosis 05/23/2012       Plan:   5/26/2024  Patient is clinically stable from a cardiac standpoint  Continue current medications and measures.  Resume Plavix when okay with surgeon  Continue to follow    Continue current medications and measures  Cardiology see will continue to follow  Resume Plavix when okay with surgeon         RIKI RICHARDSON MD

## 2024-05-26 NOTE — PROGRESS NOTES
Hospitalist Progress Note-critical care note     Patient: Angelica Duncan MRN: [id number]  CSN: [id number]    YOB: 1950  Age: 73 y.o.  Sex: female    DOA: 5/23/2024 LOS:  LOS: 3 days            Chief complaint: rt foot infection dm cad lupus anticoagulant syndrome     Assessment/Plan         Active Hospital Problems    Diagnosis Date Noted    Bacteremia [R78.81] 05/26/2024    Hypokalemia [E87.6] 05/25/2024    Right foot infection [L08.9] 05/23/2024    Type 2 diabetes mellitus without complication, with long-term current use of insulin (HCC) [E11.9, Z79.4] 05/23/2024    Multiple drug allergies [Z88.9] 05/23/2024    Arterial thrombosis (HCC) [I74.9] 03/18/2021    CAD (coronary artery disease) [I25.10] 04/13/2018    Anxiety [F41.9] 10/15/2017    Lupus anticoagulant syndrome (HCC) [D68.62] 12/29/2016        Rt foot infection with abscess and bacteremia   Need urgent surgery otherwise will be potential sepsis   ID consulted for iv abx with hx multiple abx allergic to  May 23:RIGHT FOOT DEBRIDEMENT INCISION AND DRAINAGE WITH CULTURES   Will have second surgery next Tuesday  per Dr. Lance   Tolerate ultram well   Continue IV antibiotics per infection disease recommendation : Azactam, daptomycin, Flagyl  Cx positive strep -discussed with dr. Krishna -planning ct abdomen   Monitor CK       DM type II , with complication,  Low this morning, hold night sliding scale  -long term insulin , Complication with foot infectin   - ssi, decrease Lantus to 8     HTN, accelerated  Continue home medication.     Cad and pad , hx of arterial thrombosis   Continue plavix, inr daily and start warfarin after surgery   lovenox full dose      Anxiety   Will continue home meds after verified with pt      Lupus anticoagulant syndrome    Warfarin at home , on lovenox      Restless leg   Continue home meds      Multiple medication allergy    Hypokalemia, resolved      Elevated trop cardiologist consulted no ACS due to infection  Echo

## 2024-05-26 NOTE — PROGRESS NOTES
[facility]  (A Division of [facility])                                                           Date of Admission: 5/23/2024       Reason for Consult: post op purulent infection- right foot  Referring MD: Dr Lance/Dr Marrero    C/C: drainage from foot- right    Current Antimicrobials:    Prior Antimicrobials:  Aztreonam and Clindamycin       Allergy to antibiotics: see list       Assessment--ID related:     Streptococcus Bacteremia 1/2 set-- source of unclear, didn't grow from foot infection  Post op foot infection- right  R DFI( diabetic foot infection)  --Prior TMA right foot--follows with Dr Lance  --S/P incision of bone cortex, stravix graft-5/17/24, Bone ( cuboid excision) with reactive changes, no OM, OR culture + Proteus mirablis in thio-  --Chronic, non healing,  lateral heal ulcer right foot for few years  --Underlying PAD-- severity on right side?  --S/P I and D of R foot--Op finding: Deep Infection (muscle/fascia) present as evidenced by abscess, pus, purulent fluid, and fluid consistent with infection -5/23/24    - wound vac in place    Poorly controlled DM-A1C 8.9%  Multiple allergy to antibiotics- Bactrim/Amoxicillin/cephalosprins/Zithromax/Rifdampin listed as hives. Vanco as well    Microlab data:  5/17-- tissue culture--aerobic with Proteus mirablis in thio--panSS , anaerobic no growth            R cuboid bone with reactive changes    5/23- Blood culture X2-1/2 with alpha streptococcus in blood- ID pending         --OR tissue- culture in progress    Additional data:    5/24- TTE-- 55-60% EF, no mention of veg/mass    Co-morbidities:    CAD, HTN,  DMT2, LAC syndrome, history of left artrial axillary ot femoral bypass using graft 08/2021 due to critical limb ischemia  THC use for severe nausea-- daily  History of gastric/abdominal surgery/resection 2009    Recommendation -- ID related:     FU ID on streptococcus in blood-1/2 set. Source unclear. CT AP  EF of 55 - 60%. Left ventricle size is normal. Normal wall thickness. Left Ventricle: Normal wall motion. Left Ventricle: Grade I diastolic dysfunction present with normal LV EF.   Aortic Valve: Mild stenosis of the aortic valve. AV mean gradient is 14 mmHg. AV peak gradient is 30 mmHg. AV peak velocity is 2.7 m/s. AV area by continuity VTI is 1.4 cm2.   Mitral Valve: Moderately calcified leaflet. Mild annular calcification at the posterior leaflet of the mitral valve. No stenosis noted.   IVC/SVC : IVC diameter is greater than 21 mm and decreases greater than 50% during inspiration; therefore the estimated right atrial pressure is intermediate (~8 mmHg). IVC is dilated.      Ijeoma Krishna MD  [facility])  Office #:     [phone]  [phone]- Option #[phone]   Office Fax: [phone]

## 2024-05-27 LAB
BACTERIA SPEC CULT: ABNORMAL
BACTERIA SPEC CULT: NORMAL
GLUCOSE BLD STRIP.AUTO-MCNC: 144 MG/DL (ref 70–110)
GLUCOSE BLD STRIP.AUTO-MCNC: 319 MG/DL (ref 70–110)
GLUCOSE BLD STRIP.AUTO-MCNC: 67 MG/DL (ref 70–110)
GLUCOSE BLD STRIP.AUTO-MCNC: 71 MG/DL (ref 70–110)
GLUCOSE BLD STRIP.AUTO-MCNC: 84 MG/DL (ref 70–110)
GRAM STN SPEC: ABNORMAL
SERVICE CMNT-IMP: ABNORMAL
SERVICE CMNT-IMP: ABNORMAL
SERVICE CMNT-IMP: NORMAL

## 2024-05-27 PROCEDURE — 6370000000 HC RX 637 (ALT 250 FOR IP): Performed by: FAMILY MEDICINE

## 2024-05-27 PROCEDURE — 99232 SBSQ HOSP IP/OBS MODERATE 35: CPT | Performed by: INTERNAL MEDICINE

## 2024-05-27 PROCEDURE — 2580000003 HC RX 258: Performed by: INTERNAL MEDICINE

## 2024-05-27 PROCEDURE — 6360000002 HC RX W HCPCS: Performed by: INTERNAL MEDICINE

## 2024-05-27 PROCEDURE — 6360000002 HC RX W HCPCS: Performed by: HOSPITALIST

## 2024-05-27 PROCEDURE — 2580000003 HC RX 258: Performed by: HOSPITALIST

## 2024-05-27 PROCEDURE — 6370000000 HC RX 637 (ALT 250 FOR IP): Performed by: HOSPITALIST

## 2024-05-27 PROCEDURE — 1100000003 HC PRIVATE W/ TELEMETRY

## 2024-05-27 PROCEDURE — 6370000000 HC RX 637 (ALT 250 FOR IP): Performed by: INTERNAL MEDICINE

## 2024-05-27 PROCEDURE — 82962 GLUCOSE BLOOD TEST: CPT

## 2024-05-27 RX ORDER — TRAMADOL HYDROCHLORIDE 50 MG/1
50 TABLET ORAL EVERY 4 HOURS PRN
Status: DISCONTINUED | OUTPATIENT
Start: 2024-05-27 | End: 2024-05-27

## 2024-05-27 RX ORDER — POLYETHYLENE GLYCOL 3350 17 G/17G
17 POWDER, FOR SOLUTION ORAL DAILY
Status: DISCONTINUED | OUTPATIENT
Start: 2024-05-27 | End: 2024-05-30 | Stop reason: HOSPADM

## 2024-05-27 RX ORDER — NALOXONE HYDROCHLORIDE 0.4 MG/ML
0.4 INJECTION, SOLUTION INTRAMUSCULAR; INTRAVENOUS; SUBCUTANEOUS PRN
Status: DISCONTINUED | OUTPATIENT
Start: 2024-05-27 | End: 2024-05-30 | Stop reason: HOSPADM

## 2024-05-27 RX ORDER — TRAMADOL HYDROCHLORIDE 50 MG/1
75 TABLET ORAL EVERY 6 HOURS PRN
Status: DISCONTINUED | OUTPATIENT
Start: 2024-05-27 | End: 2024-05-30 | Stop reason: HOSPADM

## 2024-05-27 RX ADMIN — DAPTOMYCIN 345 MG: 500 INJECTION, POWDER, LYOPHILIZED, FOR SOLUTION INTRAVENOUS at 23:15

## 2024-05-27 RX ADMIN — AZTREONAM 1000 MG: 1 INJECTION, POWDER, LYOPHILIZED, FOR SOLUTION INTRAMUSCULAR; INTRAVENOUS at 06:30

## 2024-05-27 RX ADMIN — CLOPIDOGREL BISULFATE 75 MG: 75 TABLET ORAL at 09:05

## 2024-05-27 RX ADMIN — METOPROLOL TARTRATE 25 MG: 25 TABLET, FILM COATED ORAL at 09:05

## 2024-05-27 RX ADMIN — ENOXAPARIN SODIUM 60 MG: 100 INJECTION SUBCUTANEOUS at 09:05

## 2024-05-27 RX ADMIN — METRONIDAZOLE 500 MG: 500 INJECTION, SOLUTION INTRAVENOUS at 05:16

## 2024-05-27 RX ADMIN — TRAMADOL HYDROCHLORIDE 75 MG: 50 TABLET ORAL at 20:40

## 2024-05-27 RX ADMIN — AZTREONAM 1000 MG: 1 INJECTION, POWDER, LYOPHILIZED, FOR SOLUTION INTRAMUSCULAR; INTRAVENOUS at 23:07

## 2024-05-27 RX ADMIN — LORAZEPAM 1 MG: 1 TABLET ORAL at 06:32

## 2024-05-27 RX ADMIN — CLONIDINE HYDROCHLORIDE 0.1 MG: 0.1 TABLET ORAL at 09:05

## 2024-05-27 RX ADMIN — POLYETHYLENE GLYCOL 3350 17 G: 17 POWDER, FOR SOLUTION ORAL at 14:42

## 2024-05-27 RX ADMIN — SODIUM CHLORIDE, PRESERVATIVE FREE 10 ML: 5 INJECTION INTRAVENOUS at 20:43

## 2024-05-27 RX ADMIN — ROPINIROLE HYDROCHLORIDE 4 MG: 1 TABLET, FILM COATED ORAL at 09:04

## 2024-05-27 RX ADMIN — SODIUM CHLORIDE, PRESERVATIVE FREE 10 ML: 5 INJECTION INTRAVENOUS at 09:05

## 2024-05-27 RX ADMIN — AZTREONAM 1000 MG: 1 INJECTION, POWDER, LYOPHILIZED, FOR SOLUTION INTRAMUSCULAR; INTRAVENOUS at 14:42

## 2024-05-27 RX ADMIN — ENOXAPARIN SODIUM 60 MG: 100 INJECTION SUBCUTANEOUS at 20:41

## 2024-05-27 RX ADMIN — ROPINIROLE HYDROCHLORIDE 4 MG: 1 TABLET, FILM COATED ORAL at 20:39

## 2024-05-27 RX ADMIN — INSULIN GLARGINE 5 UNITS: 100 INJECTION, SOLUTION SUBCUTANEOUS at 20:41

## 2024-05-27 ASSESSMENT — PAIN SCALES - GENERAL
PAINLEVEL_OUTOF10: 8
PAINLEVEL_OUTOF10: 7
PAINLEVEL_OUTOF10: 8
PAINLEVEL_OUTOF10: 7

## 2024-05-27 ASSESSMENT — PAIN DESCRIPTION - LOCATION
LOCATION: FOOT
LOCATION: FOOT

## 2024-05-27 NOTE — PLAN OF CARE
Problem: Safety - Adult  Goal: Free from fall injury  5/27/2024 1146 by Everton Mcintosh, RN  Outcome: Progressing  Flowsheets (Taken 5/24/2024 0800 by Felicity Smith, RN)  Free From Fall Injury: Instruct family/caregiver on patient safety  5/26/2024 2250 by Mayra Nguyen RN  Outcome: Progressing     Problem: Metabolic/Fluid and Electrolytes - Adult  Goal: Electrolytes maintained within normal limits  5/26/2024 2250 by Mayra Nguyen RN  Outcome: Progressing  Flowsheets (Taken 5/26/2024 2100)  Electrolytes maintained within normal limits:   Monitor labs and assess patient for signs and symptoms of electrolyte imbalances   Administer electrolyte replacement as ordered   Monitor response to electrolyte replacements, including repeat lab results as appropriate     Problem: Metabolic/Fluid and Electrolytes - Adult  Goal: Glucose maintained within prescribed range  Flowsheets (Taken 5/26/2024 2100 by Mayra Nguyen, RN)  Glucose maintained within prescribed range:   Monitor blood glucose as ordered   Assess for signs and symptoms of hyperglycemia and hypoglycemia   Administer ordered medications to maintain glucose within target range     Problem: Hematologic - Adult  Goal: Maintains hematologic stability  Flowsheets (Taken 5/26/2024 2100 by Mayra Nguyen, RN)  Maintains hematologic stability: Assess for signs and symptoms of bleeding or hemorrhage

## 2024-05-27 NOTE — PROGRESS NOTES
Hospitalist Progress Note-critical care note     Patient: Angelica Duncan MRN: [id number]  CSN: [id number]    YOB: 1950  Age: 73 y.o.  Sex: female    DOA: 5/23/2024 LOS:  LOS: 4 days            Chief complaint: rt foot infection dm cad lupus anticoagulant syndrome     Assessment/Plan         Active Hospital Problems    Diagnosis Date Noted    Bacteremia [R78.81] 05/26/2024    Hypokalemia [E87.6] 05/25/2024    Right foot infection [L08.9] 05/23/2024    Type 2 diabetes mellitus without complication, with long-term current use of insulin (HCC) [E11.9, Z79.4] 05/23/2024    Multiple drug allergies [Z88.9] 05/23/2024    Arterial thrombosis (HCC) [I74.9] 03/18/2021    CAD (coronary artery disease) [I25.10] 04/13/2018    Anxiety [F41.9] 10/15/2017    Lupus anticoagulant syndrome (HCC) [D68.62] 12/29/2016        Rt foot infection with abscess and bacteremia   Need urgent surgery otherwise will be potential sepsis   ID consulted for iv abx with hx multiple abx allergic to  May 23:RIGHT FOOT DEBRIDEMENT INCISION AND DRAINAGE WITH CULTURES   Will have second surgery next Tuesday  per Dr. Lance   Tolerate ultram well   Continue IV antibiotics per infection disease recommendation : Azactam, daptomycin, Flagyl  Cx positive strep -discussed with dr. Krishna -planning ct abdomen          DM type II , with complication,  Low this morning, hold night sliding scale  -long term insulin , Complication with foot infectin   - ssi, decrease Lantus to 8     HTN, accelerated  Continue home medication.     Cad and pad , hx of arterial thrombosis   Continue plavix, inr daily and start warfarin after surgery   lovenox full dose      Anxiety   Will continue home meds after verified with pt      Lupus anticoagulant syndrome    Warfarin at home , on lovenox      Restless leg   Continue home meds      Multiple medication allergy    Hypokalemia, resolved      Elevated trop cardiologist consulted no ACS due to infection  Echo odered , no  chest pain     Subjective feel pain now   Will increase ultram dose     TIME: E/M Time spent with patient and patient care issues: [] 31-40 mins  [x] 41-49 mins  [] 50 mins or more.      Disposition :tbd,   Review of systems:    General: No fevers or chills.  Cardiovascular: No chest pain or pressure. No palpitations.   Pulmonary: No shortness of breath.   Gastrointestinal: No nausea, vomiting.     Vital signs/Intake and Output:  Visit Vitals  /70   Pulse 64   Temp 97.4 °F (36.3 °C) (Temporal)   Resp 18   Ht 1.626 m (5' 4\")   Wt 62.6 kg (138 lb)   SpO2 97%   BMI 23.69 kg/m²     Current Shift:  No intake/output data recorded.  Last three shifts:  No intake/output data recorded.        Physical Exam:  General: WD, WN.  Alert, cooperative, no acute distress    HEENT: NC, Atraumatic.  PERRLA, anicteric sclerae.  Lungs: CTA Bilaterally. No Wheezing/Rhonchi/Rales.  Heart:  Regular  rhythm,  No murmur, No Rubs, No Gallops  Abdomen: Soft, Non distended, Non tender.  +Bowel sounds,   Extremities: No c/c/e rt foot covered with ace bandage with wound VAC   Psych:   Not anxious or agitated.  Neurologic:  No acute neurological deficit.             Labs: Results:       Chemistry Recent Labs     05/25/24 0327 05/26/24 0157    134*   K 3.0* 4.7    106   CO2 25 21   BUN 15 18   GLOB 4.3* 3.2        CBC w/Diff Recent Labs     05/25/24 0327 05/26/24 0157   WBC 13.6* 9.6   RBC 3.50* 3.13*   HGB 12.7 11.4*   HCT 35.5 33.1*    268        Cardiac Enzymes No results for input(s): \"CPK\" in the last 72 hours.    Invalid input(s): \"CKRMB\", \"CKND1\", \"TROIP\", \"CINTHIA\"   Coagulation Recent Labs     05/25/24 0327 05/26/24 0157   INR 1.4* 1.5*         Lipid Panel No results found for: \"CHOL\", \"CHOLPOCT\", \"CHLST\", \"CHOLV\", \"044387\", \"HDL\", \"HDLC\", \"LDL\", \"VLDLC\", \"VLDL\"   BNP Invalid input(s): \"BNPP\"   Liver Enzymes No results for input(s): \"TP\" in the last 72 hours.    Invalid input(s): \"ALB\", \"TBIL\", \"AP\", \"SGOT\",

## 2024-05-27 NOTE — PROGRESS NOTES
[facility]  (A Division of [facility])                                                           Date of Admission: 5/23/2024       Reason for Consult: post op purulent infection- right foot  Referring MD: Dr Lance/Dr Marrero    C/C: drainage from foot- right    Current Antimicrobials:    Prior Antimicrobials:  Aztreonam and Clindamycin       Allergy to antibiotics: see list       Assessment--ID related:     Streptococcus Bacteremia 1/2 set-- source of unclear, didn't grow from foot infection  Post op foot infection- right  R DFI( diabetic foot infection)  --Prior TMA right foot--follows with Dr Lance  --S/P incision of bone cortex, stravix graft-5/17/24, Bone ( cuboid excision) with reactive changes, no OM, OR culture + Proteus mirablis in thio-  --Chronic, non healing,  lateral heal ulcer right foot for few years  --Underlying PAD-- severity on right side?  --S/P I and D of R foot--Op finding: Deep Infection (muscle/fascia) present as evidenced by abscess, pus, purulent fluid, and fluid consistent with infection -5/23/24    - wound vac in place    Poorly controlled DM-A1C 8.9%  Multiple allergy to antibiotics- Bactrim/Amoxicillin/cephalosprins/Zithromax/Rifdampin listed as hives. Vanco as well    Microlab data:  5/17-- tissue culture--aerobic with Proteus mirablis in thio--panSS , anaerobic no growth            R cuboid bone with reactive changes    5/23- Blood culture X2-1/2 with alpha streptococcus in blood- ID pending         --OR tissue- culture in progress    Additional data:    5/24- TTE-- 55-60% EF, no mention of veg/mass  5/26-CT AP W/IV: 1. Stool filled dilated loop of bowel in the pelvis. The patient is likely status post subtotal colectomy and ileocolic anastomosis. There is no evidence of small bowel obstruction.   2. Left adrenal nodule incompletely characterized. No etiology of the patient's bacteremia is identified    Co-morbidities:    CAD, HTN,   DMT2, LAC syndrome, history of left artrial axillary ot femoral bypass using graft 08/2021 due to critical limb ischemia  THC use for severe nausea-- daily  History of gastric/abdominal surgery/resection 2009    Recommendation -- ID related:     FU ID on streptococcus in blood-1/2 set. Source unclear could be foot- but none reported, had 4+ GPC that isnot worked up. CT AP ordered for source work up of bacteremia-- no acute abdominal abn, only constipation.    FU OR cultures-- Proteus and skin organisms growth. No anaerobes so far 5/23    Continue Aztreonam and daptomycin IV  DC Flagyl--no anaerobes  Change laxative to daily instead of PRN--no BM X4 days-- further mx per primary team    Likely needs PICC for outpatient treatment, since no PO options that she isnot allergic to. Can place PICC tomorrow.    Multiple allergy listed as hives limit treatment. Will cover GNR with aztreonam and GPC with Clindamycin for now. Other class available will be tetra-cyclines, depending on culture growth. She will need allergy /Immunology MD evaluation as OP.      Discussed with ( in bold) :  Patient / family,  MD/Nursing , Microbiology/ Laboratory, Radiology, S.Worker/,  Others     Subjective:       Patient seen and examined at bedside.  Afebrile 97.4  Constipated  Complains of R foot pain    Visitor /family in room with her    Notes/Labs/Cultures and Imaging reports since last round reviewed  -- bacteremia from 5/23- stretptococcus spp. Leucocytosis is better--DW Microlab     HPI:      Angelica Duncan is a 73 y.o. female with PMH of DM, PAD with history of L ischemic /critical limb in 2021, and with history of TMA R foot. She underwent wound closure, removal of exposed bone on 5/17/24 with graft placement -received pre-op abx with Clindamycin. She had done ok until 5/23- she felt pain in foot, went to see Dr Lance and was admitted for purulent drainage from foot. No fever. Picture of wound on presentation reviewed in

## 2024-05-27 NOTE — PROGRESS NOTES
Hospital day several    Subjective:     Ms. Duncan denies chest pain, chest pressure/discomfort, dyspnea, exertional chest pressure/discomfort, irregular heart beat, orthopnea, palpitations, and paroxysmal nocturnal dyspnea    Medication side effects: none    Scheduled Meds:   polyethylene glycol  17 g Oral Daily    insulin glargine  5 Units SubCUTAneous Nightly    insulin lispro  0-8 Units SubCUTAneous TID WC    [Held by provider] insulin lispro  0-4 Units SubCUTAneous Nightly    enoxaparin  1 mg/kg SubCUTAneous BID    DAPTOmycin (CUBICIN) 345 mg in sodium chloride 0.9 % 50 mL IVPB  6 mg/kg (Adjusted) IntraVENous Q24H    sodium chloride flush  5-40 mL IntraVENous 2 times per day    metoprolol tartrate  25 mg Oral BID    rOPINIRole  4 mg Oral BID    clopidogrel  75 mg Oral Daily    cloNIDine  0.1 mg Oral Daily    aztreonam  1,000 mg IntraVENous Q8H     Continuous Infusions:   sodium chloride      dextrose       PRN Meds:naloxone, traMADol, LORazepam, sodium chloride flush, sodium chloride, ondansetron **OR** ondansetron, glucose, dextrose bolus **OR** dextrose bolus, glucagon (rDNA), dextrose, hydrALAZINE      Objective:      Physical Exam:     General Appearance: Comfortable, not using accessory muscles of respiration.  HEENT: LIU.   HEAD: Atraumatic  NECK: No JVD, no thyroidomeglay. CAROTIDS:  LUNGS: Clear bilaterally.         HEART: S1+S2 audible, 2/6  systolic murmur in aortic area, no pericardial rub.                    ABD: Non-tender, BS Audible                          EXT: No edema, and no cysnosis.  VASCULAR EXAM: Pulses are intact.                         PSYCHIATRIC EXAM: Mood is appropriate.  MUSCULOSKELETAL: Grossly no joint deformity.  NEUROLOGICAL:  AAO times 3,Motor and sensory sytem intact    Cardiographics  ECG: No acute change.  Echocardiogram: See report    Imaging  Chest X-Ray: Images and report reviewed    Lab Review   Lab Results   Component Value Date/Time     05/26/2024 01:57 AM

## 2024-05-27 NOTE — PLAN OF CARE
Problem: Chronic Conditions and Co-morbidities  Goal: Patient's chronic conditions and co-morbidity symptoms are monitored and maintained or improved  Outcome: Progressing  Flowsheets (Taken 5/26/2024 2100)  Care Plan - Patient's Chronic Conditions and Co-Morbidity Symptoms are Monitored and Maintained or Improved:   Monitor and assess patient's chronic conditions and comorbid symptoms for stability, deterioration, or improvement   Collaborate with multidisciplinary team to address chronic and comorbid conditions and prevent exacerbation or deterioration     Problem: Pain  Goal: Verbalizes/displays adequate comfort level or baseline comfort level  5/26/2024 2250 by Mayra Nguyen RN  Outcome: Progressing  Flowsheets (Taken 5/26/2024 2100)  Verbalizes/displays adequate comfort level or baseline comfort level:   Encourage patient to monitor pain and request assistance   Assess pain using appropriate pain scale   Administer analgesics based on type and severity of pain and evaluate response  5/26/2024 0941 by Everton Mcintosh RN  Outcome: Progressing  Flowsheets (Taken 5/24/2024 0818 by Felicity Smith RN)  Verbalizes/displays adequate comfort level or baseline comfort level:   Encourage patient to monitor pain and request assistance   Assess pain using appropriate pain scale   Administer analgesics based on type and severity of pain and evaluate response   Implement non-pharmacological measures as appropriate and evaluate response   Notify Licensed Independent Practitioner if interventions unsuccessful or patient reports new pain     Problem: Safety - Adult  Goal: Free from fall injury  5/26/2024 2250 by Mayra Nguyen RN  Outcome: Progressing  5/26/2024 0941 by Everton Mcintosh RN  Outcome: Progressing  Flowsheets (Taken 5/24/2024 0800 by Felicity Smith RN)  Free From Fall Injury: Instruct family/caregiver on patient safety     Problem: Discharge Planning  Goal: Discharge to home or other facility with  appropriate resources  Outcome: Progressing  Flowsheets (Taken 5/26/2024 2100)  Discharge to home or other facility with appropriate resources:   Identify barriers to discharge with patient and caregiver   Arrange for needed discharge resources and transportation as appropriate   Identify discharge learning needs (meds, wound care, etc)     Problem: Metabolic/Fluid and Electrolytes - Adult  Goal: Electrolytes maintained within normal limits  5/26/2024 2250 by Mayra Nguyen, RN  Outcome: Progressing  Flowsheets (Taken 5/26/2024 2100)  Electrolytes maintained within normal limits:   Monitor labs and assess patient for signs and symptoms of electrolyte imbalances   Administer electrolyte replacement as ordered   Monitor response to electrolyte replacements, including repeat lab results as appropriate  5/26/2024 0941 by Everton Mcintosh RN  Outcome: Progressing  Flowsheets (Taken 5/26/2024 0941)  Electrolytes maintained within normal limits:   Monitor labs and assess patient for signs and symptoms of electrolyte imbalances   Administer electrolyte replacement as ordered  Goal: Glucose maintained within prescribed range  Recent Flowsheet Documentation  Taken 5/26/2024 2100 by Mayra Nguyen RN  Glucose maintained within prescribed range:   Monitor blood glucose as ordered   Assess for signs and symptoms of hyperglycemia and hypoglycemia   Administer ordered medications to maintain glucose within target range  5/26/2024 0941 by Everton Mcintosh RN  Outcome: Progressing  Flowsheets (Taken 5/26/2024 0941)  Glucose maintained within prescribed range:   Monitor blood glucose as ordered   Assess for signs and symptoms of hyperglycemia and hypoglycemia   Administer ordered medications to maintain glucose within target range   Assess barriers to adequate nutritional intake and initiate nutrition consult as needed

## 2024-05-28 ENCOUNTER — APPOINTMENT (OUTPATIENT)
Facility: HOSPITAL | Age: 74
DRG: 857 | End: 2024-05-28
Payer: MEDICARE

## 2024-05-28 LAB
ACID FAST STN SPEC: NEGATIVE
ANION GAP SERPL CALC-SCNC: 3 MMOL/L (ref 3–18)
BASOPHILS # BLD: 0 K/UL (ref 0–0.1)
BASOPHILS NFR BLD: 1 % (ref 0–2)
BUN SERPL-MCNC: 10 MG/DL (ref 7–18)
BUN/CREAT SERPL: 19 (ref 12–20)
CALCIUM SERPL-MCNC: 8.7 MG/DL (ref 8.5–10.1)
CHLORIDE SERPL-SCNC: 106 MMOL/L (ref 100–111)
CO2 SERPL-SCNC: 27 MMOL/L (ref 21–32)
CREAT SERPL-MCNC: 0.54 MG/DL (ref 0.6–1.3)
DIFFERENTIAL METHOD BLD: ABNORMAL
EOSINOPHIL # BLD: 0.1 K/UL (ref 0–0.4)
EOSINOPHIL NFR BLD: 1 % (ref 0–5)
ERYTHROCYTE [DISTWIDTH] IN BLOOD BY AUTOMATED COUNT: 15.9 % (ref 11.6–14.5)
GLUCOSE BLD STRIP.AUTO-MCNC: 119 MG/DL (ref 70–110)
GLUCOSE BLD STRIP.AUTO-MCNC: 121 MG/DL (ref 70–110)
GLUCOSE BLD STRIP.AUTO-MCNC: 263 MG/DL (ref 70–110)
GLUCOSE BLD STRIP.AUTO-MCNC: 83 MG/DL (ref 70–110)
GLUCOSE SERPL-MCNC: 62 MG/DL (ref 74–99)
HCT VFR BLD AUTO: 31.3 % (ref 35–45)
HGB BLD-MCNC: 11.1 G/DL (ref 12–16)
IMM GRANULOCYTES # BLD AUTO: 0.1 K/UL (ref 0–0.04)
IMM GRANULOCYTES NFR BLD AUTO: 2 % (ref 0–0.5)
LYMPHOCYTES # BLD: 1.4 K/UL (ref 0.9–3.6)
LYMPHOCYTES NFR BLD: 16 % (ref 21–52)
MCH RBC QN AUTO: 36.2 PG (ref 24–34)
MCHC RBC AUTO-ENTMCNC: 35.5 G/DL (ref 31–37)
MCV RBC AUTO: 102 FL (ref 78–100)
MONOCYTES # BLD: 0.8 K/UL (ref 0.05–1.2)
MONOCYTES NFR BLD: 9 % (ref 3–10)
MYCOBACTERIUM SPEC QL CULT: NORMAL
NEUTS SEG # BLD: 6.3 K/UL (ref 1.8–8)
NEUTS SEG NFR BLD: 73 % (ref 40–73)
NRBC # BLD: 0 K/UL (ref 0–0.01)
NRBC BLD-RTO: 0 PER 100 WBC
PLATELET # BLD AUTO: 358 K/UL (ref 135–420)
PMV BLD AUTO: 9.2 FL (ref 9.2–11.8)
POTASSIUM SERPL-SCNC: 4.2 MMOL/L (ref 3.5–5.5)
RBC # BLD AUTO: 3.07 M/UL (ref 4.2–5.3)
SODIUM SERPL-SCNC: 136 MMOL/L (ref 136–145)
SPECIMEN PREPARATION: NORMAL
SPECIMEN SOURCE: NORMAL
WBC # BLD AUTO: 8.7 K/UL (ref 4.6–13.2)

## 2024-05-28 PROCEDURE — 82962 GLUCOSE BLOOD TEST: CPT

## 2024-05-28 PROCEDURE — 85025 COMPLETE CBC W/AUTO DIFF WBC: CPT

## 2024-05-28 PROCEDURE — 97116 GAIT TRAINING THERAPY: CPT

## 2024-05-28 PROCEDURE — 6370000000 HC RX 637 (ALT 250 FOR IP): Performed by: INTERNAL MEDICINE

## 2024-05-28 PROCEDURE — 2709999900 IR PICC WO SQ PORT/PUMP > 5 YEARS

## 2024-05-28 PROCEDURE — 2500000003 HC RX 250 WO HCPCS: Performed by: INTERNAL MEDICINE

## 2024-05-28 PROCEDURE — 80048 BASIC METABOLIC PNL TOTAL CA: CPT

## 2024-05-28 PROCEDURE — 73718 MRI LOWER EXTREMITY W/O DYE: CPT

## 2024-05-28 PROCEDURE — 2580000003 HC RX 258: Performed by: INTERNAL MEDICINE

## 2024-05-28 PROCEDURE — 6360000002 HC RX W HCPCS: Performed by: INTERNAL MEDICINE

## 2024-05-28 PROCEDURE — 97535 SELF CARE MNGMENT TRAINING: CPT

## 2024-05-28 PROCEDURE — 1100000003 HC PRIVATE W/ TELEMETRY

## 2024-05-28 PROCEDURE — 6370000000 HC RX 637 (ALT 250 FOR IP): Performed by: HOSPITALIST

## 2024-05-28 PROCEDURE — 2580000003 HC RX 258: Performed by: HOSPITALIST

## 2024-05-28 PROCEDURE — 36415 COLL VENOUS BLD VENIPUNCTURE: CPT

## 2024-05-28 PROCEDURE — 6360000002 HC RX W HCPCS: Performed by: HOSPITALIST

## 2024-05-28 PROCEDURE — 99232 SBSQ HOSP IP/OBS MODERATE 35: CPT | Performed by: INTERNAL MEDICINE

## 2024-05-28 RX ORDER — HEPARIN SODIUM 200 [USP'U]/100ML
1000 INJECTION, SOLUTION INTRAVENOUS ONCE
Status: COMPLETED | OUTPATIENT
Start: 2024-05-28 | End: 2024-05-28

## 2024-05-28 RX ORDER — DEXTROSE MONOHYDRATE AND SODIUM CHLORIDE 5; .45 G/100ML; G/100ML
INJECTION, SOLUTION INTRAVENOUS CONTINUOUS
Status: DISCONTINUED | OUTPATIENT
Start: 2024-05-28 | End: 2024-05-30 | Stop reason: HOSPADM

## 2024-05-28 RX ORDER — HEPARIN SODIUM (PORCINE) LOCK FLUSH IV SOLN 100 UNIT/ML 100 UNIT/ML
500 SOLUTION INTRAVENOUS EVERY 8 HOURS PRN
Status: DISCONTINUED | OUTPATIENT
Start: 2024-05-28 | End: 2024-05-30 | Stop reason: HOSPADM

## 2024-05-28 RX ADMIN — AZTREONAM 1000 MG: 2 INJECTION, POWDER, LYOPHILIZED, FOR SOLUTION INTRAMUSCULAR; INTRAVENOUS at 16:26

## 2024-05-28 RX ADMIN — ONDANSETRON 4 MG: 2 INJECTION INTRAMUSCULAR; INTRAVENOUS at 10:55

## 2024-05-28 RX ADMIN — HEPARIN SODIUM IN SODIUM CHLORIDE 1000 UNITS: 200 INJECTION INTRAVENOUS at 14:52

## 2024-05-28 RX ADMIN — TRAMADOL HYDROCHLORIDE 75 MG: 50 TABLET ORAL at 10:50

## 2024-05-28 RX ADMIN — ONDANSETRON 4 MG: 2 INJECTION INTRAMUSCULAR; INTRAVENOUS at 20:05

## 2024-05-28 RX ADMIN — ENOXAPARIN SODIUM 60 MG: 100 INJECTION SUBCUTANEOUS at 20:11

## 2024-05-28 RX ADMIN — HEPARIN SODIUM (PORCINE) LOCK FLUSH IV SOLN 100 UNIT/ML 500 UNITS: 100 SOLUTION at 14:53

## 2024-05-28 RX ADMIN — CLONIDINE HYDROCHLORIDE 0.1 MG: 0.1 TABLET ORAL at 10:00

## 2024-05-28 RX ADMIN — AZTREONAM 1000 MG: 1 INJECTION, POWDER, LYOPHILIZED, FOR SOLUTION INTRAMUSCULAR; INTRAVENOUS at 06:32

## 2024-05-28 RX ADMIN — ROPINIROLE HYDROCHLORIDE 4 MG: 1 TABLET, FILM COATED ORAL at 20:11

## 2024-05-28 RX ADMIN — CLOPIDOGREL BISULFATE 75 MG: 75 TABLET ORAL at 10:00

## 2024-05-28 RX ADMIN — DEXTROSE AND SODIUM CHLORIDE: 5; 450 INJECTION, SOLUTION INTRAVENOUS at 13:57

## 2024-05-28 RX ADMIN — POLYETHYLENE GLYCOL 3350 17 G: 17 POWDER, FOR SOLUTION ORAL at 10:00

## 2024-05-28 RX ADMIN — SODIUM CHLORIDE, PRESERVATIVE FREE 10 ML: 5 INJECTION INTRAVENOUS at 20:10

## 2024-05-28 RX ADMIN — LIDOCAINE HYDROCHLORIDE 5 ML: 10 INJECTION, SOLUTION INFILTRATION; PERINEURAL at 14:52

## 2024-05-28 RX ADMIN — METOPROLOL TARTRATE 25 MG: 25 TABLET, FILM COATED ORAL at 01:16

## 2024-05-28 RX ADMIN — TRAMADOL HYDROCHLORIDE 75 MG: 50 TABLET ORAL at 04:41

## 2024-05-28 RX ADMIN — METOPROLOL TARTRATE 25 MG: 25 TABLET, FILM COATED ORAL at 10:00

## 2024-05-28 RX ADMIN — METOPROLOL TARTRATE 25 MG: 25 TABLET, FILM COATED ORAL at 20:11

## 2024-05-28 RX ADMIN — SODIUM CHLORIDE, PRESERVATIVE FREE 10 ML: 5 INJECTION INTRAVENOUS at 10:40

## 2024-05-28 RX ADMIN — TRAMADOL HYDROCHLORIDE 75 MG: 50 TABLET ORAL at 20:19

## 2024-05-28 RX ADMIN — INSULIN GLARGINE 5 UNITS: 100 INJECTION, SOLUTION SUBCUTANEOUS at 20:11

## 2024-05-28 RX ADMIN — ROPINIROLE HYDROCHLORIDE 4 MG: 1 TABLET, FILM COATED ORAL at 10:00

## 2024-05-28 ASSESSMENT — PAIN SCALES - GENERAL
PAINLEVEL_OUTOF10: 3
PAINLEVEL_OUTOF10: 6
PAINLEVEL_OUTOF10: 8

## 2024-05-28 ASSESSMENT — PAIN DESCRIPTION - LOCATION
LOCATION: FOOT

## 2024-05-28 NOTE — PROGRESS NOTES
Hospital day several    Subjective:     Ms. Duncan denies chest pain, chest pressure/discomfort, dyspnea, exertional chest pressure/discomfort, irregular heart beat, orthopnea, palpitations, and paroxysmal nocturnal dyspnea    Medication side effects: none    Scheduled Meds:   aztreonam (AZACTAM) 1,000 mg in sodium chloride 0.9 % 50 mL IVPB  1,000 mg IntraVENous Q8H    polyethylene glycol  17 g Oral Daily    insulin glargine  5 Units SubCUTAneous Nightly    insulin lispro  0-8 Units SubCUTAneous TID WC    [Held by provider] insulin lispro  0-4 Units SubCUTAneous Nightly    enoxaparin  1 mg/kg SubCUTAneous BID    DAPTOmycin (CUBICIN) 345 mg in sodium chloride 0.9 % 50 mL IVPB  6 mg/kg (Adjusted) IntraVENous Q24H    sodium chloride flush  5-40 mL IntraVENous 2 times per day    metoprolol tartrate  25 mg Oral BID    rOPINIRole  4 mg Oral BID    clopidogrel  75 mg Oral Daily    cloNIDine  0.1 mg Oral Daily     Continuous Infusions:   dextrose 5 % and 0.45 % NaCl 50 mL/hr at 05/28/24 1357    sodium chloride      dextrose       PRN Meds:heparin, naloxone, traMADol, LORazepam, sodium chloride flush, sodium chloride, ondansetron **OR** ondansetron, glucose, dextrose bolus **OR** dextrose bolus, glucagon (rDNA), dextrose, hydrALAZINE      Objective:      Physical Exam:     General Appearance: Comfortable, not using accessory muscles of respiration.  HEENT: LIU.   HEAD: Atraumatic  NECK: No JVD, no thyroidomeglay. CAROTIDS:  LUNGS: Clear bilaterally.         HEART: S1+S2 audible, 2/6  systolic murmur in aortic area, no pericardial rub.                    ABD: Non-tender, BS Audible                          EXT: No edema, and no cysnosis.  VASCULAR EXAM: Pulses are intact.                         PSYCHIATRIC EXAM: Mood is appropriate.  MUSCULOSKELETAL: Grossly no joint deformity.  NEUROLOGICAL:  AAO times 3,Motor and sensory sytem intact    Cardiographics  ECG: No acute change.  Echocardiogram: See report    Imaging  Chest

## 2024-05-28 NOTE — PROGRESS NOTES
Hospitalist Progress Note-critical care note     Patient: Angelica Duncan MRN: [id number]  CSN: [id number]    YOB: 1950  Age: 73 y.o.  Sex: female    DOA: 5/23/2024 LOS:  LOS: 5 days            Chief complaint: rt foot infection dm cad lupus anticoagulant syndrome     Assessment/Plan         Active Hospital Problems    Diagnosis Date Noted    Bacteremia [R78.81] 05/26/2024    Hypokalemia [E87.6] 05/25/2024    Right foot infection [L08.9] 05/23/2024    Type 2 diabetes mellitus without complication, with long-term current use of insulin (HCC) [E11.9, Z79.4] 05/23/2024    Multiple drug allergies [Z88.9] 05/23/2024    Arterial thrombosis (HCC) [I74.9] 03/18/2021    CAD (coronary artery disease) [I25.10] 04/13/2018    Anxiety [F41.9] 10/15/2017    Lupus anticoagulant syndrome (HCC) [D68.62] 12/29/2016        Rt foot infection with abscess and bacteremia   Need urgent surgery otherwise will be potential sepsis   ID consulted for iv abx with hx multiple abx allergic to  May 23:RIGHT FOOT DEBRIDEMENT INCISION AND DRAINAGE WITH CULTURES   Will have second surgery next Tuesday  per Dr. Lance   Tolerate ultram well   Continue IV antibiotics per infection disease recommendation : Azactam, daptomycin, Flagyl  Cx positive strep -ct abdomen   Mri per Dr. Lance recommend and planning surgery tonight.          DM type II , with complication,  Low this morning, hold night sliding scale  -long term insulin , Complication with foot infectin   - ssi, decrease Lantus to 8  Npo now will give d5 0.45 infusion      HTN, accelerated  Continue home medication.     Cad and pad , hx of arterial thrombosis   Continue plavix, inr daily and start warfarin after surgery   lovenox full dose      Anxiety   continue home meds after verified with pt      Lupus anticoagulant syndrome    Warfarin at home , on lovenox      Restless leg   Continue home meds      Multiple medication allergy    Hypokalemia, resolved      Elevated trop

## 2024-05-28 NOTE — PERIOP NOTE
TRANSFER - IN REPORT:    Verbal report received from ALEXIS Zee on Angelica Duncan  being received from 3N for ordered procedure      Report consisted of patient's Situation, Background, Assessment and   Recommendations(SBAR).     Information from the following report(s) Nurse Handoff Report, Intake/Output, MAR, and Pre Procedure Checklist was reviewed with the receiving nurse.    Opportunity for questions and clarification was provided.      Assessment completed upon patient's arrival to unit and care assumed.     Yayo also notified signee that security is in the patient's room at this time. Smell of marijuana noted in her room and patient refuses to \"give up her belongings\" to staff unless her belongings were given to a family member/friend. CRNA, OR , and Dr. Spaulding notified of this. Dr. Spaulding states that he will notify Dr. Lance of this.

## 2024-05-28 NOTE — PROGRESS NOTES
2. Left adrenal nodule incompletely characterized. No etiology of the patient's bacteremia is identified    Co-morbidities:    CAD, HTN,  DMT2, LAC syndrome, history of left artrial axillary ot femoral bypass using graft 08/2021 due to critical limb ischemia  THC use for severe nausea-- daily  History of gastric/abdominal surgery/resection 2009    Recommendation -- ID related:     KELLY Lance- plans to review MRI to assess bones for OM, might be somewhat challanging given recent surgery- but will review and may plan for extended treatment with IV abx.  Cont daptomycin IV- 6mg/kg( due to allergy profile). Monitor cpk weekly  Continue azreonam IV  PICC ordered  MRI pending  Plans for graft today as per Dr Lance. Clinically ok to proceed with procedure from ID side.    Needs allergy specialist evaluation to sort out true allergies. ( Can refer to Dr Dustin Ortiz office in [place] on discharge OR other allergy MD as available).     Discussed with ( in bold) :  Patient / family,  MD-Dr Lance/Nursing , Microbiology/ Laboratory, Radiology, S.Worker/,  Others     Subjective:       Patient seen and examined at bedside. Constipation resolved with miralax. No abd pain. Denies F/C/R.  Tolerating antibiotics - no complaints of nauseas, vomiting, diarrhea or abdominal. No itching or skin rash.  Infusion going well so far-no reaction so far.      Notes/Labs/Cultures and Imaging reports since last round reviewed  --     HPI:      Angelica Duncan is a 73 y.o. female with PMH of DM, PAD with history of L ischemic /critical limb in 2021, and with history of TMA R foot. She underwent wound closure, removal of exposed bone on 5/17/24 with graft placement -received pre-op abx with Clindamycin. She had done ok until 5/23- she felt pain in foot, went to see Dr Lance and was admitted for purulent drainage from foot. No fever. Picture of wound on presentation reviewed in medial.    Taken to OR and I and D with abscess  drainage done 5/23/24. She is currently on Aztreonam and Clindamycin for above.    On exam, no distress. She denies F/C/R/SOB or diarrhea.  She suffers from nausea and takes daily THC as smoking/Vaping or chewing. No other drug use.  ETOH-rum -1/2 glass few times a week.        Past Medical History:   Diagnosis Date    Arthritis     hands and feet    Asthma     Autoimmune disease (HCC)     lupus anticoagulant-causes clotting    Cancer (HCC)     carcinoma in situ of cervix and uterus; inside bottom lip    Chronic pain     right foot    Diabetes (HCC) 2013    Dizzy spells     h/o    Full dentures     upper and lower    GERD (gastroesophageal reflux disease)     Hearing loss     bilateral wears hearing aids    History of blood transfusion     multiple    Hyperlipidemia     PCP [place] [facility]    Liver disease     1/2 of my liver was removed for ulcers.    Lung collapse     2015    Nausea & vomiting 5/23/2012    chronic    Neuropathy     right foot    Other ill-defined conditions(799.89)     (2 mutated genes) hyper coag. state     Pneumonia 07/22/2017    PONV (postoperative nausea and vomiting)     Positive cardiolipin antibodies     Psychiatric disorder     anxiety   no meds    PUD (peptic ulcer disease) 2009    H/O requiring partial gastrectomy and small intestine    Raynaud's syndrome     Restless leg syndrome     Retention of urine     Skin cancer 2000    mouth    Thromboembolus (HCC) 2000    RLE- arterial    Wears glasses     readers     Past Surgical History:   Procedure Laterality Date    APPENDECTOMY      BREAST SURGERY Bilateral 02/2016    hemorrhaged after breast surgery    BREAST SURGERY      developed infection and implant removed    CHOLECYSTECTOMY      FOOT DEBRIDEMENT Right 5/17/2024    INCISION BONE CORTEX RIGHT FOOT AND SURGICAL WOUND PREP, STRAVIX GRAFT, RIGHT FOOT WITH C-ARM(SPEC POP) performed by Willie Lance DPM at [facility] MAIN OR    FOOT DEBRIDEMENT Right 5/23/2024    RIGHT FOOT DEBRIDEMENT

## 2024-05-28 NOTE — PROGRESS NOTES
1650-This nurse was informed by CNA and other nursing staff members that there seemed to be a smell of marijuana coming from patients room, this nurse,security and nurse manager went to patients room to speak with patient, patient preferred only to speak with the nurse Manager. Per nurse Manager,patient admitted  that she had a marijuana vape pen that was in her bag, patient declined to hand the pen/material over to hospital security/staff. Director of nursing and nurse manager at bedside speaking to patient. Patient had a scheduled foot procedure in the OR with the podiatrist this evening and was made aware that the pen can not stay in her room,especially while she is downstairs, she continued to decline to hand over Vape pen to hospital staff and instead, stated she has a friend that will come pick it up, patients friend arrived and took pen from patient at this time.      1715-This nurse received a call from , patients foot Incision and Drainage will have to be rescheduled due to her not being able to receive anesthesia this evening, procedure to be rescheduled for tomorrow evening.patient made aware.

## 2024-05-28 NOTE — PLAN OF CARE
Problem: Chronic Conditions and Co-morbidities  Goal: Patient's chronic conditions and co-morbidity symptoms are monitored and maintained or improved  Outcome: Completed  Flowsheets (Taken 5/28/2024 0800)  Care Plan - Patient's Chronic Conditions and Co-Morbidity Symptoms are Monitored and Maintained or Improved: Monitor and assess patient's chronic conditions and comorbid symptoms for stability, deterioration, or improvement     Problem: Pain  Goal: Verbalizes/displays adequate comfort level or baseline comfort level  Outcome: Completed     Problem: Safety - Adult  Goal: Free from fall injury  Outcome: Completed     Problem: Discharge Planning  Goal: Discharge to home or other facility with appropriate resources  Outcome: Completed  Flowsheets (Taken 5/28/2024 0800)  Discharge to home or other facility with appropriate resources: Identify barriers to discharge with patient and caregiver     Problem: Metabolic/Fluid and Electrolytes - Adult  Goal: Electrolytes maintained within normal limits  Outcome: Completed  Flowsheets (Taken 5/28/2024 0800)  Electrolytes maintained within normal limits: Monitor labs and assess patient for signs and symptoms of electrolyte imbalances  Goal: Hemodynamic stability and optimal renal function maintained  Outcome: Completed  Flowsheets (Taken 5/28/2024 0800)  Hemodynamic stability and optimal renal function maintained: Monitor labs and assess for signs and symptoms of volume excess or deficit  Goal: Glucose maintained within prescribed range  Outcome: Completed  Flowsheets (Taken 5/28/2024 0800)  Glucose maintained within prescribed range: Monitor blood glucose as ordered     Problem: Hematologic - Adult  Goal: Maintains hematologic stability  Outcome: Completed  Flowsheets (Taken 5/28/2024 0800)  Maintains hematologic stability: Assess for signs and symptoms of bleeding or hemorrhage

## 2024-05-28 NOTE — PROGRESS NOTES
Physical Therapy Goals:  Initiated 5/28/2024 to be met within 7-10 days.  Short Term Goals  Short Term Goal 1: Patient will perform supine to/from sit with independence  Short Term Goal 2: Patient will perform sit to/from stand with supervision in prep for gait progression  Short Term Goal 3: Patient will ambulate 150 ft with LRAD and supervision to progress OOB mobility  Short Term Goal 4: Patient will negotiate 4 stairs with handrails and CGA  PHYSICAL THERAPY TREATMENT    Patient: Angelica Duncan (73 y.o. female)  Date: 5/28/2024  Diagnosis: Right foot infection [L08.9]  Postoperative infection, unspecified type, initial encounter [T81.40XA] Right foot infection  Procedure(s) (LRB):  RIGHT FOOT DEBRIDEMENT INCISION AND DRAINAGE WITH CULTURES (Right) 5 Days Post-Op  Precautions: Fall Risk, Weight Bearing, Right Lower Extremity Weight Bearing: Partial Weight Bearing (with bathroom priviledges)     ASSESSMENT:  Patient motivated to participate with PT. Patient performed bed mobility and stand transfers with SBA. Patient does require assist to manipulate wound vac and ensure safe mobility. Patient progress to 200 ft with \"knee scooter\" and SBA. Patient fatigues quickly and SOB noted after hallway mobility. Patient limited by endurance. Reviewed LE exercises for strengthening. Patient maintains NWB throughout session. Will continue to follow patient and progress mobility as able.     Progression toward goals:   []      Improving appropriately and progressing toward goals  [x]      Improving slowly and progressing toward goals  []      Not making progress toward goals and plan of care will be adjusted     PLAN:  Patient continues to benefit from skilled intervention to address the above impairments.  Continue treatment per established plan of care.    Further Equipment Recommendations for Discharge:   Yes (RW)    Discharge Recommendations: Home with Home health PT    [facility]:   AM-PAC Inpatient Mobility without Stair

## 2024-05-28 NOTE — CARE COORDINATION
The patient's plan is return to the OR today with podiatry. The patient will need a PICC and wound vac prior to DC. The patient states she is not agreeable to DC to a rehab facility at the time of DC. The patient states she would be agreeable to DC to home with  as she has had this service before, the patient also states she has had a PICC and wound vac before and is able to care for these things at home. FOC was offered and the patient chose [facility]. Will continue to follow.

## 2024-05-28 NOTE — PROGRESS NOTES
Occupational Therapy Goals:  Initiated 5/28/2024 to be met within 7-10 days.  Short Term Goals  Time Frame for Short Term Goals: 7 days  Short Term Goal 1: Patient will complete toilet transfer with mod I  Short Term Goal 2: Patient will complete bathing with mod I  Short Term Goal 3: Patient will complete grooming  mod I  Short Term Goal 4: Patient will complete dressing with mod I  Short Term Goal 5: Patient will complete toileting with mod I      OCCUPATIONAL THERAPY TREATMENT    Patient: Angelica Duncan (73 y.o. female)  Date: 5/28/2024  Diagnosis: Right foot infection [L08.9]  Postoperative infection, unspecified type, initial encounter [T81.40XA] Right foot infection  Procedure(s) (LRB):  RIGHT FOOT DEBRIDEMENT INCISION AND DRAINAGE WITH CULTURES (Right) 5 Days Post-Op  Precautions: Fall Risk, Weight Bearing, Right Lower Extremity Weight Bearing: Partial Weight Bearing (with bathroom priviledges),  ,  ,  ,  ,  ,    PLOF: See evaluation    Chart, occupational therapy assessment, plan of care, and goals were reviewed.  ASSESSMENT:  Patient presented seated EOB with gripper socks and IV line. Patient with no visitors present for entire session.  Patient completed bathing setup assist, upper body dressing setup assist, and lower body dressing setup assist. Patient denied need to use bathroom at this time. Due to deficits in balance, coordination, strength, and endurance patient has decreased independence with ADLs and functional mobility, continue to recommend home health occupational therapy at discharge     Progression toward goals:  [x]          Improving appropriately and progressing toward goals  []          Improving slowly and progressing toward goals  []          Not making progress toward goals and plan of care will be adjusted     PLAN:  Patient continues to benefit from skilled intervention to address the above impairments.  Continue treatment per established plan of care.    Further Equipment

## 2024-05-28 NOTE — PLAN OF CARE
Problem: Chronic Conditions and Co-morbidities  Goal: Patient's chronic conditions and co-morbidity symptoms are monitored and maintained or improved  Outcome: Progressing     Problem: Pain  Goal: Verbalizes/displays adequate comfort level or baseline comfort level  Outcome: Progressing     Problem: Safety - Adult  Goal: Free from fall injury  5/27/2024 2236 by Delma Aceves, RN  Outcome: Progressing  5/27/2024 1146 by Everton Mcintosh RN  Outcome: Progressing  Flowsheets (Taken 5/24/2024 0800 by Felicity Smith, RN)  Free From Fall Injury: Instruct family/caregiver on patient safety     Problem: Discharge Planning  Goal: Discharge to home or other facility with appropriate resources  Outcome: Progressing     Problem: Metabolic/Fluid and Electrolytes - Adult  Goal: Glucose maintained within prescribed range  5/27/2024 1146 by Everton Mcintosh RN  Flowsheets (Taken 5/26/2024 2100 by Mayra Nguyen, RN)  Glucose maintained within prescribed range:   Monitor blood glucose as ordered   Assess for signs and symptoms of hyperglycemia and hypoglycemia   Administer ordered medications to maintain glucose within target range     Problem: Hematologic - Adult  Goal: Maintains hematologic stability  5/27/2024 1146 by Everton Mcintosh RN  Flowsheets (Taken 5/26/2024 2100 by Mayra Nguyen, RN)  Maintains hematologic stability: Assess for signs and symptoms of bleeding or hemorrhage

## 2024-05-29 ENCOUNTER — ANESTHESIA (OUTPATIENT)
Facility: HOSPITAL | Age: 74
DRG: 904 | End: 2024-05-29
Payer: MEDICARE

## 2024-05-29 ENCOUNTER — ANESTHESIA EVENT (OUTPATIENT)
Facility: HOSPITAL | Age: 74
DRG: 904 | End: 2024-05-29
Payer: MEDICARE

## 2024-05-29 PROBLEM — M86.9 OM (OSTEOMYELITIS) (HCC): Status: ACTIVE | Noted: 2024-05-29

## 2024-05-29 PROBLEM — T81.31XA SURGICAL WOUND DEHISCENCE: Status: ACTIVE | Noted: 2024-05-29

## 2024-05-29 LAB
BACTERIA SPEC CULT: NORMAL
CK SERPL-CCNC: 91 U/L (ref 26–192)
CRP SERPL-MCNC: 3.9 MG/DL (ref 0–0.3)
ERYTHROCYTE [SEDIMENTATION RATE] IN BLOOD: 31 MM/HR (ref 0–30)
GLUCOSE BLD STRIP.AUTO-MCNC: 127 MG/DL (ref 70–110)
GLUCOSE BLD STRIP.AUTO-MCNC: 157 MG/DL (ref 70–110)
GLUCOSE BLD STRIP.AUTO-MCNC: 159 MG/DL (ref 70–110)
GLUCOSE BLD STRIP.AUTO-MCNC: 176 MG/DL (ref 70–110)
GLUCOSE BLD STRIP.AUTO-MCNC: 335 MG/DL (ref 70–110)
GLUCOSE BLD STRIP.AUTO-MCNC: 96 MG/DL (ref 70–110)
SERVICE CMNT-IMP: NORMAL

## 2024-05-29 PROCEDURE — 6360000002 HC RX W HCPCS: Performed by: HOSPITALIST

## 2024-05-29 PROCEDURE — 87205 SMEAR GRAM STAIN: CPT

## 2024-05-29 PROCEDURE — 2580000003 HC RX 258: Performed by: INTERNAL MEDICINE

## 2024-05-29 PROCEDURE — 6370000000 HC RX 637 (ALT 250 FOR IP): Performed by: HOSPITALIST

## 2024-05-29 PROCEDURE — 3700000000 HC ANESTHESIA ATTENDED CARE: Performed by: PODIATRIST

## 2024-05-29 PROCEDURE — 99232 SBSQ HOSP IP/OBS MODERATE 35: CPT | Performed by: INTERNAL MEDICINE

## 2024-05-29 PROCEDURE — 2500000003 HC RX 250 WO HCPCS: Performed by: PODIATRIST

## 2024-05-29 PROCEDURE — 2580000003 HC RX 258: Performed by: HOSPITALIST

## 2024-05-29 PROCEDURE — 6360000002 HC RX W HCPCS

## 2024-05-29 PROCEDURE — 6370000000 HC RX 637 (ALT 250 FOR IP): Performed by: FAMILY MEDICINE

## 2024-05-29 PROCEDURE — 7100000001 HC PACU RECOVERY - ADDTL 15 MIN: Performed by: PODIATRIST

## 2024-05-29 PROCEDURE — 87075 CULTR BACTERIA EXCEPT BLOOD: CPT

## 2024-05-29 PROCEDURE — 2709999900 HC NON-CHARGEABLE SUPPLY: Performed by: PODIATRIST

## 2024-05-29 PROCEDURE — 3600000012 HC SURGERY LEVEL 2 ADDTL 15MIN: Performed by: PODIATRIST

## 2024-05-29 PROCEDURE — 82550 ASSAY OF CK (CPK): CPT

## 2024-05-29 PROCEDURE — 36415 COLL VENOUS BLD VENIPUNCTURE: CPT

## 2024-05-29 PROCEDURE — 6360000002 HC RX W HCPCS: Performed by: INTERNAL MEDICINE

## 2024-05-29 PROCEDURE — 87070 CULTURE OTHR SPECIMN AEROBIC: CPT

## 2024-05-29 PROCEDURE — 85652 RBC SED RATE AUTOMATED: CPT

## 2024-05-29 PROCEDURE — 7100000000 HC PACU RECOVERY - FIRST 15 MIN: Performed by: PODIATRIST

## 2024-05-29 PROCEDURE — 2580000003 HC RX 258: Performed by: PODIATRIST

## 2024-05-29 PROCEDURE — 82962 GLUCOSE BLOOD TEST: CPT

## 2024-05-29 PROCEDURE — 1100000003 HC PRIVATE W/ TELEMETRY

## 2024-05-29 PROCEDURE — 86140 C-REACTIVE PROTEIN: CPT

## 2024-05-29 PROCEDURE — 3700000001 HC ADD 15 MINUTES (ANESTHESIA): Performed by: PODIATRIST

## 2024-05-29 PROCEDURE — 3600000002 HC SURGERY LEVEL 2 BASE: Performed by: PODIATRIST

## 2024-05-29 DEVICE — GRAFT HUM TISS W3XL6CM CRYOPRESERVED PLCNTA TISS UMB AMNION: Type: IMPLANTABLE DEVICE | Site: FOOT | Status: FUNCTIONAL

## 2024-05-29 RX ORDER — SODIUM CHLORIDE, SODIUM LACTATE, POTASSIUM CHLORIDE, CALCIUM CHLORIDE 600; 310; 30; 20 MG/100ML; MG/100ML; MG/100ML; MG/100ML
INJECTION, SOLUTION INTRAVENOUS CONTINUOUS
Status: DISCONTINUED | OUTPATIENT
Start: 2024-05-29 | End: 2024-05-30 | Stop reason: HOSPADM

## 2024-05-29 RX ORDER — ONDANSETRON 2 MG/ML
4 INJECTION INTRAMUSCULAR; INTRAVENOUS EVERY 8 HOURS PRN
Status: DISCONTINUED | OUTPATIENT
Start: 2024-05-29 | End: 2024-05-30 | Stop reason: HOSPADM

## 2024-05-29 RX ORDER — SODIUM CHLORIDE, SODIUM LACTATE, POTASSIUM CHLORIDE, CALCIUM CHLORIDE 600; 310; 30; 20 MG/100ML; MG/100ML; MG/100ML; MG/100ML
INJECTION, SOLUTION INTRAVENOUS CONTINUOUS
Status: DISCONTINUED | OUTPATIENT
Start: 2024-05-29 | End: 2024-05-29 | Stop reason: HOSPADM

## 2024-05-29 RX ORDER — FENTANYL CITRATE 50 UG/ML
25 INJECTION, SOLUTION INTRAMUSCULAR; INTRAVENOUS EVERY 5 MIN PRN
Status: DISCONTINUED | OUTPATIENT
Start: 2024-05-29 | End: 2024-05-29 | Stop reason: HOSPADM

## 2024-05-29 RX ORDER — SODIUM CHLORIDE 0.9 % (FLUSH) 0.9 %
5-40 SYRINGE (ML) INJECTION EVERY 12 HOURS SCHEDULED
Status: DISCONTINUED | OUTPATIENT
Start: 2024-05-29 | End: 2024-05-29 | Stop reason: HOSPADM

## 2024-05-29 RX ORDER — NALOXONE HYDROCHLORIDE 0.4 MG/ML
INJECTION, SOLUTION INTRAMUSCULAR; INTRAVENOUS; SUBCUTANEOUS PRN
Status: DISCONTINUED | OUTPATIENT
Start: 2024-05-29 | End: 2024-05-29 | Stop reason: HOSPADM

## 2024-05-29 RX ORDER — LIDOCAINE HYDROCHLORIDE 20 MG/ML
INJECTION, SOLUTION INTRAVENOUS PRN
Status: DISCONTINUED | OUTPATIENT
Start: 2024-05-29 | End: 2024-05-29 | Stop reason: SDUPTHER

## 2024-05-29 RX ORDER — OXYCODONE HYDROCHLORIDE 5 MG/1
5 TABLET ORAL
Status: DISCONTINUED | OUTPATIENT
Start: 2024-05-29 | End: 2024-05-29 | Stop reason: HOSPADM

## 2024-05-29 RX ORDER — DIPHENHYDRAMINE HYDROCHLORIDE 50 MG/ML
12.5 INJECTION INTRAMUSCULAR; INTRAVENOUS
Status: DISCONTINUED | OUTPATIENT
Start: 2024-05-29 | End: 2024-05-29 | Stop reason: HOSPADM

## 2024-05-29 RX ORDER — PROPOFOL 10 MG/ML
INJECTION, EMULSION INTRAVENOUS PRN
Status: DISCONTINUED | OUTPATIENT
Start: 2024-05-29 | End: 2024-05-29 | Stop reason: SDUPTHER

## 2024-05-29 RX ORDER — ONDANSETRON 2 MG/ML
4 INJECTION INTRAMUSCULAR; INTRAVENOUS
Status: DISCONTINUED | OUTPATIENT
Start: 2024-05-29 | End: 2024-05-29 | Stop reason: HOSPADM

## 2024-05-29 RX ORDER — DROPERIDOL 2.5 MG/ML
0.62 INJECTION, SOLUTION INTRAMUSCULAR; INTRAVENOUS
Status: DISCONTINUED | OUTPATIENT
Start: 2024-05-29 | End: 2024-05-29 | Stop reason: HOSPADM

## 2024-05-29 RX ORDER — SODIUM CHLORIDE 0.9 % (FLUSH) 0.9 %
5-40 SYRINGE (ML) INJECTION PRN
Status: DISCONTINUED | OUTPATIENT
Start: 2024-05-29 | End: 2024-05-29 | Stop reason: HOSPADM

## 2024-05-29 RX ORDER — MIDAZOLAM HYDROCHLORIDE 1 MG/ML
INJECTION INTRAMUSCULAR; INTRAVENOUS PRN
Status: DISCONTINUED | OUTPATIENT
Start: 2024-05-29 | End: 2024-05-29 | Stop reason: SDUPTHER

## 2024-05-29 RX ORDER — BUPIVACAINE HYDROCHLORIDE AND EPINEPHRINE 5; 5 MG/ML; UG/ML
INJECTION, SOLUTION EPIDURAL; INTRACAUDAL; PERINEURAL PRN
Status: DISCONTINUED | OUTPATIENT
Start: 2024-05-29 | End: 2024-05-29 | Stop reason: ALTCHOICE

## 2024-05-29 RX ORDER — ONDANSETRON 4 MG/1
4 TABLET, ORALLY DISINTEGRATING ORAL EVERY 8 HOURS PRN
Status: DISCONTINUED | OUTPATIENT
Start: 2024-05-29 | End: 2024-05-30 | Stop reason: HOSPADM

## 2024-05-29 RX ORDER — SODIUM CHLORIDE 9 MG/ML
INJECTION, SOLUTION INTRAVENOUS PRN
Status: DISCONTINUED | OUTPATIENT
Start: 2024-05-29 | End: 2024-05-29 | Stop reason: HOSPADM

## 2024-05-29 RX ORDER — LABETALOL HYDROCHLORIDE 5 MG/ML
10 INJECTION, SOLUTION INTRAVENOUS
Status: DISCONTINUED | OUTPATIENT
Start: 2024-05-29 | End: 2024-05-29 | Stop reason: HOSPADM

## 2024-05-29 RX ADMIN — ROPINIROLE HYDROCHLORIDE 4 MG: 1 TABLET, FILM COATED ORAL at 22:02

## 2024-05-29 RX ADMIN — PROPOFOL 20 MG: 10 INJECTION, EMULSION INTRAVENOUS at 18:34

## 2024-05-29 RX ADMIN — INSULIN GLARGINE 5 UNITS: 100 INJECTION, SOLUTION SUBCUTANEOUS at 22:01

## 2024-05-29 RX ADMIN — DAPTOMYCIN 345 MG: 500 INJECTION, POWDER, LYOPHILIZED, FOR SOLUTION INTRAVENOUS at 01:47

## 2024-05-29 RX ADMIN — SODIUM CHLORIDE, POTASSIUM CHLORIDE, SODIUM LACTATE AND CALCIUM CHLORIDE: 600; 310; 30; 20 INJECTION, SOLUTION INTRAVENOUS at 22:03

## 2024-05-29 RX ADMIN — AZTREONAM 1000 MG: 2 INJECTION, POWDER, LYOPHILIZED, FOR SOLUTION INTRAMUSCULAR; INTRAVENOUS at 16:55

## 2024-05-29 RX ADMIN — AZTREONAM 1000 MG: 2 INJECTION, POWDER, LYOPHILIZED, FOR SOLUTION INTRAMUSCULAR; INTRAVENOUS at 08:48

## 2024-05-29 RX ADMIN — TRAMADOL HYDROCHLORIDE 75 MG: 50 TABLET ORAL at 08:47

## 2024-05-29 RX ADMIN — METOPROLOL TARTRATE 25 MG: 25 TABLET, FILM COATED ORAL at 22:02

## 2024-05-29 RX ADMIN — LIDOCAINE HYDROCHLORIDE 100 MG: 20 INJECTION, SOLUTION INTRAVENOUS at 18:23

## 2024-05-29 RX ADMIN — ONDANSETRON 4 MG: 4 TABLET, ORALLY DISINTEGRATING ORAL at 14:29

## 2024-05-29 RX ADMIN — LORAZEPAM 1 MG: 1 TABLET ORAL at 23:24

## 2024-05-29 RX ADMIN — ROPINIROLE HYDROCHLORIDE 4 MG: 1 TABLET, FILM COATED ORAL at 08:47

## 2024-05-29 RX ADMIN — PROPOFOL 30 MG: 10 INJECTION, EMULSION INTRAVENOUS at 18:40

## 2024-05-29 RX ADMIN — CLONIDINE HYDROCHLORIDE 0.1 MG: 0.1 TABLET ORAL at 08:47

## 2024-05-29 RX ADMIN — DEXTROSE AND SODIUM CHLORIDE: 5; 450 INJECTION, SOLUTION INTRAVENOUS at 14:34

## 2024-05-29 RX ADMIN — ONDANSETRON 4 MG: 2 INJECTION INTRAMUSCULAR; INTRAVENOUS at 21:51

## 2024-05-29 RX ADMIN — ONDANSETRON 4 MG: 4 TABLET, ORALLY DISINTEGRATING ORAL at 06:29

## 2024-05-29 RX ADMIN — SODIUM CHLORIDE, SODIUM LACTATE, POTASSIUM CHLORIDE, AND CALCIUM CHLORIDE: 600; 310; 30; 20 INJECTION, SOLUTION INTRAVENOUS at 18:14

## 2024-05-29 RX ADMIN — METOPROLOL TARTRATE 25 MG: 25 TABLET, FILM COATED ORAL at 08:47

## 2024-05-29 RX ADMIN — PROPOFOL 30 MG: 10 INJECTION, EMULSION INTRAVENOUS at 18:30

## 2024-05-29 RX ADMIN — SODIUM CHLORIDE, PRESERVATIVE FREE 10 ML: 5 INJECTION INTRAVENOUS at 23:21

## 2024-05-29 RX ADMIN — HYDRALAZINE HYDROCHLORIDE 10 MG: 20 INJECTION, SOLUTION INTRAMUSCULAR; INTRAVENOUS at 16:17

## 2024-05-29 RX ADMIN — TRAMADOL HYDROCHLORIDE 75 MG: 50 TABLET ORAL at 14:30

## 2024-05-29 RX ADMIN — TRAMADOL HYDROCHLORIDE 75 MG: 50 TABLET ORAL at 22:01

## 2024-05-29 RX ADMIN — DAPTOMYCIN 345 MG: 500 INJECTION, POWDER, LYOPHILIZED, FOR SOLUTION INTRAVENOUS at 23:21

## 2024-05-29 RX ADMIN — AZTREONAM 1000 MG: 2 INJECTION, POWDER, LYOPHILIZED, FOR SOLUTION INTRAMUSCULAR; INTRAVENOUS at 00:04

## 2024-05-29 RX ADMIN — SODIUM CHLORIDE, PRESERVATIVE FREE 10 ML: 5 INJECTION INTRAVENOUS at 08:49

## 2024-05-29 RX ADMIN — PROPOFOL 50 MG: 10 INJECTION, EMULSION INTRAVENOUS at 18:23

## 2024-05-29 RX ADMIN — MIDAZOLAM 2 MG: 1 INJECTION INTRAMUSCULAR; INTRAVENOUS at 18:15

## 2024-05-29 RX ADMIN — PROPOFOL 75 MCG/KG/MIN: 10 INJECTION, EMULSION INTRAVENOUS at 18:42

## 2024-05-29 ASSESSMENT — PAIN DESCRIPTION - PAIN TYPE
TYPE: SURGICAL PAIN
TYPE: SURGICAL PAIN

## 2024-05-29 ASSESSMENT — PAIN SCALES - WONG BAKER
WONGBAKER_NUMERICALRESPONSE: NO HURT

## 2024-05-29 ASSESSMENT — LIFESTYLE VARIABLES: SMOKING_STATUS: 1

## 2024-05-29 ASSESSMENT — PAIN SCALES - GENERAL
PAINLEVEL_OUTOF10: 8
PAINLEVEL_OUTOF10: 8
PAINLEVEL_OUTOF10: 0
PAINLEVEL_OUTOF10: 8
PAINLEVEL_OUTOF10: 6
PAINLEVEL_OUTOF10: 8

## 2024-05-29 ASSESSMENT — PAIN DESCRIPTION - ORIENTATION
ORIENTATION: RIGHT

## 2024-05-29 ASSESSMENT — PAIN DESCRIPTION - ONSET
ONSET: ON-GOING
ONSET: ON-GOING

## 2024-05-29 ASSESSMENT — PAIN DESCRIPTION - DESCRIPTORS
DESCRIPTORS: ACHING
DESCRIPTORS: ACHING;THROBBING
DESCRIPTORS: ACHING
DESCRIPTORS: ACHING

## 2024-05-29 ASSESSMENT — PAIN DESCRIPTION - LOCATION
LOCATION: FOOT

## 2024-05-29 ASSESSMENT — PAIN - FUNCTIONAL ASSESSMENT
PAIN_FUNCTIONAL_ASSESSMENT: PREVENTS OR INTERFERES SOME ACTIVE ACTIVITIES AND ADLS
PAIN_FUNCTIONAL_ASSESSMENT: ACTIVITIES ARE NOT PREVENTED

## 2024-05-29 ASSESSMENT — PAIN DESCRIPTION - FREQUENCY
FREQUENCY: CONTINUOUS
FREQUENCY: CONTINUOUS

## 2024-05-29 NOTE — PROGRESS NOTES
Physical Therapy Treatment Attempt    Chart reviewed. Attempted Physical Therapy Treatment, however, patient unable to be seen due to:  []  Nausea/vomiting  []  Eating  []  Pain  []  Patient too lethargic  [x]  Off Unit for surgical intervention  []  Dialysis treatment in progress   []  Patient refused, despite encouragement and education.  []  Other:     Will follow up as patient's schedule allows.   Stacy Talamantes, PT, DPT

## 2024-05-29 NOTE — PLAN OF CARE
Problem: Pain  Goal: Verbalizes/displays adequate comfort level or baseline comfort level  Outcome: Progressing     Problem: Chronic Conditions and Co-morbidities  Goal: Patient's chronic conditions and co-morbidity symptoms are monitored and maintained or improved  Outcome: Progressing       Problem: Safety - Adult  Goal: Free from fall injury  Outcome: Progressing     Problem: Discharge Planning  Goal: Discharge to home or other facility with appropriate resources  Outcome: Progressing

## 2024-05-29 NOTE — PROGRESS NOTES
Hospitalist Progress Note-critical care note     Patient: Angelica Duncan MRN: [id number]  [facility]: [id number]    YOB: 1950  Age: 73 y.o.  Sex: female    DOA: 5/23/2024 LOS:  LOS: 6 days            Chief complaint: rt foot infection dm cad lupus anticoagulant syndrome     Assessment/Plan         Active Hospital Problems    Diagnosis Date Noted    OM (osteomyelitis) (HCC) [M86.9] 05/29/2024    Bacteremia [R78.81] 05/26/2024    Hypokalemia [E87.6] 05/25/2024    Right foot infection [L08.9] 05/23/2024    Type 2 diabetes mellitus without complication, with long-term current use of insulin (HCC) [E11.9, Z79.4] 05/23/2024    Multiple drug allergies [Z88.9] 05/23/2024    Arterial thrombosis (HCC) [I74.9] 03/18/2021    CAD (coronary artery disease) [I25.10] 04/13/2018    Anxiety [F41.9] 10/15/2017    Lupus anticoagulant syndrome (HCC) [D68.62] 12/29/2016        Rt foot infection with abscess and bacteremia , OM   Need urgent surgery otherwise will be potential sepsis   ID consulted for iv abx with hx multiple abx allergic to  May 23:RIGHT FOOT DEBRIDEMENT INCISION AND DRAINAGE WITH CULTURES   Will have second surgery next Tuesday  per Dr. Lance   Tolerate ultram well   Continue IV antibiotics per infection disease recommendation : Azactam, daptomycin, Flagyl  Cx positive strep -ct abdomen no acute issue   Mri :  Trans-TMT joint amputation. Acute on chronic osteomyelitis at the plantar  aspect of the cuboid. Ulceration in the plantar midfoot.  2.  Suspected age-indeterminate fracture at the anterior plantar calcaneus.  Given the infectious changes in the adjacent cuboid and soft tissues, additional  infection in this location is difficult to exclude entirely.  Picc line in on 5/28     DM type II , with complication,  Low this morning, hold night sliding scale  -long term insulin , Complication with foot infectin   - ssi, decrease Lantus to 8  Npo now will give d5 0.45 infusion      HTN, accelerated  Continue home

## 2024-05-29 NOTE — PROGRESS NOTES
Podiatry:    Patient is status post right foot surgery with secondary wound closure complicated due to surgical wound dehiscence, and I&D abscess.  Deep wound cultures were taken intraoperatively and sent to micro for Gram stain and sensitivity for reference for infectious disease.  Also the wound was grafted with [employer] STRAVIX graft.  Wound care nurse should reapply her wound VAC tomorrow as soon as possible.  Patient is nonweightbearing right foot.  She should continue IV antibiotics as per infectious disease, including long-term IV antibiotics for osteomyelitis of the cuboid bone.  Call Dr. Lance with any foot problems.  And I will follow-up with this patient in the wound care center this Friday, pending hospital discharge.

## 2024-05-29 NOTE — ANESTHESIA PRE PROCEDURE
Department of Anesthesiology  Preprocedure Note       Name:  Angelica Duncan   Age:  73 y.o.  :  1950                                          MRN:  [id number]         Date:  2024      Surgeon: Surgeon(s):  Willie Lance DPM    Procedure: Procedure(s):  RIGHT FOOT DEBRIDEMENT INCISION AND DRAINAGE, SURGICAL WOUND CLOSURE/ VERSA JET/ STRAVIX GRAFT WITH REP AUGUSTIN MACIEL    Medications prior to admission:   Prior to Admission medications    Medication Sig Start Date End Date Taking? Authorizing Provider   warfarin (COUMADIN) 2 MG tablet Take 1 tablet by mouth daily    Mina Pierre MD   warfarin (COUMADIN) 5 MG tablet Take 1 tablet by mouth daily    Mina Pierre MD   LORazepam (ATIVAN) 1 MG tablet Take 1 tablet by mouth in the morning and at bedtime. Takes 1 in am and 2 in pm    Mina Pierre MD   ondansetron (ZOFRAN) 8 MG tablet Take 1 tablet by mouth every 8 hours as needed for Nausea or Vomiting    Mina Pierre MD   insulin glargine (LANTUS) 100 UNIT/ML injection vial Inject 15 Units into the skin daily    Mina Pierre MD   BIOTIN PO Take 5,000 mcg by mouth daily  Patient not taking: Reported on 2024    Automatic Reconciliation, Ar   albuterol sulfate HFA (PROVENTIL;VENTOLIN;PROAIR) 108 (90 Base) MCG/ACT inhaler Inhale 2 puffs into the lungs every 4 hours as needed for Wheezing or Shortness of Breath    Automatic Reconciliation, Ar   Cetirizine HCl 10 MG CAPS Take 10 mg by mouth daily    Automatic Reconciliation, Ar   cloNIDine (CATAPRES) 0.1 MG tablet Take 1 tablet by mouth daily    Automatic Reconciliation, Ar   clopidogrel (PLAVIX) 75 MG tablet Take 1 tablet by mouth daily    Automatic Reconciliation, Ar   cyanocobalamin 1000 MCG tablet Take 1 tablet by mouth daily  Patient not taking: Reported on 2024    Automatic Reconciliation, Ar   doxycycline monohydrate (MONODOX) 100 MG capsule Take 1 capsule by mouth in the morning and 1 capsule in the

## 2024-05-29 NOTE — DISCHARGE INSTRUCTIONS
Infectious Diseases Instructions    You will be on IV antibiotics via PICC line --daptomycin and aztreaonam until July 1. Further adjustment as needed during follow up.  Your blood work done weekly is to monitor for side effects of your antibiotics. ( Can check www.drugs.com) to read on more details on the antibiotics.    If you develop fever/chills/rash/diarrhea/pain/ shortness of breath with cough/ muscle aches and pain that is progressing, and swelling of your arm with PICC line-- need to contact at nurse line below and /OR come to ED promptly for further evaluation    FU with ID on the date schedule . Please make sure you come with list of all your medications that you are actively taking.    MD Garth [facility] Infectious Disease Physician( TIDP)   [facility] ID clinic-- 2nd Floor- [address]  Clinic phone -- [phone]-- for appointments  Nurse line if medical questions OR issues with antibiotics: [phone] # [phone]  Fax #: [phone]

## 2024-05-29 NOTE — PERIOP NOTE
Nurse reported antibiotic not given, unavailable on the unit. Pharmacy notified by floor nurse and myself.

## 2024-05-29 NOTE — PLAN OF CARE
Problem: Safety - Adult  Goal: Free from fall injury  5/29/2024 1002 by Tammy Cuba RN  Outcome: Progressing  5/29/2024 0157 by Delma Aceves RN  Outcome: Progressing     Problem: Discharge Planning  Goal: Discharge to home or other facility with appropriate resources  5/29/2024 1002 by Tammy Cuba RN  Outcome: Progressing  Flowsheets (Taken 5/29/2024 0800)  Discharge to home or other facility with appropriate resources:   Identify barriers to discharge with patient and caregiver   Arrange for needed discharge resources and transportation as appropriate   Identify discharge learning needs (meds, wound care, etc)   Arrange for interpreters to assist at discharge as needed   Refer to discharge planning if patient needs post-hospital services based on physician order or complex needs related to functional status, cognitive ability or social support system  5/29/2024 0157 by Delma Aceves RN  Outcome: Progressing     Problem: Pain  Goal: Verbalizes/displays adequate comfort level or baseline comfort level  5/29/2024 1002 by Tammy Cuba RN  Outcome: Progressing  Flowsheets (Taken 5/29/2024 0700)  Verbalizes/displays adequate comfort level or baseline comfort level:   Encourage patient to monitor pain and request assistance   Assess pain using appropriate pain scale  5/29/2024 0157 by Delma Aceves RN  Outcome: Progressing     Problem: Chronic Conditions and Co-morbidities  Goal: Patient's chronic conditions and co-morbidity symptoms are monitored and maintained or improved  5/29/2024 1002 by Tammy Cuba RN  Outcome: Progressing  Flowsheets (Taken 5/29/2024 0800)  Care Plan - Patient's Chronic Conditions and Co-Morbidity Symptoms are Monitored and Maintained or Improved:   Monitor and assess patient's chronic conditions and comorbid symptoms for stability, deterioration, or improvement   Collaborate with multidisciplinary team to address chronic and comorbid conditions and prevent  exacerbation or deterioration   Update acute care plan with appropriate goals if chronic or comorbid symptoms are exacerbated and prevent overall improvement and discharge  5/29/2024 0157 by Delma Aceves, RN  Outcome: Progressing

## 2024-05-29 NOTE — INTERVAL H&P NOTE
Update History & Physical    The patient's History and Physical of May 23, planned procedure was reviewed with the patient and I examined the patient. There was no change. The surgical site was confirmed by the patient and me.     Plan: The risks, benefits, expected outcome, and alternative to the recommended procedure have been discussed with the patient. Patient understands and wants to proceed with the procedure.     Electronically signed by Willie Lance DPM on 5/29/2024 at 6:02 PM

## 2024-05-29 NOTE — PROGRESS NOTES
0849    sodium chloride flush 0.9 % injection 5-40 mL  5-40 mL IntraVENous PRN Jennifer Marrero MD        0.9 % sodium chloride infusion   IntraVENous PRN Jennifer Marrero MD        glucose chewable tablet 16 g  4 tablet Oral PRN Jennifer Marrero MD        dextrose bolus 10% 125 mL  125 mL IntraVENous PRN Jennifer Marrero MD        Or    dextrose bolus 10% 250 mL  250 mL IntraVENous PRN Jennifer Marrero MD        glucagon injection 1 mg  1 mg SubCUTAneous PRN Jennifer Marrero MD        dextrose 10 % infusion   IntraVENous Continuous PRN Jennifer Marrero MD        metoprolol tartrate (LOPRESSOR) tablet 25 mg  25 mg Oral BID Jennifer Marrero MD   25 mg at 24 0847    rOPINIRole (REQUIP) tablet 4 mg  4 mg Oral BID Jennifer Marrero MD   4 mg at 24 0847    clopidogrel (PLAVIX) tablet 75 mg  75 mg Oral Daily Jennifer Marrero MD   75 mg at 24 1000    cloNIDine (CATAPRES) tablet 0.1 mg  0.1 mg Oral Daily Jennifer Marrero MD   0.1 mg at 24 0847    hydrALAZINE (APRESOLINE) injection 10 mg  10 mg IntraVENous Q6H PRN Jennifer Marrero MD   10 mg at 24 1617        Review of Systems     Negative Unless BOLDED     General: fevers, chills, myalgias, arthralgias, unexplained weight loss, malaise, fatigue.  HEENT:  headaches, recent URI, recent dental procedures;  tinnitus, hearing loss , visual changes, dizziness or blurred vision  PUlMONARY:  cough , shortness of breath  Cardiovascular: chest pain  GI:   nausea, vomiting, diarrhea, constipation-resolved           Objective:       BP (!) 174/72   Pulse 63   Temp 97.9 °F (36.6 °C) (Oral)   Resp 18   Ht 1.626 m (5' 4\")   Wt 62.6 kg (138 lb)   SpO2 100%   BMI 23.69 kg/m²   Temp (24hrs), Av.9 °F (36.6 °C), Min:97.7 °F (36.5 °C), Max:98.2 °F (36.8 °C)      Lines: PICC- Right arm    General:   WD WN , on RA awake alert and oriented fully   Skin:   no diffuse rash  No peripheral IE finding on skin exam/ extremities  Right foot is dressed- wound vac in place   HEENT:  Normocephalic, atraumatic, EOMI, no scleral icterus or pallor; no conjunctival  BILIARY TREE: Cholecystectomy. Common bile duct is dilated at 12 mm SPLEEN: Splenic calcifications PANCREAS: No mass or ductal dilatation. ADRENALS: Left adrenal nodule measuring 2.2 x 1.5 cm KIDNEYS: No mass, calculus, or hydronephrosis. STOMACH: Possible gastric bypass history In the fossa is going on here SMALL BOWEL: Bypass. COLON: Apparent resection of a large portion of the colon. Dilated stool-filled loop of bowel in the pelvis with possible ileocolic anastomosis. APPENDIX: Absent PERITONEUM: No ascites or pneumoperitoneum. RETROPERITONEUM: Severe atherosclerotic disease. Probable axillofemoral bypass REPRODUCTIVE ORGANS: Unremarkable URINARY BLADDER: Thickened urinary bladder BONES: Scoliosis and degenerative changes in the lumbar spine. ABDOMINAL WALL: No mass or hernia. ADDITIONAL COMMENTS: N/A     1. Stool filled dilated loop of bowel in the pelvis. The patient is likely status post subtotal colectomy and ileocolic anastomosis. There is no evidence of small bowel obstruction. 2. Left adrenal nodule incompletely characterized. No etiology of the patient's bacteremia is identified       MRI FOOT RIGHT WO CONTRAST    Result Date: 5/28/2024  EXAM:  MRI FOOT RIGHT WO CONTRAST INDICATION: FINDINGS: Bone marrow: Trans-TMT joint amputation. Erosive change and subtle confluent marrow replacement at the plantar aspect of the cuboid (6-19), compatible with acute on chronic osteomyelitis. Curvilinear low signal focus with surrounding marrow edema at the anterior plantar calcaneus (5-20, 3-23), concerning for age-indeterminate fracture deformity. Joint fluid: Small subtalar joint effusion. Tendons: Post-amputation truncations of the intrinsic foot tendons.. Muscles: Diffuse atrophy and edema. Neurovascular bundles: Not well evaluated. Articular cartilage: Mild to moderate osteoarthritis throughout. Soft tissues: Diffuse soft tissue edema and swelling. Ulceration in the plantar midfoot. No discrete fluid collection.

## 2024-05-29 NOTE — PERIOP NOTE
TRANSFER - IN REPORT:    Verbal report received from OR, RN on Angelica Duncan  being received from OR for routine progression of patient care      Report consisted of patient's Situation, Background, Assessment and   Recommendations(SBAR).     Information from the following report(s) Adult Overview, Surgery Report, Intake/Output, and MAR was reviewed with the receiving nurse.    Opportunity for questions and clarification was provided.      Assessment completed upon patient's arrival to unit and care assumed.

## 2024-05-29 NOTE — BRIEF OP NOTE
Brief Postoperative Note      Patient: Angelica Duncan  YOB: 1950  MRN: [id number]    Date of Procedure: 5/29/2024    Pre-Op Diagnosis Codes:     * Right foot infection [L08.9]    Post-Op Diagnosis:  Right foot surgical wound dehiscence.  Cellulitis with deep abscess multiple right foot.       Procedure(s):  RIGHT FOOT DEBRIDEMENT INCISION AND DRAINAGE, SURGICAL WOUND CLOSURE/ VERSA JET/ STRAVIX GRAFT    Surgeon(s):  Willie Lance DPM    Assistant:  * No surgical staff found *    Anesthesia: Monitor Anesthesia Care    Estimated Blood Loss (mL): Minimal    Complications: None    Specimens:   ID Type Source Tests Collected by Time Destination   1 : RIGHT FOOT WOUND Swab Foot CULTURE, ANAEROBIC, CULTURE, WOUND Willie Lance DPM 5/29/2024 1823        Implants:  Implant Name Type Inv. Item Serial No.  Lot No. LRB No. Used Action   GRAFT HUM TISS [id number] CRYOPRESERVED PLCNTA TISS [employer] - [id number]  GRAFT HUM TISS [id number] CRYOPRESERVED PLCNTA TISS [employer] [id number] [employer]  Right 1 Implanted   GRAFT HUM TISS [id number] CRYOPRESERVED PLCNTA TISS [employer] - [id number]  GRAFT HUM TISS [id number] CRYOPRESERVED PLCNTA TISS [employer] [id number] [employer]  Right 1 Implanted         Drains:   Negative Pressure Wound Therapy Foot Right;Plantar (Active)   Wound Type Surgical 05/29/24 0800   Dressing Type Black Foam 05/29/24 0800   Cycle Continuous;Other (Comment) 05/28/24 2005   Target Pressure (mmHg) 125 05/28/24 2005   Canister changed? No 05/28/24 2005   Dressing Status Clean, dry & intact 05/29/24 0800   Dressing Changed Changed/New 05/24/24 1600   Drainage Amount None 05/29/24 0800   Drainage Description Serosanguinous 05/29/24 0800   Output (ml) 0 ml 05/29/24 1230   Wound Assessment Other (Comment) 05/29/24 0800   Rosa-wound Assessment Other (Comment) 05/27/24 0805   Odor None 05/27/24 2035       Findings:  Infection Present At Time Of Surgery (PATOS)

## 2024-05-29 NOTE — PERIOP NOTE
TRANSFER - IN REPORT:    Verbal report received from Tammy on Angelica Duncan  being received from  for routine progression of patient care      Report consisted of patient's Situation, Background, Assessment and   Recommendations(SBAR).     Information from the following report(s) Adult Overview, Surgery Report, Intake/Output, MAR, Recent Results, and Med Rec Status was reviewed with the receiving nurse.    Opportunity for questions and clarification was provided.      Assessment completed upon patient's arrival to unit and care assumed.

## 2024-05-30 VITALS
DIASTOLIC BLOOD PRESSURE: 68 MMHG | SYSTOLIC BLOOD PRESSURE: 141 MMHG | RESPIRATION RATE: 17 BRPM | WEIGHT: 138 LBS | OXYGEN SATURATION: 100 % | HEIGHT: 64 IN | BODY MASS INDEX: 23.56 KG/M2 | TEMPERATURE: 98.2 F | HEART RATE: 64 BPM

## 2024-05-30 PROBLEM — L08.9 DIABETIC INFECTION OF RIGHT FOOT (HCC): Status: ACTIVE | Noted: 2018-04-13

## 2024-05-30 PROBLEM — E11.628 DIABETIC INFECTION OF RIGHT FOOT (HCC): Status: ACTIVE | Noted: 2018-04-13

## 2024-05-30 LAB
GLUCOSE BLD STRIP.AUTO-MCNC: 132 MG/DL (ref 70–110)
GLUCOSE BLD STRIP.AUTO-MCNC: 173 MG/DL (ref 70–110)

## 2024-05-30 PROCEDURE — 02HV33Z INSERTION OF INFUSION DEVICE INTO SUPERIOR VENA CAVA, PERCUTANEOUS APPROACH: ICD-10-PCS | Performed by: HOSPITALIST

## 2024-05-30 PROCEDURE — 6360000002 HC RX W HCPCS: Performed by: HOSPITALIST

## 2024-05-30 PROCEDURE — 6370000000 HC RX 637 (ALT 250 FOR IP): Performed by: INTERNAL MEDICINE

## 2024-05-30 PROCEDURE — 2580000003 HC RX 258: Performed by: HOSPITALIST

## 2024-05-30 PROCEDURE — 97116 GAIT TRAINING THERAPY: CPT

## 2024-05-30 PROCEDURE — 6370000000 HC RX 637 (ALT 250 FOR IP): Performed by: HOSPITALIST

## 2024-05-30 PROCEDURE — 6360000002 HC RX W HCPCS: Performed by: INTERNAL MEDICINE

## 2024-05-30 PROCEDURE — 2580000003 HC RX 258: Performed by: INTERNAL MEDICINE

## 2024-05-30 PROCEDURE — 97164 PT RE-EVAL EST PLAN CARE: CPT

## 2024-05-30 PROCEDURE — 82962 GLUCOSE BLOOD TEST: CPT

## 2024-05-30 PROCEDURE — 97605 NEG PRS WND THER DME<=50SQCM: CPT

## 2024-05-30 RX ORDER — SODIUM CHLORIDE 9 MG/ML
INJECTION, SOLUTION INTRAVENOUS CONTINUOUS
Status: CANCELLED | OUTPATIENT
Start: 2024-05-31

## 2024-05-30 RX ORDER — DIPHENHYDRAMINE HCL 25 MG
25 CAPSULE ORAL EVERY 6 HOURS PRN
Status: DISCONTINUED | OUTPATIENT
Start: 2024-05-30 | End: 2024-05-30 | Stop reason: HOSPADM

## 2024-05-30 RX ORDER — SODIUM CHLORIDE 0.9 % (FLUSH) 0.9 %
5-40 SYRINGE (ML) INJECTION PRN
Status: CANCELLED | OUTPATIENT
Start: 2024-05-31

## 2024-05-30 RX ORDER — EPINEPHRINE 1 MG/ML
0.3 INJECTION, SOLUTION, CONCENTRATE INTRAVENOUS PRN
Status: CANCELLED | OUTPATIENT
Start: 2024-05-31

## 2024-05-30 RX ORDER — SODIUM CHLORIDE 9 MG/ML
5-250 INJECTION, SOLUTION INTRAVENOUS PRN
Status: CANCELLED | OUTPATIENT
Start: 2024-05-31

## 2024-05-30 RX ORDER — ONDANSETRON 2 MG/ML
8 INJECTION INTRAMUSCULAR; INTRAVENOUS
Status: CANCELLED | OUTPATIENT
Start: 2024-05-31

## 2024-05-30 RX ORDER — ACETAMINOPHEN 325 MG/1
650 TABLET ORAL
Status: CANCELLED | OUTPATIENT
Start: 2024-05-31

## 2024-05-30 RX ORDER — LEVOFLOXACIN 5 MG/ML
750 INJECTION, SOLUTION INTRAVENOUS EVERY 24 HOURS
Qty: 1000 ML | Refills: 0
Start: 2024-05-31 | End: 2024-07-12

## 2024-05-30 RX ORDER — DIPHENHYDRAMINE HYDROCHLORIDE 50 MG/ML
50 INJECTION INTRAMUSCULAR; INTRAVENOUS
Status: CANCELLED | OUTPATIENT
Start: 2024-05-31

## 2024-05-30 RX ORDER — LEVOFLOXACIN 5 MG/ML
750 INJECTION, SOLUTION INTRAVENOUS EVERY 24 HOURS
Status: DISCONTINUED | OUTPATIENT
Start: 2024-05-30 | End: 2024-05-30 | Stop reason: HOSPADM

## 2024-05-30 RX ORDER — ALBUTEROL SULFATE 90 UG/1
4 AEROSOL, METERED RESPIRATORY (INHALATION) PRN
Status: CANCELLED | OUTPATIENT
Start: 2024-05-31

## 2024-05-30 RX ORDER — HEPARIN 100 UNIT/ML
500 SYRINGE INTRAVENOUS PRN
Status: CANCELLED | OUTPATIENT
Start: 2024-05-31

## 2024-05-30 RX ADMIN — CLOPIDOGREL BISULFATE 75 MG: 75 TABLET ORAL at 08:03

## 2024-05-30 RX ADMIN — AZTREONAM 1000 MG: 1 INJECTION, POWDER, LYOPHILIZED, FOR SOLUTION INTRAMUSCULAR; INTRAVENOUS at 00:40

## 2024-05-30 RX ADMIN — ROPINIROLE HYDROCHLORIDE 4 MG: 1 TABLET, FILM COATED ORAL at 08:30

## 2024-05-30 RX ADMIN — DIPHENHYDRAMINE HYDROCHLORIDE 25 MG: 25 CAPSULE ORAL at 10:45

## 2024-05-30 RX ADMIN — METOPROLOL TARTRATE 25 MG: 25 TABLET, FILM COATED ORAL at 08:03

## 2024-05-30 RX ADMIN — AZTREONAM 1000 MG: 1 INJECTION, POWDER, LYOPHILIZED, FOR SOLUTION INTRAMUSCULAR; INTRAVENOUS at 08:30

## 2024-05-30 RX ADMIN — SODIUM CHLORIDE, PRESERVATIVE FREE 10 ML: 5 INJECTION INTRAVENOUS at 08:04

## 2024-05-30 RX ADMIN — LEVOFLOXACIN 750 MG: 5 INJECTION, SOLUTION INTRAVENOUS at 10:42

## 2024-05-30 RX ADMIN — CLONIDINE HYDROCHLORIDE 0.1 MG: 0.1 TABLET ORAL at 08:03

## 2024-05-30 RX ADMIN — ENOXAPARIN SODIUM 60 MG: 100 INJECTION SUBCUTANEOUS at 08:30

## 2024-05-30 RX ADMIN — ONDANSETRON 4 MG: 4 TABLET, ORALLY DISINTEGRATING ORAL at 12:34

## 2024-05-30 ASSESSMENT — PAIN SCALES - GENERAL
PAINLEVEL_OUTOF10: 0
PAINLEVEL_OUTOF10: 0

## 2024-05-30 NOTE — PROGRESS NOTES
reactive changes    5/23- Blood culture X2-1/2 with S.anginosus-- SS to PCN         --OR tissue- 4+- GNR, GPR, GPC on gram stain, heavy Proteus on culture, no growth on anaerobes so far.    Additional data:  5/29--ESR 31, CRP 3.9. CPK 91      5/24- TTE-- 55-60% EF, no mention of veg/mass  5/26-CT AP W/IV: 1. Stool filled dilated loop of bowel in the pelvis. The patient is likely status post subtotal colectomy and ileocolic anastomosis. There is no evidence of small bowel obstruction.   2. Left adrenal nodule incompletely characterized. No etiology of the patient's bacteremia is identified    5/28- MRI of R foot:  1.  Trans-TMT joint amputation. Acute on chronic osteomyelitis at the plantar  aspect of the cuboid. Ulceration in the plantar midfoot.  2.  Suspected age-indeterminate fracture at the anterior plantar calcaneus.  Given the infectious changes in the adjacent cuboid and soft tissues, additional  infection in this location is difficult to exclude entirely.    Co-morbidities:    CAD, HTN,  DMT2, LAC syndrome, history of left artrial axillary ot femoral bypass using graft 08/2021 due to critical limb ischemia  THC use for severe nausea-- daily  History of gastric/abdominal surgery/resection 2009    Recommendation -- ID related:     Complex issue- given her multiple allergy, and cuboid OM that is preserved and not resected out completely for foot stablity. Relapse or progression is significant concern on this patient. Daptomycin for home infusion is cost-prohibitive. Wants to go home    Continue daptomycin IV  Patient willing to try levofloxacin infusion 750 mg IV X1, the 500mg IV from tomorrow at [facility]-- reaction is only itching-- and if she tolerates this, can do outpatient infusion with both medicines once daily. Further adjustments based on cultures and would be a challenge if changing antibiotics is indicated due to her allergies.  FU OR cultures-- she is aware there are more cultures to follow  Wound  post subtotal colectomy and ileocolic anastomosis. There is no evidence of small bowel obstruction. 2. Left adrenal nodule incompletely characterized. No etiology of the patient's bacteremia is identified       MRI FOOT RIGHT WO CONTRAST    Result Date: 5/28/2024  EXAM:  MRI FOOT RIGHT WO CONTRAST INDICATION: FINDINGS: Bone marrow: Trans-TMT joint amputation. Erosive change and subtle confluent marrow replacement at the plantar aspect of the cuboid (6-19), compatible with acute on chronic osteomyelitis. Curvilinear low signal focus with surrounding marrow edema at the anterior plantar calcaneus (5-20, 3-23), concerning for age-indeterminate fracture deformity. Joint fluid: Small subtalar joint effusion. Tendons: Post-amputation truncations of the intrinsic foot tendons.. Muscles: Diffuse atrophy and edema. Neurovascular bundles: Not well evaluated. Articular cartilage: Mild to moderate osteoarthritis throughout. Soft tissues: Diffuse soft tissue edema and swelling. Ulceration in the plantar midfoot. No discrete fluid collection.     1.  Trans-TMT joint amputation. Acute on chronic osteomyelitis at the plantar aspect of the cuboid. Ulceration in the plantar midfoot. 2.  Suspected age-indeterminate fracture at the anterior plantar calcaneus. Given the infectious changes in the adjacent cuboid and soft tissues, additional infection in this location is difficult to exclude entirely.      Ijeoma Krishna MD  [facility])  Office #:     [phone]  [phone]- Option #[phone]   Office Fax: [phone]

## 2024-05-30 NOTE — PERIOP NOTE
TRANSFER - OUT REPORT:    Verbal report given to ALEXIS Munguia on Angelica Duncan  being transferred to [address] [place] [address] for routine progression of patient care       Report consisted of patient's Situation, Background, Assessment and   Recommendations(SBAR).     Information from the following report(s) Adult Overview, Surgery Report, Intake/Output, and MAR was reviewed with the receiving nurse.           Lines:   PICC 05/28/24 Right Basilic (Active)   Central Line Being Utilized Yes 05/29/24 1815   Criteria for Appropriate Use Long term IV/antibiotic administration 05/29/24 1719   Site Assessment Clean, dry & intact 05/29/24 2001   Phlebitis Assessment No symptoms 05/29/24 2001   Infiltration Assessment 0 05/29/24 2001   Lumen #1 Color/Status Purple 05/29/24 1719   Lumen #2 Color/Status Red 05/29/24 1719   Line Care Connections checked and tightened 05/29/24 2001   Alcohol Cap Used Yes 05/29/24 1719   Date of Last Dressing Change 05/28/24 05/29/24 1719   Dressing Type Gauze 05/29/24 2001   Dressing Status Clean, dry & intact 05/29/24 2001        Opportunity for questions and clarification was provided.      Patient transported with:  Registered Nurse

## 2024-05-30 NOTE — PROGRESS NOTES
Physician Progress Note      PATIENT:               MICHAEL IMLIAN  [facility] #:                  [id number]  :                       1950  ADMIT DATE:       2024 3:39 PM  DISCH DATE:  RESPONDING  PROVIDER #:        Willie Lance DPDAVID          QUERY TEXT:    Patient admitted with right foot post-op infection with dehiscence and   abscess. Per Op note dated 24, documentation of debridement. To   accurately reflect the procedure performed please document if debridement was   excisional or nonexcisional and the deepest depth of tissue removed as down to   and including:    The medical record reflects the following:  Risk Factors:  -per ID consult note documented 24, \" 73 y.o. female with PMH of DM, PAD   with history of L ischemic /critical limb in , and with history of TMA R   foot. She underwent wound closure, removal of exposed bone on 24 with   graft placement -received pre-op abx with Clindamycin. She had done ok until   - she felt pain in foot, went to see Dr Lance and was admitted for   purulent drainage from foot\".    Clinical Indicators:  -per Op note documented 24, \" All sutures were removed and the surgical   wound was opened.  Multiple cultures both aerobic anaerobic were taken from   the right foot and sent to [place] for infectious disease reference.  Next the   deep abscess was drained using blunt dissection and multiple areas.  Next a   pulse lavage with normal sterile saline mixed with Betadine was performed to   complete the complicated deep incision and drainage right foot.  Next all   necrotic tissue was debrided deep to muscle and bone layer down to healthy   bleeding tissue.  Next the wound was then packed using quarter inch iodoform   gauze\".    Treatment:  -labs, ID and Podiatry consults, OR procedure, IV ABT (Azactam, Cleocin)    Thank you,  Elton Melendrez, KARIN, RN, CCDS, CRCR  Clinical   [email or web address] or contact via Perfect

## 2024-05-30 NOTE — PLAN OF CARE
Discharge Instructions from  Wound Care Clinic at [facility]   [address]   [address]  [place] [address]  [phone] Fax [phone]    NAME:  Angelica Duncan  YOB: 1950  MEDICAL RECORD NUMBER:  [id number]  DATE:  5/23/2024    Wound Cleansing:   Do not scrub or use excessive force.  Cleanse wound prior to applying a clean dressing with:  [x]  Irrigate graft and clean darline wound with Normal Saline     Topical Treatments:  Do not apply lotions, creams, or ointments to wound bed unless directed.       Negative Pressure:           Wound Location:   [x] Pressure@    125       mm/Hg  [x]Continuous   [x] Black with non adherent over graft either adaptic or mepitel one  [x] Other: bridge sponge to lateral ankle   [x]Change dressing: [x]Two times per week        Apply Negative Pressure to right foot wound.  [x] Apply skin barrier prep to darline-wound.   [x] Cut strips of plastic drape to picture frame wound so that darline-wound is     covered with the drape.   [x]Bridge dressing to lower leg or , cover any intact skin that will come in contact with the Negative Pressure Therapy sponge, gauze or channel drain with plastic drape. The sponge should never touch intact skin.   [x] Cut sponge, gauze or channel drain to size which will fit into the wound/ulcer bed without being forced.   [x] Be sure the sponge is large enough to hold the entire round plastic flange which is attached to the tubing. Never allow flange to be larger than the sponge or it will produce suction damaging intact skin.  Total number of individual pieces of foam used within the wound bed: 1 black in wound 1 black sponge under disk   [x] If bridging the dressing away from the primary site, be sure the bridge leads to a piece of sponge large enough to hold the entire flange without allowing any of the flange to overlap onto intact skin.   [x] Cover sponge, gauze or channel drain with plastic drape.   [x] Cut a hole in

## 2024-05-30 NOTE — PROGRESS NOTES
Hospital day several    Subjective:     Ms. Duncan denies chest pain, chest pressure/discomfort, dyspnea, exertional chest pressure/discomfort, irregular heart beat, orthopnea, palpitations, and paroxysmal nocturnal dyspnea    Medication side effects: none    Scheduled Meds:   [START ON 5/30/2024] aztreonam (AZACTAM) 1,000 mg in sterile water 10 mL IV syringe  1,000 mg IntraVENous Q8H    polyethylene glycol  17 g Oral Daily    insulin glargine  5 Units SubCUTAneous Nightly    insulin lispro  0-8 Units SubCUTAneous TID WC    [Held by provider] insulin lispro  0-4 Units SubCUTAneous Nightly    enoxaparin  1 mg/kg SubCUTAneous BID    DAPTOmycin (CUBICIN) 345 mg in sodium chloride 0.9 % 50 mL IVPB  6 mg/kg (Adjusted) IntraVENous Q24H    sodium chloride flush  5-40 mL IntraVENous 2 times per day    metoprolol tartrate  25 mg Oral BID    rOPINIRole  4 mg Oral BID    clopidogrel  75 mg Oral Daily    cloNIDine  0.1 mg Oral Daily     Continuous Infusions:   lactated ringers IV soln Stopped (05/29/24 1938)    lactated ringers IV soln 100 mL/hr at 05/29/24 2203    dextrose 5 % and 0.45 % NaCl 50 mL/hr at 05/29/24 1434    sodium chloride      dextrose       PRN Meds:ondansetron **OR** ondansetron, heparin, naloxone, traMADol, LORazepam, sodium chloride flush, sodium chloride, glucose, dextrose bolus **OR** dextrose bolus, glucagon (rDNA), dextrose, hydrALAZINE      Objective:      Physical Exam:     General Appearance: Comfortable, not using accessory muscles of respiration.  HEENT: LIU.   HEAD: Atraumatic  NECK: No JVD, no thyroidomeglay. CAROTIDS:  LUNGS: Clear bilaterally.         HEART: S1+S2 audible, 2/6  systolic murmur in aortic area, no pericardial rub.                    ABD: Non-tender, BS Audible                          EXT: No edema, and no cysnosis.  VASCULAR EXAM: Pulses are intact.                         PSYCHIATRIC EXAM: Mood is appropriate.  MUSCULOSKELETAL: Grossly no joint deformity.  NEUROLOGICAL:  AAO

## 2024-05-30 NOTE — PROGRESS NOTES
kishore)  Receives Help From: Family, Neighbor  ADL Assistance: Independent  Homemaking Assistance: Independent  Ambulation Assistance: Independent  Transfer Assistance: Independent  Active : Yes  Mode of Transportation: Car  Occupation: Retired  Strength:    Strength: Generally decreased, functional  Tone & Sensation:   Tone: Normal  Sensation: Impaired  Range Of Motion:  AROM: Generally decreased, functional  PROM: Generally decreased, functional  Functional Mobility:  Bed Mobility:   Bed Mobility Training  Supine to Sit: Modified independent  Sit to Supine: Modified independent  Transfers:   Transfer Training  Transfer Training: Yes  Sit to Stand: Stand-by assistance  Stand to Sit: Stand-by assistance  Balance:   Balance  Sitting: Intact  Standing: Impaired  Standing - Static: Fair  Standing - Dynamic: Fair  Ambulation/Gait Training:  Gait Training  Right Side Weight Bearing: Non-weight bearing  Gait  Gait Training: Yes  Right Side Weight Bearing: Non-weight bearing  Overall Level of Assistance: Stand-by assistance  Distance (ft): 200 Feet  Assistive Device: Gait belt (\"knee scooter\")  Interventions: Verbal cues;Tactile cues  Speed/Sosa: Slow  Stance: Time  Gait Abnormalities: Decreased step clearance  Pain:  Pain level pre-treatment: 5/10   Pain level post-treatment: 5/10   Pain Intervention(s): Medication (see MAR); Rest, Ice, Repositioning  Response to intervention: Nurse notified, See doc flow    Activity Tolerance:   Activity Tolerance: Patient tolerated evaluation without incident;Patient tolerated treatment well    After treatment:   []         Patient left in no apparent distress sitting up in chair  [x]         Patient left in no apparent distress in bed  [x]         Call bell left within reach  [x]         Nursing notified  []         Caregiver present  []         Bed alarm activated  []         SCDs applied    COMMUNICATION/EDUCATION:   Patient Education  Education Given To: Patient  Education

## 2024-05-30 NOTE — PLAN OF CARE
Problem: Safety - Adult  Goal: Free from fall injury  5/30/2024 1518 by Hue Red RN  Outcome: Completed  5/30/2024 1254 by Hue Red RN  Outcome: Progressing     Problem: Discharge Planning  Goal: Discharge to home or other facility with appropriate resources  5/30/2024 1518 by Hue Red RN  Outcome: Completed  5/30/2024 1254 by Hue Red RN  Outcome: Progressing  Flowsheets (Taken 5/30/2024 0800)  Discharge to home or other facility with appropriate resources: Identify barriers to discharge with patient and caregiver     Problem: Pain  Goal: Verbalizes/displays adequate comfort level or baseline comfort level  5/30/2024 1518 by Hue Red RN  Outcome: Completed  5/30/2024 1254 by Hue Red RN  Outcome: Progressing  Flowsheets (Taken 5/30/2024 1122)  Verbalizes/displays adequate comfort level or baseline comfort level: Encourage patient to monitor pain and request assistance     Problem: Chronic Conditions and Co-morbidities  Goal: Patient's chronic conditions and co-morbidity symptoms are monitored and maintained or improved  5/30/2024 1518 by Hue Red RN  Outcome: Completed  5/30/2024 1254 by Hue Red RN  Outcome: Progressing  Flowsheets (Taken 5/30/2024 0800)  Care Plan - Patient's Chronic Conditions and Co-Morbidity Symptoms are Monitored and Maintained or Improved: Monitor and assess patient's chronic conditions and comorbid symptoms for stability, deterioration, or improvement

## 2024-05-30 NOTE — WOUND CARE
IP WOUND CONSULT    Angelica Duncan  MEDICAL RECORD NUMBER:  [id number]  AGE: 73 y.o.   GENDER: female  : 1950  TODAY'S DATE:  2024    GENERAL     [x] Follow-up   [] New Consult    [x] Present on Admission  [] Hospital Acquired    Angelica Duncan is a 73 y.o. 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    Lung collapse         Nausea & vomiting 2012    chronic    Neuropathy     right foot    Other ill-defined conditions(799.89)     (2 mutated genes) hyper coag. state     Pneumonia 2017    PONV (postoperative nausea and vomiting)     Positive cardiolipin antibodies     Psychiatric disorder     anxiety   no meds    PUD (peptic ulcer disease)     H/O requiring partial gastrectomy and small intestine    Raynaud's syndrome     Restless leg syndrome     Retention of urine     Skin cancer     mouth    Thromboembolus (HCC)     RLE- arterial    Wears glasses     readers        PAST SURGICAL HISTORY    Past Surgical History:   Procedure Laterality Date    APPENDECTOMY      BREAST SURGERY Bilateral 2016    hemorrhaged after breast surgery    BREAST SURGERY      developed infection and implant removed    CHOLECYSTECTOMY      FOOT DEBRIDEMENT Right 2024    INCISION BONE CORTEX RIGHT FOOT AND SURGICAL WOUND PREP, STRAVIX GRAFT, RIGHT FOOT WITH C-ARM(SPEC POP)

## 2024-05-30 NOTE — DISCHARGE SUMMARY
Discharge Summary    Patient: Angelica Duncan MRN: [id number]  [facility]: [id number]    YOB: 1950  Age: 73 y.o.  Sex: female    DOA: 5/23/2024 LOS:  LOS: 7 days   Discharge Date:      Primary Care Provider:  rOa Ramirez PA    Admission Diagnoses: Right foot infection [L08.9]  Postoperative infection, unspecified type, initial encounter [T81.40XA]    Discharge Diagnoses:    Active Hospital Problems    Diagnosis     OM (osteomyelitis) ([facility]) [M86.9]     Surgical wound dehiscence [T81.31XA]     Bacteremia [R78.81]     Hypokalemia [E87.6]     Right foot infection [L08.9]     Type 2 diabetes mellitus without complication, with long-term current use of insulin ([facility]) [E11.9, Z79.4]     Multiple drug allergies [Z88.9]     Arterial thrombosis ([facility]) [I74.9]     CAD (coronary artery disease) [I25.10]     Anxiety [F41.9]     Lupus anticoagulant syndrome ([facility]) [D68.62]        Discharge Medications:        Medication List        START taking these medications      levoFLOXacin 750 MG/150ML Soln  Commonly known as: LEVAQUIN  Infuse 150 mLs intravenously every 24 hours  Start taking on: May 31, 2024     sodium chloride 0.9 % SOLN 50 mL with DAPTOmycin 500 MG SOLR 345 mg  Infuse 345 mg intravenously every 24 hours            CONTINUE taking these medications      albuterol sulfate  (90 Base) MCG/ACT inhaler  Commonly known as: PROVENTIL;VENTOLIN;PROAIR     Cetirizine HCl 10 MG Caps     cloNIDine 0.1 MG tablet  Commonly known as: CATAPRES     clopidogrel 75 MG tablet  Commonly known as: PLAVIX     insulin glargine 100 UNIT/ML injection vial  Commonly known as: LANTUS     LORazepam 1 MG tablet  Commonly known as: ATIVAN     metoprolol tartrate 25 MG tablet  Commonly known as: LOPRESSOR     ondansetron 8 MG tablet  Commonly known as: ZOFRAN     rOPINIRole 4 MG tablet  Commonly known as: REQUIP     * warfarin 2 MG tablet  Commonly known as: COUMADIN     * warfarin 5 MG tablet  Commonly known as: COUMADIN           *

## 2024-05-30 NOTE — PROGRESS NOTES
SAI faxed over ID order to [facility] [phone]. Plan is for patient to return to [facility] for duration of meds. Patient aware that she will return tomorrow at 11am M-F and weekends at  8am. Patient agreeable with plan. Abrahan Angela [facility] to follow at home as well; orders placed. Patient eager to DC home.

## 2024-05-30 NOTE — ANESTHESIA POSTPROCEDURE EVALUATION
Department of Anesthesiology  Postprocedure Note    Patient: Angelica Duncan  MRN: [id number]  YOB: 1950  Date of evaluation: 5/29/2024    Procedure Summary       Date: 05/29/24 Room / Location: [facility] MAIN 04 / [facility] MAIN OR    Anesthesia Start: 1814 Anesthesia Stop: 1949    Procedure: RIGHT FOOT DEBRIDEMENT INCISION AND DRAINAGE, SURGICAL WOUND CLOSURE/ VERSA JET/ STRAVIX GRAFT (Right: Foot) Diagnosis:       Right foot infection      (Right foot infection [L08.9])    Surgeons: Willie Lance DPM Responsible Provider: Romeo Giron MD    Anesthesia Type: MAC ASA Status: 3            Anesthesia Type: MAC    Jolie Phase I: Jolie Score: 10    Jolie Phase II:      Anesthesia Post Evaluation    Comments: Post-Anesthesia Evaluation and Assessment    Cardiovascular Function/Vital Signs/Pain Score  Vitals  BP: 130/64  Temp: 97.4 °F (36.3 °C)  Temp Source: Temporal  Pulse: 82  Respirations: 21  SpO2: 100 %  Height: 162.6 cm (5' 4\")  Weight - Scale: 62.6 kg (138 lb)  Pain Level: 8     Patient is status post Procedure(s):  RIGHT FOOT DEBRIDEMENT INCISION AND DRAINAGE, SURGICAL WOUND CLOSURE/ VERSA JET/ STRAVIX GRAFT.    Nausea/Vomiting: Controlled.    Postoperative hydration reviewed and adequate.    Pain:  Managed.    Neurological Status:   At baseline.    Mental Status and Level of Consciousness: Arousable.    Pulmonary Status:   Adequate oxygenation and airway patent.    Complications related to anesthesia: None    Post-anesthesia assessment completed. No concerns.    Patient has met all discharge requirements. Pain listed as 8/10 pain but patient appears comfortable and in no acute distress on d/c from PACU.     Signed By: Romeo Giron MD    May 29, 2024     No notable events documented.

## 2024-05-30 NOTE — PLAN OF CARE
Problem: Safety - Adult  Goal: Free from fall injury  5/30/2024 1254 by Hue Red RN  Outcome: Progressing  5/30/2024 0030 by Nidla Matthew RN  Outcome: Progressing     Problem: Discharge Planning  Goal: Discharge to home or other facility with appropriate resources  5/30/2024 1254 by Hue Red RN  Outcome: Progressing  Flowsheets (Taken 5/30/2024 0800)  Discharge to home or other facility with appropriate resources: Identify barriers to discharge with patient and caregiver  5/30/2024 0030 by Nilda Matthew RN  Outcome: Progressing  Flowsheets (Taken 5/29/2024 2030)  Discharge to home or other facility with appropriate resources:   Identify barriers to discharge with patient and caregiver   Arrange for needed discharge resources and transportation as appropriate   Identify discharge learning needs (meds, wound care, etc)     Problem: Pain  Goal: Verbalizes/displays adequate comfort level or baseline comfort level  5/30/2024 1254 by Hue Red RN  Outcome: Progressing  Flowsheets (Taken 5/30/2024 1122)  Verbalizes/displays adequate comfort level or baseline comfort level: Encourage patient to monitor pain and request assistance  5/30/2024 0030 by Nilda Matthew RN  Outcome: Progressing     Problem: Chronic Conditions and Co-morbidities  Goal: Patient's chronic conditions and co-morbidity symptoms are monitored and maintained or improved  5/30/2024 1254 by Hue Red RN  Outcome: Progressing  Flowsheets (Taken 5/30/2024 0800)  Care Plan - Patient's Chronic Conditions and Co-Morbidity Symptoms are Monitored and Maintained or Improved: Monitor and assess patient's chronic conditions and comorbid symptoms for stability, deterioration, or improvement  5/30/2024 0030 by Nilda Matthew, RN  Outcome: Progressing  Flowsheets (Taken 5/29/2024 2030)  Care Plan - Patient's Chronic Conditions and Co-Morbidity Symptoms are Monitored and Maintained or Improved:   Monitor and assess patient's

## 2024-05-31 ENCOUNTER — HOME HEALTH ADMISSION (OUTPATIENT)
Age: 74
End: 2024-05-31
Payer: MEDICARE

## 2024-05-31 ENCOUNTER — HOSPITAL ENCOUNTER (OUTPATIENT)
Facility: HOSPITAL | Age: 74
Setting detail: INFUSION SERIES
Discharge: HOME | End: 2024-05-31
Payer: MEDICARE

## 2024-05-31 VITALS — SYSTOLIC BLOOD PRESSURE: 107 MMHG | DIASTOLIC BLOOD PRESSURE: 46 MMHG | TEMPERATURE: 98 F | HEART RATE: 75 BPM

## 2024-05-31 DIAGNOSIS — E11.628 DIABETIC INFECTION OF RIGHT FOOT (HCC): Primary | ICD-10-CM

## 2024-05-31 DIAGNOSIS — L08.9 DIABETIC INFECTION OF RIGHT FOOT (HCC): Primary | ICD-10-CM

## 2024-05-31 PROCEDURE — 96375 TX/PRO/DX INJ NEW DRUG ADDON: CPT

## 2024-05-31 PROCEDURE — 6360000002 HC RX W HCPCS: Performed by: INTERNAL MEDICINE

## 2024-05-31 PROCEDURE — 96365 THER/PROPH/DIAG IV INF INIT: CPT

## 2024-05-31 PROCEDURE — 2580000003 HC RX 258: Performed by: INTERNAL MEDICINE

## 2024-05-31 PROCEDURE — 6360000002 HC RX W HCPCS

## 2024-05-31 PROCEDURE — A4216 STERILE WATER/SALINE, 10 ML: HCPCS | Performed by: INTERNAL MEDICINE

## 2024-05-31 RX ORDER — SODIUM CHLORIDE 9 MG/ML
INJECTION, SOLUTION INTRAVENOUS CONTINUOUS
Status: CANCELLED | OUTPATIENT
Start: 2024-06-01

## 2024-05-31 RX ORDER — DIPHENHYDRAMINE HYDROCHLORIDE 50 MG/ML
50 INJECTION INTRAMUSCULAR; INTRAVENOUS
Status: CANCELLED | OUTPATIENT
Start: 2024-06-01

## 2024-05-31 RX ORDER — LEVOFLOXACIN 5 MG/ML
500 INJECTION, SOLUTION INTRAVENOUS ONCE
Status: CANCELLED
Start: 2024-06-01 | End: 2024-06-01

## 2024-05-31 RX ORDER — ACETAMINOPHEN 325 MG/1
650 TABLET ORAL
Status: CANCELLED | OUTPATIENT
Start: 2024-06-01

## 2024-05-31 RX ORDER — ONDANSETRON 2 MG/ML
8 INJECTION INTRAMUSCULAR; INTRAVENOUS
Status: CANCELLED | OUTPATIENT
Start: 2024-06-01

## 2024-05-31 RX ORDER — SODIUM CHLORIDE 0.9 % (FLUSH) 0.9 %
5-40 SYRINGE (ML) INJECTION PRN
OUTPATIENT
Start: 2024-05-31

## 2024-05-31 RX ORDER — EPINEPHRINE 1 MG/ML
0.3 INJECTION, SOLUTION, CONCENTRATE INTRAVENOUS PRN
OUTPATIENT
Start: 2024-05-31

## 2024-05-31 RX ORDER — ONDANSETRON 2 MG/ML
8 INJECTION INTRAMUSCULAR; INTRAVENOUS
OUTPATIENT
Start: 2024-05-31

## 2024-05-31 RX ORDER — ALBUTEROL SULFATE 90 UG/1
4 AEROSOL, METERED RESPIRATORY (INHALATION) PRN
Status: CANCELLED | OUTPATIENT
Start: 2024-06-01

## 2024-05-31 RX ORDER — HEPARIN 100 UNIT/ML
500 SYRINGE INTRAVENOUS PRN
OUTPATIENT
Start: 2024-05-31

## 2024-05-31 RX ORDER — SODIUM CHLORIDE 9 MG/ML
INJECTION, SOLUTION INTRAVENOUS CONTINUOUS
OUTPATIENT
Start: 2024-05-31

## 2024-05-31 RX ORDER — ACETAMINOPHEN 325 MG/1
650 TABLET ORAL
OUTPATIENT
Start: 2024-05-31

## 2024-05-31 RX ORDER — HEPARIN 100 UNIT/ML
SYRINGE INTRAVENOUS
Status: COMPLETED
Start: 2024-05-31 | End: 2024-05-31

## 2024-05-31 RX ORDER — DIPHENHYDRAMINE HYDROCHLORIDE 50 MG/ML
50 INJECTION INTRAMUSCULAR; INTRAVENOUS
OUTPATIENT
Start: 2024-05-31

## 2024-05-31 RX ORDER — HEPARIN 100 UNIT/ML
500 SYRINGE INTRAVENOUS PRN
Status: CANCELLED | OUTPATIENT
Start: 2024-06-01

## 2024-05-31 RX ORDER — LEVOFLOXACIN 5 MG/ML
500 INJECTION, SOLUTION INTRAVENOUS ONCE
Status: COMPLETED | OUTPATIENT
Start: 2024-06-01 | End: 2024-06-01

## 2024-05-31 RX ORDER — SODIUM CHLORIDE 0.9 % (FLUSH) 0.9 %
5-40 SYRINGE (ML) INJECTION PRN
Status: CANCELLED | OUTPATIENT
Start: 2024-06-01

## 2024-05-31 RX ORDER — ALBUTEROL SULFATE 90 UG/1
4 AEROSOL, METERED RESPIRATORY (INHALATION) PRN
OUTPATIENT
Start: 2024-05-31

## 2024-05-31 RX ORDER — LEVOFLOXACIN 5 MG/ML
500 INJECTION, SOLUTION INTRAVENOUS ONCE
Status: COMPLETED | OUTPATIENT
Start: 2024-05-31 | End: 2024-05-31

## 2024-05-31 RX ORDER — SODIUM CHLORIDE 9 MG/ML
5-250 INJECTION, SOLUTION INTRAVENOUS PRN
Status: CANCELLED | OUTPATIENT
Start: 2024-06-01

## 2024-05-31 RX ORDER — EPINEPHRINE 1 MG/ML
0.3 INJECTION, SOLUTION, CONCENTRATE INTRAVENOUS PRN
Status: CANCELLED | OUTPATIENT
Start: 2024-06-01

## 2024-05-31 RX ORDER — LEVOFLOXACIN 5 MG/ML
500 INJECTION, SOLUTION INTRAVENOUS ONCE
Status: COMPLETED | OUTPATIENT
Start: 2024-06-02 | End: 2024-06-02

## 2024-05-31 RX ORDER — SODIUM CHLORIDE 9 MG/ML
5-250 INJECTION, SOLUTION INTRAVENOUS PRN
OUTPATIENT
Start: 2024-05-31

## 2024-05-31 RX ADMIN — LEVOFLOXACIN 500 MG: 5 INJECTION, SOLUTION INTRAVENOUS at 11:19

## 2024-05-31 RX ADMIN — HEPARIN 500 UNITS: 100 SYRINGE at 13:02

## 2024-05-31 RX ADMIN — DAPTOMYCIN 350 MG: 500 INJECTION, POWDER, LYOPHILIZED, FOR SOLUTION INTRAVENOUS at 12:55

## 2024-05-31 ASSESSMENT — PAIN SCALES - GENERAL: PAINLEVEL_OUTOF10: 7

## 2024-05-31 ASSESSMENT — PAIN DESCRIPTION - LOCATION: LOCATION: FOOT

## 2024-05-31 NOTE — CARE COORDINATION
05/30/24 1000   IMM Letter   IMM Letter given to Patient/Family/Significant other/Guardian/POA/by: Maura Garcia CMS   IMM Letter date given: 05/30/24   IMM Letter time given: 1000

## 2024-05-31 NOTE — PROGRESS NOTES
[facility] Progress Note    Date: May 31, 2024    Name: Angelica Duncan    MRN: [id number]         : 1950    Ms. Duncan was assessed and education was provided. Pt is here for her post hospitalization antibiotics for 6 weeks,  She arrives with her boyfriend at her side using a rolling walker,      Ms. Duncan's vitals were reviewed.  Vitals:    24 1115   BP: (!) 107/46   Pulse: 75   Temp: 98 °F (36.7 °C)     Pt has received these medications in the hospital and denies any reactions or complications,  her allergies indicate she has a childhood reaction to Levofloxacin.  Verified with Pharmacist, Wanda Rubio, med was administered in hospital and no reactions.      PICC line in upper right clean and dressing intact,  flushed bilateral lines with 10 ml normal saline and verified brisk blood return,             [x]  Cubicin 350 mg     [x]  Levofloxcin 500 mg       500 mg Levofloxcin in 100 ml was infused at  100 ml/hr.  Monitored 30 minutes , no reactions or complications present     Cubicin 350 mg then administered IVP over 5 minutes, no reactions or complication    Bilateral lines flushed with 5 ml normal saline then instilled 250 units Heparin curos cap applied and secured PICC with 4x4 , coban and netting    Ms. Duncan tolerated infusion, and had no complaints at this time.  Patient armband removed and shredded.    [facility] at 1310. She is to return on 2024  at 800 for her next appointment.    RYDER CHO RN  May 31, 2024  3:57 PM

## 2024-06-01 ENCOUNTER — HOSPITAL ENCOUNTER (OUTPATIENT)
Facility: HOSPITAL | Age: 74
Setting detail: INFUSION SERIES
Discharge: HOME | End: 2024-06-01
Payer: MEDICARE

## 2024-06-01 VITALS
HEART RATE: 80 BPM | TEMPERATURE: 98.4 F | SYSTOLIC BLOOD PRESSURE: 130 MMHG | DIASTOLIC BLOOD PRESSURE: 65 MMHG | OXYGEN SATURATION: 96 % | RESPIRATION RATE: 16 BRPM

## 2024-06-01 DIAGNOSIS — E11.628 DIABETIC INFECTION OF RIGHT FOOT (HCC): ICD-10-CM

## 2024-06-01 DIAGNOSIS — L08.9 DIABETIC INFECTION OF RIGHT FOOT (HCC): ICD-10-CM

## 2024-06-01 LAB
BACTERIA SPEC CULT: NORMAL
BACTERIA SPEC CULT: NORMAL
GRAM STN SPEC: NORMAL
GRAM STN SPEC: NORMAL
SERVICE CMNT-IMP: NORMAL
SERVICE CMNT-IMP: NORMAL

## 2024-06-01 PROCEDURE — 6360000002 HC RX W HCPCS: Performed by: INTERNAL MEDICINE

## 2024-06-01 PROCEDURE — A4216 STERILE WATER/SALINE, 10 ML: HCPCS | Performed by: INTERNAL MEDICINE

## 2024-06-01 PROCEDURE — 2580000003 HC RX 258: Performed by: INTERNAL MEDICINE

## 2024-06-01 PROCEDURE — 96365 THER/PROPH/DIAG IV INF INIT: CPT

## 2024-06-01 PROCEDURE — 6360000002 HC RX W HCPCS

## 2024-06-01 PROCEDURE — 96375 TX/PRO/DX INJ NEW DRUG ADDON: CPT

## 2024-06-01 RX ORDER — SODIUM CHLORIDE 9 MG/ML
INJECTION, SOLUTION INTRAVENOUS ONCE
Status: COMPLETED | OUTPATIENT
Start: 2024-06-01 | End: 2024-06-01

## 2024-06-01 RX ORDER — HEPARIN 100 UNIT/ML
SYRINGE INTRAVENOUS
Status: COMPLETED
Start: 2024-06-01 | End: 2024-06-01

## 2024-06-01 RX ORDER — SODIUM CHLORIDE 0.9 % (FLUSH) 0.9 %
5-40 SYRINGE (ML) INJECTION PRN
Status: DISCONTINUED | OUTPATIENT
Start: 2024-06-01 | End: 2024-06-02 | Stop reason: HOSPADM

## 2024-06-01 RX ORDER — HEPARIN SODIUM (PORCINE) LOCK FLUSH IV SOLN 100 UNIT/ML 100 UNIT/ML
500 SOLUTION INTRAVENOUS PRN
Status: DISCONTINUED | OUTPATIENT
Start: 2024-06-01 | End: 2024-06-02 | Stop reason: HOSPADM

## 2024-06-01 RX ADMIN — DAPTOMYCIN 350 MG: 500 INJECTION, POWDER, LYOPHILIZED, FOR SOLUTION INTRAVENOUS at 09:13

## 2024-06-01 RX ADMIN — HEPARIN SODIUM (PORCINE) LOCK FLUSH IV SOLN 100 UNIT/ML 500 UNITS: 100 SOLUTION at 09:18

## 2024-06-01 RX ADMIN — SODIUM CHLORIDE, PRESERVATIVE FREE 10 ML: 5 INJECTION INTRAVENOUS at 09:18

## 2024-06-01 RX ADMIN — SODIUM CHLORIDE, PRESERVATIVE FREE 10 ML: 5 INJECTION INTRAVENOUS at 09:13

## 2024-06-01 RX ADMIN — LEVOFLOXACIN 500 MG: 5 INJECTION, SOLUTION INTRAVENOUS at 08:07

## 2024-06-01 RX ADMIN — SODIUM CHLORIDE: 0.9 INJECTION, SOLUTION INTRAVENOUS at 08:03

## 2024-06-01 RX ADMIN — SODIUM CHLORIDE, PRESERVATIVE FREE 10 ML: 5 INJECTION INTRAVENOUS at 08:03

## 2024-06-01 RX ADMIN — HEPARIN 500 UNITS: 100 SYRINGE at 09:18

## 2024-06-01 ASSESSMENT — PAIN SCALES - GENERAL: PAINLEVEL_OUTOF10: 7

## 2024-06-01 ASSESSMENT — PAIN DESCRIPTION - LOCATION: LOCATION: FOOT

## 2024-06-01 NOTE — PROGRESS NOTES
[facility] Progress Note    Date: 2024    Name: Angelica Duncan    MRN: [id number]         : 1950    Levaquin Infusion + Dapto IVP    Ms. Duncan to [facility], ambulatory at 0755 accompanied by her boyfriend. Pt was assessed and education was provided. Pt stated she is tolerating her antibiotics well.    Ms. Duncan's vitals were reviewed.  Vitals:    24 0757   BP: 130/65   Pulse: 80   Resp: 16   Temp: 98.4 °F (36.9 °C)   SpO2: 96%     Right upper arm PICC flushed easily and had brisk blood return via both ports.      PICC dressing c/d/i and not due to be changed. No swelling, redness, streaking, warmth or drainage noted in arm. Pt denied c/o pain in arm.    Levaquin 500mg/100ml NS was infused at 100 ml/hr over approximately 60 minutes followed by NS flush, then Daptomycin 500mg administered slow IVP over approx 5 minutes.    Ms. Duncan tolerated infusion, and had no complaints at this time.    Both lumens of PICC flushed with NS 10 ml and Heparin 250 units. Green end caps applied, and lumens wrapped with guaze and coban. Stockinette placed over dressing for protection.    Patient armband removed and shredded.    [facility]. [facility] condition at 0925. She is to return on 24 at 0800 for her next antibiotic appointment.    Adela Hernandez RN  2024  8:22 AM

## 2024-06-02 ENCOUNTER — HOSPITAL ENCOUNTER (OUTPATIENT)
Facility: HOSPITAL | Age: 74
Setting detail: INFUSION SERIES
Discharge: HOME | End: 2024-06-02
Payer: MEDICARE

## 2024-06-02 ENCOUNTER — HOME CARE VISIT (OUTPATIENT)
Age: 74
End: 2024-06-02

## 2024-06-02 VITALS
DIASTOLIC BLOOD PRESSURE: 72 MMHG | RESPIRATION RATE: 16 BRPM | HEART RATE: 90 BPM | TEMPERATURE: 98.2 F | OXYGEN SATURATION: 96 % | SYSTOLIC BLOOD PRESSURE: 137 MMHG

## 2024-06-02 DIAGNOSIS — L08.9 DIABETIC INFECTION OF RIGHT FOOT (HCC): ICD-10-CM

## 2024-06-02 DIAGNOSIS — E11.628 DIABETIC INFECTION OF RIGHT FOOT (HCC): ICD-10-CM

## 2024-06-02 PROCEDURE — 0221000100 HH NO PAY CLAIM PROCEDURE

## 2024-06-02 PROCEDURE — 2580000003 HC RX 258: Performed by: INTERNAL MEDICINE

## 2024-06-02 PROCEDURE — 96365 THER/PROPH/DIAG IV INF INIT: CPT

## 2024-06-02 PROCEDURE — 6360000002 HC RX W HCPCS: Performed by: INTERNAL MEDICINE

## 2024-06-02 PROCEDURE — A4216 STERILE WATER/SALINE, 10 ML: HCPCS | Performed by: INTERNAL MEDICINE

## 2024-06-02 PROCEDURE — 96375 TX/PRO/DX INJ NEW DRUG ADDON: CPT

## 2024-06-02 PROCEDURE — G0299 HHS/HOSPICE OF RN EA 15 MIN: HCPCS

## 2024-06-02 RX ORDER — HEPARIN SODIUM (PORCINE) LOCK FLUSH IV SOLN 100 UNIT/ML 100 UNIT/ML
500 SOLUTION INTRAVENOUS PRN
Status: DISCONTINUED | OUTPATIENT
Start: 2024-06-02 | End: 2024-06-03 | Stop reason: HOSPADM

## 2024-06-02 RX ORDER — SODIUM CHLORIDE 0.9 % (FLUSH) 0.9 %
5-40 SYRINGE (ML) INJECTION PRN
Status: DISCONTINUED | OUTPATIENT
Start: 2024-06-02 | End: 2024-06-03 | Stop reason: HOSPADM

## 2024-06-02 RX ADMIN — LEVOFLOXACIN 500 MG: 5 INJECTION, SOLUTION INTRAVENOUS at 07:52

## 2024-06-02 RX ADMIN — HEPARIN 500 UNITS: 100 SYRINGE at 09:00

## 2024-06-02 RX ADMIN — DAPTOMYCIN 350 MG: 500 INJECTION, POWDER, LYOPHILIZED, FOR SOLUTION INTRAVENOUS at 08:54

## 2024-06-02 RX ADMIN — SODIUM CHLORIDE, PRESERVATIVE FREE 10 ML: 5 INJECTION INTRAVENOUS at 07:52

## 2024-06-02 RX ADMIN — SODIUM CHLORIDE, PRESERVATIVE FREE 10 ML: 5 INJECTION INTRAVENOUS at 08:54

## 2024-06-02 RX ADMIN — SODIUM CHLORIDE, PRESERVATIVE FREE 10 ML: 5 INJECTION INTRAVENOUS at 09:00

## 2024-06-02 ASSESSMENT — PAIN DESCRIPTION - LOCATION: LOCATION: FOOT

## 2024-06-02 NOTE — PROGRESS NOTES
[facility] Progress Note    Date: 2024    Name: Angelica Duncan    MRN: [id number]         : 1950    Levaquin Infusion + Dapto IVP    Ms. Duncan to [facility], ambulatory at 0750 accompanied by her boyfriend. Pt was assessed and education was provided. Pt stated she is tolerating her antibiotics well.    Ms. Duncan's vitals were reviewed.  Vitals:    24 0750   BP: 137/72   Pulse: 90   Resp: 16   Temp: 98.2 °F (36.8 °C)   SpO2: 96%     Right upper arm PICC flushed easily and had brisk blood return via both ports.      PICC dressing c/d/i and not due to be changed. No swelling, redness, streaking, warmth or drainage noted in arm. Pt denied c/o pain in arm.    Levaquin 500mg/100ml NS was infused at 100 ml/hr over approximately 60 minutes followed by NS flush, then Daptomycin 500mg administered slow IVP over approx 5 minutes.    Ms. Duncan tolerated infusion, and had no complaints at this time.    Both lumens of PICC flushed with NS 10 ml and Heparin 250 units. Green end caps applied, and lumens wrapped with guaze and coban. Stockinette placed over dressing for protection.    Patient armband removed and shredded.    [facility]. [facility] condition at 0905. She is to return on 6/3/24 at 1130 for her next antibiotic appointment.    Adela Hernandez RN  2024  8:05 AM

## 2024-06-03 ENCOUNTER — HOSPITAL ENCOUNTER (OUTPATIENT)
Facility: HOSPITAL | Age: 74
Setting detail: INFUSION SERIES
Discharge: HOME | End: 2024-06-03
Payer: MEDICARE

## 2024-06-03 VITALS
TEMPERATURE: 97.9 F | HEART RATE: 57 BPM | SYSTOLIC BLOOD PRESSURE: 132 MMHG | OXYGEN SATURATION: 97 % | DIASTOLIC BLOOD PRESSURE: 80 MMHG | RESPIRATION RATE: 17 BRPM

## 2024-06-03 VITALS
TEMPERATURE: 97.3 F | RESPIRATION RATE: 16 BRPM | HEART RATE: 55 BPM | OXYGEN SATURATION: 97 % | SYSTOLIC BLOOD PRESSURE: 144 MMHG | DIASTOLIC BLOOD PRESSURE: 55 MMHG

## 2024-06-03 DIAGNOSIS — E11.628 DIABETIC INFECTION OF RIGHT FOOT (HCC): Primary | ICD-10-CM

## 2024-06-03 DIAGNOSIS — L08.9 DIABETIC INFECTION OF RIGHT FOOT (HCC): Primary | ICD-10-CM

## 2024-06-03 LAB
ANION GAP SERPL CALC-SCNC: 4 MMOL/L (ref 3–18)
BACTERIA SPEC CULT: NORMAL
BASO+EOS+MONOS # BLD AUTO: 0.7 K/UL (ref 0–2.3)
BASO+EOS+MONOS NFR BLD AUTO: 9 % (ref 0.1–17)
BUN SERPL-MCNC: 12 MG/DL (ref 7–18)
BUN/CREAT SERPL: 21 (ref 12–20)
CALCIUM SERPL-MCNC: 8.7 MG/DL (ref 8.5–10.1)
CHLORIDE SERPL-SCNC: 107 MMOL/L (ref 100–111)
CO2 SERPL-SCNC: 30 MMOL/L (ref 21–32)
CREAT SERPL-MCNC: 0.57 MG/DL (ref 0.6–1.3)
CRP SERPL-MCNC: 1.4 MG/DL (ref 0–0.3)
DIFFERENTIAL METHOD BLD: ABNORMAL
ERYTHROCYTE [DISTWIDTH] IN BLOOD BY AUTOMATED COUNT: 14 % (ref 11.5–14.5)
GLUCOSE SERPL-MCNC: 125 MG/DL (ref 74–99)
HCT VFR BLD AUTO: 33.4 % (ref 36–48)
HGB BLD-MCNC: 10.3 G/DL (ref 12–16)
LYMPHOCYTES # BLD: 1.3 K/UL (ref 1.1–5.9)
LYMPHOCYTES NFR BLD: 17 % (ref 14–44)
MCH RBC QN AUTO: 30.3 PG (ref 25–35)
MCHC RBC AUTO-ENTMCNC: 30.8 G/DL (ref 31–37)
MCV RBC AUTO: 98.2 FL (ref 78–102)
NEUTS SEG # BLD: 5.6 K/UL (ref 1.8–9.5)
NEUTS SEG NFR BLD: 74 % (ref 40–70)
PLATELET # BLD AUTO: 553 K/UL (ref 140–440)
POTASSIUM SERPL-SCNC: 4.2 MMOL/L (ref 3.5–5.5)
RBC # BLD AUTO: 3.4 M/UL (ref 4.1–5.1)
SERVICE CMNT-IMP: NORMAL
SODIUM SERPL-SCNC: 141 MMOL/L (ref 136–145)
WBC # BLD AUTO: 7.6 K/UL (ref 4.5–13)

## 2024-06-03 PROCEDURE — 86140 C-REACTIVE PROTEIN: CPT

## 2024-06-03 PROCEDURE — 80048 BASIC METABOLIC PNL TOTAL CA: CPT

## 2024-06-03 PROCEDURE — 96375 TX/PRO/DX INJ NEW DRUG ADDON: CPT

## 2024-06-03 PROCEDURE — 2580000003 HC RX 258: Performed by: INTERNAL MEDICINE

## 2024-06-03 PROCEDURE — A4216 STERILE WATER/SALINE, 10 ML: HCPCS | Performed by: INTERNAL MEDICINE

## 2024-06-03 PROCEDURE — 6360000002 HC RX W HCPCS: Performed by: INTERNAL MEDICINE

## 2024-06-03 PROCEDURE — 96365 THER/PROPH/DIAG IV INF INIT: CPT

## 2024-06-03 PROCEDURE — 85025 COMPLETE CBC W/AUTO DIFF WBC: CPT

## 2024-06-03 RX ORDER — SODIUM CHLORIDE 9 MG/ML
INJECTION, SOLUTION INTRAVENOUS CONTINUOUS
Status: CANCELLED | OUTPATIENT
Start: 2024-06-04

## 2024-06-03 RX ORDER — SODIUM CHLORIDE 0.9 % (FLUSH) 0.9 %
5-40 SYRINGE (ML) INJECTION 2 TIMES DAILY
Status: DISCONTINUED | OUTPATIENT
Start: 2024-06-03 | End: 2024-06-04 | Stop reason: HOSPADM

## 2024-06-03 RX ORDER — ALBUTEROL SULFATE 90 UG/1
4 AEROSOL, METERED RESPIRATORY (INHALATION) PRN
Status: CANCELLED | OUTPATIENT
Start: 2024-06-04

## 2024-06-03 RX ORDER — LEVOFLOXACIN 5 MG/ML
500 INJECTION, SOLUTION INTRAVENOUS ONCE
Status: COMPLETED | OUTPATIENT
Start: 2024-06-04 | End: 2024-06-04

## 2024-06-03 RX ORDER — SODIUM CHLORIDE 9 MG/ML
5-250 INJECTION, SOLUTION INTRAVENOUS PRN
Status: CANCELLED | OUTPATIENT
Start: 2024-06-04

## 2024-06-03 RX ORDER — EPINEPHRINE 1 MG/ML
0.3 INJECTION, SOLUTION, CONCENTRATE INTRAVENOUS PRN
Status: CANCELLED | OUTPATIENT
Start: 2024-06-04

## 2024-06-03 RX ORDER — HEPARIN 100 UNIT/ML
500 SYRINGE INTRAVENOUS PRN
Status: DISCONTINUED | OUTPATIENT
Start: 2024-06-03 | End: 2024-06-04 | Stop reason: HOSPADM

## 2024-06-03 RX ORDER — SODIUM CHLORIDE 0.9 % (FLUSH) 0.9 %
5-40 SYRINGE (ML) INJECTION PRN
Status: CANCELLED | OUTPATIENT
Start: 2024-06-04

## 2024-06-03 RX ORDER — LEVOFLOXACIN 5 MG/ML
500 INJECTION, SOLUTION INTRAVENOUS ONCE
Status: COMPLETED | OUTPATIENT
Start: 2024-06-03 | End: 2024-06-03

## 2024-06-03 RX ORDER — ACETAMINOPHEN 325 MG/1
650 TABLET ORAL
Status: CANCELLED | OUTPATIENT
Start: 2024-06-04

## 2024-06-03 RX ORDER — LEVOFLOXACIN 5 MG/ML
500 INJECTION, SOLUTION INTRAVENOUS ONCE
Status: CANCELLED
Start: 2024-06-04 | End: 2024-06-04

## 2024-06-03 RX ORDER — DIPHENHYDRAMINE HYDROCHLORIDE 50 MG/ML
50 INJECTION INTRAMUSCULAR; INTRAVENOUS
Status: CANCELLED | OUTPATIENT
Start: 2024-06-04

## 2024-06-03 RX ORDER — HEPARIN 100 UNIT/ML
500 SYRINGE INTRAVENOUS PRN
Status: CANCELLED | OUTPATIENT
Start: 2024-06-04

## 2024-06-03 RX ORDER — ONDANSETRON 2 MG/ML
8 INJECTION INTRAMUSCULAR; INTRAVENOUS
Status: CANCELLED | OUTPATIENT
Start: 2024-06-04

## 2024-06-03 RX ADMIN — HEPARIN 500 UNITS: 100 SYRINGE at 12:29

## 2024-06-03 RX ADMIN — LEVOFLOXACIN 500 MG: 5 INJECTION, SOLUTION INTRAVENOUS at 11:17

## 2024-06-03 RX ADMIN — Medication 10 ML: at 12:28

## 2024-06-03 RX ADMIN — Medication 10 ML: at 12:19

## 2024-06-03 RX ADMIN — Medication 10 ML: at 11:15

## 2024-06-03 RX ADMIN — DAPTOMYCIN 350 MG: 500 INJECTION, POWDER, LYOPHILIZED, FOR SOLUTION INTRAVENOUS at 12:22

## 2024-06-03 ASSESSMENT — ENCOUNTER SYMPTOMS
COUGH CHARACTERISTICS: PRODUCTIVE
SPUTUM PRODUCTION: 1
CONSTIPATION: 1
STOOL DESCRIPTION: FIRM
SPUTUM AMOUNT: COPIOUS
COUGH: 1
ABDOMINAL PAIN: 1
SPUTUM COLOR: CLEAR
SPUTUM CONSISTENCY: THICK
PAIN LOCATION - PAIN QUALITY: THROBBING

## 2024-06-03 ASSESSMENT — PAIN SCALES - WONG BAKER: WONGBAKER_NUMERICALRESPONSE: NO HURT

## 2024-06-03 ASSESSMENT — PAIN DESCRIPTION - LOCATION: LOCATION: FOOT

## 2024-06-03 ASSESSMENT — PAIN SCALES - GENERAL: PAINLEVEL_OUTOF10: 6

## 2024-06-03 NOTE — HOME HEALTH
Skilled services/Home bound verification:     Skilled Reason for admission/summary of clinical condition: PDGM Dx: Right Foot Osteomyelitis .Found in: CC Document: Progress Note: Date: 5/29/24   Disciplines requested: SN,   Services to provide: Evaluate and Treat, Medication and Disease process education PICC taken care of by [facility] .  This patient is homebound for the following reasons Requires considerable and taxing effort to leave the home , Requires the assistance of 1 or more persons to leave the home  and Only leaves the home for medical reasons or [employer] services and are infrequent and of short duration for other reasons .    Caregiver: boyfriend.  Caregiver assists with transportation to and from MD appt.    Medications reconciled and all medications are available in the home this visit.      The following education was provided regarding medications: high risk medications  Medications  are somewhat effective at this time.      High risk medication teaching regarding anticoagulants, antiplatelets, antibiotics, antipsychotics, hypoglycemic agents, or opioid/narcotics performed (specify): IV daptomycin, Levaquin, lantus, warfarin, Plavix, ultram and hydrocodone    MD notified of any discrepancies/look a like medications/medication interactions major interactions;   warfarin and clopidogrel, warfarin and ropinirole, warfarin and levofloxacin   .     Home health supplies by type and quantity ordered/delivered this visit include: saline wash, gauze, versatel     Patient education provided this visit to include: pt has mariajuana smoking materials several all around her and states \"I only take a toke if I need it\", with multiple medication bottles, pt refused to have education on pain management due to pt states \"I don't take anything until I can't stand it anymore\".  Pt states her son \"just lives here\" and doesn't provide any assistance.  Pt states she's been \"scooting on her butt\" to get down the stairs to go

## 2024-06-03 NOTE — CASE COMMUNICATION
Pt was admitted to home health services for a frequency of 2wk6, 2prn and treatments/interventions/modalities of wound vac dressing changes, disease process and education.  Pt is asking to have IV antibiotics changed to home infusion, also requesting home health aide to assist with bathing and self care.    Upon arrival wound vac was off and disconnected pt was unaware wound vac wasn't working and unable to recall when stopped.     Pt s cored as having depression during assessment and confusion.    Medication interactions major interactions;   warfarin and clopidogrel, warfarin and ropinirole, warfarin and levofloxacin.    Thank you,   Madelaine Cross RN, BSN, CWOCN

## 2024-06-03 NOTE — PROGRESS NOTES
[facility] Progress Note    Date: Raissa 3, 2024    Name: Angelica Duncan    MRN: [id number]         : 1950    Levaquin Infusion + Dapto IVP    Ms. Duncan to [facility], ambulatory at 1107 accompanied by her boyfriend. Pt was assessed and education was provided. Pt stated she is tolerating her antibiotics well.    Ms. Duncan's vitals were reviewed.  Vitals:    24 1100   BP: (!) 144/55   Pulse: 55   Resp: 16   Temp: 97.3 °F (36.3 °C)   SpO2: 97%     Right upper arm PICC flushed easily and had brisk blood return via both ports. 10 mL blood drawn and wasted, labs drawn per order (CBC, BMP, CRP), and line flushed with 10 mL NS.     PICC dressing c/d/i and not due to be changed. No swelling, redness, streaking, warmth or drainage noted in arm. Pt denied c/o pain in arm.    Levaquin 500mg/100ml NS was infused at 100 ml/hr over approximately 60 minutes followed by NS flush, then Daptomycin 350mg administered slow IVP over approx 5 minutes.    Ms. Duncan tolerated infusion, and had no complaints at this time.    Both lumens of PICC flushed with NS 10 ml and Heparin 250 units. Green end caps applied, and lumens wrapped with guaze and coban. Stockinette placed over dressing for protection.    Patient armband removed and shredded.    [facility]. [facility] in stable condition at 1241. She is to return on 24 at 1130 for her next antibiotic appointment.    Latoya Chaudhry RN  Raissa 3, 2024  12:11 PM

## 2024-06-04 ENCOUNTER — HOSPITAL ENCOUNTER (OUTPATIENT)
Facility: HOSPITAL | Age: 74
Setting detail: INFUSION SERIES
Discharge: HOME | End: 2024-06-04
Payer: MEDICARE

## 2024-06-04 VITALS
HEART RATE: 51 BPM | DIASTOLIC BLOOD PRESSURE: 48 MMHG | TEMPERATURE: 98 F | OXYGEN SATURATION: 96 % | RESPIRATION RATE: 16 BRPM | SYSTOLIC BLOOD PRESSURE: 135 MMHG

## 2024-06-04 DIAGNOSIS — L08.9 DIABETIC INFECTION OF RIGHT FOOT (HCC): Primary | ICD-10-CM

## 2024-06-04 DIAGNOSIS — E11.628 DIABETIC INFECTION OF RIGHT FOOT (HCC): Primary | ICD-10-CM

## 2024-06-04 PROCEDURE — 96365 THER/PROPH/DIAG IV INF INIT: CPT

## 2024-06-04 PROCEDURE — 96375 TX/PRO/DX INJ NEW DRUG ADDON: CPT

## 2024-06-04 PROCEDURE — 2580000003 HC RX 258: Performed by: INTERNAL MEDICINE

## 2024-06-04 PROCEDURE — 6360000002 HC RX W HCPCS: Performed by: INTERNAL MEDICINE

## 2024-06-04 PROCEDURE — A4216 STERILE WATER/SALINE, 10 ML: HCPCS | Performed by: INTERNAL MEDICINE

## 2024-06-04 RX ORDER — LEVOFLOXACIN 5 MG/ML
500 INJECTION, SOLUTION INTRAVENOUS ONCE
Status: COMPLETED | OUTPATIENT
Start: 2024-06-06 | End: 2024-06-06

## 2024-06-04 RX ORDER — DIPHENHYDRAMINE HYDROCHLORIDE 50 MG/ML
50 INJECTION INTRAMUSCULAR; INTRAVENOUS
Status: CANCELLED | OUTPATIENT
Start: 2024-06-05

## 2024-06-04 RX ORDER — HEPARIN 100 UNIT/ML
500 SYRINGE INTRAVENOUS PRN
Status: CANCELLED | OUTPATIENT
Start: 2024-06-05

## 2024-06-04 RX ORDER — ONDANSETRON 2 MG/ML
8 INJECTION INTRAMUSCULAR; INTRAVENOUS
Status: CANCELLED | OUTPATIENT
Start: 2024-06-05

## 2024-06-04 RX ORDER — LEVOFLOXACIN 5 MG/ML
500 INJECTION, SOLUTION INTRAVENOUS ONCE
Status: CANCELLED
Start: 2024-06-05 | End: 2024-06-05

## 2024-06-04 RX ORDER — EPINEPHRINE 1 MG/ML
0.3 INJECTION, SOLUTION, CONCENTRATE INTRAVENOUS PRN
Status: CANCELLED | OUTPATIENT
Start: 2024-06-05

## 2024-06-04 RX ORDER — SODIUM CHLORIDE 9 MG/ML
5-250 INJECTION, SOLUTION INTRAVENOUS PRN
Status: CANCELLED | OUTPATIENT
Start: 2024-06-05

## 2024-06-04 RX ORDER — SODIUM CHLORIDE 0.9 % (FLUSH) 0.9 %
5-40 SYRINGE (ML) INJECTION PRN
Status: DISCONTINUED | OUTPATIENT
Start: 2024-06-04 | End: 2024-06-05 | Stop reason: HOSPADM

## 2024-06-04 RX ORDER — SODIUM CHLORIDE 0.9 % (FLUSH) 0.9 %
5-40 SYRINGE (ML) INJECTION PRN
Status: CANCELLED | OUTPATIENT
Start: 2024-06-05

## 2024-06-04 RX ORDER — ALBUTEROL SULFATE 90 UG/1
4 AEROSOL, METERED RESPIRATORY (INHALATION) PRN
Status: CANCELLED | OUTPATIENT
Start: 2024-06-05

## 2024-06-04 RX ORDER — ACETAMINOPHEN 325 MG/1
650 TABLET ORAL
Status: CANCELLED | OUTPATIENT
Start: 2024-06-05

## 2024-06-04 RX ORDER — LEVOFLOXACIN 5 MG/ML
500 INJECTION, SOLUTION INTRAVENOUS ONCE
Status: COMPLETED | OUTPATIENT
Start: 2024-06-05 | End: 2024-06-05

## 2024-06-04 RX ORDER — LEVOFLOXACIN 5 MG/ML
500 INJECTION, SOLUTION INTRAVENOUS ONCE
Status: COMPLETED | OUTPATIENT
Start: 2024-06-07 | End: 2024-06-07

## 2024-06-04 RX ORDER — HEPARIN 100 UNIT/ML
500 SYRINGE INTRAVENOUS PRN
Status: DISCONTINUED | OUTPATIENT
Start: 2024-06-04 | End: 2024-06-05 | Stop reason: HOSPADM

## 2024-06-04 RX ORDER — SODIUM CHLORIDE 9 MG/ML
INJECTION, SOLUTION INTRAVENOUS CONTINUOUS
Status: CANCELLED | OUTPATIENT
Start: 2024-06-05

## 2024-06-04 RX ADMIN — HEPARIN 500 UNITS: 100 SYRINGE at 12:39

## 2024-06-04 RX ADMIN — Medication 10 ML: at 12:38

## 2024-06-04 RX ADMIN — LEVOFLOXACIN 500 MG: 5 INJECTION, SOLUTION INTRAVENOUS at 11:28

## 2024-06-04 RX ADMIN — DAPTOMYCIN 350 MG: 500 INJECTION, POWDER, LYOPHILIZED, FOR SOLUTION INTRAVENOUS at 12:31

## 2024-06-04 RX ADMIN — Medication 10 ML: at 12:27

## 2024-06-04 ASSESSMENT — PAIN SCALES - GENERAL: PAINLEVEL_OUTOF10: 6

## 2024-06-04 ASSESSMENT — PAIN DESCRIPTION - LOCATION: LOCATION: FOOT

## 2024-06-04 ASSESSMENT — PAIN SCALES - WONG BAKER: WONGBAKER_NUMERICALRESPONSE: NO HURT

## 2024-06-04 NOTE — PROGRESS NOTES
[facility] Progress Note    Date: 2024    Name: Angelica Duncan    MRN: [id number]         : 1950    Levaquin Infusion + Dapto IVP    Ms. Duncan to [facility], ambulatory at 1120 via wheelchair. Pt was assessed and education was provided. Pt stated she is tolerating her antibiotics well.    Ms. Duncan's vitals were reviewed.  Vitals:    24 1115   BP: (!) 135/48   Pulse: 51   Resp: 16   Temp: 98 °F (36.7 °C)   SpO2: 96%     Right upper arm PICC flushed easily and had brisk blood return via both ports.     PICC dressing c/d/i and not due to be changed. No swelling, redness, streaking, warmth or drainage noted in arm. Pt denied c/o pain in arm.    Levaquin 500mg/100ml NS was infused at 100 ml/hr over approximately 60 minutes followed by NS flush, then Daptomycin 350mg administered slow IVP over approx 5 minutes.    Ms. Duncan tolerated infusion, and had no complaints at this time.    Both lumens of PICC flushed with NS 10 ml and Heparin 250 units. Green end caps applied, and lumens wrapped with guaze and coban.     PICC line dressing was changed using sterile technique. Area around the insertion site was cleansed with alcohol swabs. New biopatch, stat lock, and tegaderm dressing applied. Patient tolerated well.  Stockinette placed over dressing for protection.    Patient armband removed and shredded.    [facility]. [facility] in stable condition at 1300. She is to return on 24 at 1130 for her next antibiotic appointment.    Latoya Chaudhry RN  2024  11:35 AM

## 2024-06-05 ENCOUNTER — HOSPITAL ENCOUNTER (OUTPATIENT)
Facility: HOSPITAL | Age: 74
Setting detail: INFUSION SERIES
Discharge: HOME | End: 2024-06-05
Payer: MEDICARE

## 2024-06-05 ENCOUNTER — HOME CARE VISIT (OUTPATIENT)
Age: 74
End: 2024-06-05

## 2024-06-05 VITALS
OXYGEN SATURATION: 97 % | TEMPERATURE: 98 F | SYSTOLIC BLOOD PRESSURE: 143 MMHG | HEART RATE: 60 BPM | RESPIRATION RATE: 16 BRPM | DIASTOLIC BLOOD PRESSURE: 58 MMHG

## 2024-06-05 DIAGNOSIS — L08.9 DIABETIC INFECTION OF RIGHT FOOT (HCC): Primary | ICD-10-CM

## 2024-06-05 DIAGNOSIS — E11.628 DIABETIC INFECTION OF RIGHT FOOT (HCC): Primary | ICD-10-CM

## 2024-06-05 PROCEDURE — 6360000002 HC RX W HCPCS: Performed by: INTERNAL MEDICINE

## 2024-06-05 PROCEDURE — 96365 THER/PROPH/DIAG IV INF INIT: CPT

## 2024-06-05 PROCEDURE — 2580000003 HC RX 258: Performed by: INTERNAL MEDICINE

## 2024-06-05 PROCEDURE — A4216 STERILE WATER/SALINE, 10 ML: HCPCS | Performed by: INTERNAL MEDICINE

## 2024-06-05 PROCEDURE — 96375 TX/PRO/DX INJ NEW DRUG ADDON: CPT

## 2024-06-05 RX ORDER — HEPARIN 100 UNIT/ML
500 SYRINGE INTRAVENOUS PRN
Status: DISCONTINUED | OUTPATIENT
Start: 2024-06-05 | End: 2024-06-06 | Stop reason: HOSPADM

## 2024-06-05 RX ORDER — LEVOFLOXACIN 5 MG/ML
500 INJECTION, SOLUTION INTRAVENOUS ONCE
Status: COMPLETED | OUTPATIENT
Start: 2024-06-08 | End: 2024-06-08

## 2024-06-05 RX ORDER — ONDANSETRON 2 MG/ML
8 INJECTION INTRAMUSCULAR; INTRAVENOUS
Status: CANCELLED | OUTPATIENT
Start: 2024-06-06

## 2024-06-05 RX ORDER — HEPARIN 100 UNIT/ML
500 SYRINGE INTRAVENOUS PRN
Status: CANCELLED | OUTPATIENT
Start: 2024-06-06

## 2024-06-05 RX ORDER — EPINEPHRINE 1 MG/ML
0.3 INJECTION, SOLUTION, CONCENTRATE INTRAVENOUS PRN
Status: CANCELLED | OUTPATIENT
Start: 2024-06-06

## 2024-06-05 RX ORDER — ALBUTEROL SULFATE 90 UG/1
4 AEROSOL, METERED RESPIRATORY (INHALATION) PRN
Status: CANCELLED | OUTPATIENT
Start: 2024-06-06

## 2024-06-05 RX ORDER — LEVOFLOXACIN 5 MG/ML
500 INJECTION, SOLUTION INTRAVENOUS ONCE
Status: COMPLETED | OUTPATIENT
Start: 2024-06-10 | End: 2024-06-10

## 2024-06-05 RX ORDER — SODIUM CHLORIDE 9 MG/ML
5-250 INJECTION, SOLUTION INTRAVENOUS PRN
Status: CANCELLED | OUTPATIENT
Start: 2024-06-06

## 2024-06-05 RX ORDER — SODIUM CHLORIDE 9 MG/ML
INJECTION, SOLUTION INTRAVENOUS CONTINUOUS
Status: CANCELLED | OUTPATIENT
Start: 2024-06-06

## 2024-06-05 RX ORDER — SODIUM CHLORIDE 0.9 % (FLUSH) 0.9 %
5-40 SYRINGE (ML) INJECTION PRN
Status: CANCELLED | OUTPATIENT
Start: 2024-06-06

## 2024-06-05 RX ORDER — ACETAMINOPHEN 325 MG/1
650 TABLET ORAL
Status: CANCELLED | OUTPATIENT
Start: 2024-06-06

## 2024-06-05 RX ORDER — SODIUM CHLORIDE 0.9 % (FLUSH) 0.9 %
5-40 SYRINGE (ML) INJECTION PRN
Status: DISCONTINUED | OUTPATIENT
Start: 2024-06-05 | End: 2024-06-06 | Stop reason: HOSPADM

## 2024-06-05 RX ORDER — DIPHENHYDRAMINE HYDROCHLORIDE 50 MG/ML
50 INJECTION INTRAMUSCULAR; INTRAVENOUS
Status: CANCELLED | OUTPATIENT
Start: 2024-06-06

## 2024-06-05 RX ORDER — LEVOFLOXACIN 5 MG/ML
500 INJECTION, SOLUTION INTRAVENOUS ONCE
Status: COMPLETED | OUTPATIENT
Start: 2024-06-09 | End: 2024-06-09

## 2024-06-05 RX ORDER — LEVOFLOXACIN 5 MG/ML
500 INJECTION, SOLUTION INTRAVENOUS ONCE
Status: CANCELLED
Start: 2024-06-06 | End: 2024-06-06

## 2024-06-05 RX ADMIN — LEVOFLOXACIN 500 MG: 5 INJECTION, SOLUTION INTRAVENOUS at 11:45

## 2024-06-05 RX ADMIN — Medication 10 ML: at 11:40

## 2024-06-05 RX ADMIN — Medication 10 ML: at 12:53

## 2024-06-05 RX ADMIN — HEPARIN 500 UNITS: 100 SYRINGE at 12:54

## 2024-06-05 RX ADMIN — DAPTOMYCIN 350 MG: 500 INJECTION, POWDER, LYOPHILIZED, FOR SOLUTION INTRAVENOUS at 12:48

## 2024-06-05 RX ADMIN — Medication 10 ML: at 12:45

## 2024-06-05 ASSESSMENT — PAIN SCALES - GENERAL: PAINLEVEL_OUTOF10: 5

## 2024-06-05 ASSESSMENT — PAIN SCALES - WONG BAKER: WONGBAKER_NUMERICALRESPONSE: NO HURT

## 2024-06-05 ASSESSMENT — PAIN DESCRIPTION - LOCATION: LOCATION: FOOT

## 2024-06-05 NOTE — PROGRESS NOTES
[facility] Progress Note    Date: 2024    Name: Angelica Duncan    MRN: [id number]         : 1950    Levaquin Infusion + Dapto IVP    Ms. Duncan to [facility], ambulatory at 1132 via wheelchair. Pt was assessed and education was provided. Pt stated she is tolerating her antibiotics well.    Ms. Duncan's vitals were reviewed.  Vitals:    24 1130   BP: (!) 143/58   Pulse: 60   Resp: 16   Temp: 98 °F (36.7 °C)   SpO2: 97%     Right upper arm PICC flushed easily and had brisk blood return via both ports.     PICC dressing c/d/i and not due to be changed. No swelling, redness, streaking, warmth or drainage noted in arm. Pt denied c/o pain in arm.    Levaquin 500mg/100ml NS was infused at 100 ml/hr over approximately 60 minutes followed by NS flush, then Daptomycin 350mg administered slow IVP over approx 5 minutes.    Ms. Duncan tolerated infusion, and had no complaints at this time.    Both lumens of PICC flushed with NS 10 ml and Heparin 250 units. Green end caps applied, and lumens wrapped with guaze and coban.  Stockinette placed over dressing for protection.    Patient armband removed and shredded.    [facility]. Duncan [facility] in stable condition at 1301. She is to return on 24 at 1130 for her next antibiotic appointment.    Latoya Chaudhry RN  2024  12:11 PM

## 2024-06-06 ENCOUNTER — HOME CARE VISIT (OUTPATIENT)
Age: 74
End: 2024-06-06

## 2024-06-06 ENCOUNTER — HOSPITAL ENCOUNTER (OUTPATIENT)
Facility: HOSPITAL | Age: 74
Setting detail: INFUSION SERIES
Discharge: HOME | End: 2024-06-06
Payer: MEDICARE

## 2024-06-06 VITALS
HEART RATE: 58 BPM | SYSTOLIC BLOOD PRESSURE: 115 MMHG | RESPIRATION RATE: 16 BRPM | OXYGEN SATURATION: 95 % | DIASTOLIC BLOOD PRESSURE: 33 MMHG | TEMPERATURE: 98 F

## 2024-06-06 DIAGNOSIS — L08.9 DIABETIC INFECTION OF RIGHT FOOT (HCC): Primary | ICD-10-CM

## 2024-06-06 DIAGNOSIS — E11.628 DIABETIC INFECTION OF RIGHT FOOT (HCC): Primary | ICD-10-CM

## 2024-06-06 PROCEDURE — 96365 THER/PROPH/DIAG IV INF INIT: CPT

## 2024-06-06 PROCEDURE — 6360000002 HC RX W HCPCS: Performed by: INTERNAL MEDICINE

## 2024-06-06 PROCEDURE — 2580000003 HC RX 258: Performed by: INTERNAL MEDICINE

## 2024-06-06 PROCEDURE — A4216 STERILE WATER/SALINE, 10 ML: HCPCS | Performed by: INTERNAL MEDICINE

## 2024-06-06 PROCEDURE — 96375 TX/PRO/DX INJ NEW DRUG ADDON: CPT

## 2024-06-06 PROCEDURE — G0299 HHS/HOSPICE OF RN EA 15 MIN: HCPCS

## 2024-06-06 RX ORDER — LEVOFLOXACIN 5 MG/ML
500 INJECTION, SOLUTION INTRAVENOUS ONCE
Status: COMPLETED | OUTPATIENT
Start: 2024-06-13 | End: 2024-06-13

## 2024-06-06 RX ORDER — EPINEPHRINE 1 MG/ML
0.3 INJECTION, SOLUTION, CONCENTRATE INTRAVENOUS PRN
Status: CANCELLED | OUTPATIENT
Start: 2024-06-07

## 2024-06-06 RX ORDER — ACETAMINOPHEN 325 MG/1
650 TABLET ORAL
Status: CANCELLED | OUTPATIENT
Start: 2024-06-07

## 2024-06-06 RX ORDER — HEPARIN 100 UNIT/ML
500 SYRINGE INTRAVENOUS PRN
Status: CANCELLED | OUTPATIENT
Start: 2024-06-07

## 2024-06-06 RX ORDER — DIPHENHYDRAMINE HYDROCHLORIDE 50 MG/ML
50 INJECTION INTRAMUSCULAR; INTRAVENOUS
Status: CANCELLED | OUTPATIENT
Start: 2024-06-07

## 2024-06-06 RX ORDER — SODIUM CHLORIDE 0.9 % (FLUSH) 0.9 %
5-40 SYRINGE (ML) INJECTION PRN
Status: DISCONTINUED | OUTPATIENT
Start: 2024-06-06 | End: 2024-06-07 | Stop reason: HOSPADM

## 2024-06-06 RX ORDER — LEVOFLOXACIN 5 MG/ML
500 INJECTION, SOLUTION INTRAVENOUS ONCE
Status: CANCELLED
Start: 2024-06-07 | End: 2024-06-07

## 2024-06-06 RX ORDER — ALBUTEROL SULFATE 90 UG/1
4 AEROSOL, METERED RESPIRATORY (INHALATION) PRN
Status: CANCELLED | OUTPATIENT
Start: 2024-06-07

## 2024-06-06 RX ORDER — HEPARIN 100 UNIT/ML
500 SYRINGE INTRAVENOUS PRN
Status: DISCONTINUED | OUTPATIENT
Start: 2024-06-06 | End: 2024-06-07 | Stop reason: HOSPADM

## 2024-06-06 RX ORDER — ONDANSETRON 2 MG/ML
8 INJECTION INTRAMUSCULAR; INTRAVENOUS
Status: CANCELLED | OUTPATIENT
Start: 2024-06-07

## 2024-06-06 RX ORDER — SODIUM CHLORIDE 0.9 % (FLUSH) 0.9 %
5-40 SYRINGE (ML) INJECTION PRN
Status: CANCELLED | OUTPATIENT
Start: 2024-06-07

## 2024-06-06 RX ORDER — LEVOFLOXACIN 5 MG/ML
500 INJECTION, SOLUTION INTRAVENOUS ONCE
Status: COMPLETED | OUTPATIENT
Start: 2024-06-12 | End: 2024-06-12

## 2024-06-06 RX ORDER — SODIUM CHLORIDE 9 MG/ML
INJECTION, SOLUTION INTRAVENOUS CONTINUOUS
Status: CANCELLED | OUTPATIENT
Start: 2024-06-07

## 2024-06-06 RX ORDER — SODIUM CHLORIDE 9 MG/ML
5-250 INJECTION, SOLUTION INTRAVENOUS PRN
Status: CANCELLED | OUTPATIENT
Start: 2024-06-07

## 2024-06-06 RX ADMIN — DAPTOMYCIN 350 MG: 500 INJECTION, POWDER, LYOPHILIZED, FOR SOLUTION INTRAVENOUS at 12:53

## 2024-06-06 RX ADMIN — HEPARIN 500 UNITS: 100 SYRINGE at 13:00

## 2024-06-06 RX ADMIN — Medication 10 ML: at 12:50

## 2024-06-06 RX ADMIN — LEVOFLOXACIN 500 MG: 5 INJECTION, SOLUTION INTRAVENOUS at 11:51

## 2024-06-06 RX ADMIN — Medication 10 ML: at 12:59

## 2024-06-06 ASSESSMENT — PAIN DESCRIPTION - LOCATION: LOCATION: FOOT

## 2024-06-06 ASSESSMENT — PAIN SCALES - GENERAL: PAINLEVEL_OUTOF10: 5

## 2024-06-06 NOTE — PROGRESS NOTES
[facility] Progress Note    Date: 2024    Name: Angelica Duncan    MRN: [id number]         : 1950    Levaquin Infusion + Dapto IVP    Ms. Duncan to [facility], ambulatory at 1137 via wheelchair, accompanied by her boyfriend. Pt was assessed and education was provided. Pt stated she is tolerating her antibiotics well, but is c/o lower right leg swelling. She states she has furosemide for fluid retention, but does not take it regularly due to frequent urination.    Ms. Duncan's vitals were reviewed.  Vitals:    24 1130   BP: (!) 115/33   Pulse: 58   Resp: 16   Temp: 98 °F (36.7 °C)   SpO2: 95%     Right upper arm PICC flushed easily and had brisk blood return via both ports.     PICC dressing c/d/i and not due to be changed. No swelling, redness, streaking, warmth or drainage noted in arm. Pt denied c/o pain in arm.    Levaquin 500mg/100ml NS was infused at 100 ml/hr over approximately 60 minutes followed by NS flush, then Daptomycin 350mg administered slow IVP over approx 5 minutes.    Ms. Duncan tolerated infusion, and had no complaints at this time.    Both lumens of PICC flushed with NS 10 ml and Heparin 250 units. Green end caps applied, and lumens wrapped with guaze and coban.  Stockinette placed over dressing for protection.    Patient armband removed and shredded.    Ms. Duncan was [facility] in stable condition at 1304. She is to return on 24 at 1100 for her next antibiotic appointment.    Latoya Chaudhry RN  2024  1:55 PM

## 2024-06-07 ENCOUNTER — HOSPITAL ENCOUNTER (OUTPATIENT)
Facility: HOSPITAL | Age: 74
Setting detail: INFUSION SERIES
Discharge: HOME | End: 2024-06-07
Payer: MEDICARE

## 2024-06-07 ENCOUNTER — HOME CARE VISIT (OUTPATIENT)
Age: 74
End: 2024-06-07
Payer: MEDICARE

## 2024-06-07 VITALS
TEMPERATURE: 98.1 F | HEART RATE: 69 BPM | DIASTOLIC BLOOD PRESSURE: 43 MMHG | RESPIRATION RATE: 16 BRPM | OXYGEN SATURATION: 95 % | SYSTOLIC BLOOD PRESSURE: 111 MMHG

## 2024-06-07 DIAGNOSIS — L08.9 DIABETIC INFECTION OF RIGHT FOOT (HCC): Primary | ICD-10-CM

## 2024-06-07 DIAGNOSIS — E11.628 DIABETIC INFECTION OF RIGHT FOOT (HCC): Primary | ICD-10-CM

## 2024-06-07 PROCEDURE — 96365 THER/PROPH/DIAG IV INF INIT: CPT

## 2024-06-07 PROCEDURE — 6360000002 HC RX W HCPCS: Performed by: INTERNAL MEDICINE

## 2024-06-07 PROCEDURE — 2580000003 HC RX 258: Performed by: INTERNAL MEDICINE

## 2024-06-07 PROCEDURE — 96375 TX/PRO/DX INJ NEW DRUG ADDON: CPT

## 2024-06-07 PROCEDURE — A4216 STERILE WATER/SALINE, 10 ML: HCPCS | Performed by: INTERNAL MEDICINE

## 2024-06-07 RX ORDER — HEPARIN 100 UNIT/ML
500 SYRINGE INTRAVENOUS PRN
Status: DISCONTINUED | OUTPATIENT
Start: 2024-06-07 | End: 2024-06-08 | Stop reason: HOSPADM

## 2024-06-07 RX ORDER — ALBUTEROL SULFATE 90 UG/1
4 AEROSOL, METERED RESPIRATORY (INHALATION) PRN
Status: CANCELLED | OUTPATIENT
Start: 2024-06-08

## 2024-06-07 RX ORDER — SODIUM CHLORIDE 9 MG/ML
5-250 INJECTION, SOLUTION INTRAVENOUS PRN
Status: CANCELLED | OUTPATIENT
Start: 2024-06-08

## 2024-06-07 RX ORDER — SODIUM CHLORIDE 9 MG/ML
INJECTION, SOLUTION INTRAVENOUS CONTINUOUS
Status: CANCELLED | OUTPATIENT
Start: 2024-06-08

## 2024-06-07 RX ORDER — LEVOFLOXACIN 5 MG/ML
500 INJECTION, SOLUTION INTRAVENOUS ONCE
Status: CANCELLED
Start: 2024-06-08 | End: 2024-06-08

## 2024-06-07 RX ORDER — DIPHENHYDRAMINE HYDROCHLORIDE 50 MG/ML
50 INJECTION INTRAMUSCULAR; INTRAVENOUS
Status: CANCELLED | OUTPATIENT
Start: 2024-06-08

## 2024-06-07 RX ORDER — ONDANSETRON 2 MG/ML
8 INJECTION INTRAMUSCULAR; INTRAVENOUS
Status: CANCELLED | OUTPATIENT
Start: 2024-06-08

## 2024-06-07 RX ORDER — SODIUM CHLORIDE 0.9 % (FLUSH) 0.9 %
5-40 SYRINGE (ML) INJECTION PRN
Status: DISCONTINUED | OUTPATIENT
Start: 2024-06-07 | End: 2024-06-08 | Stop reason: HOSPADM

## 2024-06-07 RX ORDER — EPINEPHRINE 1 MG/ML
0.3 INJECTION, SOLUTION, CONCENTRATE INTRAVENOUS PRN
Status: CANCELLED | OUTPATIENT
Start: 2024-06-08

## 2024-06-07 RX ORDER — ACETAMINOPHEN 325 MG/1
650 TABLET ORAL
Status: CANCELLED | OUTPATIENT
Start: 2024-06-08

## 2024-06-07 RX ORDER — SODIUM CHLORIDE 0.9 % (FLUSH) 0.9 %
5-40 SYRINGE (ML) INJECTION PRN
Status: CANCELLED | OUTPATIENT
Start: 2024-06-08

## 2024-06-07 RX ORDER — LEVOFLOXACIN 5 MG/ML
500 INJECTION, SOLUTION INTRAVENOUS ONCE
Status: COMPLETED | OUTPATIENT
Start: 2024-06-11 | End: 2024-06-11

## 2024-06-07 RX ORDER — HEPARIN 100 UNIT/ML
500 SYRINGE INTRAVENOUS PRN
Status: CANCELLED | OUTPATIENT
Start: 2024-06-08

## 2024-06-07 RX ADMIN — Medication 10 ML: at 11:05

## 2024-06-07 RX ADMIN — Medication 10 ML: at 12:08

## 2024-06-07 RX ADMIN — DAPTOMYCIN 350 MG: 500 INJECTION, POWDER, LYOPHILIZED, FOR SOLUTION INTRAVENOUS at 12:09

## 2024-06-07 RX ADMIN — HEPARIN 500 UNITS: 100 SYRINGE at 12:17

## 2024-06-07 RX ADMIN — Medication 10 ML: at 12:16

## 2024-06-07 RX ADMIN — LEVOFLOXACIN 500 MG: 5 INJECTION, SOLUTION INTRAVENOUS at 11:09

## 2024-06-07 ASSESSMENT — PAIN SCALES - GENERAL: PAINLEVEL_OUTOF10: 5

## 2024-06-07 ASSESSMENT — PAIN DESCRIPTION - LOCATION: LOCATION: FOOT

## 2024-06-07 NOTE — HOME HEALTH
Skilled reason for visit: wound vac to right foot     Caregiver involvement: patient stated she has a son that lives with her .    Medications reviewed and all medications are available in the home this visit.    The following education was provided regarding medications:  all meds are in the home and she is takign them as ordered.    MD notified of any discrepancies/look a-like medications/medication interactions: none  Medications are effective at this time.      Home health supplies by type and quantity ordered/delivered this visit include: nonfe    Patient education provided this visit: wound vac, maceration and what that means, propping her foot up    Sharps education provided: NA    Patient level of understanding of education provided: patient verbalized understanding    Patient response to procedure performed:   patient had no complaints    Agency Progress toward goals: progressing    Patient's Progress towards personal goals: patient stated that she is ok    Home exercise program: deep breathing    Continued need for the following skills: Nursing.    Plan for next visit: wound care     Patient and/or caregiver notified and agrees to changes in the Plan of Care: N/A.     The following discharge planning was discussed with the pt/caregiver: when goals met and education is complete

## 2024-06-07 NOTE — PROGRESS NOTES
[facility] Progress Note    Date: 2024    Name: Angelica Duncan    MRN: [id number]         : 1950    Levaquin Infusion + Dapto IVP    [facility]. [facility] to [facility], ambulatory at 1100 via wheelchair, accompanied by her boyfriend. Pt was assessed and education was provided. Pt stated she is tolerating her antibiotics well, but is c/o lower right leg swelling. She states she has furosemide for fluid retention, but does not take it regularly due to frequent urination.    Ms. Duncan's vitals were reviewed.  Vitals:    24 1100   BP: (!) 111/43   Pulse: 69   Resp: 16   Temp: 98.1 °F (36.7 °C)   SpO2: 95%     Right upper arm PICC flushed easily and had brisk blood return via both ports.     PICC dressing c/d/i and not due to be changed. No swelling, redness, streaking, warmth or drainage noted in arm. Pt denied c/o pain in arm.    Levaquin 500mg/100ml NS was infused at 100 ml/hr over approximately 60 minutes followed by NS flush, then Daptomycin 350mg administered slow IVP over approx 5 minutes.    Ms. Duncan tolerated infusion, and had no complaints at this time.    Both lumens of PICC flushed with NS 10 ml and Heparin 250 units. Green end caps applied, and lumens wrapped with guaze and coban.  Stockinette placed over dressing for protection.    Patient armband removed and shredded.    [facility]. [facility] in [facility] condition at 1221. She is to return on 24 at 0900 for her next antibiotic appointment.    Latoya Chaudhry RN  2024  11:53 AM

## 2024-06-08 ENCOUNTER — HOSPITAL ENCOUNTER (OUTPATIENT)
Facility: HOSPITAL | Age: 74
Setting detail: INFUSION SERIES
Discharge: HOME | End: 2024-06-08
Payer: MEDICARE

## 2024-06-08 VITALS
HEART RATE: 55 BPM | OXYGEN SATURATION: 98 % | DIASTOLIC BLOOD PRESSURE: 63 MMHG | RESPIRATION RATE: 16 BRPM | TEMPERATURE: 98.2 F | SYSTOLIC BLOOD PRESSURE: 127 MMHG

## 2024-06-08 DIAGNOSIS — L08.9 DIABETIC INFECTION OF RIGHT FOOT (HCC): ICD-10-CM

## 2024-06-08 DIAGNOSIS — E11.628 DIABETIC INFECTION OF RIGHT FOOT (HCC): ICD-10-CM

## 2024-06-08 PROCEDURE — 96365 THER/PROPH/DIAG IV INF INIT: CPT

## 2024-06-08 PROCEDURE — 2580000003 HC RX 258: Performed by: INTERNAL MEDICINE

## 2024-06-08 PROCEDURE — 6360000002 HC RX W HCPCS: Performed by: INTERNAL MEDICINE

## 2024-06-08 PROCEDURE — 96375 TX/PRO/DX INJ NEW DRUG ADDON: CPT

## 2024-06-08 PROCEDURE — A4216 STERILE WATER/SALINE, 10 ML: HCPCS | Performed by: INTERNAL MEDICINE

## 2024-06-08 RX ORDER — SODIUM CHLORIDE 0.9 % (FLUSH) 0.9 %
5-40 SYRINGE (ML) INJECTION PRN
Status: DISCONTINUED | OUTPATIENT
Start: 2024-06-08 | End: 2024-06-09 | Stop reason: HOSPADM

## 2024-06-08 RX ORDER — HEPARIN SODIUM (PORCINE) LOCK FLUSH IV SOLN 100 UNIT/ML 100 UNIT/ML
500 SOLUTION INTRAVENOUS PRN
Status: DISCONTINUED | OUTPATIENT
Start: 2024-06-08 | End: 2024-06-09 | Stop reason: HOSPADM

## 2024-06-08 RX ORDER — HEPARIN 100 UNIT/ML
SYRINGE INTRAVENOUS
Status: DISPENSED
Start: 2024-06-08 | End: 2024-06-08

## 2024-06-08 RX ADMIN — Medication 10 ML: at 10:13

## 2024-06-08 RX ADMIN — DAPTOMYCIN 350 MG: 500 INJECTION, POWDER, LYOPHILIZED, FOR SOLUTION INTRAVENOUS at 10:14

## 2024-06-08 RX ADMIN — Medication 10 ML: at 10:19

## 2024-06-08 RX ADMIN — LEVOFLOXACIN 500 MG: 5 INJECTION, SOLUTION INTRAVENOUS at 09:10

## 2024-06-08 RX ADMIN — Medication 10 ML: at 09:07

## 2024-06-08 RX ADMIN — HEPARIN 500 UNITS: 100 SYRINGE at 10:21

## 2024-06-08 ASSESSMENT — PAIN SCALES - GENERAL: PAINLEVEL_OUTOF10: 7

## 2024-06-08 ASSESSMENT — PAIN DESCRIPTION - LOCATION: LOCATION: FOOT

## 2024-06-08 ASSESSMENT — PAIN DESCRIPTION - ORIENTATION: ORIENTATION: RIGHT

## 2024-06-08 NOTE — PROGRESS NOTES
[facility] Progress Note    Date: 2024    Name: Angelica Duncan    MRN: [id number]         : 1950    Levaquin Infusion + Dapto IVP    Ms. Duncan to [facility], ambulatory at 0852 via wheelchair, accompanied by her boyfriend. Pt was assessed and education was provided. Pt stated she is tolerating her antibiotics well, but is c/o lower right leg swelling.     Ms. Duncan's vitals were reviewed.  Vitals:    24 0859   BP: 127/63   Pulse: 55   Resp: 16   Temp: 98.2 °F (36.8 °C)   SpO2: 98%     Right upper arm PICC flushed easily and had brisk blood return via both ports.     PICC dressing c/d/I with edges lifting. Tape reinforced around edges. No swelling, redness, streaking, warmth or drainage noted in arm. Pt denied c/o pain in arm.    Levaquin 500mg/100ml NS was infused at 100 ml/hr over approximately 60 minutes followed by 10ml NS flush, then Daptomycin 350mg administered slow IVP over approx 5 minutes.    Ms. Duncan tolerated infusion, and had no complaints at this time.    Both lumens of PICC flushed with NS 10 ml and Heparin 500 units. Green end caps applied, and lumens wrapped with guaze and coban.  Stockinette placed over dressing for protection.    Patient armband removed and shredded.    [facility]. Duncan [facility] in [facility] condition at 1025. She is to return on 24 at 0800 for her next antibiotic appointment.    Ирина Freitas RN  2024  10:32 AM

## 2024-06-09 ENCOUNTER — HOSPITAL ENCOUNTER (OUTPATIENT)
Facility: HOSPITAL | Age: 74
Setting detail: INFUSION SERIES
Discharge: HOME | End: 2024-06-09
Payer: MEDICARE

## 2024-06-09 ENCOUNTER — HOME CARE VISIT (OUTPATIENT)
Age: 74
End: 2024-06-09
Payer: MEDICARE

## 2024-06-09 VITALS
RESPIRATION RATE: 16 BRPM | DIASTOLIC BLOOD PRESSURE: 56 MMHG | HEART RATE: 62 BPM | TEMPERATURE: 98.3 F | SYSTOLIC BLOOD PRESSURE: 130 MMHG | OXYGEN SATURATION: 94 %

## 2024-06-09 VITALS
RESPIRATION RATE: 18 BRPM | SYSTOLIC BLOOD PRESSURE: 110 MMHG | DIASTOLIC BLOOD PRESSURE: 80 MMHG | TEMPERATURE: 97.8 F | HEART RATE: 78 BPM | OXYGEN SATURATION: 97 %

## 2024-06-09 DIAGNOSIS — L08.9 DIABETIC INFECTION OF RIGHT FOOT (HCC): ICD-10-CM

## 2024-06-09 DIAGNOSIS — E11.628 DIABETIC INFECTION OF RIGHT FOOT (HCC): ICD-10-CM

## 2024-06-09 PROCEDURE — 96375 TX/PRO/DX INJ NEW DRUG ADDON: CPT

## 2024-06-09 PROCEDURE — A4216 STERILE WATER/SALINE, 10 ML: HCPCS | Performed by: INTERNAL MEDICINE

## 2024-06-09 PROCEDURE — G0151 HHCP-SERV OF PT,EA 15 MIN: HCPCS

## 2024-06-09 PROCEDURE — 2580000003 HC RX 258: Performed by: INTERNAL MEDICINE

## 2024-06-09 PROCEDURE — 96365 THER/PROPH/DIAG IV INF INIT: CPT

## 2024-06-09 PROCEDURE — 6360000002 HC RX W HCPCS: Performed by: INTERNAL MEDICINE

## 2024-06-09 RX ORDER — SODIUM CHLORIDE 0.9 % (FLUSH) 0.9 %
5-40 SYRINGE (ML) INJECTION PRN
Status: DISCONTINUED | OUTPATIENT
Start: 2024-06-09 | End: 2024-06-10 | Stop reason: HOSPADM

## 2024-06-09 RX ORDER — HEPARIN SODIUM (PORCINE) LOCK FLUSH IV SOLN 100 UNIT/ML 100 UNIT/ML
500 SOLUTION INTRAVENOUS PRN
Status: DISCONTINUED | OUTPATIENT
Start: 2024-06-09 | End: 2024-06-10 | Stop reason: HOSPADM

## 2024-06-09 RX ORDER — HEPARIN 100 UNIT/ML
SYRINGE INTRAVENOUS
Status: DISPENSED
Start: 2024-06-09 | End: 2024-06-09

## 2024-06-09 RX ADMIN — Medication 10 ML: at 09:00

## 2024-06-09 RX ADMIN — DAPTOMYCIN 350 MG: 500 INJECTION, POWDER, LYOPHILIZED, FOR SOLUTION INTRAVENOUS at 09:01

## 2024-06-09 RX ADMIN — Medication 10 ML: at 07:55

## 2024-06-09 RX ADMIN — Medication 10 ML: at 09:06

## 2024-06-09 RX ADMIN — HEPARIN 500 UNITS: 100 SYRINGE at 09:08

## 2024-06-09 RX ADMIN — LEVOFLOXACIN 500 MG: 5 INJECTION, SOLUTION INTRAVENOUS at 08:00

## 2024-06-09 ASSESSMENT — PAIN SCALES - GENERAL: PAINLEVEL_OUTOF10: 6

## 2024-06-09 ASSESSMENT — PAIN DESCRIPTION - ORIENTATION: ORIENTATION: RIGHT

## 2024-06-09 ASSESSMENT — PAIN DESCRIPTION - LOCATION: LOCATION: FOOT

## 2024-06-09 NOTE — HOME HEALTH
Naeem(boyfriend) will be able to assist as need, prior to hospitalization, she was independent with basic ADL's and has been using RW for gait   CAREGIVER INVOLVEMENT/ ASSISTANCE NEEDED FOR: Naeem boyfriend for transportation, meal prep, S with mobility/ADL's  MEDICATIONS REVIEWED AND UPDATED:no change in medicine since last SN visit   NEXT MD APPOINTMENT:TO be determined      EQUIIPMENT: RW, knee scooter, cane, crutches, shower chair  STRENGTH: R hip and knee flexor and extensor 3-/5, LLE 4-/5  WOUNDS: refer to SN per patient request   BED MOBILITY:sleeps on couch at this time and needed Min A for repositioning   TRANSFERS: Min A with knee scooter to and from chair and toilet   GAIT: Patient was able to use knee scooter with supervision. Patient with noted unsteady gait pattern. patient is unsafe with gait without any AD  STAIRS: unable at this time   BALANCE: Poor in dynamic standing balance     HEP consisting of:patient educated with HEP including seated hip flex, knee extension, hip abduction, hip adduction, ankle PF and DF and ball squeeze x 20 reps x 3 sets daily to improve MMT on BLE to facilitate with improved bed mobility, transfers and gait with AD. patient also educated with deep breathing exercises to be done daily x 10 reps x 3 sets to prevent SOB during activity. Patient educated with fall prevention technique by decluttering space, proper use of AD and footwear    PATIENT EDUCATION PROVIDED THIS VISIT: safety, HEP, walking, deep breathing, home care plan and goals    PATIENT RESPONSE TO EDUCATION PROVIDED: Pt demonstrated positive response to PT shown by full participation with stable vitals  PATIENT RESPONSE TO EVALUATION/TREATMENT: Patient demonstrated a positive outcome post treatment and was able to partially teach back HEP    ASSESSMENT AND CONTINUED NEED FOR THE FOLLOWING SKILLS:      Pt presents with: decreased strength, impaired gait, decreased transfer status, decreased endurance, decreased

## 2024-06-09 NOTE — PROGRESS NOTES
[facility] Progress Note    Date: 2024    Name: Angelica Duncan    MRN: [id number]         : 1950    Levaquin Infusion + Dapto IVP    Ms. Duncan to [facility], ambulatory at 0749 via wheelchair, accompanied by her boyfriend. Pt was assessed and education was provided. Pt stated she is tolerating her antibiotics without difficulty.     Ms. Duncan's vitals were reviewed.  Patient Vitals for the past 12 hrs:   Temp Pulse Resp BP SpO2   24 0755 98.3 °F (36.8 °C) 62 16 (!) 130/56 94 %       Right upper arm PICC flushed easily and had brisk blood return via both ports.     PICC dressing c/d/I. No swelling, redness, streaking, warmth or drainage noted in arm. Pt denied c/o pain in arm.    Levaquin 500mg/100ml NS was infused at 100 ml/hr over approximately 60 minutes followed by 10ml NS flush, then Daptomycin 350mg administered slow IVP over approx 5 minutes.    Ms. Duncan tolerated infusion, and had no complaints at this time.    Both lumens of PICC flushed with NS 10 ml and Heparin 500 units per lumen. Green curos caps applied, and lumens wrapped with guaze and coban.  Stockinette placed over dressing for protection.    Patient armband removed and shredded.    [facility] condition at 0915. She is to return on 6/10/24 at 1100 for her next antibiotic appointment.    Ирина Freitas RN  2024  8:02 AM

## 2024-06-10 ENCOUNTER — HOME CARE VISIT (OUTPATIENT)
Age: 74
End: 2024-06-10
Payer: MEDICARE

## 2024-06-10 ENCOUNTER — HOSPITAL ENCOUNTER (OUTPATIENT)
Facility: HOSPITAL | Age: 74
Setting detail: INFUSION SERIES
Discharge: HOME | End: 2024-06-10
Payer: MEDICARE

## 2024-06-10 VITALS
RESPIRATION RATE: 16 BRPM | SYSTOLIC BLOOD PRESSURE: 108 MMHG | TEMPERATURE: 98.2 F | HEART RATE: 66 BPM | OXYGEN SATURATION: 96 % | DIASTOLIC BLOOD PRESSURE: 58 MMHG

## 2024-06-10 VITALS
HEART RATE: 46 BPM | RESPIRATION RATE: 16 BRPM | SYSTOLIC BLOOD PRESSURE: 120 MMHG | TEMPERATURE: 98.7 F | DIASTOLIC BLOOD PRESSURE: 78 MMHG | OXYGEN SATURATION: 96 %

## 2024-06-10 DIAGNOSIS — L08.9 DIABETIC INFECTION OF RIGHT FOOT (HCC): Primary | ICD-10-CM

## 2024-06-10 DIAGNOSIS — E11.628 DIABETIC INFECTION OF RIGHT FOOT (HCC): Primary | ICD-10-CM

## 2024-06-10 LAB
ANION GAP SERPL CALC-SCNC: 5 MMOL/L (ref 3–18)
BACTERIA SPEC CULT: NORMAL
BASO+EOS+MONOS # BLD AUTO: 0.6 K/UL (ref 0–2.3)
BASO+EOS+MONOS NFR BLD AUTO: 9 % (ref 0.1–17)
BUN SERPL-MCNC: 19 MG/DL (ref 7–18)
BUN/CREAT SERPL: 30 (ref 12–20)
CALCIUM SERPL-MCNC: 8.5 MG/DL (ref 8.5–10.1)
CHLORIDE SERPL-SCNC: 104 MMOL/L (ref 100–111)
CK SERPL-CCNC: 118 U/L (ref 26–192)
CO2 SERPL-SCNC: 27 MMOL/L (ref 21–32)
CREAT SERPL-MCNC: 0.64 MG/DL (ref 0.6–1.3)
CRP SERPL-MCNC: 0.4 MG/DL (ref 0–0.3)
DIFFERENTIAL METHOD BLD: ABNORMAL
ERYTHROCYTE [DISTWIDTH] IN BLOOD BY AUTOMATED COUNT: 13.9 % (ref 11.5–14.5)
ERYTHROCYTE [SEDIMENTATION RATE] IN BLOOD: 47 MM/HR (ref 0–30)
GLUCOSE SERPL-MCNC: 122 MG/DL (ref 74–99)
HCT VFR BLD AUTO: 34.5 % (ref 36–48)
HGB BLD-MCNC: 10.9 G/DL (ref 12–16)
LYMPHOCYTES # BLD: 1 K/UL (ref 1.1–5.9)
LYMPHOCYTES NFR BLD: 17 % (ref 14–44)
MCH RBC QN AUTO: 31 PG (ref 25–35)
MCHC RBC AUTO-ENTMCNC: 31.6 G/DL (ref 31–37)
MCV RBC AUTO: 98 FL (ref 78–102)
NEUTS SEG # BLD: 4.4 K/UL (ref 1.8–9.5)
NEUTS SEG NFR BLD: 73 % (ref 40–70)
PLATELET # BLD AUTO: 376 K/UL (ref 140–440)
POTASSIUM SERPL-SCNC: 4.3 MMOL/L (ref 3.5–5.5)
RBC # BLD AUTO: 3.52 M/UL (ref 4.1–5.1)
SERVICE CMNT-IMP: NORMAL
SODIUM SERPL-SCNC: 136 MMOL/L (ref 136–145)
WBC # BLD AUTO: 6 K/UL (ref 4.5–13)

## 2024-06-10 PROCEDURE — 2580000003 HC RX 258: Performed by: INTERNAL MEDICINE

## 2024-06-10 PROCEDURE — 96365 THER/PROPH/DIAG IV INF INIT: CPT

## 2024-06-10 PROCEDURE — 6360000002 HC RX W HCPCS: Performed by: INTERNAL MEDICINE

## 2024-06-10 PROCEDURE — A4216 STERILE WATER/SALINE, 10 ML: HCPCS | Performed by: INTERNAL MEDICINE

## 2024-06-10 PROCEDURE — 85652 RBC SED RATE AUTOMATED: CPT

## 2024-06-10 PROCEDURE — 80048 BASIC METABOLIC PNL TOTAL CA: CPT

## 2024-06-10 PROCEDURE — 85025 COMPLETE CBC W/AUTO DIFF WBC: CPT

## 2024-06-10 PROCEDURE — G0299 HHS/HOSPICE OF RN EA 15 MIN: HCPCS

## 2024-06-10 PROCEDURE — 82550 ASSAY OF CK (CPK): CPT

## 2024-06-10 PROCEDURE — 86140 C-REACTIVE PROTEIN: CPT

## 2024-06-10 PROCEDURE — 96375 TX/PRO/DX INJ NEW DRUG ADDON: CPT

## 2024-06-10 RX ORDER — ONDANSETRON 2 MG/ML
8 INJECTION INTRAMUSCULAR; INTRAVENOUS
Status: CANCELLED | OUTPATIENT
Start: 2024-06-11

## 2024-06-10 RX ORDER — DIPHENHYDRAMINE HYDROCHLORIDE 50 MG/ML
50 INJECTION INTRAMUSCULAR; INTRAVENOUS
Status: CANCELLED | OUTPATIENT
Start: 2024-06-11

## 2024-06-10 RX ORDER — SODIUM CHLORIDE 9 MG/ML
INJECTION, SOLUTION INTRAVENOUS CONTINUOUS
Status: CANCELLED | OUTPATIENT
Start: 2024-06-11

## 2024-06-10 RX ORDER — EPINEPHRINE 1 MG/ML
0.3 INJECTION, SOLUTION, CONCENTRATE INTRAVENOUS PRN
Status: CANCELLED | OUTPATIENT
Start: 2024-06-11

## 2024-06-10 RX ORDER — HEPARIN 100 UNIT/ML
500 SYRINGE INTRAVENOUS PRN
Status: CANCELLED | OUTPATIENT
Start: 2024-06-11

## 2024-06-10 RX ORDER — SODIUM CHLORIDE 0.9 % (FLUSH) 0.9 %
5-40 SYRINGE (ML) INJECTION PRN
Status: DISCONTINUED | OUTPATIENT
Start: 2024-06-10 | End: 2024-06-11 | Stop reason: HOSPADM

## 2024-06-10 RX ORDER — HEPARIN 100 UNIT/ML
500 SYRINGE INTRAVENOUS PRN
Status: DISCONTINUED | OUTPATIENT
Start: 2024-06-10 | End: 2024-06-11 | Stop reason: HOSPADM

## 2024-06-10 RX ORDER — LEVOFLOXACIN 5 MG/ML
500 INJECTION, SOLUTION INTRAVENOUS ONCE
Status: CANCELLED
Start: 2024-06-11 | End: 2024-06-11

## 2024-06-10 RX ORDER — SODIUM CHLORIDE 0.9 % (FLUSH) 0.9 %
5-40 SYRINGE (ML) INJECTION PRN
Status: CANCELLED | OUTPATIENT
Start: 2024-06-11

## 2024-06-10 RX ORDER — ALBUTEROL SULFATE 90 UG/1
4 AEROSOL, METERED RESPIRATORY (INHALATION) PRN
Status: CANCELLED | OUTPATIENT
Start: 2024-06-11

## 2024-06-10 RX ORDER — SODIUM CHLORIDE 9 MG/ML
5-250 INJECTION, SOLUTION INTRAVENOUS PRN
Status: CANCELLED | OUTPATIENT
Start: 2024-06-11

## 2024-06-10 RX ORDER — ACETAMINOPHEN 325 MG/1
650 TABLET ORAL
Status: CANCELLED | OUTPATIENT
Start: 2024-06-11

## 2024-06-10 RX ADMIN — Medication 10 ML: at 12:52

## 2024-06-10 RX ADMIN — LEVOFLOXACIN 500 MG: 5 INJECTION, SOLUTION INTRAVENOUS at 11:40

## 2024-06-10 RX ADMIN — Medication 10 ML: at 12:43

## 2024-06-10 RX ADMIN — Medication 10 ML: at 11:36

## 2024-06-10 RX ADMIN — HEPARIN 500 UNITS: 100 SYRINGE at 12:53

## 2024-06-10 RX ADMIN — DAPTOMYCIN 350 MG: 500 INJECTION, POWDER, LYOPHILIZED, FOR SOLUTION INTRAVENOUS at 12:45

## 2024-06-10 ASSESSMENT — PAIN DESCRIPTION - ORIENTATION: ORIENTATION: RIGHT

## 2024-06-10 ASSESSMENT — PAIN DESCRIPTION - LOCATION: LOCATION: FOOT

## 2024-06-10 ASSESSMENT — PAIN SCALES - GENERAL: PAINLEVEL_OUTOF10: 6

## 2024-06-10 NOTE — PROGRESS NOTES
\[facility]\"" Progress Note    Date: Raissa 10, 2024    Name: Angelica Duncan    MRN: [id number]         : 1950    Levaquin Infusion + Dapto IVP    Ms. Duncan to \[facility]\"", ambulatory at 1118 accompanied by her boyfriend. Pt was assessed and education was provided. Pt stated she is tolerating her antibiotics well and had a home health dressing change this morning.    Ms. Ducnan's vitals were reviewed.  Vitals:    06/10/24 1115   BP: (!) 108/58   Pulse: 66   Resp: 16   Temp: 98.2 °F (36.8 °C)   SpO2: 96%     Right upper arm PICC flushed easily and had brisk blood return via both ports. 10 mL blood drawn and wasted, labs drawn per order (CBC, BMP, CRP, CK, Sed Rate), and line flushed with 10 mL NS.     PICC dressing c/d/i and not due to be changed. No swelling, redness, streaking, warmth or drainage noted in arm. Pt denied c/o pain in arm.    Levaquin 500mg/100ml NS was infused at 100 ml/hr over approximately 60 minutes followed by NS flush, then Daptomycin 350mg administered slow IVP over approx 5 minutes.    Ms. Duncan tolerated infusion, and had no complaints at this time.    Both lumens of PICC flushed with NS 10 ml and Heparin 250 units. Green end caps applied, and lumens wrapped with guaze and coban. Stockinette placed over dressing for protection.    Patient armband removed and shredded.    [facility]. [facility] in stable condition at 1300. She is to return on 24 at 1100 for her next antibiotic appointment.    Latoya Chaudhry RN  Raissa 10, 2024  1:36 PM

## 2024-06-11 ENCOUNTER — HOSPITAL ENCOUNTER (OUTPATIENT)
Facility: HOSPITAL | Age: 74
Setting detail: INFUSION SERIES
Discharge: HOME | End: 2024-06-11
Payer: MEDICARE

## 2024-06-11 VITALS
OXYGEN SATURATION: 96 % | DIASTOLIC BLOOD PRESSURE: 66 MMHG | RESPIRATION RATE: 16 BRPM | SYSTOLIC BLOOD PRESSURE: 120 MMHG | HEART RATE: 59 BPM | TEMPERATURE: 96.8 F

## 2024-06-11 DIAGNOSIS — E11.628 DIABETIC INFECTION OF RIGHT FOOT (HCC): Primary | ICD-10-CM

## 2024-06-11 DIAGNOSIS — L08.9 DIABETIC INFECTION OF RIGHT FOOT (HCC): Primary | ICD-10-CM

## 2024-06-11 PROCEDURE — 6360000002 HC RX W HCPCS: Performed by: INTERNAL MEDICINE

## 2024-06-11 PROCEDURE — 96375 TX/PRO/DX INJ NEW DRUG ADDON: CPT

## 2024-06-11 PROCEDURE — 96365 THER/PROPH/DIAG IV INF INIT: CPT

## 2024-06-11 PROCEDURE — 2580000003 HC RX 258: Performed by: INTERNAL MEDICINE

## 2024-06-11 RX ORDER — ACETAMINOPHEN 325 MG/1
650 TABLET ORAL
Status: CANCELLED | OUTPATIENT
Start: 2024-06-12

## 2024-06-11 RX ORDER — SODIUM CHLORIDE 9 MG/ML
INJECTION, SOLUTION INTRAVENOUS CONTINUOUS
Status: CANCELLED | OUTPATIENT
Start: 2024-06-12

## 2024-06-11 RX ORDER — EPINEPHRINE 1 MG/ML
0.3 INJECTION, SOLUTION, CONCENTRATE INTRAVENOUS PRN
Status: CANCELLED | OUTPATIENT
Start: 2024-06-12

## 2024-06-11 RX ORDER — SODIUM CHLORIDE 0.9 % (FLUSH) 0.9 %
5-40 SYRINGE (ML) INJECTION PRN
Status: DISCONTINUED | OUTPATIENT
Start: 2024-06-11 | End: 2024-06-12 | Stop reason: HOSPADM

## 2024-06-11 RX ORDER — DIPHENHYDRAMINE HYDROCHLORIDE 50 MG/ML
50 INJECTION INTRAMUSCULAR; INTRAVENOUS
Status: CANCELLED | OUTPATIENT
Start: 2024-06-12

## 2024-06-11 RX ORDER — SODIUM CHLORIDE 0.9 % (FLUSH) 0.9 %
5-40 SYRINGE (ML) INJECTION PRN
Status: CANCELLED | OUTPATIENT
Start: 2024-06-12

## 2024-06-11 RX ORDER — HEPARIN 100 UNIT/ML
500 SYRINGE INTRAVENOUS PRN
Status: CANCELLED | OUTPATIENT
Start: 2024-06-12

## 2024-06-11 RX ORDER — SODIUM CHLORIDE 9 MG/ML
5-250 INJECTION, SOLUTION INTRAVENOUS PRN
Status: CANCELLED | OUTPATIENT
Start: 2024-06-12

## 2024-06-11 RX ORDER — LEVOFLOXACIN 5 MG/ML
500 INJECTION, SOLUTION INTRAVENOUS ONCE
Status: CANCELLED
Start: 2024-06-12 | End: 2024-06-12

## 2024-06-11 RX ORDER — ALBUTEROL SULFATE 90 UG/1
4 AEROSOL, METERED RESPIRATORY (INHALATION) PRN
Status: CANCELLED | OUTPATIENT
Start: 2024-06-12

## 2024-06-11 RX ORDER — HEPARIN 100 UNIT/ML
500 SYRINGE INTRAVENOUS PRN
Status: DISCONTINUED | OUTPATIENT
Start: 2024-06-11 | End: 2024-06-12 | Stop reason: HOSPADM

## 2024-06-11 RX ORDER — LEVOFLOXACIN 5 MG/ML
500 INJECTION, SOLUTION INTRAVENOUS ONCE
Status: DISCONTINUED | OUTPATIENT
Start: 2024-06-11 | End: 2024-06-12 | Stop reason: HOSPADM

## 2024-06-11 RX ORDER — ONDANSETRON 2 MG/ML
8 INJECTION INTRAMUSCULAR; INTRAVENOUS
Status: CANCELLED | OUTPATIENT
Start: 2024-06-12

## 2024-06-11 RX ADMIN — Medication 10 ML: at 11:21

## 2024-06-11 RX ADMIN — Medication 40 ML: at 12:49

## 2024-06-11 RX ADMIN — HEPARIN 500 UNITS: 100 SYRINGE at 12:49

## 2024-06-11 RX ADMIN — DAPTOMYCIN 350 MG: 500 INJECTION, POWDER, LYOPHILIZED, FOR SOLUTION INTRAVENOUS at 12:24

## 2024-06-11 RX ADMIN — LEVOFLOXACIN 500 MG: 5 INJECTION, SOLUTION INTRAVENOUS at 11:22

## 2024-06-11 ASSESSMENT — PAIN DESCRIPTION - ORIENTATION: ORIENTATION: RIGHT

## 2024-06-11 ASSESSMENT — PAIN DESCRIPTION - LOCATION: LOCATION: FOOT

## 2024-06-11 ASSESSMENT — PAIN SCALES - GENERAL: PAINLEVEL_OUTOF10: 5

## 2024-06-11 NOTE — PROGRESS NOTES
[facility] Progress Note    Date: 2024    Name: Angelica Duncan    MRN: [id number]         : 1950    Levaquin Infusion + Daptomycin IVP    Ms. Duncan to [facility], ambulatory at 1115 accompanied by her boyfriend. Patient was assessed and education was provided. Patient stated she is tolerating her antibiotics well.    Ms. Duncan's vitals were reviewed.  Vitals:    24 1115   BP: 120/66   Pulse: 59   Resp: 16   Temp: 96.8 °F (36 °C)   SpO2: 96%     Right upper arm PICC flushed easily and had brisk blood return via both ports.    PICC dressing clean, dry and intact.  No swelling, redness, streaking, warmth or drainage noted in arm.     Levaquin 500mg/100ml NS was infused at 100 ml/hr over approximately 60 minutes followed by NS flush, then Daptomycin 350mg administered slow IVP over approx 5 minutes.    PICC dressing, stat lock and caps changed today per protocol.    Ms. Duncan tolerated infusion, and had no complaints at this time.    Both lumens of PICC flushed with NS 10 ml and Heparin 250 units. Green end caps applied, and lumens wrapped with guaze and coban. Stockinette placed over dressing for protection.    Patient armband removed and shredded.    [facility]. [facility] was [facility] in stable condition at 1300. She is to return on 24 at 1100 for her next antibiotic appointment.    BRENDAN RUFF RN  2024  1:50 PM

## 2024-06-12 ENCOUNTER — APPOINTMENT (OUTPATIENT)
Facility: HOSPITAL | Age: 74
End: 2024-06-12
Payer: MEDICARE

## 2024-06-12 ENCOUNTER — HOSPITAL ENCOUNTER (OUTPATIENT)
Facility: HOSPITAL | Age: 74
Setting detail: INFUSION SERIES
Discharge: HOME | End: 2024-06-12
Payer: MEDICARE

## 2024-06-12 VITALS
OXYGEN SATURATION: 97 % | RESPIRATION RATE: 18 BRPM | HEART RATE: 60 BPM | SYSTOLIC BLOOD PRESSURE: 106 MMHG | TEMPERATURE: 98.1 F | DIASTOLIC BLOOD PRESSURE: 43 MMHG

## 2024-06-12 DIAGNOSIS — L08.9 DIABETIC INFECTION OF RIGHT FOOT (HCC): ICD-10-CM

## 2024-06-12 DIAGNOSIS — E11.628 DIABETIC INFECTION OF RIGHT FOOT (HCC): ICD-10-CM

## 2024-06-12 PROCEDURE — 6360000002 HC RX W HCPCS: Performed by: INTERNAL MEDICINE

## 2024-06-12 PROCEDURE — 96365 THER/PROPH/DIAG IV INF INIT: CPT

## 2024-06-12 PROCEDURE — 2580000003 HC RX 258: Performed by: INTERNAL MEDICINE

## 2024-06-12 PROCEDURE — 96375 TX/PRO/DX INJ NEW DRUG ADDON: CPT

## 2024-06-12 RX ORDER — HEPARIN 100 UNIT/ML
SYRINGE INTRAVENOUS
Status: DISPENSED
Start: 2024-06-12 | End: 2024-06-12

## 2024-06-12 RX ORDER — HEPARIN SODIUM (PORCINE) LOCK FLUSH IV SOLN 100 UNIT/ML 100 UNIT/ML
500 SOLUTION INTRAVENOUS PRN
Status: DISCONTINUED | OUTPATIENT
Start: 2024-06-12 | End: 2024-06-13 | Stop reason: HOSPADM

## 2024-06-12 RX ORDER — SODIUM CHLORIDE 0.9 % (FLUSH) 0.9 %
5-40 SYRINGE (ML) INJECTION PRN
Status: DISCONTINUED | OUTPATIENT
Start: 2024-06-12 | End: 2024-06-13 | Stop reason: HOSPADM

## 2024-06-12 RX ADMIN — HEPARIN 500 UNITS: 100 SYRINGE at 10:36

## 2024-06-12 RX ADMIN — Medication 10 ML: at 10:29

## 2024-06-12 RX ADMIN — DAPTOMYCIN 350 MG: 500 INJECTION, POWDER, LYOPHILIZED, FOR SOLUTION INTRAVENOUS at 10:31

## 2024-06-12 RX ADMIN — Medication 10 ML: at 10:36

## 2024-06-12 RX ADMIN — Medication 10 ML: at 09:04

## 2024-06-12 RX ADMIN — LEVOFLOXACIN 500 MG: 5 INJECTION, SOLUTION INTRAVENOUS at 09:09

## 2024-06-12 ASSESSMENT — ENCOUNTER SYMPTOMS: PAIN LOCATION - PAIN QUALITY: THROBBING

## 2024-06-12 ASSESSMENT — PAIN DESCRIPTION - LOCATION: LOCATION: FOOT

## 2024-06-12 ASSESSMENT — PAIN DESCRIPTION - ORIENTATION: ORIENTATION: RIGHT

## 2024-06-12 ASSESSMENT — PAIN SCALES - GENERAL: PAINLEVEL_OUTOF10: 6

## 2024-06-12 NOTE — PROGRESS NOTES
[facility] Progress Note    Date: 2024    Name: Angelica Duncan    MRN: [id number]         : 1950    Levaquin Infusion + Dapto IVP    Ms. Duncan to [facility], ambulatory at 0850 via rollator. Pt was assessed and education was provided. Pt stated she is tolerating her antibiotics without difficulty. Pt is \"extremely\" tired today. Pt states She went to bed early and woke up early.     Ms. Duncan's vitals were reviewed.  Patient Vitals for the past 12 hrs:   Temp Pulse Resp BP SpO2   24 0900 98.1 °F (36.7 °C) 60 18 (!) 106/43 97 %         Right upper arm PICC flushed with 10ml NS each port and had brisk blood return via both ports.     PICC dressing c/d/I. No swelling, redness, streaking, warmth or drainage noted in arm. Pt denied c/o pain in arm.    Levaquin 500mg/100ml NS was infused at 100 ml/hr over approximately 60 minutes followed by 10ml NS flush, then Daptomycin 350mg administered slow IVP over approx 5 minutes.    Ms. Duncan tolerated infusion, and had no complaints at this time.    Both lumens of PICC flushed with NS 10 ml and Heparin 500 units per lumen. Green curos caps applied, and lumens wrapped with guaze and coban.  Stockinette placed over dressing for protection.    Patient armband removed and shredded.    [facility]. Duncan was [facility] in stable condition at 1040. She is to return on 24 at 1100 for her next antibiotic appointment.    Ирина Freitas RN  2024  9:51 AM

## 2024-06-12 NOTE — HOME HEALTH
Skilled reason for visit: wound vac dressing change    Caregiver involvement: boyfriend drives her to daily infusion, grandson lives in home.  No real caregiver so she does not always eat properly.    Medications reviewed and all medications are available in the home this visit.    The following education was provided regarding medications:  pain medication can cause constipation.    MD notified of any discrepancies/look a-like medications/medication interactions: na  Medications are somewhat effective at this time.      Home health supplies by type and quantity ordered/delivered this visit include: Ace wrap and netting    Patient education provided this visit: s/s of infection, changing canister,     Sharps education provided: completed on admit    Patient level of understanding of education provided: verbalized understanding    Patient response to procedure performed:  complaints of pain voiced changed position and took breaks while doing vac dressing change.      Home exercise program: ambulate hourly using knee scooter    Continued need for the following skills: Nursing.    Plan for next visit: wound vac dressing change and education     Patient and/or caregiver notified and agrees to changes in the Plan of Care: N/A.     The following discharge planning was discussed with the pt/caregiver: discharge when orders end wound healed pt or MD request.

## 2024-06-13 ENCOUNTER — HOME CARE VISIT (OUTPATIENT)
Age: 74
End: 2024-06-13
Payer: MEDICARE

## 2024-06-13 ENCOUNTER — HOSPITAL ENCOUNTER (OUTPATIENT)
Facility: HOSPITAL | Age: 74
Setting detail: INFUSION SERIES
Discharge: HOME | End: 2024-06-13
Payer: MEDICARE

## 2024-06-13 VITALS
HEART RATE: 70 BPM | SYSTOLIC BLOOD PRESSURE: 111 MMHG | DIASTOLIC BLOOD PRESSURE: 43 MMHG | TEMPERATURE: 97.4 F | RESPIRATION RATE: 18 BRPM | OXYGEN SATURATION: 97 %

## 2024-06-13 VITALS
DIASTOLIC BLOOD PRESSURE: 50 MMHG | HEART RATE: 60 BPM | SYSTOLIC BLOOD PRESSURE: 130 MMHG | RESPIRATION RATE: 16 BRPM | TEMPERATURE: 98.3 F | OXYGEN SATURATION: 96 %

## 2024-06-13 DIAGNOSIS — E11.628 DIABETIC INFECTION OF RIGHT FOOT (HCC): Primary | ICD-10-CM

## 2024-06-13 DIAGNOSIS — L08.9 DIABETIC INFECTION OF RIGHT FOOT (HCC): Primary | ICD-10-CM

## 2024-06-13 PROCEDURE — 2580000003 HC RX 258: Performed by: INTERNAL MEDICINE

## 2024-06-13 PROCEDURE — 96365 THER/PROPH/DIAG IV INF INIT: CPT

## 2024-06-13 PROCEDURE — 6360000002 HC RX W HCPCS: Performed by: INTERNAL MEDICINE

## 2024-06-13 PROCEDURE — G0299 HHS/HOSPICE OF RN EA 15 MIN: HCPCS

## 2024-06-13 PROCEDURE — 96375 TX/PRO/DX INJ NEW DRUG ADDON: CPT

## 2024-06-13 PROCEDURE — G0157 HHC PT ASSISTANT EA 15: HCPCS

## 2024-06-13 RX ORDER — ACETAMINOPHEN 325 MG/1
650 TABLET ORAL
Status: CANCELLED | OUTPATIENT
Start: 2024-06-14

## 2024-06-13 RX ORDER — LEVOFLOXACIN 5 MG/ML
500 INJECTION, SOLUTION INTRAVENOUS ONCE
Status: COMPLETED | OUTPATIENT
Start: 2024-06-16 | End: 2024-06-16

## 2024-06-13 RX ORDER — SODIUM CHLORIDE 0.9 % (FLUSH) 0.9 %
5-40 SYRINGE (ML) INJECTION PRN
Status: DISCONTINUED | OUTPATIENT
Start: 2024-06-13 | End: 2024-06-14 | Stop reason: HOSPADM

## 2024-06-13 RX ORDER — LEVOFLOXACIN 5 MG/ML
500 INJECTION, SOLUTION INTRAVENOUS ONCE
Status: COMPLETED | OUTPATIENT
Start: 2024-06-18 | End: 2024-06-18

## 2024-06-13 RX ORDER — EPINEPHRINE 1 MG/ML
0.3 INJECTION, SOLUTION, CONCENTRATE INTRAVENOUS PRN
Status: CANCELLED | OUTPATIENT
Start: 2024-06-14

## 2024-06-13 RX ORDER — ALBUTEROL SULFATE 90 UG/1
4 AEROSOL, METERED RESPIRATORY (INHALATION) PRN
Status: CANCELLED | OUTPATIENT
Start: 2024-06-14

## 2024-06-13 RX ORDER — SODIUM CHLORIDE 9 MG/ML
5-250 INJECTION, SOLUTION INTRAVENOUS PRN
Status: CANCELLED | OUTPATIENT
Start: 2024-06-14

## 2024-06-13 RX ORDER — DIPHENHYDRAMINE HYDROCHLORIDE 50 MG/ML
50 INJECTION INTRAMUSCULAR; INTRAVENOUS
Status: CANCELLED | OUTPATIENT
Start: 2024-06-14

## 2024-06-13 RX ORDER — SODIUM CHLORIDE 0.9 % (FLUSH) 0.9 %
5-40 SYRINGE (ML) INJECTION PRN
Status: CANCELLED | OUTPATIENT
Start: 2024-06-14

## 2024-06-13 RX ORDER — SODIUM CHLORIDE 9 MG/ML
INJECTION, SOLUTION INTRAVENOUS CONTINUOUS
Status: CANCELLED | OUTPATIENT
Start: 2024-06-14

## 2024-06-13 RX ORDER — ONDANSETRON 2 MG/ML
8 INJECTION INTRAMUSCULAR; INTRAVENOUS
Status: CANCELLED | OUTPATIENT
Start: 2024-06-14

## 2024-06-13 RX ORDER — LEVOFLOXACIN 5 MG/ML
500 INJECTION, SOLUTION INTRAVENOUS ONCE
Status: CANCELLED
Start: 2024-06-14 | End: 2024-06-14

## 2024-06-13 RX ORDER — LEVOFLOXACIN 5 MG/ML
500 INJECTION, SOLUTION INTRAVENOUS ONCE
Status: COMPLETED | OUTPATIENT
Start: 2024-06-13 | End: 2024-06-14

## 2024-06-13 RX ORDER — HEPARIN 100 UNIT/ML
500 SYRINGE INTRAVENOUS PRN
Status: DISCONTINUED | OUTPATIENT
Start: 2024-06-13 | End: 2024-06-14 | Stop reason: HOSPADM

## 2024-06-13 RX ORDER — HEPARIN 100 UNIT/ML
500 SYRINGE INTRAVENOUS PRN
Status: CANCELLED | OUTPATIENT
Start: 2024-06-14

## 2024-06-13 RX ORDER — LEVOFLOXACIN 5 MG/ML
500 INJECTION, SOLUTION INTRAVENOUS ONCE
Status: COMPLETED | OUTPATIENT
Start: 2024-06-17 | End: 2024-06-17

## 2024-06-13 RX ORDER — LEVOFLOXACIN 5 MG/ML
500 INJECTION, SOLUTION INTRAVENOUS ONCE
Status: COMPLETED | OUTPATIENT
Start: 2024-06-15 | End: 2024-06-15

## 2024-06-13 RX ADMIN — Medication 10 ML: at 12:18

## 2024-06-13 RX ADMIN — HEPARIN 500 UNITS: 100 SYRINGE at 12:19

## 2024-06-13 RX ADMIN — Medication 10 ML: at 12:09

## 2024-06-13 RX ADMIN — DAPTOMYCIN 350 MG: 500 INJECTION, POWDER, LYOPHILIZED, FOR SOLUTION INTRAVENOUS at 12:12

## 2024-06-13 RX ADMIN — LEVOFLOXACIN 500 MG: 5 INJECTION, SOLUTION INTRAVENOUS at 11:10

## 2024-06-13 ASSESSMENT — ENCOUNTER SYMPTOMS
PAIN LOCATION - PAIN QUALITY: CONSTANT
DYSPNEA ACTIVITY LEVEL: AFTER AMBULATING LESS THAN 10 FT

## 2024-06-13 ASSESSMENT — PAIN DESCRIPTION - LOCATION: LOCATION: FOOT

## 2024-06-13 ASSESSMENT — PAIN SCALES - GENERAL: PAINLEVEL_OUTOF10: 6

## 2024-06-13 ASSESSMENT — PAIN DESCRIPTION - ORIENTATION: ORIENTATION: RIGHT

## 2024-06-13 NOTE — HOME HEALTH
Skilled reason for visit: Patient was recently hospitalized for R foot OM, requiring observation by a SN for s/s of decomposition or adverse effects resulting from newly prescribed medications.  Skilled observation needed to determine if new medication regimen prescribed requires modifications or other therapeutic interventions considered until pt's clinical condition or treatment has stabilized.  Caregiver involvement:  Patient's caregiver is family. Caregiver assists patient with bathing, dressing, walking, bathroom, meal prep and setup, medication management, grocery shopping, household chores, transportation to MD appointment and home exercise program.  Medications reconciled and all medications are available in the home this visit.  The following education was provided regarding medications, medication interactions, and look alike modifications.   clopidogrel (PLAVIX) 75 MG tablet        Contact Agency or MD with questions.  Medications are effective at this time.  Patient states understanding.    Patient education provided this visit:  .X.... Disease Management: OM, wound healing, DM teaching, pain management, fall precautions, s/s of infection.  See interventions for further teaching  Patient level of understanding of education provided: Pt verbalized understanding of all education and repeated back teaching   Skilled Care Performed this visit: Disease process teaching, medication teaching, physical assessment and monitoring  Patient response to procedure performed:  Pt verbalized satisfaction with wound care  Sharps Education Provided: Clinician instructed patient/CG on proper disposal of sharps: Containers should be made of hard plastic, be puncture-resistant and leakproof,   such as a laundry detergent or bleach bottle.  When the container is ¾ full, it should be sealed with tape and labeled   DO NOT RECYCLE prior to discarding in the regular trash.    Goals/teaching progressing. Patient's goal is to have

## 2024-06-13 NOTE — PROGRESS NOTES
[facility] Progress Note    Date: 2024    Name: Angeilca Duncan    MRN: [id number]         : 1950    Levaquin Infusion + Dapto IVP    Ms. Duncan to [facility], ambulatory at 1050 via rollator. Pt was assessed and education was provided. Pt stated she is tolerating her antibiotics well.    Ms. Duncan's vitals were reviewed.  Vitals:    24 1045   BP: (!) 111/43   Pulse: 70   Resp: 18   Temp: 97.4 °F (36.3 °C)   SpO2: 97%     Right upper arm PICC flushed easily and had brisk blood return via both ports, and lines flushed with 5 mL NS via both ports.     PICC dressing c/d/i and not due to be changed. No swelling, redness, streaking, warmth or drainage noted in arm. Pt denied c/o pain in arm.    Levaquin 500mg/100ml NS was infused at 100 ml/hr over approximately 60 minutes followed by NS flush, then Daptomycin 350mg administered slow IVP over approx 5 minutes.    Ms. Duncan tolerated infusion, and had no complaints at this time.    Both lumens of PICC flushed with NS 10 ml and Heparin 250 units. Green end caps applied, and lumens wrapped with guaze and coban. Stockinette placed over dressing for protection.    Patient armband removed and shredded.    [facility]. [facility] in stable condition at 1224. She is to return on 24 at 1100 for her next antibiotic appointment.    Latoya Chaudhry RN  2024  11:13 AM

## 2024-06-14 ENCOUNTER — HOSPITAL ENCOUNTER (OUTPATIENT)
Facility: HOSPITAL | Age: 74
Setting detail: INFUSION SERIES
Discharge: HOME | End: 2024-06-14
Payer: MEDICARE

## 2024-06-14 ENCOUNTER — HOME CARE VISIT (OUTPATIENT)
Age: 74
End: 2024-06-14
Payer: MEDICARE

## 2024-06-14 VITALS
HEART RATE: 64 BPM | TEMPERATURE: 98.2 F | SYSTOLIC BLOOD PRESSURE: 147 MMHG | OXYGEN SATURATION: 98 % | RESPIRATION RATE: 18 BRPM | DIASTOLIC BLOOD PRESSURE: 78 MMHG

## 2024-06-14 VITALS
TEMPERATURE: 98.3 F | SYSTOLIC BLOOD PRESSURE: 130 MMHG | HEART RATE: 60 BPM | DIASTOLIC BLOOD PRESSURE: 50 MMHG | OXYGEN SATURATION: 96 %

## 2024-06-14 VITALS
DIASTOLIC BLOOD PRESSURE: 62 MMHG | OXYGEN SATURATION: 96 % | RESPIRATION RATE: 16 BRPM | HEART RATE: 65 BPM | TEMPERATURE: 97.5 F | SYSTOLIC BLOOD PRESSURE: 112 MMHG

## 2024-06-14 DIAGNOSIS — L08.9 DIABETIC INFECTION OF RIGHT FOOT (HCC): Primary | ICD-10-CM

## 2024-06-14 DIAGNOSIS — E11.628 DIABETIC INFECTION OF RIGHT FOOT (HCC): Primary | ICD-10-CM

## 2024-06-14 PROCEDURE — 2580000003 HC RX 258: Performed by: INTERNAL MEDICINE

## 2024-06-14 PROCEDURE — 96365 THER/PROPH/DIAG IV INF INIT: CPT

## 2024-06-14 PROCEDURE — 6360000002 HC RX W HCPCS: Performed by: INTERNAL MEDICINE

## 2024-06-14 PROCEDURE — G0151 HHCP-SERV OF PT,EA 15 MIN: HCPCS

## 2024-06-14 PROCEDURE — 96375 TX/PRO/DX INJ NEW DRUG ADDON: CPT

## 2024-06-14 RX ORDER — HEPARIN 100 UNIT/ML
500 SYRINGE INTRAVENOUS PRN
Status: DISCONTINUED | OUTPATIENT
Start: 2024-06-14 | End: 2024-06-15 | Stop reason: HOSPADM

## 2024-06-14 RX ORDER — HEPARIN 100 UNIT/ML
500 SYRINGE INTRAVENOUS PRN
Status: CANCELLED | OUTPATIENT
Start: 2024-06-15

## 2024-06-14 RX ORDER — EPINEPHRINE 1 MG/ML
0.3 INJECTION, SOLUTION, CONCENTRATE INTRAVENOUS PRN
Status: CANCELLED | OUTPATIENT
Start: 2024-06-15

## 2024-06-14 RX ORDER — SODIUM CHLORIDE 0.9 % (FLUSH) 0.9 %
5-40 SYRINGE (ML) INJECTION PRN
Status: DISCONTINUED | OUTPATIENT
Start: 2024-06-14 | End: 2024-06-15 | Stop reason: HOSPADM

## 2024-06-14 RX ORDER — ACETAMINOPHEN 325 MG/1
650 TABLET ORAL
Status: CANCELLED | OUTPATIENT
Start: 2024-06-15

## 2024-06-14 RX ORDER — ONDANSETRON 2 MG/ML
8 INJECTION INTRAMUSCULAR; INTRAVENOUS
Status: CANCELLED | OUTPATIENT
Start: 2024-06-15

## 2024-06-14 RX ORDER — ALBUTEROL SULFATE 90 UG/1
4 AEROSOL, METERED RESPIRATORY (INHALATION) PRN
Status: CANCELLED | OUTPATIENT
Start: 2024-06-15

## 2024-06-14 RX ORDER — LEVOFLOXACIN 5 MG/ML
500 INJECTION, SOLUTION INTRAVENOUS ONCE
Status: CANCELLED
Start: 2024-06-15 | End: 2024-06-15

## 2024-06-14 RX ORDER — DIPHENHYDRAMINE HYDROCHLORIDE 50 MG/ML
50 INJECTION INTRAMUSCULAR; INTRAVENOUS
Status: CANCELLED | OUTPATIENT
Start: 2024-06-15

## 2024-06-14 RX ORDER — SODIUM CHLORIDE 0.9 % (FLUSH) 0.9 %
5-40 SYRINGE (ML) INJECTION PRN
Status: CANCELLED | OUTPATIENT
Start: 2024-06-15

## 2024-06-14 RX ORDER — SODIUM CHLORIDE 9 MG/ML
INJECTION, SOLUTION INTRAVENOUS CONTINUOUS
Status: CANCELLED | OUTPATIENT
Start: 2024-06-15

## 2024-06-14 RX ORDER — SODIUM CHLORIDE 9 MG/ML
5-250 INJECTION, SOLUTION INTRAVENOUS PRN
Status: CANCELLED | OUTPATIENT
Start: 2024-06-15

## 2024-06-14 RX ADMIN — Medication 10 ML: at 12:20

## 2024-06-14 RX ADMIN — HEPARIN 500 UNITS: 100 SYRINGE at 12:21

## 2024-06-14 RX ADMIN — Medication 10 ML: at 11:04

## 2024-06-14 RX ADMIN — LEVOFLOXACIN 500 MG: 5 INJECTION, SOLUTION INTRAVENOUS at 11:10

## 2024-06-14 RX ADMIN — Medication 10 ML: at 12:12

## 2024-06-14 RX ADMIN — DAPTOMYCIN 350 MG: 500 INJECTION, POWDER, LYOPHILIZED, FOR SOLUTION INTRAVENOUS at 12:12

## 2024-06-14 ASSESSMENT — PAIN SCALES - GENERAL: PAINLEVEL_OUTOF10: 8

## 2024-06-14 NOTE — PROGRESS NOTES
[facility] Progress Note    Date: 2024    Name: Angelica Duncan    MRN: [id number]         : 1950    Levaquin Infusion + Dapto IVP    Ms. Duncan to [facility], ambulatory at 1100 via rollator. Pt was assessed and education was provided. Pt stated she is tolerating her antibiotics well. Pt has bruises on her left thigh and upper arm, she reports falling into a door frame last night.    Ms. Duncan's vitals were reviewed.  Vitals:    24 1045   BP: 112/62   Pulse: 65   Resp: 16   Temp: 97.5 °F (36.4 °C)   SpO2: 96%     Right upper arm PICC flushed easily and had brisk blood return via both ports, and lines flushed with 5 mL NS via both ports.     PICC dressing c/d/i and not due to be changed. No swelling, redness, streaking, warmth or drainage noted in arm. Pt denied c/o pain in arm.    Levaquin 500mg/100ml NS was infused at 100 ml/hr over approximately 60 minutes followed by NS flush, then Daptomycin 350mg administered slow IVP over approx 5 minutes.    Ms. Duncan tolerated infusion, and had no complaints at this time.    Both lumens of PICC flushed with NS 10 ml and Heparin 250 units. Green end caps applied, and lumens wrapped with guaze and coban. Stockinette placed over dressing for protection.    Patient armband removed and shredded.    [facility]. [facility] in stable condition at 1223. She is to return on 6/15/24 at 0800 for her next antibiotic appointment.    Latoya Chaudhry RN  2024  12:35 PM

## 2024-06-14 NOTE — HOME HEALTH
POST FALL:   Date and Time of Fall: 6/13/24 at 9pm  SOC/HAYLEY Date: 6/2/24  Fall observed by HH Staff? no  Describe Event and Document any re-training or treatment plan modifications indicated:  coming out of bathroom lost balance on knee scooter and fell hit left hip left arm and head when she fell. Did no lose conciousness and was able to get up onto the scooter by herself.   Response to re-training or treatment plan modifications: no modifications because she just lost her balance and wasn't paying attention.   Assisted Devices used by patient prior to fall: knee scooter  Was equipment in use at time of fall? yes  Injury (Yes / No), If yes, describe:  bruise on left hip and arm  Emergent Care Received: (Yes / No), if yes, describe: no patient declines following up with PCP or going to ER  Was patient identified as High Risk for falls? yes  List Tests Performed, Scores of Tests, and Patient Risk Factors: full active and passive ROM of L hip. Patient has constant pain but is able to weightbear.  MD Notified (name and time): Ora Ramirez PA notifed at 3:50pm, left message    SUBJECTIVE: Patient stated she had a fall last night because she was not paying attention and lost her balance coming out of the bathroom. She reports she does not want to go to the ER or see her PCP because everytime they admit her for something. She reports the infusion center also suggested her to go to the ER this morning.   CAREGIVER INVOLVEMENT/ASSISTANCE NEEDED FOR: son and grandson assisting patient intermittently.  OBJECTIVE:  See interventions.  PATIENT EDUCATION PROVIDED THIS VISIT: patient educated on risk of bleeding with fall due to patient being on Plavix and importance of being evaluated by a doctor because she hit her head and now has a large possible hematoma on her left hip.  PATIENT RESPONSE TO EDUCATION PROVIDED: patient is declining visit to ER or PCP.  PATIENT RESPONSE TO TREATMENT: patient having pain with all

## 2024-06-14 NOTE — HOME HEALTH
SUBJECTIVE: \"The nurse is finishing up with the dressing.\" Expressed some concerns with being able to shower. She is taking bird baths right now. Voiced that she went to MD and used the scooter to get to the back door, but used the walker and hopping technique outside to the car and then from the car to MD office.    CAREGIVER INVOLVEMENT/ASSISTANCE NEEDED FOR: Son on occ, but relies on her boyfriend or brother for transportation to MD appointments.  .  OBJECTIVE:  See interventions.    PATIENT EDUCATION PROVIDED THIS VISIT: 1. voiced concerns with how much patient is (I)ly doing in the home like the laundry. 2. Eat better meals and make sure to drink water. 3. Use pillows for elevation. 4. Maintain strict non-weight bearing per MD instructions.      PATIENT RESPONSE TO EDUCATION PROVIDED: Stated she keeps snacks near by and will sometimes use [employer] to deliver food. Patient gave the impression that son provides limited assist.    PATIENT RESPONSE TO TREATMENT: Voiced increase in throbbing in the (R) LE with demonstrating LE exercises. Patient is ready to practice on the steps and get a shower after discussing options to get from the steps to the bathroom.    .  ASSESSMENT OF PROGRESS TOWARD GOALS: Improved balance using scooter to allow patient to assist her dog in getting outside to use the bathroom. Educated on some safety techniques to use like proping the door open and fall prevention with locking the brakes on the scooter. Patient required several reminders about locking the brakes. (I) with couch mobility and good postioning of pillows to assit with edema reduction in LEs. Patient needs strategies to get from the top of the steps to a standing position while maintaining non weight bearing staus on the (R) LE.  .  PLAN FOR NEXT VISIT: Discussed with PT(Manju) to asses for options to assist patient getting from the floor at the top of the steps and then into the bathroom.    THE FOLLOWING DISCHARGE PLANNING

## 2024-06-15 ENCOUNTER — HOSPITAL ENCOUNTER (OUTPATIENT)
Facility: HOSPITAL | Age: 74
Setting detail: INFUSION SERIES
Discharge: HOME | End: 2024-06-15
Payer: MEDICARE

## 2024-06-15 VITALS
TEMPERATURE: 97.4 F | HEART RATE: 76 BPM | DIASTOLIC BLOOD PRESSURE: 58 MMHG | SYSTOLIC BLOOD PRESSURE: 102 MMHG | OXYGEN SATURATION: 98 % | RESPIRATION RATE: 16 BRPM

## 2024-06-15 DIAGNOSIS — L08.9 DIABETIC INFECTION OF RIGHT FOOT (HCC): Primary | ICD-10-CM

## 2024-06-15 DIAGNOSIS — E11.628 DIABETIC INFECTION OF RIGHT FOOT (HCC): Primary | ICD-10-CM

## 2024-06-15 PROCEDURE — 96375 TX/PRO/DX INJ NEW DRUG ADDON: CPT

## 2024-06-15 PROCEDURE — 2580000003 HC RX 258: Performed by: INTERNAL MEDICINE

## 2024-06-15 PROCEDURE — 6360000002 HC RX W HCPCS: Performed by: INTERNAL MEDICINE

## 2024-06-15 PROCEDURE — 96365 THER/PROPH/DIAG IV INF INIT: CPT

## 2024-06-15 RX ORDER — DIPHENHYDRAMINE HYDROCHLORIDE 50 MG/ML
50 INJECTION INTRAMUSCULAR; INTRAVENOUS
OUTPATIENT
Start: 2024-06-16

## 2024-06-15 RX ORDER — ONDANSETRON 2 MG/ML
8 INJECTION INTRAMUSCULAR; INTRAVENOUS
OUTPATIENT
Start: 2024-06-16

## 2024-06-15 RX ORDER — SODIUM CHLORIDE 0.9 % (FLUSH) 0.9 %
5-40 SYRINGE (ML) INJECTION PRN
Status: DISCONTINUED | OUTPATIENT
Start: 2024-06-15 | End: 2024-06-16 | Stop reason: HOSPADM

## 2024-06-15 RX ORDER — HEPARIN 100 UNIT/ML
500 SYRINGE INTRAVENOUS PRN
Status: CANCELLED | OUTPATIENT
Start: 2024-06-16

## 2024-06-15 RX ORDER — SODIUM CHLORIDE 9 MG/ML
INJECTION, SOLUTION INTRAVENOUS CONTINUOUS
OUTPATIENT
Start: 2024-06-16

## 2024-06-15 RX ORDER — HEPARIN 100 UNIT/ML
500 SYRINGE INTRAVENOUS PRN
Status: DISCONTINUED | OUTPATIENT
Start: 2024-06-15 | End: 2024-06-16 | Stop reason: HOSPADM

## 2024-06-15 RX ORDER — SODIUM CHLORIDE 9 MG/ML
5-250 INJECTION, SOLUTION INTRAVENOUS PRN
OUTPATIENT
Start: 2024-06-16

## 2024-06-15 RX ORDER — EPINEPHRINE 1 MG/ML
0.3 INJECTION, SOLUTION, CONCENTRATE INTRAVENOUS PRN
OUTPATIENT
Start: 2024-06-16

## 2024-06-15 RX ORDER — ACETAMINOPHEN 325 MG/1
650 TABLET ORAL
OUTPATIENT
Start: 2024-06-16

## 2024-06-15 RX ORDER — LEVOFLOXACIN 5 MG/ML
500 INJECTION, SOLUTION INTRAVENOUS ONCE
Start: 2024-06-16 | End: 2024-06-16

## 2024-06-15 RX ORDER — ALBUTEROL SULFATE 90 UG/1
4 AEROSOL, METERED RESPIRATORY (INHALATION) PRN
OUTPATIENT
Start: 2024-06-16

## 2024-06-15 RX ORDER — SODIUM CHLORIDE 0.9 % (FLUSH) 0.9 %
5-40 SYRINGE (ML) INJECTION PRN
Status: CANCELLED | OUTPATIENT
Start: 2024-06-16

## 2024-06-15 RX ADMIN — LEVOFLOXACIN 500 MG: 5 INJECTION, SOLUTION INTRAVENOUS at 07:55

## 2024-06-15 RX ADMIN — DAPTOMYCIN 350 MG: 500 INJECTION, POWDER, LYOPHILIZED, FOR SOLUTION INTRAVENOUS at 08:55

## 2024-06-15 RX ADMIN — Medication 10 ML: at 09:01

## 2024-06-15 RX ADMIN — Medication 10 ML: at 08:54

## 2024-06-15 RX ADMIN — HEPARIN 500 UNITS: 100 SYRINGE at 09:02

## 2024-06-15 ASSESSMENT — PAIN SCALES - GENERAL: PAINLEVEL_OUTOF10: 5

## 2024-06-15 ASSESSMENT — ENCOUNTER SYMPTOMS: PAIN LOCATION - PAIN QUALITY: ACHE

## 2024-06-15 ASSESSMENT — PAIN DESCRIPTION - LOCATION: LOCATION: HIP;FOOT

## 2024-06-15 NOTE — PROGRESS NOTES
[facility] Progress Note    Date: Raissa 15, 2024    Name: Angelica Duncan    MRN: [id number]         : 1950    Levaquin Infusion + Dapto IVP    Ms. Duncan to [facility], ambulatory at 0744 via rollator. Pt was assessed and education was provided. Pt stated she is tolerating her antibiotics well. Pt has bruising from her fall on Thursday.    Ms. Duncan's vitals were reviewed.  Vitals:    06/15/24 0745   BP: (!) 102/58   Pulse: 76   Resp: 16   Temp: 97.4 °F (36.3 °C)   SpO2: 98%     Right upper arm PICC flushed easily and had brisk blood return via both ports, and lines flushed with 5 mL NS via both ports.     PICC dressing c/d/i and not due to be changed. No swelling, redness, streaking, warmth or drainage noted in arm. Pt denied c/o pain in arm.    Levaquin 500mg/100ml NS was infused at 100 ml/hr over approximately 60 minutes followed by NS flush, then Daptomycin 350mg administered slow IVP over approx 5 minutes.    Ms. Duncan tolerated infusion, and had no complaints at this time.    Both lumens of PICC flushed with NS 10 ml and Heparin 250 units. Green end caps applied, and lumens wrapped with guaze and coban. Stockinette placed over dressing for protection.    Patient armband removed and shredded.    [facility]. Duncan was [facility] in stable condition at 0911. She is to return on 24 at 0800 for her next antibiotic appointment.    Latoya Chaudrhy RN  Raissa 15, 2024  9:12 AM

## 2024-06-16 ENCOUNTER — HOME CARE VISIT (OUTPATIENT)
Age: 74
End: 2024-06-16
Payer: MEDICARE

## 2024-06-16 ENCOUNTER — HOSPITAL ENCOUNTER (OUTPATIENT)
Facility: HOSPITAL | Age: 74
Setting detail: INFUSION SERIES
Discharge: HOME | End: 2024-06-16
Payer: MEDICARE

## 2024-06-16 VITALS
OXYGEN SATURATION: 96 % | TEMPERATURE: 97.2 F | SYSTOLIC BLOOD PRESSURE: 98 MMHG | RESPIRATION RATE: 16 BRPM | HEART RATE: 58 BPM | DIASTOLIC BLOOD PRESSURE: 56 MMHG

## 2024-06-16 DIAGNOSIS — E11.628 DIABETIC INFECTION OF RIGHT FOOT (HCC): Primary | ICD-10-CM

## 2024-06-16 DIAGNOSIS — L08.9 DIABETIC INFECTION OF RIGHT FOOT (HCC): Primary | ICD-10-CM

## 2024-06-16 PROCEDURE — 6360000002 HC RX W HCPCS: Performed by: INTERNAL MEDICINE

## 2024-06-16 PROCEDURE — 2580000003 HC RX 258: Performed by: INTERNAL MEDICINE

## 2024-06-16 PROCEDURE — 96365 THER/PROPH/DIAG IV INF INIT: CPT

## 2024-06-16 PROCEDURE — G0299 HHS/HOSPICE OF RN EA 15 MIN: HCPCS

## 2024-06-16 PROCEDURE — 96375 TX/PRO/DX INJ NEW DRUG ADDON: CPT

## 2024-06-16 RX ORDER — SODIUM CHLORIDE 0.9 % (FLUSH) 0.9 %
5-40 SYRINGE (ML) INJECTION PRN
Status: CANCELLED | OUTPATIENT
Start: 2024-06-17

## 2024-06-16 RX ORDER — SODIUM CHLORIDE 9 MG/ML
5-250 INJECTION, SOLUTION INTRAVENOUS PRN
Status: CANCELLED | OUTPATIENT
Start: 2024-06-17

## 2024-06-16 RX ORDER — HEPARIN 100 UNIT/ML
500 SYRINGE INTRAVENOUS PRN
Status: DISCONTINUED | OUTPATIENT
Start: 2024-06-16 | End: 2024-06-17 | Stop reason: HOSPADM

## 2024-06-16 RX ORDER — SODIUM CHLORIDE 9 MG/ML
INJECTION, SOLUTION INTRAVENOUS CONTINUOUS
Status: CANCELLED | OUTPATIENT
Start: 2024-06-17

## 2024-06-16 RX ORDER — EPINEPHRINE 1 MG/ML
0.3 INJECTION, SOLUTION, CONCENTRATE INTRAVENOUS PRN
Status: CANCELLED | OUTPATIENT
Start: 2024-06-17

## 2024-06-16 RX ORDER — SODIUM CHLORIDE 0.9 % (FLUSH) 0.9 %
5-40 SYRINGE (ML) INJECTION PRN
Status: DISCONTINUED | OUTPATIENT
Start: 2024-06-16 | End: 2024-06-17 | Stop reason: HOSPADM

## 2024-06-16 RX ORDER — LEVOFLOXACIN 5 MG/ML
500 INJECTION, SOLUTION INTRAVENOUS ONCE
Status: CANCELLED
Start: 2024-06-17 | End: 2024-06-17

## 2024-06-16 RX ORDER — ACETAMINOPHEN 325 MG/1
650 TABLET ORAL
Status: CANCELLED | OUTPATIENT
Start: 2024-06-17

## 2024-06-16 RX ORDER — DIPHENHYDRAMINE HYDROCHLORIDE 50 MG/ML
50 INJECTION INTRAMUSCULAR; INTRAVENOUS
Status: CANCELLED | OUTPATIENT
Start: 2024-06-17

## 2024-06-16 RX ORDER — HEPARIN 100 UNIT/ML
500 SYRINGE INTRAVENOUS PRN
Status: CANCELLED | OUTPATIENT
Start: 2024-06-17

## 2024-06-16 RX ORDER — ONDANSETRON 2 MG/ML
8 INJECTION INTRAMUSCULAR; INTRAVENOUS
Status: CANCELLED | OUTPATIENT
Start: 2024-06-17

## 2024-06-16 RX ORDER — ALBUTEROL SULFATE 90 UG/1
4 AEROSOL, METERED RESPIRATORY (INHALATION) PRN
Status: CANCELLED | OUTPATIENT
Start: 2024-06-17

## 2024-06-16 RX ADMIN — LEVOFLOXACIN 500 MG: 500 INJECTION, SOLUTION INTRAVENOUS at 08:32

## 2024-06-16 RX ADMIN — HEPARIN 500 UNITS: 100 SYRINGE at 09:37

## 2024-06-16 RX ADMIN — Medication 10 ML: at 09:30

## 2024-06-16 RX ADMIN — Medication 10 ML: at 09:36

## 2024-06-16 RX ADMIN — DAPTOMYCIN 350 MG: 500 INJECTION, POWDER, LYOPHILIZED, FOR SOLUTION INTRAVENOUS at 09:30

## 2024-06-16 ASSESSMENT — PAIN DESCRIPTION - LOCATION: LOCATION: FOOT;HIP

## 2024-06-16 ASSESSMENT — PAIN SCALES - GENERAL: PAINLEVEL_OUTOF10: 5

## 2024-06-16 NOTE — PROGRESS NOTES
[facility] Progress Note    Date: 2024    Name: Angelica Duncan    MRN: [id number]         : 1950    Levaquin Infusion + Dapto IVP    Ms. Duncan to [facility], ambulatory at 0820 via rollator. Pt was assessed and education was provided. Pt stated she is tolerating her antibiotics well. Pt has bruising from her fall on Thursday.    Ms. Duncan's vitals were reviewed.  Vitals:    24 0815   BP: (!) 98/56   Pulse: 58   Resp: 16   Temp: 97.2 °F (36.2 °C)   SpO2: 96%     Right upper arm PICC flushed easily and had brisk blood return via both ports, and lines flushed with 5 mL NS via both ports.     PICC dressing c/d/i and not due to be changed. No swelling, redness, streaking, warmth or drainage noted in arm. Pt denied c/o pain in arm.    Levaquin 500mg/100ml NS was infused at 100 ml/hr over approximately 60 minutes followed by NS flush, then Daptomycin 350mg administered slow IVP over approx 5 minutes.    Ms. Duncan tolerated infusion, and had no complaints at this time.    Both lumens of PICC flushed with NS 10 ml and Heparin 250 units. Green end caps applied, and lumens wrapped with guaze and coban. Stockinette placed over dressing for protection.    Patient armband removed and shredded.    [facility]. [facility] in stable condition at 0940. She is to return on 24 at 1100 for her next antibiotic appointment.    Latoya Chaudhry RN  2024  10:02 AM

## 2024-06-17 ENCOUNTER — HOSPITAL ENCOUNTER (OUTPATIENT)
Facility: HOSPITAL | Age: 74
Setting detail: INFUSION SERIES
Discharge: HOME | End: 2024-06-17
Payer: MEDICARE

## 2024-06-17 ENCOUNTER — HOME CARE VISIT (OUTPATIENT)
Age: 74
End: 2024-06-17
Payer: MEDICARE

## 2024-06-17 VITALS
DIASTOLIC BLOOD PRESSURE: 60 MMHG | HEART RATE: 62 BPM | SYSTOLIC BLOOD PRESSURE: 86 MMHG | TEMPERATURE: 97.2 F | OXYGEN SATURATION: 98 %

## 2024-06-17 DIAGNOSIS — E11.628 DIABETIC INFECTION OF RIGHT FOOT (HCC): ICD-10-CM

## 2024-06-17 DIAGNOSIS — L08.9 DIABETIC INFECTION OF RIGHT FOOT (HCC): ICD-10-CM

## 2024-06-17 LAB
ANION GAP SERPL CALC-SCNC: 6 MMOL/L (ref 3–18)
BACTERIA SPEC CULT: NORMAL
BASOPHILS # BLD: 0 K/UL (ref 0–0.1)
BASOPHILS NFR BLD: 1 % (ref 0–2)
BUN SERPL-MCNC: 30 MG/DL (ref 7–18)
BUN/CREAT SERPL: 37 (ref 12–20)
CALCIUM SERPL-MCNC: 8.5 MG/DL (ref 8.5–10.1)
CHLORIDE SERPL-SCNC: 108 MMOL/L (ref 100–111)
CK SERPL-CCNC: 221 U/L (ref 26–192)
CO2 SERPL-SCNC: 26 MMOL/L (ref 21–32)
CREAT SERPL-MCNC: 0.81 MG/DL (ref 0.6–1.3)
CRP SERPL-MCNC: 0.7 MG/DL (ref 0–0.3)
DIFFERENTIAL METHOD BLD: ABNORMAL
EOSINOPHIL # BLD: 0.1 K/UL (ref 0–0.4)
EOSINOPHIL NFR BLD: 1 % (ref 0–5)
ERYTHROCYTE [DISTWIDTH] IN BLOOD BY AUTOMATED COUNT: 16.2 % (ref 11.6–14.5)
GLUCOSE SERPL-MCNC: 155 MG/DL (ref 74–99)
HCT VFR BLD AUTO: 28.7 % (ref 35–45)
HGB BLD-MCNC: 9.8 G/DL (ref 12–16)
IMM GRANULOCYTES # BLD AUTO: 0 K/UL (ref 0–0.04)
IMM GRANULOCYTES NFR BLD AUTO: 0 % (ref 0–0.5)
LYMPHOCYTES # BLD: 1.1 K/UL (ref 0.9–3.6)
LYMPHOCYTES NFR BLD: 17 % (ref 21–52)
MCH RBC QN AUTO: 35.3 PG (ref 24–34)
MCHC RBC AUTO-ENTMCNC: 34.1 G/DL (ref 31–37)
MCV RBC AUTO: 103.2 FL (ref 78–100)
MONOCYTES # BLD: 0.4 K/UL (ref 0.05–1.2)
MONOCYTES NFR BLD: 6 % (ref 3–10)
NEUTS SEG # BLD: 4.8 K/UL (ref 1.8–8)
NEUTS SEG NFR BLD: 75 % (ref 40–73)
NRBC # BLD: 0 K/UL (ref 0–0.01)
NRBC BLD-RTO: 0 PER 100 WBC
PLATELET # BLD AUTO: 286 K/UL (ref 135–420)
PMV BLD AUTO: 9.1 FL (ref 9.2–11.8)
POTASSIUM SERPL-SCNC: 4.1 MMOL/L (ref 3.5–5.5)
RBC # BLD AUTO: 2.78 M/UL (ref 4.2–5.3)
SERVICE CMNT-IMP: NORMAL
SODIUM SERPL-SCNC: 140 MMOL/L (ref 136–145)
WBC # BLD AUTO: 6.4 K/UL (ref 4.6–13.2)

## 2024-06-17 PROCEDURE — 96375 TX/PRO/DX INJ NEW DRUG ADDON: CPT

## 2024-06-17 PROCEDURE — 86140 C-REACTIVE PROTEIN: CPT

## 2024-06-17 PROCEDURE — 82550 ASSAY OF CK (CPK): CPT

## 2024-06-17 PROCEDURE — 6360000002 HC RX W HCPCS: Performed by: INTERNAL MEDICINE

## 2024-06-17 PROCEDURE — 80048 BASIC METABOLIC PNL TOTAL CA: CPT

## 2024-06-17 PROCEDURE — 2580000003 HC RX 258: Performed by: INTERNAL MEDICINE

## 2024-06-17 PROCEDURE — 85025 COMPLETE CBC W/AUTO DIFF WBC: CPT

## 2024-06-17 PROCEDURE — 96365 THER/PROPH/DIAG IV INF INIT: CPT

## 2024-06-17 RX ORDER — HEPARIN SODIUM (PORCINE) LOCK FLUSH IV SOLN 100 UNIT/ML 100 UNIT/ML
500 SOLUTION INTRAVENOUS PRN
Status: DISCONTINUED | OUTPATIENT
Start: 2024-06-17 | End: 2024-06-18 | Stop reason: HOSPADM

## 2024-06-17 RX ORDER — SODIUM CHLORIDE 0.9 % (FLUSH) 0.9 %
10 SYRINGE (ML) INJECTION 2 TIMES DAILY
Status: DISCONTINUED | OUTPATIENT
Start: 2024-06-17 | End: 2024-06-18 | Stop reason: HOSPADM

## 2024-06-17 RX ADMIN — HEPARIN 500 UNITS: 100 SYRINGE at 12:18

## 2024-06-17 RX ADMIN — Medication 10 ML: at 11:05

## 2024-06-17 RX ADMIN — Medication 10 ML: at 11:07

## 2024-06-17 RX ADMIN — DAPTOMYCIN 350 MG: 500 INJECTION, POWDER, LYOPHILIZED, FOR SOLUTION INTRAVENOUS at 12:13

## 2024-06-17 RX ADMIN — LEVOFLOXACIN 500 MG: 500 INJECTION, SOLUTION INTRAVENOUS at 11:08

## 2024-06-17 ASSESSMENT — PAIN DESCRIPTION - LOCATION: LOCATION: HIP

## 2024-06-17 ASSESSMENT — PAIN SCALES - GENERAL: PAINLEVEL_OUTOF10: 0

## 2024-06-17 NOTE — PROGRESS NOTES
[facility] Progress Note    Date: 2024    Name: Angelica Duncan    MRN: [id number]         : 1950    Levaquin Infusion + Dapto IVP    Ms. Duncan to [facility], ambulatory at 1100 via wheelchair Pt was assessed and education was provided. Pt stated she is tolerating her antibiotics well. Pt has bruising from her fall on      Ms. Duncan's vitals were reviewed.  Vitals:    24 1110   BP: (!) 86/60   Pulse: 62   Temp: 97.2 °F (36.2 °C)   SpO2: 98%     Right upper arm PICC flushed easily and had brisk blood return via both ports, and lines flushed with 5 mL NS via both ports.   Collected weekly lab from red lumen   Recent Results (from the past 12 hour(s))   CBC with Auto Differential    Collection Time: 24 11:00 AM   Result Value Ref Range    WBC 6.4 4.6 - 13.2 K/uL    RBC 2.78 (L) 4.20 - 5.30 M/uL    Hemoglobin 9.8 (L) 12.0 - 16.0 g/dL    Hematocrit 28.7 (L) 35.0 - 45.0 %    .2 (H) 78.0 - 100.0 FL    MCH 35.3 (H) 24.0 - 34.0 PG    MCHC 34.1 31.0 - 37.0 g/dL    RDW 16.2 (H) 11.6 - 14.5 %    Platelets 286 135 - 420 K/uL    MPV 9.1 (L) 9.2 - 11.8 FL    Nucleated RBCs 0.0 0  WBC    nRBC 0.00 0.00 - 0.01 K/uL    Neutrophils % 75 (H) 40 - 73 %    Lymphocytes % 17 (L) 21 - 52 %    Monocytes % 6 3 - 10 %    Eosinophils % 1 0 - 5 %    Basophils % 1 0 - 2 %    Immature Granulocytes % 0 0.0 - 0.5 %    Neutrophils Absolute 4.8 1.8 - 8.0 K/UL    Lymphocytes Absolute 1.1 0.9 - 3.6 K/UL    Monocytes Absolute 0.4 0.05 - 1.2 K/UL    Eosinophils Absolute 0.1 0.0 - 0.4 K/UL    Basophils Absolute 0.0 0.0 - 0.1 K/UL    Immature Granulocytes Absolute 0.0 0.00 - 0.04 K/UL    Differential Type AUTOMATED     CK    Collection Time: 24 11:00 AM   Result Value Ref Range    Total  (H) 26 - 192 U/L   Basic Metabolic Panel    Collection Time: 24 11:00 AM   Result Value Ref Range    Sodium 140 136 - 145 mmol/L    Potassium 4.1 3.5 - 5.5 mmol/L    Chloride 108 100 - 111 mmol/L    CO2 26 21 - 32

## 2024-06-18 ENCOUNTER — HOSPITAL ENCOUNTER (OUTPATIENT)
Facility: HOSPITAL | Age: 74
Setting detail: INFUSION SERIES
Discharge: HOME | End: 2024-06-18
Payer: MEDICARE

## 2024-06-18 ENCOUNTER — HOME CARE VISIT (OUTPATIENT)
Age: 74
End: 2024-06-18
Payer: MEDICARE

## 2024-06-18 VITALS
HEART RATE: 67 BPM | OXYGEN SATURATION: 98 % | SYSTOLIC BLOOD PRESSURE: 98 MMHG | DIASTOLIC BLOOD PRESSURE: 58 MMHG | TEMPERATURE: 98 F | RESPIRATION RATE: 18 BRPM

## 2024-06-18 VITALS
DIASTOLIC BLOOD PRESSURE: 38 MMHG | TEMPERATURE: 97 F | HEART RATE: 66 BPM | RESPIRATION RATE: 16 BRPM | SYSTOLIC BLOOD PRESSURE: 91 MMHG | OXYGEN SATURATION: 98 %

## 2024-06-18 VITALS
DIASTOLIC BLOOD PRESSURE: 64 MMHG | TEMPERATURE: 98 F | OXYGEN SATURATION: 95 % | SYSTOLIC BLOOD PRESSURE: 98 MMHG | RESPIRATION RATE: 18 BRPM

## 2024-06-18 DIAGNOSIS — L08.9 DIABETIC INFECTION OF RIGHT FOOT (HCC): ICD-10-CM

## 2024-06-18 DIAGNOSIS — E11.628 DIABETIC INFECTION OF RIGHT FOOT (HCC): ICD-10-CM

## 2024-06-18 PROCEDURE — G0151 HHCP-SERV OF PT,EA 15 MIN: HCPCS

## 2024-06-18 PROCEDURE — 96365 THER/PROPH/DIAG IV INF INIT: CPT

## 2024-06-18 PROCEDURE — 96375 TX/PRO/DX INJ NEW DRUG ADDON: CPT

## 2024-06-18 PROCEDURE — 6360000002 HC RX W HCPCS: Performed by: INTERNAL MEDICINE

## 2024-06-18 PROCEDURE — 2580000003 HC RX 258: Performed by: INTERNAL MEDICINE

## 2024-06-18 RX ORDER — LEVOFLOXACIN 5 MG/ML
500 INJECTION, SOLUTION INTRAVENOUS ONCE
Status: COMPLETED | OUTPATIENT
Start: 2024-06-19 | End: 2024-06-19

## 2024-06-18 RX ORDER — LEVOFLOXACIN 5 MG/ML
500 INJECTION, SOLUTION INTRAVENOUS ONCE
Status: COMPLETED | OUTPATIENT
Start: 2024-06-21 | End: 2024-06-21

## 2024-06-18 RX ORDER — LEVOFLOXACIN 5 MG/ML
500 INJECTION, SOLUTION INTRAVENOUS ONCE
Status: COMPLETED | OUTPATIENT
Start: 2024-06-20 | End: 2024-06-20

## 2024-06-18 RX ORDER — HEPARIN SODIUM (PORCINE) LOCK FLUSH IV SOLN 100 UNIT/ML 100 UNIT/ML
500 SOLUTION INTRAVENOUS PRN
Status: DISCONTINUED | OUTPATIENT
Start: 2024-06-18 | End: 2024-06-19 | Stop reason: HOSPADM

## 2024-06-18 RX ORDER — SODIUM CHLORIDE 0.9 % (FLUSH) 0.9 %
5-40 SYRINGE (ML) INJECTION PRN
Status: DISCONTINUED | OUTPATIENT
Start: 2024-06-18 | End: 2024-06-19 | Stop reason: HOSPADM

## 2024-06-18 RX ORDER — LEVOFLOXACIN 5 MG/ML
500 INJECTION, SOLUTION INTRAVENOUS ONCE
Status: DISCONTINUED | OUTPATIENT
Start: 2024-06-19 | End: 2024-06-18

## 2024-06-18 RX ADMIN — Medication 10 ML: at 11:10

## 2024-06-18 RX ADMIN — Medication 10 ML: at 12:27

## 2024-06-18 RX ADMIN — DAPTOMYCIN 350 MG: 500 INJECTION, POWDER, LYOPHILIZED, FOR SOLUTION INTRAVENOUS at 12:32

## 2024-06-18 RX ADMIN — Medication 10 ML: at 12:37

## 2024-06-18 RX ADMIN — HEPARIN 500 UNITS: 100 SYRINGE at 12:38

## 2024-06-18 RX ADMIN — LEVOFLOXACIN 500 MG: 500 INJECTION, SOLUTION INTRAVENOUS at 11:13

## 2024-06-18 ASSESSMENT — ENCOUNTER SYMPTOMS
SPUTUM PRODUCTION: 1
SPUTUM COLOR: CLEAR
DYSPNEA ACTIVITY LEVEL: AFTER AMBULATING 10 - 20 FT
SPUTUM AMOUNT: SCANT
STOOL DESCRIPTION: HARD
COUGH: 1
COUGH CHARACTERISTICS: NON-PRODUCTIVE
CONSTIPATION: 1
PAIN LOCATION - PAIN QUALITY: PHANTOM
ABDOMINAL PAIN: 1

## 2024-06-18 ASSESSMENT — PAIN DESCRIPTION - LOCATION: LOCATION: HIP;ARM

## 2024-06-18 ASSESSMENT — PAIN SCALES - GENERAL: PAINLEVEL_OUTOF10: 7

## 2024-06-18 NOTE — HOME HEALTH
Skilled reason for visit: wound vac dressing changes    Caregiver involvement:not present during visit.    Medications reviewed and all medications ARE available in the home this visit.    The following education was provided regarding medications: high risk with blood thinners.    MD notified of any discrepancies/look a-like medications/medication interactions: pt is on multiple blood thinners  Medications are effective at this time.      Home health supplies by type and quantity ordered/delivered this visit include: none     Patient education provided this visit: pt's diabetic foot care, hypoglycemic episodes treatment with glucose tabs and or crackers and cheese.    Sharps education provided: reviewed with pt Clinician instructed patient/CG on proper disposal of sharps: Containers should be made of hard plastic, be puncture-resistant and leakproof,   such as a laundry detergent or bleach bottle.  When the container is ¾ full, it should be sealed with tape and labeled   DO NOT RECYCLE prior to discarding in the regular trash.    Patient level of understanding of education provided: pt was able to verbalize, and pt needs reinforcement    Skilled Care Performed this visit: R foot DFU with surgical wound vac applied, macerated around wound despite wound vac operating correctly, asked Dr. Lance if wound dressing change of frequency of 3x weekly to help improve.    Patient response to procedure performed:  pt tolerated well.    Agency Progress toward goals: pt is progressing towards above goals    Patient's Progress towards personal goals: pt is goal to be \"healed up\"    Home exercise program: pt is up with knee rollator    Continued need for the following skills: normal    Plan for next visit:6/20

## 2024-06-18 NOTE — PROGRESS NOTES
[facility] Progress Note    Date: 2024    Name: Angelica Duncan    MRN: [id number]         : 1950    Levaquin Infusion + Dapto IVP    Ms. Duncan to [facility], ambulatory at 1052 via rollator. Pt was assessed and education was provided.  Pt is \"extremely\" tired today.     Ms. Duncan's vitals were reviewed.  Patient Vitals for the past 12 hrs:   Temp Pulse Resp BP   24 1108 97 °F (36.1 °C) 66 16 (!) 91/38     Pts BP low. Pt denies lightheadedness, dizziness, chest pain, SOB. Pt states \"I feel fine\"     Right upper arm PICC flushed with 10ml NS each port and had brisk blood return via both ports.     PICC dressing c/d/I. No swelling, redness, streaking, warmth or drainage noted in arm.     Levaquin 500mg/100ml NS was infused at 100 ml/hr over approximately 60 minutes followed by 10ml NS flush, then Daptomycin 350mg administered slow IVP over approx 5 minutes. Both lumens of PICC flushed with NS 10 ml and Heparin 250 units per lumen.    Ms. Duncan tolerated infusion, and had no complaints at this time.    PICC dressing changed using sterile technique. Site cleaned with alcohol swabs, new biopatch applied, new transparent/tegaderm applied. Green curos caps applied, and lumens wrapped with guaze and coban.  Stockinette placed over dressing for protection.    Patient armband removed and shredded.    [facility] condition at 1300. She is to return on 24 at 1130 for her next antibiotic appointment.    Ирина Freitas RN  2024  12:02 PM

## 2024-06-18 NOTE — CASE COMMUNICATION
Dr. Lance,  Pt's wound is staying very wet and macerated despite wound vac seeming to be operational between wound dressing changes, would you like the frequency of dressing changes to be changed to 3x weekly?    Thank you,   Madelaine Cross RN, BSN, CWOCN

## 2024-06-19 ENCOUNTER — HOME CARE VISIT (OUTPATIENT)
Age: 74
End: 2024-06-19
Payer: MEDICARE

## 2024-06-19 ENCOUNTER — HOSPITAL ENCOUNTER (OUTPATIENT)
Facility: HOSPITAL | Age: 74
Setting detail: INFUSION SERIES
Discharge: HOME | End: 2024-06-19
Payer: MEDICARE

## 2024-06-19 VITALS
HEART RATE: 64 BPM | DIASTOLIC BLOOD PRESSURE: 74 MMHG | RESPIRATION RATE: 16 BRPM | TEMPERATURE: 97.9 F | OXYGEN SATURATION: 99 % | SYSTOLIC BLOOD PRESSURE: 112 MMHG

## 2024-06-19 VITALS
HEART RATE: 64 BPM | RESPIRATION RATE: 18 BRPM | DIASTOLIC BLOOD PRESSURE: 60 MMHG | SYSTOLIC BLOOD PRESSURE: 98 MMHG | TEMPERATURE: 97.8 F

## 2024-06-19 DIAGNOSIS — E11.628 DIABETIC INFECTION OF RIGHT FOOT (HCC): ICD-10-CM

## 2024-06-19 DIAGNOSIS — L08.9 DIABETIC INFECTION OF RIGHT FOOT (HCC): ICD-10-CM

## 2024-06-19 PROCEDURE — 96375 TX/PRO/DX INJ NEW DRUG ADDON: CPT

## 2024-06-19 PROCEDURE — 2580000003 HC RX 258: Performed by: INTERNAL MEDICINE

## 2024-06-19 PROCEDURE — G0152 HHCP-SERV OF OT,EA 15 MIN: HCPCS

## 2024-06-19 PROCEDURE — 6360000002 HC RX W HCPCS: Performed by: INTERNAL MEDICINE

## 2024-06-19 PROCEDURE — 96365 THER/PROPH/DIAG IV INF INIT: CPT

## 2024-06-19 RX ORDER — HEPARIN 100 UNIT/ML
SYRINGE INTRAVENOUS
Status: DISCONTINUED
Start: 2024-06-19 | End: 2024-06-20 | Stop reason: HOSPADM

## 2024-06-19 RX ORDER — SODIUM CHLORIDE 0.9 % (FLUSH) 0.9 %
10 SYRINGE (ML) INJECTION PRN
Status: DISCONTINUED | OUTPATIENT
Start: 2024-06-19 | End: 2024-06-20 | Stop reason: HOSPADM

## 2024-06-19 RX ORDER — HEPARIN 100 UNIT/ML
500 SYRINGE INTRAVENOUS PRN
Status: CANCELLED | OUTPATIENT
Start: 2024-06-20

## 2024-06-19 RX ORDER — SODIUM CHLORIDE 9 MG/ML
5-250 INJECTION, SOLUTION INTRAVENOUS PRN
Status: CANCELLED | OUTPATIENT
Start: 2024-06-20

## 2024-06-19 RX ORDER — LEVOFLOXACIN 5 MG/ML
500 INJECTION, SOLUTION INTRAVENOUS ONCE
Status: COMPLETED | OUTPATIENT
Start: 2024-06-25 | End: 2024-06-25

## 2024-06-19 RX ORDER — SODIUM CHLORIDE 9 MG/ML
INJECTION, SOLUTION INTRAVENOUS CONTINUOUS
Status: CANCELLED | OUTPATIENT
Start: 2024-06-20

## 2024-06-19 RX ORDER — DIPHENHYDRAMINE HYDROCHLORIDE 50 MG/ML
50 INJECTION INTRAMUSCULAR; INTRAVENOUS
Status: CANCELLED | OUTPATIENT
Start: 2024-06-20

## 2024-06-19 RX ORDER — ALBUTEROL SULFATE 90 UG/1
4 AEROSOL, METERED RESPIRATORY (INHALATION) PRN
Status: CANCELLED | OUTPATIENT
Start: 2024-06-20

## 2024-06-19 RX ORDER — EPINEPHRINE 1 MG/ML
0.3 INJECTION, SOLUTION, CONCENTRATE INTRAVENOUS PRN
Status: CANCELLED | OUTPATIENT
Start: 2024-06-20

## 2024-06-19 RX ORDER — HEPARIN SODIUM (PORCINE) LOCK FLUSH IV SOLN 100 UNIT/ML 100 UNIT/ML
500 SOLUTION INTRAVENOUS PRN
Status: DISCONTINUED | OUTPATIENT
Start: 2024-06-19 | End: 2024-06-20 | Stop reason: HOSPADM

## 2024-06-19 RX ORDER — ACETAMINOPHEN 325 MG/1
650 TABLET ORAL
Status: CANCELLED | OUTPATIENT
Start: 2024-06-20

## 2024-06-19 RX ORDER — LEVOFLOXACIN 5 MG/ML
500 INJECTION, SOLUTION INTRAVENOUS ONCE
Status: CANCELLED
Start: 2024-06-20 | End: 2024-06-20

## 2024-06-19 RX ORDER — LEVOFLOXACIN 5 MG/ML
500 INJECTION, SOLUTION INTRAVENOUS ONCE
Status: COMPLETED | OUTPATIENT
Start: 2024-06-24 | End: 2024-06-24

## 2024-06-19 RX ORDER — ONDANSETRON 2 MG/ML
8 INJECTION INTRAMUSCULAR; INTRAVENOUS
Status: CANCELLED | OUTPATIENT
Start: 2024-06-20

## 2024-06-19 RX ORDER — LEVOFLOXACIN 5 MG/ML
500 INJECTION, SOLUTION INTRAVENOUS ONCE
Status: COMPLETED | OUTPATIENT
Start: 2024-06-23 | End: 2024-06-23

## 2024-06-19 RX ORDER — LEVOFLOXACIN 5 MG/ML
500 INJECTION, SOLUTION INTRAVENOUS ONCE
Status: COMPLETED | OUTPATIENT
Start: 2024-06-22 | End: 2024-06-22

## 2024-06-19 RX ORDER — SODIUM CHLORIDE 0.9 % (FLUSH) 0.9 %
5-40 SYRINGE (ML) INJECTION PRN
Status: CANCELLED | OUTPATIENT
Start: 2024-06-20

## 2024-06-19 RX ADMIN — SODIUM CHLORIDE 350 MG: 9 INJECTION INTRAMUSCULAR; INTRAVENOUS; SUBCUTANEOUS at 11:24

## 2024-06-19 RX ADMIN — Medication 10 ML: at 11:28

## 2024-06-19 RX ADMIN — Medication 10 ML: at 11:22

## 2024-06-19 RX ADMIN — Medication 10 ML: at 12:41

## 2024-06-19 RX ADMIN — HEPARIN 500 UNITS: 100 SYRINGE at 12:41

## 2024-06-19 RX ADMIN — LEVOFLOXACIN 500 MG: 500 INJECTION, SOLUTION INTRAVENOUS at 11:28

## 2024-06-19 ASSESSMENT — PAIN DESCRIPTION - LOCATION: LOCATION: HIP;ARM

## 2024-06-19 ASSESSMENT — PAIN SCALES - GENERAL: PAINLEVEL_OUTOF10: 7

## 2024-06-19 ASSESSMENT — ENCOUNTER SYMPTOMS: PAIN LOCATION - PAIN QUALITY: PHANTOM

## 2024-06-19 NOTE — HOME HEALTH
SUBJECTIVE: Patient stated she was very tired from getting in/out of the house everyday for infusions.   CAREGIVER INVOLVEMENT/ASSISTANCE NEEDED FOR: patient does not appear to have much assistance in the home. Her grandson and son will assist if she asks them but they are never present during visit and appear to help very little. Brother drives patient to infusion appointments daily but does not assist her to the car.  OBJECTIVE:  See interventions.  PATIENT EDUCATION PROVIDED THIS VISIT: patient instructed to continue to ice and elevate L hip which has a very large football sized black and blue bruise.   PATIENT RESPONSE TO EDUCATION PROVIDED: patient reports the ice really helps her hip and and she still believes it is not fractured and just a bruise and she does not want to see a doctor about it.  PATIENT RESPONSE TO TREATMENT: patient reporting fatigue but able to demonstrated mobility today despite her long morning at the infusion center.  ASSESSMENT OF PROGRESS TOWARD GOALS: patient was able to demonstrate exiting the home albeit she would be safest with a WC ramp as she will continue to be limited by RLE ampuation. She was able hop and kneel down steps to garage and used FWW to hop maintaining RLE NWB status.  PLAN FOR NEXT VISIT: progress stair training to second floor master bath.  THE FOLLOWING DISCHARGE PLANNING WAS DISCUSSED WITH THE PATIENT/CAREGIVER: 1W1, 2W2

## 2024-06-19 NOTE — PROGRESS NOTES
[facility] Progress Note    Date: 2024    Name: Angelica Duncan    MRN: [id number]         : 1950    Levaquin Infusion + Dapto IVP    Ms. Duncan to [facility], ambulatory at 1130 via wheelchair with boyfriend at her side . Pt was assessed and education was provided. Pt stated she is tolerating her antibiotics well. Pt has bruising from her fall on Thursday pt states she feels that the hip is getting better and she has been alternating cold and warm compresses for relief     Ms. Duncan's vitals were reviewed.  Vitals:    24 1200   BP: 98/60   Pulse: 64   Resp: 18   Temp: 97.8 °F (36.6 °C)     Right upper arm PICC flushed easily and had brisk blood return via both ports, and lines flushed with 5 mL NS via both ports.             PICC dressing c/d/i and not due to be changed until 2024 No swelling, redness, streaking, warmth or drainage noted in arm. Pt denied c/o pain in arm.    Levaquin 500mg/100ml NS was infused at 100 ml/hr over approximately 60 minutes followed by NS flush, then Daptomycin 350mg administered slow IVP over approx 5 minutes.    Ms. Duncan tolerated infusion, and had no complaints at this time.    Both lumens of PICC flushed with NS 10 ml and Heparin 250 units. Green end caps applied, and lumens wrapped with guaze and coban. Stockinette placed over dressing for protection.    Patient armband removed and shredded.    [facility] condition at  1245 via wheelchair She is to return on 24 at 1130 for her next antibiotic appointment.    RYDER CHO RN  2024  12:13 PM

## 2024-06-20 ENCOUNTER — HOME CARE VISIT (OUTPATIENT)
Age: 74
End: 2024-06-20
Payer: MEDICARE

## 2024-06-20 ENCOUNTER — HOSPITAL ENCOUNTER (OUTPATIENT)
Facility: HOSPITAL | Age: 74
Setting detail: INFUSION SERIES
Discharge: HOME | End: 2024-06-20
Payer: MEDICARE

## 2024-06-20 VITALS
HEART RATE: 60 BPM | RESPIRATION RATE: 16 BRPM | SYSTOLIC BLOOD PRESSURE: 98 MMHG | DIASTOLIC BLOOD PRESSURE: 48 MMHG | TEMPERATURE: 98.1 F | OXYGEN SATURATION: 97 %

## 2024-06-20 DIAGNOSIS — E11.628 DIABETIC INFECTION OF RIGHT FOOT (HCC): ICD-10-CM

## 2024-06-20 DIAGNOSIS — L08.9 DIABETIC INFECTION OF RIGHT FOOT (HCC): ICD-10-CM

## 2024-06-20 PROCEDURE — 96375 TX/PRO/DX INJ NEW DRUG ADDON: CPT

## 2024-06-20 PROCEDURE — G0299 HHS/HOSPICE OF RN EA 15 MIN: HCPCS

## 2024-06-20 PROCEDURE — 2580000003 HC RX 258: Performed by: INTERNAL MEDICINE

## 2024-06-20 PROCEDURE — 96365 THER/PROPH/DIAG IV INF INIT: CPT

## 2024-06-20 PROCEDURE — 6360000002 HC RX W HCPCS: Performed by: INTERNAL MEDICINE

## 2024-06-20 RX ORDER — HEPARIN SODIUM (PORCINE) LOCK FLUSH IV SOLN 100 UNIT/ML 100 UNIT/ML
500 SOLUTION INTRAVENOUS PRN
Status: DISCONTINUED | OUTPATIENT
Start: 2024-06-20 | End: 2024-06-21 | Stop reason: HOSPADM

## 2024-06-20 RX ORDER — SODIUM CHLORIDE 0.9 % (FLUSH) 0.9 %
5-40 SYRINGE (ML) INJECTION PRN
Status: DISCONTINUED | OUTPATIENT
Start: 2024-06-20 | End: 2024-06-21 | Stop reason: HOSPADM

## 2024-06-20 RX ADMIN — SODIUM CHLORIDE 350 MG: 9 INJECTION INTRAMUSCULAR; INTRAVENOUS; SUBCUTANEOUS at 12:47

## 2024-06-20 RX ADMIN — LEVOFLOXACIN 500 MG: 500 INJECTION, SOLUTION INTRAVENOUS at 11:46

## 2024-06-20 RX ADMIN — HEPARIN 500 UNITS: 100 SYRINGE at 12:54

## 2024-06-20 RX ADMIN — Medication 10 ML: at 12:53

## 2024-06-20 ASSESSMENT — PAIN SCALES - GENERAL: PAINLEVEL_OUTOF10: 5

## 2024-06-20 ASSESSMENT — PAIN DESCRIPTION - LOCATION: LOCATION: FOOT;HIP

## 2024-06-20 NOTE — HOME HEALTH
PROVIDED THIS VISIT: Pt trained/educated in the role of occupational therapy; areas of occupation; occupation-based activity demands; performance patterns and skills; context and environmental factors; safety; balance; fall prevention; activity modifications; compensatory strategies; use of adaptive device(s) and/or DME; proper posture and body mechanics; progression of treatment plan; visit frequency and duration; discharge planning; and contact procedures for routine questions and emergency situations. This therapist reviewed the patient's needs to ensure no additional resources are needed at this time and confirm the current resources are sufficient and appropriate. All training and education is for the purpose of increasing safety and awareness of function in order to increase the patient's ability to perform ADLs, IADLs and access the community with a decreasing degree of difficulty. This will improve the patient's quality of life by increasing his/her physical, psychological and social functioning while minimizing the necessity of future skilled medical services.      PATIENT LEVEL OF UNDERSTANDING OF EDUCATION PROVIDED: Pt demonstrated good understanding of role of OT, DME recommendations, safety education, plan of care and discharge planning. Pt requires additional training and reinforcement of education for carryover and adherence.      REHAB POTENTIAL: Ms Duncan presents with good rehab potential to progress with treatment focused upon increasing participation in ADLs including bathing; safe functional mobility; upper body strength; dynamic balance activities; and increasing endurance to improve performance with daily tasks. Pt agreeable to continue home health occupational therapy services to increase independence safely within the home environment.    HOME EXERCISE PROGRAM: HEP to be provided at a later date and consist of upper body strengthening and functional dynamic standing.      CONTINUED NEED FOR

## 2024-06-20 NOTE — PROGRESS NOTES
[facility] Progress Note    Date: 2024    Name: Angelica Duncan    MRN: [id number]         : 1950    LEVAQUIN INFUSION AND DAPTOMYCIN IV PUSH      Ms. Duncan arrived to \[facility]\"" at 11:10 AM, ambulatory via knee rolator. . She denied any complications/adverse effects from her previous Levaquin and Daptomycin treatments.     Ms. Duncan was assessed and education was provided.     Ms. Duncan's vitals were reviewed.  Vitals:    24 1138   BP: (!) 98/48   Pulse: 60   Resp: 16   Temp: 98.1 °F (36.7 °C)   SpO2: 97%     Double lumen Picc line RUE with dressing clean, dry and intact. Flushed purple lumen with 10 ml NS. Flushed easily with brisk blood return.     Levaquin 500 mg was infused via purple lumen over approximately 60 minutes as per MD order. Patient tolerated well. Lumen was then flushed with 10 ml NS.     Daptomycin 350 mg was administered slowly over 5 minutes IV Push as per MD order. Flushed with 10 ml NS afterwards and hep locked with 250 Units of Heparin. Red lumen also flushed with 10 ml NS and hep locked with 250 Units of Heparin. Patient tolerated well.     New curos cap was applied to both ports. Lumens secured with 4 x 4 gauze and coban, and stockinette.     Patient's armband removed and shredded.     [facility] condition at 1 PM.  She is to return on 24 at 11 AM for her next appointment.    Belkis Lopez RN  2024

## 2024-06-21 ENCOUNTER — HOME CARE VISIT (OUTPATIENT)
Age: 74
End: 2024-06-21
Payer: MEDICARE

## 2024-06-21 ENCOUNTER — HOSPITAL ENCOUNTER (OUTPATIENT)
Facility: HOSPITAL | Age: 74
Setting detail: INFUSION SERIES
Discharge: HOME | End: 2024-06-21
Payer: MEDICARE

## 2024-06-21 VITALS
TEMPERATURE: 97.5 F | HEART RATE: 57 BPM | SYSTOLIC BLOOD PRESSURE: 119 MMHG | RESPIRATION RATE: 16 BRPM | OXYGEN SATURATION: 97 % | DIASTOLIC BLOOD PRESSURE: 46 MMHG

## 2024-06-21 DIAGNOSIS — E11.628 DIABETIC INFECTION OF RIGHT FOOT (HCC): Primary | ICD-10-CM

## 2024-06-21 DIAGNOSIS — L08.9 DIABETIC INFECTION OF RIGHT FOOT (HCC): Primary | ICD-10-CM

## 2024-06-21 PROCEDURE — G0157 HHC PT ASSISTANT EA 15: HCPCS

## 2024-06-21 PROCEDURE — 6360000002 HC RX W HCPCS: Performed by: INTERNAL MEDICINE

## 2024-06-21 PROCEDURE — 96365 THER/PROPH/DIAG IV INF INIT: CPT

## 2024-06-21 PROCEDURE — 2580000003 HC RX 258: Performed by: INTERNAL MEDICINE

## 2024-06-21 PROCEDURE — 96375 TX/PRO/DX INJ NEW DRUG ADDON: CPT

## 2024-06-21 RX ORDER — HEPARIN 100 UNIT/ML
500 SYRINGE INTRAVENOUS PRN
Status: CANCELLED | OUTPATIENT
Start: 2024-06-22

## 2024-06-21 RX ORDER — EPINEPHRINE 1 MG/ML
0.3 INJECTION, SOLUTION, CONCENTRATE INTRAVENOUS PRN
Status: CANCELLED | OUTPATIENT
Start: 2024-06-22

## 2024-06-21 RX ORDER — SODIUM CHLORIDE 0.9 % (FLUSH) 0.9 %
5-40 SYRINGE (ML) INJECTION PRN
Status: CANCELLED | OUTPATIENT
Start: 2024-06-22

## 2024-06-21 RX ORDER — HEPARIN 100 UNIT/ML
500 SYRINGE INTRAVENOUS PRN
Status: DISCONTINUED | OUTPATIENT
Start: 2024-06-21 | End: 2024-06-22 | Stop reason: HOSPADM

## 2024-06-21 RX ORDER — DIPHENHYDRAMINE HYDROCHLORIDE 50 MG/ML
50 INJECTION INTRAMUSCULAR; INTRAVENOUS
Status: CANCELLED | OUTPATIENT
Start: 2024-06-22

## 2024-06-21 RX ORDER — ONDANSETRON 2 MG/ML
8 INJECTION INTRAMUSCULAR; INTRAVENOUS
Status: CANCELLED | OUTPATIENT
Start: 2024-06-22

## 2024-06-21 RX ORDER — LEVOFLOXACIN 5 MG/ML
500 INJECTION, SOLUTION INTRAVENOUS ONCE
Status: CANCELLED
Start: 2024-06-22 | End: 2024-06-22

## 2024-06-21 RX ORDER — SODIUM CHLORIDE 0.9 % (FLUSH) 0.9 %
5-40 SYRINGE (ML) INJECTION PRN
Status: DISCONTINUED | OUTPATIENT
Start: 2024-06-21 | End: 2024-06-22 | Stop reason: HOSPADM

## 2024-06-21 RX ORDER — ALBUTEROL SULFATE 90 UG/1
4 AEROSOL, METERED RESPIRATORY (INHALATION) PRN
Status: CANCELLED | OUTPATIENT
Start: 2024-06-22

## 2024-06-21 RX ORDER — SODIUM CHLORIDE 9 MG/ML
INJECTION, SOLUTION INTRAVENOUS CONTINUOUS
Status: CANCELLED | OUTPATIENT
Start: 2024-06-22

## 2024-06-21 RX ORDER — SODIUM CHLORIDE 9 MG/ML
5-250 INJECTION, SOLUTION INTRAVENOUS PRN
Status: CANCELLED | OUTPATIENT
Start: 2024-06-22

## 2024-06-21 RX ORDER — ACETAMINOPHEN 325 MG/1
650 TABLET ORAL
Status: CANCELLED | OUTPATIENT
Start: 2024-06-22

## 2024-06-21 RX ADMIN — SODIUM CHLORIDE 350 MG: 9 INJECTION INTRAMUSCULAR; INTRAVENOUS; SUBCUTANEOUS at 12:16

## 2024-06-21 RX ADMIN — LEVOFLOXACIN 500 MG: 500 INJECTION, SOLUTION INTRAVENOUS at 11:15

## 2024-06-21 RX ADMIN — Medication 10 ML: at 12:22

## 2024-06-21 RX ADMIN — Medication 10 ML: at 12:16

## 2024-06-21 RX ADMIN — HEPARIN 500 UNITS: 100 SYRINGE at 12:23

## 2024-06-21 ASSESSMENT — PAIN DESCRIPTION - LOCATION: LOCATION: HIP;FOOT

## 2024-06-21 ASSESSMENT — PAIN SCALES - GENERAL: PAINLEVEL_OUTOF10: 5

## 2024-06-21 NOTE — PROGRESS NOTES
[facility] Progress Note    Date: 2024    Name: Angelica Duncan    MRN: [id number]         : 1950    Levaquin Infusion + Dapto IVP    [facility]. [facility] to [facility], ambulatory at 1103 via rollator. Pt was assessed and education was provided. Pt stated she is tolerating her antibiotics well. Pt has bruising from her fall last week.    Ms. Duncan's vitals were reviewed.  Vitals:    24 1100   BP: (!) 119/46   Pulse: 57   Resp: 16   Temp: 97.5 °F (36.4 °C)   SpO2: 97%     Right upper arm PICC flushed easily and had brisk blood return via both ports, and lines flushed with 5 mL NS via both ports.     PICC dressing c/d/i and not due to be changed, reinforced with paper tape. No swelling, redness, streaking, warmth or drainage noted in arm. Pt denied c/o pain in arm.    Levaquin 500mg/100ml NS was infused at 100 ml/hr over approximately 60 minutes followed by NS flush, then Daptomycin 350mg administered slow IVP over approx 5 minutes.    Ms. Duncan tolerated infusion, and had no complaints at this time.    Both lumens of PICC flushed with NS 10 ml and Heparin 250 units. Green end caps applied, and lumens wrapped with guaze and coban. Stockinette placed over dressing for protection.    Patient armband removed and shredded.    [facility]. Duncan was [facility] in stable condition at 1230. She is to return on 24 at 0800 for her next antibiotic appointment.    Latoya Chaudhry RN  2024  12:50 PM

## 2024-06-21 NOTE — HOME HEALTH
Skilled reason for visit: wound vac to right foot          Caregiver involvement: patient stated she has a son that lives with her .         Medications reviewed and all medications are available in the home this visit.      The following education was provided regarding medications:  all meds are in the home and she is takign them as ordered.      MD notified of any discrepancies/look a-like medications/medication interactions: none    Medications are effective at this time.           Home health supplies by type and quantity ordered/delivered this visit include: nonfe         Patient education provided this visit: keeping her feet elevated, protein in wound vac          Sharps education provided: NA         Patient level of understanding of education provided: patient verbalized understanding         Patient response to procedure performed:   patient had no complaints         Agency Progress toward goals: progressing         Patient's Progress towards personal goals: patient stated that she is ok         Home exercise program: deep breathing         Continued need for the following skills: Nursing.         Plan for next visit: wound care          Patient and/or caregiver notified and agrees to changes in the Plan of Care: N/A.          The following discharge planning was discussed with the pt/caregiver: when goals met and education is complete

## 2024-06-22 ENCOUNTER — HOSPITAL ENCOUNTER (OUTPATIENT)
Facility: HOSPITAL | Age: 74
Setting detail: INFUSION SERIES
Discharge: HOME | End: 2024-06-22
Payer: MEDICARE

## 2024-06-22 VITALS
TEMPERATURE: 98.1 F | DIASTOLIC BLOOD PRESSURE: 52 MMHG | RESPIRATION RATE: 16 BRPM | HEART RATE: 63 BPM | SYSTOLIC BLOOD PRESSURE: 113 MMHG | OXYGEN SATURATION: 97 %

## 2024-06-22 DIAGNOSIS — L08.9 DIABETIC INFECTION OF RIGHT FOOT (HCC): ICD-10-CM

## 2024-06-22 DIAGNOSIS — E11.628 DIABETIC INFECTION OF RIGHT FOOT (HCC): ICD-10-CM

## 2024-06-22 PROCEDURE — 2580000003 HC RX 258: Performed by: INTERNAL MEDICINE

## 2024-06-22 PROCEDURE — 6360000002 HC RX W HCPCS

## 2024-06-22 PROCEDURE — 96375 TX/PRO/DX INJ NEW DRUG ADDON: CPT

## 2024-06-22 PROCEDURE — 96365 THER/PROPH/DIAG IV INF INIT: CPT

## 2024-06-22 PROCEDURE — 6360000002 HC RX W HCPCS: Performed by: INTERNAL MEDICINE

## 2024-06-22 RX ORDER — HEPARIN 100 UNIT/ML
SYRINGE INTRAVENOUS
Status: COMPLETED
Start: 2024-06-22 | End: 2024-06-22

## 2024-06-22 RX ORDER — SODIUM CHLORIDE 0.9 % (FLUSH) 0.9 %
5-40 SYRINGE (ML) INJECTION AS NEEDED
Status: DISCONTINUED | OUTPATIENT
Start: 2024-06-22 | End: 2024-06-23 | Stop reason: HOSPADM

## 2024-06-22 RX ORDER — HEPARIN SODIUM (PORCINE) LOCK FLUSH IV SOLN 100 UNIT/ML 100 UNIT/ML
500 SOLUTION INTRAVENOUS PRN
Status: DISCONTINUED | OUTPATIENT
Start: 2024-06-22 | End: 2024-06-23 | Stop reason: HOSPADM

## 2024-06-22 RX ADMIN — HEPARIN SODIUM (PORCINE) LOCK FLUSH IV SOLN 100 UNIT/ML 500 UNITS: 100 SOLUTION at 09:13

## 2024-06-22 RX ADMIN — Medication 10 ML: at 09:12

## 2024-06-22 RX ADMIN — SODIUM CHLORIDE 350 MG: 9 INJECTION INTRAMUSCULAR; INTRAVENOUS; SUBCUTANEOUS at 09:06

## 2024-06-22 RX ADMIN — HEPARIN 500 UNITS: 100 SYRINGE at 09:13

## 2024-06-22 RX ADMIN — LEVOFLOXACIN 500 MG: 500 INJECTION, SOLUTION INTRAVENOUS at 08:05

## 2024-06-22 ASSESSMENT — PAIN SCALES - GENERAL: PAINLEVEL_OUTOF10: 5

## 2024-06-22 ASSESSMENT — PAIN DESCRIPTION - LOCATION: LOCATION: HIP;FOOT

## 2024-06-22 NOTE — PROGRESS NOTES
[facility] Progress Note    Date: 2024    Name: Angelica Duncan    MRN: [id number]         : 1950    LEVAQUIN INFUSION AND DAPTOMYCIN IV PUSH      Ms. Duncan arrived to [facility] at 7:45 AM, ambulatory via rollator. She denied any complications from her last antibiotics treatment.     Ms. Duncan was assessed and education was provided.     Ms. Duncan's vitals were reviewed.  Vitals:    24 0800   BP: (!) 125/57   Pulse: 65   Resp: 16   Temp: 98 °F (36.7 °C)   SpO2: 99%     Double lumen Picc line RUE with dressing clean, dry and intact. Flushed purple lumen with 10 ml NS. Flushed easily with brisk blood return.      Levaquin 500 mg was infused via purple lumen over 60 minutes as per MD order. Patient tolerated well. Lumen was then flushed with 10 ml NS.      Daptomycin 350 mg was administered slowly over 5 minutes via purple lumen IV Push as per MD order. Flushed with 10 ml NS afterwards and hep locked with 250 Units of Heparin. Red lumen also flushed with 10 ml NS and hep locked with 250 Units of Heparin. Patient tolerated well.      New curos cap was applied to both ports. Lumens secured with 4 x 4 gauze and coban, and new stockinette was placed.      Patient's armband removed and shredded.     [facility]. [facility] in [facility] condition at 9:15 Am.  She is to return on 24 at 9:15 Am for her next appointment to receive antibiotics.     Belkis Lopez RN  2024

## 2024-06-23 ENCOUNTER — HOSPITAL ENCOUNTER (OUTPATIENT)
Facility: HOSPITAL | Age: 74
Setting detail: INFUSION SERIES
Discharge: HOME | End: 2024-06-23
Payer: MEDICARE

## 2024-06-23 ENCOUNTER — HOME CARE VISIT (OUTPATIENT)
Age: 74
End: 2024-06-23
Payer: MEDICARE

## 2024-06-23 VITALS
SYSTOLIC BLOOD PRESSURE: 103 MMHG | RESPIRATION RATE: 16 BRPM | HEART RATE: 60 BPM | DIASTOLIC BLOOD PRESSURE: 48 MMHG | OXYGEN SATURATION: 98 % | TEMPERATURE: 98 F

## 2024-06-23 DIAGNOSIS — L08.9 DIABETIC INFECTION OF RIGHT FOOT (HCC): ICD-10-CM

## 2024-06-23 DIAGNOSIS — E11.628 DIABETIC INFECTION OF RIGHT FOOT (HCC): ICD-10-CM

## 2024-06-23 PROCEDURE — 96375 TX/PRO/DX INJ NEW DRUG ADDON: CPT

## 2024-06-23 PROCEDURE — 96365 THER/PROPH/DIAG IV INF INIT: CPT

## 2024-06-23 PROCEDURE — G0299 HHS/HOSPICE OF RN EA 15 MIN: HCPCS

## 2024-06-23 PROCEDURE — 6360000002 HC RX W HCPCS: Performed by: INTERNAL MEDICINE

## 2024-06-23 PROCEDURE — 2580000003 HC RX 258: Performed by: INTERNAL MEDICINE

## 2024-06-23 RX ORDER — HEPARIN SODIUM (PORCINE) LOCK FLUSH IV SOLN 100 UNIT/ML 100 UNIT/ML
500 SOLUTION INTRAVENOUS PRN
Status: DISCONTINUED | OUTPATIENT
Start: 2024-06-23 | End: 2024-06-24 | Stop reason: HOSPADM

## 2024-06-23 RX ORDER — HEPARIN 100 UNIT/ML
SYRINGE INTRAVENOUS
Status: DISPENSED
Start: 2024-06-23 | End: 2024-06-23

## 2024-06-23 RX ORDER — SODIUM CHLORIDE 0.9 % (FLUSH) 0.9 %
5-40 SYRINGE (ML) INJECTION AS NEEDED
Status: DISCONTINUED | OUTPATIENT
Start: 2024-06-23 | End: 2024-06-24 | Stop reason: HOSPADM

## 2024-06-23 RX ADMIN — HEPARIN 500 UNITS: 100 SYRINGE at 09:33

## 2024-06-23 RX ADMIN — LEVOFLOXACIN 500 MG: 500 INJECTION, SOLUTION INTRAVENOUS at 08:12

## 2024-06-23 RX ADMIN — Medication 10 ML: at 09:32

## 2024-06-23 RX ADMIN — SODIUM CHLORIDE 350 MG: 9 INJECTION INTRAMUSCULAR; INTRAVENOUS; SUBCUTANEOUS at 09:25

## 2024-06-23 RX ADMIN — HEPARIN 500 UNITS: 100 SYRINGE at 09:34

## 2024-06-23 RX ADMIN — Medication 10 ML: at 09:31

## 2024-06-23 ASSESSMENT — PAIN DESCRIPTION - LOCATION: LOCATION: FOOT;HIP

## 2024-06-23 ASSESSMENT — PAIN SCALES - GENERAL: PAINLEVEL_OUTOF10: 6

## 2024-06-23 NOTE — PROGRESS NOTES
[facility] Progress Note    Date: 2024    Name: Angelica Duncan    MRN: [id number]         : 1950    LEVAQUIN INFUSION AND DAPTOMYCIN IV PUSH      Ms. Duncan arrived to [facility] at 8:02 AM, ambulatory via rollator.  She denied any complications from her last antibiotics treatment.       Ms. Duncan was assessed and education was provided. Hypotensive. Asymptomatic.     Ms. Duncan's vitals were reviewed.  Vitals:    24 0805   BP: (!) 91/45   Pulse: 67   Resp: 16   Temp: 97.2 °F (36.2 °C)   SpO2: 97%     Double lumen Picc line RUE with dressing clean, dry and intact. Flushed purple lumen with 10 ml NS. Flushed easily, but sluggish blood return. Flushed with another 10 ml NS and brisk blood return verified. Flushed Red lumen with 10 ml NS with sluggish blood return.      Levaquin 500 mg was infused via purple lumen over approximately 60 minutes as per MD order. Patient tolerated well. Lumen was then flushed with 10 ml NS.      Daptomycin 350 mg was administered slowly over 5 minutes via purple lumen IV Push as per MD order. Flushed with 10 ml NS afterwards and hep locked with 500 Units of Heparin as per protocol. Red lumen flushed with 10 ml NS and also hep locked with 500 Units of Heparin.  Patient tolerated well.      New curos cap was applied to both ports. Lumens secured with 4 x 4 gauze,  coban, and stockinette.      Patient's armband removed and shredded.     Ms. Duncan was [facility] in stable condition at 9:40 AM.  She is to return on 24 at 11 AM for her next appointment.    Belkis Lopez RN  2024

## 2024-06-24 ENCOUNTER — HOSPITAL ENCOUNTER (OUTPATIENT)
Facility: HOSPITAL | Age: 74
Setting detail: INFUSION SERIES
Discharge: HOME | End: 2024-06-24
Payer: MEDICARE

## 2024-06-24 ENCOUNTER — HOME CARE VISIT (OUTPATIENT)
Age: 74
End: 2024-06-24
Payer: MEDICARE

## 2024-06-24 VITALS
OXYGEN SATURATION: 97 % | DIASTOLIC BLOOD PRESSURE: 82 MMHG | SYSTOLIC BLOOD PRESSURE: 120 MMHG | TEMPERATURE: 97 F | RESPIRATION RATE: 15 BRPM | HEART RATE: 77 BPM

## 2024-06-24 VITALS
TEMPERATURE: 97.4 F | OXYGEN SATURATION: 98 % | RESPIRATION RATE: 16 BRPM | HEART RATE: 57 BPM | SYSTOLIC BLOOD PRESSURE: 82 MMHG | DIASTOLIC BLOOD PRESSURE: 35 MMHG

## 2024-06-24 DIAGNOSIS — L08.9 DIABETIC INFECTION OF RIGHT FOOT (HCC): ICD-10-CM

## 2024-06-24 DIAGNOSIS — E11.628 DIABETIC INFECTION OF RIGHT FOOT (HCC): ICD-10-CM

## 2024-06-24 LAB
ANION GAP SERPL CALC-SCNC: 7 MMOL/L (ref 3–18)
BACTERIA SPEC CULT: NORMAL
BASO+EOS+MONOS # BLD AUTO: 0.5 K/UL (ref 0–2.3)
BASO+EOS+MONOS NFR BLD AUTO: 7 % (ref 0.1–17)
BUN SERPL-MCNC: 24 MG/DL (ref 7–18)
BUN/CREAT SERPL: 30 (ref 12–20)
CALCIUM SERPL-MCNC: 8.4 MG/DL (ref 8.5–10.1)
CHLORIDE SERPL-SCNC: 109 MMOL/L (ref 100–111)
CK SERPL-CCNC: 182 U/L (ref 26–192)
CO2 SERPL-SCNC: 26 MMOL/L (ref 21–32)
CREAT SERPL-MCNC: 0.79 MG/DL (ref 0.6–1.3)
CRP SERPL-MCNC: <0.3 MG/DL (ref 0–0.3)
DIFFERENTIAL METHOD BLD: ABNORMAL
ERYTHROCYTE [DISTWIDTH] IN BLOOD BY AUTOMATED COUNT: 14.1 % (ref 11.5–14.5)
GLUCOSE SERPL-MCNC: 193 MG/DL (ref 74–99)
HCT VFR BLD AUTO: 34 % (ref 36–48)
HGB BLD-MCNC: 10.7 G/DL (ref 12–16)
LYMPHOCYTES # BLD: 1.2 K/UL (ref 1.1–5.9)
LYMPHOCYTES NFR BLD: 17 % (ref 14–44)
MCH RBC QN AUTO: 30.1 PG (ref 25–35)
MCHC RBC AUTO-ENTMCNC: 31.5 G/DL (ref 31–37)
MCV RBC AUTO: 95.5 FL (ref 78–102)
NEUTS SEG # BLD: 5.3 K/UL (ref 1.8–9.5)
NEUTS SEG NFR BLD: 76 % (ref 40–70)
PLATELET # BLD AUTO: 349 K/UL (ref 140–440)
POTASSIUM SERPL-SCNC: 4.2 MMOL/L (ref 3.5–5.5)
RBC # BLD AUTO: 3.56 M/UL (ref 4.1–5.1)
SERVICE CMNT-IMP: NORMAL
SODIUM SERPL-SCNC: 142 MMOL/L (ref 136–145)
WBC # BLD AUTO: 7 K/UL (ref 4.5–13)

## 2024-06-24 PROCEDURE — 85025 COMPLETE CBC W/AUTO DIFF WBC: CPT

## 2024-06-24 PROCEDURE — 6360000002 HC RX W HCPCS: Performed by: INTERNAL MEDICINE

## 2024-06-24 PROCEDURE — 80048 BASIC METABOLIC PNL TOTAL CA: CPT

## 2024-06-24 PROCEDURE — 2580000003 HC RX 258: Performed by: INTERNAL MEDICINE

## 2024-06-24 PROCEDURE — 82550 ASSAY OF CK (CPK): CPT

## 2024-06-24 PROCEDURE — 6360000002 HC RX W HCPCS

## 2024-06-24 PROCEDURE — 96375 TX/PRO/DX INJ NEW DRUG ADDON: CPT

## 2024-06-24 PROCEDURE — 96365 THER/PROPH/DIAG IV INF INIT: CPT

## 2024-06-24 PROCEDURE — 86140 C-REACTIVE PROTEIN: CPT

## 2024-06-24 RX ORDER — HEPARIN 100 UNIT/ML
SYRINGE INTRAVENOUS
Status: COMPLETED
Start: 2024-06-24 | End: 2024-06-24

## 2024-06-24 RX ADMIN — DAPTOMYCIN 350 MG: 50 INJECTION, POWDER, LYOPHILIZED, FOR SOLUTION INTRAVENOUS at 12:22

## 2024-06-24 RX ADMIN — HEPARIN 500 UNITS: 100 SYRINGE at 12:32

## 2024-06-24 RX ADMIN — LEVOFLOXACIN 500 MG: 500 INJECTION, SOLUTION INTRAVENOUS at 11:19

## 2024-06-24 ASSESSMENT — ENCOUNTER SYMPTOMS
COUGH: 1
STOOL DESCRIPTION: HARD
CONSTIPATION: 1
CRAMPS: 1
DYSPNEA ACTIVITY LEVEL: AFTER AMBULATING 10 - 20 FT
COUGH CHARACTERISTICS: NON-PRODUCTIVE

## 2024-06-24 ASSESSMENT — PAIN DESCRIPTION - LOCATION: LOCATION: FOOT

## 2024-06-24 ASSESSMENT — PAIN SCALES - GENERAL: PAINLEVEL_OUTOF10: 6

## 2024-06-24 NOTE — PROGRESS NOTES
[facility] Progress Note    Date: 2024    Name: Angelica Duncan    MRN: [id number]         : 1950    Levaquin Infusion + Dapto IVP    Ms. Duncan to [facility], ambulatory at 1055 via rollator. Pt was assessed and education was provided. Pt stated she is tolerating her antibiotics well. She is very drowsy this morning, blood pressure is low but patient is asymptomatic. Pt reports falling this morning and landing on a pile of blankets.    Ms. Duncan's vitals were reviewed.  Vitals:    24 1045   BP: (!) 82/35   Pulse: 57   Resp: 16   Temp: 97.4 °F (36.3 °C)   SpO2: 98%     Right upper arm PICC flushed easily and had brisk blood return via both ports, and lines flushed with 5 mL NS via both ports. 10 mL blood drawn and wasted, labs drawn per order (CBC, BMP, CK, CRP), line flushed with 10 mL NS.     PICC dressing c/d/i and not due to be changed. No swelling, redness, streaking, warmth or drainage noted in arm. Pt denied c/o pain in arm.    Levaquin 500mg/100ml NS was infused at 100 ml/hr over approximately 60 minutes followed by NS flush, then Daptomycin 350mg administered slow IVP over approx 5 minutes.    Ms. Duncan tolerated infusion, and had no complaints at this time.    Both lumens of PICC flushed with NS 10 ml and Heparin 250 units. Green end caps applied, and lumens wrapped with guaze and coban. Stockinette placed over dressing for protection.    Patient armband removed and shredded.    [facility]. Duncan was [facility] in stable condition at 1233. She is to return on 24 at 1100 for her next antibiotic appointment.    Latoya Chaudhry RN  2024  12:42 PM

## 2024-06-25 ENCOUNTER — HOSPITAL ENCOUNTER (OUTPATIENT)
Facility: HOSPITAL | Age: 74
Setting detail: INFUSION SERIES
Discharge: HOME | End: 2024-06-25
Payer: MEDICARE

## 2024-06-25 VITALS
SYSTOLIC BLOOD PRESSURE: 112 MMHG | RESPIRATION RATE: 17 BRPM | HEART RATE: 78 BPM | DIASTOLIC BLOOD PRESSURE: 76 MMHG | TEMPERATURE: 97.8 F | OXYGEN SATURATION: 98 %

## 2024-06-25 VITALS
HEART RATE: 62 BPM | RESPIRATION RATE: 16 BRPM | OXYGEN SATURATION: 97 % | SYSTOLIC BLOOD PRESSURE: 111 MMHG | DIASTOLIC BLOOD PRESSURE: 32 MMHG | TEMPERATURE: 98 F

## 2024-06-25 DIAGNOSIS — L08.9 DIABETIC INFECTION OF RIGHT FOOT (HCC): ICD-10-CM

## 2024-06-25 DIAGNOSIS — E11.628 DIABETIC INFECTION OF RIGHT FOOT (HCC): ICD-10-CM

## 2024-06-25 PROCEDURE — 96375 TX/PRO/DX INJ NEW DRUG ADDON: CPT

## 2024-06-25 PROCEDURE — 96365 THER/PROPH/DIAG IV INF INIT: CPT

## 2024-06-25 PROCEDURE — 2580000003 HC RX 258: Performed by: INTERNAL MEDICINE

## 2024-06-25 PROCEDURE — 6360000002 HC RX W HCPCS

## 2024-06-25 PROCEDURE — 6360000002 HC RX W HCPCS: Performed by: INTERNAL MEDICINE

## 2024-06-25 RX ORDER — HEPARIN 100 UNIT/ML
SYRINGE INTRAVENOUS
Status: COMPLETED
Start: 2024-06-25 | End: 2024-06-25

## 2024-06-25 RX ORDER — HEPARIN SODIUM (PORCINE) LOCK FLUSH IV SOLN 100 UNIT/ML 100 UNIT/ML
500 SOLUTION INTRAVENOUS PRN
Status: DISCONTINUED | OUTPATIENT
Start: 2024-06-25 | End: 2024-06-26 | Stop reason: HOSPADM

## 2024-06-25 RX ORDER — SODIUM CHLORIDE 0.9 % (FLUSH) 0.9 %
5-40 SYRINGE (ML) INJECTION 2 TIMES DAILY
Status: DISCONTINUED | OUTPATIENT
Start: 2024-06-25 | End: 2024-06-26 | Stop reason: HOSPADM

## 2024-06-25 RX ORDER — LEVOFLOXACIN 5 MG/ML
500 INJECTION, SOLUTION INTRAVENOUS ONCE
Status: COMPLETED | OUTPATIENT
Start: 2024-06-26 | End: 2024-06-26

## 2024-06-25 RX ADMIN — HEPARIN 500 UNITS: 100 SYRINGE at 12:27

## 2024-06-25 RX ADMIN — Medication 10 ML: at 12:25

## 2024-06-25 RX ADMIN — LEVOFLOXACIN 500 MG: 500 INJECTION, SOLUTION INTRAVENOUS at 11:10

## 2024-06-25 RX ADMIN — SODIUM CHLORIDE 350 MG: 9 INJECTION INTRAMUSCULAR; INTRAVENOUS; SUBCUTANEOUS at 12:20

## 2024-06-25 RX ADMIN — Medication 10 ML: at 12:26

## 2024-06-25 RX ADMIN — HEPARIN 500 UNITS: 100 SYRINGE at 12:28

## 2024-06-25 ASSESSMENT — PAIN SCALES - GENERAL: PAINLEVEL_OUTOF10: 6

## 2024-06-25 ASSESSMENT — PAIN DESCRIPTION - LOCATION: LOCATION: FOOT

## 2024-06-25 ASSESSMENT — PAIN DESCRIPTION - ORIENTATION: ORIENTATION: RIGHT

## 2024-06-25 ASSESSMENT — ENCOUNTER SYMPTOMS: PAIN LOCATION - PAIN QUALITY: ACHE

## 2024-06-25 NOTE — HOME HEALTH
Skilled reason for visit: wound vac dressing change    Caregiver involvement: not present during visit.    Medications reviewed and all medications ARE available in the home this visit.    The following education was provided regarding medications: how to better control blood glucose and pain medication management.    MD notified of any discrepancies/look a-like medications/medication interactions: none  Medications are somewhat effective at this time.      Home health supplies by type and quantity ordered/delivered this visit include: none    Patient education provided this visit: dietery changes and ambulation assistance    Sharps education provided: Clinician instructed patient/CG on proper disposal of sharps: Containers should be made of hard plastic, be puncture-resistant and leakproof,   such as a laundry detergent or bleach bottle.  When the container is ¾ full, it should be sealed with tape and labeled   DO NOT RECYCLE prior to discarding in the regular trash.    Patient level of understanding of education provided: needs reinforcement    Skilled Care Performed this visit: wound vac dressing change.    Patient response to procedure performed:  pt tolerated well.    Agency Progress toward goals: pt is progressing towards above goals.    Patient's Progress towards personal goals: pt's goal is to be \"healed up\".    Home exercise program: up with supervision    Continued need for the following skills: normal    Plan for next visit: pt states she's having appt with Dr. Lance this week, 6/27 with skilled nurse.

## 2024-06-25 NOTE — HOME HEALTH
SUBJECTIVE: Pt reports she is doing well and has been doing her own laundry.  CAREGIVER INVOLVEMENT/ASSISTANCE NEEDED FOR: pts boyfriend, brother and grandson assist pt with all needs prn and pts brother present during PT session.  .  OBJECTIVE:  See interventions.  PATIENT EDUCATION PROVIDED THIS VISIT: Pt instructed to continue HEP 2x/day and amb with knee walker in home as tolerated.  Pt requires constant cuing for safe walker placement and to safely manuver around obstacles.  Gave pt bright post it notes and recommend pt leave herself notes to slow down and to remember to take her wound vac with her with transfers/stair amb.  PATIENT RESPONSE TO EDUCATION PROVIDED: Pt reports she tries to take her time but she has a hard time slowing down.  Pt reports she will try to the exercises daily.  PATIENT RESPONSE TO TREATMENT: Pt requires frequent rest breaks secondary to c/o fatigue.  Vitals WFL.  .  ASSESSMENT OF PROGRESS TOWARD GOALS: Pt is very impulsive and easily distracted requiring frequent cuing to focus on tasks.  Pt requires cuing for safe walker placement and technique for amb and transfers.  Pt also requires constant cuing to pace herself for safety and to conserve energy.  .  PLAN FOR NEXT VISIT: Will plan to continue working towards improving gait balance and safety with all activities.  THE FOLLOWING DISCHARGE PLANNING WAS DISCUSSED WITH THE PATIENT/CAREGIVER: Pt is scheduled to continue PT 2w2.

## 2024-06-25 NOTE — PROGRESS NOTES
[facility] Progress Note    Date: 2024    Name: Angelica Duncan    MRN: [id number]         : 1950    LEVAQUIN INFUSION AND DAPTOMYCIN IV PUSH       Ms. Duncan arrived to [facility] at 1052, ambulatory via rollator. .She denied any complications from her last antibiotics treatment.     Ms. Duncan was assessed and education was provided.     Ms. Duncan's vitals were reviewed.  Vitals:    24 1103   BP: (!) 111/32   Pulse: 62   Resp: 16   Temp: 98 °F (36.7 °C)   SpO2: 97%     Double lumen Picc line RUE with dressing clean, dry and intact. Flushed purple lumen with 10 ml NS. Flushed easily, with brisk blood return.     Levaquin 500 mg was infused via purple lumen over approximately 60 minutes as per MD order. Patient tolerated well. Lumen was then flushed with 10 ml NS.      Daptomycin 350 mg was administered slowly over 5 minutes via purple lumen IV Push as per MD order. Flushed with 10 ml NS afterwards and hep locked with 500 Units of Heparin as per protocol. Red lumen flushed with 10 ml NS and also hep locked with 500 Units of Heparin. New curos caps applied to both lumens. Patient tolerated well.      PICC line dressing was changed using sterile technique. Area cleansed with CHG. New stat lock, biopatch and tegaderm dressing applied. Lumens secured with 4 x 4 gauze and coban. New stockinette was applied. Pt tolerated well.     [facility]. Duncan [facility] in [facility] condition at 12:47 PM.  She is to return on 24 at 11 AM for her next appointment.    Belkis Lopez RN  2024

## 2024-06-26 ENCOUNTER — HOSPITAL ENCOUNTER (OUTPATIENT)
Facility: HOSPITAL | Age: 74
Setting detail: INFUSION SERIES
Discharge: HOME | End: 2024-06-26
Payer: MEDICARE

## 2024-06-26 VITALS
TEMPERATURE: 98.4 F | OXYGEN SATURATION: 99 % | RESPIRATION RATE: 16 BRPM | DIASTOLIC BLOOD PRESSURE: 42 MMHG | SYSTOLIC BLOOD PRESSURE: 80 MMHG | HEART RATE: 56 BPM

## 2024-06-26 DIAGNOSIS — L08.9 DIABETIC INFECTION OF RIGHT FOOT (HCC): ICD-10-CM

## 2024-06-26 DIAGNOSIS — E11.628 DIABETIC INFECTION OF RIGHT FOOT (HCC): ICD-10-CM

## 2024-06-26 PROCEDURE — 2580000003 HC RX 258: Performed by: INTERNAL MEDICINE

## 2024-06-26 PROCEDURE — 96365 THER/PROPH/DIAG IV INF INIT: CPT

## 2024-06-26 PROCEDURE — 6360000002 HC RX W HCPCS: Performed by: INTERNAL MEDICINE

## 2024-06-26 PROCEDURE — 6360000002 HC RX W HCPCS

## 2024-06-26 PROCEDURE — 96375 TX/PRO/DX INJ NEW DRUG ADDON: CPT

## 2024-06-26 RX ORDER — HEPARIN 100 UNIT/ML
SYRINGE INTRAVENOUS
Status: COMPLETED
Start: 2024-06-26 | End: 2024-06-26

## 2024-06-26 RX ORDER — LEVOFLOXACIN 5 MG/ML
500 INJECTION, SOLUTION INTRAVENOUS ONCE
Status: COMPLETED | OUTPATIENT
Start: 2024-06-27 | End: 2024-06-27

## 2024-06-26 RX ORDER — SODIUM CHLORIDE 0.9 % (FLUSH) 0.9 %
5-40 SYRINGE (ML) INJECTION PRN
Status: DISCONTINUED | OUTPATIENT
Start: 2024-06-26 | End: 2024-06-27 | Stop reason: HOSPADM

## 2024-06-26 RX ADMIN — SODIUM CHLORIDE 350 MG: 9 INJECTION INTRAMUSCULAR; INTRAVENOUS; SUBCUTANEOUS at 12:38

## 2024-06-26 RX ADMIN — LEVOFLOXACIN 500 MG: 500 INJECTION, SOLUTION INTRAVENOUS at 11:33

## 2024-06-26 RX ADMIN — Medication 10 ML: at 12:44

## 2024-06-26 RX ADMIN — HEPARIN 500 UNITS: 100 SYRINGE at 12:45

## 2024-06-26 ASSESSMENT — PAIN DESCRIPTION - ORIENTATION: ORIENTATION: RIGHT;LEFT

## 2024-06-26 ASSESSMENT — PAIN SCALES - GENERAL: PAINLEVEL_OUTOF10: 4

## 2024-06-26 ASSESSMENT — PAIN DESCRIPTION - LOCATION: LOCATION: FOOT;HIP

## 2024-06-26 NOTE — PROGRESS NOTES
[facility] Progress Note    Date: 2024    Name: Angelica Duncan    MRN: [id number]         : 1950    LEVAQUIN INFUSION AND DAPTOMYCIN IV PUSH    Ms. Duncan arrived to [facility] at 10:56 AM via wheelchair, and accompanied by her . She denied any complications from her last antibiotics treatment.     Ms. Duncan was assessed and education was provided.     Ms. Duncan's vitals were reviewed.  Vitals:    24 1120   BP: (!) 80/42   Pulse: 56   Resp: 16   Temp: 98.4 °F (36.9 °C)   SpO2: 99%     Double lumen Picc line RUE with dressing clean, dry and intact. Flushed purple lumen with 10 ml NS. Flushed easily, with brisk blood return.     Levaquin 500 mg was infused via purple lumen over approximately 60 minutes as per MD order. Patient tolerated well. Lumen was then flushed with 10 ml NS.      Daptomycin 350 mg was administered slowly over 5 minutes via purple lumen IV Push as per MD order. Flushed with 10 ml NS afterwards and hep locked with 250 Units of Heparin as per protocol. Red lumen flushed with 10 ml NS and also hep locked with 250 Units of Heparin. New curos caps applied to both lumens.    Ms. Duncan tolerated well. No complaints.      [facility]. Duncan [facility] in stable condition at 1247.  She is to return on 24 at 11 AM for her next appointment.    Belkis Lopez RN  2024

## 2024-06-27 ENCOUNTER — HOME CARE VISIT (OUTPATIENT)
Age: 74
End: 2024-06-27
Payer: MEDICARE

## 2024-06-27 ENCOUNTER — HOSPITAL ENCOUNTER (OUTPATIENT)
Facility: HOSPITAL | Age: 74
Setting detail: INFUSION SERIES
Discharge: HOME | End: 2024-06-27
Payer: MEDICARE

## 2024-06-27 VITALS
SYSTOLIC BLOOD PRESSURE: 125 MMHG | RESPIRATION RATE: 16 BRPM | TEMPERATURE: 97 F | DIASTOLIC BLOOD PRESSURE: 59 MMHG | OXYGEN SATURATION: 96 % | HEART RATE: 94 BPM

## 2024-06-27 VITALS
SYSTOLIC BLOOD PRESSURE: 128 MMHG | HEART RATE: 84 BPM | DIASTOLIC BLOOD PRESSURE: 78 MMHG | RESPIRATION RATE: 17 BRPM | OXYGEN SATURATION: 98 % | TEMPERATURE: 97.8 F

## 2024-06-27 VITALS
SYSTOLIC BLOOD PRESSURE: 118 MMHG | RESPIRATION RATE: 18 BRPM | OXYGEN SATURATION: 95 % | HEART RATE: 62 BPM | DIASTOLIC BLOOD PRESSURE: 68 MMHG

## 2024-06-27 DIAGNOSIS — L08.9 DIABETIC INFECTION OF RIGHT FOOT (HCC): Primary | ICD-10-CM

## 2024-06-27 DIAGNOSIS — E11.628 DIABETIC INFECTION OF RIGHT FOOT (HCC): Primary | ICD-10-CM

## 2024-06-27 PROCEDURE — 96365 THER/PROPH/DIAG IV INF INIT: CPT

## 2024-06-27 PROCEDURE — G0157 HHC PT ASSISTANT EA 15: HCPCS

## 2024-06-27 PROCEDURE — G0299 HHS/HOSPICE OF RN EA 15 MIN: HCPCS

## 2024-06-27 PROCEDURE — 6360000002 HC RX W HCPCS: Performed by: INTERNAL MEDICINE

## 2024-06-27 PROCEDURE — 96375 TX/PRO/DX INJ NEW DRUG ADDON: CPT

## 2024-06-27 PROCEDURE — G0152 HHCP-SERV OF OT,EA 15 MIN: HCPCS

## 2024-06-27 PROCEDURE — 2580000003 HC RX 258: Performed by: INTERNAL MEDICINE

## 2024-06-27 RX ORDER — EPINEPHRINE 1 MG/ML
0.3 INJECTION, SOLUTION, CONCENTRATE INTRAVENOUS PRN
Status: CANCELLED | OUTPATIENT
Start: 2024-06-28

## 2024-06-27 RX ORDER — LEVOFLOXACIN 5 MG/ML
500 INJECTION, SOLUTION INTRAVENOUS ONCE
Status: COMPLETED | OUTPATIENT
Start: 2024-06-29 | End: 2024-06-29

## 2024-06-27 RX ORDER — SODIUM CHLORIDE 0.9 % (FLUSH) 0.9 %
5-40 SYRINGE (ML) INJECTION PRN
Status: CANCELLED | OUTPATIENT
Start: 2024-06-28

## 2024-06-27 RX ORDER — ALBUTEROL SULFATE 90 UG/1
4 AEROSOL, METERED RESPIRATORY (INHALATION) PRN
Status: CANCELLED | OUTPATIENT
Start: 2024-06-28

## 2024-06-27 RX ORDER — ACETAMINOPHEN 325 MG/1
650 TABLET ORAL
Status: CANCELLED | OUTPATIENT
Start: 2024-06-28

## 2024-06-27 RX ORDER — HEPARIN 100 UNIT/ML
500 SYRINGE INTRAVENOUS PRN
Status: CANCELLED | OUTPATIENT
Start: 2024-06-28

## 2024-06-27 RX ORDER — SODIUM CHLORIDE 9 MG/ML
INJECTION, SOLUTION INTRAVENOUS CONTINUOUS
Status: CANCELLED | OUTPATIENT
Start: 2024-06-28

## 2024-06-27 RX ORDER — LEVOFLOXACIN 5 MG/ML
500 INJECTION, SOLUTION INTRAVENOUS ONCE
Status: COMPLETED | OUTPATIENT
Start: 2024-06-30 | End: 2024-06-30

## 2024-06-27 RX ORDER — DIPHENHYDRAMINE HYDROCHLORIDE 50 MG/ML
50 INJECTION INTRAMUSCULAR; INTRAVENOUS
Status: CANCELLED | OUTPATIENT
Start: 2024-06-28

## 2024-06-27 RX ORDER — LEVOFLOXACIN 5 MG/ML
500 INJECTION, SOLUTION INTRAVENOUS ONCE
Status: COMPLETED | OUTPATIENT
Start: 2024-06-28 | End: 2024-06-28

## 2024-06-27 RX ORDER — ONDANSETRON 2 MG/ML
8 INJECTION INTRAMUSCULAR; INTRAVENOUS
Status: CANCELLED | OUTPATIENT
Start: 2024-06-28

## 2024-06-27 RX ORDER — HEPARIN 100 UNIT/ML
500 SYRINGE INTRAVENOUS PRN
Status: DISCONTINUED | OUTPATIENT
Start: 2024-06-27 | End: 2024-06-28 | Stop reason: HOSPADM

## 2024-06-27 RX ORDER — SODIUM CHLORIDE 0.9 % (FLUSH) 0.9 %
5-40 SYRINGE (ML) INJECTION PRN
Status: DISCONTINUED | OUTPATIENT
Start: 2024-06-27 | End: 2024-06-28 | Stop reason: HOSPADM

## 2024-06-27 RX ORDER — SODIUM CHLORIDE 9 MG/ML
5-250 INJECTION, SOLUTION INTRAVENOUS PRN
Status: CANCELLED | OUTPATIENT
Start: 2024-06-28

## 2024-06-27 RX ORDER — LEVOFLOXACIN 5 MG/ML
500 INJECTION, SOLUTION INTRAVENOUS ONCE
Status: DISCONTINUED | OUTPATIENT
Start: 2024-06-27 | End: 2024-06-28 | Stop reason: HOSPADM

## 2024-06-27 RX ORDER — LEVOFLOXACIN 5 MG/ML
500 INJECTION, SOLUTION INTRAVENOUS ONCE
Status: CANCELLED
Start: 2024-06-28 | End: 2024-06-28

## 2024-06-27 RX ADMIN — LEVOFLOXACIN 500 MG: 500 INJECTION, SOLUTION INTRAVENOUS at 11:01

## 2024-06-27 RX ADMIN — Medication 10 ML: at 11:00

## 2024-06-27 RX ADMIN — HEPARIN 500 UNITS: 100 SYRINGE at 12:13

## 2024-06-27 RX ADMIN — SODIUM CHLORIDE 350 MG: 9 INJECTION INTRAMUSCULAR; INTRAVENOUS; SUBCUTANEOUS at 12:07

## 2024-06-27 RX ADMIN — Medication 10 ML: at 12:13

## 2024-06-27 RX ADMIN — Medication 10 ML: at 12:07

## 2024-06-27 ASSESSMENT — ENCOUNTER SYMPTOMS: PAIN LOCATION - PAIN QUALITY: SORE

## 2024-06-27 ASSESSMENT — PAIN DESCRIPTION - LOCATION: LOCATION: FOOT;HIP

## 2024-06-27 ASSESSMENT — PAIN SCALES - GENERAL: PAINLEVEL_OUTOF10: 5

## 2024-06-27 NOTE — HOME HEALTH
SUBJECTIVE: Patient reports that she is doing alright.   CAREGIVER INVOLVEMENT/ASSISTANCE NEEDED FOR: Grandson and son assist with IADLs as needed  .  OBJECTIVE:  See interventions.  PATIENT EDUCATION PROVIDED THIS VISIT: Gait training, stair negotiation training, ther ex  PATIENT RESPONSE TO EDUCATION PROVIDED: Patient verbalized understanding and return demonstration of techniques  PATIENT RESPONSE TO TREATMENT: Patient reports 3/10 BENITA RPE post gait training, 3/10 BENITA RPE post ther ex, and 4/10 pain levels post treatment.   .  ASSESSMENT OF PROGRESS TOWARD GOALS: patient is making progress toward goals noted by improvements in gait distance to 150 feet with knee scooter, improvements in stair negotation to enter/exit home with 4 steps assistance using knee bump technique and SPV for safety. Patient requires multiple cues throughout session due to distracting and poor carry over of techniques limiting her progress. Patient is limited secondary to R NWB status, weakness, impaired mobility, impaired balance. Patient would benefit from continued skilled PT to address balance deficits, mobility deficits, stair negotiation strategies.   .  PLAN FOR NEXT VISIT: gait training, balance training, ther ex  THE FOLLOWING DISCHARGE PLANNING WAS DISCUSSED WITH THE PATIENT/CAREGIVER: 1w1, 2w1 with discharge to self and MD following. Patient verbalized understanding and in agreement with discharge plan.

## 2024-06-27 NOTE — HOME HEALTH
Skilled reason for visit: wound vac to right foot     Caregiver involvement: patient stated she has a son that lives with her .       Medications reviewed and all medications are available in the home this visit.           The following education was provided regarding medications:  all meds are in the home and she is taking them as ordered.           MD notified of any discrepancies/look a-like medications/medication interactions: none         Medications are effective at this time.         Home health supplies by type and quantity ordered/delivered this visit include: nonfe     Patient education provided this visit: keeping her feet elevated, how to turn wond vac off , how to replace the cannister      Sharps education provided: Clinician instructed patient/CG on proper disposal of sharps: Containers should be made of hard plastic, be puncture-resistant and leakproof, such as a laundry detergent or bleach bottle.  When the container is ¾ full, it should be sealed with tape and labeled DO NOT RECYCLE prior to discarding in the regular trash.         Patient level of understanding of education provided: patient verbalized understanding    Patient response to procedure performed:   patient had no complaints    Agency Progress toward goals: progressing    Patient's Progress towards personal goals: patient stated that she is ok    Home exercise program: deep breathing    Continued need for the following skills: Nursing.    Plan for next visit: wound care        Patient and/or caregiver notified and agrees to changes in the Plan of Care: N/A.      The following discharge planning was discussed with the pt/caregiver: when goals met and education is complete

## 2024-06-27 NOTE — PROGRESS NOTES
[facility] Progress Note    Date: 2024    Name: Angelica Duncan    MRN: [id number]         : 1950    Levaquin Infusion + Dapto IVP    Ms. Duncan to [facility], ambulatory at 1053 via rollator. Pt was assessed and education was provided. Pt stated she is tolerating her antibiotics well.     Ms. Duncan's vitals were reviewed.  Vitals:    24 1045   BP: (!) 125/59   Pulse: 94   Resp: 16   Temp: 97 °F (36.1 °C)   SpO2: 96%     Right upper arm PICC flushed easily and had brisk blood return via both ports, and lines flushed with 5 mL NS via both ports.     PICC dressing c/d/i and not due to be changed. No swelling, redness, streaking, warmth or drainage noted in arm. Pt denied c/o pain in arm.    Levaquin 500mg/100ml NS was infused at 100 ml/hr over approximately 60 minutes followed by NS flush, then Daptomycin 350mg administered slow IVP over approx 5 minutes.    Ms. Duncan tolerated infusion, and had no complaints at this time.    Both lumens of PICC flushed with NS 10 ml and Heparin 250 units. Green end caps applied, and lumens wrapped with guaze and coban. Stockinette placed over dressing for protection.    Patient armband removed and shredded.    [facility] condition at 1225. She is to return on 24 at 1100 for her next antibiotic appointment.    Latoya Chaudhry RN  2024  12:39 PM

## 2024-06-28 ENCOUNTER — HOME CARE VISIT (OUTPATIENT)
Age: 74
End: 2024-06-28
Payer: MEDICARE

## 2024-06-28 ENCOUNTER — HOSPITAL ENCOUNTER (OUTPATIENT)
Facility: HOSPITAL | Age: 74
Setting detail: INFUSION SERIES
Discharge: HOME | End: 2024-06-28
Payer: MEDICARE

## 2024-06-28 VITALS
RESPIRATION RATE: 16 BRPM | TEMPERATURE: 97.9 F | DIASTOLIC BLOOD PRESSURE: 84 MMHG | SYSTOLIC BLOOD PRESSURE: 136 MMHG | OXYGEN SATURATION: 96 % | HEART RATE: 80 BPM

## 2024-06-28 VITALS
DIASTOLIC BLOOD PRESSURE: 49 MMHG | HEART RATE: 72 BPM | RESPIRATION RATE: 16 BRPM | TEMPERATURE: 98.2 F | SYSTOLIC BLOOD PRESSURE: 83 MMHG

## 2024-06-28 DIAGNOSIS — E11.628 DIABETIC INFECTION OF RIGHT FOOT (HCC): ICD-10-CM

## 2024-06-28 DIAGNOSIS — L08.9 DIABETIC INFECTION OF RIGHT FOOT (HCC): ICD-10-CM

## 2024-06-28 PROCEDURE — 6360000002 HC RX W HCPCS: Performed by: INTERNAL MEDICINE

## 2024-06-28 PROCEDURE — 96365 THER/PROPH/DIAG IV INF INIT: CPT

## 2024-06-28 PROCEDURE — 2580000003 HC RX 258: Performed by: INTERNAL MEDICINE

## 2024-06-28 PROCEDURE — 96375 TX/PRO/DX INJ NEW DRUG ADDON: CPT

## 2024-06-28 RX ORDER — HEPARIN SODIUM (PORCINE) LOCK FLUSH IV SOLN 100 UNIT/ML 100 UNIT/ML
500 SOLUTION INTRAVENOUS PRN
Status: DISCONTINUED | OUTPATIENT
Start: 2024-06-28 | End: 2024-06-29 | Stop reason: HOSPADM

## 2024-06-28 RX ORDER — LEVOFLOXACIN 5 MG/ML
500 INJECTION, SOLUTION INTRAVENOUS ONCE
Status: COMPLETED | OUTPATIENT
Start: 2024-07-01 | End: 2024-07-01

## 2024-06-28 RX ORDER — SODIUM CHLORIDE 0.9 % (FLUSH) 0.9 %
5-40 SYRINGE (ML) INJECTION PRN
Status: DISCONTINUED | OUTPATIENT
Start: 2024-06-28 | End: 2024-06-29 | Stop reason: HOSPADM

## 2024-06-28 RX ADMIN — LEVOFLOXACIN 500 MG: 500 INJECTION, SOLUTION INTRAVENOUS at 11:14

## 2024-06-28 RX ADMIN — SODIUM CHLORIDE 350 MG: 9 INJECTION INTRAMUSCULAR; INTRAVENOUS; SUBCUTANEOUS at 12:15

## 2024-06-28 RX ADMIN — SODIUM CHLORIDE, PRESERVATIVE FREE 10 ML: 5 INJECTION INTRAVENOUS at 12:13

## 2024-06-28 RX ADMIN — SODIUM CHLORIDE, PRESERVATIVE FREE 10 ML: 5 INJECTION INTRAVENOUS at 12:20

## 2024-06-28 RX ADMIN — HEPARIN 500 UNITS: 100 SYRINGE at 12:21

## 2024-06-28 RX ADMIN — SODIUM CHLORIDE, PRESERVATIVE FREE 10 ML: 5 INJECTION INTRAVENOUS at 11:13

## 2024-06-28 ASSESSMENT — PAIN SCALES - GENERAL: PAINLEVEL_OUTOF10: 3

## 2024-06-28 ASSESSMENT — ENCOUNTER SYMPTOMS: PAIN LOCATION - PAIN QUALITY: ACHE

## 2024-06-28 NOTE — PROGRESS NOTES
[facility] Progress Note    Date: 2024    Name: Angelica Duncan    MRN: [id number]         : 1950    Levaquin Infusion + Dapto IVP    Ms. Duncan to [facility], ambulatory at 1105 via wheelchai with boyfriend. Pt was assessed and education was provided. Pt stated she is tolerating her antibiotics well.     Ms. Duncan's vitals were reviewed.  Vitals:    24 1108   BP: (!) 83/49   Pulse: 72   Resp: 16   Temp: 98.2 °F (36.8 °C)     BP low, pt states that it is always low when she is falling asleep when it is taken, it is not a new thing and she is asymptomatic.    Right upper arm PICC flushed easily and had brisk blood return via both ports, and lines flushed with 5 mL NS via both ports.     PICC dressing c/d/i and not due to be changed. No swelling, redness, streaking, warmth or drainage noted in arm. Pt denied c/o pain in arm.    Levaquin 500mg/100ml NS was infused at 100 ml/hr over approximately 60 minutes followed by NS flush, then Daptomycin 350mg administered slow IVP over approx 5 minutes.    Ms. Duncan tolerated infusion, and had no complaints at this time.    PICC flushed with NS 10 ml and Heparin 250 units each lumen. Green end caps applied, and lumens wrapped with guaze and coban. Stockinette placed over dressing for protection.    Patient armband removed and shredded.    [facility]. [facility] condition at 1225. She is to return on 24 at 0800 for her next antibiotic appointment.    Adela Hernandez RN  2024  12:37 PM

## 2024-06-28 NOTE — HOME HEALTH
THE FOLLOWING SKILLS: HH OT is medically necessary to address pain, decreased functional strength, decreased independence and safety with functional transfers, decreased independence and safety performing ADL/IADL tasks, decreased activity and standing tolerance, decreased functional endurance, and impaired balance in order to improve functional independence, obtain set goals, reduce risk of falls, reduce pain, improve quality of life, and return to PLOF.    PLAN FOR NEXT VISIT:  UB strengthening, safety and daily tasks per POC      THE FOLLOWING DISCHARGE PLANNING WAS DISCUSSED WITH THE PATIENT/CAREGIVER: Pt oriented to current duration and frequency of HHOT and affirmed understanding.

## 2024-06-29 ENCOUNTER — HOSPITAL ENCOUNTER (OUTPATIENT)
Facility: HOSPITAL | Age: 74
Setting detail: INFUSION SERIES
Discharge: HOME | End: 2024-06-29
Payer: MEDICARE

## 2024-06-29 VITALS
DIASTOLIC BLOOD PRESSURE: 61 MMHG | SYSTOLIC BLOOD PRESSURE: 119 MMHG | HEART RATE: 68 BPM | TEMPERATURE: 97.7 F | RESPIRATION RATE: 16 BRPM | OXYGEN SATURATION: 98 %

## 2024-06-29 DIAGNOSIS — L08.9 DIABETIC INFECTION OF RIGHT FOOT (HCC): Primary | ICD-10-CM

## 2024-06-29 DIAGNOSIS — E11.628 DIABETIC INFECTION OF RIGHT FOOT (HCC): Primary | ICD-10-CM

## 2024-06-29 PROCEDURE — 96375 TX/PRO/DX INJ NEW DRUG ADDON: CPT

## 2024-06-29 PROCEDURE — 6360000002 HC RX W HCPCS: Performed by: INTERNAL MEDICINE

## 2024-06-29 PROCEDURE — 2580000003 HC RX 258: Performed by: INTERNAL MEDICINE

## 2024-06-29 PROCEDURE — 96365 THER/PROPH/DIAG IV INF INIT: CPT

## 2024-06-29 RX ORDER — SODIUM CHLORIDE 9 MG/ML
5-250 INJECTION, SOLUTION INTRAVENOUS PRN
Status: CANCELLED | OUTPATIENT
Start: 2024-06-30

## 2024-06-29 RX ORDER — ONDANSETRON 2 MG/ML
8 INJECTION INTRAMUSCULAR; INTRAVENOUS
Status: CANCELLED | OUTPATIENT
Start: 2024-06-30

## 2024-06-29 RX ORDER — HEPARIN 100 UNIT/ML
500 SYRINGE INTRAVENOUS PRN
Status: DISCONTINUED | OUTPATIENT
Start: 2024-06-29 | End: 2024-06-30 | Stop reason: HOSPADM

## 2024-06-29 RX ORDER — LEVOFLOXACIN 5 MG/ML
500 INJECTION, SOLUTION INTRAVENOUS ONCE
Status: CANCELLED
Start: 2024-06-30 | End: 2024-06-30

## 2024-06-29 RX ORDER — LEVOFLOXACIN 5 MG/ML
500 INJECTION, SOLUTION INTRAVENOUS ONCE
Status: DISCONTINUED | OUTPATIENT
Start: 2024-06-29 | End: 2024-06-30 | Stop reason: HOSPADM

## 2024-06-29 RX ORDER — SODIUM CHLORIDE 0.9 % (FLUSH) 0.9 %
5-40 SYRINGE (ML) INJECTION PRN
Status: DISCONTINUED | OUTPATIENT
Start: 2024-06-29 | End: 2024-06-30 | Stop reason: HOSPADM

## 2024-06-29 RX ORDER — ACETAMINOPHEN 325 MG/1
650 TABLET ORAL
Status: CANCELLED | OUTPATIENT
Start: 2024-06-30

## 2024-06-29 RX ORDER — DIPHENHYDRAMINE HYDROCHLORIDE 50 MG/ML
50 INJECTION INTRAMUSCULAR; INTRAVENOUS
Status: CANCELLED | OUTPATIENT
Start: 2024-06-30

## 2024-06-29 RX ORDER — ALBUTEROL SULFATE 90 UG/1
4 AEROSOL, METERED RESPIRATORY (INHALATION) PRN
Status: CANCELLED | OUTPATIENT
Start: 2024-06-30

## 2024-06-29 RX ORDER — EPINEPHRINE 1 MG/ML
0.3 INJECTION, SOLUTION, CONCENTRATE INTRAVENOUS PRN
Status: CANCELLED | OUTPATIENT
Start: 2024-06-30

## 2024-06-29 RX ORDER — SODIUM CHLORIDE 0.9 % (FLUSH) 0.9 %
5-40 SYRINGE (ML) INJECTION PRN
Status: CANCELLED | OUTPATIENT
Start: 2024-06-30

## 2024-06-29 RX ORDER — HEPARIN 100 UNIT/ML
500 SYRINGE INTRAVENOUS PRN
Status: CANCELLED | OUTPATIENT
Start: 2024-06-30

## 2024-06-29 RX ORDER — SODIUM CHLORIDE 9 MG/ML
INJECTION, SOLUTION INTRAVENOUS CONTINUOUS
Status: CANCELLED | OUTPATIENT
Start: 2024-06-30

## 2024-06-29 RX ADMIN — SODIUM CHLORIDE 350 MG: 9 INJECTION INTRAMUSCULAR; INTRAVENOUS; SUBCUTANEOUS at 10:23

## 2024-06-29 RX ADMIN — Medication 30 ML: at 10:30

## 2024-06-29 RX ADMIN — Medication 10 ML: at 09:17

## 2024-06-29 RX ADMIN — LEVOFLOXACIN 500 MG: 500 INJECTION, SOLUTION INTRAVENOUS at 09:19

## 2024-06-29 RX ADMIN — HEPARIN 500 UNITS: 100 SYRINGE at 10:31

## 2024-06-29 ASSESSMENT — PAIN DESCRIPTION - DESCRIPTORS: DESCRIPTORS: ACHING

## 2024-06-29 ASSESSMENT — PAIN DESCRIPTION - ORIENTATION: ORIENTATION: RIGHT

## 2024-06-29 ASSESSMENT — PAIN DESCRIPTION - LOCATION: LOCATION: FOOT

## 2024-06-29 ASSESSMENT — PAIN SCALES - GENERAL: PAINLEVEL_OUTOF10: 3

## 2024-06-29 NOTE — PROGRESS NOTES
[facility] Progress Note    Date: 2024    Name: Angelica Duncan    MRN: [id number]         : 1950    Levaquin Infusion + Dapto IVP    Ms. Duncan to [facility], ambulatory at 0905 via seated rolling walker by herself. Ms. Duncan was assessed and education was provided. Ms. Duncan stated she is tolerating her antibiotics well.     Ms. Duncan's vitals were reviewed.  Vitals:    24 1030   BP: 119/61   Pulse: 68   Resp: 16   Temp: 97.7 °F (36.5 °C)   SpO2: 98%     Right upper arm PICC flushed easily and had brisk blood return via both ports, and lines flushed with 10mL NS via both ports.     PICC dressing clean,dryy and intact today and not due to be changed. No swelling, redness, streaking, warmth or drainage noted in arm.     Levaquin 500mg/100ml NS was infused at 100 ml/hr over approximately 60 minutes followed by NS flush, then Daptomycin 350mg administered slow IVP over approximately  5 minutes.    Ms. Duncan tolerated infusion, and had no complaints at this time.    PICC flushed with NS 10 ml and Heparin 250 units each lumen. Green end caps applied, and lumens wrapped with guaze and coban. Stockinette placed over dressing for protection.    Patient armband removed and shredded.    [facility]. [facility] condition at 1035. She is to return on 24 at 0900 for her next antibiotic appointment.    BRENDAN RUFF RN  2024  10:45 AM

## 2024-06-30 ENCOUNTER — HOSPITAL ENCOUNTER (OUTPATIENT)
Facility: HOSPITAL | Age: 74
Setting detail: INFUSION SERIES
Discharge: HOME | End: 2024-06-30
Payer: MEDICARE

## 2024-06-30 VITALS
TEMPERATURE: 97.8 F | RESPIRATION RATE: 16 BRPM | HEART RATE: 64 BPM | SYSTOLIC BLOOD PRESSURE: 105 MMHG | OXYGEN SATURATION: 95 % | DIASTOLIC BLOOD PRESSURE: 54 MMHG

## 2024-06-30 DIAGNOSIS — E11.628 DIABETIC INFECTION OF RIGHT FOOT (HCC): Primary | ICD-10-CM

## 2024-06-30 DIAGNOSIS — L08.9 DIABETIC INFECTION OF RIGHT FOOT (HCC): Primary | ICD-10-CM

## 2024-06-30 PROCEDURE — 96365 THER/PROPH/DIAG IV INF INIT: CPT

## 2024-06-30 PROCEDURE — 6360000002 HC RX W HCPCS: Performed by: INTERNAL MEDICINE

## 2024-06-30 PROCEDURE — 2580000003 HC RX 258: Performed by: INTERNAL MEDICINE

## 2024-06-30 PROCEDURE — 96375 TX/PRO/DX INJ NEW DRUG ADDON: CPT

## 2024-06-30 RX ORDER — DIPHENHYDRAMINE HYDROCHLORIDE 50 MG/ML
50 INJECTION INTRAMUSCULAR; INTRAVENOUS
Status: CANCELLED | OUTPATIENT
Start: 2024-07-01

## 2024-06-30 RX ORDER — LEVOFLOXACIN 5 MG/ML
500 INJECTION, SOLUTION INTRAVENOUS ONCE
Status: CANCELLED
Start: 2024-07-01 | End: 2024-07-01

## 2024-06-30 RX ORDER — EPINEPHRINE 1 MG/ML
0.3 INJECTION, SOLUTION, CONCENTRATE INTRAVENOUS PRN
Status: CANCELLED | OUTPATIENT
Start: 2024-07-01

## 2024-06-30 RX ORDER — SODIUM CHLORIDE 0.9 % (FLUSH) 0.9 %
5-40 SYRINGE (ML) INJECTION PRN
Status: DISCONTINUED | OUTPATIENT
Start: 2024-06-30 | End: 2024-07-01 | Stop reason: HOSPADM

## 2024-06-30 RX ORDER — SODIUM CHLORIDE 9 MG/ML
INJECTION, SOLUTION INTRAVENOUS CONTINUOUS
Status: CANCELLED | OUTPATIENT
Start: 2024-07-01

## 2024-06-30 RX ORDER — SODIUM CHLORIDE 9 MG/ML
5-250 INJECTION, SOLUTION INTRAVENOUS PRN
Status: CANCELLED | OUTPATIENT
Start: 2024-07-01

## 2024-06-30 RX ORDER — ONDANSETRON 2 MG/ML
8 INJECTION INTRAMUSCULAR; INTRAVENOUS
Status: CANCELLED | OUTPATIENT
Start: 2024-07-01

## 2024-06-30 RX ORDER — ACETAMINOPHEN 325 MG/1
650 TABLET ORAL
Status: CANCELLED | OUTPATIENT
Start: 2024-07-01

## 2024-06-30 RX ORDER — HEPARIN 100 UNIT/ML
500 SYRINGE INTRAVENOUS PRN
Status: DISCONTINUED | OUTPATIENT
Start: 2024-06-30 | End: 2024-07-01 | Stop reason: HOSPADM

## 2024-06-30 RX ORDER — SODIUM CHLORIDE 0.9 % (FLUSH) 0.9 %
5-40 SYRINGE (ML) INJECTION PRN
Status: CANCELLED | OUTPATIENT
Start: 2024-07-01

## 2024-06-30 RX ORDER — ALBUTEROL SULFATE 90 UG/1
4 AEROSOL, METERED RESPIRATORY (INHALATION) PRN
Status: CANCELLED | OUTPATIENT
Start: 2024-07-01

## 2024-06-30 RX ORDER — LEVOFLOXACIN 5 MG/ML
500 INJECTION, SOLUTION INTRAVENOUS ONCE
Status: DISCONTINUED | OUTPATIENT
Start: 2024-06-30 | End: 2024-07-01 | Stop reason: HOSPADM

## 2024-06-30 RX ORDER — HEPARIN 100 UNIT/ML
500 SYRINGE INTRAVENOUS PRN
Status: CANCELLED | OUTPATIENT
Start: 2024-07-01

## 2024-06-30 RX ADMIN — Medication 30 ML: at 10:04

## 2024-06-30 RX ADMIN — LEVOFLOXACIN 500 MG: 500 INJECTION, SOLUTION INTRAVENOUS at 08:58

## 2024-06-30 RX ADMIN — HEPARIN 500 UNITS: 100 SYRINGE at 10:12

## 2024-06-30 RX ADMIN — Medication 10 ML: at 08:57

## 2024-06-30 RX ADMIN — SODIUM CHLORIDE 350 MG: 9 INJECTION INTRAMUSCULAR; INTRAVENOUS; SUBCUTANEOUS at 10:04

## 2024-06-30 NOTE — PROGRESS NOTES
[facility] Progress Note    Date: 2024    Name: Angelica Duncan    MRN: [id number]         : 1950    Levaquin Infusion + Dapto IVP    Ms. Duncan to [facility], ambulatory at 0850 via seated walker by herself. Ms. Duncan was assessed and education was provided. Ms. Duncan stated she is tolerating her antibiotics well.     Ms. Duncan's vitals were reviewed.  Vitals:    24 0854   BP: (!) 105/54   Pulse: 64   Resp: 16   Temp: 98.2 °F (36.8 °C)   SpO2: 96%     Right upper arm PICC flushed easily and had brisk blood return via both ports, and lines flushed with 10mL NS via both ports.     PICC dressing clean,dry and intact today and not due to be changed. No swelling, redness, streaking, warmth or drainage noted in arm.     Levaquin 500mg/100ml NS was infused at 100 ml/hr over approximately 60 minutes followed by NS flush, then Daptomycin 350mg administered slow IVP over approximately  5 minutes.    Ms. Duncan tolerated infusion, and had no complaints at this time.    PICC flushed with NS 10 ml and Heparin 250 units each lumen. Green end caps applied, and lumens wrapped with guaze and coban. Stockinette placed over dressing for protection.    Patient armband removed and shredded.    [facility]. Duncan [facility] in [facility] condition at 1015. She is to return on 24 at 1100 for her next antibiotic appointment.    BRENDAN RUFF RN  2024  9:29 AM

## 2024-07-01 ENCOUNTER — HOSPITAL ENCOUNTER (OUTPATIENT)
Facility: HOSPITAL | Age: 74
Setting detail: INFUSION SERIES
Discharge: HOME | End: 2024-07-01
Payer: MEDICARE

## 2024-07-01 ENCOUNTER — HOME CARE VISIT (OUTPATIENT)
Age: 74
End: 2024-07-01
Payer: MEDICARE

## 2024-07-01 VITALS
HEART RATE: 76 BPM | OXYGEN SATURATION: 99 % | RESPIRATION RATE: 16 BRPM | TEMPERATURE: 98.4 F | SYSTOLIC BLOOD PRESSURE: 107 MMHG | DIASTOLIC BLOOD PRESSURE: 52 MMHG

## 2024-07-01 DIAGNOSIS — L08.9 DIABETIC INFECTION OF RIGHT FOOT (HCC): ICD-10-CM

## 2024-07-01 DIAGNOSIS — E11.628 DIABETIC INFECTION OF RIGHT FOOT (HCC): ICD-10-CM

## 2024-07-01 LAB
ANION GAP SERPL CALC-SCNC: 6 MMOL/L (ref 3–18)
BASO+EOS+MONOS # BLD AUTO: 0.4 K/UL (ref 0–2.3)
BASO+EOS+MONOS NFR BLD AUTO: 7 % (ref 0.1–17)
BUN SERPL-MCNC: 25 MG/DL (ref 7–18)
BUN/CREAT SERPL: 30 (ref 12–20)
CALCIUM SERPL-MCNC: 9.2 MG/DL (ref 8.5–10.1)
CHLORIDE SERPL-SCNC: 106 MMOL/L (ref 100–111)
CK SERPL-CCNC: 184 U/L (ref 26–192)
CO2 SERPL-SCNC: 29 MMOL/L (ref 21–32)
CREAT SERPL-MCNC: 0.83 MG/DL (ref 0.6–1.3)
CRP SERPL-MCNC: <0.3 MG/DL (ref 0–0.3)
DIFFERENTIAL METHOD BLD: ABNORMAL
ERYTHROCYTE [DISTWIDTH] IN BLOOD BY AUTOMATED COUNT: 13.9 % (ref 11.5–14.5)
GLUCOSE SERPL-MCNC: 199 MG/DL (ref 74–99)
HCT VFR BLD AUTO: 35.3 % (ref 36–48)
HGB BLD-MCNC: 11.4 G/DL (ref 12–16)
LYMPHOCYTES # BLD: 1.4 K/UL (ref 1.1–5.9)
LYMPHOCYTES NFR BLD: 25 % (ref 14–44)
MCH RBC QN AUTO: 30.7 PG (ref 25–35)
MCHC RBC AUTO-ENTMCNC: 32.3 G/DL (ref 31–37)
MCV RBC AUTO: 95.1 FL (ref 78–102)
NEUTS SEG # BLD: 3.9 K/UL (ref 1.8–9.5)
NEUTS SEG NFR BLD: 68 % (ref 40–70)
PLATELET # BLD AUTO: 309 K/UL (ref 140–440)
POTASSIUM SERPL-SCNC: 3.8 MMOL/L (ref 3.5–5.5)
RBC # BLD AUTO: 3.71 M/UL (ref 4.1–5.1)
SODIUM SERPL-SCNC: 141 MMOL/L (ref 136–145)
WBC # BLD AUTO: 5.7 K/UL (ref 4.5–13)

## 2024-07-01 PROCEDURE — 85025 COMPLETE CBC W/AUTO DIFF WBC: CPT

## 2024-07-01 PROCEDURE — 6360000002 HC RX W HCPCS: Performed by: INTERNAL MEDICINE

## 2024-07-01 PROCEDURE — 86140 C-REACTIVE PROTEIN: CPT

## 2024-07-01 PROCEDURE — 2580000003 HC RX 258: Performed by: INTERNAL MEDICINE

## 2024-07-01 PROCEDURE — 82550 ASSAY OF CK (CPK): CPT

## 2024-07-01 PROCEDURE — 96365 THER/PROPH/DIAG IV INF INIT: CPT

## 2024-07-01 PROCEDURE — 96375 TX/PRO/DX INJ NEW DRUG ADDON: CPT

## 2024-07-01 PROCEDURE — 80048 BASIC METABOLIC PNL TOTAL CA: CPT

## 2024-07-01 PROCEDURE — G0299 HHS/HOSPICE OF RN EA 15 MIN: HCPCS

## 2024-07-01 PROCEDURE — 36589 REMOVAL TUNNELED CV CATH: CPT

## 2024-07-01 RX ORDER — SODIUM CHLORIDE 0.9 % (FLUSH) 0.9 %
5-40 SYRINGE (ML) INJECTION PRN
Status: DISCONTINUED | OUTPATIENT
Start: 2024-07-01 | End: 2024-07-02 | Stop reason: HOSPADM

## 2024-07-01 RX ADMIN — Medication 10 ML: at 12:24

## 2024-07-01 RX ADMIN — LEVOFLOXACIN 500 MG: 500 INJECTION, SOLUTION INTRAVENOUS at 11:18

## 2024-07-01 RX ADMIN — Medication 10 ML: at 11:15

## 2024-07-01 RX ADMIN — Medication 10 ML: at 12:18

## 2024-07-01 RX ADMIN — DAPTOMYCIN 350 MG: 50 INJECTION, POWDER, LYOPHILIZED, FOR SOLUTION INTRAVENOUS at 12:19

## 2024-07-01 ASSESSMENT — PAIN SCALES - GENERAL: PAINLEVEL_OUTOF10: 3

## 2024-07-01 ASSESSMENT — PAIN DESCRIPTION - LOCATION: LOCATION: FOOT

## 2024-07-02 ENCOUNTER — HOME CARE VISIT (OUTPATIENT)
Age: 74
End: 2024-07-02
Payer: MEDICARE

## 2024-07-02 VITALS
DIASTOLIC BLOOD PRESSURE: 74 MMHG | OXYGEN SATURATION: 98 % | TEMPERATURE: 97.8 F | RESPIRATION RATE: 18 BRPM | HEART RATE: 78 BPM | SYSTOLIC BLOOD PRESSURE: 138 MMHG

## 2024-07-02 VITALS
TEMPERATURE: 98.9 F | OXYGEN SATURATION: 98 % | RESPIRATION RATE: 16 BRPM | SYSTOLIC BLOOD PRESSURE: 122 MMHG | DIASTOLIC BLOOD PRESSURE: 68 MMHG | HEART RATE: 72 BPM

## 2024-07-02 VITALS
OXYGEN SATURATION: 98 % | RESPIRATION RATE: 16 BRPM | SYSTOLIC BLOOD PRESSURE: 154 MMHG | DIASTOLIC BLOOD PRESSURE: 80 MMHG | TEMPERATURE: 97.9 F | HEART RATE: 74 BPM

## 2024-07-02 PROCEDURE — G0152 HHCP-SERV OF OT,EA 15 MIN: HCPCS

## 2024-07-02 PROCEDURE — G0157 HHC PT ASSISTANT EA 15: HCPCS

## 2024-07-02 NOTE — HOME HEALTH
Skilled reason for visit: wound vac to right foot          Caregiver involvement: patient stated she has a son that lives with her .              Medications reviewed and all medications are available in the home this visit.                     The following education was provided regarding medications:  all meds are in the home and she is taking them as ordered.                     MD notified of any discrepancies/look a-like medications/medication interactions: none                   Medications are effective at this time.                Home health supplies by type and quantity ordered/delivered this visit include: nonfe          Patient education provided this visit: keeping her feet elevated and off of her foot           Sharps education provided: Clinician instructed patient/CG on proper disposal of sharps: Containers should be made of hard plastic, be puncture-resistant and leakproof, such as a laundry detergent or bleach bottle.  When the container is ¾ full, it should be sealed with tape and labeled DO NOT RECYCLE prior to discarding in the regular trash.                Patient level of understanding of education provided: patient verbalized understanding         Patient response to procedure performed:   patient had no complaints         Agency Progress toward goals: progressing         Patient's Progress towards personal goals: patient stated that she is ok         Home exercise program: deep breathing         Continued need for the following skills: Nursing.         Plan for next visit: wound care               Patient and/or caregiver notified and agrees to changes in the Plan of Care: N/A.           The following discharge planning was discussed with the pt/caregiver: when goals met and education is complete

## 2024-07-03 ENCOUNTER — HOSPITAL ENCOUNTER (OUTPATIENT)
Facility: HOSPITAL | Age: 74
Discharge: HOME | End: 2024-07-03
Attending: INTERNAL MEDICINE
Payer: MEDICARE

## 2024-07-03 ENCOUNTER — HOME CARE VISIT (OUTPATIENT)
Age: 74
End: 2024-07-03
Payer: MEDICARE

## 2024-07-03 VITALS
DIASTOLIC BLOOD PRESSURE: 52 MMHG | OXYGEN SATURATION: 96 % | RESPIRATION RATE: 16 BRPM | TEMPERATURE: 99.9 F | HEART RATE: 79 BPM | SYSTOLIC BLOOD PRESSURE: 121 MMHG

## 2024-07-03 DIAGNOSIS — Z51.6 ALLERGY DESENSITIZATION THERAPY: Primary | ICD-10-CM

## 2024-07-03 PROCEDURE — 99211 OFF/OP EST MAY X REQ PHY/QHP: CPT

## 2024-07-03 PROCEDURE — G0299 HHS/HOSPICE OF RN EA 15 MIN: HCPCS

## 2024-07-03 NOTE — HOME HEALTH
SUBJECTIVE: Patient reports she is doing well.   CAREGIVER INVOLVEMENT/ASSISTANCE NEEDED FOR: Grandson and son assist with IADLs as needed .   OBJECTIVE: See interventions.   PATIENT EDUCATION PROVIDED THIS VISIT: stair negotiation, gait training  PATIENT RESPONSE TO EDUCATION PROVIDED: Patient verbalized understanding and return demonstration of techniques   PATIENT RESPONSE TO TREATMENT: Patient reports 3/10 BENITA RPE post gait training, 3/10 BENITA RPE post ther ex, and 6/10 pain levels post treatment. .   ASSESSMENT OF PROGRESS TOWARD GOALS: Patient is making progress toward goals noted by improved balance as noted by no falls over service time frame, patient demonstrates improved strength as noted by MMT as listed below, Demonstrates SPV level of stair negotiation 10 steps to 2nd floor and 3 steps to enter/exit home. Patient demonstrates mod I with knee scooter for 100 feet over indoor surfaces. Patient has met goals at this time and is ready for discharge.   PLAN FOR NEXT VISIT: discharge from skilled PT.   THE FOLLOWING DISCHARGE PLANNING WAS DISCUSSED WITH THE PATIENT/CAREGIVER:  1w1 with discharge to self and MD following. Patient verbalized understanding and in agreement with discharge plan.    Assessment and Summary of Care:  Patient's current functional status before discharge is as follows  Strength: MMT: RLE:  hip flexion:4/5 hip abduction:4/5 hip adduction:4/5 knee extension:4/5 knee flexion: 4/5 LLE: hip flexion:4/5 hip abduction:4/5 hip adduction:4/5 knee extension:4/5 knee flexion: 4/5  ROM:  WFL  Bed Mobility: independent  Transfers: mod I with all functional transfers  Gait/WC mobility: 100 feet over indoor/outdoor surfaces with knee scooter and SPV level.   Stairs: 10 steps with bump technique and demonstrates SPV  Special Tests: No functional testing.   Recommendations: Discharge from skilled PT and recommed OPPT to continue rehab once she is allowed to weight bare.

## 2024-07-04 VITALS
RESPIRATION RATE: 16 BRPM | DIASTOLIC BLOOD PRESSURE: 58 MMHG | SYSTOLIC BLOOD PRESSURE: 118 MMHG | TEMPERATURE: 97.8 F | HEART RATE: 68 BPM | OXYGEN SATURATION: 97 %

## 2024-07-04 ASSESSMENT — ENCOUNTER SYMPTOMS: DIARRHEA: 1

## 2024-07-04 NOTE — HOME HEALTH
Skilled reason for visit: wound vac to right foot     Caregiver involvement: patient stated she has a son that lives with her .     Medications reviewed and all medications are available in the home this visit.      The following education was provided regarding medications:  all meds are in the home and she is taking them as ordered.      MD notified of any discrepancies/look a-like medications/medication interactions: none    Medications are effective at this time.      Home health supplies by type and quantity ordered/delivered this visit include: netting for leg    Patient education provided this visit: keeping her feet elevated and off of her foot , keeping foot dry and on scooter no walking on it     Sharps education provided: Clinician instructed patient/CG on proper disposal of sharps: Containers should be made of hard plastic, be puncture-resistant and leakproof, such as a laundry detergent or bleach bottle.  When the container is ¾ full, it should be sealed with tape and labeled DO NOT RECYCLE prior to discarding in the regular trash.      Patient level of understanding of education provided: patient verbalized understanding    Patient response to procedure performed:   patient had no complaints    Agency Progress toward goals: progressing    Patient's Progress towards personal goals: patient stated that she is ok    Home exercise program: deep breathing    Continued need for the following skills: Nursing.    Plan for next visit: wound care     Patient and/or caregiver notified and agrees to changes in the Plan of Care: N/A.      The following discharge planning was discussed with the pt/caregiver: when goals met and education is complete

## 2024-07-04 NOTE — HOME HEALTH
SUBJECTIVE: I'm doing really well. I can shower now. I am scooting up and down the stairs without any problem.     CAREGIVER INVOLVEMENT/ASSISTANCE NEEDED FOR: Family assists with IADLs when available    HOME HEALTH SUPPLIES BY TYPE AND QUANTITY ORDERED/DELIVERED THIS VISIT INCLUDE: NONE     OBJECTIVE: See interventions.    PATIENT EDUCATION PROVIDED THIS VISIT: Pt trained/educated in occupation-based activity demands; performance patterns and skills; context and environmental factors; safety; balance; fall prevention; activity modifications; compensatory strategies; use of adaptive device(s) and/or DME; proper posture and body mechanics; progression of treatment plan; visit frequency and duration; discharge planning; and contact procedures for routine questions and emergency situations. This therapist reviewed the patient's needs to ensure no additional resources are needed at this time and confirm the current resources are sufficient and appropriate. All training and education is for the purpose of increasing safety and awareness of function in order to increase the patient's ability to perform ADLs, IADLs and access the community with a decreasing degree of difficulty. This will improve the patient's quality of life by increasing his/her physical, psychological and social functioning while minimizing the necessity of future skilled medical services.    PATIENT RESPONSE TO EDUCATION: Patient able to teach-back education with min verbal cues. Handouts provided for reinforcement.      RESPONSE TO TREATMENT: Pt displays improvements in activity tolerance and participation in daily tasks. She is very pleased with her progress.        ASSESSMENT OF PROGRESS TOWARD GOALS: Pt demonstrates increased independence with tasks and understanding of education topics. Pt will be ready for discharge at next visit.       CONTINUED NEED FOR THE FOLLOWING SKILLS: HH OT is medically necessary to address pain, decreased functional

## 2024-07-05 ENCOUNTER — HOME CARE VISIT (OUTPATIENT)
Age: 74
End: 2024-07-05
Payer: MEDICARE

## 2024-07-05 VITALS
TEMPERATURE: 96.4 F | RESPIRATION RATE: 16 BRPM | HEART RATE: 92 BPM | OXYGEN SATURATION: 94 % | DIASTOLIC BLOOD PRESSURE: 72 MMHG | SYSTOLIC BLOOD PRESSURE: 130 MMHG

## 2024-07-05 PROCEDURE — G0299 HHS/HOSPICE OF RN EA 15 MIN: HCPCS

## 2024-07-06 ENCOUNTER — HOME CARE VISIT (OUTPATIENT)
Age: 74
End: 2024-07-06
Payer: MEDICARE

## 2024-07-06 VITALS
DIASTOLIC BLOOD PRESSURE: 66 MMHG | SYSTOLIC BLOOD PRESSURE: 115 MMHG | RESPIRATION RATE: 16 BRPM | OXYGEN SATURATION: 99 % | TEMPERATURE: 98.5 F | HEART RATE: 93 BPM

## 2024-07-06 PROCEDURE — G0151 HHCP-SERV OF PT,EA 15 MIN: HCPCS

## 2024-07-06 NOTE — HOME HEALTH
SUBJECTIVE: I am tired, but doing well today  REQUIRES CAREGIVER ASSISTANCE FOR: transportation, IADLS   MEDICATIONS REVIEWED AND RECONCILED no changes   NEXT MD APPT: , 7/10/24   STRENGTH: R hip and knee flexor and extensor +4/5, LLE 5/5  WOUNDS:R foot bandages  clean dry and intact. wound vac inplace. Multiple bruises noted on B UE and LE    BED MOBILITY:supine<>sit MOD I  TRANSFERS: sit to stand x3 from bed to scooter MOD I . Per PTA visit 7/2/24 mod I with all functional transfers  GAIT: Pt amb on flat level surfaces with R knee scooter with S demonstated hop too gait pattern with S. NWB R LE. No balance checks noted with amb .  Per PTA visit 7/2/24 100 feet over indoor/outdoor surfaces with knee scooter and SPV level.   STAIRS:NA Per PTA visit 7/2/24 10 steps with bump technique and demonstrates SPV  BALANCE: Pt demonstrated fair dynamic standing with amb with R knee scooter. No balance checks noted with amb  PATIENT RESPONE TO TX: Pt pain level remained the same throughout tx session   PATIENT LEVEL OF UNDERSTANDING OF EDUCATION PROVIDED Cont to use R knee scooter at all times when amb for safety NWB R LE. Pt repoted to take her time with amb   PATIENT EDUCATION PROVIDED THIS VISIT: safety, HEP, walking, deep breathing,     HEP consisting of:  1. Walking every hour during the day with knee scooter   2. supine SLR, heel slides, hip abduction/adduction, chair ups   Written HEP issued, patient/caregiver verbalized understanding.   Patient is s/p R foot ostemylitis and has been treated for ROM, strengthening, gait training, stair training, HEP training, safety training, and balance training. On SOC 6/9/24  strength was  R hip and knee flexor and extensor 3-/5, LLE 4-/5. Today at DC strength is  R hip and knee flexor and extensor +4/5, LLE 5/5. On SOC bed mobility was :sleeps on couch at this time and needed Min A for repositioning .  Today at CO bed mobility is supine<>sit MOD I. On SOC transfers were Min

## 2024-07-07 ENCOUNTER — HOME CARE VISIT (OUTPATIENT)
Age: 74
End: 2024-07-07
Payer: MEDICARE

## 2024-07-07 VITALS
RESPIRATION RATE: 18 BRPM | HEART RATE: 74 BPM | TEMPERATURE: 96.3 F | DIASTOLIC BLOOD PRESSURE: 68 MMHG | SYSTOLIC BLOOD PRESSURE: 158 MMHG | OXYGEN SATURATION: 97 %

## 2024-07-07 PROCEDURE — G0299 HHS/HOSPICE OF RN EA 15 MIN: HCPCS

## 2024-07-08 ENCOUNTER — HOME CARE VISIT (OUTPATIENT)
Age: 74
End: 2024-07-08
Payer: MEDICARE

## 2024-07-08 VITALS
TEMPERATURE: 97.9 F | HEART RATE: 68 BPM | RESPIRATION RATE: 18 BRPM | OXYGEN SATURATION: 98 % | SYSTOLIC BLOOD PRESSURE: 110 MMHG | DIASTOLIC BLOOD PRESSURE: 66 MMHG

## 2024-07-08 PROCEDURE — G0152 HHCP-SERV OF OT,EA 15 MIN: HCPCS

## 2024-07-09 NOTE — HOME HEALTH
SUBJECTIVE: Pt is in good spirts and reports she doing very well.     CAREGIVER INVOLVEMENT/ASSISTANCE NEEDED FOR: Family assists occasionally with IADLs     HOME HEALTH SUPPLIES BY TYPE AND QUANTITY ORDERED/DELIVERED THIS VISIT INCLUDE: n/a    OBJECTIVE: See interventions    PATIENT RESPONSE TO TREATMENT: Pt responded well to occupational therapy session on this date with demonstration and understanding of UB HEP, safety education, energy conservation techniques, bathing and functional mobility tasks.     PATIENT LEVEL OF UNDERSTANDING OF EDUCATION PROVIDED: Pt educated on importance of continuing HEP and performing ADLs without therapy services to promote carryover and increased independence with tasks. Pt educated on continuing to use energy conservation strategies within daily routine to decrease fatigue and risk of falls. Pt educated on notifying MD of any changes in health status. Pt verbalized understanding and able to teach back.     OCCUPATIONAL THERAPY DISCHARGE: Ms. Duncan received skilled home health occupational therapy services to address deficits in ADLs, BUE strength and functional mobility. Pt reached the maximum level of function with ADLs and achieved OT goals.   ADLS: Pt trained on adaptive techniques and use of LRAD to increase safety and independence with completing ADLs. Pt is now able to bathe in walk in shower on second level with MODIFIED INDEPENDENCE. Barthel Index score from 75 to 95.  HEP: Pt demonstrates independence with HEP as evidence by an increase in UB strength from the MMT score of 3+/5 to 4+/5  ENERGY CONSERVATION: Pt demonstrates effective use of energy conservation strategies to reduce fall risk and safely performing ADLs and functional mobility in the home. The strategies include keeping objects at appropriate height to minimize lifting and bending. Instructed pt in maintaining appropriate posture for increase energy efficiency.     Pt medications reconciled.   MD and Care

## 2024-07-10 ENCOUNTER — HOME CARE VISIT (OUTPATIENT)
Age: 74
End: 2024-07-10
Payer: MEDICARE

## 2024-07-10 PROCEDURE — G0299 HHS/HOSPICE OF RN EA 15 MIN: HCPCS

## 2024-07-11 VITALS
RESPIRATION RATE: 16 BRPM | DIASTOLIC BLOOD PRESSURE: 72 MMHG | OXYGEN SATURATION: 96 % | SYSTOLIC BLOOD PRESSURE: 112 MMHG | HEART RATE: 68 BPM

## 2024-07-12 ENCOUNTER — HOME CARE VISIT (OUTPATIENT)
Age: 74
End: 2024-07-12
Payer: MEDICARE

## 2024-07-12 VITALS
SYSTOLIC BLOOD PRESSURE: 122 MMHG | HEART RATE: 62 BPM | TEMPERATURE: 96.9 F | RESPIRATION RATE: 16 BRPM | DIASTOLIC BLOOD PRESSURE: 72 MMHG

## 2024-07-12 LAB
ACID FAST STN SPEC: NEGATIVE
MYCOBACTERIUM SPEC QL CULT: NEGATIVE
SPECIMEN PREPARATION: NORMAL
SPECIMEN SOURCE: NORMAL

## 2024-07-12 PROCEDURE — G0299 HHS/HOSPICE OF RN EA 15 MIN: HCPCS

## 2024-07-12 NOTE — HOME HEALTH
Skilled reason for visit: wound vac to right foot     Caregiver involvement: patient stated she has a son that lives with her .    Medications reviewed and all medications are available in the home this visit.      The following education was provided regarding medications:  all meds are in the home and she is taking them as ordered.      MD notified of any discrepancies/look a-like medications/medication interactions: none    Medications are effective at this time.      Home health supplies by type and quantity ordered/delivered this visit include: kristel  Patient education provided this visit: keeping her feet elevated and off of her foot , keeping foot dry and on scooter no walking on it          Sharps education provided: Clinician instructed patient/CG on proper disposal of sharps: Containers should be made of hard plastic, be puncture-resistant and leakproof, such as a laundry detergent or bleach bottle.  When the container is ¾ full, it should be sealed with tape and labeled DO NOT RECYCLE prior to discarding in the regular trash.      Patient level of understanding of education provided: patient verbalized understanding    Patient response to procedure performed:   patient had no complaints    Agency Progress toward goals: progressing    Patient's Progress towards personal goals: patient stated that she is ok    Home exercise program: deep breathing    Continued need for the following skills: Nursing.    Plan for next visit: wound care     Patient and/or caregiver notified and agrees to changes in the Plan of Care: N/A.      The following discharge planning was discussed with the pt/caregiver: when goals met and education is complete

## 2024-07-14 ENCOUNTER — HOME CARE VISIT (OUTPATIENT)
Age: 74
End: 2024-07-14
Payer: MEDICARE

## 2024-07-14 PROCEDURE — G0299 HHS/HOSPICE OF RN EA 15 MIN: HCPCS

## 2024-07-16 VITALS
HEART RATE: 88 BPM | OXYGEN SATURATION: 99 % | SYSTOLIC BLOOD PRESSURE: 150 MMHG | DIASTOLIC BLOOD PRESSURE: 72 MMHG | TEMPERATURE: 98.4 F | RESPIRATION RATE: 17 BRPM

## 2024-07-16 ASSESSMENT — ENCOUNTER SYMPTOMS
STOOL DESCRIPTION: FORMED
CONSTIPATION: 1
INDIGESTION: 1

## 2024-07-16 NOTE — HOME HEALTH
Skilled reason for visit: wound vac dressing change    Caregiver involvement: son present in home.    Medications reviewed and all medications ARE available in the home this visit.    The following education was provided regarding medications: hypoglycemic medications.    MD notified of any discrepancies/look a-like medications/medication interactions: none  Medications are effective at this time.      Home health supplies by type and quantity ordered/delivered this visit include: none    Patient education provided this visit: giving skin on RLE break from ace wrap to give skin lotion.    Sharps education provided: Clinician instructed patient/CG on proper disposal of sharps: Containers should be made of hard plastic, be puncture-resistant and leakproof,   such as a laundry detergent or bleach bottle.  When the container is ¾ full, it should be sealed with tape and labeled   DO NOT RECYCLE prior to discarding in the regular trash.      Patient level of understanding of education provided:pt states understanding.    Skilled Care Performed this visit: wound vac dressing change.    Patient response to procedure performed:pt tolerated well.    Agency Progress toward goals: pt is progressing towards above goals.    Patient's Progress towards personal goals: pt is progressing towards goal of \"healing up\".    Home exercise program: up with knee scooter only for ambulation    Continued need for the following skills: normal    Plan for next visit: 7/17

## 2024-07-17 ENCOUNTER — HOME CARE VISIT (OUTPATIENT)
Age: 74
End: 2024-07-17
Payer: MEDICARE

## 2024-07-17 PROCEDURE — G0299 HHS/HOSPICE OF RN EA 15 MIN: HCPCS

## 2024-07-18 ENCOUNTER — ANESTHESIA EVENT (OUTPATIENT)
Facility: HOSPITAL | Age: 74
End: 2024-07-18
Payer: MEDICARE

## 2024-07-18 NOTE — HOME HEALTH
Skilled reason for visit: wound vac to right foot          Caregiver involvement: patient stated she has a son that lives with her .         Medications reviewed and all medications are available in the home this visit.           The following education was provided regarding medications:  all meds are in the home and she is taking them as ordered.           MD notified of any discrepancies/look a-like medications/medication interactions: none         Medications are effective at this time.           Home health supplies by type and quantity ordered/delivered this visit include: kristel    Patient education provided this visit: keeping her feet elevated and off of her foot , keeping foot dry and on scooter no walking on it                    Sharps education provided: Clinician instructed patient/CG on proper disposal of sharps: Containers should be made of hard plastic, be puncture-resistant and leakproof, such as a laundry detergent or bleach bottle.  When the container is ¾ full, it should be sealed with tape and labeled DO NOT RECYCLE prior to discarding in the regular trash.           Patient level of understanding of education provided: patient verbalized understanding         Patient response to procedure performed:   patient had no complaints         Agency Progress toward goals: progressing         Patient's Progress towards personal goals: patient stated that she is ok anxious for surgery friday to get her graft          Home exercise program: deep breathing         Continued need for the following skills: Nursing.         Plan for next visit: wound care          Patient and/or caregiver notified and agrees to changes in the Plan of Care: N/A.           The following discharge planning was discussed with the pt/caregiver: when goals met and education is complete

## 2024-07-19 ENCOUNTER — HOME CARE VISIT (OUTPATIENT)
Age: 74
End: 2024-07-19
Payer: MEDICARE

## 2024-07-19 ENCOUNTER — HOSPITAL ENCOUNTER (OUTPATIENT)
Facility: HOSPITAL | Age: 74
Setting detail: OUTPATIENT SURGERY
Discharge: HOME | End: 2024-07-19
Attending: PODIATRIST | Admitting: PODIATRIST
Payer: MEDICARE

## 2024-07-19 ENCOUNTER — ANESTHESIA (OUTPATIENT)
Facility: HOSPITAL | Age: 74
End: 2024-07-19
Payer: MEDICARE

## 2024-07-19 VITALS
RESPIRATION RATE: 16 BRPM | HEART RATE: 78 BPM | OXYGEN SATURATION: 98 % | SYSTOLIC BLOOD PRESSURE: 140 MMHG | DIASTOLIC BLOOD PRESSURE: 78 MMHG

## 2024-07-19 VITALS
HEIGHT: 63 IN | BODY MASS INDEX: 24.43 KG/M2 | TEMPERATURE: 97 F | SYSTOLIC BLOOD PRESSURE: 164 MMHG | RESPIRATION RATE: 16 BRPM | OXYGEN SATURATION: 100 % | DIASTOLIC BLOOD PRESSURE: 68 MMHG | WEIGHT: 137.9 LBS | HEART RATE: 63 BPM

## 2024-07-19 DIAGNOSIS — L97.413 ULCER OF RIGHT HEEL, WITH NECROSIS OF MUSCLE (HCC): ICD-10-CM

## 2024-07-19 PROBLEM — T81.31XD: Status: ACTIVE | Noted: 2024-05-29

## 2024-07-19 LAB
GLUCOSE BLD STRIP.AUTO-MCNC: 88 MG/DL (ref 70–110)
INR PPP: 0.9 (ref 0.9–1.1)
PROTHROMBIN TIME: 12.2 SEC (ref 11.9–14.9)

## 2024-07-19 PROCEDURE — 82962 GLUCOSE BLOOD TEST: CPT

## 2024-07-19 PROCEDURE — G0299 HHS/HOSPICE OF RN EA 15 MIN: HCPCS

## 2024-07-19 PROCEDURE — 87075 CULTR BACTERIA EXCEPT BLOOD: CPT

## 2024-07-19 PROCEDURE — 87205 SMEAR GRAM STAIN: CPT

## 2024-07-19 PROCEDURE — 7100000010 HC PHASE II RECOVERY - FIRST 15 MIN: Performed by: PODIATRIST

## 2024-07-19 PROCEDURE — 3700000001 HC ADD 15 MINUTES (ANESTHESIA): Performed by: PODIATRIST

## 2024-07-19 PROCEDURE — 2500000003 HC RX 250 WO HCPCS: Performed by: PODIATRIST

## 2024-07-19 PROCEDURE — 6360000002 HC RX W HCPCS: Performed by: PODIATRIST

## 2024-07-19 PROCEDURE — 3600000002 HC SURGERY LEVEL 2 BASE: Performed by: PODIATRIST

## 2024-07-19 PROCEDURE — A4217 STERILE WATER/SALINE, 500 ML: HCPCS | Performed by: PODIATRIST

## 2024-07-19 PROCEDURE — 2500000003 HC RX 250 WO HCPCS: Performed by: SPECIALIST

## 2024-07-19 PROCEDURE — C1713 ANCHOR/SCREW BN/BN,TIS/BN: HCPCS | Performed by: PODIATRIST

## 2024-07-19 PROCEDURE — 87070 CULTURE OTHR SPECIMN AEROBIC: CPT

## 2024-07-19 PROCEDURE — 2580000003 HC RX 258: Performed by: PODIATRIST

## 2024-07-19 PROCEDURE — 87186 SC STD MICRODIL/AGAR DIL: CPT

## 2024-07-19 PROCEDURE — 85610 PROTHROMBIN TIME: CPT

## 2024-07-19 PROCEDURE — 2720000010 HC SURG SUPPLY STERILE: Performed by: PODIATRIST

## 2024-07-19 PROCEDURE — 3700000000 HC ANESTHESIA ATTENDED CARE: Performed by: PODIATRIST

## 2024-07-19 PROCEDURE — 2709999900 HC NON-CHARGEABLE SUPPLY: Performed by: PODIATRIST

## 2024-07-19 PROCEDURE — 6360000002 HC RX W HCPCS: Performed by: SPECIALIST

## 2024-07-19 PROCEDURE — 87077 CULTURE AEROBIC IDENTIFY: CPT

## 2024-07-19 PROCEDURE — 3600000012 HC SURGERY LEVEL 2 ADDTL 15MIN: Performed by: PODIATRIST

## 2024-07-19 PROCEDURE — 7100000011 HC PHASE II RECOVERY - ADDTL 15 MIN: Performed by: PODIATRIST

## 2024-07-19 PROCEDURE — 87076 CULTURE ANAEROBE IDENT EACH: CPT

## 2024-07-19 RX ORDER — SODIUM CHLORIDE, SODIUM LACTATE, POTASSIUM CHLORIDE, CALCIUM CHLORIDE 600; 310; 30; 20 MG/100ML; MG/100ML; MG/100ML; MG/100ML
INJECTION, SOLUTION INTRAVENOUS CONTINUOUS
Status: DISCONTINUED | OUTPATIENT
Start: 2024-07-19 | End: 2024-07-19 | Stop reason: HOSPADM

## 2024-07-19 RX ORDER — NALOXONE HYDROCHLORIDE 0.4 MG/ML
INJECTION, SOLUTION INTRAMUSCULAR; INTRAVENOUS; SUBCUTANEOUS PRN
Status: CANCELLED | OUTPATIENT
Start: 2024-07-19

## 2024-07-19 RX ORDER — DROPERIDOL 2.5 MG/ML
0.62 INJECTION, SOLUTION INTRAMUSCULAR; INTRAVENOUS
Status: CANCELLED | OUTPATIENT
Start: 2024-07-19 | End: 2024-07-20

## 2024-07-19 RX ORDER — SODIUM CHLORIDE 0.9 % (FLUSH) 0.9 %
5-40 SYRINGE (ML) INJECTION PRN
Status: CANCELLED | OUTPATIENT
Start: 2024-07-19

## 2024-07-19 RX ORDER — OXYCODONE HYDROCHLORIDE 5 MG/1
5 TABLET ORAL
Status: CANCELLED | OUTPATIENT
Start: 2024-07-19 | End: 2024-07-20

## 2024-07-19 RX ORDER — DIPHENHYDRAMINE HYDROCHLORIDE 50 MG/ML
12.5 INJECTION INTRAMUSCULAR; INTRAVENOUS
Status: CANCELLED | OUTPATIENT
Start: 2024-07-19 | End: 2024-07-20

## 2024-07-19 RX ORDER — SODIUM CHLORIDE 9 MG/ML
INJECTION, SOLUTION INTRAVENOUS PRN
Status: CANCELLED | OUTPATIENT
Start: 2024-07-19

## 2024-07-19 RX ORDER — LIDOCAINE HYDROCHLORIDE 20 MG/ML
INJECTION, SOLUTION EPIDURAL; INFILTRATION; INTRACAUDAL; PERINEURAL PRN
Status: DISCONTINUED | OUTPATIENT
Start: 2024-07-19 | End: 2024-07-19 | Stop reason: SDUPTHER

## 2024-07-19 RX ORDER — FENTANYL CITRATE 50 UG/ML
25 INJECTION, SOLUTION INTRAMUSCULAR; INTRAVENOUS EVERY 5 MIN PRN
Status: CANCELLED | OUTPATIENT
Start: 2024-07-19

## 2024-07-19 RX ORDER — CLINDAMYCIN PHOSPHATE 900 MG/50ML
900 INJECTION, SOLUTION INTRAVENOUS
Status: COMPLETED | OUTPATIENT
Start: 2024-07-19 | End: 2024-07-19

## 2024-07-19 RX ORDER — LABETALOL HYDROCHLORIDE 5 MG/ML
10 INJECTION, SOLUTION INTRAVENOUS
Status: CANCELLED | OUTPATIENT
Start: 2024-07-19

## 2024-07-19 RX ORDER — SODIUM CHLORIDE 0.9 % (FLUSH) 0.9 %
5-40 SYRINGE (ML) INJECTION EVERY 12 HOURS SCHEDULED
Status: CANCELLED | OUTPATIENT
Start: 2024-07-19

## 2024-07-19 RX ORDER — PROPOFOL 10 MG/ML
INJECTION, EMULSION INTRAVENOUS PRN
Status: DISCONTINUED | OUTPATIENT
Start: 2024-07-19 | End: 2024-07-19 | Stop reason: SDUPTHER

## 2024-07-19 RX ORDER — MIDAZOLAM HYDROCHLORIDE 1 MG/ML
INJECTION INTRAMUSCULAR; INTRAVENOUS PRN
Status: DISCONTINUED | OUTPATIENT
Start: 2024-07-19 | End: 2024-07-19 | Stop reason: SDUPTHER

## 2024-07-19 RX ORDER — SODIUM CHLORIDE, SODIUM LACTATE, POTASSIUM CHLORIDE, CALCIUM CHLORIDE 600; 310; 30; 20 MG/100ML; MG/100ML; MG/100ML; MG/100ML
INJECTION, SOLUTION INTRAVENOUS CONTINUOUS
Status: CANCELLED | OUTPATIENT
Start: 2024-07-19

## 2024-07-19 RX ORDER — ONDANSETRON 2 MG/ML
4 INJECTION INTRAMUSCULAR; INTRAVENOUS
Status: CANCELLED | OUTPATIENT
Start: 2024-07-19 | End: 2024-07-20

## 2024-07-19 RX ADMIN — MIDAZOLAM 2 MG: 1 INJECTION INTRAMUSCULAR; INTRAVENOUS at 07:28

## 2024-07-19 RX ADMIN — CLINDAMYCIN PHOSPHATE 900 MG: 900 INJECTION, SOLUTION INTRAVENOUS at 07:28

## 2024-07-19 RX ADMIN — PROPOFOL 75 MCG/KG/MIN: 10 INJECTION, EMULSION INTRAVENOUS at 07:40

## 2024-07-19 RX ADMIN — LIDOCAINE HYDROCHLORIDE 60 MG: 20 INJECTION, SOLUTION EPIDURAL; INFILTRATION; INTRACAUDAL; PERINEURAL at 07:37

## 2024-07-19 RX ADMIN — PROPOFOL 60 MG: 10 INJECTION, EMULSION INTRAVENOUS at 07:37

## 2024-07-19 RX ADMIN — SODIUM CHLORIDE, POTASSIUM CHLORIDE, SODIUM LACTATE AND CALCIUM CHLORIDE: 600; 310; 30; 20 INJECTION, SOLUTION INTRAVENOUS at 06:26

## 2024-07-19 ASSESSMENT — PAIN - FUNCTIONAL ASSESSMENT
PAIN_FUNCTIONAL_ASSESSMENT: 0-10
PAIN_FUNCTIONAL_ASSESSMENT: NONE - DENIES PAIN
PAIN_FUNCTIONAL_ASSESSMENT: NONE - DENIES PAIN

## 2024-07-19 ASSESSMENT — COPD QUESTIONNAIRES: CAT_SEVERITY: MILD

## 2024-07-19 NOTE — PERIOP NOTE
Reviewed PTA medication list with patient/caregiver and patient/caregiver denies any additional medications.     Patient admits to having a responsible adult care for them at home for at least 24 hours after surgery.    Patient encouraged to use gown warming system and informed that using said warming gown to regulate body temperature prior to a procedure has been shown to help reduce the risks of blood clots and infection.    Patient's pharmacy of choice verified and documented in PTA medication section.    Dual skin assessment & fall risk band verification completed with Faye ESPINOZA.

## 2024-07-19 NOTE — PERIOP NOTE
TRANSFER - IN REPORT:    Verbal report received from Gabriella march on Angelica Duncan  being received from or for routine post-op      Report consisted of patient's Situation, Background, Assessment and   Recommendations(SBAR).     Information from the following report(s) Nurse Handoff Report was reviewed with the receiving nurse.    Opportunity for questions and clarification was provided.      Assessment completed upon patient's arrival to unit and care assumed.

## 2024-07-19 NOTE — HOME HEALTH
Skilled reason for visit: wound vac to right foot , patient had graft placed today, wound vac paced over graft          Caregiver involvement: patient stated she has a son that lives with her .         Medications reviewed and all medications are available in the home this visit.           The following education was provided regarding medications:  all meds are in the home and she is taking them as ordered.           MD notified of any discrepancies/look a-like medications/medication interactions: none         Medications are effective at this time.           Home health supplies by type and quantity ordered/delivered this visit include: kristel    Patient education provided this visit: keeping her feet elevated and off of her foot , compression to leg, staying off her foot                    Sharps education provided: Clinician instructed patient/CG on proper disposal of sharps: Containers should be made of hard plastic, be puncture-resistant and leakproof, such as a laundry detergent or bleach bottle.  When the container is ¾ full, it should be sealed with tape and labeled DO NOT RECYCLE prior to discarding in the regular trash.           Patient level of understanding of education provided: patient verbalized understanding         Patient response to procedure performed:   patient had no complaints         Agency Progress toward goals: progressing         Patient's Progress towards personal goals: patient stated that she is ok         Home exercise program: deep breathing         Continued need for the following skills: Nursing.         Plan for next visit: wound care          Patient and/or caregiver notified and agrees to changes in the Plan of Care: N/A.           The following discharge planning was discussed with the pt/caregiver: when goals met and education is complete     no signature as computer was dead

## 2024-07-19 NOTE — BRIEF OP NOTE
Brief Postoperative Note      Patient: Angelica Duncan  YOB: 1950  MRN: [id number]    Date of Procedure: 7/19/2024    Pre-Op Diagnosis Codes:     * Ulcer of right heel, with necrosis of muscle (HCC) [L97.413]    Post-Op Diagnosis: Same       Procedure(s):  APPLICATION STRAVIX GRAFT RIGHT FOOT ULCER \"SPEC POP\"with selective debridement right foot ulcer    Surgeon(s):  Willie Lance DPM    Assistant:  Surgical Assistant: Aidee Tam    Anesthesia: Monitor Anesthesia Care    Estimated Blood Loss (mL): Minimal    Complications: None    Specimens:   ID Type Source Tests Collected by Time Destination   1 : SWAB RIGHT FOOT Swab Foot CULTURE, ANAEROBIC, CULTURE, WOUND Willie Lance DPM 7/19/2024 0802        Implants:  Implant Name Type Inv. Item Serial No.  Lot No. LRB No. Used Action   STRAVIX GRAFT   [id number] [employer]-  Right 1 Implanted         Drains:   Negative Pressure Wound Therapy Foot Plantar;Right (Active)   Wound Type Diabetic foot ulcer 07/14/24 0915   Unit Type KCI 07/14/24 0915   Dressing Type Black Foam 07/17/24 1445   Number of pieces used 1 07/17/24 1445   Number of pieces removed 1 07/17/24 1445   Cycle Continuous 07/17/24 1445   Target Pressure (mmHg) 125 07/17/24 1445   Intensity 1 07/14/24 0915   Irrigation Solution Other (comment) 07/17/24 1445   Canister changed? No 07/17/24 1445   Dressing Status New dressing applied 07/17/24 1445   Dressing Changed Changed/New 07/17/24 1445   Drainage Amount Small 07/17/24 1445   Drainage Description Serosanguinous 07/17/24 1445   Output (ml) 20 ml 07/14/24 0915   Wound Assessment Slough 07/17/24 1445   Rosa-wound Assessment Maceration 07/17/24 1445   Odor None 07/14/24 0915       Negative Pressure Wound Therapy Foot Right;Plantar (Active)       [REMOVED] Negative Pressure Wound Therapy Foot Right;Plantar (Removed)       Findings:  Infection Present At Time Of Surgery (PATOS) (choose all levels that have  infection present):  No infection present  Other Findings: none    Electronically signed by Willie Lance DPM on 7/19/2024 at 8:39 AM

## 2024-07-19 NOTE — ANESTHESIA POSTPROCEDURE EVALUATION
Post-Anesthesia Evaluation and Assessment    Cardiovascular Function/Vital Signs/Pain Score:  Vitals  BP: (!) 164/68  Temp: 97 °F (36.1 °C)  Temp Source: Temporal  Pulse: 63  Respirations: 16  SpO2: 100 %  Height: 160 cm (5' 3\")  Weight - Scale: 62.6 kg (137 lb 14.4 oz)  Pain Level: 0    Patient is status post Procedure(s):  APPLICATION STRAVIX GRAFT RIGHT FOOT ULCER \"SPEC POP\".    Nausea/Vomiting: Controlled.    Postoperative hydration reviewed and adequate.    Pain:  Managed    Mental Status and Level of Consciousness: Baseline and appropriate for discharge.    Adequate oxygenation and airway patent.    Complications related to anesthesia: None    Post-anesthesia assessment completed. No concerns.    Patient has met all discharge requirements.    Signed By: Moises Crowley MD    July 19, 2024

## 2024-07-19 NOTE — H&P
HISTORY AND PHYSICAL             Date: 7/19/2024        Patient Name: Angelica Duncan     YOB: 1950      Age:  73 y.o.    Chief Complaint   No chief complaint on file.         History Obtained From   patient    History of Present Illness   Right foot ulcer    Past Medical History     Past Medical History:   Diagnosis Date    Arthritis     hands and feet    Asthma     Autoimmune disease (HCC)     lupus anticoagulant-causes clotting    Cancer (HCC)     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    Lung collapse     2015    Nausea & vomiting 5/23/2012    chronic    Neuropathy     right foot    Other ill-defined conditions(799.89)     (2 mutated genes) hyper coag. state     Pneumonia 07/22/2017    PONV (postoperative nausea and vomiting)     Positive cardiolipin antibodies     Psychiatric disorder     anxiety   no meds    PUD (peptic ulcer disease) 2009    H/O requiring partial gastrectomy and small intestine    Raynaud's syndrome     Restless leg syndrome     Retention of urine     Skin cancer 2000    mouth    Thromboembolus (HCC) 2000    RLE- arterial    Wears glasses     readers    Wears hearing aid in both ears         Past Surgical History     Past Surgical History:   Procedure Laterality Date    APPENDECTOMY      BREAST SURGERY Bilateral 02/2016    hemorrhaged after breast surgery    BREAST SURGERY      developed infection and implant removed    CHOLECYSTECTOMY      FOOT DEBRIDEMENT Right 5/17/2024    INCISION BONE CORTEX RIGHT FOOT AND SURGICAL WOUND PREP, STRAVIX GRAFT, RIGHT FOOT WITH C-ARM(SPEC POP) performed by  Willie Lance DPM at [facility] MAIN OR    FOOT DEBRIDEMENT Right 5/23/2024    RIGHT FOOT DEBRIDEMENT INCISION AND DRAINAGE WITH CULTURES performed by Willie Lance DPM at [facility] MAIN OR    FOOT DEBRIDEMENT Right 5/29/2024    RIGHT FOOT DEBRIDEMENT INCISION AND DRAINAGE, SURGICAL WOUND CLOSURE/ VERSA JET/ STRAVIX GRAFT performed by Willie Lance DPM at [facility] MAIN OR    GASTRECTOMY      due to ulcers    GASTRIC BYPASS SURGERY  1996    gastric by-pass    GI      total gastrectomy, partial colectomy    GI      colostomy and reversal    HEENT      cancer of lip removed    HYSTERECTOMY (CERVIX STATUS UNKNOWN)      for cervical cancer    ORTHOPEDIC SURGERY      rt foot surgery x10    ORTHOPEDIC SURGERY Left     great toe gangrenous and amputated    OTHER SURGICAL HISTORY      part of liver removal    OTHER SURGICAL HISTORY      ventral hernia repair x10    OTHER SURGICAL HISTORY      Exc pilonial cystectomy    SC UNLISTED PROCEDURE ABDOMEN PERITONEUM & OMENTUM      duodenal ulcer rupture    SC UNLISTED PROCEDURE CARDIAC SURGERY  2018    open heart (single vessel) at [facility]    VASCULAR SURGERY  2022    iliac aorta stents x 6    VASCULAR SURGERY      seven more stents iliac aorta and femoral         Medications Prior to Admission     Prior to Admission medications    Medication Sig Start Date End Date Taking? Authorizing Provider   furosemide (LASIX) 20 MG tablet Take 1 tablet by mouth daily Indications: edema    ProviderMina MD   HYDROcodone-ibuprofen  MG TABS Take 10 mg of opioid by mouth every 6 hours as needed for Pain.    Mina Pierre MD   traMADol (ULTRAM) 50 MG tablet Take 1 tablet by mouth every 6 hours as needed for Pain.    Mina Pierre MD   warfarin (COUMADIN) 2 MG tablet Take 1 tablet by mouth daily    Mina Pierre MD   warfarin (COUMADIN) 5 MG tablet Take 1 tablet by mouth daily    Mina Pierre MD   LORazepam (ATIVAN) 1 MG tablet Take 1 tablet by mouth in the morning

## 2024-07-19 NOTE — OP NOTE
Operative Note      Patient: Angelica Duncan  YOB: 1950  MRN: [id number]    Date of Procedure: 7/19/2024    Pre-Op Diagnosis Codes:     * Ulcer of right heel, with necrosis of muscle (HCC) [L97.413]    Post-Op Diagnosis: Same       Procedure(s):  APPLICATION STRAVIX GRAFT RIGHT FOOT ULCER \"SPEC POP\"    Surgeon(s):  Willie Lance DPM    Assistant:   Surgical Assistant: Aidee Tam    Anesthesia: Monitor Anesthesia Care    Estimated Blood Loss (mL): Minimal    Complications: None    Specimens:   ID Type Source Tests Collected by Time Destination   1 : SWAB RIGHT FOOT Swab Foot CULTURE, ANAEROBIC, CULTURE, WOUND Willie Lance DPM 7/19/2024 0802        Implants:  Implant Name Type Inv. Item Serial No.  Lot No. LRB No. Used Action   STRAVIX GRAFT   [id number] [employer]  Right 1 Implanted         Drains:   Negative Pressure Wound Therapy Foot Plantar;Right (Active)   Wound Type Diabetic foot ulcer 07/14/24 0915   Unit Type KCI 07/14/24 0915   Dressing Type Black Foam 07/17/24 1445   Number of pieces used 1 07/17/24 1445   Number of pieces removed 1 07/17/24 1445   Cycle Continuous 07/17/24 1445   Target Pressure (mmHg) 125 07/17/24 1445   Intensity 1 07/14/24 0915   Irrigation Solution Other (comment) 07/17/24 1445   Canister changed? No 07/17/24 1445   Dressing Status New dressing applied 07/17/24 1445   Dressing Changed Changed/New 07/17/24 1445   Drainage Amount Small 07/17/24 1445   Drainage Description Serosanguinous 07/17/24 1445   Output (ml) 20 ml 07/14/24 0915   Wound Assessment Slough 07/17/24 1445   Rosa-wound Assessment Maceration 07/17/24 1445   Odor None 07/14/24 0915       Negative Pressure Wound Therapy Foot Right;Plantar (Active)       [REMOVED] Negative Pressure Wound Therapy Foot Right;Plantar (Removed)       Findings:  Infection Present At Time Of Surgery (PATOS) (choose all levels that have infection present):  No infection present  Other Findings:

## 2024-07-19 NOTE — ANESTHESIA PRE PROCEDURE
CHOLECYSTECTOMY     • FOOT DEBRIDEMENT Right 2024    INCISION BONE CORTEX RIGHT FOOT AND SURGICAL WOUND PREP, STRAVIX GRAFT, RIGHT FOOT WITH C-ARM(SPEC POP) performed by Willie Lance DPM at [facility] MAIN OR   • FOOT DEBRIDEMENT Right 2024    RIGHT FOOT DEBRIDEMENT INCISION AND DRAINAGE WITH CULTURES performed by Willie Lance DPM at [facility] MAIN OR   • FOOT DEBRIDEMENT Right 2024    RIGHT FOOT DEBRIDEMENT INCISION AND DRAINAGE, SURGICAL WOUND CLOSURE/ VERSA JET/ STRAVIX GRAFT performed by Willie Lance DPM at [facility] MAIN OR   • GASTRECTOMY      due to ulcers   • GASTRIC BYPASS SURGERY      gastric by-pass   • GI      total gastrectomy, partial colectomy   • GI      colostomy and reversal   • HEENT      cancer of lip removed   • HYSTERECTOMY (CERVIX STATUS UNKNOWN)      for cervical cancer   • ORTHOPEDIC SURGERY      rt foot surgery x10   • ORTHOPEDIC SURGERY Left     great toe gangrenous and amputated   • OTHER SURGICAL HISTORY      part of liver removal   • OTHER SURGICAL HISTORY      ventral hernia repair x10   • OTHER SURGICAL HISTORY      Exc pilonial cystectomy   • MD UNLISTED PROCEDURE ABDOMEN PERITONEUM & OMENTUM      duodenal ulcer rupture   • MD UNLISTED PROCEDURE CARDIAC SURGERY  2018    open heart (single vessel) at [facility]   • VASCULAR SURGERY      iliac aorta stents x 6   • VASCULAR SURGERY      seven more stents iliac aorta and femoral        Social History:    Social History     Tobacco Use   • Smoking status: Former     Current packs/day: 0.00     Types: Cigarettes     Quit date: 1996     Years since quittin.5   • Smokeless tobacco: Never   Substance Use Topics   • Alcohol use: Yes     Alcohol/week: 3.0 - 4.0 standard drinks of alcohol     Types: 3 - 4 Shots of liquor per week     Comment: 3-4 times a week  1 oz rum                                Counseling given: Not Answered      Vital Signs (Current):   Vitals:    24 0553   BP: 125/73   Pulse: 70   Resp: 18   Temp:

## 2024-07-19 NOTE — DISCHARGE INSTRUCTIONS
[facility]   Willie Wise D.P.M.    [address]  [place], [place] [address]  Telephone:  [phone]  Fax:  [phone]     Patient Discharge Instructions    1.) Wear surgical shoe for protection of no shoe foot.    2.) Use cane, crutches, or walker as needed     3.) Keep walking and standing to a minimum    4.) Keep bandage dry and do not remove or change bandage.     5.) Take precsribed medication as directed.     6.) Keep scheduled post-operative office appointment.     7.) Follow up with Dr. Wise in Office in 3 days    8.)Call our office with any questions or problems at [phone].  If after hours please call Dr. Wise at [phone].       DISCHARGE SUMMARY from Nurse    PATIENT INSTRUCTIONS:    After general anesthesia or intravenous sedation, for 24 hours or while taking prescription Narcotics:  Limit your activities  Do not drive and operate hazardous machinery  Do not make important personal or business decisions  Do  not drink alcoholic beverages  If you have not urinated within 8 hours after discharge, please contact your surgeon on call.    Report the following to your surgeon:  Excessive pain, swelling, redness or odor of or around the surgical area  Temperature over 100.5  Nausea and vomiting lasting longer than 4 hours or if unable to take medications  Any signs of decreased circulation or nerve impairment to extremity: change in color, persistent  numbness, tingling, coldness or increase pain  Any questions    What to do at Home:  Recommended activity: ,   REGULAR DIET  RETURN TO OFFICE AS SCHEDULED  HOME HEALTH WILL SEE PATIENT AT HOME    If you experience any of the following symptoms heavy bleeding, fevers, severe pain, circulation changes, please follow up with dr wise.    *  Please give a list of your current medications to your Primary Care Provider.    *  Please update this list whenever your medications are discontinued, doses are       changed, or new medications (including over-the-counter products) are added.    *  Please carry medication information at all times in case of emergency situations.    These are general instructions for a healthy lifestyle:    No smoking/ No tobacco products/ Avoid exposure to second hand smoke  Surgeon General's Warning:  Quitting smoking now greatly reduces serious risk to your health.    Obesity, smoking, and sedentary lifestyle greatly increases your risk for illness    A healthy diet, regular physical exercise & weight monitoring are important for maintaining a healthy lifestyle    You may be retaining fluid if you have a history of heart failure or if you experience any of the following symptoms:  Weight gain of 3 pounds or more overnight or 5 pounds in a week, increased swelling in our hands or feet or shortness of breath while lying flat in bed.  Please call your doctor as soon as you notice any of these symptoms; do not wait until your next office visit.     Patient armband removed and shredded     The discharge information has been reviewed with the patient and caregiver.  The patient and caregiver verbalized understanding.  Discharge medications reviewed with the patient and caregiver and appropriate educational materials and side effects teaching were provided.           Nausea and Vomiting After Surgery: Care Instructions  Your Care Instructions     After you've had surgery, you may feel sick to your stomach (nauseated) or you may vomit. Sometimes anesthesia can make you feel sick. It's a common side effect and often doesn't last long. Pain also can make you feel sick or vomit. After the anesthesia wears off, you may feel pain from the incision (cut). That pain could then upset your stomach. Taking pain medicine can also make you feel sick to your stomach.  Whatever the cause, you may get medicine that can help. There are also some things you can do at home to prevent nausea and feel better.  The doctor

## 2024-07-21 ENCOUNTER — HOME CARE VISIT (OUTPATIENT)
Age: 74
End: 2024-07-21
Payer: MEDICARE

## 2024-07-21 VITALS
DIASTOLIC BLOOD PRESSURE: 90 MMHG | OXYGEN SATURATION: 96 % | RESPIRATION RATE: 16 BRPM | HEART RATE: 80 BPM | SYSTOLIC BLOOD PRESSURE: 180 MMHG | TEMPERATURE: 97.6 F

## 2024-07-21 LAB
BACTERIA SPEC CULT: ABNORMAL
BACTERIA SPEC CULT: ABNORMAL
GRAM STN SPEC: ABNORMAL
GRAM STN SPEC: ABNORMAL
SERVICE CMNT-IMP: ABNORMAL
SERVICE CMNT-IMP: ABNORMAL

## 2024-07-21 PROCEDURE — G0299 HHS/HOSPICE OF RN EA 15 MIN: HCPCS

## 2024-07-22 ASSESSMENT — ENCOUNTER SYMPTOMS
COUGH: 1
COUGH CHARACTERISTICS: DRY
STOOL DESCRIPTION: FORMED

## 2024-07-22 NOTE — HOME HEALTH
Skilled reason for visit: wound vac dressing change    Caregiver involvement: son present in home during visit.    Medications reviewed and all medications ARE available in the home this visit.    The following education was provided regarding medications: high risks for bleeding.   Medications are effective at this time.      MD notified of any discrepancies/look a-like medications/medication interactions: none    Home health supplies by type and quantity ordered/delivered this visit include: none    Patient education provided this visit: skin graft needs cushioning with oil emulsion dressing    Sharps education provided: Clinician instructed patient/CG on proper disposal of sharps: Containers should be made of hard plastic, be puncture-resistant and leakproof,   such as a laundry detergent or bleach bottle.  When the container is ¾ full, it should be sealed with tape and labeled   DO NOT RECYCLE prior to discarding in the regular trash.    Patient level of understanding of education provided: pt states understanding.    Skilled Care Performed this visit: wound vac dressing change    Patient response to procedure performed:  pt tolerated well    Agency Progress toward goals: pt is progressing towards above goals    Patient's Progress towards personal goals: pt is \"wanting Healed\"    Home exercise program: up with knee scooter only    Continued need for the following skills: normal    Plan for next visit:7/24/24

## 2024-07-24 ENCOUNTER — HOME CARE VISIT (OUTPATIENT)
Age: 74
End: 2024-07-24
Payer: MEDICARE

## 2024-07-24 PROCEDURE — G0299 HHS/HOSPICE OF RN EA 15 MIN: HCPCS

## 2024-07-26 ENCOUNTER — HOSPITAL ENCOUNTER (OUTPATIENT)
Facility: HOSPITAL | Age: 74
Discharge: HOME | End: 2024-07-26
Attending: INTERNAL MEDICINE

## 2024-07-26 VITALS — OXYGEN SATURATION: 98 % | RESPIRATION RATE: 18 BRPM | HEART RATE: 78 BPM | TEMPERATURE: 97 F

## 2024-07-26 DIAGNOSIS — E11.9 TYPE 2 DIABETES MELLITUS WITHOUT COMPLICATION, WITH LONG-TERM CURRENT USE OF INSULIN (HCC): ICD-10-CM

## 2024-07-26 DIAGNOSIS — Z79.4 TYPE 2 DIABETES MELLITUS WITHOUT COMPLICATION, WITH LONG-TERM CURRENT USE OF INSULIN (HCC): ICD-10-CM

## 2024-07-26 DIAGNOSIS — L97.412 MIDFOOT ULCERATION, RIGHT, WITH FAT LAYER EXPOSED (HCC): Primary | ICD-10-CM

## 2024-07-26 RX ORDER — LIDOCAINE HYDROCHLORIDE 20 MG/ML
JELLY TOPICAL ONCE
Status: CANCELLED | OUTPATIENT
Start: 2024-07-26 | End: 2024-07-26

## 2024-07-26 RX ORDER — LIDOCAINE HYDROCHLORIDE 20 MG/ML
JELLY TOPICAL ONCE
OUTPATIENT
Start: 2024-07-26 | End: 2024-07-26

## 2024-07-26 RX ORDER — IBUPROFEN 200 MG
TABLET ORAL ONCE
Status: CANCELLED | OUTPATIENT
Start: 2024-07-26 | End: 2024-07-26

## 2024-07-26 RX ORDER — IBUPROFEN 200 MG
TABLET ORAL ONCE
OUTPATIENT
Start: 2024-07-26 | End: 2024-07-26

## 2024-07-26 RX ORDER — SODIUM CHLOR/HYPOCHLOROUS ACID 0.033 %
SOLUTION, IRRIGATION IRRIGATION ONCE
OUTPATIENT
Start: 2024-07-26 | End: 2024-07-26

## 2024-07-26 RX ORDER — SODIUM CHLOR/HYPOCHLOROUS ACID 0.033 %
SOLUTION, IRRIGATION IRRIGATION ONCE
Status: CANCELLED | OUTPATIENT
Start: 2024-07-26 | End: 2024-07-26

## 2024-07-26 ASSESSMENT — ENCOUNTER SYMPTOMS
STOOL DESCRIPTION: FORMED
PAIN LOCATION - PAIN QUALITY: SHARP

## 2024-07-26 NOTE — HOME HEALTH
tolerted wound care well     Agency Progress toward goals: nonhealing wound     Patient's Progress towards personal goals: \"The wound is taking a long time to heal:    Home exercise program: Elevate leg     Continued need for the following skills: Skilled nursing     Plan for next visit: wound assessment/wound care     Patient and/or caregiver notified and agrees to changes in the Plan of Care: N/A.     The following discharge planning was discussed with the pt/caregiver: when goals met

## 2024-07-27 ENCOUNTER — HOME CARE VISIT (OUTPATIENT)
Age: 74
End: 2024-07-27
Payer: MEDICARE

## 2024-07-27 NOTE — CASE COMMUNICATION
Pt refused to have visit today due to just \"went to wound clinic yesterday.\"    Thank you,  Madelaine

## 2024-07-28 ENCOUNTER — HOME CARE VISIT (OUTPATIENT)
Age: 74
End: 2024-07-28
Payer: MEDICARE

## 2024-07-28 VITALS
HEART RATE: 83 BPM | TEMPERATURE: 97 F | OXYGEN SATURATION: 96 % | RESPIRATION RATE: 17 BRPM | DIASTOLIC BLOOD PRESSURE: 78 MMHG | SYSTOLIC BLOOD PRESSURE: 144 MMHG

## 2024-07-28 PROCEDURE — G0493 RN CARE EA 15 MIN HH/HOSPICE: HCPCS

## 2024-07-28 NOTE — OP NOTE
Operative Note      Patient: Angelica Duncan  YOB: 1950  MRN: [id number]    Date of Procedure: 7/19/2024    Pre-Op Diagnosis Codes:     * Ulcer of right heel, with necrosis of muscle ([facility]) [L97.413]    Post-Op Diagnosis:  Open ulcer right midfoot necrosis of muscle       Procedure performed: Surgical wound prep right foot plantar ulcer, then application STRAVIX graft.    Surgeon(s):  Willie Lance DPM    Assistant:   Surgical Assistant: Aidee Tam    Anesthesia: Monitor Anesthesia Care    Estimated Blood Loss (mL): Minimal    Complications: None    Specimens:   ID Type Source Tests Collected by Time Destination   1 : SWAB RIGHT FOOT Swab Foot CULTURE, ANAEROBIC, CULTURE, WOUND Willie Lance DPM 7/19/2024 0802        Implants:  Implant Name Type Inv. Item Serial No.  Lot No. LRB No. Used Action   STRAVIX GRAFT   [id number] [facility]  Right 1 Implanted         Drains:   Negative Pressure Wound Therapy Foot Plantar;Right (Active)   Wound Type Meshed graft 07/24/24 1400   Unit Type KCI activac 07/21/24 0930   Dressing Type Black Foam 07/24/24 1400   Number of pieces used 1 07/21/24 0930   Number of pieces removed 1 07/21/24 0930   Cycle Continuous 07/24/24 1400   Target Pressure (mmHg) 125 07/24/24 1400   Intensity 1 07/21/24 0930   Irrigation Solution Other (comment) 07/19/24 1610   Canister changed? No 07/21/24 0930   Dressing Status New dressing applied 07/24/24 1400   Dressing Changed Changed/New 07/21/24 0930   Drainage Amount Small 07/24/24 1400   Drainage Description Serosanguinous 07/24/24 1400   Output (ml) 50 ml 07/21/24 0930   Wound Assessment Graft 07/24/24 1400   Rosa-wound Assessment Fragile 07/24/24 1400   Odor None 07/21/24 0930       [REMOVED] Negative Pressure Wound Therapy Foot Right;Plantar (Removed)       [REMOVED] Negative Pressure Wound Therapy Foot Right;Plantar (Removed)       Findings:  Infection Present At Time Of Surgery (PATOS) (choose

## 2024-07-29 ASSESSMENT — ENCOUNTER SYMPTOMS
STOOL DESCRIPTION: FORMED
COUGH: 1
COUGH CHARACTERISTICS: NON-PRODUCTIVE

## 2024-07-29 NOTE — HOME HEALTH
Skilled reason for visit: reassessment    Caregiver involvement: son's not present during visit.    Medications reviewed and all medications ARE available in the home this visit.    The following education was provided regarding medications: pain medications and help with pain from post fall.    MD notified of any discrepancies/look a-like medications/medication interactions: none  Medications are effective at this time.      Home health supplies by type and quantity ordered/delivered this visit include: KCI granulfoam small and wound vac canisters    Patient education provided this visit: pt states she had a fall previously that she report to previous clinician last Tuesday, there was some bruising but no injuries that she can tell. Per pt is having more pain since fall and needing to use more of her pain medication.  Pt states she \"sleep walks\" and fell then after she was sleep walking in the kitchen.  Pt states she did see her MD on Friday and dressing changes are going to continue to be 3 times a week to help keep maceration from wound.    Sharps education provided: Clinician instructed patient/CG on proper disposal of sharps: Containers should be made of hard plastic, be puncture-resistant and leakproof,   such as a laundry detergent or bleach bottle.  When the container is ¾ full, it should be sealed with tape and labeled   DO NOT RECYCLE prior to discarding in the regular trash.      Patient level of understanding of education provided: pt states understanding.    Skilled Care Performed this visit: wound vac dressing change    Patient response to procedure performed:  pt tolerated well    Agency Progress toward goals: pt is progressing towards above goals    Patient's Progress towards personal goals: pt's goal is to be \"healed up\"    Home exercise program: up with knee scooter only    Continued need for the following skills: normal    Plan for next visit: Recert.

## 2024-07-31 ENCOUNTER — HOME CARE VISIT (OUTPATIENT)
Age: 74
End: 2024-07-31

## 2024-07-31 PROCEDURE — G0299 HHS/HOSPICE OF RN EA 15 MIN: HCPCS

## 2024-07-31 ASSESSMENT — ENCOUNTER SYMPTOMS: DIARRHEA: 1

## 2024-07-31 NOTE — OP NOTE
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  Multiple deep abscess pus filled right midfoot.    Detailed Description of Procedure:   Patient was brought to the operating room and placed on the operating table in a supine position.  Patient's right foot was anesthetized using 6 mL of 0.5% Marcaine with epinephrine as a local block.  The patient's right foot and ankle were then prepped and draped in usual sterile manner.  No tourniquet was used in this procedure.  Bilateral SCDs were used.    Attention was drawn to the disrupted surgical wound dehiscence plantar right midfoot with necrotic soft tissue that goes deep to bone through the muscle and fascial layers.  Purulent drainage was noted and cultured and sent to the lab.  #15 blade was used to make a linear incision deep into the deeper tissues and a hemostat was used to open up and drain multiple areas of deep pus with abscess.  Next the necrotic soft tissue muscle and fascia was then removed using a #15 blade.  The area was then pulse lavaged using sterile saline mixed with Betadine.  This was done to complete the complicated incision and drainage right foot.    Next the [employer] high-pressure water debridement system was used to further debride the wound to healthy bleeding tissue.  Next the area was flushed with copious amounts of normal sterile saline.  At this point the wound measured 6 x 4 cm deep to bone muscle and fascial layers.  The secondary wound closure complicated was then performed using 3-0 Vicryl to close the muscle and fascial layers in a simple interrupted technique.  Next a 3-0 Vicryl was used to close the subcutaneous layers in a simple interrupted technique.   Next 3-0 nylon was used to close any skin edges that would come together.  In a simple interrupted technique.  At this point there was still a large portion of the wound that would not closed due to loss of skin and subcutaneous tissue.  Therefore a [employer] STRAVIX graft 3 x 6 cm x 2 was used.  Was sutured into place using 3-0 Vicryl in a running stitch.  The STRAVIX graft was then dressed with Adaptic 4 x 4's Kerlix ABD and an Ace wrap.  The patient was then transported to the recovery room with vital signs stable and neurovascular status returned to baseline for the patient's right foot the patient tolerated the procedures and anesthesia well with no complications.    Postoperative check patient was later seen in the recovery room with her dressings clean dry and intact with no bleeding noted to the right foot.  Patient stated that she had no pain.  Patient was later transported back to her room on the third floor of the hospital for continued medical care and IV antibiotic treatment.  Dr. Cardenas will follow this patient as needed.    Electronically signed by Willie Lance DPM on 5/29/2024 at 7:50 PM     60

## 2024-08-01 VITALS
RESPIRATION RATE: 16 BRPM | HEART RATE: 78 BPM | DIASTOLIC BLOOD PRESSURE: 88 MMHG | OXYGEN SATURATION: 97 % | SYSTOLIC BLOOD PRESSURE: 138 MMHG

## 2024-08-01 ASSESSMENT — ENCOUNTER SYMPTOMS: PAIN LOCATION - PAIN QUALITY: ACHY

## 2024-08-01 NOTE — HOME HEALTH
Physician Notification and Justification to Continue Services:     Justification for continued intermittent care: patient with wound care concerns      Dr Rivas notified of the following: the need for continued Skilled Nursing home health services for above reasons, plan of care including visit frequency of 3 x week for 9 weeks  Skilled Nursing for : additional assessment and WOUND    Skilled reason for visit: wound vac to right foot     Caregiver involvement: patient stated she has a son that lives with her .    Medications reviewed and all medications are available in the home this visit.      The following education was provided regarding medications:  all meds are in the home and she is taking them as ordered.      MD notified of any discrepancies/look a-like medications/medication interactions: none    Medications are effective at this time.      Home health supplies by type and quantity ordered/delivered this visit include: kristel         Patient education provided this visit: keeping her feet elevated and off of her foot , compression to leg, staying off her foot     Sharps education provided: Clinician instructed patient/CG on proper disposal of sharps: Containers should be made of hard plastic, be puncture-resistant and leakproof, such as a laundry detergent or bleach bottle.  When the container is ¾ full, it should be sealed with tape and labeled DO NOT RECYCLE prior to discarding in the regular trash.      Patient level of understanding of education provided: patient verbalized understanding    Patient response to procedure performed:   patient had no complaints    Agency Progress toward goals: progressing    Patient's Progress towards personal goals: patient stated that she is ok    Home exercise program: deep breathing  Continued need for the following skills: Nursing.    Plan for next visit: wound care     Patient and/or caregiver notified and agrees to changes in the Plan of Care: N/A.   The following

## 2024-08-03 ENCOUNTER — HOME CARE VISIT (OUTPATIENT)
Age: 74
End: 2024-08-03

## 2024-08-03 VITALS
HEART RATE: 78 BPM | TEMPERATURE: 97 F | DIASTOLIC BLOOD PRESSURE: 90 MMHG | SYSTOLIC BLOOD PRESSURE: 138 MMHG | OXYGEN SATURATION: 96 % | RESPIRATION RATE: 18 BRPM

## 2024-08-03 PROCEDURE — G0299 HHS/HOSPICE OF RN EA 15 MIN: HCPCS

## 2024-08-03 ASSESSMENT — ENCOUNTER SYMPTOMS
PAIN LOCATION - PAIN QUALITY: DULL ACHE
HEMOPTYSIS: 0

## 2024-08-05 ENCOUNTER — HOME CARE VISIT (OUTPATIENT)
Age: 74
End: 2024-08-05
Payer: MEDICARE

## 2024-08-05 PROCEDURE — G0299 HHS/HOSPICE OF RN EA 15 MIN: HCPCS

## 2024-08-05 NOTE — HOME HEALTH
Skilled reason for visit: wound vac to right foot     Caregiver involvement: patient stated she has a son that lives with her .       Medications reviewed and all medications are available in the home this visit.      The following education was provided regarding medications:  all meds are in the home and she is taking them as ordered.      MD notified of any discrepancies/look a-like medications/medication interactions: none    Medications are effective at this time.      Home health supplies by type and quantity ordered/delivered this visit include: none      Patient education provided this visit: keeping her feet elevated and off of her foot , keeping foot dry and on scooter no walking on it     Sharps education provided: Clinician instructed patient/CG on proper disposal of sharps: Containers should be made of hard plastic, be puncture-resistant and leakproof, such as a laundry detergent or bleach bottle.  When the container is ¾ full, it should be sealed with tape and labeled DO NOT RECYCLE prior to discarding in the regular trash.      Patient level of understanding of education provided: patient verbalized understanding    Patient response to procedure performed:   patient had no complaints    Agency Progress toward goals: progressing    Patient's Progress towards personal goals: patient stated that she is ok     Home exercise program: deep breathing    Continued need for the following skills: Nursing.    Plan for next visit: wound care     Patient and/or caregiver notified and agrees to changes in the Plan of Care: N/A.     The following discharge planning was discussed with the pt/caregiver: when goals met and education is complete

## 2024-08-07 ENCOUNTER — HOME CARE VISIT (OUTPATIENT)
Age: 74
End: 2024-08-07
Payer: MEDICARE

## 2024-08-07 VITALS
DIASTOLIC BLOOD PRESSURE: 68 MMHG | TEMPERATURE: 97 F | RESPIRATION RATE: 16 BRPM | OXYGEN SATURATION: 96 % | SYSTOLIC BLOOD PRESSURE: 122 MMHG | HEART RATE: 92 BPM

## 2024-08-07 PROCEDURE — G0299 HHS/HOSPICE OF RN EA 15 MIN: HCPCS

## 2024-08-08 NOTE — HOME HEALTH
Skilled reason for visit: wound vac to right foot          Caregiver involvement: patient stated she has a son that lives with her .              Medications reviewed and all medications are available in the home this visit.           The following education was provided regarding medications:  all meds are in the home and she is taking them as ordered.           MD notified of any discrepancies/look a-like medications/medication interactions: none         Medications are effective at this time.           Home health supplies by type and quantity ordered/delivered this visit include: none           Patient education provided this visit: keeping her feet elevated and off of her foot , not putting weight on foot         Sharps education provided: Clinician instructed patient/CG on proper disposal of sharps: Containers should be made of hard plastic, be puncture-resistant and leakproof, such as a laundry detergent or bleach bottle.  When the container is ¾ full, it should be sealed with tape and labeled DO NOT RECYCLE prior to discarding in the regular trash.           Patient level of understanding of education provided: patient verbalized understanding         Patient response to procedure performed:   patient had no complaints         Agency Progress toward goals: progressing         Patient's Progress towards personal goals: patient stated that she is ok          Home exercise program: deep breathing         Continued need for the following skills: Nursing.         Plan for next visit: wound care          Patient and/or caregiver notified and agrees to changes in the Plan of Care: N/A.          The following discharge planning was discussed with the pt/caregiver: when goals met and education is complete

## 2024-08-09 ENCOUNTER — HOME CARE VISIT (OUTPATIENT)
Age: 74
End: 2024-08-09
Payer: MEDICARE

## 2024-08-09 ENCOUNTER — HOSPITAL ENCOUNTER (OUTPATIENT)
Facility: HOSPITAL | Age: 74
Discharge: HOME | End: 2024-08-09
Attending: PODIATRIST
Payer: MEDICARE

## 2024-08-09 VITALS
DIASTOLIC BLOOD PRESSURE: 52 MMHG | OXYGEN SATURATION: 97 % | HEART RATE: 81 BPM | SYSTOLIC BLOOD PRESSURE: 104 MMHG | RESPIRATION RATE: 16 BRPM | TEMPERATURE: 98.8 F

## 2024-08-09 PROCEDURE — 87075 CULTR BACTERIA EXCEPT BLOOD: CPT

## 2024-08-09 PROCEDURE — 87070 CULTURE OTHR SPECIMN AEROBIC: CPT

## 2024-08-09 PROCEDURE — 87205 SMEAR GRAM STAIN: CPT

## 2024-08-11 LAB
BACTERIA SPEC CULT: NORMAL
GRAM STN SPEC: NORMAL
SERVICE CMNT-IMP: NORMAL
SERVICE CMNT-IMP: NORMAL

## 2024-08-12 LAB
BACTERIA SPEC CULT: ABNORMAL
BACTERIA SPEC CULT: ABNORMAL
GRAM STN SPEC: ABNORMAL
SERVICE CMNT-IMP: ABNORMAL

## 2024-08-13 ENCOUNTER — HOME CARE VISIT (OUTPATIENT)
Age: 74
End: 2024-08-13
Payer: MEDICARE

## 2024-08-13 PROCEDURE — G0299 HHS/HOSPICE OF RN EA 15 MIN: HCPCS

## 2024-08-14 VITALS
DIASTOLIC BLOOD PRESSURE: 78 MMHG | HEART RATE: 78 BPM | OXYGEN SATURATION: 97 % | SYSTOLIC BLOOD PRESSURE: 122 MMHG | RESPIRATION RATE: 16 BRPM

## 2024-08-15 ENCOUNTER — HOME CARE VISIT (OUTPATIENT)
Age: 74
End: 2024-08-15
Payer: MEDICARE

## 2024-08-15 PROCEDURE — G0299 HHS/HOSPICE OF RN EA 15 MIN: HCPCS

## 2024-08-17 ENCOUNTER — HOME CARE VISIT (OUTPATIENT)
Age: 74
End: 2024-08-17
Payer: MEDICARE

## 2024-08-17 VITALS
TEMPERATURE: 97.8 F | HEART RATE: 72 BPM | SYSTOLIC BLOOD PRESSURE: 100 MMHG | DIASTOLIC BLOOD PRESSURE: 68 MMHG | OXYGEN SATURATION: 96 % | RESPIRATION RATE: 16 BRPM

## 2024-08-17 PROCEDURE — G0299 HHS/HOSPICE OF RN EA 15 MIN: HCPCS

## 2024-08-19 ENCOUNTER — HOME CARE VISIT (OUTPATIENT)
Age: 74
End: 2024-08-19
Payer: MEDICARE

## 2024-08-19 VITALS
HEART RATE: 82 BPM | DIASTOLIC BLOOD PRESSURE: 68 MMHG | TEMPERATURE: 98.9 F | SYSTOLIC BLOOD PRESSURE: 122 MMHG | OXYGEN SATURATION: 96 % | RESPIRATION RATE: 16 BRPM

## 2024-08-19 PROCEDURE — G0299 HHS/HOSPICE OF RN EA 15 MIN: HCPCS

## 2024-08-19 NOTE — HOME HEALTH
Skilled reason for visit: wound vac to right foot          Caregiver involvement: patient stated she has a son that lives with her .         Medications reviewed and all medications are available in the home this visit.           The following education was provided regarding medications:  all meds are in the home and she is taking them as ordered.           MD notified of any discrepancies/look a-like medications/medication interactions: none         Medications are effective at this time.           Home health supplies by type and quantity ordered/delivered this visit include: none          Patient education provided this visit: keeping her BS in normal range , she stated that they have been up and down, sn encouraged her to keep a food log          Sharps education provided: Clinician instructed patient/CG on proper disposal of sharps: Containers should be made of hard plastic, be puncture-resistant and leakproof, such as a laundry detergent or bleach bottle.  When the container is ¾ full, it should be sealed with tape and labeled DO NOT RECYCLE prior to discarding in the regular trash.           Patient level of understanding of education provided: patient verbalized understanding         Patient response to procedure performed:   patient had no complaints         Agency Progress toward goals: progressing         Patient's Progress towards personal goals: patient stated that she is ok          Home exercise program: deep breathing         Continued need for the following skills: Nursing.         Plan for next visit: wound care          Patient and/or caregiver notified and agrees to changes in the Plan of Care: N/A.                     The following discharge planning was discussed with the pt/caregiver: when goals met and education is complete

## 2024-08-21 ENCOUNTER — HOME CARE VISIT (OUTPATIENT)
Age: 74
End: 2024-08-21
Payer: MEDICARE

## 2024-08-21 PROCEDURE — G0299 HHS/HOSPICE OF RN EA 15 MIN: HCPCS

## 2024-08-22 VITALS
HEART RATE: 72 BPM | OXYGEN SATURATION: 97 % | DIASTOLIC BLOOD PRESSURE: 72 MMHG | TEMPERATURE: 97.1 F | SYSTOLIC BLOOD PRESSURE: 120 MMHG | RESPIRATION RATE: 20 BRPM

## 2024-08-22 ASSESSMENT — ENCOUNTER SYMPTOMS
PAIN LOCATION - PAIN QUALITY: TENDER
STOOL DESCRIPTION: FORMED
DYSPNEA ACTIVITY LEVEL: AFTER AMBULATING 10 - 20 FT

## 2024-08-22 NOTE — HOME HEALTH
Skilled reason for visit: wound vac dressing change.     Patient awake, alert and oriented, wound vac dressing removed by nurse, wound cleaned with wound cleanser, black sponge placed in wound, wound vac drape applied and wound vac resumed at 125mm continuously.  Patient nonweight bearing right foot, uses knee scooter.   Retaught on need for increased protein to help wound heal, on trouble shooting alarms from wound vac.       Caregiver involvement: Son, grandson provide intermittent assistance per patient     Medications reviewed and all medications are available in the home this visit.    The following education was provided regarding medications:  medication regimen, doses, actions, frequencies, potential side effects.  .    MD notified of any discrepancies/look a-like medications/medication interactions: na  Medications are effective at this time.      Home health supplies by type and quantity ordered/delivered this visit include: Requested wound vac drape/sponge packages     Patient education provided this visit: see interventions and above narrative     Sharps education provided: Clinician instructed patient/CG on proper disposal of sharps: Containers should be made of hard plastic, be puncture-resistant and leakproof, such as a laundry detergent or bleach bottle.  When the container is ¾ full, it should be sealed with tape and labeled DO NOT RECYCLE prior to discarding in the regular trash.        Patient level of understanding of education provided: Needs reinforcement.     Patient response to procedure performed:  Wound tender to touch.     Agency Progress toward goals: No s/s recurring complications to wounds.    Patient's Progress towards personal goals: \"the wound doesnt hurt as much anymore \"    Home exercise program: nonweight bearing right foot, change position frequenty     Continued need for the following skills: Nursing     Plan for next visit: wound vac dressing change     Patient and/or caregiver

## 2024-08-23 ENCOUNTER — HOME CARE VISIT (OUTPATIENT)
Age: 74
End: 2024-08-23
Payer: MEDICARE

## 2024-08-23 ENCOUNTER — HOSPITAL ENCOUNTER (OUTPATIENT)
Facility: HOSPITAL | Age: 74
Discharge: HOME | End: 2024-08-23
Attending: PODIATRIST
Payer: MEDICARE

## 2024-08-23 VITALS
HEART RATE: 72 BPM | OXYGEN SATURATION: 100 % | DIASTOLIC BLOOD PRESSURE: 54 MMHG | SYSTOLIC BLOOD PRESSURE: 100 MMHG | RESPIRATION RATE: 20 BRPM | TEMPERATURE: 97.5 F

## 2024-08-23 DIAGNOSIS — E11.9 TYPE 2 DIABETES MELLITUS WITHOUT COMPLICATION, WITH LONG-TERM CURRENT USE OF INSULIN (HCC): ICD-10-CM

## 2024-08-23 DIAGNOSIS — L97.412 MIDFOOT ULCERATION, RIGHT, WITH FAT LAYER EXPOSED (HCC): Primary | ICD-10-CM

## 2024-08-23 DIAGNOSIS — Z79.4 TYPE 2 DIABETES MELLITUS WITHOUT COMPLICATION, WITH LONG-TERM CURRENT USE OF INSULIN (HCC): ICD-10-CM

## 2024-08-23 PROCEDURE — 11043 DBRDMT MUSC&/FSCA 1ST 20/<: CPT

## 2024-08-23 PROCEDURE — 6370000000 HC RX 637 (ALT 250 FOR IP): Performed by: PODIATRIST

## 2024-08-23 RX ORDER — SODIUM CHLOR/HYPOCHLOROUS ACID 0.033 %
SOLUTION, IRRIGATION IRRIGATION ONCE
OUTPATIENT
Start: 2024-08-23 | End: 2024-08-23

## 2024-08-23 RX ORDER — IBUPROFEN 200 MG
TABLET ORAL ONCE
OUTPATIENT
Start: 2024-08-23 | End: 2024-08-23

## 2024-08-23 RX ORDER — LIDOCAINE HYDROCHLORIDE 20 MG/ML
JELLY TOPICAL ONCE
Status: COMPLETED | OUTPATIENT
Start: 2024-08-23 | End: 2024-08-23

## 2024-08-23 RX ORDER — LIDOCAINE HYDROCHLORIDE 20 MG/ML
JELLY TOPICAL ONCE
OUTPATIENT
Start: 2024-08-23 | End: 2024-08-23

## 2024-08-23 RX ADMIN — LIDOCAINE HYDROCHLORIDE: 20 JELLY TOPICAL at 12:07

## 2024-08-23 ASSESSMENT — PAIN SCALES - GENERAL: PAINLEVEL_OUTOF10: 4

## 2024-08-23 NOTE — PLAN OF CARE
AVAILABLE UNTIL YOU ARE ABLE TO REACH US. IT IS MOST IMPORTANT TO KEEP THE WOUND COVERED AT ALL TIMES.

## 2024-08-23 NOTE — FLOWSHEET NOTE
08/23/24 1100   Anesthetic   Anesthetic 2% Lidocaine Gel Topical   Right Leg Edema Point of Measurement   Compression Therapy Ace wrap   Musculoskeletal   Musculoskeletal (WDL) X   Foot Assessment   Foot Assessment X   Toe Nail Assessment   Toe Nail Assessment X   Negative Pressure Wound Therapy Foot Plantar;Right   Placement Date: 06/02/24   Location: Foot  Wound Location Orientation: Plantar;Right   Wound Type Surgical   Unit Type activac   Dressing Type Black Foam   Number of pieces used 1   Number of pieces removed 1   Cycle Continuous   Target Pressure (mmHg) 125   Canister changed? No   Dressing Status New dressing applied   Wound 08/09/24 Right;Plantar   Date First Assessed/Time First Assessed: 08/09/24 1124   Wound Location Orientation: Right;Plantar   Wound Image     Wound Etiology Surgical   Dressing Status New dressing applied   Wound Cleansed Wound cleanser   Dressing/Treatment Negative pressure wound therapy   Offloading for Diabetic Foot Ulcers Offloading not required   Dressing Change Due 08/29/24   Wound Length (cm) 2.8 cm   Wound Width (cm) 2.5 cm   Wound Depth (cm) 0.3 cm   Wound Surface Area (cm^2) 7 cm^2   Change in Wound Size % (l*w) 20   Wound Volume (cm^3) 2.1 cm^3   Wound Healing % 20   Post-Procedure Length (cm) 2.9 cm   Post-Procedure Width (cm) 2.6 cm   Post-Procedure Depth (cm) 0.4 cm   Post-Procedure Surface Area (cm^2) 7.54 cm^2   Post-Procedure Volume (cm^3) 3.016 cm^3   Wound Assessment Devitalized tissue   Drainage Amount Scant (moist but unmeasurable)   Drainage Description Serosanguinous   Odor None   Rosa-wound Assessment Intact   Margins Attached edges   Wound Thickness Description not for Pressure Injury Full thickness   Pain Assessment   Pain Assessment 0-10   Pain Level 4     Negative Pressure Wound Therapy    NAME:  Angelica Duncan  YOB: 1950  MEDICAL RECORD NUMBER:  [id number]  DATE:  8/23/2024    Applied Negative Pressure to right foot

## 2024-08-26 ENCOUNTER — HOME CARE VISIT (OUTPATIENT)
Age: 74
End: 2024-08-26
Payer: MEDICARE

## 2024-08-26 VITALS
DIASTOLIC BLOOD PRESSURE: 88 MMHG | RESPIRATION RATE: 16 BRPM | HEART RATE: 78 BPM | SYSTOLIC BLOOD PRESSURE: 138 MMHG | OXYGEN SATURATION: 98 %

## 2024-08-26 PROCEDURE — G0299 HHS/HOSPICE OF RN EA 15 MIN: HCPCS

## 2024-08-26 NOTE — HOME HEALTH
Skilled reason for visit: wound vac to right foot     Caregiver involvement: patient stated she has a son that lives with her .    Medications reviewed and all medications are available in the home this visit.      The following education was provided regarding medications:  all meds are in the home and she is taking them as ordered.      MD notified of any discrepancies/look a-like medications/medication interactions: none    Medications are effective at this time.      Home health supplies by type and quantity ordered/delivered this visit include: none     Patient education provided this visit: keeping her BS in normal range , keeping her foot dry and not to shower     Sharps education provided: Clinician instructed patient/CG on proper disposal of sharps: Containers should be made of hard plastic, be puncture-resistant and leakproof, such as a laundry detergent or bleach bottle.  When the container is ¾ full, it should be sealed with tape and labeled DO NOT RECYCLE prior to discarding in the regular trash.      Patient level of understanding of education provided: patient verbalized understanding       Patient response to procedure performed:   patient had no complaints    Agency Progress toward goals: progressing    Patient's Progress towards personal goals: patient stated that she is ok     Home exercise program: deep breathing    Continued need for the following skills: Nursing.    Plan for next visit: wound care      Patient and/or caregiver notified and agrees to changes in the Plan of Care: N/A.      The following discharge planning was discussed with the pt/caregiver: when goals met and education is complete

## 2024-08-28 ENCOUNTER — HOME CARE VISIT (OUTPATIENT)
Age: 74
End: 2024-08-28
Payer: MEDICARE

## 2024-08-28 NOTE — CASE COMMUNICATION
radha will be seen in [facility] clinic tomorrow. No visit needed today. SN will see patient on Saturday. MD is aware

## 2024-08-29 ENCOUNTER — HOSPITAL ENCOUNTER (OUTPATIENT)
Facility: HOSPITAL | Age: 74
Discharge: HOME | End: 2024-08-29
Attending: PODIATRIST
Payer: MEDICARE

## 2024-08-29 VITALS
TEMPERATURE: 99 F | DIASTOLIC BLOOD PRESSURE: 41 MMHG | SYSTOLIC BLOOD PRESSURE: 86 MMHG | RESPIRATION RATE: 16 BRPM | OXYGEN SATURATION: 98 % | HEART RATE: 66 BPM

## 2024-08-29 DIAGNOSIS — Z79.4 TYPE 2 DIABETES MELLITUS WITHOUT COMPLICATION, WITH LONG-TERM CURRENT USE OF INSULIN (HCC): ICD-10-CM

## 2024-08-29 DIAGNOSIS — E11.9 TYPE 2 DIABETES MELLITUS WITHOUT COMPLICATION, WITH LONG-TERM CURRENT USE OF INSULIN (HCC): ICD-10-CM

## 2024-08-29 DIAGNOSIS — L97.412 MIDFOOT ULCERATION, RIGHT, WITH FAT LAYER EXPOSED (HCC): Primary | ICD-10-CM

## 2024-08-29 PROCEDURE — 6370000000 HC RX 637 (ALT 250 FOR IP): Performed by: PODIATRIST

## 2024-08-29 PROCEDURE — 11042 DBRDMT SUBQ TIS 1ST 20SQCM/<: CPT

## 2024-08-29 RX ORDER — SODIUM CHLOR/HYPOCHLOROUS ACID 0.033 %
SOLUTION, IRRIGATION IRRIGATION ONCE
OUTPATIENT
Start: 2024-08-29 | End: 2024-08-29

## 2024-08-29 RX ORDER — LIDOCAINE HYDROCHLORIDE 20 MG/ML
JELLY TOPICAL ONCE
Status: COMPLETED | OUTPATIENT
Start: 2024-08-29 | End: 2024-08-29

## 2024-08-29 RX ORDER — NEOMYCIN/BACITRACIN/POLYMYXINB 3.5-400-5K
OINTMENT (GRAM) TOPICAL ONCE
OUTPATIENT
Start: 2024-08-29 | End: 2024-08-29

## 2024-08-29 RX ORDER — LIDOCAINE HYDROCHLORIDE 20 MG/ML
JELLY TOPICAL ONCE
OUTPATIENT
Start: 2024-08-29 | End: 2024-08-29

## 2024-08-29 RX ADMIN — LIDOCAINE HYDROCHLORIDE: 20 JELLY TOPICAL at 12:26

## 2024-08-29 ASSESSMENT — PAIN SCALES - GENERAL: PAINLEVEL_OUTOF10: 3

## 2024-08-29 NOTE — FLOWSHEET NOTE
barrier prep to darline-wound.   [x] Cut strips of plastic drape to picture frame wound so that darline-wound is     covered with the drape.   [x] If bridging dressing to less prominent site, cover any intact skin that will come in contact with the Negative Pressure Therapy sponge, gauze or channel drain with plastic drape. The sponge should never touch intact skin.   [x] Cut sponge, gauze or channel drain to size which will fit into the wound/ulcer bed without being forced.   [x] Be sure the sponge is large enough to hold the entire round plastic flange which is attached to the tubing. Never allow flange to be larger than the sponge or it will produce suction damaging intact skin.  Total number of individual pieces of foam used within the wound bed: 1    [x] If bridging the dressing away from the primary site, be sure the bridge leads to a piece of sponge large enough to hold the entire flange without allowing any of the flange to overlap onto intact skin.   [x] Covered sponge, gauze or channel drain with plastic drape.   [x] Cut a hole in this plastic drape directly over the sponge the same size as the plastic drain tubing.   [x] Removed plastic liner from flange and apply it directly over the hole you cut.   [x] Removed the plastic cover from the flange.   [x] Attached the tubing to the wound/ulcer Negative Pressure Therapy and turn it on to be sure a vacuum is created and that there are no leaks.   [x] If air leaks occur, use plastic drape to patch them.   [x] Secured Negative Pressure Therapy dressing with ace wrap loosely if located on an extremity. Maintain tubing outside of ace wrap. Tubing must not exert pressure on intact skin.    Applied per  Guidelines      Electronically signed by TEN CASTILLO RN on 8/29/2024 at 4:03 PM

## 2024-08-29 NOTE — PLAN OF CARE
Discharge Instructions from  Wound Care Clinic at [facility]   [address]   [address]  [place] [address]  [phone] Fax [phone]    NAME:  Angelica Duncan  YOB: 1950  MEDICAL RECORD NUMBER:  [id number]  DATE:  8/29/2024    Wound Cleansing:   Do not scrub or use excessive force.  Cleanse wound prior to applying a clean dressing with:     [x] Wound Cleanser         Topical Treatments:  Do not apply lotions, creams, or ointments to wound bed unless directed.         Dressings:           Wound Location :  right plantar foot wound   [x] Apply Primary Dressing:       [x] Collagen       [x] Cover and Secure with:     [x] Ace Wrap      [x] Other: Soft roll    [x] Change dressing: [] Daily    [] Every Other Day [] Three times per week   [] Once a week [] Do Not Change Dressing   [] Other:     Negative Pressure:            [x] Pressure@    125    mm/Hg  [x]Continuous    [x] Black     [x]Change dressing: []Three times per week    [x]Other: 2 times perweek    Applied Negative Pressure to Right foot wound(s)/ulcer(s).  [x] Applied skin barrier prep to darline-wound.   [x] Cut strips of plastic drape to picture frame wound so that darline-wound is     covered with the drape.   [x] If bridging dressing to less prominent site, cover any intact skin that will come in contact with the Negative Pressure Therapy sponge, gauze or channel drain with plastic drape. The sponge should never touch intact skin.   [x] Cut sponge, gauze or channel drain to size which will fit into the wound/ulcer bed without being forced.   [x] Be sure the sponge is large enough to hold the entire round plastic flange which is attached to the tubing. Never allow flange to be larger than the sponge or it will produce suction damaging intact skin.  Total number of individual pieces of foam used within the wound bed: 1    [x] If bridging the dressing away from the primary site, be sure the bridge leads to a piece of sponge

## 2024-08-31 ENCOUNTER — HOME CARE VISIT (OUTPATIENT)
Age: 74
End: 2024-08-31
Payer: MEDICARE

## 2024-08-31 VITALS
DIASTOLIC BLOOD PRESSURE: 100 MMHG | HEART RATE: 73 BPM | OXYGEN SATURATION: 98 % | TEMPERATURE: 96.8 F | SYSTOLIC BLOOD PRESSURE: 166 MMHG | RESPIRATION RATE: 17 BRPM

## 2024-08-31 PROCEDURE — G0299 HHS/HOSPICE OF RN EA 15 MIN: HCPCS

## 2024-09-04 ENCOUNTER — HOME CARE VISIT (OUTPATIENT)
Age: 74
End: 2024-09-04
Payer: MEDICARE

## 2024-09-04 ASSESSMENT — ENCOUNTER SYMPTOMS
STOOL DESCRIPTION: LOOSE
DIARRHEA: 1

## 2024-09-04 NOTE — HOME HEALTH
Skilled reason for visit: wound vac to right foot          Caregiver involvement: patient stated she has a son that lives with her .         Medications reviewed and all medications are available in the home this visit.           The following education was provided regarding medications:  all meds are in the home and she is taking them as ordered.           MD notified of any discrepancies/look a-like medications/medication interactions: none         Medications are effective at this time.           Home health supplies by type and quantity ordered/delivered this visit include: none          Patient education provided this visit: keeping her BS in normal range , making sure that she is prooping her foot up and keeping her foot clean and dry       Sharps education provided: Clinician instructed patient/CG on proper disposal of sharps: Containers should be made of hard plastic, be puncture-resistant and leakproof, such as a laundry detergent or bleach bottle.  When the container is ¾ full, it should be sealed with tape and labeled DO NOT RECYCLE prior to discarding in the regular trash.           Patient level of understanding of education provided: patient verbalized understanding              Patient response to procedure performed:   patient had no complaints         Agency Progress toward goals: progressing         Patient's Progress towards personal goals: patient stated that she is ok          Home exercise program: deep breathing         Continued need for the following skills: Nursing.         Plan for next visit: wound care           Patient and/or caregiver notified and agrees to changes in the Plan of Care: N/A.           The following discharge planning was discussed with the pt/caregiver: when goals met and education is complete

## 2024-09-06 ENCOUNTER — HOME CARE VISIT (OUTPATIENT)
Age: 74
End: 2024-09-06
Payer: MEDICARE

## 2024-09-06 VITALS
DIASTOLIC BLOOD PRESSURE: 72 MMHG | OXYGEN SATURATION: 96 % | RESPIRATION RATE: 15 BRPM | TEMPERATURE: 97.9 F | HEART RATE: 72 BPM | SYSTOLIC BLOOD PRESSURE: 132 MMHG

## 2024-09-06 PROCEDURE — G0299 HHS/HOSPICE OF RN EA 15 MIN: HCPCS

## 2024-09-06 ASSESSMENT — ENCOUNTER SYMPTOMS: DIARRHEA: 1

## 2024-09-06 NOTE — HOME HEALTH
Skilled reason for visit: wound vac to right foot     Caregiver involvement: patient stated she has a son that lives with her .    Medications reviewed and all medications are available in the home this visit.      The following education was provided regarding medications:  all meds are in the home and she is taking them as ordered.       MD notified of any discrepancies/look a-like medications/medication interactions: none    Medications are effective at this time.       Home health supplies by type and quantity ordered/delivered this visit include: none   Patient education provided this visit: keeping pressure off her foot , keeping foot clean and dry. handwashing      Sharps education provided: Clinician instructed patient/CG on proper disposal of sharps: Containers should be made of hard plastic, be puncture-resistant and leakproof, such as a laundry detergent or bleach bottle.  When the container is ¾ full, it should be sealed with tape and labeled DO NOT RECYCLE prior to discarding in the regular trash.      Patient level of understanding of education provided: patient verbalized understanding    Patient response to procedure performed:   patient had no complaints    Agency Progress toward goals: progressing    Patient's Progress towards personal goals: patient stated that she is ok     Home exercise program: deep breathing    Continued need for the following skills: Nursing.    Plan for next visit: wound care     Patient and/or caregiver notified and agrees to changes in the Plan of Care: N/A.     The following discharge planning was discussed with the pt/caregiver: when goals met and education is complete

## 2024-09-10 ENCOUNTER — HOME CARE VISIT (OUTPATIENT)
Age: 74
End: 2024-09-10
Payer: MEDICARE

## 2024-09-10 PROCEDURE — G0299 HHS/HOSPICE OF RN EA 15 MIN: HCPCS

## 2024-09-10 ASSESSMENT — ENCOUNTER SYMPTOMS: DIARRHEA: 1

## 2024-09-11 ENCOUNTER — HOME CARE VISIT (OUTPATIENT)
Age: 74
End: 2024-09-11
Payer: MEDICARE

## 2024-09-14 ENCOUNTER — HOME CARE VISIT (OUTPATIENT)
Age: 74
End: 2024-09-14
Payer: MEDICARE

## 2024-09-14 PROCEDURE — G0299 HHS/HOSPICE OF RN EA 15 MIN: HCPCS

## 2024-09-15 VITALS
TEMPERATURE: 97.6 F | DIASTOLIC BLOOD PRESSURE: 70 MMHG | RESPIRATION RATE: 18 BRPM | SYSTOLIC BLOOD PRESSURE: 124 MMHG | HEART RATE: 78 BPM | OXYGEN SATURATION: 96 %

## 2024-09-16 ENCOUNTER — HOME CARE VISIT (OUTPATIENT)
Age: 74
End: 2024-09-16
Payer: MEDICARE

## 2024-09-16 PROCEDURE — G0299 HHS/HOSPICE OF RN EA 15 MIN: HCPCS

## 2024-09-16 ASSESSMENT — ENCOUNTER SYMPTOMS: DIARRHEA: 1

## 2024-09-17 VITALS
DIASTOLIC BLOOD PRESSURE: 78 MMHG | HEART RATE: 72 BPM | RESPIRATION RATE: 16 BRPM | SYSTOLIC BLOOD PRESSURE: 128 MMHG | OXYGEN SATURATION: 98 %

## 2024-09-18 ENCOUNTER — HOME CARE VISIT (OUTPATIENT)
Age: 74
End: 2024-09-18
Payer: MEDICARE

## 2024-09-19 ENCOUNTER — HOME CARE VISIT (OUTPATIENT)
Age: 74
End: 2024-09-19
Payer: MEDICARE

## 2024-09-20 ENCOUNTER — HOME CARE VISIT (OUTPATIENT)
Age: 74
End: 2024-09-20
Payer: MEDICARE

## 2024-09-20 VITALS
DIASTOLIC BLOOD PRESSURE: 72 MMHG | OXYGEN SATURATION: 96 % | SYSTOLIC BLOOD PRESSURE: 122 MMHG | RESPIRATION RATE: 16 BRPM | HEART RATE: 62 BPM

## 2024-09-20 PROCEDURE — G0299 HHS/HOSPICE OF RN EA 15 MIN: HCPCS

## 2024-09-20 ASSESSMENT — ENCOUNTER SYMPTOMS: DIARRHEA: 1

## 2024-09-23 ENCOUNTER — HOME CARE VISIT (OUTPATIENT)
Age: 74
End: 2024-09-23
Payer: MEDICARE

## 2024-09-23 PROCEDURE — G0299 HHS/HOSPICE OF RN EA 15 MIN: HCPCS

## 2024-09-25 ENCOUNTER — HOME CARE VISIT (OUTPATIENT)
Age: 74
End: 2024-09-25
Payer: MEDICARE

## 2024-09-25 VITALS
OXYGEN SATURATION: 98 % | TEMPERATURE: 97.8 F | DIASTOLIC BLOOD PRESSURE: 78 MMHG | RESPIRATION RATE: 16 BRPM | HEART RATE: 62 BPM | SYSTOLIC BLOOD PRESSURE: 128 MMHG

## 2024-09-27 ENCOUNTER — HOSPITAL ENCOUNTER (OUTPATIENT)
Facility: HOSPITAL | Age: 74
Discharge: HOME | End: 2024-09-27
Attending: PODIATRIST

## 2024-09-27 ENCOUNTER — HOME CARE VISIT (OUTPATIENT)
Age: 74
End: 2024-09-27
Payer: MEDICARE

## 2024-09-29 ENCOUNTER — HOME CARE VISIT (OUTPATIENT)
Age: 74
End: 2024-09-29
Payer: MEDICARE

## 2024-10-11 ENCOUNTER — HOSPITAL ENCOUNTER (OUTPATIENT)
Facility: HOSPITAL | Age: 74
Discharge: HOME | End: 2024-10-11
Attending: PODIATRIST | Admitting: PODIATRIST
Payer: MEDICARE

## 2024-10-11 VITALS
TEMPERATURE: 98.9 F | HEART RATE: 63 BPM | OXYGEN SATURATION: 99 % | RESPIRATION RATE: 16 BRPM | DIASTOLIC BLOOD PRESSURE: 92 MMHG | SYSTOLIC BLOOD PRESSURE: 126 MMHG

## 2024-10-11 DIAGNOSIS — Z79.4 TYPE 2 DIABETES MELLITUS WITHOUT COMPLICATION, WITH LONG-TERM CURRENT USE OF INSULIN (HCC): ICD-10-CM

## 2024-10-11 DIAGNOSIS — E11.9 TYPE 2 DIABETES MELLITUS WITHOUT COMPLICATION, WITH LONG-TERM CURRENT USE OF INSULIN (HCC): ICD-10-CM

## 2024-10-11 DIAGNOSIS — L97.412 MIDFOOT ULCERATION, RIGHT, WITH FAT LAYER EXPOSED (HCC): Primary | ICD-10-CM

## 2024-10-11 PROCEDURE — 6370000000 HC RX 637 (ALT 250 FOR IP): Performed by: PODIATRIST

## 2024-10-11 PROCEDURE — 11042 DBRDMT SUBQ TIS 1ST 20SQCM/<: CPT

## 2024-10-11 RX ORDER — LIDOCAINE HYDROCHLORIDE 20 MG/ML
JELLY TOPICAL ONCE
Status: COMPLETED | OUTPATIENT
Start: 2024-10-11 | End: 2024-10-11

## 2024-10-11 RX ORDER — LIDOCAINE HYDROCHLORIDE 20 MG/ML
JELLY TOPICAL ONCE
OUTPATIENT
Start: 2024-10-11 | End: 2024-10-11

## 2024-10-11 RX ORDER — NEOMYCIN/BACITRACIN/POLYMYXINB 3.5-400-5K
OINTMENT (GRAM) TOPICAL ONCE
OUTPATIENT
Start: 2024-10-11 | End: 2024-10-11

## 2024-10-11 RX ORDER — SODIUM CHLOR/HYPOCHLOROUS ACID 0.033 %
SOLUTION, IRRIGATION IRRIGATION ONCE
OUTPATIENT
Start: 2024-10-11 | End: 2024-10-11

## 2024-10-11 RX ADMIN — LIDOCAINE HYDROCHLORIDE: 20 JELLY TOPICAL at 13:45

## 2024-10-11 ASSESSMENT — PAIN SCALES - GENERAL: PAINLEVEL_OUTOF10: 3

## 2024-10-11 NOTE — FLOWSHEET NOTE
10/11/24 1619   Wound 08/09/24 Right;Plantar   Date First Assessed/Time First Assessed: 08/09/24 1124   Wound Location Orientation: Right;Plantar   Wound Image     Wound Etiology Surgical   Dressing Status New dressing applied   Wound Cleansed Wound cleanser   Dressing/Treatment Collagen;Negative pressure wound therapy   Offloading for Diabetic Foot Ulcers Knee scooter   Dressing Change Due 10/18/24   Wound Length (cm) 1.5 cm   Wound Width (cm) 0.9 cm   Wound Depth (cm) 0.2 cm   Wound Surface Area (cm^2) 1.35 cm^2   Change in Wound Size % (l*w) 84.57   Wound Volume (cm^3) 0.27 cm^3   Wound Healing % 90   Post-Procedure Length (cm) 1.6 cm   Post-Procedure Width (cm) 1 cm   Post-Procedure Depth (cm) 0.2 cm   Post-Procedure Surface Area (cm^2) 1.6 cm^2   Post-Procedure Volume (cm^3) 0.32 cm^3   Wound Assessment Devitalized tissue   Drainage Amount Scant (moist but unmeasurable)   Drainage Description Serosanguinous   Odor None   Darline-wound Assessment Intact   Margins Attached edges;Defined edges   Wound Thickness Description not for Pressure Injury Full thickness   Negative Pressure Wound Therapy Foot Plantar;Right   Placement Date: 06/02/24   Location: Foot  Wound Location Orientation: Plantar;Right   Wound Type Surgical   Dressing Type Black Foam   Number of pieces used 1   Number of pieces removed 1   Cycle Continuous   Target Pressure (mmHg) 125   Canister changed? Yes   Dressing Status New dressing applied   Dressing Changed Changed/New   Drainage Amount Small   Drainage Description Serosanguinous   Wound Assessment Granulation tissue   Darline-wound Assessment Hyperkeratosis (callous)               Negative Pressure Wound Therapy    NAME:  Angelica Duncan  YOB: 1950  MEDICAL RECORD NUMBER:  [id number]  DATE:  10/11/2024    Applied Negative Pressure to right plantar foot wound(s)/ulcer(s).  [x] Applied skin barrier prep to darline-wound.   [x] Cut strips of plastic drape to picture frame wound so that

## 2024-10-25 ENCOUNTER — HOSPITAL ENCOUNTER (OUTPATIENT)
Facility: HOSPITAL | Age: 74
Discharge: HOME | End: 2024-10-25
Attending: PODIATRIST | Admitting: PODIATRIST
Payer: MEDICARE

## 2024-10-25 VITALS — TEMPERATURE: 97.4 F | SYSTOLIC BLOOD PRESSURE: 95 MMHG | RESPIRATION RATE: 16 BRPM | DIASTOLIC BLOOD PRESSURE: 49 MMHG

## 2024-10-25 DIAGNOSIS — Z79.4 TYPE 2 DIABETES MELLITUS WITHOUT COMPLICATION, WITH LONG-TERM CURRENT USE OF INSULIN (HCC): ICD-10-CM

## 2024-10-25 DIAGNOSIS — L97.412 MIDFOOT ULCERATION, RIGHT, WITH FAT LAYER EXPOSED (HCC): Primary | ICD-10-CM

## 2024-10-25 DIAGNOSIS — E11.9 TYPE 2 DIABETES MELLITUS WITHOUT COMPLICATION, WITH LONG-TERM CURRENT USE OF INSULIN (HCC): ICD-10-CM

## 2024-10-25 PROCEDURE — 11042 DBRDMT SUBQ TIS 1ST 20SQCM/<: CPT

## 2024-10-25 PROCEDURE — 6370000000 HC RX 637 (ALT 250 FOR IP): Performed by: PODIATRIST

## 2024-10-25 RX ORDER — LIDOCAINE HYDROCHLORIDE 20 MG/ML
JELLY TOPICAL ONCE
OUTPATIENT
Start: 2024-10-25 | End: 2024-10-25

## 2024-10-25 RX ORDER — LIDOCAINE HYDROCHLORIDE 20 MG/ML
JELLY TOPICAL ONCE
Status: COMPLETED | OUTPATIENT
Start: 2024-10-25 | End: 2024-10-25

## 2024-10-25 RX ORDER — SODIUM CHLOR/HYPOCHLOROUS ACID 0.033 %
SOLUTION, IRRIGATION IRRIGATION ONCE
OUTPATIENT
Start: 2024-10-25 | End: 2024-10-25

## 2024-10-25 RX ORDER — NEOMYCIN/BACITRACIN/POLYMYXINB 3.5-400-5K
OINTMENT (GRAM) TOPICAL ONCE
OUTPATIENT
Start: 2024-10-25 | End: 2024-10-25

## 2024-10-25 RX ADMIN — LIDOCAINE HYDROCHLORIDE: 20 JELLY TOPICAL at 08:30

## 2024-10-25 ASSESSMENT — PAIN SCALES - GENERAL: PAINLEVEL_OUTOF10: 2

## 2024-10-25 NOTE — PLAN OF CARE
Discharge Instructions from  Wound Care Clinic at [facility]   [address]   [address]  [place] [address]  [phone] Fax [phone]    NAME:  Angelica Duncan  YOB: 1950  MEDICAL RECORD NUMBER:  [id number]  DATE:  10/25/2024      Wound Cleansing:   Do not scrub or use excessive force.  Cleanse wound prior to applying a clean dressing with:     [x] Wound Cleanser          Topical Treatments:  Do not apply lotions, creams, or ointments to wound bed unless directed.                    Dressings:                  Wound Location :  right plantar foot wound          [x] Apply Primary Dressing:                                          [x] Collagen           [x] Cover and Secure with:                   [x] Ace Wrap                                         [x] Other: Soft roll     [x] Change dressing:  [] Daily           [] Every Other Day    [x] Three times per week( Except on Fridays when pt comes to wound clinic)              [] Once a week          [] Do Not Change Dressing    [] Other:                Negative Pressure:            [x] Pressure@    125    mm/Hg                       [x]Continuous                [x] Black                        [x]Change dressing:   []Three times per week         [x]Other: 3 times per week (Except on Fridays when pt comes to wound clinic)     Applied Negative Pressure to Right foot wound(s)/ulcer(s).  [x] Applied skin barrier prep to darline-wound.   [x] Cut strips of plastic drape to picture frame wound so that darline-wound is     covered with the drape.   [x] If bridging dressing to less prominent site, cover any intact skin that will come in contact with the Negative Pressure Therapy sponge, gauze or channel drain with plastic drape. The sponge should never touch intact skin.   [x] Cut sponge, gauze or channel drain to size which will fit into the wound/ulcer bed without being forced.   [x] Be sure the sponge is large enough to hold the entire round

## 2024-10-25 NOTE — FLOWSHEET NOTE
10/25/24 0826   Wound 08/09/24 Right;Plantar   Date First Assessed/Time First Assessed: 08/09/24 1124   Wound Location Orientation: Right;Plantar   Wound Image     Wound Etiology Surgical   Dressing Status New dressing applied   Wound Cleansed Wound cleanser   Dressing/Treatment Collagen with Ag;Negative pressure wound therapy   Offloading for Diabetic Foot Ulcers Knee scooter   Dressing Change Due 11/08/24   Wound Length (cm) 1.8 cm   Wound Width (cm) 0.6 cm   Wound Depth (cm) 0.5 cm   Wound Surface Area (cm^2) 1.08 cm^2   Change in Wound Size % (l*w) 87.66   Wound Volume (cm^3) 0.54 cm^3   Wound Healing % 79   Post-Procedure Length (cm) 1.9 cm   Post-Procedure Width (cm) 0.7 cm   Post-Procedure Depth (cm) 0.5 cm   Post-Procedure Surface Area (cm^2) 1.33 cm^2   Post-Procedure Volume (cm^3) 0.665 cm^3   Wound Assessment Devitalized tissue   Drainage Amount Scant (moist but unmeasurable)   Drainage Description Serosanguinous   Odor None   Rosa-wound Assessment Intact   Margins Attached edges;Defined edges   Wound Thickness Description not for Pressure Injury Full thickness   Negative Pressure Wound Therapy Foot Plantar;Right   Placement Date: 06/02/24   Location: Foot  Wound Location Orientation: Plantar;Right   Wound Type Surgical   Unit Type activac   Dressing Type Black Foam   Number of pieces used 1   Number of pieces removed 1   Cycle Continuous   Target Pressure (mmHg) 125   Canister changed? Yes   Dressing Status New dressing applied   Dressing Changed Changed/New   Drainage Amount Small   Drainage Description Serosanguinous   Dressing Change Due   (home health to change)   Wound Assessment Granulation tissue   Rosa-wound Assessment Hyperkeratosis (callous)   Wound Follow Up   Require Follow Up Yes

## 2024-11-07 ENCOUNTER — HOSPITAL ENCOUNTER (OUTPATIENT)
Facility: HOSPITAL | Age: 74
Discharge: HOME | End: 2024-11-07
Attending: PODIATRIST | Admitting: PODIATRIST
Payer: MEDICARE

## 2024-11-07 VITALS
HEART RATE: 83 BPM | SYSTOLIC BLOOD PRESSURE: 142 MMHG | RESPIRATION RATE: 20 BRPM | OXYGEN SATURATION: 100 % | TEMPERATURE: 97.9 F | DIASTOLIC BLOOD PRESSURE: 42 MMHG

## 2024-11-07 DIAGNOSIS — Z79.4 TYPE 2 DIABETES MELLITUS WITHOUT COMPLICATION, WITH LONG-TERM CURRENT USE OF INSULIN (HCC): ICD-10-CM

## 2024-11-07 DIAGNOSIS — L97.412 MIDFOOT ULCERATION, RIGHT, WITH FAT LAYER EXPOSED (HCC): Primary | ICD-10-CM

## 2024-11-07 DIAGNOSIS — E11.9 TYPE 2 DIABETES MELLITUS WITHOUT COMPLICATION, WITH LONG-TERM CURRENT USE OF INSULIN (HCC): ICD-10-CM

## 2024-11-07 PROCEDURE — 87075 CULTR BACTERIA EXCEPT BLOOD: CPT

## 2024-11-07 PROCEDURE — 11042 DBRDMT SUBQ TIS 1ST 20SQCM/<: CPT

## 2024-11-07 PROCEDURE — 87205 SMEAR GRAM STAIN: CPT

## 2024-11-07 PROCEDURE — 87070 CULTURE OTHR SPECIMN AEROBIC: CPT

## 2024-11-07 PROCEDURE — 6370000000 HC RX 637 (ALT 250 FOR IP): Performed by: PODIATRIST

## 2024-11-07 RX ORDER — LIDOCAINE HYDROCHLORIDE 20 MG/ML
JELLY TOPICAL ONCE
Status: COMPLETED | OUTPATIENT
Start: 2024-11-07 | End: 2024-11-07

## 2024-11-07 RX ORDER — NEOMYCIN/BACITRACIN/POLYMYXINB 3.5-400-5K
OINTMENT (GRAM) TOPICAL ONCE
OUTPATIENT
Start: 2024-11-07 | End: 2024-11-07

## 2024-11-07 RX ORDER — LIDOCAINE HYDROCHLORIDE 20 MG/ML
JELLY TOPICAL ONCE
OUTPATIENT
Start: 2024-11-07 | End: 2024-11-07

## 2024-11-07 RX ORDER — SODIUM CHLOR/HYPOCHLOROUS ACID 0.033 %
SOLUTION, IRRIGATION IRRIGATION ONCE
OUTPATIENT
Start: 2024-11-07 | End: 2024-11-07

## 2024-11-07 RX ADMIN — LIDOCAINE HYDROCHLORIDE: 20 JELLY TOPICAL at 10:16

## 2024-11-07 ASSESSMENT — PAIN SCALES - GENERAL: PAINLEVEL_OUTOF10: 2

## 2024-11-07 NOTE — FLOWSHEET NOTE
11/07/24 0918   Wound 08/09/24 Right;Plantar   Date First Assessed/Time First Assessed: 08/09/24 1124   Wound Location Orientation: Right;Plantar   Wound Image    Wound Etiology Surgical   Dressing Status New dressing applied   Wound Cleansed Wound cleanser   Dressing Change Due 11/22/24   Wound Length (cm) 1.5 cm   Wound Width (cm) 0.4 cm   Wound Depth (cm) 0.3 cm   Wound Surface Area (cm^2) 0.6 cm^2   Change in Wound Size % (l*w) 93.14   Wound Volume (cm^3) 0.18 cm^3   Wound Healing % 93   Post-Procedure Length (cm) 1.6 cm   Post-Procedure Width (cm) 0.5 cm   Post-Procedure Depth (cm) 0.4 cm   Post-Procedure Surface Area (cm^2) 0.8 cm^2   Post-Procedure Volume (cm^3) 0.32 cm^3   Wound Assessment Devitalized tissue   Drainage Amount Scant (moist but unmeasurable)   Drainage Description Serosanguinous   Odor Moderate   Darline-wound Assessment Maceration;Intact   Margins Defined edges   Wound Thickness Description not for Pressure Injury Full thickness   Negative Pressure Wound Therapy Foot Plantar;Right   Placement Date: 06/02/24   Location: Foot  Wound Location Orientation: Plantar;Right   Wound Type Surgical   Unit Type activac   Dressing Type Black Foam   Number of pieces used 2   Number of pieces removed 2   Cycle Continuous   Target Pressure (mmHg) 125   Canister changed? Yes   Dressing Status New dressing applied   Dressing Changed Changed/New   Drainage Amount Small   Drainage Description Serosanguinous   Wound Assessment Granulation tissue   Darline-wound Assessment Maceration   Behzad Scale   Sensory Perceptions 3   Moisture 4   Activity 4   Mobility 3   Nutrition 3   Friction and Shear 3   Behzad Scale Score 20     Negative Pressure Wound Therapy    NAME:  Angelica Duncan  YOB: 1950  MEDICAL RECORD NUMBER:  [id number]  DATE:  11/7/2024    Applied Negative Pressure to 1 wound(s)/ulcer(s).  [x] Applied skin barrier prep to darline-wound.   [x] Cut strips of plastic drape to picture frame wound so

## 2024-11-07 NOTE — DISCHARGE INSTRUCTIONS
Discharge Instructions from  Wound Care Clinic at [facility]   [address]   [address]  [place] [address] [phone] Fax [phone]    NAME:  Angelica Duncan  YOB: 1950  MEDICAL RECORD NUMBER:  [id number]  DATE:  @ED@    Wound Cleansing:   Do not scrub or use excessive force.  Cleanse wound prior to applying a clean dressing with:    [x] Wound Cleanser      Dressings:           Wound Location: right plantar foot   [x] Apply Primary Dressing:        [x] Collagen with Silver         [x] Change dressing: [x] Three times a week with home health and once every other week in outpatient wound clinic     Negative Pressure:           Wound Location:   [x] Pressure@  125 mmHg      [x]Continuous    [x] Black    [x]Change dressing: [x]Three times per week         Return Appointment:  [] Wound and dressing supply provider:   [x] ECF or Home Healthcare: [facility]  [] Wound Assessment: [] Physician or NP scheduled for Wound Assessment:   [x] Return Appointment: With MD  in  2 Week (s)  [x] Ordered tests: cultures ordered     Electronically signed Tere Jenkins RN on 11/7/2024 at 11:59 AM     Wound Care Center Information: Should you experience any significant changes in your wound(s) or have questions about your wound care, please contact the [facility] at MONDAY - FRIDAY 8:00 am - 4:30.  If you need help with your wound outside these hours and cannot wait until we are again available, contact your PCP or go to the hospital emergency room.     PLEASE NOTE: IF YOU ARE UNABLE TO OBTAIN WOUND SUPPLIES, CONTINUE TO USE THE SUPPLIES YOU HAVE AVAILABLE UNTIL YOU ARE ABLE TO REACH US. IT IS MOST IMPORTANT TO KEEP THE WOUND COVERED AT ALL TIMES.

## 2024-11-07 NOTE — PLAN OF CARE
Discharge Instructions from  Wound Care Clinic at [facility]   [address]   [address]  [place] [address] [phone] Fax [phone]    NAME:  Angelica Duncan  YOB: 1950  MEDICAL RECORD NUMBER:  [id number]  DATE:  @ED@    Wound Cleansing:   Do not scrub or use excessive force.  Cleanse wound prior to applying a clean dressing with:    [x] Wound Cleanser      Dressings:           Wound Location: right plantar foot   [x] Apply Primary Dressing:        [x] Collagen with Silver         [x] Change dressing: [x] Three times a week with home health and once every other week in outpatient wound clinic     Negative Pressure:           Wound Location:   [x] Pressure@  125 mmHg      [x]Continuous    [x] Black    [x]Change dressing: [x]Three times per week         Return Appointment:  [] Wound and dressing supply provider:   [x] ECF or Home Healthcare: [facility]  [] Wound Assessment: [] Physician or NP scheduled for Wound Assessment:   [x] Return Appointment: With MD  in  2 Week (s)  [x] Ordered tests: cultures ordered     Electronically signed Tere Jenkins RN on 11/7/2024 at 11:59 AM     Wound Care Center Information: Should you experience any significant changes in your wound(s) or have questions about your wound care, please contact the [facility] at MONDAY - FRIDAY 8:00 am - 4:30.  If you need help with your wound outside these hours and cannot wait until we are again available, contact your PCP or go to the hospital emergency room.     PLEASE NOTE: IF YOU ARE UNABLE TO OBTAIN WOUND SUPPLIES, CONTINUE TO USE THE SUPPLIES YOU HAVE AVAILABLE UNTIL YOU ARE ABLE TO REACH US. IT IS MOST IMPORTANT TO KEEP THE WOUND COVERED AT ALL TIMES.

## 2024-11-09 LAB
BACTERIA SPEC CULT: NORMAL
SERVICE CMNT-IMP: NORMAL

## 2024-11-16 ENCOUNTER — APPOINTMENT (OUTPATIENT)
Facility: HOSPITAL | Age: 74
End: 2024-11-16
Payer: MEDICARE

## 2024-11-16 ENCOUNTER — HOSPITAL ENCOUNTER (EMERGENCY)
Facility: HOSPITAL | Age: 74
Discharge: HOME | End: 2024-11-16
Attending: EMERGENCY MEDICINE
Payer: MEDICARE

## 2024-11-16 VITALS
BODY MASS INDEX: 23.92 KG/M2 | DIASTOLIC BLOOD PRESSURE: 64 MMHG | TEMPERATURE: 98.5 F | SYSTOLIC BLOOD PRESSURE: 126 MMHG | WEIGHT: 135 LBS | OXYGEN SATURATION: 95 % | HEIGHT: 63 IN | HEART RATE: 71 BPM | RESPIRATION RATE: 20 BRPM

## 2024-11-16 DIAGNOSIS — R22.32 LOCALIZED SWELLING ON LEFT HAND: ICD-10-CM

## 2024-11-16 DIAGNOSIS — T80.1XXA INTRAVENOUS INFILTRATION, INITIAL ENCOUNTER: Primary | ICD-10-CM

## 2024-11-16 LAB
ALBUMIN SERPL-MCNC: 3.6 G/DL (ref 3.4–5)
ALBUMIN/GLOB SERPL: 1.1 (ref 0.8–1.7)
ALP SERPL-CCNC: 137 U/L (ref 45–117)
ALT SERPL-CCNC: 21 U/L (ref 13–56)
ANION GAP SERPL CALC-SCNC: 7 MMOL/L (ref 3–18)
AST SERPL-CCNC: 24 U/L (ref 10–38)
BASOPHILS # BLD: 0.1 K/UL (ref 0–0.1)
BASOPHILS NFR BLD: 1 % (ref 0–2)
BILIRUB SERPL-MCNC: 0.3 MG/DL (ref 0.2–1)
BUN SERPL-MCNC: 20 MG/DL (ref 7–18)
BUN/CREAT SERPL: 24 (ref 12–20)
CALCIUM SERPL-MCNC: 9.4 MG/DL (ref 8.5–10.1)
CHLORIDE SERPL-SCNC: 105 MMOL/L (ref 100–111)
CO2 SERPL-SCNC: 25 MMOL/L (ref 21–32)
CREAT SERPL-MCNC: 0.82 MG/DL (ref 0.6–1.3)
DIFFERENTIAL METHOD BLD: ABNORMAL
EOSINOPHIL # BLD: 0.1 K/UL (ref 0–0.4)
EOSINOPHIL NFR BLD: 2 % (ref 0–5)
ERYTHROCYTE [DISTWIDTH] IN BLOOD BY AUTOMATED COUNT: 18.2 % (ref 11.6–14.5)
GLOBULIN SER CALC-MCNC: 3.3 G/DL (ref 2–4)
GLUCOSE SERPL-MCNC: 231 MG/DL (ref 74–99)
HCT VFR BLD AUTO: 37.7 % (ref 35–45)
HGB BLD-MCNC: 13.1 G/DL (ref 12–16)
IMM GRANULOCYTES # BLD AUTO: 0 K/UL (ref 0–0.04)
IMM GRANULOCYTES NFR BLD AUTO: 0 % (ref 0–0.5)
LYMPHOCYTES # BLD: 2 K/UL (ref 0.9–3.6)
LYMPHOCYTES NFR BLD: 28 % (ref 21–52)
MCH RBC QN AUTO: 35 PG (ref 24–34)
MCHC RBC AUTO-ENTMCNC: 34.7 G/DL (ref 31–37)
MCV RBC AUTO: 100.8 FL (ref 78–100)
MONOCYTES # BLD: 0.7 K/UL (ref 0.05–1.2)
MONOCYTES NFR BLD: 9 % (ref 3–10)
NEUTS SEG # BLD: 4.3 K/UL (ref 1.8–8)
NEUTS SEG NFR BLD: 60 % (ref 40–73)
NRBC # BLD: 0 K/UL (ref 0–0.01)
NRBC BLD-RTO: 0 PER 100 WBC
PLATELET # BLD AUTO: 228 K/UL (ref 135–420)
PMV BLD AUTO: 9.7 FL (ref 9.2–11.8)
POTASSIUM SERPL-SCNC: 4.1 MMOL/L (ref 3.5–5.5)
PROT SERPL-MCNC: 6.9 G/DL (ref 6.4–8.2)
RBC # BLD AUTO: 3.74 M/UL (ref 4.2–5.3)
SODIUM SERPL-SCNC: 137 MMOL/L (ref 136–145)
WBC # BLD AUTO: 7.2 K/UL (ref 4.6–13.2)

## 2024-11-16 PROCEDURE — 2580000003 HC RX 258: Performed by: EMERGENCY MEDICINE

## 2024-11-16 PROCEDURE — 85025 COMPLETE CBC W/AUTO DIFF WBC: CPT

## 2024-11-16 PROCEDURE — 96374 THER/PROPH/DIAG INJ IV PUSH: CPT

## 2024-11-16 PROCEDURE — 96375 TX/PRO/DX INJ NEW DRUG ADDON: CPT

## 2024-11-16 PROCEDURE — 73110 X-RAY EXAM OF WRIST: CPT

## 2024-11-16 PROCEDURE — 73090 X-RAY EXAM OF FOREARM: CPT

## 2024-11-16 PROCEDURE — 80053 COMPREHEN METABOLIC PANEL: CPT

## 2024-11-16 PROCEDURE — 99285 EMERGENCY DEPT VISIT HI MDM: CPT

## 2024-11-16 PROCEDURE — 6360000002 HC RX W HCPCS: Performed by: EMERGENCY MEDICINE

## 2024-11-16 PROCEDURE — 73130 X-RAY EXAM OF HAND: CPT

## 2024-11-16 PROCEDURE — 94761 N-INVAS EAR/PLS OXIMETRY MLT: CPT

## 2024-11-16 RX ORDER — METHYLPREDNISOLONE 4 MG/1
TABLET ORAL
Qty: 21 TABLET | Refills: 0 | Status: SHIPPED | OUTPATIENT
Start: 2024-11-16 | End: 2024-11-21

## 2024-11-16 RX ORDER — OXYCODONE HYDROCHLORIDE 5 MG/1
5 TABLET ORAL EVERY 4 HOURS PRN
Qty: 12 TABLET | Refills: 0 | Status: SHIPPED | OUTPATIENT
Start: 2024-11-16 | End: 2024-11-19

## 2024-11-16 RX ORDER — MORPHINE SULFATE 4 MG/ML
4 INJECTION, SOLUTION INTRAMUSCULAR; INTRAVENOUS
Status: COMPLETED | OUTPATIENT
Start: 2024-11-16 | End: 2024-11-16

## 2024-11-16 RX ADMIN — WATER 125 MG: 1 INJECTION INTRAMUSCULAR; INTRAVENOUS; SUBCUTANEOUS at 02:28

## 2024-11-16 RX ADMIN — MORPHINE SULFATE 4 MG: 4 INJECTION, SOLUTION INTRAMUSCULAR; INTRAVENOUS at 02:32

## 2024-11-16 ASSESSMENT — PAIN DESCRIPTION - LOCATION: LOCATION: ARM

## 2024-11-16 ASSESSMENT — PAIN SCALES - GENERAL: PAINLEVEL_OUTOF10: 9

## 2024-11-16 NOTE — ED PROVIDER NOTES
[facility] EMERGENCY DEPT  EMERGENCY DEPARTMENT ENCOUNTER    Patient Name: Angelica Duncan  MRN: [id number]  YOB: 1950  Provider: Ghulam Appiah MD  PCP: Ailyn Villafana MD   Time/Date of evaluation: 1:32 AM EST on 11/16/24    History of Presenting Illness     Chief Complaint   Patient presents with    Arm Pain       History Provided by: EMS and Patient   History is limited by: Nothing    HISTORY (Narative):   Angelica Duncan is a 74 y.o. female with a PMHX of asthma, lupus, cervical cancer, diabetes, hyperlipidemia DVT  who presents to the emergency department (room 15) by EMS C/O left arm pain onset today. Associated sxs include left hand and wrist swelling. Patient denies chest pain, shortness of breath or any other sxs or complaints.  Patient states that she receives regular iron infusions for low iron.  She was receiving an iron infusion 2 days ago in the infusion infiltrated in her left forearm.  She now has pain and swelling of the left forearm.    Nursing Notes were all reviewed and agreed with or any disagreements were addressed in the HPI.    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    Lung collapse     2015    Nausea & vomiting 5/23/2012    chronic    Neuropathy     right foot    Other ill-defined conditions(799.89)     (2 mutated genes) hyper coag. state     Pneumonia 07/22/2017    PONV (postoperative  by mouth every 6 hours as needed for Pain.      traMADol (ULTRAM) 50 MG tablet Take 1 tablet by mouth every 6 hours as needed for Pain.      !! warfarin (COUMADIN) 2 MG tablet Take 1 tablet by mouth daily      !! warfarin (COUMADIN) 5 MG tablet Take 1 tablet by mouth daily      LORazepam (ATIVAN) 1 MG tablet Take 1 tablet by mouth in the morning and at bedtime. Takes 1 in am and 2 in pm      ondansetron (ZOFRAN) 8 MG tablet Take 1 tablet by mouth every 8 hours as needed for Nausea or Vomiting      insulin glargine (LANTUS) 100 UNIT/ML injection vial Inject 20 Units into the skin nightly Indications: one half dose night prior to dos      albuterol sulfate HFA (PROVENTIL;VENTOLIN;PROAIR) 108 (90 Base) MCG/ACT inhaler Inhale 2 puffs into the lungs every 4 hours as needed for Wheezing or Shortness of Breath      Cetirizine HCl 10 MG CAPS Take 10 mg by mouth daily      cloNIDine (CATAPRES) 0.1 MG tablet Take 1 tablet by mouth daily      clopidogrel (PLAVIX) 75 MG tablet Take 1 tablet by mouth daily      metoprolol tartrate (LOPRESSOR) 25 MG tablet Take 1 tablet by mouth at bedtime      rOPINIRole (REQUIP) 4 MG tablet Take 1 tablet by mouth in the morning and at bedtime 1 tab in am, 2 tabs at HS       !! - Potential duplicate medications found. Please discuss with provider.          DISCONTINUED MEDICATIONS:  Current Discharge Medication List          PATIENT REFERRED TO:  Follow Up with:  Ailyn Villafana MD  [address] [place] [id number] [phone]    Schedule an appointment as soon as possible for a visit   With your PCP, As soon as possible, For follow up from the Emergency Department    [facility] EMERGENCY DEPT  [address] [place] [address]  [phone]    As needed, If symptoms worsen      I Ghulam Appiah MD am the primary clinician of record.    Malu Disclaimer     Please note that this dictation was completed with [employer] the [employer] voice recognition software.  Quite often unanticipated

## 2024-11-16 NOTE — ED TRIAGE NOTES
Patient arrives via EMS c/o pain in left arm that started on Friday.Reports she had Fe infusions on Tuesday and its been swelling since.Per patient vein blew during infusion.Aox 4.Patient received 25mcg of Fentanyl per EMS.  AOX 4.

## 2024-11-16 NOTE — DISCHARGE INSTRUCTIONS
Thank you for choosing [facility]'s Emergency Department for your care. It is our privilege to provide excellent care for you in your time of need. In the next several days, you may receive a survey via mail or email about your experience with our team. We would appreciate you taking a few minutes to complete the survey, as we use this information to learn what we have done well and what we could be doing better. Thank you for trusting us with your care.    -----------------------------------------------------------------------------  Below you will find a list of your tests from today's visit.   Labs  Recent Results (from the past 12 hour(s))   CBC with Auto Differential    Collection Time: 11/16/24  1:57 AM   Result Value Ref Range    WBC 7.2 4.6 - 13.2 K/uL    RBC 3.74 (L) 4.20 - 5.30 M/uL    Hemoglobin 13.1 12.0 - 16.0 g/dL    Hematocrit 37.7 35.0 - 45.0 %    .8 (H) 78.0 - 100.0 FL    MCH 35.0 (H) 24.0 - 34.0 PG    MCHC 34.7 31.0 - 37.0 g/dL    RDW 18.2 (H) 11.6 - 14.5 %    Platelets 228 135 - 420 K/uL    MPV 9.7 9.2 - 11.8 FL    Nucleated RBCs 0.0 0  WBC    nRBC 0.00 0.00 - 0.01 K/uL    Neutrophils % 60 40 - 73 %    Lymphocytes % 28 21 - 52 %    Monocytes % 9 3 - 10 %    Eosinophils % 2 0 - 5 %    Basophils % 1 0 - 2 %    Immature Granulocytes % 0 0.0 - 0.5 %    Neutrophils Absolute 4.3 1.8 - 8.0 K/UL    Lymphocytes Absolute 2.0 0.9 - 3.6 K/UL    Monocytes Absolute 0.7 0.05 - 1.2 K/UL    Eosinophils Absolute 0.1 0.0 - 0.4 K/UL    Basophils Absolute 0.1 0.0 - 0.1 K/UL    Immature Granulocytes Absolute 0.0 0.00 - 0.04 K/UL    Differential Type AUTOMATED     Comprehensive Metabolic Panel    Collection Time: 11/16/24  1:57 AM   Result Value Ref Range    Sodium 137 136 - 145 mmol/L    Potassium 4.1 3.5 - 5.5 mmol/L    Chloride 105 100 - 111 mmol/L    CO2 25 21 - 32 mmol/L    Anion Gap 7 3.0 - 18 mmol/L    Glucose 231 (H) 74 - 99 mg/dL    BUN 20 (H) 7.0 - 18 MG/DL    Creatinine 0.82

## 2024-11-22 ENCOUNTER — APPOINTMENT (OUTPATIENT)
Facility: HOSPITAL | Age: 74
End: 2024-11-22
Attending: EMERGENCY MEDICINE
Payer: MEDICARE

## 2024-11-22 ENCOUNTER — HOSPITAL ENCOUNTER (EMERGENCY)
Facility: HOSPITAL | Age: 74
Discharge: HOME | End: 2024-11-22
Attending: EMERGENCY MEDICINE
Payer: MEDICARE

## 2024-11-22 ENCOUNTER — HOSPITAL ENCOUNTER (OUTPATIENT)
Facility: HOSPITAL | Age: 74
Discharge: HOME | End: 2024-11-22
Payer: MEDICARE

## 2024-11-22 VITALS
RESPIRATION RATE: 20 BRPM | DIASTOLIC BLOOD PRESSURE: 87 MMHG | OXYGEN SATURATION: 100 % | HEIGHT: 63 IN | WEIGHT: 133 LBS | SYSTOLIC BLOOD PRESSURE: 126 MMHG | BODY MASS INDEX: 23.57 KG/M2 | TEMPERATURE: 97.5 F | HEART RATE: 79 BPM

## 2024-11-22 VITALS
DIASTOLIC BLOOD PRESSURE: 57 MMHG | OXYGEN SATURATION: 100 % | TEMPERATURE: 98.7 F | SYSTOLIC BLOOD PRESSURE: 107 MMHG | HEART RATE: 68 BPM | RESPIRATION RATE: 18 BRPM

## 2024-11-22 DIAGNOSIS — T80.1XXA INTRAVENOUS INFILTRATION, INITIAL ENCOUNTER: Primary | ICD-10-CM

## 2024-11-22 DIAGNOSIS — L97.412 MIDFOOT ULCERATION, RIGHT, WITH FAT LAYER EXPOSED (HCC): Primary | ICD-10-CM

## 2024-11-22 DIAGNOSIS — E11.9 TYPE 2 DIABETES MELLITUS WITHOUT COMPLICATION, WITH LONG-TERM CURRENT USE OF INSULIN (HCC): ICD-10-CM

## 2024-11-22 DIAGNOSIS — R22.32 LOCALIZED SWELLING ON LEFT HAND: ICD-10-CM

## 2024-11-22 DIAGNOSIS — Z79.4 TYPE 2 DIABETES MELLITUS WITHOUT COMPLICATION, WITH LONG-TERM CURRENT USE OF INSULIN (HCC): ICD-10-CM

## 2024-11-22 LAB — ECHO BSA: 1.64 M2

## 2024-11-22 PROCEDURE — 6370000000 HC RX 637 (ALT 250 FOR IP): Performed by: EMERGENCY MEDICINE

## 2024-11-22 PROCEDURE — 11042 DBRDMT SUBQ TIS 1ST 20SQCM/<: CPT

## 2024-11-22 PROCEDURE — 99284 EMERGENCY DEPT VISIT MOD MDM: CPT

## 2024-11-22 PROCEDURE — 93971 EXTREMITY STUDY: CPT

## 2024-11-22 RX ORDER — LIDOCAINE HYDROCHLORIDE 20 MG/ML
JELLY TOPICAL ONCE
OUTPATIENT
Start: 2024-11-22 | End: 2024-11-22

## 2024-11-22 RX ORDER — NEOMYCIN/BACITRACIN/POLYMYXINB 3.5-400-5K
OINTMENT (GRAM) TOPICAL ONCE
OUTPATIENT
Start: 2024-11-22 | End: 2024-11-22

## 2024-11-22 RX ORDER — SODIUM CHLOR/HYPOCHLOROUS ACID 0.033 %
SOLUTION, IRRIGATION IRRIGATION ONCE
OUTPATIENT
Start: 2024-11-22 | End: 2024-11-22

## 2024-11-22 RX ORDER — OXYCODONE HYDROCHLORIDE 5 MG/1
5 TABLET ORAL
Status: COMPLETED | OUTPATIENT
Start: 2024-11-22 | End: 2024-11-22

## 2024-11-22 RX ADMIN — OXYCODONE HYDROCHLORIDE 5 MG: 5 TABLET ORAL at 13:23

## 2024-11-22 ASSESSMENT — PAIN DESCRIPTION - DESCRIPTORS
DESCRIPTORS: ACHING

## 2024-11-22 ASSESSMENT — LIFESTYLE VARIABLES
HOW MANY STANDARD DRINKS CONTAINING ALCOHOL DO YOU HAVE ON A TYPICAL DAY: PATIENT DOES NOT DRINK
HOW OFTEN DO YOU HAVE A DRINK CONTAINING ALCOHOL: NEVER

## 2024-11-22 ASSESSMENT — PAIN DESCRIPTION - PAIN TYPE
TYPE: ACUTE PAIN
TYPE: ACUTE PAIN

## 2024-11-22 ASSESSMENT — PAIN DESCRIPTION - ORIENTATION
ORIENTATION: LEFT

## 2024-11-22 ASSESSMENT — PAIN DESCRIPTION - LOCATION
LOCATION: HAND

## 2024-11-22 ASSESSMENT — PAIN SCALES - GENERAL
PAINLEVEL_OUTOF10: 10

## 2024-11-22 ASSESSMENT — PAIN - FUNCTIONAL ASSESSMENT: PAIN_FUNCTIONAL_ASSESSMENT: 0-10

## 2024-11-22 NOTE — ED NOTES
Pt reports she had a iron infusion on Monday \"they went through the vein\"  Pt reports her swelling and discoloration have decreased, however she has swelling to L hand, reports she doesn't have a full  strength.   Pt aware a doppler study has been ordered and pt advised to keep her hand above her heart. Pt has no open wounds noted.

## 2024-11-22 NOTE — ED NOTES
Pt declines motrin or tylenol. Pt reports she is allergic to tylenol and motrin doesn't work, Pt reports she has been taking oxy at home and requests something \"potent\" for her pain.

## 2024-11-22 NOTE — ED TRIAGE NOTES
Pt presents to ED with c/o left arm pain. Reports getting iron infusion over 1 week ago and experiencing pain from mid forearm to fingers since. Seen at [facility] Friday for same.     A&OX4, ambulatory to triage.

## 2024-11-22 NOTE — ED PROVIDER NOTES
presenting problems have been discussed, and they are in agreement with the care plan formulated and outlined with them.  I have encouraged them to ask questions as they arise throughout their visit.    RECORDS REVIEWED: Nursing Notes    MEDICATIONS ADMINISTERED IN THE ED:  Medications   oxyCODONE (ROXICODONE) immediate release tablet 5 mg (5 mg Oral Given 11/22/24 1323)            None    Medical Decision Making     SCREENING TOOLS:  None    CLINICAL MANAGEMENT TOOLS:  Not Applicable    Is this patient to be included in the SEP-1 core measure? No Exclusion criteria - the patient is NOT to be included for SEP-1 Core Measure due to: 2+ SIRS criteria are not met    DDX:  IV infiltration, localized reaction, lymphedema, DVT    DISCUSSION:  This appears to be a moderate condition.  This appears to be an acute condition.    74 y.o. female with left arm swelling after infiltrated iron infusion 8 days ago.  Patient has not improved with pain medications on a steroid taper.  Oncology recommended evaluation in the ED today.  In evaluation of the above differential diagnosis, consideration was given to the following tests and treatments.  Duplex ultrasound of the left upper extremity did not demonstrate any DVTs.  This is likely pain due to the iron infiltration through her IV.  Patient will need to follow-up with her primary care doctor or her oncology team to be referred for further care.  There are no emergent needs for further imaging, workup in the ED or inpatient admission.  The decision to perform testing and results were discussed with the patient and spouse.  I discussed each of these tests and considerations with the patient. They agree with the plan of discharge.      ADDITIONAL CONSIDERATIONS:  None    SMOKING CESSATION:   None    Critical Care Time:     None    Ghulam Appiah MD    Diagnosis and Disposition     DISPOSITION Discharge - Pending Orders Complete 11/22/2024 02:07:48 PM     DISCHARGE NOTE:  Angelica MCDANIEL    cloNIDine (CATAPRES) 0.1 MG tablet Take 1 tablet by mouth daily      clopidogrel (PLAVIX) 75 MG tablet Take 1 tablet by mouth daily      metoprolol tartrate (LOPRESSOR) 25 MG tablet Take 1 tablet by mouth at bedtime      rOPINIRole (REQUIP) 4 MG tablet Take 1 tablet by mouth in the morning and at bedtime 1 tab in am, 2 tabs at HS       !! - Potential duplicate medications found. Please discuss with provider.          DISCONTINUED MEDICATIONS:  Current Discharge Medication List          PATIENT REFERRED TO:  Follow Up with:  Ailyn Villafana MD  [address] [place] [id number] [phone]    Schedule an appointment as soon as possible for a visit   With your PCP, As soon as possible, For follow up from the Emergency Department    [facility] EMERGENCY DEPT   [facility] [place] [address]  [phone]    As needed, If symptoms worsen      I Ghulam Appiah MD am the primary clinician of record.    Dragon Disclaimer     Please note that this dictation was completed with uSpeak, the computer voice recognition software.  Quite often unanticipated grammatical, syntax, homophones, and other interpretive errors are inadvertently transcribed by the computer software.  Please disregard these errors.  Please excuse any errors that have escaped final proofreading.    Ghulam Appiah MD  (Electronically signed)            Ghulam Appiah MD  11/22/24 8761

## 2024-11-22 NOTE — ED NOTES
Pt has ace wrap in place also icepack. Pt reports \"wrap it way tighter\" RN explained the dangers of the acre wrap being too tight. Pt verbalizes understanding.

## 2024-11-22 NOTE — FLOWSHEET NOTE
11/22/24 0913   Wound 08/09/24 Right;Plantar   Date First Assessed/Time First Assessed: 08/09/24 1124   Wound Location Orientation: Right;Plantar   Wound Image    Wound Etiology Surgical   Dressing Status New dressing applied   Wound Cleansed Soap and water   Dressing/Treatment Collagen with Ag;Alginate with Ag   Offloading for Diabetic Foot Ulcers Felt or foam  (felt off load in shoe)   Dressing Change Due 11/29/24   Wound Length (cm) 1 cm   Wound Width (cm) 0.5 cm   Wound Depth (cm) 0.3 cm   Wound Surface Area (cm^2) 0.5 cm^2   Change in Wound Size % (l*w) 94.29   Wound Volume (cm^3) 0.15 cm^3   Wound Healing % 94   Post-Procedure Length (cm) 1.5 cm   Post-Procedure Width (cm) 0.5 cm   Post-Procedure Depth (cm) 0.4 cm   Post-Procedure Surface Area (cm^2) 0.75 cm^2   Post-Procedure Volume (cm^3) 0.3 cm^3   Wound Assessment Devitalized tissue   Drainage Amount Scant (moist but unmeasurable)   Drainage Description Serosanguinous   Odor None   Rosa-wound Assessment Hyperkeratosis (callous);Maceration   Margins Defined edges   Wound Thickness Description not for Pressure Injury Full thickness     Orders placed for PT and Wound VAC D/c'd

## 2024-11-22 NOTE — ED NOTES
Pt on phone speaking in full sentences w/o complications. Pt reports \"Im in the ER and theres nothing emergent but I need this Iron out of my arm jorge I can't walk around with it in there my whole life\".

## 2024-11-22 NOTE — PLAN OF CARE
Discharge Instructions from  Wound Care Clinic at [facility]   [address]   [address]  [place] [address] [phone] Fax [phone]    NAME:  Angelica Duncan  YOB: 1950  MEDICAL RECORD NUMBER:  [id number]  DATE:  11/22/2024    Wound Cleansing:   Do not scrub or use excessive force.  Cleanse wound prior to applying a clean dressing with:  [x] Wound Cleanser     Topical Treatments:  Do not apply lotions, creams, or ointments to wound bed unless directed.        Dressings:           Wound Location right plantar foot   [x] Apply Primary Dressing:       [x] Collagen [x] Alginate with Silver        [x] Cover and Secure with:     [x] Gauze [x] Bárbara or [x] Kerlix      Avoid contact of tape with skin.  [x] Change dressing:    [x] Two times per week        Off-Loading:   [x] Off-loading when [x] walking    [x] Felt off load placed in shoe                Return Appointment:    [x] Return Appointment:  Future Appointments   Date Time Provider Department Center   12/13/2024  8:45 AM Willie Lance DPM [facility]            Electronically signed Paola Flores RN on 11/22/2024 at 10:30 AM     Wound Care Center Information: Should you experience any significant changes in your wound(s) or have questions about your wound care, please contact the [facility]ound Center at MONDAY - FRIDAY 8:00 am - 4:30.  If you need help with your wound outside these hours and cannot wait until we are again available, contact your PCP or go to the hospital emergency room.     PLEASE NOTE: IF YOU ARE UNABLE TO OBTAIN WOUND SUPPLIES, CONTINUE TO USE THE SUPPLIES YOU HAVE AVAILABLE UNTIL YOU ARE ABLE TO REACH US. IT IS MOST IMPORTANT TO KEEP THE WOUND COVERED AT ALL TIMES.     Physician Signature:_______________________    Date: ___________ Time:  ____________

## 2024-11-22 NOTE — DISCHARGE INSTRUCTIONS
Thank you for choosing [facility]'s Emergency Department for your care. It is our privilege to provide excellent care for you in your time of need. In the next several days, you may receive a survey via mail or email about your experience with our team. We would appreciate you taking a few minutes to complete the survey, as we use this information to learn what we have done well and what we could be doing better. Thank you for trusting us with your care.    -----------------------------------------------------------------------------  Below you will find a list of your tests from today's visit.   Labs  Recent Results (from the past 12 hour(s))   Vascular duplex upper extremity venous left    Collection Time: 11/22/24  1:12 PM   Result Value Ref Range    Body Surface Area 1.64 m2        Radiologic Studies  Vascular duplex upper extremity venous left   Final Result        -----------------------------------------------------------------------------  The evaluation and treatment you received in the Emergency Department were for an urgent problem. It is important that you follow-up with a doctor, nurse practitioner, or physician assistant to: 1. confirm your diagnosis, 2. re-evaluate changes in your illness and treatment, and 3. for ongoing care. In some cases, specialist follow up is recommended. You will need to call to arrange an appointment. Recommended providers are listed in this packet. Please take your discharge instructions with you when you go to your follow-up appointment.      If your symptoms become worse or you do not improve as expected, please return to us. We are available 24 hours a day.  Elevate the arm above your heart.  Compression should not be tight enough to cut off circulation.  Follow-up with your primary care doctor and your oncologist to be referred for any further treatment.     If a prescription has been provided, please fill it as soon as possible to prevent a delay in

## 2024-12-13 ENCOUNTER — HOSPITAL ENCOUNTER (OUTPATIENT)
Facility: HOSPITAL | Age: 74
Discharge: HOME | End: 2024-12-13
Attending: PODIATRIST | Admitting: PODIATRIST
Payer: MEDICARE

## 2024-12-13 VITALS
RESPIRATION RATE: 18 BRPM | DIASTOLIC BLOOD PRESSURE: 46 MMHG | HEART RATE: 72 BPM | SYSTOLIC BLOOD PRESSURE: 137 MMHG | OXYGEN SATURATION: 99 % | TEMPERATURE: 97.9 F

## 2024-12-13 DIAGNOSIS — L97.412 MIDFOOT ULCERATION, RIGHT, WITH FAT LAYER EXPOSED (HCC): Primary | ICD-10-CM

## 2024-12-13 DIAGNOSIS — Z79.4 TYPE 2 DIABETES MELLITUS WITHOUT COMPLICATION, WITH LONG-TERM CURRENT USE OF INSULIN (HCC): ICD-10-CM

## 2024-12-13 DIAGNOSIS — E11.9 TYPE 2 DIABETES MELLITUS WITHOUT COMPLICATION, WITH LONG-TERM CURRENT USE OF INSULIN (HCC): ICD-10-CM

## 2024-12-13 PROCEDURE — 11043 DBRDMT MUSC&/FSCA 1ST 20/<: CPT

## 2024-12-13 PROCEDURE — 6370000000 HC RX 637 (ALT 250 FOR IP): Performed by: PODIATRIST

## 2024-12-13 RX ORDER — NEOMYCIN/BACITRACIN/POLYMYXINB 3.5-400-5K
OINTMENT (GRAM) TOPICAL ONCE
OUTPATIENT
Start: 2024-12-13 | End: 2024-12-13

## 2024-12-13 RX ORDER — SODIUM CHLOR/HYPOCHLOROUS ACID 0.033 %
SOLUTION, IRRIGATION IRRIGATION ONCE
OUTPATIENT
Start: 2024-12-13 | End: 2024-12-13

## 2024-12-13 RX ORDER — LIDOCAINE HYDROCHLORIDE 20 MG/ML
JELLY TOPICAL ONCE
Status: COMPLETED | OUTPATIENT
Start: 2024-12-13 | End: 2024-12-13

## 2024-12-13 RX ORDER — LIDOCAINE HYDROCHLORIDE 20 MG/ML
JELLY TOPICAL ONCE
OUTPATIENT
Start: 2024-12-13 | End: 2024-12-13

## 2024-12-13 RX ADMIN — LIDOCAINE HYDROCHLORIDE: 20 JELLY TOPICAL at 09:33

## 2024-12-13 NOTE — DISCHARGE INSTRUCTIONS
Discharge Instructions from  Wound Care Clinic at [facility]   [address]   [address]  [place] [address] [phone] Fax [phone]    NAME:  Angelica Duncan  YOB: 1950  MEDICAL RECORD NUMBER:  [id number]  DATE:  12/13/24    Wound Cleansing:   Do not scrub or use excessive force.  Cleanse wound prior to applying a clean dressing with:   [x] Wound Cleanser      Dressings:           Wound Location: right plantar foot   [x] Apply Primary Dressing:       [x] Alginate   [x] Collagen   [x] Cover and Secure with:     [x] Gauze [x] Bárbara    [x] Ace Wrap         Avoid contact of tape with skin.  [x] Change dressing: [x] Two times per week        Off-Loading:   [x] Off-loading when [x] walking     [x] Other: Felt/foam insert for shoe    Return Appointment:  [x] ECF or Home Healthcare: [facility]  [x] Return Appointment: With MD  in  4 Week(s)  [] Ordered tests:      Electronically signed Tere Jenkins RN on 12/13/2024 at 2:03 PM     Wound Care Center Information: Should you experience any significant changes in your wound(s) or have questions about your wound care, please contact the [facility] Outpatient [facility]ound Center at MONDAY - FRIDAY 8:00 am - 4:30.  If you need help with your wound outside these hours and cannot wait until we are again available, contact your PCP or go to the hospital emergency room.     PLEASE NOTE: IF YOU ARE UNABLE TO OBTAIN WOUND SUPPLIES, CONTINUE TO USE THE SUPPLIES YOU HAVE AVAILABLE UNTIL YOU ARE ABLE TO REACH US. IT IS MOST IMPORTANT TO KEEP THE WOUND COVERED AT ALL TIMES.

## 2024-12-13 NOTE — FLOWSHEET NOTE
12/13/24 0901   Wound 08/09/24 Right;Plantar   Date First Assessed/Time First Assessed: 08/09/24 1124   Wound Location Orientation: Right;Plantar   Wound Image     Wound Etiology Surgical   Dressing Status New dressing applied   Wound Cleansed Wound cleanser   Dressing/Treatment Collagen with Ag;Alginate with Ag;Roll gauze   Offloading for Diabetic Foot Ulcers Felt or foam;Other (comment)  (in shoe)   Dressing Change Due 01/10/25   Wound Length (cm) 1 cm   Wound Width (cm) 0.2 cm   Wound Depth (cm) 0.5 cm   Wound Surface Area (cm^2) 0.2 cm^2   Change in Wound Size % (l*w) 97.71   Wound Volume (cm^3) 0.1 cm^3   Wound Healing % 96   Post-Procedure Length (cm) 1.1 cm   Post-Procedure Width (cm) 0.3 cm   Post-Procedure Depth (cm) 0.5 cm   Post-Procedure Surface Area (cm^2) 0.33 cm^2   Post-Procedure Volume (cm^3) 0.165 cm^3   Wound Assessment Devitalized tissue   Drainage Amount Scant (moist but unmeasurable)   Drainage Description Serous   Odor None   Rosa-wound Assessment Maceration;Hyperkeratosis (callous)   Margins Defined edges   Wound Thickness Description not for Pressure Injury Full thickness   Behzad Scale   Sensory Perceptions 3   Moisture 4   Activity 4   Mobility 4   Nutrition 3   Friction and Shear 3   Behzad Scale Score 21   Wound Follow Up   Require Follow Up Yes

## 2025-01-10 ENCOUNTER — HOSPITAL ENCOUNTER (OUTPATIENT)
Facility: HOSPITAL | Age: 75
Discharge: HOME | End: 2025-01-10
Attending: PODIATRIST | Admitting: PODIATRIST
Payer: MEDICARE

## 2025-01-10 VITALS
HEART RATE: 68 BPM | OXYGEN SATURATION: 100 % | DIASTOLIC BLOOD PRESSURE: 53 MMHG | SYSTOLIC BLOOD PRESSURE: 118 MMHG | RESPIRATION RATE: 16 BRPM | TEMPERATURE: 97.7 F

## 2025-01-10 DIAGNOSIS — L97.412 MIDFOOT ULCERATION, RIGHT, WITH FAT LAYER EXPOSED (HCC): Primary | ICD-10-CM

## 2025-01-10 DIAGNOSIS — E11.9 TYPE 2 DIABETES MELLITUS WITHOUT COMPLICATION, WITH LONG-TERM CURRENT USE OF INSULIN (HCC): ICD-10-CM

## 2025-01-10 DIAGNOSIS — Z79.4 TYPE 2 DIABETES MELLITUS WITHOUT COMPLICATION, WITH LONG-TERM CURRENT USE OF INSULIN (HCC): ICD-10-CM

## 2025-01-10 PROCEDURE — 11042 DBRDMT SUBQ TIS 1ST 20SQCM/<: CPT

## 2025-01-10 RX ORDER — LIDOCAINE HYDROCHLORIDE 20 MG/ML
JELLY TOPICAL ONCE
OUTPATIENT
Start: 2025-01-10 | End: 2025-01-10

## 2025-01-10 RX ORDER — NEOMYCIN/BACITRACIN/POLYMYXINB 3.5-400-5K
OINTMENT (GRAM) TOPICAL ONCE
OUTPATIENT
Start: 2025-01-10 | End: 2025-01-10

## 2025-01-10 RX ORDER — SODIUM CHLOR/HYPOCHLOROUS ACID 0.033 %
SOLUTION, IRRIGATION IRRIGATION ONCE
OUTPATIENT
Start: 2025-01-10 | End: 2025-01-10

## 2025-01-17 ENCOUNTER — HOSPITAL ENCOUNTER (EMERGENCY)
Facility: HOSPITAL | Age: 75
Discharge: HOME | End: 2025-01-17
Payer: MEDICARE

## 2025-01-17 ENCOUNTER — APPOINTMENT (OUTPATIENT)
Facility: HOSPITAL | Age: 75
End: 2025-01-17
Payer: MEDICARE

## 2025-01-17 VITALS
HEIGHT: 63 IN | SYSTOLIC BLOOD PRESSURE: 120 MMHG | WEIGHT: 135 LBS | BODY MASS INDEX: 23.92 KG/M2 | OXYGEN SATURATION: 100 % | TEMPERATURE: 97.6 F | RESPIRATION RATE: 14 BRPM | HEART RATE: 89 BPM | DIASTOLIC BLOOD PRESSURE: 62 MMHG

## 2025-01-17 DIAGNOSIS — Z79.01 WARFARIN ANTICOAGULATION: ICD-10-CM

## 2025-01-17 DIAGNOSIS — E11.69 TYPE 2 DIABETES MELLITUS WITH OTHER SPECIFIED COMPLICATION, WITH LONG-TERM CURRENT USE OF INSULIN (HCC): ICD-10-CM

## 2025-01-17 DIAGNOSIS — Z79.4 TYPE 2 DIABETES MELLITUS WITH OTHER SPECIFIED COMPLICATION, WITH LONG-TERM CURRENT USE OF INSULIN (HCC): ICD-10-CM

## 2025-01-17 DIAGNOSIS — L03.90 WOUND CELLULITIS: Primary | ICD-10-CM

## 2025-01-17 DIAGNOSIS — I73.9 PVD (PERIPHERAL VASCULAR DISEASE) (HCC): ICD-10-CM

## 2025-01-17 LAB
ANION GAP SERPL CALC-SCNC: 4 MMOL/L (ref 3–18)
BASOPHILS # BLD: 0.05 K/UL (ref 0–0.1)
BASOPHILS NFR BLD: 0.8 % (ref 0–2)
BUN SERPL-MCNC: 18 MG/DL (ref 7–18)
BUN/CREAT SERPL: 21 (ref 12–20)
CALCIUM SERPL-MCNC: 9.2 MG/DL (ref 8.5–10.1)
CHLORIDE SERPL-SCNC: 101 MMOL/L (ref 100–111)
CO2 SERPL-SCNC: 31 MMOL/L (ref 21–32)
CREAT SERPL-MCNC: 0.85 MG/DL (ref 0.6–1.3)
DIFFERENTIAL METHOD BLD: ABNORMAL
EOSINOPHIL # BLD: 0.13 K/UL (ref 0–0.4)
EOSINOPHIL NFR BLD: 2 % (ref 0–5)
ERYTHROCYTE [DISTWIDTH] IN BLOOD BY AUTOMATED COUNT: 16.6 % (ref 11.6–14.5)
ERYTHROCYTE [SEDIMENTATION RATE] IN BLOOD: 10 MM/HR (ref 0–30)
GLUCOSE SERPL-MCNC: 246 MG/DL (ref 74–99)
HCT VFR BLD AUTO: 41.3 % (ref 35–45)
HGB BLD-MCNC: 14.3 G/DL (ref 12–16)
IMM GRANULOCYTES # BLD AUTO: 0.02 K/UL (ref 0–0.04)
IMM GRANULOCYTES NFR BLD AUTO: 0.3 % (ref 0–0.5)
INR PPP: 1.4 (ref 0.9–1.1)
LYMPHOCYTES # BLD: 1.38 K/UL (ref 0.9–3.3)
LYMPHOCYTES NFR BLD: 21.6 % (ref 21–52)
MCH RBC QN AUTO: 34.6 PG (ref 24–34)
MCHC RBC AUTO-ENTMCNC: 34.6 G/DL (ref 31–37)
MCV RBC AUTO: 100 FL (ref 78–100)
MONOCYTES # BLD: 0.48 K/UL (ref 0.05–1.2)
MONOCYTES NFR BLD: 7.5 % (ref 3–10)
NEUTS SEG # BLD: 4.34 K/UL (ref 1.8–8)
NEUTS SEG NFR BLD: 67.8 % (ref 40–73)
NRBC # BLD: 0 K/UL (ref 0–0.01)
NRBC BLD-RTO: 0 PER 100 WBC
PLATELET # BLD AUTO: 264 K/UL (ref 135–420)
PMV BLD AUTO: 9.1 FL (ref 9.2–11.8)
POTASSIUM SERPL-SCNC: 4.4 MMOL/L (ref 3.5–5.5)
PROTHROMBIN TIME: 17.5 SEC (ref 11.9–14.9)
RBC # BLD AUTO: 4.13 M/UL (ref 4.2–5.3)
SODIUM SERPL-SCNC: 136 MMOL/L (ref 136–145)
WBC # BLD AUTO: 6.4 K/UL (ref 4.6–13.2)

## 2025-01-17 PROCEDURE — 85610 PROTHROMBIN TIME: CPT

## 2025-01-17 PROCEDURE — 85652 RBC SED RATE AUTOMATED: CPT

## 2025-01-17 PROCEDURE — 85025 COMPLETE CBC W/AUTO DIFF WBC: CPT

## 2025-01-17 PROCEDURE — 99284 EMERGENCY DEPT VISIT MOD MDM: CPT

## 2025-01-17 PROCEDURE — 73630 X-RAY EXAM OF FOOT: CPT

## 2025-01-17 PROCEDURE — 80048 BASIC METABOLIC PNL TOTAL CA: CPT

## 2025-01-17 RX ORDER — LIDOCAINE HYDROCHLORIDE 20 MG/ML
15 SOLUTION OROPHARYNGEAL
Status: DISCONTINUED | OUTPATIENT
Start: 2025-01-17 | End: 2025-01-17 | Stop reason: HOSPADM

## 2025-01-17 RX ORDER — CLINDAMYCIN HYDROCHLORIDE 300 MG/1
300 CAPSULE ORAL 3 TIMES DAILY
Qty: 21 CAPSULE | Refills: 0 | Status: SHIPPED | OUTPATIENT
Start: 2025-01-17 | End: 2025-01-24

## 2025-01-17 ASSESSMENT — PAIN DESCRIPTION - ORIENTATION: ORIENTATION: LEFT

## 2025-01-17 ASSESSMENT — PAIN - FUNCTIONAL ASSESSMENT: PAIN_FUNCTIONAL_ASSESSMENT: 0-10

## 2025-01-17 ASSESSMENT — PAIN DESCRIPTION - DESCRIPTORS: DESCRIPTORS: ACHING

## 2025-01-17 ASSESSMENT — PAIN DESCRIPTION - FREQUENCY: FREQUENCY: CONTINUOUS

## 2025-01-17 ASSESSMENT — PAIN DESCRIPTION - LOCATION: LOCATION: FOOT

## 2025-01-17 ASSESSMENT — PAIN SCALES - GENERAL: PAINLEVEL_OUTOF10: 10

## 2025-01-17 ASSESSMENT — PAIN DESCRIPTION - PAIN TYPE: TYPE: ACUTE PAIN

## 2025-01-17 NOTE — ED NOTES
Pt asked to have IV removed;  states she is ready to go home now;  Pt polite, not angry  MORE Shirley aware

## 2025-01-17 NOTE — ED PROVIDER NOTES
3\")     Physical Exam:  Vital Signs Reviewed. Nursing Notes Reviewed.  Constitutional:  Well developed, well nourished patient. Appearance and behavior are age and situation appropriate.  Head: Normocephalic, Atraumatic  Eyes: Conjunctiva clear, lids normal. Sclera anicteric.   ENT:hearing grossly intact  Neck:  supple, FROM  Lungs: No respiratory distress. Lungs CTAB   CV:  RR&R with harsh systolic murmur  Extremities:  Left foot does reveal some darkened, slightly ecchymotic skin to her lateral foot at the area of the fifth MTP.  There does appear to be some blistering there.  No obvious ulceration but there is some swelling noted.  No open wound, no drainage.  Fourth webspace does reveal a very superficial ulceration to her fifth toe.  Patient status post great toe amputation.  Pulses are dopplerable.  Foot with good coloration, warm to touch.  No overt redness, global tenderness.  No calf tenderness or ankle swelling.  Neuro:  A&O x 3. CN II-XII grossly intact.No gross neuro deficits.  Skin:  Warm, dry, no rash. Skin intact.           Diagnostics:    Labs -     Recent Results (from the past 12 hour(s))   CBC with Auto Differential    Collection Time: 01/17/25 10:50 AM   Result Value Ref Range    WBC 6.4 4.6 - 13.2 K/uL    RBC 4.13 (L) 4.20 - 5.30 M/uL    Hemoglobin 14.3 12.0 - 16.0 g/dL    Hematocrit 41.3 35.0 - 45.0 %    .0 78.0 - 100.0 FL    MCH 34.6 (H) 24.0 - 34.0 PG    MCHC 34.6 31.0 - 37.0 g/dL    RDW 16.6 (H) 11.6 - 14.5 %    Platelets 264 135 - 420 K/uL    MPV 9.1 (L) 9.2 - 11.8 FL    Nucleated RBCs 0.0 0  WBC    nRBC 0.00 0.00 - 0.01 K/uL    Neutrophils % 67.8 40.0 - 73.0 %    Lymphocytes % 21.6 21.0 - 52.0 %    Monocytes % 7.5 3.0 - 10.0 %    Eosinophils % 2.0 0.0 - 5.0 %    Basophils % 0.8 0.0 - 2.0 %    Immature Granulocytes % 0.3 0.0 - 0.5 %    Neutrophils Absolute 4.34 1.80 - 8.00 K/UL    Lymphocytes Absolute 1.38 0.90 - 3.30 K/UL    Monocytes Absolute 0.48 0.05 - 1.20 K/UL

## 2025-01-17 NOTE — DISCHARGE INSTRUCTIONS
Your INR is 1.4, please contact your doctor today for recommendation for dosing.  Call wound clinic today for earlier appt  Wash wound daily with soap and water, keep covered with clean bandage/sock, and take antibiotic as prescribed, with food.  Return if new/worsening symptoms or new concerns

## 2025-01-24 ENCOUNTER — HOSPITAL ENCOUNTER (OUTPATIENT)
Facility: HOSPITAL | Age: 75
Discharge: HOME | End: 2025-01-24
Attending: PODIATRIST | Admitting: PODIATRIST
Payer: MEDICARE

## 2025-01-24 ENCOUNTER — TRANSCRIBE ORDERS (OUTPATIENT)
Facility: HOSPITAL | Age: 75
End: 2025-01-24

## 2025-01-24 ENCOUNTER — HOSPITAL ENCOUNTER (OUTPATIENT)
Facility: HOSPITAL | Age: 75
Discharge: HOME | End: 2025-01-26
Attending: PODIATRIST
Payer: MEDICARE

## 2025-01-24 VITALS
HEART RATE: 65 BPM | TEMPERATURE: 97.7 F | OXYGEN SATURATION: 100 % | DIASTOLIC BLOOD PRESSURE: 52 MMHG | SYSTOLIC BLOOD PRESSURE: 138 MMHG | RESPIRATION RATE: 16 BRPM

## 2025-01-24 DIAGNOSIS — E11.9 TYPE 2 DIABETES MELLITUS WITHOUT COMPLICATION, WITH LONG-TERM CURRENT USE OF INSULIN (HCC): ICD-10-CM

## 2025-01-24 DIAGNOSIS — Z79.4 TYPE 2 DIABETES MELLITUS WITHOUT COMPLICATION, WITH LONG-TERM CURRENT USE OF INSULIN (HCC): ICD-10-CM

## 2025-01-24 DIAGNOSIS — I70.222 ATHEROSCLEROSIS OF NATIVE ARTERY OF LEFT LOWER EXTREMITY WITH REST PAIN (HCC): ICD-10-CM

## 2025-01-24 DIAGNOSIS — L97.412 MIDFOOT ULCERATION, RIGHT, WITH FAT LAYER EXPOSED (HCC): Primary | ICD-10-CM

## 2025-01-24 DIAGNOSIS — I70.223 CRITICAL LIMB ISCHEMIA OF BOTH LOWER EXTREMITIES (HCC): Primary | ICD-10-CM

## 2025-01-24 LAB
VAS LEFT ARM BP: 221 MMHG
VAS RIGHT ABI: 0.69
VAS RIGHT ARM BP: 236 MMHG
VAS RIGHT DORSALIS PEDIS BP: 164 MMHG

## 2025-01-24 PROCEDURE — 93923 UPR/LXTR ART STDY 3+ LVLS: CPT

## 2025-01-24 PROCEDURE — 6370000000 HC RX 637 (ALT 250 FOR IP): Performed by: PODIATRIST

## 2025-01-24 PROCEDURE — 11042 DBRDMT SUBQ TIS 1ST 20SQCM/<: CPT

## 2025-01-24 RX ORDER — LIDOCAINE HYDROCHLORIDE 20 MG/ML
JELLY TOPICAL ONCE
OUTPATIENT
Start: 2025-01-24 | End: 2025-01-24

## 2025-01-24 RX ORDER — SODIUM CHLOR/HYPOCHLOROUS ACID 0.033 %
SOLUTION, IRRIGATION IRRIGATION ONCE
OUTPATIENT
Start: 2025-01-24 | End: 2025-01-24

## 2025-01-24 RX ORDER — LIDOCAINE HYDROCHLORIDE 20 MG/ML
JELLY TOPICAL ONCE
Status: COMPLETED | OUTPATIENT
Start: 2025-01-24 | End: 2025-01-24

## 2025-01-24 RX ORDER — NEOMYCIN/BACITRACIN/POLYMYXINB 3.5-400-5K
OINTMENT (GRAM) TOPICAL ONCE
OUTPATIENT
Start: 2025-01-24 | End: 2025-01-24

## 2025-01-24 RX ADMIN — LIDOCAINE HYDROCHLORIDE: 20 JELLY TOPICAL at 08:51

## 2025-01-24 NOTE — FLOWSHEET NOTE
01/24/25 0843   Wound 01/24/25 Heel Left   Date First Assessed: 01/24/25   Location: Heel  Wound Location Orientation: Left   Wound Image    Wound Etiology Skin Tear   Dressing Status New dressing applied   Wound Cleansed Wound cleanser   Dressing/Treatment Steri-strips   Offloading for Diabetic Foot Ulcers Felt or foam   Dressing Change Due 02/07/25   Wound Length (cm) 1 cm   Wound Width (cm) 0.1 cm   Wound Depth (cm) 0.1 cm   Wound Surface Area (cm^2) 0.1 cm^2   Wound Volume (cm^3) 0.01 cm^3   Wound Assessment Epithelialization;Dry   Drainage Amount None (dry)   Odor None   Rosa-wound Assessment Blanchable erythema   Margins Attached edges   Wound Thickness Description not for Pressure Injury Partial thickness   Wound 01/24/25 Foot Left;Lateral   Date First Assessed: 01/24/25   Primary Wound Type: (c) Other (comment)  Location: Foot  Wound Location Orientation: Left;Lateral   Wound Image    Wound Etiology Other  (blister)   Dressing Status New dressing applied  (blister)   Wound Cleansed Wound cleanser   Dressing/Treatment Collagen;Alginate;Roll gauze   Offloading for Diabetic Foot Ulcers Offloading ordered   Dressing Change Due 02/07/25   Wound Length (cm) 2 cm   Wound Width (cm) 2.5 cm   Wound Depth (cm) 0 cm   Wound Surface Area (cm^2) 5 cm^2   Wound Volume (cm^3) 0 cm^3   Wound Assessment Epithelialization   Drainage Amount None (dry)   Odor None   Rosa-wound Assessment Blanchable erythema   Margins Attached edges   Wound Thickness Description not for Pressure Injury Partial thickness   Wound 01/24/25 Toe (Comment  which one) Left;Medial   Date First Assessed: 01/24/25   Primary Wound Type: Diabetic Ulcer  Location: (c) Toe (Comment  which one)  Wound Location Orientation: Left;Medial   Wound Image    Wound Etiology Pressure Stage 3   Dressing Status New dressing applied   Wound Cleansed Wound cleanser   Dressing/Treatment Collagen;Alginate;Roll gauze   Offloading for Diabetic Foot Ulcers Offloading not

## 2025-01-24 NOTE — DISCHARGE INSTRUCTIONS
Discharge Instructions from  Wound Care Clinic at [facility]   [address]   [address]  [place] [address] [phone] Fax [phone]    NAME:  Angelica Duncan  YOB: 1950  MEDICAL RECORD NUMBER:  [id number]  DATE:  1/24/25    Wound Cleansing:   Do not scrub or use excessive force.  Cleanse wound prior to applying a clean dressing with:  [] Normal Saline [] Keep Wound Dry in Shower    [x] Wound Cleanser   [] Cleanse wound with Mild Soap & Water  [] May Shower at Discharge   [] Other:      Topical Treatments:  Do not apply lotions, creams, or ointments to wound bed unless directed.   [] Apply moisturizing lotion to skin surrounding the wound prior to dressing change.  [] Apply antifungal ointment to skin surrounding the wound prior to dressing change.  [] Apply thin film of moisture barrier ointment to skin immediately around wound.  [] Other:       Dressings:           Wound Location right and left foot   [] Apply Primary Dressing:       [] MediHoney Gel [] Alginate with Silver [x] Alginate   [x] Collagen [] Collagen with Silver   [] Santyl with Moisten saline gauze     [] Hydrocolloid   [] MediHoney Alginate [] Foam with Silver   [] Foam   [] Hydrofera Blue    [x] Mepilex Border    [] Moisten with Saline [] Hydrogel [] Mepitel     [] Bactroban/Mupirocin [] Polysporin  [] Other:    [] Pack wound loosely with  [] Iodoform   [] Plain Packing  [] Other   [] Cover and Secure with:     [x] Gauze [] Bárbara [] Kerlix   [] Ace Wrap [] Cover Roll Tape [] ABD     [] Other:    Avoid contact of tape with skin.  [x] Change dressing: [x] Daily    [] Every Other Day [] Three times per week   [] Once a week [] Do Not Change Dressing   [] Other:     Negative Pressure:           Wound Location:   [] Pressure@           mm/Hg  []Continuous []Intermittent   [] Black  [] White Foam [] Other:   []Change dressing: []Three times per week    []Other:     Pressure Relief:  [] When sitting, shift

## 2025-01-28 ENCOUNTER — HOSPITAL ENCOUNTER (INPATIENT)
Facility: HOSPITAL | Age: 75
LOS: 3 days | Discharge: ANOTHER HOSPITAL | DRG: 315 | End: 2025-01-31
Attending: INTERNAL MEDICINE | Admitting: INTERNAL MEDICINE
Payer: MEDICARE

## 2025-01-28 ENCOUNTER — APPOINTMENT (OUTPATIENT)
Facility: HOSPITAL | Age: 75
DRG: 315 | End: 2025-01-28
Payer: MEDICARE

## 2025-01-28 DIAGNOSIS — L97.412 MIDFOOT ULCERATION, RIGHT, WITH FAT LAYER EXPOSED (HCC): ICD-10-CM

## 2025-01-28 DIAGNOSIS — I74.9 ARTERIAL THROMBOSIS (HCC): Primary | ICD-10-CM

## 2025-01-28 PROBLEM — I74.5 ILIAC ARTERY OCCLUSION, LEFT (HCC): Status: ACTIVE | Noted: 2025-01-28

## 2025-01-28 LAB
ALBUMIN SERPL-MCNC: 3.5 G/DL (ref 3.4–5)
ALBUMIN/GLOB SERPL: 1.1 (ref 0.8–1.7)
ALP SERPL-CCNC: 143 U/L (ref 45–117)
ALT SERPL-CCNC: 15 U/L (ref 13–56)
AMPHET UR QL SCN: NEGATIVE
ANION GAP SERPL CALC-SCNC: 6 MMOL/L (ref 3–18)
APTT PPP: 33 SEC (ref 23–36.4)
AST SERPL-CCNC: 13 U/L (ref 10–38)
BARBITURATES UR QL SCN: NEGATIVE
BASOPHILS # BLD: 0.04 K/UL (ref 0–0.1)
BASOPHILS NFR BLD: 0.4 % (ref 0–2)
BENZODIAZ UR QL: NEGATIVE
BILIRUB SERPL-MCNC: 0.4 MG/DL (ref 0.2–1)
BUN SERPL-MCNC: 13 MG/DL (ref 7–18)
BUN/CREAT SERPL: 15 (ref 12–20)
CALCIUM SERPL-MCNC: 9.2 MG/DL (ref 8.5–10.1)
CANNABINOIDS UR QL SCN: POSITIVE
CHLORIDE SERPL-SCNC: 99 MMOL/L (ref 100–111)
CO2 SERPL-SCNC: 29 MMOL/L (ref 21–32)
COCAINE UR QL SCN: NEGATIVE
CREAT SERPL-MCNC: 0.84 MG/DL (ref 0.6–1.3)
DIFFERENTIAL METHOD BLD: ABNORMAL
EOSINOPHIL # BLD: 0.03 K/UL (ref 0–0.4)
EOSINOPHIL NFR BLD: 0.3 % (ref 0–5)
ERYTHROCYTE [DISTWIDTH] IN BLOOD BY AUTOMATED COUNT: 15 % (ref 11.6–14.5)
GLOBULIN SER CALC-MCNC: 3.3 G/DL (ref 2–4)
GLUCOSE SERPL-MCNC: 320 MG/DL (ref 74–99)
HCT VFR BLD AUTO: 40.1 % (ref 35–45)
HGB BLD-MCNC: 13.9 G/DL (ref 12–16)
IMM GRANULOCYTES # BLD AUTO: 0.06 K/UL (ref 0–0.04)
IMM GRANULOCYTES NFR BLD AUTO: 0.6 % (ref 0–0.5)
LYMPHOCYTES # BLD: 1.31 K/UL (ref 0.9–3.3)
LYMPHOCYTES NFR BLD: 12.5 % (ref 21–52)
Lab: ABNORMAL
MCH RBC QN AUTO: 33.3 PG (ref 24–34)
MCHC RBC AUTO-ENTMCNC: 34.7 G/DL (ref 31–37)
MCV RBC AUTO: 95.9 FL (ref 78–100)
METHADONE UR QL: NEGATIVE
MONOCYTES # BLD: 0.8 K/UL (ref 0.05–1.2)
MONOCYTES NFR BLD: 7.7 % (ref 3–10)
NEUTS SEG # BLD: 8.2 K/UL (ref 1.8–8)
NEUTS SEG NFR BLD: 78.5 % (ref 40–73)
NRBC # BLD: 0 K/UL (ref 0–0.01)
NRBC BLD-RTO: 0 PER 100 WBC
OPIATES UR QL: NEGATIVE
PCP UR QL: NEGATIVE
PLATELET # BLD AUTO: 261 K/UL (ref 135–420)
PMV BLD AUTO: 9.6 FL (ref 9.2–11.8)
POTASSIUM SERPL-SCNC: 3.9 MMOL/L (ref 3.5–5.5)
PROT SERPL-MCNC: 6.8 G/DL (ref 6.4–8.2)
RBC # BLD AUTO: 4.18 M/UL (ref 4.2–5.3)
SODIUM SERPL-SCNC: 134 MMOL/L (ref 136–145)
TROPONIN I SERPL HS-MCNC: 12 NG/L (ref 0–54)
WBC # BLD AUTO: 10.4 K/UL (ref 4.6–13.2)

## 2025-01-28 PROCEDURE — 85025 COMPLETE CBC W/AUTO DIFF WBC: CPT

## 2025-01-28 PROCEDURE — 6370000000 HC RX 637 (ALT 250 FOR IP)

## 2025-01-28 PROCEDURE — 80053 COMPREHEN METABOLIC PANEL: CPT

## 2025-01-28 PROCEDURE — 6360000002 HC RX W HCPCS

## 2025-01-28 PROCEDURE — 6360000004 HC RX CONTRAST MEDICATION

## 2025-01-28 PROCEDURE — 73706 CT ANGIO LWR EXTR W/O&W/DYE: CPT

## 2025-01-28 PROCEDURE — 2580000003 HC RX 258

## 2025-01-28 PROCEDURE — 99285 EMERGENCY DEPT VISIT HI MDM: CPT

## 2025-01-28 PROCEDURE — 84484 ASSAY OF TROPONIN QUANT: CPT

## 2025-01-28 PROCEDURE — 80307 DRUG TEST PRSMV CHEM ANLYZR: CPT

## 2025-01-28 PROCEDURE — 85730 THROMBOPLASTIN TIME PARTIAL: CPT

## 2025-01-28 PROCEDURE — 1100000000 HC RM PRIVATE

## 2025-01-28 PROCEDURE — 93005 ELECTROCARDIOGRAM TRACING: CPT

## 2025-01-28 RX ORDER — INSULIN GLARGINE 100 [IU]/ML
0.25 INJECTION, SOLUTION SUBCUTANEOUS DAILY
Status: DISCONTINUED | OUTPATIENT
Start: 2025-01-29 | End: 2025-01-29

## 2025-01-28 RX ORDER — INSULIN LISPRO 100 [IU]/ML
0.08 INJECTION, SOLUTION INTRAVENOUS; SUBCUTANEOUS
Status: DISCONTINUED | OUTPATIENT
Start: 2025-01-29 | End: 2025-01-29 | Stop reason: SDUPTHER

## 2025-01-28 RX ORDER — IOPAMIDOL 755 MG/ML
100 INJECTION, SOLUTION INTRAVASCULAR
Status: COMPLETED | OUTPATIENT
Start: 2025-01-28 | End: 2025-01-28

## 2025-01-28 RX ORDER — DEXTROSE MONOHYDRATE 100 MG/ML
INJECTION, SOLUTION INTRAVENOUS CONTINUOUS PRN
Status: DISCONTINUED | OUTPATIENT
Start: 2025-01-28 | End: 2025-01-29 | Stop reason: SDUPTHER

## 2025-01-28 RX ORDER — 0.9 % SODIUM CHLORIDE 0.9 %
1000 INTRAVENOUS SOLUTION INTRAVENOUS ONCE
Status: COMPLETED | OUTPATIENT
Start: 2025-01-28 | End: 2025-01-28

## 2025-01-28 RX ORDER — HEPARIN SODIUM 10000 [USP'U]/100ML
5-30 INJECTION, SOLUTION INTRAVENOUS CONTINUOUS
Status: DISCONTINUED | OUTPATIENT
Start: 2025-01-28 | End: 2025-01-31 | Stop reason: HOSPADM

## 2025-01-28 RX ORDER — OXYCODONE HYDROCHLORIDE 5 MG/1
5 TABLET ORAL EVERY 6 HOURS PRN
Status: DISCONTINUED | OUTPATIENT
Start: 2025-01-28 | End: 2025-01-31 | Stop reason: HOSPADM

## 2025-01-28 RX ORDER — OXYCODONE HYDROCHLORIDE 5 MG/1
5 TABLET ORAL
Status: COMPLETED | OUTPATIENT
Start: 2025-01-28 | End: 2025-01-28

## 2025-01-28 RX ADMIN — IOPAMIDOL 90 ML: 755 INJECTION, SOLUTION INTRAVENOUS at 20:20

## 2025-01-28 RX ADMIN — SODIUM CHLORIDE 1000 ML: 9 INJECTION, SOLUTION INTRAVENOUS at 20:29

## 2025-01-28 RX ADMIN — HEPARIN SODIUM 18 UNITS/KG/HR: 10000 INJECTION, SOLUTION INTRAVENOUS at 21:50

## 2025-01-28 RX ADMIN — OXYCODONE HYDROCHLORIDE 5 MG: 5 TABLET ORAL at 21:45

## 2025-01-28 ASSESSMENT — PAIN SCALES - GENERAL: PAINLEVEL_OUTOF10: 10

## 2025-01-28 ASSESSMENT — PAIN DESCRIPTION - FREQUENCY: FREQUENCY: INTERMITTENT

## 2025-01-28 ASSESSMENT — PAIN DESCRIPTION - DESCRIPTORS: DESCRIPTORS: ACHING

## 2025-01-28 ASSESSMENT — PAIN DESCRIPTION - ORIENTATION: ORIENTATION: LEFT

## 2025-01-28 ASSESSMENT — PAIN - FUNCTIONAL ASSESSMENT: PAIN_FUNCTIONAL_ASSESSMENT: 0-10

## 2025-01-28 ASSESSMENT — PAIN DESCRIPTION - PAIN TYPE: TYPE: ACUTE PAIN;CHRONIC PAIN

## 2025-01-28 ASSESSMENT — PAIN DESCRIPTION - LOCATION: LOCATION: FOOT

## 2025-01-28 NOTE — ED NOTES
Patient states she is \"taking her own Zofran and not going to wait for us to order it.\" Patient showed me the label, it is 8mg zofran.

## 2025-01-29 ENCOUNTER — APPOINTMENT (OUTPATIENT)
Facility: HOSPITAL | Age: 75
DRG: 315 | End: 2025-01-29
Attending: INTERNAL MEDICINE
Payer: MEDICARE

## 2025-01-29 ENCOUNTER — APPOINTMENT (OUTPATIENT)
Facility: HOSPITAL | Age: 75
DRG: 315 | End: 2025-01-29
Payer: MEDICARE

## 2025-01-29 PROBLEM — N76.4 LABIAL ABSCESS: Status: ACTIVE | Noted: 2025-01-29

## 2025-01-29 LAB
ALBUMIN SERPL-MCNC: 3.3 G/DL (ref 3.4–5)
ALBUMIN/GLOB SERPL: 0.9 (ref 0.8–1.7)
ALP SERPL-CCNC: 140 U/L (ref 45–117)
ALT SERPL-CCNC: 14 U/L (ref 13–56)
ANION GAP SERPL CALC-SCNC: 2 MMOL/L (ref 3–18)
APTT PPP: 46.8 SEC (ref 23–36.4)
APTT PPP: 59.5 SEC (ref 23–36.4)
APTT PPP: >180 SEC (ref 23–36.4)
APTT PPP: >180 SEC (ref 23–36.4)
AST SERPL-CCNC: 13 U/L (ref 10–38)
BILIRUB SERPL-MCNC: 0.5 MG/DL (ref 0.2–1)
BUN SERPL-MCNC: 12 MG/DL (ref 7–18)
BUN/CREAT SERPL: 16 (ref 12–20)
CALCIUM SERPL-MCNC: 8.5 MG/DL (ref 8.5–10.1)
CHLORIDE SERPL-SCNC: 100 MMOL/L (ref 100–111)
CK SERPL-CCNC: 103 U/L (ref 26–192)
CK SERPL-CCNC: 74 U/L (ref 26–192)
CK SERPL-CCNC: 88 U/L (ref 26–192)
CO2 SERPL-SCNC: 30 MMOL/L (ref 21–32)
CREAT SERPL-MCNC: 0.76 MG/DL (ref 0.6–1.3)
CRP SERPL-MCNC: 6.2 MG/DL (ref 0–0.3)
ECHO AO ASC DIAM: 3.3 CM
ECHO AO ASCENDING AORTA INDEX: 2.01 CM/M2
ECHO AO ROOT DIAM: 3.6 CM
ECHO AO ROOT INDEX: 2.2 CM/M2
ECHO AV AREA PEAK VELOCITY: 1.2 CM2
ECHO AV AREA VTI: 1.1 CM2
ECHO AV AREA/BSA PEAK VELOCITY: 0.7 CM2/M2
ECHO AV AREA/BSA VTI: 0.7 CM2/M2
ECHO AV MEAN GRADIENT: 20 MMHG
ECHO AV MEAN VELOCITY: 2.1 M/S
ECHO AV PEAK GRADIENT: 33 MMHG
ECHO AV PEAK VELOCITY: 2.9 M/S
ECHO AV VELOCITY RATIO: 0.31
ECHO AV VTI: 58.3 CM
ECHO BSA: 1.65 M2
ECHO EST RA PRESSURE: 3 MMHG
ECHO LA DIAMETER INDEX: 2.07 CM/M2
ECHO LA DIAMETER: 3.4 CM
ECHO LA TO AORTIC ROOT RATIO: 0.94
ECHO LA VOL A-L A2C: 38 ML (ref 22–52)
ECHO LA VOL A-L A4C: 31 ML (ref 22–52)
ECHO LA VOL BP: 34 ML (ref 22–52)
ECHO LA VOL MOD A2C: 38 ML (ref 22–52)
ECHO LA VOL MOD A4C: 30 ML (ref 22–52)
ECHO LA VOL/BSA BIPLANE: 21 ML/M2 (ref 16–34)
ECHO LA VOLUME AREA LENGTH: 35 ML
ECHO LA VOLUME INDEX A-L A2C: 23 ML/M2 (ref 16–34)
ECHO LA VOLUME INDEX A-L A4C: 19 ML/M2 (ref 16–34)
ECHO LA VOLUME INDEX AREA LENGTH: 21 ML/M2 (ref 16–34)
ECHO LA VOLUME INDEX MOD A2C: 23 ML/M2 (ref 16–34)
ECHO LA VOLUME INDEX MOD A4C: 18 ML/M2 (ref 16–34)
ECHO LV E' LATERAL VELOCITY: 6.69 CM/S
ECHO LV E' SEPTAL VELOCITY: 4.66 CM/S
ECHO LV EDV A2C: 123 ML
ECHO LV EDV A4C: 82 ML
ECHO LV EDV BP: 100 ML (ref 56–104)
ECHO LV EDV INDEX A4C: 50 ML/M2
ECHO LV EDV INDEX BP: 61 ML/M2
ECHO LV EDV NDEX A2C: 75 ML/M2
ECHO LV EJECTION FRACTION A2C: 55 %
ECHO LV EJECTION FRACTION A4C: 56 %
ECHO LV EJECTION FRACTION BIPLANE: 55 % (ref 55–100)
ECHO LV ESV A2C: 55 ML
ECHO LV ESV A4C: 36 ML
ECHO LV ESV BP: 46 ML (ref 19–49)
ECHO LV ESV INDEX A2C: 34 ML/M2
ECHO LV ESV INDEX A4C: 22 ML/M2
ECHO LV ESV INDEX BP: 28 ML/M2
ECHO LV FRACTIONAL SHORTENING: 34 % (ref 28–44)
ECHO LV INTERNAL DIMENSION DIASTOLE INDEX: 2.5 CM/M2
ECHO LV INTERNAL DIMENSION DIASTOLIC: 4.1 CM (ref 3.9–5.3)
ECHO LV INTERNAL DIMENSION SYSTOLIC INDEX: 1.65 CM/M2
ECHO LV INTERNAL DIMENSION SYSTOLIC: 2.7 CM
ECHO LV IVSD: 1.1 CM (ref 0.6–0.9)
ECHO LV MASS 2D: 151.3 G (ref 67–162)
ECHO LV MASS INDEX 2D: 92.3 G/M2 (ref 43–95)
ECHO LV POSTERIOR WALL DIASTOLIC: 1.1 CM (ref 0.6–0.9)
ECHO LV RELATIVE WALL THICKNESS RATIO: 0.54
ECHO LVOT AREA: 3.8 CM2
ECHO LVOT AV VTI INDEX: 0.28
ECHO LVOT DIAM: 2.2 CM
ECHO LVOT MEAN GRADIENT: 2 MMHG
ECHO LVOT PEAK GRADIENT: 3 MMHG
ECHO LVOT PEAK VELOCITY: 0.9 M/S
ECHO LVOT STROKE VOLUME INDEX: 38.5 ML/M2
ECHO LVOT SV: 63.1 ML
ECHO LVOT VTI: 16.6 CM
ECHO MV A VELOCITY: 1.29 M/S
ECHO MV E DECELERATION TIME (DT): 369 MS
ECHO MV E VELOCITY: 0.94 M/S
ECHO MV E/A RATIO: 0.73
ECHO MV E/E' LATERAL: 14.05
ECHO MV E/E' RATIO (AVERAGED): 17.11
ECHO MV E/E' SEPTAL: 20.17
ECHO PULMONARY ARTERY SYSTOLIC PRESSURE (PASP): 17 MMHG
ECHO RA END SYSTOLIC VOLUME APICAL 4 CHAMBER INDEX BSA: 29 ML/M2
ECHO RA VOLUME: 48 ML
ECHO RIGHT VENTRICULAR SYSTOLIC PRESSURE (RVSP): 17 MMHG
ECHO RV FREE WALL PEAK S': 17.1 CM/S
ECHO RV INTERNAL DIMENSION: 3.8 CM
ECHO RV TAPSE: 2 CM (ref 1.7–?)
ECHO TV REGURGITANT MAX VELOCITY: 1.88 M/S
ECHO TV REGURGITANT PEAK GRADIENT: 14 MMHG
ERYTHROCYTE [DISTWIDTH] IN BLOOD BY AUTOMATED COUNT: 17 % (ref 11.6–14.5)
ERYTHROCYTE [SEDIMENTATION RATE] IN BLOOD: 8 MM/HR (ref 0–30)
EST. AVERAGE GLUCOSE BLD GHB EST-MCNC: 249 MG/DL
GLOBULIN SER CALC-MCNC: 3.6 G/DL (ref 2–4)
GLUCOSE BLD STRIP.AUTO-MCNC: 282 MG/DL (ref 70–110)
GLUCOSE BLD STRIP.AUTO-MCNC: 282 MG/DL (ref 70–110)
GLUCOSE BLD STRIP.AUTO-MCNC: 343 MG/DL (ref 70–110)
GLUCOSE BLD STRIP.AUTO-MCNC: 344 MG/DL (ref 70–110)
GLUCOSE SERPL-MCNC: 411 MG/DL (ref 74–99)
HBA1C MFR BLD: 10.3 % (ref 4.2–5.6)
HCT VFR BLD AUTO: 37.6 % (ref 35–45)
HGB BLD-MCNC: 13.5 G/DL (ref 12–16)
INR PPP: 2.3 (ref 0.9–1.1)
MAGNESIUM SERPL-MCNC: 1.5 MG/DL (ref 1.6–2.6)
MCH RBC QN AUTO: 36.1 PG (ref 24–34)
MCHC RBC AUTO-ENTMCNC: 35.9 G/DL (ref 31–37)
MCV RBC AUTO: 100.5 FL (ref 78–100)
NRBC # BLD: 0 K/UL (ref 0–0.01)
NRBC BLD-RTO: 0 PER 100 WBC
PLATELET # BLD AUTO: 242 K/UL (ref 135–420)
PMV BLD AUTO: 9.8 FL (ref 9.2–11.8)
POTASSIUM SERPL-SCNC: 4.7 MMOL/L (ref 3.5–5.5)
PROCALCITONIN SERPL-MCNC: 0.11 NG/ML
PROT SERPL-MCNC: 6.9 G/DL (ref 6.4–8.2)
PROTHROMBIN TIME: 25.4 SEC (ref 11.9–14.9)
RBC # BLD AUTO: 3.74 M/UL (ref 4.2–5.3)
SODIUM SERPL-SCNC: 132 MMOL/L (ref 136–145)
TROPONIN I SERPL HS-MCNC: 18 NG/L (ref 0–54)
TROPONIN I SERPL HS-MCNC: 19 NG/L (ref 0–54)
TROPONIN I SERPL HS-MCNC: 19 NG/L (ref 0–54)
WBC # BLD AUTO: 10.9 K/UL (ref 4.6–13.2)

## 2025-01-29 PROCEDURE — 83036 HEMOGLOBIN GLYCOSYLATED A1C: CPT

## 2025-01-29 PROCEDURE — 85610 PROTHROMBIN TIME: CPT

## 2025-01-29 PROCEDURE — 85027 COMPLETE CBC AUTOMATED: CPT

## 2025-01-29 PROCEDURE — 85652 RBC SED RATE AUTOMATED: CPT

## 2025-01-29 PROCEDURE — 73630 X-RAY EXAM OF FOOT: CPT

## 2025-01-29 PROCEDURE — 6360000002 HC RX W HCPCS

## 2025-01-29 PROCEDURE — 80053 COMPREHEN METABOLIC PANEL: CPT

## 2025-01-29 PROCEDURE — 86140 C-REACTIVE PROTEIN: CPT

## 2025-01-29 PROCEDURE — 85730 THROMBOPLASTIN TIME PARTIAL: CPT

## 2025-01-29 PROCEDURE — 6360000002 HC RX W HCPCS: Performed by: OBSTETRICS & GYNECOLOGY

## 2025-01-29 PROCEDURE — 6360000002 HC RX W HCPCS: Performed by: INTERNAL MEDICINE

## 2025-01-29 PROCEDURE — 83735 ASSAY OF MAGNESIUM: CPT

## 2025-01-29 PROCEDURE — 82550 ASSAY OF CK (CPK): CPT

## 2025-01-29 PROCEDURE — 36415 COLL VENOUS BLD VENIPUNCTURE: CPT

## 2025-01-29 PROCEDURE — 6370000000 HC RX 637 (ALT 250 FOR IP): Performed by: HOSPITALIST

## 2025-01-29 PROCEDURE — 82962 GLUCOSE BLOOD TEST: CPT

## 2025-01-29 PROCEDURE — 6370000000 HC RX 637 (ALT 250 FOR IP): Performed by: INTERNAL MEDICINE

## 2025-01-29 PROCEDURE — 84484 ASSAY OF TROPONIN QUANT: CPT

## 2025-01-29 PROCEDURE — 6360000002 HC RX W HCPCS: Performed by: HOSPITALIST

## 2025-01-29 PROCEDURE — 87070 CULTURE OTHR SPECIMN AEROBIC: CPT

## 2025-01-29 PROCEDURE — 2580000003 HC RX 258: Performed by: INTERNAL MEDICINE

## 2025-01-29 PROCEDURE — 84145 PROCALCITONIN (PCT): CPT

## 2025-01-29 PROCEDURE — 93306 TTE W/DOPPLER COMPLETE: CPT | Performed by: INTERNAL MEDICINE

## 2025-01-29 PROCEDURE — 1100000003 HC PRIVATE W/ TELEMETRY

## 2025-01-29 PROCEDURE — 0U9M0ZZ DRAINAGE OF VULVA, OPEN APPROACH: ICD-10-PCS | Performed by: OBSTETRICS & GYNECOLOGY

## 2025-01-29 PROCEDURE — 87077 CULTURE AEROBIC IDENTIFY: CPT

## 2025-01-29 PROCEDURE — 87040 BLOOD CULTURE FOR BACTERIA: CPT

## 2025-01-29 PROCEDURE — 93306 TTE W/DOPPLER COMPLETE: CPT

## 2025-01-29 PROCEDURE — 87186 SC STD MICRODIL/AGAR DIL: CPT

## 2025-01-29 PROCEDURE — 87205 SMEAR GRAM STAIN: CPT

## 2025-01-29 RX ORDER — MORPHINE SULFATE 2 MG/ML
1 INJECTION, SOLUTION INTRAMUSCULAR; INTRAVENOUS
Status: DISCONTINUED | OUTPATIENT
Start: 2025-01-29 | End: 2025-01-29

## 2025-01-29 RX ORDER — INSULIN LISPRO 100 [IU]/ML
0-8 INJECTION, SOLUTION INTRAVENOUS; SUBCUTANEOUS
Status: DISCONTINUED | OUTPATIENT
Start: 2025-01-29 | End: 2025-01-31 | Stop reason: HOSPADM

## 2025-01-29 RX ORDER — INSULIN LISPRO 100 [IU]/ML
10 INJECTION, SOLUTION INTRAVENOUS; SUBCUTANEOUS
Status: DISCONTINUED | OUTPATIENT
Start: 2025-01-29 | End: 2025-01-31 | Stop reason: HOSPADM

## 2025-01-29 RX ORDER — IBUPROFEN 400 MG/1
400 TABLET, FILM COATED ORAL EVERY 6 HOURS PRN
Status: DISCONTINUED | OUTPATIENT
Start: 2025-01-29 | End: 2025-01-31 | Stop reason: HOSPADM

## 2025-01-29 RX ORDER — MORPHINE SULFATE 2 MG/ML
2 INJECTION, SOLUTION INTRAMUSCULAR; INTRAVENOUS
Status: DISCONTINUED | OUTPATIENT
Start: 2025-01-29 | End: 2025-01-31 | Stop reason: HOSPADM

## 2025-01-29 RX ORDER — HEPARIN SODIUM 1000 [USP'U]/ML
40 INJECTION, SOLUTION INTRAVENOUS; SUBCUTANEOUS PRN
Status: DISCONTINUED | OUTPATIENT
Start: 2025-01-29 | End: 2025-01-31 | Stop reason: HOSPADM

## 2025-01-29 RX ORDER — MORPHINE SULFATE 2 MG/ML
1 INJECTION, SOLUTION INTRAMUSCULAR; INTRAVENOUS
Status: DISCONTINUED | OUTPATIENT
Start: 2025-01-29 | End: 2025-01-29 | Stop reason: ALTCHOICE

## 2025-01-29 RX ORDER — HEPARIN SODIUM 1000 [USP'U]/ML
80 INJECTION, SOLUTION INTRAVENOUS; SUBCUTANEOUS ONCE
Status: DISCONTINUED | OUTPATIENT
Start: 2025-01-29 | End: 2025-01-29

## 2025-01-29 RX ORDER — INSULIN LISPRO 100 [IU]/ML
0.08 INJECTION, SOLUTION INTRAVENOUS; SUBCUTANEOUS
Status: DISCONTINUED | OUTPATIENT
Start: 2025-01-29 | End: 2025-01-29

## 2025-01-29 RX ORDER — OXYCODONE HYDROCHLORIDE 5 MG/1
5 TABLET ORAL EVERY 4 HOURS PRN
Status: DISCONTINUED | OUTPATIENT
Start: 2025-01-29 | End: 2025-01-31 | Stop reason: HOSPADM

## 2025-01-29 RX ORDER — DIPHENHYDRAMINE HYDROCHLORIDE 50 MG/ML
12.5 INJECTION INTRAMUSCULAR; INTRAVENOUS EVERY 6 HOURS PRN
Status: DISCONTINUED | OUTPATIENT
Start: 2025-01-29 | End: 2025-01-31 | Stop reason: HOSPADM

## 2025-01-29 RX ORDER — DEXTROSE MONOHYDRATE 100 MG/ML
INJECTION, SOLUTION INTRAVENOUS CONTINUOUS PRN
Status: DISCONTINUED | OUTPATIENT
Start: 2025-01-29 | End: 2025-01-31 | Stop reason: HOSPADM

## 2025-01-29 RX ORDER — HEPARIN SODIUM 1000 [USP'U]/ML
80 INJECTION, SOLUTION INTRAVENOUS; SUBCUTANEOUS PRN
Status: DISCONTINUED | OUTPATIENT
Start: 2025-01-29 | End: 2025-01-31 | Stop reason: HOSPADM

## 2025-01-29 RX ORDER — INSULIN GLARGINE 100 [IU]/ML
30 INJECTION, SOLUTION SUBCUTANEOUS DAILY
Status: DISCONTINUED | OUTPATIENT
Start: 2025-01-29 | End: 2025-01-31 | Stop reason: HOSPADM

## 2025-01-29 RX ORDER — LEVOFLOXACIN 5 MG/ML
250 INJECTION, SOLUTION INTRAVENOUS EVERY 24 HOURS
Status: DISCONTINUED | OUTPATIENT
Start: 2025-01-29 | End: 2025-01-31 | Stop reason: HOSPADM

## 2025-01-29 RX ADMIN — IBUPROFEN 400 MG: 400 TABLET, FILM COATED ORAL at 19:33

## 2025-01-29 RX ADMIN — DAPTOMYCIN 350 MG: 500 INJECTION, POWDER, LYOPHILIZED, FOR SOLUTION INTRAVENOUS at 05:04

## 2025-01-29 RX ADMIN — HEPARIN SODIUM 2400 UNITS: 1000 INJECTION INTRAVENOUS; SUBCUTANEOUS at 19:35

## 2025-01-29 RX ADMIN — INSULIN GLARGINE 30 UNITS: 100 INJECTION, SOLUTION SUBCUTANEOUS at 21:10

## 2025-01-29 RX ADMIN — MORPHINE SULFATE 1 MG: 2 INJECTION, SOLUTION INTRAMUSCULAR; INTRAVENOUS at 05:46

## 2025-01-29 RX ADMIN — OXYCODONE 5 MG: 5 TABLET ORAL at 11:23

## 2025-01-29 RX ADMIN — MORPHINE SULFATE 2 MG: 2 INJECTION, SOLUTION INTRAMUSCULAR; INTRAVENOUS at 13:18

## 2025-01-29 RX ADMIN — HEPARIN SODIUM 17 UNITS/KG/HR: 10000 INJECTION, SOLUTION INTRAVENOUS at 19:35

## 2025-01-29 RX ADMIN — OXYCODONE 5 MG: 5 TABLET ORAL at 03:50

## 2025-01-29 RX ADMIN — LIDOCAINE HYDROCHLORIDE 10 ML: 10; .005 INJECTION, SOLUTION EPIDURAL; INFILTRATION; INTRACAUDAL; PERINEURAL at 13:45

## 2025-01-29 RX ADMIN — INSULIN LISPRO 6 UNITS: 100 INJECTION, SOLUTION INTRAVENOUS; SUBCUTANEOUS at 21:11

## 2025-01-29 RX ADMIN — HEPARIN SODIUM 4900 UNITS: 1000 INJECTION INTRAVENOUS; SUBCUTANEOUS at 05:38

## 2025-01-29 RX ADMIN — OXYCODONE HYDROCHLORIDE 5 MG: 5 TABLET ORAL at 21:10

## 2025-01-29 ASSESSMENT — PAIN SCALES - GENERAL
PAINLEVEL_OUTOF10: 9
PAINLEVEL_OUTOF10: 10
PAINLEVEL_OUTOF10: 3
PAINLEVEL_OUTOF10: 5
PAINLEVEL_OUTOF10: 10
PAINLEVEL_OUTOF10: 10
PAINLEVEL_OUTOF10: 4
PAINLEVEL_OUTOF10: 5
PAINLEVEL_OUTOF10: 3
PAINLEVEL_OUTOF10: 10
PAINLEVEL_OUTOF10: 3
PAINLEVEL_OUTOF10: 9

## 2025-01-29 ASSESSMENT — PAIN DESCRIPTION - ORIENTATION
ORIENTATION: LEFT
ORIENTATION_2: LEFT;ANTERIOR
ORIENTATION: LEFT
ORIENTATION: RIGHT;LEFT;LOWER
ORIENTATION: LEFT

## 2025-01-29 ASSESSMENT — PAIN DESCRIPTION - DESCRIPTORS
DESCRIPTORS: ACHING
DESCRIPTORS: ACHING;STABBING
DESCRIPTORS: ACHING;SHARP
DESCRIPTORS_2: SHARP
DESCRIPTORS: SHARP
DESCRIPTORS: ACHING;SORE

## 2025-01-29 ASSESSMENT — PAIN DESCRIPTION - ONSET
ONSET: ON-GOING
ONSET: ON-GOING

## 2025-01-29 ASSESSMENT — PAIN DESCRIPTION - LOCATION
LOCATION_2: PELVIS
LOCATION: FOOT
LOCATION: GROIN;OTHER (COMMENT)
LOCATION: FOOT
LOCATION: GROIN
LOCATION: LEG

## 2025-01-29 ASSESSMENT — PAIN SCALES - WONG BAKER
WONGBAKER_NUMERICALRESPONSE: HURTS A LITTLE BIT
WONGBAKER_NUMERICALRESPONSE: HURTS A LITTLE BIT

## 2025-01-29 ASSESSMENT — PAIN DESCRIPTION - PAIN TYPE
TYPE: ACUTE PAIN
TYPE: ACUTE PAIN

## 2025-01-29 ASSESSMENT — PAIN DESCRIPTION - FREQUENCY
FREQUENCY: CONTINUOUS
FREQUENCY: CONTINUOUS

## 2025-01-29 ASSESSMENT — PAIN DESCRIPTION - INTENSITY
RATING_2: 10
RATING_2: 10

## 2025-01-29 NOTE — CONSULTS
Ailyn Villafana MD   Patient seen and examined, full note to follow.    Multiple antibiotic allergy listed- -- Questionable as to what is truly allergy, as I have treated her with levofloxacin in prior treatment    Allergy testing done as OP few months ago- was told she can be treated by PCN    Known to me from prior admission, for foot infection, and treatment long term with daptomycin and levofloxacin X6 weeks.    Plan:  Continue daptomycin--monitor cpk  Attempted to get culture from labial abscess-- no pus coming, some blood- sent off  Obtain report of allergy evaluation- from PCP office- she doesn't recall name    DW nursing  Thanks    Ijeoma Krishna MD  [facility]  Office: [phone] -Option #8  Office fax:  [phone]

## 2025-01-29 NOTE — PROGRESS NOTES
Cardiology Progress Note    Patient: Angelica Dunacn Age: 74 y.o. Sex: female    YOB: 1950 Admit Date: 1/28/2025 PCP: Ailyn Villafana MD   MRN: [id number]  CSN: [id number]       Cardiologist: Karin Bernabe MD     Chief Complaint     Chief Complaint   Patient presents with    Foot Pain     Dr. Gatica, cardiology, sending for admission with concern for vasc compromise left leg/foot with gangrenous lesion, + KYRA, left foot. Requesting admit with hospitalist, labs, CTA LLE and he will consult.    infection foot        HPI:   Angelica Duncan is a 74 y.o. female who  presented with severe leg pain. She has PVD, positive KYRA. Had her ObGy procedure today and feels better.     Assessment and Diagnosis:   CAD  PVD  Labial abscess, S/P I&D  Limb ischemia (occluded stent)  ECHO normal.   DM  Lupus Anticoagulant     Recommendations and Plan:    Will review the CTA   Continue current medical Rx  Continue antibiotics  May need limb salvage - PCI    Medications:     Allergies   Allergen Reactions    Hydromorphone Hives    Rifampin Hives and Swelling     Swelling of mouth and tounge    Levofloxacin Itching    Acetaminophen Hives    Amoxicillin Hives    Azithromycin Angioedema    Cefatrizine Hives    Ceftriaxone Hives    Cephalexin Itching     Swollen throat and tongue    Erythromycin Hives    Sulfa Antibiotics Hives    Aspirin Nausea And Vomiting    Vancomycin Hives      Current Facility-Administered Medications   Medication Dose Route Frequency    DAPTOmycin (CUBICIN) 350 mg in sodium chloride 0.9 % 50 mL IVPB  6 mg/kg IntraVENous Q24H    levoFLOXacin (LEVAQUIN) 250 MG/50ML infusion 250 mg  250 mg IntraVENous Q24H    diphenhydrAMINE (BENADRYL) injection 12.5 mg  12.5 mg IntraVENous Q6H PRN    oxyCODONE (ROXICODONE) immediate release tablet 5 mg  5 mg Oral Q4H PRN    heparin (porcine) injection 4,900 Units  80 Units/kg IntraVENous PRN    heparin (porcine) injection 2,400 Units  40 Units/kg IntraVENous PRN     --  2.3*     All Cardiac Markers in the last 24 hours:    Lab Results   Component Value Date/Time    TROPHS 19 01/29/2025 11:26 AM     No results found for: \"BNP\"    Last Lipid:  No results found for: \"CHOL\", \"CHLST\", \"CHOLV\", \"HDL\", \"HDLC\", \"LDL\", \"LDLC\"    Cardiographics:     Encounter Date: 01/28/25   EKG 12 Lead   Result Value    Ventricular Rate 100    Atrial Rate 100    P-R Interval 146    QRS Duration 70    Q-T Interval 336    QTc Calculation (Bazett) 433    P Axis 11    R Axis 30    T Axis 79    Diagnosis      Sinus rhythm with premature atrial complexes  Otherwise normal ECG  When compared with ECG of 23-MAY-2024 16:57,  Nonspecific T wave abnormality now evident in Lateral leads       01/28/25    ECHO (TTE) COMPLETE (PRN CONTRAST/BUBBLE/STRAIN/3D) 01/29/2025 12:12 PM (Final)    Interpretation Summary    Left Ventricle: Normal left ventricular systolic function with a visually estimated EF of 55 - 60%. Left ventricle size is normal. Mildly increased wall thickness. Findings consistent with mild concentric hypertrophy. EF BP is 55%. Normal wall motion. Diastolic dysfunction present with normal LV EF.    Aortic Valve: Mildly calcified cusps. Moderate sclerosis of the aortic valve cusps. Mild annular calcification. Moderate stenosis of the aortic valve. AV mean gradient is 20 mmHg. AV peak gradient is 33 mmHg. AV peak velocity is 2.9 m/s. AV area by continuity VTI is 1.1 cm2.    Mitral Valve: Moderate annular calcification at the posterior leaflet. Mildly thickened leaflets.    Tricuspid Valve: The estimated RVSP is 17 mmHg. The estimated PASP is 17 mmHg.    Pericardium: No pericardial effusion.    Image quality is technically difficult. Technically difficult study due to patient's body habitus and limited study due to patient's ability to tolerate test.    Signed by: Karin Bernabe MD on 1/29/2025 12:12 PM    Xray Result (most recent):  XR FOOT LEFT (MIN 3 VIEWS) 01/29/2025    Narrative  EXAM: XR FOOT
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
## 2025-01-29 NOTE — PROGRESS NOTES
Ct scan and case reviewed with surgeons.  Will cancel CTA of chest, abdomen, pelvis.  Will defer any vascular surgical intervention at this time secondary to labial abscess.

## 2025-01-29 NOTE — PROGRESS NOTES
Care  assisting Care Mgcatrachita Mckoy RN with Discharge Planning needs for patient.  Referral sent to [facility] (patient's preference) for review and consideration for acceptance for home care needs.    Will follow.

## 2025-01-29 NOTE — PROGRESS NOTES
Above noted, chart reviewed.  Full note to follow.  75 yo female with ischemic rest pain left leg.  S/p left axillary artery to femoral artery bypass in the past.   Labial abscess.  Check CTA chest, abdomen, pelvis  Recommend gyn consult for labial abscess.   Will make further recommendation based on CTA.

## 2025-01-29 NOTE — CONSULTS
Inpatient Wound Care Note:     Angelica Duncan  MEDICAL RECORD NUMBER:  [id number]  AGE: 74 y.o.   GENDER: female  : 1950  TODAY'S DATE:  2025    [] Follow-up visit for   [x] New consult for   Seen in  with no assistance  Patient admitted from home    PAST MEDICAL HISTORY    Past Medical History:   Diagnosis Date    Arthritis     hands and feet    Asthma     Autoimmune disease (HCC)     lupus anticoagulant-causes clotting    Cancer (HCC)     carcinoma in situ of cervix and uterus; inside bottom lip    Chronic pain     right foot    Diabetes (HCC)     Dizzy spells     h/o    Full dentures     upper and lower; no adhesive used    GERD (gastroesophageal reflux disease)     Hearing loss     bilateral wears hearing aids    History of blood transfusion     multiple    Hyperlipidemia     PCP Chacho AGUERO    Liver disease      of my liver was removed for ulcers.    Lung collapse         Nausea & vomiting 2012    chronic    Neuropathy     right foot    Other ill-defined conditions(799.89)     (2 mutated genes) hyper coag. state     Pneumonia 2017    PONV (postoperative nausea and vomiting)     Positive cardiolipin antibodies     Psychiatric disorder     anxiety   no meds    PUD (peptic ulcer disease)     H/O requiring partial gastrectomy and small intestine    Raynaud's syndrome     Restless leg syndrome     Retention of urine     Skin cancer     mouth    Thromboembolus (HCC)     RLE- arterial    Wears glasses     readers    Wears hearing aid in both ears         PAST SURGICAL HISTORY    Past Surgical History:   Procedure Laterality Date    APPENDECTOMY      BREAST SURGERY Bilateral 2016    hemorrhaged after breast surgery    BREAST SURGERY      developed infection and implant removed    CHOLECYSTECTOMY      FOOT DEBRIDEMENT Right 2024    INCISION BONE CORTEX RIGHT FOOT AND SURGICAL WOUND PREP, STRAVIX GRAFT, RIGHT FOOT WITH C-ARM(SPEC POP) performed by Natalio  Orientation: Left   Wound Image    Wound Etiology Traumatic   Dressing Status Clean;Dry;Intact   Wound Cleansed Not Cleansed  (too painful to clean)   Dressing/Treatment Silicone border   Offloading for Diabetic Foot Ulcers Felt or foam   Dressing Change Due 02/03/25   Wound Length (cm) 1 cm   Wound Width (cm) 0.1 cm   Wound Depth (cm) 0.1 cm   Wound Surface Area (cm^2) 0.1 cm^2   Change in Wound Size % (l*w) 0   Wound Volume (cm^3) 0.01 cm^3   Wound Healing % 0   Wound Assessment Dry   Drainage Amount None (dry)   Odor None   Rosa-wound Assessment Dry/flaky   Wound Thickness Description not for Pressure Injury Partial thickness   Wound 01/24/25 Toe (Comment  which one) Left;Dorsal   Date First Assessed: 01/24/25   Primary Wound Type: (c) Other (comment)  Location: (c) Toe (Comment  which one)  Wound Location Orientation: Left;Dorsal   Wound Image    Wound Etiology Diabetic   Dressing Status Dry   Wound Cleansed Not Cleansed  (foot to painful to touch)   Dressing/Treatment Open to air  (patient refused all dressings related to pain in foot)   Drainage Amount None (dry)   Odor None   Rosa-wound Assessment Intact;Other (Comment)  (entire fore foot dusky and purple)   Margins Attached edges   Wound 08/09/24 Right;Plantar   Date First Assessed/Time First Assessed: 08/09/24 1124   Wound Location Orientation: Right;Plantar   Wound Image    Wound Etiology Diabetic   Dressing Status Old drainage noted   Wound Cleansed Not Cleansed   Wound Assessment Epithelialization   Drainage Amount None (dry)   Rosa-wound Assessment Dry/flaky;Intact   Wound 01/29/25 Thigh Left;Medial   Date First Assessed/Time First Assessed: 01/29/25 1321   Present on Original Admission: Yes  Primary Wound Type: Burn  Location: Thigh  Wound Location Orientation: Left;Medial   Wound Image    Wound Etiology Burn   Wound Cleansed Wound cleanser   Dressing/Treatment Open to air   Wound Length (cm) 11 cm   Wound Width (cm) 5 cm   Wound Surface Area (cm^2) 55

## 2025-01-29 NOTE — PROGRESS NOTES
Critical lab result for glucose of 411. Dr. Padilla notified via secure message. Orders received for 30 units of lantus. Patient states she is very sensitive to both long acting and short acting insulin, as she is known to become hypoglycemic as low as the 30's. MD made aware. Per Dr. Padilla, give 15 units of Lantus now and reassess for response.     Patient arrived to unit with one PIV site. Patient accidentally pulled out IV access. Able to obtain one PIV but unsuccessful obtaining second. Has Heparin gtt infusing with Levaquin ordered, which are incompatible medications. Nursing supervisor made aware of need for RRMT to attempt additional IV access once available in the morning.      0721 POC glucose 343. Patient does not want full dose of SSI. Requests that dose be held until breakfast and a smaller dose given.     0726 Spoke with RRMT. Will be on unit shortly to attempt IV access.     0756 PTT > 180. Lab drawn from the same arm where IV Heparin is infusing. RRMT at bedside for IV access. Hand off report completed. Oncoming RN to send a new sample for accuracy.

## 2025-01-29 NOTE — CONSULTS
Department of Gynecology  Attending Consult Note      Reason for Consult:  Labial Abscess  Requesting Physician:  MELINDA Marrero    CHIEF COMPLAINT:   Labial Abscess    History obtained from patient    HISTORY OF PRESENT ILLNESS:                   The patient is a 74 y.o. female with significant past medical history of Diabetes, Hypertension, PVD who presents with c/o vascular issues with her left leg.  She was admitted by IM and noted to have a left labial/mons pubis abscess.  She states has felt worsening pain and swelling at this site for the past 4 days.  Pt. States has had a similar abscess in the past which was relieved by Incision and drainage.    Past Medical History:        Diagnosis Date    Arthritis     hands and feet    Asthma     Autoimmune disease (HCC)     lupus anticoagulant-causes clotting    Cancer (HCC)     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    Lung collapse     2015    Nausea & vomiting 5/23/2012    chronic    Neuropathy     right foot    Other ill-defined conditions(799.89)     (2 mutated genes) hyper coag. state     Pneumonia 07/22/2017    PONV (postoperative nausea and vomiting)     Positive cardiolipin antibodies     Psychiatric disorder     anxiety   no meds    PUD (peptic ulcer disease) 2009    H/O requiring partial gastrectomy and small intestine    Raynaud's syndrome     Restless leg syndrome     Retention of urine     Skin cancer 2000    mouth    Thromboembolus (HCC) 2000    RLE- arterial    Wears glasses     readers    Wears hearing aid in both ears      Past Surgical History:        Procedure Laterality

## 2025-01-29 NOTE — PROGRESS NOTES
[facility] Hospitalist Service  At the heart of better care     Advance Care Planning   Admit Date:  2025  6:15 PM   Name:  Angelica Duncan   Age:  74 y.o.  Sex:  female  :  1950   MRN:  [id number]   Room:  [facility]/    Angelica Duncan has capacity to make her own decisions:   Yes    If pt unable to make decisions, POA/surrogate decision maker:  N/A    Other people present:   None    Patient / surrogate decision-maker directed code status:  DNR/DNI    Other ACP topics discussed, if applicable:   None Discussed possibility of hospice or comfort measures.      Patient or surrogate consented to discussion of the current conditions, workup, management plans, prognosis, and the risk for further deterioration.  Aggregate face-to-facetime, 17 minutes was spent discussing end-of-life care planning with patient/family and/or Power of . Discussed CPR, Intubation, treatment goals, and Quality of life/Intensity of care.    Signed:  Kandice Padilla MD

## 2025-01-29 NOTE — ED NOTES
TRANSFER - OUT REPORT:    Verbal report given to ALEXIS Wooten on Angelica Duncan  being transferred to [address] [place] [address] for routine progression of patient care       Report consisted of patient's Situation, Background, Assessment and   Recommendations(SBAR).     Information from the following report(s) Nurse Handoff Report, Index, ED Encounter Summary, ED SBAR, Adult Overview, MAR, and Recent Results was reviewed with the receiving nurse.    [place] Fall Assessment:    Presents to emergency department  because of falls (Syncope, seizure, or loss of consciousness): No  Age > 70: No  Altered Mental Status, Intoxication with alcohol or substance confusion (Disorientation, impaired judgment, poor safety awaremess, or inability to follow instructions): No  Impaired Mobility: Ambulates or transfers with assistive devices or assistance; Unable to ambulate or transer.: No  Nursing Judgement: No          Lines:   Peripheral IV 01/28/25 Left Antecubital (Active)   Site Assessment Clean, dry & intact 01/28/25 1847   Line Status Blood return noted;Brisk blood return;Flushed;Specimen collected;Normal saline locked 01/28/25 1847   Phlebitis Assessment No symptoms 01/28/25 1847   Dressing Status New dressing applied;Clean, dry & intact 01/28/25 1847   Dressing Type Transparent 01/28/25 1847       Peripheral IV 01/28/25 Left;Anterior Cephalic (Active)        Opportunity for questions and clarification was provided.      Patient transported with:  Registered Nurse

## 2025-01-29 NOTE — PROCEDURES
[facility]      Procedure Note    Date : 1/29/2025  Surgeon: Omar Khan MD  Preop Dx: Left Mons Pubis Abscess  Post op Dx: YOLANDE  EBL : Minimal  Procedure:  Informed consent was first obtained and risks/benefits of procedure were d/w pt.  The area was first cleansed with betadine and injected with 1% lidocaine with epi.  A 1-2 cm incision was made with the scalpel allowing puss to drain from the abscess.  The area was milked and massaged to drain the puss completely.  The incision was left open to allow continued drainage.  Pt. Tolerated procedure well.        Omar Khan MD  1/29/2025  2:13 PM

## 2025-01-29 NOTE — CARE COORDINATION
01/29/25 0843   Service Assessment   Patient Orientation Alert and Oriented   Cognition Alert   History Provided By Patient   Primary Caregiver Self   Accompanied By/Relationship N/A   Support Systems Spouse/Significant Other;Family Members   PCP Verified by CM Yes   Last Visit to PCP Within last 6 months   Prior Functional Level Independent in ADLs/IADLs;Assistance with the following:;Cooking   Current Functional Level Independent in ADLs/IADLs;Assistance with the following:;Cooking   Can patient return to prior living arrangement Yes   Ability to make needs known: Good   Family able to assist with home care needs: Yes   Would you like for me to discuss the discharge plan with any other family members/significant others, and if so, who? Yes   Financial Resources Medicare   Social/Functional History   Lives With Alone   Type of Home House   Home Equipment Rollator;Walker - Standard  (Patient states she has the equipment at home but does not use it)   Receives Help From Friend(s)   Prior Level of Assist for ADLs Independent   Prior Level of Assist for Homemaking Independent   Homemaking Responsibilities No   Ambulation Assistance Independent   Prior Level of Assist for Transfers Independent   Active  No   Occupation Retired   Discharge Planning   Type of Residence House   Living Arrangements Alone   Current Services Prior To Admission Home Care  (Wound Care)   Potential Assistance Needed Home Care   DME Ordered? No   Potential Assistance Purchasing Medications No   Type of Home Care Services Nursing Services   Patient expects to be discharged to: House   History of falls? 0   Services At/After Discharge   Transition of Care Consult (CM Consult) Home Health;Discharge Planning   Services At/After Discharge Home Health   Mode of Transport at Discharge Other (see comment)  (Friend)   Confirm Follow Up Transport Self   Condition of Participation: Discharge Planning   The Plan for Transition of Care is related to

## 2025-01-29 NOTE — CONSULTS
Consulted for possible left groin abscess with arterial stents placed previously.  On examination the patient has a left labial abscess.  Recommend GYN consultation as this is not a general surgical problem.
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
## 2025-01-29 NOTE — WOUND CARE
Patient stated to this nurse that if she could get a sharp instrument she would open up her own labial abscess.  Wound care nurse notified primary nurse and charge nurse of comments of self harm.

## 2025-01-29 NOTE — ED PROVIDER NOTES
[facility]  EMERGENCY DEPARTMENT ENCOUNTER       Pt Name: Angelica Duncan  MRN: [id number]  Birthdate 1950  Date of evaluation: 1/28/2025  PCP: Ailyn Villafana MD  Note Started: 10:24 PM 1/28/25     CHIEF COMPLAINT       Chief Complaint   Patient presents with    Foot Pain     Dr. Gatica, cardiology, sending for admission with concern for vasc compromise left leg/foot with gangrenous lesion, + KYRA, left foot. Requesting admit with hospitalist, labs, CTA LLE and he will consult.    infection foot        HISTORY OF PRESENT ILLNESS: 1 or more elements      History From: Patient  HPI Limitations: None    Angelica Duncan is a 74 y.o. female who presents to ED c/o left foot pain. Patient reports that she saw Dr. Gatica, her cardiologist today who recommended she come here for further evaluation of her foot pain.  Patient states that her cardiologist is concerned for vascular compromise of her left leg/foot.  Patient denies radiation of pain up her leg.  States that she has a history of right toe amputations.  Patient has a history of diabetes with blood sugars usually running greater than 250 in combination with coronary artery disease, peripheral vascular disease with bypass grafts, diabetic foot ulcers, previous osteomyelitis, and chronic anticoagulation with Coumadin and states that she has an extensive health history.  Reports that she is also part of the wound clinic and has had the wound on her right foot that has been healing.  Patient states that her left foot has been getting worse for the last couple of weeks and that she was seen here on January 17 for evaluation.  States that the area is tender to touch but she has not noticed any drainage.  Patient states that is getting more difficult to ambulate due to the amputation of her right toes and now her left foot pain.  No fever, chills or flulike illness.     Nursing Notes were all reviewed and agreed with or any disagreements were

## 2025-01-30 PROBLEM — L02.214 GROIN ABSCESS: Status: ACTIVE | Noted: 2025-01-30

## 2025-01-30 LAB
ALBUMIN SERPL-MCNC: 2.8 G/DL (ref 3.4–5)
ALBUMIN/GLOB SERPL: 0.7 (ref 0.8–1.7)
ALP SERPL-CCNC: 119 U/L (ref 45–117)
ALT SERPL-CCNC: 13 U/L (ref 13–56)
ANION GAP SERPL CALC-SCNC: 7 MMOL/L (ref 3–18)
APTT PPP: 46.8 SEC (ref 23–36.4)
APTT PPP: 73.7 SEC (ref 23–36.4)
APTT PPP: 74.9 SEC (ref 23–36.4)
AST SERPL-CCNC: 13 U/L (ref 10–38)
BILIRUB SERPL-MCNC: 0.6 MG/DL (ref 0.2–1)
BUN SERPL-MCNC: 17 MG/DL (ref 7–18)
BUN/CREAT SERPL: 30 (ref 12–20)
CALCIUM SERPL-MCNC: 8.7 MG/DL (ref 8.5–10.1)
CHLORIDE SERPL-SCNC: 99 MMOL/L (ref 100–111)
CK SERPL-CCNC: 72 U/L (ref 26–192)
CK SERPL-CCNC: 78 U/L (ref 26–192)
CK SERPL-CCNC: 87 U/L (ref 26–192)
CK SERPL-CCNC: 89 U/L (ref 26–192)
CK SERPL-CCNC: 92 U/L (ref 26–192)
CO2 SERPL-SCNC: 27 MMOL/L (ref 21–32)
CREAT SERPL-MCNC: 0.57 MG/DL (ref 0.6–1.3)
CRP SERPL-MCNC: 15.9 MG/DL (ref 0–0.3)
EKG ATRIAL RATE: 100 BPM
EKG DIAGNOSIS: NORMAL
EKG P AXIS: 11 DEGREES
EKG P-R INTERVAL: 146 MS
EKG Q-T INTERVAL: 336 MS
EKG QRS DURATION: 70 MS
EKG QTC CALCULATION (BAZETT): 433 MS
EKG R AXIS: 30 DEGREES
EKG T AXIS: 79 DEGREES
EKG VENTRICULAR RATE: 100 BPM
ERYTHROCYTE [DISTWIDTH] IN BLOOD BY AUTOMATED COUNT: 15.7 % (ref 11.6–14.5)
ERYTHROCYTE [SEDIMENTATION RATE] IN BLOOD: 49 MM/HR (ref 0–30)
EST. AVERAGE GLUCOSE BLD GHB EST-MCNC: 249 MG/DL
GLOBULIN SER CALC-MCNC: 3.8 G/DL (ref 2–4)
GLUCOSE BLD STRIP.AUTO-MCNC: 172 MG/DL (ref 70–110)
GLUCOSE BLD STRIP.AUTO-MCNC: 211 MG/DL (ref 70–110)
GLUCOSE BLD STRIP.AUTO-MCNC: 245 MG/DL (ref 70–110)
GLUCOSE BLD STRIP.AUTO-MCNC: 401 MG/DL (ref 70–110)
GLUCOSE BLD STRIP.AUTO-MCNC: 412 MG/DL (ref 70–110)
GLUCOSE SERPL-MCNC: 196 MG/DL (ref 74–99)
HBA1C MFR BLD: 10.3 % (ref 4.2–5.6)
HCT VFR BLD AUTO: 36.1 % (ref 35–45)
HGB BLD-MCNC: 13.1 G/DL (ref 12–16)
INR PPP: 1.5 (ref 0.9–1.1)
MAGNESIUM SERPL-MCNC: 1.6 MG/DL (ref 1.6–2.6)
MCH RBC QN AUTO: 35.1 PG (ref 24–34)
MCHC RBC AUTO-ENTMCNC: 36.3 G/DL (ref 31–37)
MCV RBC AUTO: 96.8 FL (ref 78–100)
NRBC # BLD: 0 K/UL (ref 0–0.01)
NRBC BLD-RTO: 0 PER 100 WBC
PLATELET # BLD AUTO: 247 K/UL (ref 135–420)
PMV BLD AUTO: 9.6 FL (ref 9.2–11.8)
POTASSIUM SERPL-SCNC: 3.9 MMOL/L (ref 3.5–5.5)
PROT SERPL-MCNC: 6.6 G/DL (ref 6.4–8.2)
PROTHROMBIN TIME: 18.6 SEC (ref 11.9–14.9)
RBC # BLD AUTO: 3.73 M/UL (ref 4.2–5.3)
SODIUM SERPL-SCNC: 133 MMOL/L (ref 136–145)
WBC # BLD AUTO: 16.7 K/UL (ref 4.6–13.2)

## 2025-01-30 PROCEDURE — 83036 HEMOGLOBIN GLYCOSYLATED A1C: CPT

## 2025-01-30 PROCEDURE — 6360000002 HC RX W HCPCS

## 2025-01-30 PROCEDURE — 82962 GLUCOSE BLOOD TEST: CPT

## 2025-01-30 PROCEDURE — 93010 ELECTROCARDIOGRAM REPORT: CPT | Performed by: INTERNAL MEDICINE

## 2025-01-30 PROCEDURE — 2580000003 HC RX 258: Performed by: INTERNAL MEDICINE

## 2025-01-30 PROCEDURE — 83735 ASSAY OF MAGNESIUM: CPT

## 2025-01-30 PROCEDURE — 82550 ASSAY OF CK (CPK): CPT

## 2025-01-30 PROCEDURE — 6360000002 HC RX W HCPCS: Performed by: INTERNAL MEDICINE

## 2025-01-30 PROCEDURE — 36415 COLL VENOUS BLD VENIPUNCTURE: CPT

## 2025-01-30 PROCEDURE — 85730 THROMBOPLASTIN TIME PARTIAL: CPT

## 2025-01-30 PROCEDURE — 6370000000 HC RX 637 (ALT 250 FOR IP): Performed by: HOSPITALIST

## 2025-01-30 PROCEDURE — 85610 PROTHROMBIN TIME: CPT

## 2025-01-30 PROCEDURE — 6370000000 HC RX 637 (ALT 250 FOR IP): Performed by: INTERNAL MEDICINE

## 2025-01-30 PROCEDURE — 85027 COMPLETE CBC AUTOMATED: CPT

## 2025-01-30 PROCEDURE — 80053 COMPREHEN METABOLIC PANEL: CPT

## 2025-01-30 PROCEDURE — 85652 RBC SED RATE AUTOMATED: CPT

## 2025-01-30 PROCEDURE — 86140 C-REACTIVE PROTEIN: CPT

## 2025-01-30 PROCEDURE — 1100000003 HC PRIVATE W/ TELEMETRY

## 2025-01-30 RX ORDER — INSULIN GLARGINE 100 [IU]/ML
30 INJECTION, SOLUTION SUBCUTANEOUS DAILY
Qty: 10 ML | Refills: 3
Start: 2025-01-30

## 2025-01-30 RX ORDER — INSULIN LISPRO 100 [IU]/ML
10 INJECTION, SOLUTION INTRAVENOUS; SUBCUTANEOUS
Qty: 10 ML | Refills: 0
Start: 2025-01-30

## 2025-01-30 RX ORDER — HEPARIN SODIUM 10000 [USP'U]/100ML
5-30 INJECTION, SOLUTION INTRAVENOUS CONTINUOUS
Qty: 100 ML | Refills: 0
Start: 2025-01-30

## 2025-01-30 RX ORDER — IBUPROFEN 400 MG/1
400 TABLET, FILM COATED ORAL EVERY 6 HOURS PRN
Qty: 120 TABLET | Refills: 0
Start: 2025-01-30

## 2025-01-30 RX ADMIN — HEPARIN SODIUM 21 UNITS/KG/HR: 10000 INJECTION, SOLUTION INTRAVENOUS at 18:38

## 2025-01-30 RX ADMIN — AZTREONAM 1000 MG: 1 INJECTION, POWDER, LYOPHILIZED, FOR SOLUTION INTRAMUSCULAR; INTRAVENOUS at 17:40

## 2025-01-30 RX ADMIN — IBUPROFEN 400 MG: 400 TABLET, FILM COATED ORAL at 02:59

## 2025-01-30 RX ADMIN — INSULIN GLARGINE 30 UNITS: 100 INJECTION, SOLUTION SUBCUTANEOUS at 21:04

## 2025-01-30 RX ADMIN — HEPARIN SODIUM 17 UNITS/KG/HR: 10000 INJECTION, SOLUTION INTRAVENOUS at 03:03

## 2025-01-30 RX ADMIN — LEVOFLOXACIN 250 MG: 5 INJECTION, SOLUTION INTRAVENOUS at 02:54

## 2025-01-30 RX ADMIN — AZTREONAM 1000 MG: 1 INJECTION, POWDER, LYOPHILIZED, FOR SOLUTION INTRAMUSCULAR; INTRAVENOUS at 23:41

## 2025-01-30 RX ADMIN — INSULIN LISPRO 8 UNITS: 100 INJECTION, SOLUTION INTRAVENOUS; SUBCUTANEOUS at 21:04

## 2025-01-30 RX ADMIN — DAPTOMYCIN 350 MG: 500 INJECTION, POWDER, LYOPHILIZED, FOR SOLUTION INTRAVENOUS at 04:26

## 2025-01-30 RX ADMIN — HEPARIN SODIUM 4900 UNITS: 1000 INJECTION INTRAVENOUS; SUBCUTANEOUS at 18:36

## 2025-01-30 RX ADMIN — OXYCODONE 5 MG: 5 TABLET ORAL at 23:35

## 2025-01-30 RX ADMIN — AZTREONAM 1000 MG: 1 INJECTION, POWDER, LYOPHILIZED, FOR SOLUTION INTRAMUSCULAR; INTRAVENOUS at 09:28

## 2025-01-30 ASSESSMENT — PAIN DESCRIPTION - ORIENTATION
ORIENTATION: RIGHT;LEFT
ORIENTATION: POSTERIOR

## 2025-01-30 ASSESSMENT — PAIN - FUNCTIONAL ASSESSMENT
PAIN_FUNCTIONAL_ASSESSMENT: ACTIVITIES ARE NOT PREVENTED
PAIN_FUNCTIONAL_ASSESSMENT: PREVENTS OR INTERFERES SOME ACTIVE ACTIVITIES AND ADLS

## 2025-01-30 ASSESSMENT — PAIN SCALES - GENERAL
PAINLEVEL_OUTOF10: 8
PAINLEVEL_OUTOF10: 6
PAINLEVEL_OUTOF10: 0
PAINLEVEL_OUTOF10: 0

## 2025-01-30 ASSESSMENT — PAIN DESCRIPTION - ONSET
ONSET: ON-GOING
ONSET: GRADUAL

## 2025-01-30 ASSESSMENT — PAIN DESCRIPTION - FREQUENCY
FREQUENCY: CONTINUOUS
FREQUENCY: INTERMITTENT

## 2025-01-30 ASSESSMENT — PAIN DESCRIPTION - PAIN TYPE
TYPE: ACUTE PAIN
TYPE: ACUTE PAIN

## 2025-01-30 ASSESSMENT — PAIN DESCRIPTION - DESCRIPTORS
DESCRIPTORS: ACHING
DESCRIPTORS: ACHING

## 2025-01-30 ASSESSMENT — PAIN DESCRIPTION - LOCATION
LOCATION: HEAD
LOCATION: LEG

## 2025-01-30 NOTE — PLAN OF CARE
Problem: Chronic Conditions and Co-morbidities  Goal: Patient's chronic conditions and co-morbidity symptoms are monitored and maintained or improved  1/29/2025 2215 by Nilda Matthew RN  Outcome: Progressing  Flowsheets (Taken 1/29/2025 1308 by Clau Grimes RN)  Care Plan - Patient's Chronic Conditions and Co-Morbidity Symptoms are Monitored and Maintained or Improved:   Monitor and assess patient's chronic conditions and comorbid symptoms for stability, deterioration, or improvement   Collaborate with multidisciplinary team to address chronic and comorbid conditions and prevent exacerbation or deterioration  1/29/2025 1308 by Clau Grimes RN  Outcome: Progressing  Flowsheets (Taken 1/29/2025 1308)  Care Plan - Patient's Chronic Conditions and Co-Morbidity Symptoms are Monitored and Maintained or Improved:   Monitor and assess patient's chronic conditions and comorbid symptoms for stability, deterioration, or improvement   Collaborate with multidisciplinary team to address chronic and comorbid conditions and prevent exacerbation or deterioration     Problem: Pain  Goal: Verbalizes/displays adequate comfort level or baseline comfort level  1/29/2025 2215 by Nilda Matthew RN  Outcome: Progressing  Flowsheets (Taken 1/29/2025 2215)  Verbalizes/displays adequate comfort level or baseline comfort level:   Encourage patient to monitor pain and request assistance   Assess pain using appropriate pain scale   Administer analgesics based on type and severity of pain and evaluate response   Implement non-pharmacological measures as appropriate and evaluate response  1/29/2025 1308 by Clau Grimes RN  Outcome: Progressing  Flowsheets (Taken 1/29/2025 1308)  Verbalizes/displays adequate comfort level or baseline comfort level:   Encourage patient to monitor pain and request assistance   Assess pain using appropriate pain scale   Administer analgesics based on type and severity of pain and evaluate response
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
## 2025-01-30 NOTE — PROGRESS NOTES
met patient and friend at bedside for an initial visit.    Patient said she was being transferred to another hospital for further treatment. Patient and friend thanked  for the visit and asked for prayer.     provided presence, support. prayer and assurance of continued prayer.     Chaplains will provide follow-up care for patient and family as needed.    Spiritual Health History and Assessment/Progress Note  [facility]    Spiritual/Emotional Needs,  ,  ,      Name: Angelica Duncan MRN: [id number]    Age: 74 y.o.     Sex: female   Language: English   Jew: Restorationism   Iliac artery occlusion, left (HCC)     Date: 1/30/2025            Total Time Calculated: 10 min              Spiritual Assessment began in [address] CARDIAC/MEDICAL            Encounter Overview/Reason: Spiritual/Emotional Needs  Service Provided For: Patient, Significant other    Faye, Belief, Meaning:   Patient identifies as spiritual, is connected with a faye tradition or spiritual practice, and has beliefs or practices that help with coping during difficult times  Family/Friends identify as spiritual, are connected with a faye tradition or spiritual practice, and have beliefs or practices that help with coping during difficult times      Importance and Influence:  Patient has spiritual/personal beliefs that influence decisions regarding their health  Family/Friends have spiritual/personal beliefs that influence decisions regarding the patient's health    Community:  Patient is connected with a spiritual community and feels well-supported. Support system includes: Spouse/Partner and Faye Community  Family/Friends are connected with a spiritual community: and feel well-supported. Support system includes: Spouse/Partner and Faye Community    Assessment and Plan of Care:     Patient Interventions include: Facilitated expression of thoughts and feelings, Explored spiritual coping/struggle/distress, and

## 2025-01-30 NOTE — PLAN OF CARE
Problem: Chronic Conditions and Co-morbidities  Goal: Patient's chronic conditions and co-morbidity symptoms are monitored and maintained or improved  Outcome: Progressing     Problem: Pain  Goal: Verbalizes/displays adequate comfort level or baseline comfort level  Outcome: Progressing     Problem: Safety - Adult  Goal: Free from fall injury  Outcome: Progressing     Problem: ABCDS Injury Assessment  Goal: Absence of physical injury  Outcome: Progressing     Problem: Discharge Planning  Goal: Discharge to home or other facility with appropriate resources  Outcome: Progressing     Problem: Skin/Tissue Integrity  Goal: Skin integrity remains intact  Description: 1.  Monitor for areas of redness and/or skin breakdown  2.  Assess vascular access sites hourly  3.  Every 4-6 hours minimum:  Change oxygen saturation probe site  4.  Every 4-6 hours:  If on nasal continuous positive airway pressure, respiratory therapy assess nares and determine need for appliance change or resting period  Outcome: Progressing
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
## 2025-01-30 NOTE — PROGRESS NOTES
Talked with vascular -recommend to transfer to [facility]. Transfer center called

## 2025-01-30 NOTE — PROGRESS NOTES
VASCULAR AND TRANSPLANT       PROGRESS NOTE    Date: 2025        Plan:   Case discussed with surgeons, will recommend transfer to [facility] for further vascular surgical intervention.     Assessment:   75 yo female with atherosclerosis of native arteries with rest pain.  Mechanical complication of bypass graft.  Labial abscess.  S/p incision and drainage.     Subjective:   Patient feeling better this am.  Slightly better pain left foot.       Objective:   Admit weight: Weight - Scale: 61.2 kg (135 lb)  Last recorded weight: Weight - Scale: 61.2 kg (135 lb)    Temp (24hrs), Av.3 °F (37.4 °C), Min:98.2 °F (36.8 °C), Max:100.4 °F (38 °C)          [unfilled]    Intake/Output Summary (Last 24 hours) at 2025 0956  Last data filed at 2025 0635  Gross per 24 hour   Intake --   Output 300 ml   Net -300 ml       Left foot with some erythema.  Motor intact.  Tender to palpation.      Labs:     BMP:   Recent Labs     25  021   GLUCOSE 320* 411* 196*   CALCIUM 9.2 8.5 8.7   BUN 13 12 17   * 132* 133*   CO2 29 30 27     CBC:    Recent Labs     25  0405 25  0214   WBC 10.4 10.9 16.7*   HCT 40.1 37.6 36.1   RDW 15.0* 17.0* 15.7*     PT/INR:   Recent Labs     25  021   INR 2.3* 1.5*       Arterial Blood Gases   No results found for: \"LPMO2\", \"FIO2\", \"PEEP\", \"PH1\", \"PCO2\", \"PO2\", \"HCO3\", \"TCO2\", \"MODE\", \"DRAWSITE\"     MICRO:  Results       Procedure Component Value Units Date/Time    Culture, Wound (with Gram Stain) [[id number]] Collected: 25 1500    Order Status: Completed Specimen: Wound Updated: 25 0147     Special Requests NO SPECIAL REQUESTS        Gram Stain NO WBC'S SEEN         NO ORGANISMS SEEN        Culture PENDING    Culture, Blood 1 [[id number]] Collected: 25 0405    Order Status: Completed Specimen: Blood Updated:

## 2025-01-30 NOTE — PROGRESS NOTES
Cardiology Progress Note    Patient: Angelica Duncan Age: 74 y.o. Sex: female    YOB: 1950 Admit Date: 1/28/2025 PCP: Ailyn Villafana MD   MRN: [id number]  CSN: [id number]       Cardiologist: Karin Bernabe MD     Chief Complaint     Chief Complaint   Patient presents with    Foot Pain     Dr. Gatica, cardiology, sending for admission with concern for vasc compromise left leg/foot with gangrenous lesion, + KYRA, left foot. Requesting admit with hospitalist, labs, CTA LLE and he will consult.    infection foot        HPI:   Angelica Duncan is a 74 y.o. female who  presented with severe leg pain. She has PVD, positive KYRA. Had her ObGy procedure today and feels better.     Assessment and Diagnosis:   CAD  PVD  Labial abscess, S/P I&D  Limb ischemia (occluded stent)  ECHO normal.   DM  Lupus Anticoagulant     Recommendations and Plan:    Discussed the case with Dr. Chau (Vascular). The patient will be transferred to [facility]  CPM  Will sign off.     Medications:     Allergies   Allergen Reactions    Hydromorphone Hives    Rifampin Hives and Swelling     Swelling of mouth and tounge    Levofloxacin Itching    Acetaminophen Hives    Amoxicillin Hives    Azithromycin Angioedema    Cefatrizine Hives    Ceftriaxone Hives    Cephalexin Itching     Swollen throat and tongue    Erythromycin Hives    Sulfa Antibiotics Hives    Aspirin Nausea And Vomiting    Vancomycin Hives      Current Facility-Administered Medications   Medication Dose Route Frequency    aztreonam (AZACTAM) 1,000 mg in sodium chloride 0.9 % 100 mL IVPB (mini-bag)  1,000 mg IntraVENous Q8H    DAPTOmycin (CUBICIN) 350 mg in sodium chloride 0.9 % 50 mL IVPB  6 mg/kg IntraVENous Q24H    levoFLOXacin (LEVAQUIN) 250 MG/50ML infusion 250 mg  250 mg IntraVENous Q24H    diphenhydrAMINE (BENADRYL) injection 12.5 mg  12.5 mg IntraVENous Q6H PRN    oxyCODONE (ROXICODONE) immediate release tablet 5 mg  5 mg Oral Q4H PRN    heparin (porcine) injection

## 2025-01-31 VITALS
HEART RATE: 106 BPM | WEIGHT: 135 LBS | RESPIRATION RATE: 18 BRPM | OXYGEN SATURATION: 96 % | SYSTOLIC BLOOD PRESSURE: 146 MMHG | BODY MASS INDEX: 23.92 KG/M2 | DIASTOLIC BLOOD PRESSURE: 92 MMHG | TEMPERATURE: 98.1 F | HEIGHT: 63 IN

## 2025-01-31 LAB
APTT PPP: 79.7 SEC (ref 23–36.4)
CK SERPL-CCNC: 114 U/L (ref 26–192)

## 2025-01-31 PROCEDURE — 85730 THROMBOPLASTIN TIME PARTIAL: CPT

## 2025-01-31 PROCEDURE — 82550 ASSAY OF CK (CPK): CPT

## 2025-01-31 PROCEDURE — 36415 COLL VENOUS BLD VENIPUNCTURE: CPT

## 2025-01-31 PROCEDURE — 6360000002 HC RX W HCPCS: Performed by: HOSPITALIST

## 2025-01-31 RX ADMIN — MORPHINE SULFATE 2 MG: 2 INJECTION, SOLUTION INTRAMUSCULAR; INTRAVENOUS at 01:58

## 2025-01-31 ASSESSMENT — PAIN DESCRIPTION - PAIN TYPE: TYPE: ACUTE PAIN

## 2025-01-31 ASSESSMENT — PAIN DESCRIPTION - FREQUENCY: FREQUENCY: CONTINUOUS

## 2025-01-31 ASSESSMENT — PAIN - FUNCTIONAL ASSESSMENT: PAIN_FUNCTIONAL_ASSESSMENT: PREVENTS OR INTERFERES SOME ACTIVE ACTIVITIES AND ADLS

## 2025-01-31 ASSESSMENT — PAIN SCALES - GENERAL
PAINLEVEL_OUTOF10: 8
PAINLEVEL_OUTOF10: 2
PAINLEVEL_OUTOF10: 4

## 2025-01-31 ASSESSMENT — PAIN DESCRIPTION - LOCATION: LOCATION: LEG

## 2025-01-31 ASSESSMENT — PAIN DESCRIPTION - DESCRIPTORS: DESCRIPTORS: ACHING

## 2025-01-31 ASSESSMENT — PAIN DESCRIPTION - ORIENTATION: ORIENTATION: RIGHT;LEFT

## 2025-01-31 ASSESSMENT — PAIN DESCRIPTION - ONSET: ONSET: ON-GOING

## 2025-01-31 NOTE — PROGRESS NOTES
2230 - Called [facility] for an update on arrival. Per [facility], they did not have transportation orders for this patient.  Charge nurse and supervisor notified.      2326 - Per Supervisor, transportation will arrive at 0200. Patient updated.

## 2025-02-02 LAB
BACTERIA SPEC CULT: ABNORMAL
BACTERIA SPEC CULT: NORMAL
BACTERIA SPEC CULT: NORMAL
GRAM STN SPEC: ABNORMAL
GRAM STN SPEC: ABNORMAL
SERVICE CMNT-IMP: ABNORMAL
SERVICE CMNT-IMP: NORMAL
SERVICE CMNT-IMP: NORMAL

## 2025-02-28 ENCOUNTER — HOSPITAL ENCOUNTER (OUTPATIENT)
Facility: HOSPITAL | Age: 75
Discharge: HOME | End: 2025-02-28
Payer: MEDICARE

## 2025-02-28 VITALS
DIASTOLIC BLOOD PRESSURE: 85 MMHG | HEART RATE: 98 BPM | OXYGEN SATURATION: 98 % | TEMPERATURE: 98 F | RESPIRATION RATE: 18 BRPM | SYSTOLIC BLOOD PRESSURE: 150 MMHG

## 2025-02-28 DIAGNOSIS — L97.412 MIDFOOT ULCERATION, RIGHT, WITH FAT LAYER EXPOSED (HCC): Primary | ICD-10-CM

## 2025-02-28 DIAGNOSIS — Z79.4 TYPE 2 DIABETES MELLITUS WITHOUT COMPLICATION, WITH LONG-TERM CURRENT USE OF INSULIN (HCC): ICD-10-CM

## 2025-02-28 DIAGNOSIS — E11.9 TYPE 2 DIABETES MELLITUS WITHOUT COMPLICATION, WITH LONG-TERM CURRENT USE OF INSULIN (HCC): ICD-10-CM

## 2025-02-28 PROCEDURE — 6370000000 HC RX 637 (ALT 250 FOR IP): Performed by: PODIATRIST

## 2025-02-28 PROCEDURE — 11042 DBRDMT SUBQ TIS 1ST 20SQCM/<: CPT

## 2025-02-28 RX ORDER — LIDOCAINE HYDROCHLORIDE 20 MG/ML
JELLY TOPICAL ONCE
OUTPATIENT
Start: 2025-02-28 | End: 2025-02-28

## 2025-02-28 RX ORDER — SODIUM CHLOR/HYPOCHLOROUS ACID 0.033 %
SOLUTION, IRRIGATION IRRIGATION ONCE
OUTPATIENT
Start: 2025-02-28 | End: 2025-02-28

## 2025-02-28 RX ORDER — NEOMYCIN/BACITRACIN/POLYMYXINB 3.5-400-5K
OINTMENT (GRAM) TOPICAL ONCE
OUTPATIENT
Start: 2025-02-28 | End: 2025-02-28

## 2025-02-28 RX ORDER — LIDOCAINE HYDROCHLORIDE 20 MG/ML
JELLY TOPICAL ONCE
Status: COMPLETED | OUTPATIENT
Start: 2025-02-28 | End: 2025-02-28

## 2025-02-28 RX ADMIN — LIDOCAINE HYDROCHLORIDE: 20 JELLY TOPICAL at 14:25

## 2025-02-28 ASSESSMENT — PAIN SCALES - GENERAL: PAINLEVEL_OUTOF10: 8

## 2025-02-28 NOTE — FLOWSHEET NOTE
02/28/25 1457   Anesthetic   Anesthetic 2% Lidocaine Gel Topical   Musculoskeletal   Musculoskeletal (WDL) WDL   Foot Assessment   Foot Assessment X   Toe Nail Assessment   Toe Nail Assessment X   Wound 01/24/25 Heel Left   Date First Assessed: 01/24/25   Location: Heel  Wound Location Orientation: Left   Wound Image    Wound Etiology Traumatic   Dressing Status New dressing applied   Wound Cleansed Wound cleanser   Dressing/Treatment Steri-strips   Offloading for Diabetic Foot Ulcers Offloading not required   Dressing Change Due 03/14/25   Wound Length (cm) 2 cm   Wound Width (cm) 0.1 cm   Wound Surface Area (cm^2) 0.2 cm^2   Change in Wound Size % (l*w) -100   Wound Assessment Epithelialization   Drainage Amount None (dry)   Odor None   Rosa-wound Assessment Dry/flaky   Margins Flat/open edges   Wound Thickness Description not for Pressure Injury Partial thickness   Wound 01/24/25 Foot Left;Lateral   Date First Assessed: 01/24/25   Primary Wound Type: (c) Other (comment)  Location: Foot  Wound Location Orientation: Left;Lateral   Wound Image     Wound Etiology Diabetic   Dressing Status New dressing applied   Wound Cleansed Wound cleanser   Dressing/Treatment Collagen;Alginate   Offloading for Diabetic Foot Ulcers Felt or foam;Offloading ordered   Dressing Change Due 03/14/25   Wound Length (cm) 1.2 cm   Wound Width (cm) 0.6 cm   Wound Surface Area (cm^2) 0.72 cm^2   Change in Wound Size % (l*w) 85.6   Post-Procedure Length (cm) 1.3 cm   Post-Procedure Width (cm) 0.7 cm   Post-Procedure Depth (cm) 0.2 cm   Post-Procedure Surface Area (cm^2) 0.91 cm^2   Post-Procedure Volume (cm^3) 0.182 cm^3   Wound Assessment Eschar dry   Drainage Amount None (dry)   Odor None   Rosa-wound Assessment Fragile;Intact   Margins Attached edges   Wound Thickness Description not for Pressure Injury Partial thickness   Wound 08/09/24 Right;Plantar   Date First Assessed/Time First Assessed: 08/09/24 1124   Wound Location Orientation:

## 2025-02-28 NOTE — PLAN OF CARE
Discharge Instructions from  Wound Care Clinic at [facility]   [address]   [address]  [place] [address]  [phone] Fax [phone]    NAME:  Angelica Duncan  YOB: 1950  MEDICAL RECORD NUMBER:  [id number]  DATE:  2/28/2025    Wound Cleansing:   Do not scrub or use excessive force.  Cleanse wound prior to applying a clean dressing with:      [x] Wound Cleanser       Topical Treatments:  Do not apply lotions, creams, or ointments to wound bed unless directed.          Dressings:           Wound Location Left foot   [x] Apply Primary Dressing:       [x] Alginate with Silver    [x] Collagen    [x] Foam offload    [] Cover and Secure with:     [x] Gauze [x] Bárbara   Avoid contact of tape with skin.  [x] Change dressing:  [x] Two times per week     Off-Loading:   [x] Off-loading when [x] walking  [] in bed [x] sitting          Return Appointment:    [x] ECF or Home Healthcare:[facility]    [x] Return Appointment: With frandy   in  2 Week(s)       Electronically signed CHARLENE EDWARDS RN on 2/28/2025 at 3:11 PM     Wound Care Center Information: Should you experience any significant changes in your wound(s) or have questions about your wound care, please contact the [facility] at MONDAY - FRIDAY 8:00 am - 4:30.  If you need help with your wound outside these hours and cannot wait until we are again available, contact your PCP or go to the hospital emergency room.     PLEASE NOTE: IF YOU ARE UNABLE TO OBTAIN WOUND SUPPLIES, CONTINUE TO USE THE SUPPLIES YOU HAVE AVAILABLE UNTIL YOU ARE ABLE TO REACH US. IT IS MOST IMPORTANT TO KEEP THE WOUND COVERED AT ALL TIMES.

## 2025-03-14 ENCOUNTER — HOSPITAL ENCOUNTER (OUTPATIENT)
Facility: HOSPITAL | Age: 75
Discharge: HOME | End: 2025-03-14
Payer: MEDICARE

## 2025-03-14 VITALS
HEART RATE: 64 BPM | RESPIRATION RATE: 16 BRPM | OXYGEN SATURATION: 100 % | TEMPERATURE: 98.4 F | DIASTOLIC BLOOD PRESSURE: 82 MMHG | SYSTOLIC BLOOD PRESSURE: 104 MMHG

## 2025-03-14 DIAGNOSIS — L97.412 MIDFOOT ULCERATION, RIGHT, WITH FAT LAYER EXPOSED (HCC): Primary | ICD-10-CM

## 2025-03-14 DIAGNOSIS — Z79.4 TYPE 2 DIABETES MELLITUS WITHOUT COMPLICATION, WITH LONG-TERM CURRENT USE OF INSULIN (HCC): ICD-10-CM

## 2025-03-14 DIAGNOSIS — E11.9 TYPE 2 DIABETES MELLITUS WITHOUT COMPLICATION, WITH LONG-TERM CURRENT USE OF INSULIN (HCC): ICD-10-CM

## 2025-03-14 PROCEDURE — 87075 CULTR BACTERIA EXCEPT BLOOD: CPT

## 2025-03-14 PROCEDURE — 87205 SMEAR GRAM STAIN: CPT

## 2025-03-14 PROCEDURE — 87070 CULTURE OTHR SPECIMN AEROBIC: CPT

## 2025-03-14 PROCEDURE — 11042 DBRDMT SUBQ TIS 1ST 20SQCM/<: CPT

## 2025-03-14 PROCEDURE — 6370000000 HC RX 637 (ALT 250 FOR IP): Performed by: PODIATRIST

## 2025-03-14 RX ORDER — NEOMYCIN/BACITRACIN/POLYMYXINB 3.5-400-5K
OINTMENT (GRAM) TOPICAL ONCE
OUTPATIENT
Start: 2025-03-14 | End: 2025-03-14

## 2025-03-14 RX ORDER — LIDOCAINE HYDROCHLORIDE 20 MG/ML
JELLY TOPICAL ONCE
Status: COMPLETED | OUTPATIENT
Start: 2025-03-14 | End: 2025-03-14

## 2025-03-14 RX ORDER — LIDOCAINE HYDROCHLORIDE 20 MG/ML
JELLY TOPICAL ONCE
OUTPATIENT
Start: 2025-03-14 | End: 2025-03-14

## 2025-03-14 RX ORDER — SODIUM CHLOR/HYPOCHLOROUS ACID 0.033 %
SOLUTION, IRRIGATION IRRIGATION ONCE
OUTPATIENT
Start: 2025-03-14 | End: 2025-03-14

## 2025-03-14 RX ADMIN — LIDOCAINE HYDROCHLORIDE: 20 JELLY TOPICAL at 13:27

## 2025-03-14 ASSESSMENT — PAIN SCALES - GENERAL: PAINLEVEL_OUTOF10: 3

## 2025-03-14 NOTE — FLOWSHEET NOTE
Width (cm) 0.4 cm   Wound Depth (cm) 0.2 cm   Wound Surface Area (cm^2) 0.4 cm^2   Change in Wound Size % (l*w) 95.43   Wound Volume (cm^3) 0.08 cm^3   Wound Healing % 97   Post-Procedure Length (cm) 1.1 cm   Post-Procedure Width (cm) 0.5 cm   Post-Procedure Depth (cm) 0.3 cm   Post-Procedure Surface Area (cm^2) 0.55 cm^2   Post-Procedure Volume (cm^3) 0.165 cm^3   Wound Assessment Devitalized tissue   Drainage Amount Scant (moist but unmeasurable)   Drainage Description Serous   Odor Mild   Rosa-wound Assessment Maceration   Margins Undefined edges   Wound Thickness Description not for Pressure Injury Full thickness   Wound 01/24/25 Toe (Comment  which one) Left;Dorsal   Date First Assessed: 01/24/25   Primary Wound Type: (c) Other (comment)  Location: (c) Toe (Comment  which one)  Wound Location Orientation: Left;Dorsal   Wound Image    Wound Length (cm) 0 cm   Wound Assessment Dry   Wound Follow Up   Require Follow Up Yes

## 2025-03-14 NOTE — DISCHARGE INSTRUCTIONS
Discharge Instructions from  Wound Care Clinic at [facility]   [address]   [address]  [place] [address] [phone] Fax [phone]    NAME:  Angelica Duncan  YOB: 1950  MEDICAL RECORD NUMBER:  [id number]  DATE:  3/14/25    Wound Cleansing:   Do not scrub or use excessive force.  Cleanse wound prior to applying a clean dressing with:  [] Normal Saline [] Keep Wound Dry in Shower    [x] Wound Cleanser   [] Cleanse wound with Mild Soap & Water  [] May Shower at Discharge   [] Other:      Topical Treatments:  Do not apply lotions, creams, or ointments to wound bed unless directed.   [] Apply moisturizing lotion to skin surrounding the wound prior to dressing change.  [] Apply antifungal ointment to skin surrounding the wound prior to dressing change.  [] Apply thin film of moisture barrier ointment to skin immediately around wound.  [] Other:       Dressings:           Wound Location right and left foot   [x] Apply Primary Dressing:       [] MediHoney Gel [x] Alginate with Silver [] Alginate   [x] Collagen [] Collagen with Silver   [] Santyl with Moisten saline gauze     [] Hydrocolloid   [] MediHoney Alginate [] Foam with Silver   [] Foam   [] Hydrofera Blue    [] Mepilex Border    [] Moisten with Saline [] Hydrogel [] Mepitel     [] Bactroban/Mupirocin [] Polysporin  [] Other:    [] Pack wound loosely with  [] Iodoform   [] Plain Packing  [] Other   [] Cover and Secure with:     [] Gauze [x] Bárbara [] Kerlix   [] Ace Wrap [] Cover Roll Tape [] ABD     [x] Other: coban   Avoid contact of tape with skin.  [x] Change dressing: [] Daily    [] Every Other Day [x] Three times per week   [] Once a week [] Do Not Change Dressing   [] Other:     Negative Pressure:           Wound Location:   [] Pressure@           mm/Hg  []Continuous []Intermittent   [] Black  [] White Foam [] Other:   []Change dressing: []Three times per week    []Other:     Pressure Relief:  [] When sitting, shift

## 2025-03-28 ENCOUNTER — HOSPITAL ENCOUNTER (OUTPATIENT)
Facility: HOSPITAL | Age: 75
Discharge: HOME | End: 2025-03-28
Payer: MEDICARE

## 2025-03-28 VITALS
RESPIRATION RATE: 16 BRPM | SYSTOLIC BLOOD PRESSURE: 111 MMHG | OXYGEN SATURATION: 100 % | DIASTOLIC BLOOD PRESSURE: 47 MMHG | TEMPERATURE: 98.2 F | HEART RATE: 53 BPM

## 2025-03-28 DIAGNOSIS — Z79.4 TYPE 2 DIABETES MELLITUS WITHOUT COMPLICATION, WITH LONG-TERM CURRENT USE OF INSULIN: ICD-10-CM

## 2025-03-28 DIAGNOSIS — E11.9 TYPE 2 DIABETES MELLITUS WITHOUT COMPLICATION, WITH LONG-TERM CURRENT USE OF INSULIN: ICD-10-CM

## 2025-03-28 DIAGNOSIS — L97.412 MIDFOOT ULCERATION, RIGHT, WITH FAT LAYER EXPOSED (HCC): Primary | ICD-10-CM

## 2025-03-28 PROCEDURE — 6370000000 HC RX 637 (ALT 250 FOR IP): Performed by: PODIATRIST

## 2025-03-28 PROCEDURE — 11042 DBRDMT SUBQ TIS 1ST 20SQCM/<: CPT

## 2025-03-28 RX ORDER — SODIUM CHLOR/HYPOCHLOROUS ACID 0.033 %
SOLUTION, IRRIGATION IRRIGATION ONCE
OUTPATIENT
Start: 2025-03-28 | End: 2025-03-28

## 2025-03-28 RX ORDER — LIDOCAINE HCL/PF 50 MG/5 ML
2 SYRINGE (ML) INJECTION ONCE
Status: DISCONTINUED | OUTPATIENT
Start: 2025-03-28 | End: 2025-03-28 | Stop reason: CLARIF

## 2025-03-28 RX ORDER — LIDOCAINE HYDROCHLORIDE 20 MG/ML
JELLY TOPICAL ONCE
OUTPATIENT
Start: 2025-03-28 | End: 2025-03-28

## 2025-03-28 RX ORDER — NEOMYCIN/BACITRACIN/POLYMYXINB 3.5-400-5K
OINTMENT (GRAM) TOPICAL ONCE
OUTPATIENT
Start: 2025-03-28 | End: 2025-03-28

## 2025-03-28 RX ORDER — LIDOCAINE HYDROCHLORIDE 20 MG/ML
JELLY TOPICAL ONCE
Status: COMPLETED | OUTPATIENT
Start: 2025-03-28 | End: 2025-03-28

## 2025-03-28 RX ORDER — LIDOCAINE HYDROCHLORIDE 10 MG/ML
20 INJECTION, SOLUTION INFILTRATION; PERINEURAL ONCE
Status: COMPLETED | OUTPATIENT
Start: 2025-03-28 | End: 2025-03-28

## 2025-03-28 RX ADMIN — LIDOCAINE HYDROCHLORIDE 20 ML: 10 INJECTION, SOLUTION INFILTRATION; PERINEURAL at 15:17

## 2025-03-28 RX ADMIN — LIDOCAINE HYDROCHLORIDE: 20 JELLY TOPICAL at 09:52

## 2025-03-28 ASSESSMENT — PAIN SCALES - GENERAL: PAINLEVEL_OUTOF10: 4

## 2025-03-28 NOTE — DISCHARGE INSTRUCTIONS
Discharge Instructions from  Wound Care Clinic at [facility]   [address]   [address]  [place] [address] [phone] Fax [phone]    NAME:  Angelica Duncan  YOB: 1950  MEDICAL RECORD NUMBER:  [id number]  DATE:  3/28/25    Wound Cleansing:   Do not scrub or use excessive force.  Cleanse wound prior to applying a clean dressing with:  [] Normal Saline [] Keep Wound Dry in Shower    [x] Wound Cleanser   [] Cleanse wound with Mild Soap & Water  [] May Shower at Discharge   [] Other:      Topical Treatments:  Do not apply lotions, creams, or ointments to wound bed unless directed.   [] Apply moisturizing lotion to skin surrounding the wound prior to dressing change.  [] Apply antifungal ointment to skin surrounding the wound prior to dressing change.  [] Apply thin film of moisture barrier ointment to skin immediately around wound.  [] Other:       Dressings:           Wound Location right and left foot   [x] Apply Primary Dressing:       [] MediHoney Gel [x] Alginate with Silver [] Alginate   [x] Collagen [] Collagen with Silver   [] Santyl with Moisten saline gauze     [] Hydrocolloid   [] MediHoney Alginate [] Foam with Silver   [] Foam   [] Hydrofera Blue    [] Mepilex Border    [] Moisten with Saline [] Hydrogel [] Mepitel     [] Bactroban/Mupirocin [] Polysporin  [] Other:    [] Pack wound loosely with  [] Iodoform   [] Plain Packing  [] Other   [x] Cover and Secure with:     [] Gauze [x] Bárbara [] Kerlix   [] Ace Wrap [] Cover Roll Tape [] ABD     [x] Other: Coban   Avoid contact of tape with skin.  [x] Change dressing: [] Daily    [] Every Other Day [x] Three times per week   [] Once a week [] Do Not Change Dressing   [] Other:     Negative Pressure:           Wound Location:   [] Pressure@           mm/Hg  []Continuous []Intermittent   [] Black  [] White Foam [] Other:   []Change dressing: []Three times per week    []Other:     Pressure Relief:  [] When sitting,

## 2025-03-28 NOTE — FLOWSHEET NOTE
03/28/25 0940   Wound 01/24/25 Heel Left   Date First Assessed: 01/24/25   Location: Heel  Wound Location Orientation: Left   Wound Image    Wound Length (cm) 0 cm   Wound 01/24/25 Foot Left;Lateral   Date First Assessed: 01/24/25   Primary Wound Type: (c) Other (comment)  Location: Foot  Wound Location Orientation: Left;Lateral   Wound Image    Wound Etiology Diabetic   Dressing Status New dressing applied   Wound Cleansed Wound cleanser   Dressing/Treatment Collagen;Alginate with Ag;ABD;Roll gauze;Coban/self-adherent bandages   Offloading for Diabetic Foot Ulcers Offloading ordered   Dressing Change Due 04/11/25   Wound Length (cm) 1.7 cm   Wound Width (cm) 1.2 cm   Wound Depth (cm) 0.2 cm   Wound Surface Area (cm^2) 2.04 cm^2   Change in Wound Size % (l*w) 59.2   Wound Volume (cm^3) 0.408 cm^3   Post-Procedure Length (cm) 1.7 cm   Post-Procedure Width (cm) 1.2 cm   Post-Procedure Depth (cm) 0.3 cm   Post-Procedure Surface Area (cm^2) 2.04 cm^2   Post-Procedure Volume (cm^3) 0.612 cm^3   Wound Assessment Slough   Drainage Amount Moderate (25-50%)   Drainage Description Yellow   Odor None   Rosa-wound Assessment Fragile;Maceration   Margins Defined edges   Wound Thickness Description not for Pressure Injury Full thickness   Wound 08/09/24 Right;Plantar   Date First Assessed/Time First Assessed: 08/09/24 1124   Wound Location Orientation: Right;Plantar   Wound Image    Wound Etiology Diabetic   Dressing Status New dressing applied   Wound Cleansed Wound cleanser   Dressing/Treatment Alginate with Ag;Roll gauze;Coban/self-adherent bandages   Offloading for Diabetic Foot Ulcers Offloading ordered   Dressing Change Due 04/11/25   Wound Length (cm) 1 cm   Wound Width (cm) 0.2 cm   Wound Depth (cm) 0.1 cm   Wound Surface Area (cm^2) 0.2 cm^2   Change in Wound Size % (l*w) 97.71   Wound Volume (cm^3) 0.02 cm^3   Wound Healing % 99   Wound Assessment Epithelialization;Eschar moist   Drainage Amount Scant (moist but

## 2025-04-11 ENCOUNTER — HOSPITAL ENCOUNTER (OUTPATIENT)
Facility: HOSPITAL | Age: 75
Discharge: HOME | End: 2025-04-11
Payer: MEDICARE

## 2025-04-11 VITALS
OXYGEN SATURATION: 99 % | TEMPERATURE: 98.1 F | RESPIRATION RATE: 16 BRPM | SYSTOLIC BLOOD PRESSURE: 118 MMHG | DIASTOLIC BLOOD PRESSURE: 45 MMHG | HEART RATE: 56 BPM

## 2025-04-11 DIAGNOSIS — Z79.4 TYPE 2 DIABETES MELLITUS WITHOUT COMPLICATION, WITH LONG-TERM CURRENT USE OF INSULIN: ICD-10-CM

## 2025-04-11 DIAGNOSIS — E11.9 TYPE 2 DIABETES MELLITUS WITHOUT COMPLICATION, WITH LONG-TERM CURRENT USE OF INSULIN: ICD-10-CM

## 2025-04-11 DIAGNOSIS — L97.412 MIDFOOT ULCERATION, RIGHT, WITH FAT LAYER EXPOSED (HCC): Primary | ICD-10-CM

## 2025-04-11 PROCEDURE — 6370000000 HC RX 637 (ALT 250 FOR IP): Performed by: PODIATRIST

## 2025-04-11 RX ORDER — LIDOCAINE HYDROCHLORIDE 20 MG/ML
JELLY TOPICAL ONCE
Status: COMPLETED | OUTPATIENT
Start: 2025-04-11 | End: 2025-04-11

## 2025-04-11 RX ORDER — SODIUM CHLOR/HYPOCHLOROUS ACID 0.033 %
SOLUTION, IRRIGATION IRRIGATION ONCE
OUTPATIENT
Start: 2025-04-11 | End: 2025-04-11

## 2025-04-11 RX ORDER — NEOMYCIN/BACITRACIN/POLYMYXINB 3.5-400-5K
OINTMENT (GRAM) TOPICAL ONCE
OUTPATIENT
Start: 2025-04-11 | End: 2025-04-11

## 2025-04-11 RX ORDER — LIDOCAINE HYDROCHLORIDE 20 MG/ML
JELLY TOPICAL ONCE
OUTPATIENT
Start: 2025-04-11 | End: 2025-04-11

## 2025-04-11 RX ADMIN — LIDOCAINE HYDROCHLORIDE: 20 JELLY TOPICAL at 10:10

## 2025-04-18 ENCOUNTER — HOSPITAL ENCOUNTER (EMERGENCY)
Facility: HOSPITAL | Age: 75
Discharge: HOME | End: 2025-04-18
Attending: EMERGENCY MEDICINE
Payer: MEDICARE

## 2025-04-18 ENCOUNTER — APPOINTMENT (OUTPATIENT)
Facility: HOSPITAL | Age: 75
End: 2025-04-18
Payer: MEDICARE

## 2025-04-18 VITALS
SYSTOLIC BLOOD PRESSURE: 169 MMHG | HEIGHT: 63 IN | WEIGHT: 120 LBS | HEART RATE: 72 BPM | DIASTOLIC BLOOD PRESSURE: 71 MMHG | OXYGEN SATURATION: 99 % | BODY MASS INDEX: 21.26 KG/M2 | RESPIRATION RATE: 20 BRPM | TEMPERATURE: 98.2 F
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
## 2025-04-18 DIAGNOSIS — S80.01XA CONTUSION OF RIGHT KNEE, INITIAL ENCOUNTER: ICD-10-CM

## 2025-04-18 DIAGNOSIS — E11.65 HYPERGLYCEMIA DUE TO DIABETES MELLITUS (HCC): Primary | ICD-10-CM

## 2025-04-18 LAB
ALBUMIN SERPL-MCNC: 3.4 G/DL (ref 3.4–5)
ALBUMIN/GLOB SERPL: 0.9 (ref 0.8–1.7)
ALP SERPL-CCNC: 136 U/L (ref 45–117)
ALT SERPL-CCNC: 27 U/L (ref 13–56)
ANION GAP SERPL CALC-SCNC: 6 MMOL/L (ref 3–18)
AST SERPL-CCNC: 28 U/L (ref 10–38)
BASOPHILS # BLD: 0.05 K/UL (ref 0–0.1)
BASOPHILS NFR BLD: 0.5 % (ref 0–2)
BILIRUB SERPL-MCNC: 0.3 MG/DL (ref 0.2–1)
BUN SERPL-MCNC: 16 MG/DL (ref 7–18)
BUN/CREAT SERPL: 20 (ref 12–20)
CALCIUM SERPL-MCNC: 8.8 MG/DL (ref 8.5–10.1)
CHLORIDE SERPL-SCNC: 100 MMOL/L (ref 100–111)
CHP ED QC CHECK: NORMAL
CO2 SERPL-SCNC: 27 MMOL/L (ref 21–32)
CREAT SERPL-MCNC: 0.8 MG/DL (ref 0.6–1.3)
DIFFERENTIAL METHOD BLD: ABNORMAL
EOSINOPHIL # BLD: 0 K/UL (ref 0–0.4)
EOSINOPHIL NFR BLD: 0 % (ref 0–5)
ERYTHROCYTE [DISTWIDTH] IN BLOOD BY AUTOMATED COUNT: 14.7 % (ref 11.6–14.5)
GLOBULIN SER CALC-MCNC: 3.6 G/DL (ref 2–4)
GLUCOSE BLD STRIP.AUTO-MCNC: 272 MG/DL (ref 70–110)
GLUCOSE BLD STRIP.AUTO-MCNC: 334 MG/DL (ref 70–110)
GLUCOSE BLD-MCNC: 334 MG/DL
GLUCOSE SERPL-MCNC: 324 MG/DL (ref 74–99)
HCT VFR BLD AUTO: 38.2 % (ref 35–45)
HGB BLD-MCNC: 12.3 G/DL (ref 12–16)
IMM GRANULOCYTES # BLD AUTO: 0.07 K/UL (ref 0–0.04)
IMM GRANULOCYTES NFR BLD AUTO: 0.7 % (ref 0–0.5)
LYMPHOCYTES # BLD: 0.88 K/UL (ref 0.9–3.3)
LYMPHOCYTES NFR BLD: 9.4 % (ref 21–52)
MAGNESIUM SERPL-MCNC: 1.6 MG/DL (ref 1.6–2.6)
MCH RBC QN AUTO: 31.8 PG (ref 24–34)
MCHC RBC AUTO-ENTMCNC: 32.2 G/DL (ref 31–37)
MCV RBC AUTO: 98.7 FL (ref 78–100)
MONOCYTES # BLD: 0.53 K/UL (ref 0.05–1.2)
MONOCYTES NFR BLD: 5.7 % (ref 3–10)
NEUTS SEG # BLD: 7.83 K/UL (ref 1.8–8)
NEUTS SEG NFR BLD: 83.7 % (ref 40–73)
NRBC # BLD: 0 K/UL (ref 0–0.01)
NRBC BLD-RTO: 0 PER 100 WBC
PLATELET # BLD AUTO: 318 K/UL (ref 135–420)
PMV BLD AUTO: 9.6 FL (ref 9.2–11.8)
POTASSIUM SERPL-SCNC: 4.2 MMOL/L (ref 3.5–5.5)
PROT SERPL-MCNC: 7 G/DL (ref 6.4–8.2)
RBC # BLD AUTO: 3.87 M/UL (ref 4.2–5.3)
SODIUM SERPL-SCNC: 133 MMOL/L (ref 136–145)
WBC # BLD AUTO: 9.4 K/UL (ref 4.6–13.2)

## 2025-04-18 PROCEDURE — 93005 ELECTROCARDIOGRAM TRACING: CPT | Performed by: EMERGENCY MEDICINE

## 2025-04-18 PROCEDURE — 99285 EMERGENCY DEPT VISIT HI MDM: CPT

## 2025-04-18 PROCEDURE — 2580000003 HC RX 258: Performed by: EMERGENCY MEDICINE

## 2025-04-18 PROCEDURE — 80053 COMPREHEN METABOLIC PANEL: CPT

## 2025-04-18 PROCEDURE — 85025 COMPLETE CBC W/AUTO DIFF WBC: CPT

## 2025-04-18 PROCEDURE — 71045 X-RAY EXAM CHEST 1 VIEW: CPT

## 2025-04-18 PROCEDURE — 83735 ASSAY OF MAGNESIUM: CPT

## 2025-04-18 PROCEDURE — 73502 X-RAY EXAM HIP UNI 2-3 VIEWS: CPT

## 2025-04-18 PROCEDURE — 73562 X-RAY EXAM OF KNEE 3: CPT

## 2025-04-18 PROCEDURE — 96360 HYDRATION IV INFUSION INIT: CPT

## 2025-04-18 PROCEDURE — 82962 GLUCOSE BLOOD TEST: CPT

## 2025-04-18 RX ORDER — 0.9 % SODIUM CHLORIDE 0.9 %
1000 INTRAVENOUS SOLUTION INTRAVENOUS ONCE
Status: COMPLETED | OUTPATIENT
Start: 2025-04-18 | End: 2025-04-18

## 2025-04-18 RX ADMIN — SODIUM CHLORIDE 1000 ML: 900 INJECTION, SOLUTION INTRAVENOUS at 12:47

## 2025-04-18 NOTE — DISCHARGE INSTRUCTIONS
Thank you for choosing [facility]'s Emergency Department for your care. It is our privilege to provide excellent care for you in your time of need. In the next several days, you may receive a survey via mail or email about your experience with our team. We would appreciate you taking a few minutes to complete the survey, as we use this information to learn what we have done well and what we could be doing better. Thank you for trusting us with your care.    -----------------------------------------------------------------------------  Below you will find a list of your tests from today's visit.   Labs  Recent Results (from the past 12 hours)   CBC with Auto Differential    Collection Time: 04/18/25 11:15 AM   Result Value Ref Range    WBC 9.4 4.6 - 13.2 K/uL    RBC 3.87 (L) 4.20 - 5.30 M/uL    Hemoglobin 12.3 12.0 - 16.0 g/dL    Hematocrit 38.2 35.0 - 45.0 %    MCV 98.7 78.0 - 100.0 FL    MCH 31.8 24.0 - 34.0 PG    MCHC 32.2 31.0 - 37.0 g/dL    RDW 14.7 (H) 11.6 - 14.5 %    Platelets 318 135 - 420 K/uL    MPV 9.6 9.2 - 11.8 FL    Nucleated RBCs 0.0 0  WBC    nRBC 0.00 0.00 - 0.01 K/uL    Neutrophils % 83.7 (H) 40.0 - 73.0 %    Lymphocytes % 9.4 (L) 21.0 - 52.0 %    Monocytes % 5.7 3.0 - 10.0 %    Eosinophils % 0.0 0.0 - 5.0 %    Basophils % 0.5 0.0 - 2.0 %    Immature Granulocytes % 0.7 (H) 0.0 - 0.5 %    Neutrophils Absolute 7.83 1.80 - 8.00 K/UL    Lymphocytes Absolute 0.88 (L) 0.90 - 3.30 K/UL    Monocytes Absolute 0.53 0.05 - 1.20 K/UL    Eosinophils Absolute 0.00 0.00 - 0.40 K/UL    Basophils Absolute 0.05 0.00 - 0.10 K/UL    Immature Granulocytes Absolute 0.07 (H) 0.00 - 0.04 K/UL    Differential Type AUTOMATED     Comprehensive Metabolic Panel    Collection Time: 04/18/25 11:15 AM   Result Value Ref Range    Sodium 133 (L) 136 - 145 mmol/L    Potassium 4.2 3.5 - 5.5 mmol/L    Chloride 100 100 - 111 mmol/L    CO2 27 21 - 32 mmol/L    Anion Gap 6 3.0 - 18 mmol/L    Glucose 324 (H) 74 -

## 2025-04-18 NOTE — ED TRIAGE NOTES
Pt brought in by ambulance from home c/o fall. Per EMS, BG was 300, pt states she does not take her insulin and that 300 is normal for her. Pt also states that falling is \"normal for her\". ,Pt endorses pain in R knee from the fall, no obvious deformity noted.  A&Ox4  PMH:   Active Ambulatory Problems     Diagnosis Date Noted    Delta-9-tetrahydrocannabinol (THC) dependence ([facility]) 07/31/2021    Chronic anticoagulation 04/16/2019    Anxiety 10/15/2017    S/P total gastrectomy and Bety-en-Y esophagojejunal anastomosis 05/23/2012    Anemia 03/01/2016    Diabetic infection of right foot ([facility]) 04/13/2018    Cellulitis of right foot 12/05/2018    Arterial thrombosis ([facility]) 03/18/2021    Sleepwalking disorder 07/22/2017    Debility     Iron deficiency anemia, unspecified 09/07/2021    Atherosclerotic PVD with ulceration ([facility]) 03/13/2015    PAD (peripheral artery disease) 07/22/2017    Lupus anticoagulant syndrome 12/29/2016    Diabetic ulcer of right midfoot with fat layer exposed ([facility]) 12/04/2018    CAD (coronary artery disease) 04/13/2018    Ischemic toe 07/31/2021    Major osseous defect, right ankle and foot 05/17/2024    Midfoot ulceration, right, with fat layer exposed ([facility]) 05/17/2024    Right foot infection 05/23/2024    Type 2 diabetes mellitus without complication, with long-term current use of insulin 05/23/2024    Multiple drug allergies 05/23/2024    Hypokalemia 05/25/2024    Bacteremia 05/26/2024    OM (osteomyelitis) ([facility]) 05/29/2024    Superficial disruption of operation wound, subsequent encounter 05/29/2024    Ulcer of right heel, with necrosis of muscle ([facility]) 07/19/2024    Iliac artery occlusion, left ([facility]) 01/28/2025    Labial abscess 01/29/2025    Groin abscess 01/30/2025     Resolved Ambulatory Problems     Diagnosis Date Noted    No Resolved Ambulatory Problems     Past Medical History:   Diagnosis Date    Arthritis     Asthma     Autoimmune disease     Cancer ([facility])     Chronic pain     Diabetes ([facility])

## 2025-04-18 NOTE — ED PROVIDER NOTES
[facility] EMERGENCY DEPT  EMERGENCY DEPARTMENT ENCOUNTER    Patient Name: Angelica Duncan  MRN: [id number]  YOB: 1950  Provider: Ghulam Appiah MD  PCP: Ailyn Villafana MD   Time/Date of evaluation: 11:11 AM EDT on 4/18/25    History of Presenting Illness     Chief Complaint   Patient presents with    Hyperglycemia    Fall       History Provided by: EMS and Patient   History is limited by: Nothing    HISTORY (Narative):   Angelica Duncan is a 74 y.o. female with a PMHX of asthma, diabetes, neuropathy  who presents to the emergency department (room 6) by EMS C/O elevated blood sugar onset chronically but elevated today. Associated sxs include nonsyncopal fall this morning, right hip and right knee pain. Patient denies any other sxs or complaints.  Patient states that she has frequent falls due to her neuropathy.  She was found on the floor by home health nursing this morning who called EMS.  When EMS arrived patient had gotten herself up and gotten to the chair.  Patient states that she falls frequently during her diabetic neuropathy.  EMS that her blood sugar was over 300 today which is why they transported.  Patient states that her blood sugar typically runs in the 300 range because she does not like the way insulin makes her feel and she does not take her insulin.    Nursing Notes were all reviewed and agreed with or any disagreements were addressed in the HPI.    Past History     PAST MEDICAL HISTORY:  Past Medical History:   Diagnosis Date    Arthritis     hands and feet    Asthma     Autoimmune disease     lupus anticoagulant-causes clotting    Cancer (HCC)     carcinoma in situ of cervix and uterus; inside bottom lip    Chronic pain     right foot    Diabetes (HCC) 2013    Dizzy spells     h/o    Full dentures     upper and lower; no adhesive used    GERD (gastroesophageal reflux disease)     Hearing loss     bilateral wears hearing aids    History of blood transfusion     multiple    Hyperlipidemia  Emergency Department  [address]  [facility] [address] [phone]    As needed, If symptoms worsen      I Ghulam Appiah MD am the primary clinician of record.    Dragon Disclaimer     Please note that this dictation was completed with [employer], the computer voice recognition software.  Quite often unanticipated grammatical, syntax, homophones, and other interpretive errors are inadvertently transcribed by the computer software.  Please disregard these errors.  Please excuse any errors that have escaped final proofreading.    Ghulam Appiah MD  (Electronically signed)            Ghulam Appiah MD  04/18/25 1825

## 2025-04-20 LAB
EKG ATRIAL RATE: 74 BPM
EKG DIAGNOSIS: NORMAL
EKG P AXIS: 10 DEGREES
EKG P-R INTERVAL: 154 MS
EKG Q-T INTERVAL: 414 MS
EKG QRS DURATION: 74 MS
EKG QTC CALCULATION (BAZETT): 459 MS
EKG R AXIS: 31 DEGREES
EKG T AXIS: 70 DEGREES
EKG VENTRICULAR RATE: 74 BPM

## 2025-04-25 ENCOUNTER — HOSPITAL ENCOUNTER (OUTPATIENT)
Facility: HOSPITAL | Age: 75
Discharge: HOME | End: 2025-04-25
Payer: MEDICARE

## 2025-04-25 VITALS
RESPIRATION RATE: 16 BRPM | OXYGEN SATURATION: 99 % | TEMPERATURE: 97.9 F | DIASTOLIC BLOOD PRESSURE: 49 MMHG | SYSTOLIC BLOOD PRESSURE: 151 MMHG | HEART RATE: 61 BPM

## 2025-04-25 DIAGNOSIS — E11.9 TYPE 2 DIABETES MELLITUS WITHOUT COMPLICATION, WITH LONG-TERM CURRENT USE OF INSULIN (HCC): ICD-10-CM

## 2025-04-25 DIAGNOSIS — L97.412 MIDFOOT ULCERATION, RIGHT, WITH FAT LAYER EXPOSED (HCC): Primary | ICD-10-CM

## 2025-04-25 DIAGNOSIS — Z79.4 TYPE 2 DIABETES MELLITUS WITHOUT COMPLICATION, WITH LONG-TERM CURRENT USE OF INSULIN (HCC): ICD-10-CM

## 2025-04-25 PROCEDURE — 6370000000 HC RX 637 (ALT 250 FOR IP): Performed by: PODIATRIST

## 2025-04-25 PROCEDURE — 99213 OFFICE O/P EST LOW 20 MIN: CPT

## 2025-04-25 RX ORDER — NEOMYCIN/BACITRACIN/POLYMYXINB 3.5-400-5K
OINTMENT (GRAM) TOPICAL ONCE
OUTPATIENT
Start: 2025-04-25 | End: 2025-04-25

## 2025-04-25 RX ORDER — SODIUM CHLOR/HYPOCHLOROUS ACID 0.033 %
SOLUTION, IRRIGATION IRRIGATION ONCE
OUTPATIENT
Start: 2025-04-25 | End: 2025-04-25

## 2025-04-25 RX ORDER — LIDOCAINE HYDROCHLORIDE 20 MG/ML
JELLY TOPICAL ONCE
OUTPATIENT
Start: 2025-04-25 | End: 2025-04-25

## 2025-04-25 RX ORDER — LIDOCAINE HYDROCHLORIDE 20 MG/ML
JELLY TOPICAL ONCE
Status: COMPLETED | OUTPATIENT
Start: 2025-04-25 | End: 2025-04-25

## 2025-04-25 RX ADMIN — LIDOCAINE HYDROCHLORIDE: 20 JELLY TOPICAL at 09:17

## 2025-04-25 ASSESSMENT — PAIN SCALES - GENERAL: PAINLEVEL_OUTOF10: 5

## 2025-04-25 NOTE — DISCHARGE INSTRUCTIONS
Discharge Instructions from  Wound Care Clinic at [facility]   [address]   [address]  [place] [address] [phone] Fax [phone]    NAME:  Angelica Duncan  YOB: 1950  MEDICAL RECORD NUMBER:  [id number]  DATE:  4/25/25    Wound Cleansing:   Do not scrub or use excessive force.  Cleanse wound prior to applying a clean dressing with:  [] Normal Saline [] Keep Wound Dry in Shower    [x] Wound Cleanser   [] Cleanse wound with Mild Soap & Water  [] May Shower at Discharge   [] Other:      Topical Treatments:  Do not apply lotions, creams, or ointments to wound bed unless directed.   [] Apply moisturizing lotion to skin surrounding the wound prior to dressing change.  [] Apply antifungal ointment to skin surrounding the wound prior to dressing change.  [] Apply thin film of moisture barrier ointment to skin immediately around wound.  [] Other:       Dressings:           Wound Location left lateral foot   [x] Apply Primary Dressing:       [] MediHoney Gel [x] Alginate with Silver [] Alginate   [] Collagen [] Collagen with Silver   [] Santyl with Moisten saline gauze     [] Hydrocolloid   [] MediHoney Alginate [] Foam with Silver   [] Foam   [] Hydrofera Blue    [x] Mepilex Border    [] Moisten with Saline [] Hydrogel [] Mepitel     [] Bactroban/Mupirocin [] Polysporin  [] Other:    [] Pack wound loosely with  [] Iodoform   [] Plain Packing  [] Other   [] Cover and Secure with:     [] Gauze [] Bárbara [] Kerlix   [] Ace Wrap [] Cover Roll Tape [] ABD     [] Other:    Avoid contact of tape with skin.  [x] Change dressing: [] Daily    [] Every Other Day [x] Three times per week   [] Once a week [] Do Not Change Dressing   [] Other:     Negative Pressure:           Wound Location:   [] Pressure@           mm/Hg  []Continuous []Intermittent   [] Black  [] White Foam [] Other:   []Change dressing: []Three times per week    []Other:     Pressure Relief:  [] When sitting, shift position

## 2025-04-25 NOTE — FLOWSHEET NOTE
04/25/25 0906   Wound 01/24/25 Foot Left;Lateral   Date First Assessed: 01/24/25   Primary Wound Type: (c) Other (comment)  Location: Foot  Wound Location Orientation: Left;Lateral   Wound Image    Wound Etiology Diabetic   Dressing Status New dressing applied   Wound Cleansed Wound cleanser   Dressing/Treatment Alginate with Ag;Silicone border   Offloading for Diabetic Foot Ulcers Felt or foam   Dressing Change Due 05/09/25   Wound Length (cm) 0.7 cm   Wound Width (cm) 0.9 cm   Wound Depth (cm) 0.1 cm   Wound Surface Area (cm^2) 0.63 cm^2   Change in Wound Size % (l*w) 87.4   Wound Volume (cm^3) 0.063 cm^3   Wound Assessment Devitalized tissue   Drainage Amount Moderate (25-50%)   Drainage Description Serosanguinous   Odor None   Rosa-wound Assessment Fragile;Maceration   Margins Defined edges   Wound Thickness Description not for Pressure Injury Partial thickness   Wound Follow Up   Require Follow Up Yes     Left foot and toes cold, patient complaining of pain and heaviness, patient had recent vascular procedure. Dr. Lance recommended patient go to ER.

## 2025-05-09 ENCOUNTER — HOSPITAL ENCOUNTER (OUTPATIENT)
Facility: HOSPITAL | Age: 75
Discharge: HOME | End: 2025-05-09
Payer: MEDICARE

## 2025-05-09 VITALS
SYSTOLIC BLOOD PRESSURE: 140 MMHG | HEART RATE: 94 BPM | TEMPERATURE: 98.4 F | OXYGEN SATURATION: 100 % | RESPIRATION RATE: 16 BRPM | DIASTOLIC BLOOD PRESSURE: 76 MMHG

## 2025-05-09 DIAGNOSIS — E11.9 TYPE 2 DIABETES MELLITUS WITHOUT COMPLICATION, WITH LONG-TERM CURRENT USE OF INSULIN (HCC): ICD-10-CM

## 2025-05-09 DIAGNOSIS — Z79.4 TYPE 2 DIABETES MELLITUS WITHOUT COMPLICATION, WITH LONG-TERM CURRENT USE OF INSULIN (HCC): ICD-10-CM

## 2025-05-09 DIAGNOSIS — L97.412 MIDFOOT ULCERATION, RIGHT, WITH FAT LAYER EXPOSED (HCC): Primary | ICD-10-CM

## 2025-05-09 PROCEDURE — 11042 DBRDMT SUBQ TIS 1ST 20SQCM/<: CPT

## 2025-05-09 PROCEDURE — 6370000000 HC RX 637 (ALT 250 FOR IP): Performed by: PODIATRIST

## 2025-05-09 RX ORDER — LIDOCAINE HYDROCHLORIDE 20 MG/ML
JELLY TOPICAL ONCE
OUTPATIENT
Start: 2025-05-09 | End: 2025-05-09

## 2025-05-09 RX ORDER — SODIUM CHLOR/HYPOCHLOROUS ACID 0.033 %
SOLUTION, IRRIGATION IRRIGATION ONCE
OUTPATIENT
Start: 2025-05-09 | End: 2025-05-09

## 2025-05-09 RX ORDER — NEOMYCIN/BACITRACIN/POLYMYXINB 3.5-400-5K
OINTMENT (GRAM) TOPICAL ONCE
OUTPATIENT
Start: 2025-05-09 | End: 2025-05-09

## 2025-05-09 RX ORDER — LIDOCAINE HYDROCHLORIDE 20 MG/ML
JELLY TOPICAL ONCE
Status: COMPLETED | OUTPATIENT
Start: 2025-05-09 | End: 2025-05-09

## 2025-05-09 RX ADMIN — LIDOCAINE HYDROCHLORIDE: 20 JELLY TOPICAL at 09:13

## 2025-05-09 ASSESSMENT — PAIN SCALES - GENERAL: PAINLEVEL_OUTOF10: 7

## 2025-05-09 NOTE — FLOWSHEET NOTE
05/09/25 0900   Wound 01/24/25 Foot Left;Lateral   Date First Assessed: 01/24/25   Primary Wound Type: (c) Other (comment)  Location: Foot  Wound Location Orientation: Left;Lateral   Wound Image     Wound Etiology Diabetic   Dressing Status New dressing applied   Wound Cleansed Vashe   Dressing/Treatment Collagen;Alginate with Ag;Roll gauze;Coban/self-adherent bandages   Offloading for Diabetic Foot Ulcers Felt or foam   Dressing Change Due 05/23/25   Wound Length (cm) 0.8 cm   Wound Width (cm) 1 cm   Wound Depth (cm) 0.1 cm   Wound Surface Area (cm^2) 0.8 cm^2   Change in Wound Size % (l*w) 84   Wound Volume (cm^3) 0.08 cm^3   Wound Assessment Slough   Drainage Amount Moderate (25-50%)   Drainage Description Yellow   Odor None   Rosa-wound Assessment Maceration   Margins Defined edges   Wound Thickness Description not for Pressure Injury Partial thickness   Wound 05/09/25 Toe (Comment  which one) Left 2nd toe   Date First Assessed: 05/09/25   Wound Approximate Age at First Assessment (Weeks): 1 weeks  Location: Toe (Comment  which one)  Wound Location Orientation: Left  Wound Description (Comments): 2nd toe   Wound Image     Wound Etiology Other   Dressing Status New dressing applied   Wound Cleansed Vashe   Dressing/Treatment Collagen;Alginate with Ag;Roll gauze;Coban/self-adherent bandages   Offloading for Diabetic Foot Ulcers Offloading not ordered   Dressing Change Due 05/23/25   Wound Length (cm) 0.6 cm   Wound Width (cm) 1 cm   Wound Depth (cm) 0.1 cm   Wound Surface Area (cm^2) 0.6 cm^2   Wound Volume (cm^3) 0.06 cm^3   Wound Assessment Blood filled blister;Eschar moist   Drainage Amount Small (< 25%)   Drainage Description Serosanguinous   Odor None   Rosa-wound Assessment Fragile   Margins Attached edges   Wound Thickness Description not for Pressure Injury Partial thickness   Wound Follow Up   Require Follow Up Yes

## 2025-05-09 NOTE — DISCHARGE INSTRUCTIONS
Discharge Instructions from  Wound Care Clinic at [facility]   [address]   [address]  [place] [address] [phone] Fax [phone]    NAME:  Angelica Duncan  YOB: 1950  MEDICAL RECORD NUMBER:  [id number]  DATE:  5/9/25    Wound Cleansing:   Do not scrub or use excessive force.  Cleanse wound prior to applying a clean dressing with:  [] Normal Saline [] Keep Wound Dry in Shower    [x] Wound Cleanser   [] Cleanse wound with Mild Soap & Water  [] May Shower at Discharge   [] Other:      Topical Treatments:  Do not apply lotions, creams, or ointments to wound bed unless directed.   [] Apply moisturizing lotion to skin surrounding the wound prior to dressing change.  [] Apply antifungal ointment to skin surrounding the wound prior to dressing change.  [] Apply thin film of moisture barrier ointment to skin immediately around wound.  [] Other:       Dressings:           Wound Location left foot   [x] Apply Primary Dressing:       [] MediHoney Gel [x] Alginate with Silver [] Alginate   [x] Collagen [] Collagen with Silver   [] Santyl with Moisten saline gauze     [] Hydrocolloid   [] MediHoney Alginate [] Foam with Silver   [] Foam   [] Hydrofera Blue    [] Mepilex Border    [] Moisten with Saline [] Hydrogel [] Mepitel     [] Bactroban/Mupirocin [] Polysporin  [] Other:    [] Pack wound loosely with  [] Iodoform   [] Plain Packing  [] Other   [x] Cover and Secure with:     [] Gauze [x] Bárbara [] Kerlix   [] Ace Wrap [] Cover Roll Tape [x] ABD     [x] Other: Coban   Avoid contact of tape with skin.  [x] Change dressing: [] Daily    [] Every Other Day [x] Three times per week   [] Once a week [] Do Not Change Dressing   [] Other:     Negative Pressure:           Wound Location:   [] Pressure@           mm/Hg  []Continuous []Intermittent   [] Black  [] White Foam [] Other:   []Change dressing: []Three times per week    []Other:     Pressure Relief:  [] When sitting, shift position

## 2025-05-23 ENCOUNTER — HOSPITAL ENCOUNTER (OUTPATIENT)
Facility: HOSPITAL | Age: 75
Discharge: HOME | End: 2025-05-23
Payer: MEDICARE

## 2025-05-23 VITALS
SYSTOLIC BLOOD PRESSURE: 141 MMHG | RESPIRATION RATE: 18 BRPM | DIASTOLIC BLOOD PRESSURE: 72 MMHG | TEMPERATURE: 99.5 F | OXYGEN SATURATION: 100 % | HEART RATE: 114 BPM

## 2025-05-23 DIAGNOSIS — L97.412 MIDFOOT ULCERATION, RIGHT, WITH FAT LAYER EXPOSED (HCC): Primary | ICD-10-CM

## 2025-05-23 DIAGNOSIS — Z79.4 TYPE 2 DIABETES MELLITUS WITHOUT COMPLICATION, WITH LONG-TERM CURRENT USE OF INSULIN (HCC): ICD-10-CM

## 2025-05-23 DIAGNOSIS — E11.9 TYPE 2 DIABETES MELLITUS WITHOUT COMPLICATION, WITH LONG-TERM CURRENT USE OF INSULIN (HCC): ICD-10-CM

## 2025-05-23 PROCEDURE — 6370000000 HC RX 637 (ALT 250 FOR IP): Performed by: PODIATRIST

## 2025-05-23 PROCEDURE — 11042 DBRDMT SUBQ TIS 1ST 20SQCM/<: CPT

## 2025-05-23 RX ORDER — LIDOCAINE HYDROCHLORIDE 20 MG/ML
JELLY TOPICAL ONCE
Status: COMPLETED | OUTPATIENT
Start: 2025-05-23 | End: 2025-05-23

## 2025-05-23 RX ORDER — LIDOCAINE HYDROCHLORIDE 20 MG/ML
JELLY TOPICAL ONCE
OUTPATIENT
Start: 2025-05-23 | End: 2025-05-23

## 2025-05-23 RX ORDER — NEOMYCIN/BACITRACIN/POLYMYXINB 3.5-400-5K
OINTMENT (GRAM) TOPICAL ONCE
OUTPATIENT
Start: 2025-05-23 | End: 2025-05-23

## 2025-05-23 RX ORDER — SODIUM CHLOR/HYPOCHLOROUS ACID 0.033 %
SOLUTION, IRRIGATION IRRIGATION ONCE
OUTPATIENT
Start: 2025-05-23 | End: 2025-05-23

## 2025-05-23 RX ADMIN — LIDOCAINE HYDROCHLORIDE: 20 JELLY TOPICAL at 09:08

## 2025-05-23 NOTE — DISCHARGE INSTRUCTIONS
Discharge Instructions from  Wound Care Clinic at [facility]   [address]   [address]  [place] [address] [phone] Fax [phone]    NAME:  Angelica Duncan  YOB: 1950  MEDICAL RECORD NUMBER:  [id number]  DATE: 5/23/25    Wound Cleansing:   Do not scrub or use excessive force.  Cleanse wound prior to applying a clean dressing with:  [] Normal Saline [] Keep Wound Dry in Shower    [x] Wound Cleanser   [] Cleanse wound with Mild Soap & Water  [] May Shower at Discharge   [] Other:      Topical Treatments:  Do not apply lotions, creams, or ointments to wound bed unless directed.   [] Apply moisturizing lotion to skin surrounding the wound prior to dressing change.  [] Apply antifungal ointment to skin surrounding the wound prior to dressing change.  [] Apply thin film of moisture barrier ointment to skin immediately around wound.  [] Other:       Dressings:           Wound Location left toe and left foot   [x] Apply Primary Dressing:       [] MediHoney Gel [x] Alginate with Silver [] Alginate   [x] Collagen [] Collagen with Silver   [] Santyl with Moisten saline gauze     [] Hydrocolloid   [] MediHoney Alginate [] Foam with Silver   [] Foam   [] Hydrofera Blue    [] Mepilex Border    [] Moisten with Saline [] Hydrogel [] Mepitel     [] Bactroban/Mupirocin [] Polysporin  [] Other:    [] Pack wound loosely with  [] Iodoform   [] Plain Packing  [] Other   [x] Cover and Secure with:     [x] Gauze [x] Bárbara [] Kerlix   [] Ace Wrap [] Cover Roll Tape [] ABD     [x] Other: coban   Avoid contact of tape with skin.  [x] Change dressing: [] Daily    [] Every Other Day [x] Three times per week   [] Once a week [] Do Not Change Dressing   [] Other:     Negative Pressure:           Wound Location:   [] Pressure@           mm/Hg  []Continuous []Intermittent   [] Black  [] White Foam [] Other:   []Change dressing: []Three times per week    []Other:     Pressure Relief:  [] When sitting,

## 2025-05-23 NOTE — FLOWSHEET NOTE
05/23/25 0901   Wound 05/09/25 Toe (Comment  which one) Left 2nd toe   Date First Assessed: 05/09/25   Wound Approximate Age at First Assessment (Weeks): 1 weeks  Location: Toe (Comment  which one)  Wound Location Orientation: Left  Wound Description (Comments): 2nd toe   Wound Image    Wound Etiology Other   Dressing Status New dressing applied   Wound Cleansed Vashe   Dressing/Treatment Collagen;Alginate with Ag;Roll gauze;Coban/self-adherent bandages   Offloading for Diabetic Foot Ulcers Offloading not required   Dressing Change Due 05/30/25   Wound Length (cm) 0.5 cm   Wound Width (cm) 0.4 cm   Wound Depth (cm) 0.1 cm   Wound Surface Area (cm^2) 0.2 cm^2   Change in Wound Size % (l*w) 66.67   Wound Volume (cm^3) 0.02 cm^3   Wound Healing % 67   Post-Procedure Length (cm) 0.5 cm   Post-Procedure Width (cm) 0.4 cm   Post-Procedure Depth (cm) 0.2 cm   Post-Procedure Surface Area (cm^2) 0.2 cm^2   Post-Procedure Volume (cm^3) 0.04 cm^3   Wound Assessment Eschar moist   Drainage Amount Scant (moist but unmeasurable)   Drainage Description Serosanguinous   Odor None   Rosa-wound Assessment Intact   Margins Attached edges   Wound Thickness Description not for Pressure Injury Partial thickness   Wound 01/24/25 Foot Left;Lateral   Date First Assessed: 01/24/25   Primary Wound Type: (c) Other (comment)  Location: Foot  Wound Location Orientation: Left;Lateral   Wound Image    Wound Etiology Diabetic   Dressing Status New dressing applied   Wound Cleansed Vashe   Dressing/Treatment Collagen;Alginate with Ag;Roll gauze;Coban/self-adherent bandages   Offloading for Diabetic Foot Ulcers Felt or foam   Dressing Change Due 05/30/25   Wound Length (cm) 0.8 cm   Wound Width (cm) 0.9 cm   Wound Depth (cm) 0.1 cm   Wound Surface Area (cm^2) 0.72 cm^2   Change in Wound Size % (l*w) 85.6   Wound Volume (cm^3) 0.072 cm^3   Post-Procedure Length (cm) 0.8 cm   Post-Procedure Width (cm) 0.9 cm   Post-Procedure Depth (cm) 0.2 cm

## 2025-06-02 ENCOUNTER — HOSPITAL ENCOUNTER (EMERGENCY)
Facility: HOSPITAL | Age: 75
Discharge: ANOTHER HOSPITAL | End: 2025-06-02
Attending: EMERGENCY MEDICINE
Payer: MEDICARE

## 2025-06-02 ENCOUNTER — APPOINTMENT (OUTPATIENT)
Facility: HOSPITAL | Age: 75
End: 2025-06-02
Payer: MEDICARE

## 2025-06-02 VITALS
DIASTOLIC BLOOD PRESSURE: 88 MMHG | TEMPERATURE: 98.3 F | WEIGHT: 120 LBS | OXYGEN SATURATION: 100 % | SYSTOLIC BLOOD PRESSURE: 146 MMHG | HEART RATE: 105 BPM | BODY MASS INDEX: 21.26 KG/M2 | RESPIRATION RATE: 17 BRPM

## 2025-06-02 DIAGNOSIS — A41.9 SEPSIS, DUE TO UNSPECIFIED ORGANISM, UNSPECIFIED WHETHER ACUTE ORGAN DYSFUNCTION PRESENT (HCC): ICD-10-CM

## 2025-06-02 DIAGNOSIS — I73.9 PAD (PERIPHERAL ARTERY DISEASE): ICD-10-CM

## 2025-06-02 DIAGNOSIS — R73.9 HYPERGLYCEMIA: Primary | ICD-10-CM

## 2025-06-02 DIAGNOSIS — D72.825 BANDEMIA: ICD-10-CM

## 2025-06-02 DIAGNOSIS — I74.10 AORTIC THROMBUS (HCC): ICD-10-CM

## 2025-06-02 LAB
ACB COMPLEX DNA BLD POS QL NAA+NON-PROBE: NOT DETECTED
ACCESSION NUMBER, LLC1M: ABNORMAL
ALBUMIN SERPL-MCNC: 2.8 G/DL (ref 3.4–5)
ALBUMIN/GLOB SERPL: 0.8 (ref 0.8–1.7)
ALP SERPL-CCNC: 128 U/L (ref 45–117)
ALT SERPL-CCNC: 23 U/L (ref 10–35)
ANION GAP SERPL CALC-SCNC: 15 MMOL/L (ref 3–18)
AST SERPL-CCNC: 27 U/L (ref 10–38)
B FRAGILIS DNA BLD POS QL NAA+NON-PROBE: NOT DETECTED
BASOPHILS # BLD: 0 K/UL (ref 0–0.1)
BASOPHILS NFR BLD: 0 % (ref 0–2)
BILIRUB SERPL-MCNC: 0.3 MG/DL (ref 0.2–1)
BIOFIRE TEST COMMENT: ABNORMAL
BUN SERPL-MCNC: 21 MG/DL (ref 6–23)
BUN/CREAT SERPL: 33 (ref 12–20)
C ALBICANS DNA BLD POS QL NAA+NON-PROBE: NOT DETECTED
C AURIS DNA BLD POS QL NAA+NON-PROBE: NOT DETECTED
C GATTII+NEOFOR DNA BLD POS QL NAA+N-PRB: NOT DETECTED
C GLABRATA DNA BLD POS QL NAA+NON-PROBE: NOT DETECTED
C KRUSEI DNA BLD POS QL NAA+NON-PROBE: NOT DETECTED
C PARAP DNA BLD POS QL NAA+NON-PROBE: NOT DETECTED
C TROPICLS DNA BLD POS QL NAA+NON-PROBE: NOT DETECTED
CALCIUM SERPL-MCNC: 9.1 MG/DL (ref 8.5–10.1)
CHLORIDE SERPL-SCNC: 91 MMOL/L (ref 98–107)
CO2 SERPL-SCNC: 24 MMOL/L (ref 21–32)
CREAT SERPL-MCNC: 0.64 MG/DL (ref 0.6–1.3)
DIFFERENTIAL METHOD BLD: ABNORMAL
E CLOAC COMP DNA BLD POS NAA+NON-PROBE: NOT DETECTED
E COLI DNA BLD POS QL NAA+NON-PROBE: NOT DETECTED
E FAECALIS DNA BLD POS QL NAA+NON-PROBE: NOT DETECTED
E FAECIUM DNA BLD POS QL NAA+NON-PROBE: NOT DETECTED
EKG ATRIAL RATE: 85 BPM
EKG DIAGNOSIS: NORMAL
EKG P AXIS: 24 DEGREES
EKG P-R INTERVAL: 146 MS
EKG Q-T INTERVAL: 350 MS
EKG QRS DURATION: 72 MS
EKG QTC CALCULATION (BAZETT): 416 MS
EKG R AXIS: 40 DEGREES
EKG T AXIS: 98 DEGREES
EKG VENTRICULAR RATE: 85 BPM
ENTEROBACTERALES DNA BLD POS NAA+N-PRB: NOT DETECTED
EOSINOPHIL # BLD: 0 K/UL (ref 0–0.4)
EOSINOPHIL NFR BLD: 0 % (ref 0–5)
ERYTHROCYTE [DISTWIDTH] IN BLOOD BY AUTOMATED COUNT: 18.7 % (ref 11.6–14.5)
GLOBULIN SER CALC-MCNC: 3.6 G/DL (ref 2–4)
GLUCOSE SERPL-MCNC: 300 MG/DL (ref 74–108)
GP B STREP DNA BLD POS QL NAA+NON-PROBE: NOT DETECTED
HAEM INFLU DNA BLD POS QL NAA+NON-PROBE: NOT DETECTED
HCT VFR BLD AUTO: 30.8 % (ref 35–45)
HGB BLD-MCNC: 9.9 G/DL (ref 12–16)
IMM GRANULOCYTES # BLD AUTO: 0 K/UL
IMM GRANULOCYTES NFR BLD AUTO: 0 %
K OXYTOCA DNA BLD POS QL NAA+NON-PROBE: NOT DETECTED
KLEBSIELLA SP DNA BLD POS QL NAA+NON-PRB: NOT DETECTED
KLEBSIELLA SP DNA BLD POS QL NAA+NON-PRB: NOT DETECTED
L MONOCYTOG DNA BLD POS QL NAA+NON-PROBE: NOT DETECTED
LACTATE BLD-SCNC: 1.99 MMOL/L (ref 0.4–2)
LIPASE SERPL-CCNC: 9 U/L (ref 13–75)
LYMPHOCYTES # BLD: 0.34 K/UL (ref 0.9–3.6)
LYMPHOCYTES NFR BLD: 2 % (ref 21–52)
MAGNESIUM SERPL-MCNC: 1.7 MG/DL (ref 1.6–2.6)
MCH RBC QN AUTO: 28.8 PG (ref 24–34)
MCHC RBC AUTO-ENTMCNC: 32.1 G/DL (ref 31–37)
MCV RBC AUTO: 89.5 FL (ref 78–100)
MECA+MECC+MREJ ISLT/SPM QL: DETECTED
MONOCYTES # BLD: 0.51 K/UL (ref 0.05–1.2)
MONOCYTES NFR BLD: 3 % (ref 3–10)
N MEN DNA BLD POS QL NAA+NON-PROBE: NOT DETECTED
NEUTS BAND NFR BLD MANUAL: 12 % (ref 0–5)
NEUTS SEG # BLD: 16.25 K/UL (ref 1.8–8)
NEUTS SEG NFR BLD: 83 % (ref 40–73)
NRBC # BLD: 0 K/UL (ref 0–0.01)
NRBC BLD-RTO: 0 PER 100 WBC
P AERUGINOSA DNA BLD POS NAA+NON-PROBE: NOT DETECTED
PLATELET # BLD AUTO: 258 K/UL (ref 135–420)
PLATELET COMMENT: ABNORMAL
PMV BLD AUTO: 9.3 FL (ref 9.2–11.8)
POTASSIUM SERPL-SCNC: 4.5 MMOL/L (ref 3.5–5.5)
PROT SERPL-MCNC: 6.4 G/DL (ref 6.4–8.2)
PROTEUS SP DNA BLD POS QL NAA+NON-PROBE: NOT DETECTED
RBC # BLD AUTO: 3.44 M/UL (ref 4.2–5.3)
RBC MORPH BLD: ABNORMAL
RESISTANT GENE TARGETS: ABNORMAL
S AUREUS DNA BLD POS QL NAA+NON-PROBE: DETECTED
S AUREUS+CONS DNA BLD POS NAA+NON-PROBE: DETECTED
S EPIDERMIDIS DNA BLD POS QL NAA+NON-PRB: NOT DETECTED
S LUGDUNENSIS DNA BLD POS QL NAA+NON-PRB: NOT DETECTED
S MALTOPHILIA DNA BLD POS QL NAA+NON-PRB: NOT DETECTED
S MARCESCENS DNA BLD POS NAA+NON-PROBE: NOT DETECTED
S PNEUM DNA BLD POS QL NAA+NON-PROBE: NOT DETECTED
S PYO DNA BLD POS QL NAA+NON-PROBE: NOT DETECTED
SALMONELLA DNA BLD POS QL NAA+NON-PROBE: NOT DETECTED
SODIUM SERPL-SCNC: 131 MMOL/L (ref 136–145)
STREPTOCOCCUS DNA BLD POS NAA+NON-PROBE: NOT DETECTED
TROPONIN T SERPL HS-MCNC: 24.8 NG/L (ref 0–14)
WBC # BLD AUTO: 17.1 K/UL (ref 4.6–13.2)

## 2025-06-02 PROCEDURE — 85025 COMPLETE CBC W/AUTO DIFF WBC: CPT

## 2025-06-02 PROCEDURE — 6360000002 HC RX W HCPCS: Performed by: EMERGENCY MEDICINE

## 2025-06-02 PROCEDURE — 87077 CULTURE AEROBIC IDENTIFY: CPT

## 2025-06-02 PROCEDURE — 80053 COMPREHEN METABOLIC PANEL: CPT

## 2025-06-02 PROCEDURE — 2500000003 HC RX 250 WO HCPCS: Performed by: EMERGENCY MEDICINE

## 2025-06-02 PROCEDURE — 96365 THER/PROPH/DIAG IV INF INIT: CPT

## 2025-06-02 PROCEDURE — 87186 SC STD MICRODIL/AGAR DIL: CPT

## 2025-06-02 PROCEDURE — 99291 CRITICAL CARE FIRST HOUR: CPT

## 2025-06-02 PROCEDURE — 96375 TX/PRO/DX INJ NEW DRUG ADDON: CPT

## 2025-06-02 PROCEDURE — 74177 CT ABD & PELVIS W/CONTRAST: CPT

## 2025-06-02 PROCEDURE — 83605 ASSAY OF LACTIC ACID: CPT

## 2025-06-02 PROCEDURE — 96376 TX/PRO/DX INJ SAME DRUG ADON: CPT

## 2025-06-02 PROCEDURE — 87154 CUL TYP ID BLD PTHGN 6+ TRGT: CPT

## 2025-06-02 PROCEDURE — 6360000004 HC RX CONTRAST MEDICATION: Performed by: EMERGENCY MEDICINE

## 2025-06-02 PROCEDURE — 83690 ASSAY OF LIPASE: CPT

## 2025-06-02 PROCEDURE — 84484 ASSAY OF TROPONIN QUANT: CPT

## 2025-06-02 PROCEDURE — 87040 BLOOD CULTURE FOR BACTERIA: CPT

## 2025-06-02 PROCEDURE — 93005 ELECTROCARDIOGRAM TRACING: CPT | Performed by: EMERGENCY MEDICINE

## 2025-06-02 PROCEDURE — 2580000003 HC RX 258: Performed by: EMERGENCY MEDICINE

## 2025-06-02 PROCEDURE — 83735 ASSAY OF MAGNESIUM: CPT

## 2025-06-02 PROCEDURE — 71275 CT ANGIOGRAPHY CHEST: CPT

## 2025-06-02 PROCEDURE — 71045 X-RAY EXAM CHEST 1 VIEW: CPT

## 2025-06-02 RX ORDER — ONDANSETRON 2 MG/ML
4 INJECTION INTRAMUSCULAR; INTRAVENOUS
Status: COMPLETED | OUTPATIENT
Start: 2025-06-02 | End: 2025-06-02

## 2025-06-02 RX ORDER — HYDROCODONE BITARTRATE AND IBUPROFEN 10; 200 MG/1; MG/1
TABLET ORAL
COMMUNITY

## 2025-06-02 RX ORDER — TRAMADOL HYDROCHLORIDE 50 MG/1
50 TABLET ORAL EVERY 6 HOURS PRN
COMMUNITY

## 2025-06-02 RX ORDER — MORPHINE SULFATE 4 MG/ML
4 INJECTION, SOLUTION INTRAMUSCULAR; INTRAVENOUS
Refills: 0 | Status: COMPLETED | OUTPATIENT
Start: 2025-06-02 | End: 2025-06-02

## 2025-06-02 RX ORDER — FUROSEMIDE 20 MG/1
20 TABLET ORAL DAILY
COMMUNITY

## 2025-06-02 RX ORDER — IOPAMIDOL 755 MG/ML
100 INJECTION, SOLUTION INTRAVASCULAR
Status: COMPLETED | OUTPATIENT
Start: 2025-06-02 | End: 2025-06-02

## 2025-06-02 RX ORDER — METOPROLOL TARTRATE 25 MG/1
25 TABLET, FILM COATED ORAL
COMMUNITY

## 2025-06-02 RX ORDER — 0.9 % SODIUM CHLORIDE 0.9 %
30 INTRAVENOUS SOLUTION INTRAVENOUS ONCE
Status: COMPLETED | OUTPATIENT
Start: 2025-06-02 | End: 2025-06-02

## 2025-06-02 RX ADMIN — MORPHINE SULFATE 4 MG: 4 INJECTION, SOLUTION INTRAMUSCULAR; INTRAVENOUS at 09:12

## 2025-06-02 RX ADMIN — AZTREONAM 1000 MG: 1 INJECTION, POWDER, LYOPHILIZED, FOR SOLUTION INTRAMUSCULAR; INTRAVENOUS at 12:27

## 2025-06-02 RX ADMIN — ONDANSETRON 4 MG: 2 INJECTION, SOLUTION INTRAMUSCULAR; INTRAVENOUS at 07:48

## 2025-06-02 RX ADMIN — MORPHINE SULFATE 4 MG: 4 INJECTION, SOLUTION INTRAMUSCULAR; INTRAVENOUS at 11:05

## 2025-06-02 RX ADMIN — DOXYCYCLINE 100 MG: 100 INJECTION, POWDER, LYOPHILIZED, FOR SOLUTION INTRAVENOUS at 12:56

## 2025-06-02 RX ADMIN — IOPAMIDOL 100 ML: 755 INJECTION, SOLUTION INTRAVENOUS at 09:14

## 2025-06-02 RX ADMIN — MORPHINE SULFATE 4 MG: 4 INJECTION, SOLUTION INTRAMUSCULAR; INTRAVENOUS at 12:54

## 2025-06-02 RX ADMIN — MORPHINE SULFATE 4 MG: 4 INJECTION, SOLUTION INTRAMUSCULAR; INTRAVENOUS at 07:47

## 2025-06-02 RX ADMIN — SODIUM CHLORIDE 1632 ML: 0.9 INJECTION, SOLUTION INTRAVENOUS at 08:44

## 2025-06-02 ASSESSMENT — PAIN DESCRIPTION - LOCATION
LOCATION: BACK

## 2025-06-02 ASSESSMENT — PAIN DESCRIPTION - DESCRIPTORS
DESCRIPTORS: STABBING
DESCRIPTORS: STABBING
DESCRIPTORS: DULL;STABBING

## 2025-06-02 ASSESSMENT — PAIN SCALES - GENERAL
PAINLEVEL_OUTOF10: 10

## 2025-06-02 NOTE — ED NOTES
MD Appiah aware of patch and requested patch removal due to pt not knowing how long patch had been in place       Fentanyl patch on upper abdomen area removed with ALEXIS Membreno as witness to disposal of patch and removal of patch.

## 2025-06-02 NOTE — ED NOTES
Lab called with critical blood result: Critical blood cultures all 4 sets , 2 aerobic and 2 aneraobic, gram + cocci in clusters      Christina Hughes, DEWEY  06/02/25 1913

## 2025-06-02 NOTE — ED NOTES
Report was called to Evelin Borges RN at [facility]. [facility] is at the bedside for transport. Report was given to [facility].     Supervisor came and signed EMTALA. EMTALA was copied and  has copy and [facility] transports have EMTALA.

## 2025-06-02 NOTE — ED PROVIDER NOTES
[facility] EMERGENCY DEPT  EMERGENCY DEPARTMENT ENCOUNTER    Patient Name: Angelica Duncan  MRN: [id number]  YOB: 1950  Provider: Ghulam Appiah MD  PCP: Ailyn Villafana MD   Time/Date of evaluation: 6:59 AM EDT on 6/2/25    History of Presenting Illness     Chief Complaint   Patient presents with    Back Pain       History Provided by: EMS and Patient   History is limited by: Nothing    HISTORY (Narative):   Angelica Duncan is a 74 y.o. female with a PMHX of lupus, cervical cancer, diabetes, hyperlipidemia, PAD, CAD (s/p CABG), bowel resection, total gastrectomy, ALEXI ([place])  who presents to the emergency department (room 8) by EMS C/O left-sided pain onset 1 week ago. Associated sxs include recent surgery. Patient denies any other sxs or complaints.  EMS states that they were called for back pain, they administered 25 mcg of intranasal fentanyl prior to transport.    Nursing Notes were all reviewed and agreed with or any disagreements were addressed in the HPI.    Past History     PAST MEDICAL HISTORY:  Past Medical History:   Diagnosis Date    Arthritis     hands and feet    Asthma     Autoimmune disease     lupus anticoagulant-causes clotting    Cancer (HCC)     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    Lung collapse     2015    Nausea & vomiting 5/23/2012    chronic    Neuropathy     right foot    Other ill-defined conditions(799.89)     (2 mutated genes) hyper coag. state     Pneumonia 07/22/2017    PONV (postoperative nausea and vomiting)     Positive cardiolipin antibodies     Psychiatric disorder     anxiety

## 2025-06-04 LAB
BACTERIA SPEC CULT: ABNORMAL
BACTERIA SPEC CULT: ABNORMAL
GRAM STN SPEC: ABNORMAL
SERVICE CMNT-IMP: ABNORMAL
SERVICE CMNT-IMP: ABNORMAL

## (undated) DEVICE — BANDAGE COMPR W4INXL10YD WHITE/BEIGE E MTRX HK LOOP CLSR

## (undated) DEVICE — PLUS HANDPIECE WITH SPRAY TIP: Brand: SURGILAV

## (undated) DEVICE — SUTURE VCRL + 3-0 L27IN ABSRB UD PS-2 L19MM 3/8 CIR PRIM VCP427H

## (undated) DEVICE — IODOFORM PACKING STRIP: Brand: CURITY

## (undated) DEVICE — GLOVE ORANGE PI 8   MSG9080

## (undated) DEVICE — NON-ADHERENT STRIPS,OIL EMULSION: Brand: CURITY

## (undated) DEVICE — KENDALL SCD EXPRESS SLEEVES, KNEE LENGTH, MEDIUM: Brand: KENDALL SCD

## (undated) DEVICE — SUT ETHLN 3-0 18IN PS2 BLK --

## (undated) DEVICE — SUT ETHLN 5-0 18IN P3 BLK --

## (undated) DEVICE — BANDAGE,GAUZE,BULKEE II,4.5"X4.1YD,STRL: Brand: MEDLINE

## (undated) DEVICE — PAD,ABDOMINAL,5"X9",ST,LF,25/BX: Brand: MEDLINE INDUSTRIES, INC.

## (undated) DEVICE — UNDERCAST PADDING: Brand: DEROYAL

## (undated) DEVICE — BLADE SAW W5.5XL25MM THK0.38MM CUT THK0.43MM REPL S STL SAG

## (undated) DEVICE — ABDOMINAL PAD: Brand: DERMACEA

## (undated) DEVICE — SPONGE LAP W18XL18IN WHT COT 4 PLY FLD STRUNG RADPQ DISP ST 2 PER PACK

## (undated) DEVICE — DRAIN SURG 10FR L1/8IN RND CHN FULL FLUT HEAT POLISHED PERF

## (undated) DEVICE — SWAB CULT LIQ STUART AGR AERB MOD IN BRK SGL RAYON TIP PLAS

## (undated) DEVICE — (D)PACK EXTREMITY CUSTOM -- DISC BY MFR USE ITEM 338833

## (undated) DEVICE — PREMIUM WET SKIN PREP TRAY: Brand: MEDLINE INDUSTRIES, INC.

## (undated) DEVICE — GARMENT,MEDLINE,DVT,INT,CALF,MED, GEN2: Brand: MEDLINE

## (undated) DEVICE — CURITY NON-ADHERENT STRIPS: Brand: CURITY

## (undated) DEVICE — SUTURE NONABSORBABLE MONOFILAMENT 2-0 FS 18 IN ETHILON 664H

## (undated) DEVICE — INTENDED FOR TISSUE SEPARATION, AND OTHER PROCEDURES THAT REQUIRE A SHARP SURGICAL BLADE TO PUNCTURE OR CUT.: Brand: BARD-PARKER ® CARBON RIB-BACK BLADES

## (undated) DEVICE — GAUZE,SPONGE,8"X4",12PLY,XRAY,STRL,LF: Brand: MEDLINE

## (undated) DEVICE — SUTURE VCRL SZ 3-0 L54IN ABSRB UD LIGAPAK REEL POLYGLACTIN J285G

## (undated) DEVICE — BANDAGE,ELASTIC,ESMARK,STERILE,4"X9',LF: Brand: MEDLINE

## (undated) DEVICE — TRAY PREP DRY W/ PREM GLV 2 APPL 6 SPNG 2 UNDPD 1 OVERWRAP

## (undated) DEVICE — KERLIX BANDAGE ROLL: Brand: KERLIX

## (undated) DEVICE — SHEET,DRAPE,40X58,STERILE: Brand: MEDLINE

## (undated) DEVICE — STRIP SKIN CLSR 1/4INX1.5IN REINF WND NYL CURAD

## (undated) DEVICE — CONTAINER,SPECIMEN,O.R.STRL,4.5OZ: Brand: MEDLINE

## (undated) DEVICE — DEVON™ KNEE AND BODY STRAP 60" X 3" (1.5 M X 7.6 CM): Brand: DEVON

## (undated) DEVICE — Z DISCONTINUED SPONGE LAPAROTOMY W18XL18IN WHITE STRUNG RADIOPAQUE STERILE

## (undated) DEVICE — BNDG SOF-FORM 2X75 STRL LF --

## (undated) DEVICE — ZIMMER® STERILE DISPOSABLE TOURNIQUET CUFF WITH PROTECTIVE SLEEVE AND PLC, SINGLE PORT, SINGLE BLADDER, 18 IN. (46 CM)

## (undated) DEVICE — VERSAJET II HYDROSURGERY SYSTEM HANDSET, 45DEG 14MM EXACT: Brand: VERSAJET

## (undated) DEVICE — SPONGE GZ W4XL4IN COT 12 PLY TYP VII WVN C FLD DSGN

## (undated) DEVICE — MASTISOL ADHESIVE LIQ 2/3ML

## (undated) DEVICE — TOWEL,OR,DSP,ST,BLUE,STD,4/PK,20PK/CS: Brand: MEDLINE

## (undated) DEVICE — INTENDED FOR TISSUE SEPARATION, AND OTHER PROCEDURES THAT REQUIRE A SHARP SURGICAL BLADE TO PUNCTURE OR CUT.: Brand: BARD-PARKER SAFETY BLADES SIZE 10, STERILE

## (undated) DEVICE — STRAP,POSITIONING,KNEE/BODY,FOAM,4X60": Brand: MEDLINE

## (undated) DEVICE — PRECISION (9.0 X 0.51 X 25.0MM)

## (undated) DEVICE — BANDAGE COMPR W3INXL5YD BGE POLY COT E RECOVERABLE BRTH W/

## (undated) DEVICE — CATH IV AUTOGRD ORN 14GA 45MM -- INSYTE-N

## (undated) DEVICE — STRIP SKIN CLSR W0.25XL4IN WHT SPUNBOUND FBR NYL HI ADH

## (undated) DEVICE — SPONGE GZ W4XL4IN COT 12 PLY TYP VII WVN C FLD DSGN STERILE

## (undated) DEVICE — SYR 5ML 1/5 GRAD LL NSAF LF --

## (undated) DEVICE — PACK PROCEDURE SURG EXTREMITY CUST

## (undated) DEVICE — BNDG ELAS STD 4INCH --

## (undated) DEVICE — GAUZE SPONGES,USP TYPE VII GAUZE, 12 PLY: Brand: CURITY

## (undated) DEVICE — STRIP WND PK W0.25INXL5YD IODO GZ TIGHTLY WVN CURAD

## (undated) DEVICE — CONTAINER,SPECIMEN,OR STERILE,4OZ: Brand: MEDLINE

## (undated) DEVICE — SPONGE LAP 18X18IN STRL -- 5/PK

## (undated) DEVICE — SUTURE VCRL SZ 3-0 L18IN ABSRB UD PS-2 L19MM 1/2 CIR J497G

## (undated) DEVICE — SWAB CULT LIQ STUART AGR AERB MOD IN BRK SGL RAYON TIP PLAS 220099] BECTON DICKINSON MICRO]

## (undated) DEVICE — SWAB CULT SGL AMIES W/O CHAR FOR THRT VAG SKIN HRT CULTSWAB

## (undated) DEVICE — SYR 20ML LL STRL LF --

## (undated) DEVICE — NEEDLE HYPO 18GA L1.5IN PNK POLYPR HUB S STL THN WALL FILL

## (undated) DEVICE — NEEDLE 25GA X 1.5 IN ECLIPSE

## (undated) DEVICE — HANDPIECE SET WITH HIGH FLOW TIP AND SUCTION TUBE: Brand: INTERPULSE

## (undated) DEVICE — 3M™ TEGADERM™ TRANSPARENT FILM DRESSING FRAME STYLE, 1626W, 4 IN X 4-3/4 IN (10 CM X 12 CM), 50/CT 4CT/CASE: Brand: 3M™ TEGADERM™

## (undated) DEVICE — X-RAY SPONGES,12 PLY: Brand: DERMACEA

## (undated) DEVICE — PREP SKN CHLRAPRP APL 26ML STR --

## (undated) DEVICE — DRAIN SURG L49IN DIA3/32IN 10IN H SIL W/O TRCR END PERF

## (undated) DEVICE — GOWN,AURORA,NONRNF,XL,30/CS: Brand: MEDLINE

## (undated) DEVICE — DRAPE TWL SURG 16X26IN BLU ORB04] ALLCARE INC]

## (undated) DEVICE — 4-PORT MANIFOLD: Brand: NEPTUNE 2

## (undated) DEVICE — SUTURE VICRYL SZ 3-0 L54IN ABSRB UD LIGAPAK REEL POLYGLACTIN J285G

## (undated) DEVICE — STERILE POLYISOPRENE POWDER-FREE SURGICAL GLOVES: Brand: PROTEXIS

## (undated) DEVICE — BANDAGE COMPR EXSANGUATION SGL LAYERED NO CLSR 9FT LEN 4IN W

## (undated) DEVICE — (D)STRIP SKN CLSR 0.5X4IN WHT --

## (undated) DEVICE — BANDAGE COMPR W4INXL5YD ELAS CLP CLSR

## (undated) DEVICE — NEEDLE HYPO 27GA L1.25IN GRY POLYPR HUB S STL REG BVL STR

## (undated) DEVICE — SUTURE VCRL SZ 4-0 L27IN ABSRB UD L19MM PS-2 3/8 CIR PRIM J426H

## (undated) DEVICE — SKIN MARKER,REGULAR TIP WITH RULER AND LABELS: Brand: DEVON

## (undated) DEVICE — AMD ANTIMICROBIAL SUPER SPONGES,MEDIUM: Brand: KERLIX

## (undated) DEVICE — TELFA NON-ADHERENT PADS PREPAK: Brand: TELFA

## (undated) DEVICE — TUBERCULIN SAFETY SYRINGE WITH NEEDLE: Brand: MONOJECT

## (undated) DEVICE — Device

## (undated) DEVICE — DRSG GZ OIL EMUL CURAD 3X3 --

## (undated) DEVICE — GAUZE SPONGES,12 PLY: Brand: CURITY